# Patient Record
Sex: MALE | Race: WHITE | NOT HISPANIC OR LATINO | Employment: UNEMPLOYED | URBAN - METROPOLITAN AREA
[De-identification: names, ages, dates, MRNs, and addresses within clinical notes are randomized per-mention and may not be internally consistent; named-entity substitution may affect disease eponyms.]

---

## 2024-08-20 ENCOUNTER — APPOINTMENT (OUTPATIENT)
Dept: GENERAL RADIOLOGY | Facility: CLINIC | Age: 24
DRG: 853 | End: 2024-08-20
Attending: STUDENT IN AN ORGANIZED HEALTH CARE EDUCATION/TRAINING PROGRAM
Payer: MEDICARE

## 2024-08-20 ENCOUNTER — HOSPITAL ENCOUNTER (INPATIENT)
Facility: CLINIC | Age: 24
LOS: 30 days | Discharge: HOSPICE | DRG: 853 | End: 2024-09-19
Attending: INTERNAL MEDICINE | Admitting: INTERNAL MEDICINE
Payer: MEDICARE

## 2024-08-20 ENCOUNTER — APPOINTMENT (OUTPATIENT)
Dept: GENERAL RADIOLOGY | Facility: CLINIC | Age: 24
DRG: 853 | End: 2024-08-20
Attending: INTERNAL MEDICINE
Payer: MEDICARE

## 2024-08-20 DIAGNOSIS — R05.8 DRY COUGH: ICD-10-CM

## 2024-08-20 DIAGNOSIS — F84.0 AUTISM: ICD-10-CM

## 2024-08-20 DIAGNOSIS — B95.5 BACTEREMIA DUE TO STREPTOCOCCUS: ICD-10-CM

## 2024-08-20 DIAGNOSIS — R21 RASH: ICD-10-CM

## 2024-08-20 DIAGNOSIS — Z98.890 HISTORY OF BRONCHOSCOPY: ICD-10-CM

## 2024-08-20 DIAGNOSIS — I50.20 HEART FAILURE WITH REDUCED EJECTION FRACTION, NYHA CLASS II (H): Primary | ICD-10-CM

## 2024-08-20 DIAGNOSIS — Z78.9 TAKES DIETARY SUPPLEMENTS: ICD-10-CM

## 2024-08-20 DIAGNOSIS — R78.81 BACTEREMIA DUE TO STREPTOCOCCUS: ICD-10-CM

## 2024-08-20 DIAGNOSIS — F41.9 ANXIETY: ICD-10-CM

## 2024-08-20 DIAGNOSIS — R52 PAIN: ICD-10-CM

## 2024-08-20 DIAGNOSIS — R23.8 DRY SCALP: ICD-10-CM

## 2024-08-20 DIAGNOSIS — K59.00 CONSTIPATION, UNSPECIFIED CONSTIPATION TYPE: ICD-10-CM

## 2024-08-20 DIAGNOSIS — R57.0 CARDIOGENIC SHOCK (H): ICD-10-CM

## 2024-08-20 LAB
ALLEN'S TEST: ABNORMAL
BASE EXCESS BLDA CALC-SCNC: 1.8 MMOL/L (ref -3–3)
BASE EXCESS BLDV CALC-SCNC: 2.6 MMOL/L (ref -3–3)
BASOPHILS # BLD AUTO: 0.1 10E3/UL (ref 0–0.2)
BASOPHILS NFR BLD AUTO: 1 %
COHGB MFR BLD: 93.3 % (ref 96–97)
EOSINOPHIL # BLD AUTO: 0 10E3/UL (ref 0–0.7)
EOSINOPHIL NFR BLD AUTO: 0 %
ERYTHROCYTE [DISTWIDTH] IN BLOOD BY AUTOMATED COUNT: 13.2 % (ref 10–15)
GLUCOSE BLDC GLUCOMTR-MCNC: 133 MG/DL (ref 70–99)
HCO3 BLD-SCNC: 28 MMOL/L (ref 21–28)
HCO3 BLDV-SCNC: 29 MMOL/L (ref 21–28)
HCT VFR BLD AUTO: 44.2 % (ref 40–53)
HGB BLD-MCNC: 14.4 G/DL (ref 13.3–17.7)
IMM GRANULOCYTES # BLD: 0.1 10E3/UL
IMM GRANULOCYTES NFR BLD: 1 %
LACTATE SERPL-SCNC: 1.4 MMOL/L (ref 0.7–2)
LYMPHOCYTES # BLD AUTO: 4 10E3/UL (ref 0.8–5.3)
LYMPHOCYTES NFR BLD AUTO: 20 %
MCH RBC QN AUTO: 28.2 PG (ref 26.5–33)
MCHC RBC AUTO-ENTMCNC: 32.6 G/DL (ref 31.5–36.5)
MCV RBC AUTO: 87 FL (ref 78–100)
MONOCYTES # BLD AUTO: 2.8 10E3/UL (ref 0–1.3)
MONOCYTES NFR BLD AUTO: 14 %
NEUTROPHILS # BLD AUTO: 13.2 10E3/UL (ref 1.6–8.3)
NEUTROPHILS NFR BLD AUTO: 64 %
NRBC # BLD AUTO: 0 10E3/UL
NRBC BLD AUTO-RTO: 0 /100
O2/TOTAL GAS SETTING VFR VENT: 100 %
O2/TOTAL GAS SETTING VFR VENT: 100 %
OXYHGB MFR BLDV: 71 % (ref 70–75)
PCO2 BLD: 46 MM HG (ref 35–45)
PCO2 BLDV: 52 MM HG (ref 40–50)
PH BLD: 7.38 [PH] (ref 7.35–7.45)
PH BLDV: 7.36 [PH] (ref 7.32–7.43)
PLATELET # BLD AUTO: 315 10E3/UL (ref 150–450)
PO2 BLD: 66 MM HG (ref 80–105)
PO2 BLDV: 39 MM HG (ref 25–47)
RBC # BLD AUTO: 5.11 10E6/UL (ref 4.4–5.9)
SAO2 % BLDA: 92 % (ref 92–100)
SAO2 % BLDV: 72 % (ref 70–75)
WBC # BLD AUTO: 20.2 10E3/UL (ref 4–11)

## 2024-08-20 PROCEDURE — 83605 ASSAY OF LACTIC ACID: CPT | Performed by: STUDENT IN AN ORGANIZED HEALTH CARE EDUCATION/TRAINING PROGRAM

## 2024-08-20 PROCEDURE — 83880 ASSAY OF NATRIURETIC PEPTIDE: CPT | Performed by: STUDENT IN AN ORGANIZED HEALTH CARE EDUCATION/TRAINING PROGRAM

## 2024-08-20 PROCEDURE — 71045 X-RAY EXAM CHEST 1 VIEW: CPT

## 2024-08-20 PROCEDURE — 83735 ASSAY OF MAGNESIUM: CPT | Performed by: STUDENT IN AN ORGANIZED HEALTH CARE EDUCATION/TRAINING PROGRAM

## 2024-08-20 PROCEDURE — 71045 X-RAY EXAM CHEST 1 VIEW: CPT | Mod: 26 | Performed by: RADIOLOGY

## 2024-08-20 PROCEDURE — 85652 RBC SED RATE AUTOMATED: CPT | Performed by: STUDENT IN AN ORGANIZED HEALTH CARE EDUCATION/TRAINING PROGRAM

## 2024-08-20 PROCEDURE — 82805 BLOOD GASES W/O2 SATURATION: CPT | Performed by: STUDENT IN AN ORGANIZED HEALTH CARE EDUCATION/TRAINING PROGRAM

## 2024-08-20 PROCEDURE — 85025 COMPLETE CBC W/AUTO DIFF WBC: CPT | Performed by: STUDENT IN AN ORGANIZED HEALTH CARE EDUCATION/TRAINING PROGRAM

## 2024-08-20 PROCEDURE — 84100 ASSAY OF PHOSPHORUS: CPT | Performed by: STUDENT IN AN ORGANIZED HEALTH CARE EDUCATION/TRAINING PROGRAM

## 2024-08-20 PROCEDURE — 99291 CRITICAL CARE FIRST HOUR: CPT | Mod: GC | Performed by: INTERNAL MEDICINE

## 2024-08-20 PROCEDURE — 74018 RADEX ABDOMEN 1 VIEW: CPT | Mod: 26 | Performed by: RADIOLOGY

## 2024-08-20 PROCEDURE — 36415 COLL VENOUS BLD VENIPUNCTURE: CPT | Performed by: INTERNAL MEDICINE

## 2024-08-20 PROCEDURE — 94002 VENT MGMT INPAT INIT DAY: CPT

## 2024-08-20 PROCEDURE — 82248 BILIRUBIN DIRECT: CPT | Performed by: STUDENT IN AN ORGANIZED HEALTH CARE EDUCATION/TRAINING PROGRAM

## 2024-08-20 PROCEDURE — 200N000002 HC R&B ICU UMMC

## 2024-08-20 PROCEDURE — 99292 CRITICAL CARE ADDL 30 MIN: CPT | Mod: GC | Performed by: INTERNAL MEDICINE

## 2024-08-20 PROCEDURE — 86140 C-REACTIVE PROTEIN: CPT | Performed by: STUDENT IN AN ORGANIZED HEALTH CARE EDUCATION/TRAINING PROGRAM

## 2024-08-20 PROCEDURE — 999N000157 HC STATISTIC RCP TIME EA 10 MIN

## 2024-08-20 PROCEDURE — 5A1955Z RESPIRATORY VENTILATION, GREATER THAN 96 CONSECUTIVE HOURS: ICD-10-PCS | Performed by: INTERNAL MEDICINE

## 2024-08-20 PROCEDURE — 82550 ASSAY OF CK (CPK): CPT | Performed by: STUDENT IN AN ORGANIZED HEALTH CARE EDUCATION/TRAINING PROGRAM

## 2024-08-20 PROCEDURE — 84484 ASSAY OF TROPONIN QUANT: CPT | Performed by: STUDENT IN AN ORGANIZED HEALTH CARE EDUCATION/TRAINING PROGRAM

## 2024-08-20 PROCEDURE — 74018 RADEX ABDOMEN 1 VIEW: CPT

## 2024-08-20 PROCEDURE — 87040 BLOOD CULTURE FOR BACTERIA: CPT | Performed by: INTERNAL MEDICINE

## 2024-08-20 RX ORDER — NALOXONE HYDROCHLORIDE 0.4 MG/ML
0.4 INJECTION, SOLUTION INTRAMUSCULAR; INTRAVENOUS; SUBCUTANEOUS
Status: DISCONTINUED | OUTPATIENT
Start: 2024-08-20 | End: 2024-09-19 | Stop reason: HOSPADM

## 2024-08-20 RX ORDER — LIDOCAINE 40 MG/G
CREAM TOPICAL
Status: DISCONTINUED | OUTPATIENT
Start: 2024-08-20 | End: 2024-09-19 | Stop reason: HOSPADM

## 2024-08-20 RX ORDER — NALOXONE HYDROCHLORIDE 0.4 MG/ML
0.2 INJECTION, SOLUTION INTRAMUSCULAR; INTRAVENOUS; SUBCUTANEOUS
Status: DISCONTINUED | OUTPATIENT
Start: 2024-08-20 | End: 2024-09-19 | Stop reason: HOSPADM

## 2024-08-20 RX ORDER — HEPARIN SODIUM 5000 [USP'U]/.5ML
5000 INJECTION, SOLUTION INTRAVENOUS; SUBCUTANEOUS EVERY 8 HOURS
Status: DISCONTINUED | OUTPATIENT
Start: 2024-08-21 | End: 2024-08-22

## 2024-08-20 RX ORDER — MIDAZOLAM HCL IN 0.9 % NACL/PF 1 MG/ML
1-8 PLASTIC BAG, INJECTION (ML) INTRAVENOUS CONTINUOUS
Status: DISCONTINUED | OUTPATIENT
Start: 2024-08-20 | End: 2024-08-26

## 2024-08-20 RX ORDER — NOREPINEPHRINE BITARTRATE 0.06 MG/ML
.01-.6 INJECTION, SOLUTION INTRAVENOUS CONTINUOUS
Status: DISCONTINUED | OUTPATIENT
Start: 2024-08-20 | End: 2024-08-22

## 2024-08-20 RX ORDER — CEFEPIME HYDROCHLORIDE 2 G/1
2 INJECTION, POWDER, FOR SOLUTION INTRAVENOUS EVERY 8 HOURS
Status: DISCONTINUED | OUTPATIENT
Start: 2024-08-21 | End: 2024-08-21

## 2024-08-20 RX ORDER — MEROPENEM 1 G/1
1 INJECTION, POWDER, FOR SOLUTION INTRAVENOUS EVERY 8 HOURS
Status: DISCONTINUED | OUTPATIENT
Start: 2024-08-20 | End: 2024-08-20

## 2024-08-20 RX ORDER — ACETAMINOPHEN 325 MG/1
650 TABLET ORAL EVERY 4 HOURS PRN
Status: DISCONTINUED | OUTPATIENT
Start: 2024-08-20 | End: 2024-08-21

## 2024-08-20 RX ORDER — ACETAMINOPHEN 650 MG/1
650 SUPPOSITORY RECTAL EVERY 4 HOURS PRN
Status: DISCONTINUED | OUTPATIENT
Start: 2024-08-20 | End: 2024-08-21

## 2024-08-20 RX ORDER — IPRATROPIUM BROMIDE AND ALBUTEROL SULFATE 2.5; .5 MG/3ML; MG/3ML
3 SOLUTION RESPIRATORY (INHALATION) ONCE
Status: COMPLETED | OUTPATIENT
Start: 2024-08-21 | End: 2024-08-21

## 2024-08-20 RX ORDER — AMOXICILLIN 250 MG
1 CAPSULE ORAL 2 TIMES DAILY PRN
Status: DISCONTINUED | OUTPATIENT
Start: 2024-08-20 | End: 2024-08-22

## 2024-08-20 RX ORDER — AMOXICILLIN 250 MG
2 CAPSULE ORAL 2 TIMES DAILY PRN
Status: DISCONTINUED | OUTPATIENT
Start: 2024-08-20 | End: 2024-08-22

## 2024-08-20 RX ORDER — POLYETHYLENE GLYCOL 3350 17 G/17G
17 POWDER, FOR SOLUTION ORAL DAILY PRN
Status: DISCONTINUED | OUTPATIENT
Start: 2024-08-20 | End: 2024-09-19 | Stop reason: HOSPADM

## 2024-08-20 ASSESSMENT — ACTIVITIES OF DAILY LIVING (ADL): ADLS_ACUITY_SCORE: 35

## 2024-08-20 NOTE — Clinical Note
dry, intact, no bleeding and no hematoma. 9 fr sheath removed. 8 fr angiseal deployed. Light manual pressure held. Hemostasis achieved. Tegaderm dressing applied.

## 2024-08-20 NOTE — Clinical Note
IABP inserted in the right femoral artery. IABP inserted with 50 cc balloon volume Lot number is: [id number]

## 2024-08-20 NOTE — Clinical Note
Procedure is scheduled in [facility]. Body Of Note (Please Add Your Own Text Here): S/p TBSE today Price (Do Not Change): 0.00 Detail Level: Simple Instructions: This plan will send the code FBSE to the PM system.  DO NOT or CHANGE the price.

## 2024-08-20 NOTE — Clinical Note
Secured with Catheter remains in place at 50cm.    Secured in normal fashion with tegadeDibspace tech.

## 2024-08-21 ENCOUNTER — APPOINTMENT (OUTPATIENT)
Dept: CARDIOLOGY | Facility: CLINIC | Age: 24
DRG: 853 | End: 2024-08-21
Attending: STUDENT IN AN ORGANIZED HEALTH CARE EDUCATION/TRAINING PROGRAM
Payer: MEDICARE

## 2024-08-21 ENCOUNTER — DOCUMENTATION ONLY (OUTPATIENT)
Dept: OTHER | Facility: CLINIC | Age: 24
End: 2024-08-21

## 2024-08-21 ENCOUNTER — APPOINTMENT (OUTPATIENT)
Dept: CT IMAGING | Facility: CLINIC | Age: 24
DRG: 853 | End: 2024-08-21
Attending: STUDENT IN AN ORGANIZED HEALTH CARE EDUCATION/TRAINING PROGRAM
Payer: MEDICARE

## 2024-08-21 ENCOUNTER — APPOINTMENT (OUTPATIENT)
Dept: GENERAL RADIOLOGY | Facility: CLINIC | Age: 24
DRG: 853 | End: 2024-08-21
Attending: STUDENT IN AN ORGANIZED HEALTH CARE EDUCATION/TRAINING PROGRAM
Payer: MEDICARE

## 2024-08-21 LAB
ALBUMIN SERPL BCG-MCNC: 3.3 G/DL (ref 3.5–5.2)
ALBUMIN SERPL BCG-MCNC: 3.4 G/DL (ref 3.5–5.2)
ALBUMIN SERPL BCG-MCNC: 3.5 G/DL (ref 3.5–5.2)
ALBUMIN SERPL BCG-MCNC: 3.5 G/DL (ref 3.5–5.2)
ALBUMIN SERPL BCG-MCNC: 3.6 G/DL (ref 3.5–5.2)
ALBUMIN UR-MCNC: 30 MG/DL
ALLEN'S TEST: ABNORMAL
ALP SERPL-CCNC: 67 U/L (ref 40–150)
ALP SERPL-CCNC: 68 U/L (ref 40–150)
ALP SERPL-CCNC: 73 U/L (ref 40–150)
ALP SERPL-CCNC: 73 U/L (ref 40–150)
ALP SERPL-CCNC: 74 U/L (ref 40–150)
ALT SERPL W P-5'-P-CCNC: 362 U/L (ref 0–70)
ALT SERPL W P-5'-P-CCNC: 368 U/L (ref 0–70)
ALT SERPL W P-5'-P-CCNC: 376 U/L (ref 0–70)
ALT SERPL W P-5'-P-CCNC: 405 U/L (ref 0–70)
ALT SERPL W P-5'-P-CCNC: 415 U/L (ref 0–70)
ANION GAP SERPL CALCULATED.3IONS-SCNC: 12 MMOL/L (ref 7–15)
ANION GAP SERPL CALCULATED.3IONS-SCNC: 13 MMOL/L (ref 7–15)
ANION GAP SERPL CALCULATED.3IONS-SCNC: 14 MMOL/L (ref 7–15)
ANION GAP SERPL CALCULATED.3IONS-SCNC: 14 MMOL/L (ref 7–15)
ANION GAP SERPL CALCULATED.3IONS-SCNC: 17 MMOL/L (ref 7–15)
APPEARANCE UR: CLEAR
AST SERPL W P-5'-P-CCNC: 216 U/L (ref 0–45)
AST SERPL W P-5'-P-CCNC: 267 U/L (ref 0–45)
AST SERPL W P-5'-P-CCNC: 341 U/L (ref 0–45)
AST SERPL W P-5'-P-CCNC: 418 U/L (ref 0–45)
AST SERPL W P-5'-P-CCNC: 435 U/L (ref 0–45)
BASE EXCESS BLDA CALC-SCNC: 1.5 MMOL/L (ref -3–3)
BASE EXCESS BLDA CALC-SCNC: 2.9 MMOL/L (ref -3–3)
BASE EXCESS BLDA CALC-SCNC: 3.4 MMOL/L (ref -3–3)
BASE EXCESS BLDA CALC-SCNC: 5.7 MMOL/L (ref -3–3)
BASE EXCESS BLDA CALC-SCNC: 6.7 MMOL/L (ref -3–3)
BASE EXCESS BLDA CALC-SCNC: 7.1 MMOL/L (ref -3–3)
BASE EXCESS BLDV CALC-SCNC: 2.6 MMOL/L (ref -3–3)
BASE EXCESS BLDV CALC-SCNC: 5.1 MMOL/L (ref -3–3)
BASE EXCESS BLDV CALC-SCNC: 6.9 MMOL/L (ref -3–3)
BASE EXCESS BLDV CALC-SCNC: 7.1 MMOL/L (ref -3–3)
BASOPHILS # BLD AUTO: 0 10E3/UL (ref 0–0.2)
BASOPHILS # BLD AUTO: 0.1 10E3/UL (ref 0–0.2)
BASOPHILS NFR BLD AUTO: 0 %
BASOPHILS NFR BLD AUTO: 1 %
BI-PLANE LVEF ECHO: NORMAL
BILIRUB DIRECT SERPL-MCNC: 0.22 MG/DL (ref 0–0.3)
BILIRUB DIRECT SERPL-MCNC: 0.24 MG/DL (ref 0–0.3)
BILIRUB DIRECT SERPL-MCNC: 0.3 MG/DL (ref 0–0.3)
BILIRUB SERPL-MCNC: 0.6 MG/DL
BILIRUB SERPL-MCNC: 0.7 MG/DL
BILIRUB SERPL-MCNC: 0.8 MG/DL
BILIRUB UR QL STRIP: ABNORMAL
BUN SERPL-MCNC: 11.3 MG/DL (ref 6–20)
BUN SERPL-MCNC: 11.5 MG/DL (ref 6–20)
BUN SERPL-MCNC: 11.7 MG/DL (ref 6–20)
BUN SERPL-MCNC: 12.6 MG/DL (ref 6–20)
BUN SERPL-MCNC: 14.3 MG/DL (ref 6–20)
C PNEUM DNA SPEC QL NAA+PROBE: NOT DETECTED
CALCIUM SERPL-MCNC: 7.9 MG/DL (ref 8.8–10.4)
CALCIUM SERPL-MCNC: 8.2 MG/DL (ref 8.8–10.4)
CALCIUM SERPL-MCNC: 8.2 MG/DL (ref 8.8–10.4)
CALCIUM SERPL-MCNC: 8.3 MG/DL (ref 8.8–10.4)
CALCIUM SERPL-MCNC: 8.4 MG/DL (ref 8.8–10.4)
CHLORIDE SERPL-SCNC: 102 MMOL/L (ref 98–107)
CHLORIDE SERPL-SCNC: 103 MMOL/L (ref 98–107)
CK SERPL-CCNC: 367 U/L (ref 39–308)
COHGB MFR BLD: 93.7 % (ref 96–97)
COHGB MFR BLD: 94.9 % (ref 96–97)
COHGB MFR BLD: 95.8 % (ref 96–97)
COHGB MFR BLD: 98.5 % (ref 96–97)
COHGB MFR BLD: 99.2 % (ref 96–97)
COHGB MFR BLD: >100 % (ref 96–97)
COLOR UR AUTO: YELLOW
CREAT SERPL-MCNC: 0.63 MG/DL (ref 0.67–1.17)
CREAT SERPL-MCNC: 0.68 MG/DL (ref 0.67–1.17)
CREAT SERPL-MCNC: 0.75 MG/DL (ref 0.67–1.17)
CREAT SERPL-MCNC: 0.75 MG/DL (ref 0.67–1.17)
CREAT SERPL-MCNC: 0.86 MG/DL (ref 0.67–1.17)
CRP SERPL-MCNC: 103 MG/L
EGFRCR SERPLBLD CKD-EPI 2021: >90 ML/MIN/1.73M2
EOSINOPHIL # BLD AUTO: 0 10E3/UL (ref 0–0.7)
EOSINOPHIL NFR BLD AUTO: 0 %
ERYTHROCYTE [DISTWIDTH] IN BLOOD BY AUTOMATED COUNT: 13.1 % (ref 10–15)
ERYTHROCYTE [DISTWIDTH] IN BLOOD BY AUTOMATED COUNT: 13.3 % (ref 10–15)
ERYTHROCYTE [DISTWIDTH] IN BLOOD BY AUTOMATED COUNT: 13.3 % (ref 10–15)
ERYTHROCYTE [DISTWIDTH] IN BLOOD BY AUTOMATED COUNT: 13.5 % (ref 10–15)
ERYTHROCYTE [SEDIMENTATION RATE] IN BLOOD BY WESTERGREN METHOD: 11 MM/HR (ref 0–15)
FLUAV H1 2009 PAND RNA SPEC QL NAA+PROBE: NOT DETECTED
FLUAV H1 RNA SPEC QL NAA+PROBE: NOT DETECTED
FLUAV H3 RNA SPEC QL NAA+PROBE: NOT DETECTED
FLUAV RNA SPEC QL NAA+PROBE: NOT DETECTED
FLUBV RNA SPEC QL NAA+PROBE: NOT DETECTED
GLUCOSE BLDC GLUCOMTR-MCNC: 120 MG/DL (ref 70–99)
GLUCOSE BLDC GLUCOMTR-MCNC: 124 MG/DL (ref 70–99)
GLUCOSE BLDC GLUCOMTR-MCNC: 127 MG/DL (ref 70–99)
GLUCOSE BLDC GLUCOMTR-MCNC: 145 MG/DL (ref 70–99)
GLUCOSE SERPL-MCNC: 124 MG/DL (ref 70–99)
GLUCOSE SERPL-MCNC: 132 MG/DL (ref 70–99)
GLUCOSE SERPL-MCNC: 134 MG/DL (ref 70–99)
GLUCOSE SERPL-MCNC: 142 MG/DL (ref 70–99)
GLUCOSE SERPL-MCNC: 155 MG/DL (ref 70–99)
GLUCOSE UR STRIP-MCNC: NEGATIVE MG/DL
HADV DNA SPEC QL NAA+PROBE: NOT DETECTED
HCO3 BLD-SCNC: 27 MMOL/L (ref 21–28)
HCO3 BLD-SCNC: 29 MMOL/L (ref 21–28)
HCO3 BLD-SCNC: 29 MMOL/L (ref 21–28)
HCO3 BLD-SCNC: 31 MMOL/L (ref 21–28)
HCO3 BLD-SCNC: 32 MMOL/L (ref 21–28)
HCO3 BLD-SCNC: 33 MMOL/L (ref 21–28)
HCO3 BLDV-SCNC: 29 MMOL/L (ref 21–28)
HCO3 BLDV-SCNC: 31 MMOL/L (ref 21–28)
HCO3 BLDV-SCNC: 33 MMOL/L (ref 21–28)
HCO3 BLDV-SCNC: 34 MMOL/L (ref 21–28)
HCO3 SERPL-SCNC: 23 MMOL/L (ref 22–29)
HCO3 SERPL-SCNC: 25 MMOL/L (ref 22–29)
HCO3 SERPL-SCNC: 27 MMOL/L (ref 22–29)
HCO3 SERPL-SCNC: 28 MMOL/L (ref 22–29)
HCO3 SERPL-SCNC: 30 MMOL/L (ref 22–29)
HCOV PNL SPEC NAA+PROBE: NOT DETECTED
HCT VFR BLD AUTO: 40.8 % (ref 40–53)
HCT VFR BLD AUTO: 41 % (ref 40–53)
HCT VFR BLD AUTO: 43.5 % (ref 40–53)
HCT VFR BLD AUTO: 45 % (ref 40–53)
HGB BLD-MCNC: 13.4 G/DL (ref 13.3–17.7)
HGB BLD-MCNC: 13.5 G/DL (ref 13.3–17.7)
HGB BLD-MCNC: 14.4 G/DL (ref 13.3–17.7)
HGB BLD-MCNC: 14.6 G/DL (ref 13.3–17.7)
HGB BLD-MCNC: 14.7 G/DL (ref 13.3–17.7)
HGB UR QL STRIP: ABNORMAL
HIV 1+2 AB+HIV1 P24 AG SERPL QL IA: NONREACTIVE
HMPV RNA SPEC QL NAA+PROBE: NOT DETECTED
HOLD SPECIMEN: NORMAL
HPIV1 RNA SPEC QL NAA+PROBE: NOT DETECTED
HPIV2 RNA SPEC QL NAA+PROBE: NOT DETECTED
HPIV3 RNA SPEC QL NAA+PROBE: NOT DETECTED
HPIV4 RNA SPEC QL NAA+PROBE: NOT DETECTED
HYALINE CASTS: 5 /LPF
IMM GRANULOCYTES # BLD: 0 10E3/UL
IMM GRANULOCYTES # BLD: 0.1 10E3/UL
IMM GRANULOCYTES # BLD: 0.1 10E3/UL
IMM GRANULOCYTES # BLD: 0.2 10E3/UL
IMM GRANULOCYTES NFR BLD: 0 %
IMM GRANULOCYTES NFR BLD: 1 %
KETONES UR STRIP-MCNC: >150 MG/DL
L PNEUMO1 AG UR QL IA: NEGATIVE
LACTATE SERPL-SCNC: 1.4 MMOL/L (ref 0.7–2)
LACTATE SERPL-SCNC: 1.5 MMOL/L (ref 0.7–2)
LACTATE SERPL-SCNC: 1.6 MMOL/L (ref 0.7–2)
LACTATE SERPL-SCNC: 1.9 MMOL/L (ref 0.7–2)
LEUKOCYTE ESTERASE UR QL STRIP: NEGATIVE
LYMPHOCYTES # BLD AUTO: 0.6 10E3/UL (ref 0.8–5.3)
LYMPHOCYTES # BLD AUTO: 0.9 10E3/UL (ref 0.8–5.3)
LYMPHOCYTES # BLD AUTO: 1.5 10E3/UL (ref 0.8–5.3)
LYMPHOCYTES # BLD AUTO: 3.2 10E3/UL (ref 0.8–5.3)
LYMPHOCYTES NFR BLD AUTO: 10 %
LYMPHOCYTES NFR BLD AUTO: 17 %
LYMPHOCYTES NFR BLD AUTO: 5 %
LYMPHOCYTES NFR BLD AUTO: 7 %
M PNEUMO DNA SPEC QL NAA+PROBE: NOT DETECTED
MAGNESIUM SERPL-MCNC: 1.7 MG/DL (ref 1.7–2.3)
MAGNESIUM SERPL-MCNC: 1.8 MG/DL (ref 1.7–2.3)
MAGNESIUM SERPL-MCNC: 2 MG/DL (ref 1.7–2.3)
MAGNESIUM SERPL-MCNC: 2.4 MG/DL (ref 1.7–2.3)
MAGNESIUM SERPL-MCNC: 2.7 MG/DL (ref 1.7–2.3)
MCH RBC QN AUTO: 28.2 PG (ref 26.5–33)
MCH RBC QN AUTO: 28.3 PG (ref 26.5–33)
MCH RBC QN AUTO: 28.4 PG (ref 26.5–33)
MCH RBC QN AUTO: 28.5 PG (ref 26.5–33)
MCHC RBC AUTO-ENTMCNC: 32.7 G/DL (ref 31.5–36.5)
MCHC RBC AUTO-ENTMCNC: 32.8 G/DL (ref 31.5–36.5)
MCHC RBC AUTO-ENTMCNC: 32.9 G/DL (ref 31.5–36.5)
MCHC RBC AUTO-ENTMCNC: 33.1 G/DL (ref 31.5–36.5)
MCV RBC AUTO: 86 FL (ref 78–100)
MCV RBC AUTO: 87 FL (ref 78–100)
MONOCYTES # BLD AUTO: 0.2 10E3/UL (ref 0–1.3)
MONOCYTES # BLD AUTO: 1 10E3/UL (ref 0–1.3)
MONOCYTES # BLD AUTO: 1.8 10E3/UL (ref 0–1.3)
MONOCYTES # BLD AUTO: 2.1 10E3/UL (ref 0–1.3)
MONOCYTES NFR BLD AUTO: 11 %
MONOCYTES NFR BLD AUTO: 11 %
MONOCYTES NFR BLD AUTO: 2 %
MONOCYTES NFR BLD AUTO: 8 %
MUCOUS THREADS #/AREA URNS LPF: PRESENT /LPF
NEUTROPHILS # BLD AUTO: 10.6 10E3/UL (ref 1.6–8.3)
NEUTROPHILS # BLD AUTO: 12 10E3/UL (ref 1.6–8.3)
NEUTROPHILS # BLD AUTO: 13.4 10E3/UL (ref 1.6–8.3)
NEUTROPHILS # BLD AUTO: 9.5 10E3/UL (ref 1.6–8.3)
NEUTROPHILS NFR BLD AUTO: 70 %
NEUTROPHILS NFR BLD AUTO: 78 %
NEUTROPHILS NFR BLD AUTO: 84 %
NEUTROPHILS NFR BLD AUTO: 93 %
NITRATE UR QL: NEGATIVE
NRBC # BLD AUTO: 0 10E3/UL
NRBC BLD AUTO-RTO: 0 /100
NT-PROBNP SERPL-MCNC: 6568 PG/ML (ref 0–450)
O2/TOTAL GAS SETTING VFR VENT: 100 %
O2/TOTAL GAS SETTING VFR VENT: 60 %
O2/TOTAL GAS SETTING VFR VENT: 70 %
O2/TOTAL GAS SETTING VFR VENT: 70 %
O2/TOTAL GAS SETTING VFR VENT: 80 %
O2/TOTAL GAS SETTING VFR VENT: 80 %
OXYHGB MFR BLDV: 61 % (ref 70–75)
OXYHGB MFR BLDV: 70 % (ref 70–75)
OXYHGB MFR BLDV: 73 % (ref 70–75)
OXYHGB MFR BLDV: 78 % (ref 70–75)
OXYHGB MFR BLDV: 82 % (ref 70–75)
PCO2 BLD: 45 MM HG (ref 35–45)
PCO2 BLD: 46 MM HG (ref 35–45)
PCO2 BLD: 46 MM HG (ref 35–45)
PCO2 BLD: 47 MM HG (ref 35–45)
PCO2 BLD: 47 MM HG (ref 35–45)
PCO2 BLD: 48 MM HG (ref 35–45)
PCO2 BLDV: 50 MM HG (ref 40–50)
PCO2 BLDV: 52 MM HG (ref 40–50)
PCO2 BLDV: 53 MM HG (ref 40–50)
PCO2 BLDV: 54 MM HG (ref 40–50)
PEEP: 12 CM H2O
PH BLD: 7.38 [PH] (ref 7.35–7.45)
PH BLD: 7.4 [PH] (ref 7.35–7.45)
PH BLD: 7.42 [PH] (ref 7.35–7.45)
PH BLD: 7.43 [PH] (ref 7.35–7.45)
PH BLD: 7.44 [PH] (ref 7.35–7.45)
PH BLD: 7.44 [PH] (ref 7.35–7.45)
PH BLDV: 7.36 [PH] (ref 7.32–7.43)
PH BLDV: 7.4 [PH] (ref 7.32–7.43)
PH BLDV: 7.4 [PH] (ref 7.32–7.43)
PH BLDV: 7.41 [PH] (ref 7.32–7.43)
PH UR STRIP: 5.5 [PH] (ref 5–7)
PHOSPHATE SERPL-MCNC: 2.8 MG/DL (ref 2.5–4.5)
PHOSPHATE SERPL-MCNC: 2.9 MG/DL (ref 2.5–4.5)
PHOSPHATE SERPL-MCNC: 3.4 MG/DL (ref 2.5–4.5)
PHOSPHATE SERPL-MCNC: 3.6 MG/DL (ref 2.5–4.5)
PHOSPHATE SERPL-MCNC: 3.8 MG/DL (ref 2.5–4.5)
PLATELET # BLD AUTO: 230 10E3/UL (ref 150–450)
PLATELET # BLD AUTO: 235 10E3/UL (ref 150–450)
PLATELET # BLD AUTO: 239 10E3/UL (ref 150–450)
PLATELET # BLD AUTO: 275 10E3/UL (ref 150–450)
PO2 BLD: 108 MM HG (ref 80–105)
PO2 BLD: 179 MM HG (ref 80–105)
PO2 BLD: 67 MM HG (ref 80–105)
PO2 BLD: 68 MM HG (ref 80–105)
PO2 BLD: 76 MM HG (ref 80–105)
PO2 BLD: 90 MM HG (ref 80–105)
PO2 BLDV: 34 MM HG (ref 25–47)
PO2 BLDV: 42 MM HG (ref 25–47)
PO2 BLDV: 44 MM HG (ref 25–47)
PO2 BLDV: 46 MM HG (ref 25–47)
POTASSIUM SERPL-SCNC: 2.9 MMOL/L (ref 3.4–5.3)
POTASSIUM SERPL-SCNC: 3.3 MMOL/L (ref 3.4–5.3)
POTASSIUM SERPL-SCNC: 3.3 MMOL/L (ref 3.4–5.3)
POTASSIUM SERPL-SCNC: 4.1 MMOL/L (ref 3.4–5.3)
POTASSIUM SERPL-SCNC: 4.1 MMOL/L (ref 3.4–5.3)
POTASSIUM SERPL-SCNC: 4.9 MMOL/L (ref 3.4–5.3)
PROT SERPL-MCNC: 5.9 G/DL (ref 6.4–8.3)
PROT SERPL-MCNC: 6.1 G/DL (ref 6.4–8.3)
PROT SERPL-MCNC: 6.2 G/DL (ref 6.4–8.3)
PROT SERPL-MCNC: 6.2 G/DL (ref 6.4–8.3)
PROT SERPL-MCNC: 6.3 G/DL (ref 6.4–8.3)
RBC # BLD AUTO: 4.74 10E6/UL (ref 4.4–5.9)
RBC # BLD AUTO: 4.78 10E6/UL (ref 4.4–5.9)
RBC # BLD AUTO: 5.05 10E6/UL (ref 4.4–5.9)
RBC # BLD AUTO: 5.18 10E6/UL (ref 4.4–5.9)
RBC URINE: 12 /HPF
RSV RNA SPEC QL NAA+PROBE: NOT DETECTED
RSV RNA SPEC QL NAA+PROBE: NOT DETECTED
RV+EV RNA SPEC QL NAA+PROBE: NOT DETECTED
S PNEUM AG SPEC QL: NEGATIVE
SAO2 % BLDA: 92 % (ref 92–100)
SAO2 % BLDA: 94 % (ref 92–100)
SAO2 % BLDA: 94 % (ref 92–100)
SAO2 % BLDA: 97 % (ref 92–100)
SAO2 % BLDA: 98 % (ref 92–100)
SAO2 % BLDA: 99 % (ref 92–100)
SAO2 % BLDV: 62.7 % (ref 70–75)
SAO2 % BLDV: 74.3 % (ref 70–75)
SAO2 % BLDV: 79.8 % (ref 70–75)
SAO2 % BLDV: 83.6 % (ref 70–75)
SODIUM SERPL-SCNC: 142 MMOL/L (ref 135–145)
SODIUM SERPL-SCNC: 143 MMOL/L (ref 135–145)
SODIUM SERPL-SCNC: 144 MMOL/L (ref 135–145)
SP GR UR STRIP: 1.02 (ref 1–1.03)
SQUAMOUS EPITHELIAL: <1 /HPF
TROPONIN T SERPL HS-MCNC: 279 NG/L
TROPONIN T SERPL HS-MCNC: 297 NG/L
UROBILINOGEN UR STRIP-MCNC: NORMAL MG/DL
WBC # BLD AUTO: 10.3 10E3/UL (ref 4–11)
WBC # BLD AUTO: 12.6 10E3/UL (ref 4–11)
WBC # BLD AUTO: 15.5 10E3/UL (ref 4–11)
WBC # BLD AUTO: 18.9 10E3/UL (ref 4–11)
WBC URINE: 4 /HPF

## 2024-08-21 PROCEDURE — 83735 ASSAY OF MAGNESIUM: CPT | Performed by: STUDENT IN AN ORGANIZED HEALTH CARE EDUCATION/TRAINING PROGRAM

## 2024-08-21 PROCEDURE — 84100 ASSAY OF PHOSPHORUS: CPT | Performed by: STUDENT IN AN ORGANIZED HEALTH CARE EDUCATION/TRAINING PROGRAM

## 2024-08-21 PROCEDURE — 87899 AGENT NOS ASSAY W/OPTIC: CPT | Performed by: INTERNAL MEDICINE

## 2024-08-21 PROCEDURE — 31622 DX BRONCHOSCOPE/WASH: CPT

## 2024-08-21 PROCEDURE — 999N000065 XR CHEST PORT 1 VIEW

## 2024-08-21 PROCEDURE — C1769 GUIDE WIRE: HCPCS | Performed by: INTERNAL MEDICINE

## 2024-08-21 PROCEDURE — 86658 ENTEROVIRUS ANTIBODY: CPT | Performed by: STUDENT IN AN ORGANIZED HEALTH CARE EDUCATION/TRAINING PROGRAM

## 2024-08-21 PROCEDURE — 250N000009 HC RX 250: Performed by: INTERNAL MEDICINE

## 2024-08-21 PROCEDURE — 4A023N6 MEASUREMENT OF CARDIAC SAMPLING AND PRESSURE, RIGHT HEART, PERCUTANEOUS APPROACH: ICD-10-PCS | Performed by: INTERNAL MEDICINE

## 2024-08-21 PROCEDURE — 87449 NOS EACH ORGANISM AG IA: CPT | Performed by: INTERNAL MEDICINE

## 2024-08-21 PROCEDURE — 84132 ASSAY OF SERUM POTASSIUM: CPT | Performed by: STUDENT IN AN ORGANIZED HEALTH CARE EDUCATION/TRAINING PROGRAM

## 2024-08-21 PROCEDURE — 81001 URINALYSIS AUTO W/SCOPE: CPT | Performed by: STUDENT IN AN ORGANIZED HEALTH CARE EDUCATION/TRAINING PROGRAM

## 2024-08-21 PROCEDURE — 86747 PARVOVIRUS ANTIBODY: CPT | Performed by: STUDENT IN AN ORGANIZED HEALTH CARE EDUCATION/TRAINING PROGRAM

## 2024-08-21 PROCEDURE — 85018 HEMOGLOBIN: CPT | Performed by: STUDENT IN AN ORGANIZED HEALTH CARE EDUCATION/TRAINING PROGRAM

## 2024-08-21 PROCEDURE — 82310 ASSAY OF CALCIUM: CPT | Performed by: STUDENT IN AN ORGANIZED HEALTH CARE EDUCATION/TRAINING PROGRAM

## 2024-08-21 PROCEDURE — 85025 COMPLETE CBC W/AUTO DIFF WBC: CPT

## 2024-08-21 PROCEDURE — C1894 INTRO/SHEATH, NON-LASER: HCPCS | Performed by: INTERNAL MEDICINE

## 2024-08-21 PROCEDURE — 250N000013 HC RX MED GY IP 250 OP 250 PS 637: Performed by: INTERNAL MEDICINE

## 2024-08-21 PROCEDURE — 258N000003 HC RX IP 258 OP 636: Performed by: STUDENT IN AN ORGANIZED HEALTH CARE EDUCATION/TRAINING PROGRAM

## 2024-08-21 PROCEDURE — 82805 BLOOD GASES W/O2 SATURATION: CPT | Performed by: STUDENT IN AN ORGANIZED HEALTH CARE EDUCATION/TRAINING PROGRAM

## 2024-08-21 PROCEDURE — 82805 BLOOD GASES W/O2 SATURATION: CPT | Performed by: INTERNAL MEDICINE

## 2024-08-21 PROCEDURE — 87205 SMEAR GRAM STAIN: CPT | Performed by: STUDENT IN AN ORGANIZED HEALTH CARE EDUCATION/TRAINING PROGRAM

## 2024-08-21 PROCEDURE — 272N000001 HC OR GENERAL SUPPLY STERILE: Performed by: INTERNAL MEDICINE

## 2024-08-21 PROCEDURE — 250N000012 HC RX MED GY IP 250 OP 636 PS 637: Performed by: STUDENT IN AN ORGANIZED HEALTH CARE EDUCATION/TRAINING PROGRAM

## 2024-08-21 PROCEDURE — 83605 ASSAY OF LACTIC ACID: CPT | Performed by: STUDENT IN AN ORGANIZED HEALTH CARE EDUCATION/TRAINING PROGRAM

## 2024-08-21 PROCEDURE — 0BJ08ZZ INSPECTION OF TRACHEOBRONCHIAL TREE, VIA NATURAL OR ARTIFICIAL OPENING ENDOSCOPIC: ICD-10-PCS | Performed by: STUDENT IN AN ORGANIZED HEALTH CARE EDUCATION/TRAINING PROGRAM

## 2024-08-21 PROCEDURE — 88307 TISSUE EXAM BY PATHOLOGIST: CPT | Mod: TC | Performed by: INTERNAL MEDICINE

## 2024-08-21 PROCEDURE — 250N000011 HC RX IP 250 OP 636: Performed by: INTERNAL MEDICINE

## 2024-08-21 PROCEDURE — 255N000002 HC RX 255 OP 636: Performed by: INTERNAL MEDICINE

## 2024-08-21 PROCEDURE — 84484 ASSAY OF TROPONIN QUANT: CPT | Performed by: STUDENT IN AN ORGANIZED HEALTH CARE EDUCATION/TRAINING PROGRAM

## 2024-08-21 PROCEDURE — 71270 CT THORAX DX C-/C+: CPT | Mod: 26 | Performed by: RADIOLOGY

## 2024-08-21 PROCEDURE — 02HP32Z INSERTION OF MONITORING DEVICE INTO PULMONARY TRUNK, PERCUTANEOUS APPROACH: ICD-10-PCS | Performed by: INTERNAL MEDICINE

## 2024-08-21 PROCEDURE — 250N000013 HC RX MED GY IP 250 OP 250 PS 637: Performed by: STUDENT IN AN ORGANIZED HEALTH CARE EDUCATION/TRAINING PROGRAM

## 2024-08-21 PROCEDURE — 99153 MOD SED SAME PHYS/QHP EA: CPT

## 2024-08-21 PROCEDURE — C1751 CATH, INF, PER/CENT/MIDLINE: HCPCS | Performed by: INTERNAL MEDICINE

## 2024-08-21 PROCEDURE — 70450 CT HEAD/BRAIN W/O DYE: CPT | Mod: 26 | Performed by: RADIOLOGY

## 2024-08-21 PROCEDURE — 86038 ANTINUCLEAR ANTIBODIES: CPT | Performed by: STUDENT IN AN ORGANIZED HEALTH CARE EDUCATION/TRAINING PROGRAM

## 2024-08-21 PROCEDURE — 80048 BASIC METABOLIC PNL TOTAL CA: CPT | Performed by: STUDENT IN AN ORGANIZED HEALTH CARE EDUCATION/TRAINING PROGRAM

## 2024-08-21 PROCEDURE — 4A1239Z MONITORING OF CARDIAC OUTPUT, PERCUTANEOUS APPROACH: ICD-10-PCS | Performed by: INTERNAL MEDICINE

## 2024-08-21 PROCEDURE — 258N000003 HC RX IP 258 OP 636: Performed by: INTERNAL MEDICINE

## 2024-08-21 PROCEDURE — 999N000157 HC STATISTIC RCP TIME EA 10 MIN

## 2024-08-21 PROCEDURE — 87385 HISTOPLASMA CAPSUL AG IA: CPT | Performed by: INTERNAL MEDICINE

## 2024-08-21 PROCEDURE — 74178 CT ABD&PLV WO CNTR FLWD CNTR: CPT | Mod: 26 | Performed by: RADIOLOGY

## 2024-08-21 PROCEDURE — 02BK3ZX EXCISION OF RIGHT VENTRICLE, PERCUTANEOUS APPROACH, DIAGNOSTIC: ICD-10-PCS | Performed by: INTERNAL MEDICINE

## 2024-08-21 PROCEDURE — 99152 MOD SED SAME PHYS/QHP 5/>YRS: CPT | Mod: GC | Performed by: INTERNAL MEDICINE

## 2024-08-21 PROCEDURE — 250N000011 HC RX IP 250 OP 636: Performed by: STUDENT IN AN ORGANIZED HEALTH CARE EDUCATION/TRAINING PROGRAM

## 2024-08-21 PROCEDURE — 86618 LYME DISEASE ANTIBODY: CPT | Performed by: STUDENT IN AN ORGANIZED HEALTH CARE EDUCATION/TRAINING PROGRAM

## 2024-08-21 PROCEDURE — 82810 BLOOD GASES O2 SAT ONLY: CPT

## 2024-08-21 PROCEDURE — 94003 VENT MGMT INPAT SUBQ DAY: CPT

## 2024-08-21 PROCEDURE — C8929 TTE W OR WO FOL WCON,DOPPLER: HCPCS

## 2024-08-21 PROCEDURE — 94640 AIRWAY INHALATION TREATMENT: CPT

## 2024-08-21 PROCEDURE — 250N000009 HC RX 250: Performed by: STUDENT IN AN ORGANIZED HEALTH CARE EDUCATION/TRAINING PROGRAM

## 2024-08-21 PROCEDURE — 200N000002 HC R&B ICU UMMC

## 2024-08-21 PROCEDURE — 85025 COMPLETE CBC W/AUTO DIFF WBC: CPT | Performed by: STUDENT IN AN ORGANIZED HEALTH CARE EDUCATION/TRAINING PROGRAM

## 2024-08-21 PROCEDURE — 88307 TISSUE EXAM BY PATHOLOGIST: CPT | Mod: 26 | Performed by: PATHOLOGY

## 2024-08-21 PROCEDURE — 99223 1ST HOSP IP/OBS HIGH 75: CPT | Performed by: INTERNAL MEDICINE

## 2024-08-21 PROCEDURE — 93306 TTE W/DOPPLER COMPLETE: CPT | Mod: 26 | Performed by: INTERNAL MEDICINE

## 2024-08-21 PROCEDURE — 71045 X-RAY EXAM CHEST 1 VIEW: CPT | Mod: 26 | Performed by: RADIOLOGY

## 2024-08-21 PROCEDURE — 99222 1ST HOSP IP/OBS MODERATE 55: CPT | Mod: 25 | Performed by: STUDENT IN AN ORGANIZED HEALTH CARE EDUCATION/TRAINING PROGRAM

## 2024-08-21 PROCEDURE — 93505 ENDOMYOCARDIAL BIOPSY: CPT | Performed by: INTERNAL MEDICINE

## 2024-08-21 PROCEDURE — 93505 ENDOMYOCARDIAL BIOPSY: CPT | Mod: 26 | Performed by: INTERNAL MEDICINE

## 2024-08-21 PROCEDURE — 87389 HIV-1 AG W/HIV-1&-2 AB AG IA: CPT | Performed by: STUDENT IN AN ORGANIZED HEALTH CARE EDUCATION/TRAINING PROGRAM

## 2024-08-21 PROCEDURE — 82248 BILIRUBIN DIRECT: CPT | Performed by: STUDENT IN AN ORGANIZED HEALTH CARE EDUCATION/TRAINING PROGRAM

## 2024-08-21 PROCEDURE — 74178 CT ABD&PLV WO CNTR FLWD CNTR: CPT

## 2024-08-21 PROCEDURE — 71270 CT THORAX DX C-/C+: CPT

## 2024-08-21 PROCEDURE — 87799 DETECT AGENT NOS DNA QUANT: CPT | Performed by: STUDENT IN AN ORGANIZED HEALTH CARE EDUCATION/TRAINING PROGRAM

## 2024-08-21 PROCEDURE — 4A133B3 MONITORING OF ARTERIAL PRESSURE, PULMONARY, PERCUTANEOUS APPROACH: ICD-10-PCS | Performed by: INTERNAL MEDICINE

## 2024-08-21 PROCEDURE — 87476 LYME DIS DNA AMP PROBE: CPT | Performed by: STUDENT IN AN ORGANIZED HEALTH CARE EDUCATION/TRAINING PROGRAM

## 2024-08-21 PROCEDURE — 70450 CT HEAD/BRAIN W/O DYE: CPT

## 2024-08-21 PROCEDURE — 86036 ANCA SCREEN EACH ANTIBODY: CPT | Performed by: STUDENT IN AN ORGANIZED HEALTH CARE EDUCATION/TRAINING PROGRAM

## 2024-08-21 PROCEDURE — 99291 CRITICAL CARE FIRST HOUR: CPT | Mod: 25 | Performed by: INTERNAL MEDICINE

## 2024-08-21 PROCEDURE — 87486 CHLMYD PNEUM DNA AMP PROBE: CPT | Performed by: STUDENT IN AN ORGANIZED HEALTH CARE EDUCATION/TRAINING PROGRAM

## 2024-08-21 PROCEDURE — 99152 MOD SED SAME PHYS/QHP 5/>YRS: CPT

## 2024-08-21 PROCEDURE — 999N000208 ECHOCARDIOGRAM COMPLETE

## 2024-08-21 RX ORDER — AMPICILLIN AND SULBACTAM 2; 1 G/1; G/1
3 INJECTION, POWDER, FOR SOLUTION INTRAMUSCULAR; INTRAVENOUS EVERY 6 HOURS
Status: DISCONTINUED | OUTPATIENT
Start: 2024-08-21 | End: 2024-08-26

## 2024-08-21 RX ORDER — VANCOMYCIN HYDROCHLORIDE 1 G/200ML
1000 INJECTION, SOLUTION INTRAVENOUS EVERY 8 HOURS
Status: DISCONTINUED | OUTPATIENT
Start: 2024-08-21 | End: 2024-08-21

## 2024-08-21 RX ORDER — ALBUTEROL SULFATE 0.83 MG/ML
2.5 SOLUTION RESPIRATORY (INHALATION)
Status: ACTIVE | OUTPATIENT
Start: 2024-08-21 | End: 2024-08-21

## 2024-08-21 RX ORDER — MEPERIDINE HYDROCHLORIDE 25 MG/ML
25 INJECTION INTRAMUSCULAR; INTRAVENOUS; SUBCUTANEOUS EVERY 30 MIN PRN
Status: ACTIVE | OUTPATIENT
Start: 2024-08-21 | End: 2024-08-21

## 2024-08-21 RX ORDER — METHYLPREDNISOLONE SODIUM SUCCINATE 125 MG/2ML
125 INJECTION, POWDER, LYOPHILIZED, FOR SOLUTION INTRAMUSCULAR; INTRAVENOUS
Status: ACTIVE | OUTPATIENT
Start: 2024-08-21 | End: 2024-08-21

## 2024-08-21 RX ORDER — POTASSIUM CHLORIDE 29.8 MG/ML
20 INJECTION INTRAVENOUS
Status: COMPLETED | OUTPATIENT
Start: 2024-08-21 | End: 2024-08-21

## 2024-08-21 RX ORDER — ACETAMINOPHEN 325 MG/1
650 TABLET ORAL EVERY 8 HOURS PRN
Status: DISCONTINUED | OUTPATIENT
Start: 2024-08-21 | End: 2024-09-19 | Stop reason: HOSPADM

## 2024-08-21 RX ORDER — AZITHROMYCIN 250 MG/1
250 TABLET, FILM COATED ORAL DAILY
Status: DISCONTINUED | OUTPATIENT
Start: 2024-08-21 | End: 2024-08-22

## 2024-08-21 RX ORDER — MAGNESIUM SULFATE HEPTAHYDRATE 40 MG/ML
2 INJECTION, SOLUTION INTRAVENOUS ONCE
Status: COMPLETED | OUTPATIENT
Start: 2024-08-21 | End: 2024-08-21

## 2024-08-21 RX ORDER — POTASSIUM CHLORIDE 29.8 MG/ML
20 INJECTION INTRAVENOUS
Status: DISCONTINUED | OUTPATIENT
Start: 2024-08-21 | End: 2024-08-21

## 2024-08-21 RX ORDER — CHLORHEXIDINE GLUCONATE ORAL RINSE 1.2 MG/ML
15 SOLUTION DENTAL EVERY 12 HOURS
Status: DISCONTINUED | OUTPATIENT
Start: 2024-08-21 | End: 2024-08-29

## 2024-08-21 RX ORDER — POTASSIUM CHLORIDE 1.5 G/1.58G
40 POWDER, FOR SOLUTION ORAL ONCE
Status: COMPLETED | OUTPATIENT
Start: 2024-08-21 | End: 2024-08-21

## 2024-08-21 RX ORDER — IOPAMIDOL 755 MG/ML
135 INJECTION, SOLUTION INTRAVASCULAR ONCE
Status: COMPLETED | OUTPATIENT
Start: 2024-08-21 | End: 2024-08-21

## 2024-08-21 RX ORDER — DEXTROSE MONOHYDRATE 25 G/50ML
25-50 INJECTION, SOLUTION INTRAVENOUS
Status: DISCONTINUED | OUTPATIENT
Start: 2024-08-21 | End: 2024-08-23

## 2024-08-21 RX ORDER — ALBUTEROL SULFATE 90 UG/1
1-2 AEROSOL, METERED RESPIRATORY (INHALATION)
Status: ACTIVE | OUTPATIENT
Start: 2024-08-21 | End: 2024-08-21

## 2024-08-21 RX ORDER — POTASSIUM CHLORIDE 29.8 MG/ML
20 INJECTION INTRAVENOUS ONCE
Status: COMPLETED | OUTPATIENT
Start: 2024-08-21 | End: 2024-08-21

## 2024-08-21 RX ORDER — DIPHENHYDRAMINE HYDROCHLORIDE 50 MG/ML
50 INJECTION INTRAMUSCULAR; INTRAVENOUS
Status: ACTIVE | OUTPATIENT
Start: 2024-08-21 | End: 2024-08-21

## 2024-08-21 RX ORDER — NICOTINE POLACRILEX 4 MG
15-30 LOZENGE BUCCAL
Status: DISCONTINUED | OUTPATIENT
Start: 2024-08-21 | End: 2024-08-23

## 2024-08-21 RX ORDER — FUROSEMIDE 10 MG/ML
60 INJECTION INTRAMUSCULAR; INTRAVENOUS ONCE
Status: COMPLETED | OUTPATIENT
Start: 2024-08-21 | End: 2024-08-21

## 2024-08-21 RX ADMIN — HEPARIN SODIUM 5000 UNITS: 5000 INJECTION, SOLUTION INTRAVENOUS; SUBCUTANEOUS at 01:00

## 2024-08-21 RX ADMIN — INSULIN ASPART 1 UNITS: 100 INJECTION, SOLUTION INTRAVENOUS; SUBCUTANEOUS at 22:16

## 2024-08-21 RX ADMIN — AMPICILLIN SODIUM AND SULBACTAM SODIUM 3 G: 2; 1 INJECTION, POWDER, FOR SOLUTION INTRAMUSCULAR; INTRAVENOUS at 21:18

## 2024-08-21 RX ADMIN — Medication 2 UNITS/HR: at 00:27

## 2024-08-21 RX ADMIN — PANTOPRAZOLE SODIUM 40 MG: 40 INJECTION, POWDER, FOR SOLUTION INTRAVENOUS at 08:09

## 2024-08-21 RX ADMIN — AZITHROMYCIN DIHYDRATE 250 MG: 250 TABLET ORAL at 08:19

## 2024-08-21 RX ADMIN — HEPARIN SODIUM 5000 UNITS: 5000 INJECTION, SOLUTION INTRAVENOUS; SUBCUTANEOUS at 08:10

## 2024-08-21 RX ADMIN — AMPICILLIN SODIUM AND SULBACTAM SODIUM 3 G: 2; 1 INJECTION, POWDER, FOR SOLUTION INTRAMUSCULAR; INTRAVENOUS at 17:08

## 2024-08-21 RX ADMIN — CHLORHEXIDINE GLUCONATE 0.12% ORAL RINSE 15 ML: 1.2 LIQUID ORAL at 21:19

## 2024-08-21 RX ADMIN — CHLORHEXIDINE GLUCONATE 0.12% ORAL RINSE 15 ML: 1.2 LIQUID ORAL at 08:19

## 2024-08-21 RX ADMIN — IOPAMIDOL 135 ML: 755 INJECTION, SOLUTION INTRAVENOUS at 01:19

## 2024-08-21 RX ADMIN — IPRATROPIUM BROMIDE AND ALBUTEROL SULFATE 3 ML: .5; 3 SOLUTION RESPIRATORY (INHALATION) at 00:07

## 2024-08-21 RX ADMIN — MIDAZOLAM HYDROCHLORIDE 6 MG/HR: 1 INJECTION, SOLUTION INTRAVENOUS at 15:24

## 2024-08-21 RX ADMIN — MIDAZOLAM HYDROCHLORIDE 4 MG/HR: 1 INJECTION, SOLUTION INTRAVENOUS at 00:29

## 2024-08-21 RX ADMIN — CEFEPIME HYDROCHLORIDE 2 G: 2 INJECTION, POWDER, FOR SOLUTION INTRAVENOUS at 01:00

## 2024-08-21 RX ADMIN — NOREPINEPHRINE BITARTRATE 0.06 MCG/KG/MIN: 0.06 INJECTION, SOLUTION INTRAVENOUS at 00:27

## 2024-08-21 RX ADMIN — FUROSEMIDE 10 MG/HR: 10 INJECTION, SOLUTION INTRAVENOUS at 02:55

## 2024-08-21 RX ADMIN — VANCOMYCIN HYDROCHLORIDE 2500 MG: 500 INJECTION, POWDER, LYOPHILIZED, FOR SOLUTION INTRAVENOUS at 03:16

## 2024-08-21 RX ADMIN — VANCOMYCIN HYDROCHLORIDE 1000 MG: 1 INJECTION, SOLUTION INTRAVENOUS at 13:32

## 2024-08-21 RX ADMIN — MAGNESIUM SULFATE HEPTAHYDRATE 2 G: 2 INJECTION, SOLUTION INTRAVENOUS at 17:28

## 2024-08-21 RX ADMIN — POTASSIUM CHLORIDE 20 MEQ: 29.8 INJECTION, SOLUTION INTRAVENOUS at 17:28

## 2024-08-21 RX ADMIN — ACETAMINOPHEN 650 MG: 325 TABLET ORAL at 03:47

## 2024-08-21 RX ADMIN — HUMAN ALBUMIN MICROSPHERES AND PERFLUTREN 6 ML: 10; .22 INJECTION, SOLUTION INTRAVENOUS at 08:21

## 2024-08-21 RX ADMIN — HEPARIN SODIUM 5000 UNITS: 5000 INJECTION, SOLUTION INTRAVENOUS; SUBCUTANEOUS at 17:11

## 2024-08-21 RX ADMIN — THIAMINE HCL TAB 100 MG 100 MG: 100 TAB at 14:48

## 2024-08-21 RX ADMIN — Medication 100 MCG/HR: at 00:27

## 2024-08-21 RX ADMIN — MAGNESIUM SULFATE HEPTAHYDRATE 2 G: 2 INJECTION, SOLUTION INTRAVENOUS at 01:00

## 2024-08-21 RX ADMIN — POTASSIUM CHLORIDE 20 MEQ: 29.8 INJECTION, SOLUTION INTRAVENOUS at 18:13

## 2024-08-21 RX ADMIN — SODIUM CHLORIDE 1000 MG: 9 INJECTION, SOLUTION INTRAVENOUS at 13:36

## 2024-08-21 RX ADMIN — POTASSIUM CHLORIDE 40 MEQ: 1.5 POWDER, FOR SOLUTION ORAL at 01:00

## 2024-08-21 RX ADMIN — POTASSIUM CHLORIDE 20 MEQ: 29.8 INJECTION, SOLUTION INTRAVENOUS at 02:53

## 2024-08-21 RX ADMIN — FUROSEMIDE 60 MG: 10 INJECTION, SOLUTION INTRAMUSCULAR; INTRAVENOUS at 02:53

## 2024-08-21 RX ADMIN — POTASSIUM CHLORIDE 20 MEQ: 29.8 INJECTION, SOLUTION INTRAVENOUS at 03:38

## 2024-08-21 RX ADMIN — CEFEPIME HYDROCHLORIDE 2 G: 2 INJECTION, POWDER, FOR SOLUTION INTRAVENOUS at 08:03

## 2024-08-21 RX ADMIN — Medication 200 MCG/HR: at 14:41

## 2024-08-21 ASSESSMENT — ACTIVITIES OF DAILY LIVING (ADL)
ADLS_ACUITY_SCORE: 47
ADLS_ACUITY_SCORE: 45
ADLS_ACUITY_SCORE: 47
ADLS_ACUITY_SCORE: 35
ADLS_ACUITY_SCORE: 35
ADLS_ACUITY_SCORE: 47
ADLS_ACUITY_SCORE: 47
DEPENDENT_IADLS:: CLEANING;COOKING;LAUNDRY;SHOPPING;MEAL PREPARATION;MEDICATION MANAGEMENT;MONEY MANAGEMENT;TRANSPORTATION
ADLS_ACUITY_SCORE: 47
ADLS_ACUITY_SCORE: 35
ADLS_ACUITY_SCORE: 47
ADLS_ACUITY_SCORE: 35
ADLS_ACUITY_SCORE: 47

## 2024-08-21 NOTE — PROGRESS NOTES
Admitted/transferred from: [facility]  Reason for admission/transfer: Cardiogenic shock, Viral Myocarditis  2 RN skin assessment: completed by Rose Mary Okeefe  Result of skin assessment and interventions/actions: Sacral Mepilex applied,   Height, weight, drug calc weight: Done  Patient belongings (see Flowsheet)  MDRO education added to care planN/A  ?

## 2024-08-21 NOTE — PROGRESS NOTES
Cardiology ICU Progress Note  Date of Service: 08/21/2024  Patient: Terrance Hidalgo  MRN: [id number]  Admission Date: 8/20/2024  Hospital Day: 1             ASSESSMENT AND PLAN   25 yo M with PMH autism who initially presented to [place] with cough, URI symptoms, and hypoxemia. He was intubated due to respiratory failure and inability to tolerate BiPAP (vomiting). Then transferred to [facility], where his hospital course transpired to mixed cardiogenic/septic shock picture requiring multiple pressor agents and IV diuresis. Transferred again to [facility] CICU on 8/20. Arrived intubated, with maximal support on ventilator, yet otherwise hemodynamically stable on NE/vaso without additional inotropic support.    Clinical picture largely suggestive of fulminant lymphocytic myocarditis based on elevated cardiac and inflammatory biomarkers, severe biventricular dysfunction (presumed new based on reported history, LVIDd 5.8 cm), intermittent inotropic support, and concurrent infectious/respiratory symptomotology c/b respiratory failure. He was taken urgently to cardiac cath lab for EMB/RHC on 8/21. The study demonstrated normal right sided filling pressures, mildly elevated PCWP, borderline CO/CI (via osbaldo), and mild PH. He is being actively managed with IV steroids, broad antimicrobial coverage, and ventilatory support for oxygenation. Holding on immunosuppression, inotropes, or escalation to VA-ECMO. We will continue to investigate potential precipitants via infectious workup and bronchoscopy with BAL while we await pathology from EMB.     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 Biplane LVEF is 16%. Severe hypokinesis of mid to apical RV free wall.  RHC (8/21/24): RA mean  6, PA 30/23/26, PCWP 18, [place] CO/CI 5.0/2.1, PVR 1.6, SVR 1270  EMB (8/21/24): Pathology Pending  Coronary angiogram: None  CMR: None  - Preload:        - RAP 6 mmHg, PCWP 18 mmHg       - Diuretics: HOLD lasix gtt  - Afterload (LV):       - SVR (today): 1200 (Goal 3670-3251; MAP 65-75)       - Pressors: None       - Vasodilators: None  - Contractility:       - CO/CI (today): 5.0/2.1        - Inotropic agents: None       - Trend mixed venous blood gas and markers of end organ perfusion  - Steroids: 1g IV solumedrol (8/21 - )  - MCS: None - low threshold to VA-ECMO if deteriorates  - Anticoagulation: None - no indication  - Antiplatelet:None - no indication  - Statin: None - no indication  - Antiarrythmic: None - no indication  - SCD prophylaxis: None - no indication  - GDMT: Will start later in admission    # Acute hypoxic respiratory failure  # Aspiration Pneumonia  # Fevers   CT Chest on arrival - Patchy and nodular opacity of the right upper and right middle lobe suspicious for pneumonia. Significant atelectasis with complete collapse of the left lower lobe with near total collapse of the left upper, and right lower lobes. Moderate bilateral pleural effusions extending from the base to the apices.   - Consult pulm - consider bronch  - Full vent support   - Consult ID  - Antimicrobials       > Cefepime 8/20 -        > Azithromycin 8/20 -        > Vanco 8/20 -     # Transaminitis  - Suspected due to shock hepatic congestion/shock liver  - Trend LFT's  - CT CAP unremarkable    # AMS  - Sedation with ativan, fentanyl while intubated    Plan of care discussed with Dr. Martin, who agrees with above plan.    Thank you for consulting the cardiovascular services at the [facility]. Please do not hesitate to call us with any questions.     Colten Garay MD  Cardiology Fellow  Pager: [phone]             PAST MEDICAL HISTORY      No past medical history on file.  Active Problems:  Patient Active  Problem List    Diagnosis Date Noted    Cardiogenic shock (H) 08/20/2024     Priority: Medium     Social History:     Family History:  No family history on file.    Medications:  Current Facility-Administered Medications   Medication Dose Route Frequency Provider Last Rate Last Admin    azithromycin (ZITHROMAX) tablet 250 mg  250 mg Oral Daily Sudheer Rojas MD   250 mg at 08/21/24 0819    ceFEPIme (MAXIPIME) 2 g vial to attach to  mL bag for ADULTS or NS 50 mL bag for PEDS  2 g Intravenous Q8H Sudheer Rojas MD   2 g at 08/21/24 0803    chlorhexidine (PERIDEX) 0.12 % solution 15 mL  15 mL Mouth/Throat Q12H Sudheer Rojas MD   15 mL at 08/21/24 0819    heparin ANTICOAGULANT injection 5,000 Units  5,000 Units Subcutaneous Q8H Sudheer Rojas MD   5,000 Units at 08/21/24 0810    methylPREDNISolone sodium succinate (solu-MEDROL) 1,000 mg in sodium chloride 0.9 % 258 mL intermittent infusion  1,000 mg Intravenous Once Colten Garay MD        pantoprazole (PROTONIX) 2 mg/mL suspension 40 mg  40 mg Per Feeding Tube QAM AC Sudheer Rojas MD        Or    pantoprazole (PROTONIX) IV push injection 40 mg  40 mg Intravenous QA Sudheer Cavazos MD   40 mg at 08/21/24 0809    sodium chloride (PF) 0.9% PF flush 3 mL  3 mL Intracatheter Q8H Sudheer Rojas MD   3 mL at 08/21/24 0030    vancomycin (VANCOCIN) 1,000 mg in 200 mL dextrose intermittent infusion  1,000 mg Intravenous Q8H Jani Martin MD           Current Facility-Administered Medications   Medication Dose Route Frequency Provider Last Rate Last Admin    fentaNYL (SUBLIMAZE) infusion   mcg/hr Intravenous Continuous Sudheer Rojas MD 4 mL/hr at 08/21/24 1121 200 mcg/hr at 08/21/24 1121    [Held by provider] furosemide (LASIX) 500 mg/50mL infusion ADULT MAX CONC  10 mg/hr Intravenous Continuous Sudheer Rojas MD 1 mL/hr at 08/21/24 1121 10 mg/hr at 08/21/24 1121    medication instruction   Does not apply Continuous  PRN Sudheer Rojas MD        midazolam (VERSED) 100 mg/100 mL NS infusion - ADULT  1-8 mg/hr Intravenous Continuous Sudheer Rojas MD 6 mL/hr at 08/21/24 1204 6 mg/hr at 08/21/24 1204    norepinephrine (LEVOPHED) 16 mg in  mL infusion MAX CONC CENTRAL LINE  0.01-0.6 mcg/kg/min (Dosing Weight) Intravenous Continuous Sudheer Rojas MD   Stopped at 08/21/24 0730    vasopressin 1 unit/mL MAX Conc (PITRESSIN) infusion  0.5-4 Units/hr Intravenous Continuous Sudheer Rojas MD   Stopped at 08/21/24 0754             PHYSICAL EXAM     Temp:  [99.3  F (37.4  C)-101.8  F (38.8  C)] 99.7  F (37.6  C)  Pulse:  [119-151] 129  Resp:  [14-32] 16  MAP:  [49 mmHg-102 mmHg] 87 mmHg  Arterial Line BP: ()/(60-86) 112/75  FiO2 (%):  [100 %] 100 %  SpO2:  [88 %-100 %] 94 %    Intake/Output Summary (Last 24 hours) at 8/21/2024 1232  Last data filed at 8/21/2024 1121  Gross per 24 hour   Intake 1080.92 ml   Output 2850 ml   Net -1769.08 ml     GEN: Sedated  HEENT: No discharge  EYES: no icterus  CV: RRR, normal s1/s2, no murmurs/rubs/s3/s4, no heave.   CHEST:Mechanical breath sounds  ABD: soft, non-tender, normal active bowel sounds  : no flank/suprapubic tenderness  EXTR: pulses 2+. No clubbing, cyanosis or edema.   NEURO: alert oriented, speech fluent/appropriate, motor grossly nonfocal  PSYCH: cooperative, affect appropriate, pleasant            DIAGNOSTICS     All labs and imaging were reviewed, of note:    CMP  Recent Labs   Lab 08/21/24  1027 08/21/24  1010 08/21/24  0856 08/21/24  0358 08/20/24  2325   NA  --  144  --  142 143   POTASSIUM  --  3.3*  --  4.9 2.9*   CHLORIDE  --  103  --  103 103   CO2  --  27  --  25 23   ANIONGAP  --  14  --  14 17*   * 124* 124* 142* 134*   BUN  --  11.3  --  11.7 12.6   CR  --  0.75  --  0.75 0.86   GFRESTIMATED  --  >90  --  >90 >90   ALEXANDER  --  8.2*  --  8.2* 8.3*   MAG  --  2.0  --  2.4* 1.7   PHOS  --  3.4  --  3.8 3.6   PROTTOTAL  --  6.2*  --  6.3* 6.2*  "  ALBUMIN  --  3.5  --  3.5 3.6   BILITOTAL  --  0.6  --  0.6 0.7   ALKPHOS  --  73  --  74 73   AST  --  341*  --  418* 435*   ALT  --  415*  --  405* 368*     CBC  Recent Labs   Lab 08/21/24  1153 08/21/24  1010 08/21/24  0358 08/20/24  2325   WBC  --  15.5* 18.9* 20.2*   RBC  --  5.05 5.18 5.11   HGB 14.6 14.4 14.7 14.4   HCT  --  43.5 45.0 44.2   MCV  --  86 87 87   MCH  --  28.5 28.4 28.2   MCHC  --  33.1 32.7 32.6   RDW  --  13.5 13.3 13.2   PLT  --  230 275 315     INRNo lab results found in last 7 days.  Arterial Blood Gas  Recent Labs   Lab 08/21/24  1010 08/21/24  0843 08/21/24  0358 08/21/24  0052 08/20/24  2341 08/20/24  2326   PH  --  7.42 7.38 7.40  --  7.38   PCO2  --  45 47* 46*  --  46*   PO2  --  179* 67* 76*  --  66*   HCO3  --  29* 27 29*  --  28   O2PER 80 100 100  100 100   < > 100    < > = values in this interval not displayed.       No results found for: \"TROPI\", \"TROPONIN\", \"TROPR\", \"TROPN\"         "

## 2024-08-21 NOTE — H&P
CCU History and Physical    Date of Service: 8/20/2024  Attending: Jani Martin MD    Primary Care Provider:No primary care provider on file.     Phone:None  ID: Terrance Hidalgo is a 24 year old male patient.     Chief Complaint: SOB    HPI: 25 yo M with PMH autism who initially presented to [place] with cough and URI type symptoms, based on O2 saturation and presentation patient was placed on BiPAP. He subsequently vomitted and aspirated, leading to intubation. He was then transferred to [facility] for further care. During the admission he was found to have a newly reduced EF of ~20-25%. He then became febrile, and then required levo/vaso, failed a trial of carol ann. He was started on lasix drip. Labs notable for worsening troponin of 20's-114-1400. He was then trasferred to [facility] for further evaluation and management.     Social history. Previously was followed by cardiology/pulmonology when lived with aunt in [place]. He then moved to [place] a few years ago to live in a group home. Brother is current guardian, they are working toward transferring guardianship back the the aunt. Mother is allowed to receive updates, but is not to make any medical decision without the above 2 people involved.     No past medical history on file.  No past surgical history on file.    Allergies:   Allergies   Allergen Reactions    Penicillin V Hives     No medications prior to admission.       No current outpatient medications on file.       No family history on file.  Social History     Socioeconomic History    Marital status: Not on file     Spouse name: Not on file    Number of children: Not on file    Years of education: Not on file    Highest education level: Not on file   Occupational History    Not on file   Tobacco Use    Smoking status: Not on file    Smokeless tobacco: Not on file   Substance and Sexual Activity    Alcohol use: Not on file    Drug use: Not on file    Sexual activity: Not on file   Other Topics Concern     "Not on file   Social History Narrative    Not on file     Social Determinants of Health     Financial Resource Strain: Not on file   Food Insecurity: Not on file   Transportation Needs: Not on file   Physical Activity: Not on file   Stress: Not on file   Social Connections: Not on file   Interpersonal Safety: Not At Risk (8/19/2024)    Received from [facility]     Humiliation, Afraid, Rape, and Kick questionnaire     Fear of Current or Ex-Partner: No     Emotionally Abused: No     Physically Abused: No     Sexually Abused: No   Housing Stability: Not on file        Review Of Systems  Unable to assess, patient intubated and sedated    Exam and Labs by System    Gen: Ill appearing, intubated and sedated  CV: tachycardic, regular rhythm, no MRG, cap refill 3 seconds, 1+ pitting edema to mid shin, unable to assess JVD (CVP 12 on central line)  Pulm: crackles throughout, decreased breath sounds in L lung base  GI: soft, nondistended, protuberant, no fluid wave  Neuro/psych: intubated and sedated    Lab Results   Component Value Date    WBC 20.2 08/20/2024     Lab Results   Component Value Date    RBC 5.11 08/20/2024     Lab Results   Component Value Date    HGB 14.4 08/20/2024     Lab Results   Component Value Date    HCT 44.2 08/20/2024     No components found for: \"MCT\"  Lab Results   Component Value Date    MCV 87 08/20/2024     Lab Results   Component Value Date    MCH 28.2 08/20/2024     Lab Results   Component Value Date    MCHC 32.6 08/20/2024     Lab Results   Component Value Date    RDW 13.2 08/20/2024     Lab Results   Component Value Date     08/20/2024       Last Comprehensive Metabolic Panel:  Sodium   Date Value Ref Range Status   08/20/2024 143 135 - 145 mmol/L Final     Potassium   Date Value Ref Range Status   08/20/2024 2.9 (L) 3.4 - 5.3 mmol/L Final     Chloride   Date Value Ref Range Status   08/20/2024 103 98 - 107 mmol/L Final     Carbon Dioxide (CO2)   Date Value Ref Range Status "   08/20/2024 23 22 - 29 mmol/L Final     Anion Gap   Date Value Ref Range Status   08/20/2024 17 (H) 7 - 15 mmol/L Final     Glucose   Date Value Ref Range Status   08/20/2024 134 (H) 70 - 99 mg/dL Final     GLUCOSE BY METER POCT   Date Value Ref Range Status   08/20/2024 133 (H) 70 - 99 mg/dL Final     Urea Nitrogen   Date Value Ref Range Status   08/20/2024 12.6 6.0 - 20.0 mg/dL Final     Creatinine   Date Value Ref Range Status   08/20/2024 0.86 0.67 - 1.17 mg/dL Final     GFR Estimate   Date Value Ref Range Status   08/20/2024 >90 >60 mL/min/1.73m2 Final     Comment:     eGFR calculated using 2021 CKD-EPI equation.     Calcium   Date Value Ref Range Status   08/20/2024 8.3 (L) 8.8 - 10.4 mg/dL Final     Comment:     Reference intervals for this test were updated on 7/16/2024 to reflect our healthy population more accurately. There may be differences in the flagging of prior results with similar values performed with this method. Those prior results can be interpreted in the context of the updated reference intervals.     Bilirubin Total   Date Value Ref Range Status   08/20/2024 0.7 <=1.2 mg/dL Final     Alkaline Phosphatase   Date Value Ref Range Status   08/20/2024 73 40 - 150 U/L Final     ALT   Date Value Ref Range Status   08/20/2024 368 (H) 0 - 70 U/L Final     AST   Date Value Ref Range Status   08/20/2024 435 (H) 0 - 45 U/L Final       Liver Function Studies -   Recent Labs   Lab Test 08/20/24  2325   PROTTOTAL 6.2*   ALBUMIN 3.6   BILITOTAL 0.7   ALKPHOS 73   *   *     TTE 8/19/24  Interpretation Summary     * The left ventricular systolic function is severely reduced, quantified   ejection fraction is 23%.     * Left ventricular wall motion is abnormal with apical akinesis with global   hypokinesis.    * The right ventricle is not well visualized with normal right ventricular   systolic function.     * The inferior vena cava is normal in size (< 2.1 cm), > 50% respiratory   variance, RA  pressure normal at 3 mmHg.     * No pulmonary hypertension, estimated right ventricular systolic pressure   is 32 mmHg.     * No prior study.     Assessment/Plan by System  Neuro  #Acute toxic metabolic encephalopathy   #Autism spectrum disorder  -Continue versed and fentanyl for sedation, RAAS goal -2 to -3  -Hold on home risperidone for now    CV:  #Acute systolic heart failure, suspected NICM  #?Viral myocarditis  #Mixed shock, likely cardiogenic and septic  -Patient presenting with cough and URI symptoms for a few days. Initially on presentation patient was hypoxic requiring BIPAP, and emesis leading to aspiration and intubation. TTE obtained initially with EF of 23% with apical akinesis concerning for takotsubo. He did also have a fever up to 103 with hypotension which could lead to a potentially septic picture. He was treated with CTX and azithromycin. Initial workup with negative trops and CRP, however trop started to climb today [phone], repeat trop here 200 s. DDx still include stress CM vs. viral myocarditis. EF performed by myself on admission with global severe hypokinesis EF 5-10%. Ischemic less likely. BNP 6500 ( s)  ===PLAN===  -Continue support with levo/vaso, wean as tolerated  -Obtain pan-Ct to assess for other potential sources of infection  -Strict I/O, daily weights  -will limit fluids as much as possible  -formal TTE in the AM  -tenatively plan for RHC/endomyocardial biopsy +/- CORS tomorrow, NPO  -Continue lasix 10/hr with IV 60 push x1  -no BB in shock state  -No ACE/ARB/ARNI/MRA/SGLT2 given shock state  -vanc, cefepime, azithromycin  -UA/UC, resp cx, blood cx    Pulm:  #Acute hypoxic respiratory failure 2/2 aspiration pneumonia and acute heart failure  -Treatment of HF as above  -Vanc, cefepime, azithro  -duoneb  -CT including chest  -Continue vent settings (AC/VC FiO2 90%, PEEP 8, ), wean as tolerated    GI  #Shock liver  -LFTs in the 400 s on admission here, last in the  low 100 s prior to transfer, likely related to shock state as above.   -Pan-CT including A/P to assess for acute pathology  -Trend LFTs    Renal  N/A    ID  #Aspiration pneumonia   -antibiotics as above    Endo:  N/A    Code Status: Full code assumed until can clarify with family      Sudheer Rojas MD  Cardiology Fellow  August 20, 2024

## 2024-08-21 NOTE — PHARMACY-VANCOMYCIN DOSING SERVICE
Pharmacy Vancomycin Initial Note  Date of Service 2024  Patient's  2000  24 year old, male    Indication: Bacteremia    Current estimated CrCl = Estimated Creatinine Clearance: 169.4 mL/min (based on SCr of 0.86 mg/dL).    Creatinine for last 3 days  2024: 11:25 PM Creatinine 0.86 mg/dL    Recent Vancomycin Level(s) for last 3 days  No results found for requested labs within last 3 days.      Vancomycin IV Administrations (past 72 hours)                     vancomycin (VANCOCIN) 2,500 mg in sodium chloride 0.9 % 250 mL intermittent infusion (mg) 2,500 mg New Bag 24 0316                    Nephrotoxins and other renal medications (From now, onward)      Start     Dose/Rate Route Frequency Ordered Stop    24 0200  vancomycin (VANCOCIN) 2,500 mg in sodium chloride 0.9 % 250 mL intermittent infusion         2,500 mg (central catheter)  over 90 Minutes Intravenous ONCE 24 0058      24 0100  furosemide (LASIX) 500 mg/50mL infusion ADULT MAX CONC         10 mg/hr  1 mL/hr  Intravenous CONTINUOUS 24 0035      24 2230  norepinephrine (LEVOPHED) 16 mg in  mL infusion MAX CONC CENTRAL LINE         0.01-0.6 mcg/kg/min × 108 kg (Dosing Weight)  1-60.8 mL/hr  Intravenous CONTINUOUS 24 2223      24 2230  vasopressin 1 unit/mL MAX Conc (PITRESSIN) infusion         0.5-4 Units/hr  0.5-4 mL/hr  Intravenous CONTINUOUS 24 2223              Contrast Orders - past 72 hours (72h ago, onward)      Start     Dose/Rate Route Frequency Stop    24 0100  iopamidol (ISOVUE-370) solution 135 mL         135 mL Intravenous ONCE 24 0119            InsightRX Prediction of Planned Initial Vancomycin Regimen  Loading dose: 2500 mg IV once.  Regimen: 1000 mg IV every 8 hours.  Start time: 13:00 on 2024  Exposure target: AUC24 (range)400-600 mg/L.hr   AUC24,ss: 553 mg/L.hr  Probability of AUC24 > 400: 80 %  Ctrough,ss: 18.8 mg/L  Probability of  Ctrough,ss > 20: 45 %  Probability of nephrotoxicity (Lodise FCO 2009): 15 %          Plan:  Start vancomycin  2500 mg IV once, followed by 1000 mg IV q8h.   Vancomycin monitoring method: AUC  Vancomycin therapeutic monitoring goal: 400-600 mg*h/L  Pharmacy will check vancomycin levels as appropriate in 1-3 Days.    Serum creatinine levels will be ordered daily for the first week of therapy and at least twice weekly for subsequent weeks.      Misha Hernnadez RPH

## 2024-08-21 NOTE — PLAN OF CARE
Problem: Adult Inpatient Plan of Care  Goal: Plan of Care Review  Description: The Plan of Care Review/Shift note should be completed every shift.  The Outcome Evaluation is a brief statement about your assessment that the patient is improving, declining, or no change.  This information will be displayed automatically on your shift  note.  Flowsheets (Taken 8/21/2024 9879)  Outcome Evaluation: Psychosocial Assessment completed with AuntGuerda. Pt intubated and Sedated.  Plan of Care Reviewed With: family  Overall Patient Progress: no change     MILO Eddy, [facility] HospitalSW   4A/4E ICU   Phone: [phone]  Available via C-sam

## 2024-08-21 NOTE — PLAN OF CARE
Major Shift Events:  RHC in Cath lab w/[place] placed in L internal jugular @50cm. PA 30/23 PAOP 18 SvO2 70 CI 2.7. Bedside bronch attempted and aborted 2/2 friable tissue, bleeding. Multiple infectious panels sent and pending. 2g Mg+ 40 mEq K+ replaced    Neuro: Sedation increased for vent compliance Now 200 fent 6 versed. Responds vigorous stimulation.    CV:   Rate/rhythm: ST 110s. Off pressors MAP > 65    Pulm:  CMV 80%/400/RR 16/12 swan LIJ @ 50cm    GI: OG @ 57- meds only    : Swan ~150mL/hr, lasix gtt d/c'd    Skin: Intact    Drips:   Fent-200  Versed-6  Pt remains off pressors  Plan: Continue to titrate respiratory support as tolerated.  For vital signs and complete assessments, please see documentation flowsheets.

## 2024-08-21 NOTE — PLAN OF CARE
ICU End of Shift Summary. See flowsheets for vital signs and detailed assessment.    Neuro: Sedated, following commands, tolerating the vent.     CV:   Rate/rhythm: -150's  Mechanical: Bedside echo ~ 5% EF    Pulm:  %/400/16/14 changed from 50%/400/16/8 on admission.     GI: OG @ 57- meds only at this time    : Swan ~150mL/hr, given 60mg lasix push with 700 UOP response.     Skin: Intact    Labs:   Elevated LFT's    Plan: Possible bronch in AM if not proned    Goal Outcome Evaluation:      Plan of Care Reviewed With: patient, family    Overall Patient Progress: decliningOverall Patient Progress: declining    Outcome Evaluation: Remains intubated and sedated, had to increase respiratory support overnight.

## 2024-08-21 NOTE — PROGRESS NOTES
"Bronchoscopy Risk Assessment Guidelines      A. Patient symptoms to consider when assessing pulmonary TB risk are:    I. Cough greater than 3 weeks; and fever, hemoptysis, pleuritic chest    pain, weight loss greater than 10 lbs, night sweats, fatigue, infiltrates on    upper lobes or superior segments of lower lobes, cavitation on chest    x-ray.   B. Patient risk factors to consider when assessing pulmonary TB risk are:    I. Exposure to known TB case, foreign-born persons (within 5 years of    arrival to [place]), residence in a crowded setting (correctional facility,     long-term care center, etc.), persons with HIV or immunosuppression.    Patients with symptoms and risk factors should generally be considered \"suspect risk\" and bronchoscopies should be performed in airborne precautions.    This patient has NO KNOWN RISK of Tuberculosis (proceed with bronchoscopy)    Specimens sent: no  Complications: None  Scope used: #[id number]  Slim  Attending Physician: Dr.lambert Leonardo Saleem, RRT on 8/21/2024 at 3:55 PM  "

## 2024-08-21 NOTE — CONSULTS
[facility] CONSULTATION     Patient:  Terrance Hidalgo   Date of birth 2000, Medical record number [id number]  Date of Visit:  08/21/2024  Date of Admission: 8/20/2024  Consult Requester:Jani Martin MD          Assessment and Recommendations:     Terrance Hidalgo is a 24 year old male with autism. He presented with upper respiratory tract infection symptoms and new heart failure. I would expand his ID workup to check for infectious causes of myocarditis outlined below. Some of these should be molecular tests instead of serology (antibody testing). For his bacterial pneumonia possibly from aspiration, I would use amp-sulbactam instead as this covers oral zuleika. I doubt he has ESBL or Pseudomonas pneumonia and cephalosporins are associated with myocarditis. There is no indication for vancomycin since he does not have MRSA.         IMPRESSION:  Mixed shock   Elevated transaminases  Respiratory failure from Severe bacterial pneumonia  New heart failure with reduced EF    RECOMMENDATION:  Send serum parvovirus, CMV, EBV, adenovirus PCR, HIV ab, lyme ab .   Stop vancomycin and cefepime.   Start IV ampicillin-sulbactam 3g every 6 hours.     REFERENCE:  Sean P, Tommy LT, Calvin VARGASW. Diagnostic Approach for Suspected Acute Myocarditis: Considerations for Standardization and Broadening Clinical Spectrum. J Am Heart Assoc. 2023;12(17):[id number]. doi:10.1161/[id number]    Thank you for this consult. ID will continue to follow.     MDM: >3 notes and tests reviewed, discussed with primary team, life threatening illness.          History of Present Illness:     Terrance Hidalgo is a 24 year old male with autism and undefined heart condition history who presented to OSH with worsening cough and SOB.     Presented initially to [facility] then transferred to [facility]. On chart review, patient's family member noted a dry cough for 1-2 weeks. Over the few days PTA, cough worsened with developemnt of  "vomiting, SOB, and lethargy prompting ED presentation. On initial presentation patient sounded stable, afebrile but with some tachycardia and tachypnea. Initial labs with mild leukocytosis but normal PCT, lactate VBG and troponin. BNP elevated at 354 at OSH. Patient was described as \"tripoding\" with breathing and began dry heaving so was placed on BIPAP. Began vomiting on BIPAP which raised concern for aspiration event. He developed hypoxia requiring intubation at that point. CXR with bilateral infiltrates at OSH report. Was started on antibiotics. ECHO found to have an EF of 20%. Prior to transfer, he developed fever and hypotension requiring pressors. Now transferred to [facility] 8/20 for advanced cardiac care.     He is intubated and sedated. Is febrile with leukocytosis, elevated troponin, elevated CRP, and LFTs c/w shock. Current concern for viral myocarditis.     He underwent heart biopsy. Histopath pending.   I saw him in the ICU. The nurse did not report diarrhea, sputum production.     CT showed Patchy and nodular opacity of the right upper and right middle lobe suspicious for pneumonia. Additionally, significant atelectasis with complete collapse of the left lower lobe with near total collapse of  the left upper, and right lower lobes. Moderate bilateral pleural effusions extending from the base to the apices.  Multiple enlarged mediastinal and hilar lymph nodes, which are  likely reactive. Mild thickening of the colonic mucosa most notably at the level of the cecum, without significant surrounding inflammatory change. Nonspecific finding which can be seen with colitis.            Social History:     Social History     Tobacco Use    Smoking status: Not on file    Smokeless tobacco: Not on file   Substance Use Topics    Alcohol use: Not on file     History   Sexual Activity    Sexual activity: Not on file            Allergies:     Allergies   Allergen Reactions    Penicillin V Hives            Physical Exam: " "    Pulse 115   Temp 99.7  F (37.6  C)   Resp 16   Ht 1.86 m (6' 1.23\")   Wt 105.4 kg (232 lb 5.8 oz)   SpO2 99%   BMI 30.47 kg/m      Physical Examination:  Constitutional: intubated.   HEENT: anicteric sclerae, conjunctiva clear.   Face flushed  Respiratory: Lungs are clear to auscultation bilaterally, without wheezing, crackles.   Cardiovascular: Regular rate and rhythm with no murmur  GI: Abdomen is soft, non-distended, and non-tender to palpation. No rigidity or guarding.  Skin: Warm and dry.   VAD: c/d/i with no erythema, drainage.         Laboratory Data:     WBC 15  Estimated Creatinine Clearance: 194.2 mL/min (based on SCr of 0.75 mg/dL).      MICRO:  8/21 Parvo, Coxsackie ab -   RPP - neg   COVID - neg   "

## 2024-08-21 NOTE — CONSULTS
[facility]   Pulmonary Consult Note - Initial    Terrance Hidalgo MRN: [id number]  Date of Admission:8/20/2024  Date of Service: 08/21/2024    Reason for consult: Hypoxia, atelectasis and consolidation on CT chest, bronchoscopy?   Primary service: Cards 2/HF       ASSESSMENT/RECOMMENDATION:     # Bibasilar atelectasis and ALMA near complete collapse   # AHRF 2/2 above   # URI symptoms   # Acute decompensated HF, suspect viral myocarditis     Bronchoscopy was done on 8/21 with significant airway edema and erythema. Mucosa was extremely friable - oozed with a small amount of suctioned. There was no plugging, mucous mainly pooling in the LLL but thin. Inflammation of the airways certainly can be explained by a viral infection with superimposed bacterial (aspiration).     - Continue broad spectrum abx. Unasyn and Azithromycin okay. If worsens would broaden to Zosyn   - Added on histo and blasto urine Ag as patient will be receiving high dose steroids      Pulmonary will follow     Patient seen and discussed with Dr. Margarito Kraus MD   Pulmonary/Critical Care Fellow  Pager: [phone]             HPI:  25 yo M with PMH autism who initially presented from group home to OSH 8/19 with 1 week of cough and URI symptoms. Found to be hypoxic to the 80's. Had large volume emesis in the OSH ED with aspiration and subsequently intubated. During the admission he was found to have a newly reduced EF of ~20-25% and transferred to [facility] for further management of new acute HF.     Per chart review, patient never had any pulmonary issues. No prior recurrent PNA, dysphagia, aspiration.       Social history. Previously was followed by cardiology when lived with aunt in [place]. He then moved to [place] a few years ago to live in a group home. Brother is current guardian, they are working toward transferring guardianship back the the aunt. Mother is allowed to receive updates, but is not to make any medical decision  without the above 2 people involved.      ROS: Unable to obtain     PMHx/PSHx:  Autism  ?vague heart disorder    Social Hx:   Unable to obtain     Outpatient Medications:   Current Facility-Administered Medications   Medication Dose Route Frequency Provider Last Rate Last Admin    acetaminophen (TYLENOL) tablet 650 mg  650 mg Oral or Feeding Tube Q8H PRN Sudheer Rojas MD        azithromycin (ZITHROMAX) tablet 250 mg  250 mg Oral Daily Sudheer Rojas MD   250 mg at 08/21/24 0819    ceFEPIme (MAXIPIME) 2 g vial to attach to  mL bag for ADULTS or NS 50 mL bag for PEDS  2 g Intravenous Q8H Sudheer Rojas MD   2 g at 08/21/24 0803    chlorhexidine (PERIDEX) 0.12 % solution 15 mL  15 mL Mouth/Throat Q12H Sudheer Rojas MD   15 mL at 08/21/24 0819    fentaNYL (SUBLIMAZE) infusion   mcg/hr Intravenous Continuous Sudheer Rojas MD 4 mL/hr at 08/21/24 1300 200 mcg/hr at 08/21/24 1300    [Held by provider] furosemide (LASIX) 500 mg/50mL infusion ADULT MAX CONC  10 mg/hr Intravenous Continuous Sudheer Rojas MD   Stopped at 08/21/24 1251    heparin ANTICOAGULANT injection 5,000 Units  5,000 Units Subcutaneous Q8H Sudheer Rojas MD   5,000 Units at 08/21/24 0810    lidocaine (LMX4) cream   Topical Q1H PRN Sudheer Rojas MD        lidocaine 1 % 0.1-1 mL  0.1-1 mL Other Q1H PRN Sudheer Rojas MD        medication instruction   Does not apply Continuous PRN Sudheer Rojas MD        methylPREDNISolone sodium succinate (solu-MEDROL) 1,000 mg in sodium chloride 0.9 % 283 mL intermittent infusion  1,000 mg Intravenous Once Colten Garay  mL/hr at 08/21/24 1336 1,000 mg at 08/21/24 1336    midazolam (VERSED) 100 mg/100 mL NS infusion - ADULT  1-8 mg/hr Intravenous Continuous Sudheer Rojas MD 6 mL/hr at 08/21/24 1300 6 mg/hr at 08/21/24 1300    naloxone (NARCAN) injection 0.2 mg  0.2 mg Intravenous Q2 Min PRN Jani Martin MD        Or    naloxone (NARCAN)  "injection 0.4 mg  0.4 mg Intravenous Q2 Min PRN Jani Martin MD        Or    naloxone (NARCAN) injection 0.2 mg  0.2 mg Intramuscular Q2 Min PRN Jani Martin MD        Or    naloxone (NARCAN) injection 0.4 mg  0.4 mg Intramuscular Q2 Min PRN Jani Martin MD        norepinephrine (LEVOPHED) 16 mg in  mL infusion MAX CONC CENTRAL LINE  0.01-0.6 mcg/kg/min (Dosing Weight) Intravenous Continuous Sudheer Rojas MD   Stopped at 08/21/24 0730    pantoprazole (PROTONIX) 2 mg/mL suspension 40 mg  40 mg Per Feeding Tube M AC Sudheer Rojas MD        Or    pantoprazole (PROTONIX) IV push injection 40 mg  40 mg Intravenous QA Sudheer Cavazos MD   40 mg at 08/21/24 0809    polyethylene glycol (MIRALAX) Packet 17 g  17 g Oral Daily PRN Sudheer Rojas MD        senna-docusate (SENOKOT-S/PERICOLACE) 8.6-50 MG per tablet 1 tablet  1 tablet Oral BID PRN Sudheer Rojas MD        Or    senna-docusate (SENOKOT-S/PERICOLACE) 8.6-50 MG per tablet 2 tablet  2 tablet Oral BID PRN Sudheer Rojas MD        sodium chloride (PF) 0.9% PF flush 3 mL  3 mL Intracatheter Q8H Sudheer Rojas MD   3 mL at 08/21/24 1338    sodium chloride (PF) 0.9% PF flush 3 mL  3 mL Intracatheter q1 min prn Sudheer Rojas MD        thiamine (B-1) tablet 100 mg  100 mg Oral or Feeding Tube Daily Graciela Whittaker MD        vancomycin (VANCOCIN) 1,000 mg in 200 mL dextrose intermittent infusion  1,000 mg Intravenous Q8H Jani Martin  mL/hr at 08/21/24 1332 1,000 mg at 08/21/24 1332    vasopressin 1 unit/mL MAX Conc (PITRESSIN) infusion  0.5-4 Units/hr Intravenous Continuous Sudheer Rojas MD   Stopped at 08/21/24 0754       Allergies:   Allergies   Allergen Reactions    Penicillin V Hives       Family Hx:   No family history on file.      Physical Exam:   Pulse 115   Temp 99.7  F (37.6  C)   Resp 16   Ht 1.86 m (6' 1.23\")   Wt 105.4 kg (232 lb 5.8 oz)   SpO2 99%   BMI 30.47 kg/m    - Gen: " Intubated   - CV: RRR, no m/r/g  - Lung: Coarse breath sounds   - Abd: NTND, +BS  - Ext: No BLE swelling  - Skin: No major rashes or lesions    Images/Studies:   8/21/24 CT Chest   Personal read: Bibasilar atelectasis. Lobar collapse of ALMA.         Labs:   ABG  Recent Labs   Lab 08/21/24  1341 08/21/24  0843 08/21/24  0358 08/21/24  0052   PH 7.44 7.42 7.38 7.40   PCO2 47* 45 47* 46*   PO2 108* 179* 67* 76*   HCO3 32* 29* 27 29*     CBC  Recent Labs   Lab 08/21/24  1153 08/21/24  1010 08/21/24  0358 08/20/24  2325   WBC  --  15.5* 18.9* 20.2*   HGB 14.6 14.4 14.7 14.4   PLT  --  230 275 315     BMP  Recent Labs   Lab 08/21/24  1027 08/21/24  1010 08/21/24  0856 08/21/24  0358 08/20/24  2325   NA  --  144  --  142 143   POTASSIUM  --  3.3*  --  4.9 2.9*   CHLORIDE  --  103  --  103 103   CO2  --  27  --  25 23   BUN  --  11.3  --  11.7 12.6   CR  --  0.75  --  0.75 0.86   * 124* 124* 142* 134*     LFT  Recent Labs   Lab 08/21/24  1010 08/21/24  0358 08/20/24  2325   * 418* 435*   * 405* 368*   ALKPHOS 73 74 73   BILITOTAL 0.6 0.6 0.7   ALBUMIN 3.5 3.5 3.6
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
## 2024-08-21 NOTE — PROCEDURES
History     Chief Complaint   Patient presents with     Facial Swelling     HPI  Audelia Vargas is a 6 year old female who presents to the emergency department for concerns of facial swelling.  This has been ongoing for the last several days.  She was evaluated on  at the urgent care and mom states that they said it was nothing too serious and should just watch it.  It seemed to get quite a bit bigger in the following days.  It has since calmed down again but you can still feel a lump under her neck.  In the last 24 hours she is also noticed a little white spot underneath her tongue that feels hard.  Mom thought it may be salivary gland stone so she gave patient some sour foods to see if that would help but there has been no relief.  Patient has not had any fevers, cough or congestion symptoms.  She denies sore throat, but does have pain in the neck when she swallows.  No vomiting.  Otherwise at baseline health.      Allergies:  No Known Allergies    Problem List:    Patient Active Problem List    Diagnosis Date Noted     Underweight 10/07/2019     Priority: Medium     Anemia, unspecified type 2017     Priority: Medium     Congenital labial adhesions 2017     Priority: Medium     Imperforate hymen 2017     Priority: Medium     Heart murmur 2016     Priority: Medium     Subconjunctival hemorrhage of right eye 2016     Priority: Medium     Abnormal findings on  screening 2016     Priority: Medium     Elevated amino acids - but baby did get IV amino acids in NICU and this could have caused the findings.   Plan - repeat the screen       Premature infant, 35 weeks completed 2016     Priority: Medium        Past Medical History:    History reviewed. No pertinent past medical history.    Past Surgical History:    History reviewed. No pertinent surgical history.    Family History:    Family History   Problem Relation Age of Onset     Anxiety Disorder Mother       [facility]   Bronchoscopy Procedure Note    PROCEDURE: Bronchoscopy - therapeutic and washing    INDICATION: Abnormal CT chest with ALMA consolidation and bibasilar atelectasis     CONSENT: Obtained and in the paper chart  The patient's medical record has been reviewed. The indication for the procedure was reviewed. The necessary history and physical examination was performed and reviewed. The risks, benefits and alternatives of the procedure were discussed with the the patient in detail and he had the opportunity to ask questions. I discussed in particular the potential complications including risks of minor or life-threatening bleeding and/or infection, respiratory failure, vocal cord trauma / paralysis, pneumothorax, and discomfort. Sedation risks were also discussed including abnormal heart rhythms, low blood pressure, and respiratory failure. All questions were answered to the best of my ability.      PROCEDURE :   Graciela Kraus MD     MEDICATIONS: Midazolam 4 mg IV, Fentanyl 100 mcg IV   Sedation Time: 5 minutes of continuous 1:1 monitoring   Time Out:  Performed    After clinical evaluation and reviewing the indication, risks, alternatives and benefits of the procedure the patient was deemed to be in satisfactory condition to undergo the procedure.       Immediately before administration of medications the patient was re-assessed for adequacy to receive sedatives including the heart rate, respiratory rate, mental status, oxygen saturation, blood pressure and adequacy of pulmonary ventilation.These same parameters were continuously monitored throughout the procedure.    PROCEDURE:  3mL of lidocaine instilled via ET tube with minimal cough. Flexible bronchoscope was inserted through patient's ET tube. The ET tube was in good position. There was thick yellow mucous scattered within the ETT. The mauri was sharp. An airway inspection was done to the segmental level which showed  Depression Mother      Asthma Maternal Grandmother      Anxiety Disorder Maternal Grandmother      Depression Maternal Grandmother      Kidney Cancer Maternal Grandmother      Lung Cancer Maternal Grandmother      Other Cancer Maternal Grandmother      Substance Abuse Maternal Grandmother      Anesthesia Reaction Maternal Grandmother      Osteoporosis Maternal Grandmother      Obesity Maternal Grandmother      Anxiety Disorder Maternal Grandfather      Depression Maternal Grandfather      Substance Abuse Maternal Grandfather      Obesity Maternal Grandfather        Social History:  Marital Status:  Single [1]  Social History     Tobacco Use     Smoking status: Never Smoker     Smokeless tobacco: Never Used     Tobacco comment: both parents smoke outside   Vaping Use     Vaping Use: Never used   Substance Use Topics     Alcohol use: No     Alcohol/week: 0.0 standard drinks     Drug use: No        Medications:    Acetaminophen (TYLENOL PO)  ibuprofen (ADVIL/MOTRIN) 100 MG/5ML suspension  Nutritional Supplements (PEDIASURE PEDIATRIC) LIQD          Review of Systems   All other systems reviewed and are negative.      Physical Exam   BP: 98/65  Pulse: 92  Temp: 98  F (36.7  C)  Resp: 18  Weight: 18.1 kg (40 lb)  SpO2: 97 %      Physical Exam  Vitals and nursing note reviewed.   Constitutional:       General: She is active. She is not in acute distress.     Appearance: Normal appearance. She is well-developed. She is not toxic-appearing.   HENT:      Head: Normocephalic and atraumatic.      Right Ear: Tympanic membrane normal.      Left Ear: Tympanic membrane normal.      Nose: Nose normal.      Mouth/Throat:      Mouth: Mucous membranes are moist.      Pharynx: Oropharynx is clear. No oropharyngeal exudate or posterior oropharyngeal erythema.      Comments: Small 1 to 2 mm white papular lesion to the left of the base of the frenulum, question if at sublingual salivary gland.  Hard to touch and patient reports is tender.   significant edema and erythema, thin mucous in the L lung, particularly in the LLL. Suction was attempted in the ALMA with immediate bleeding/oozing. Due to the friability of the airways, no further suction was attempted. At withdrawal of scope, the oozing has stopped.     IMPRESSION: Significant airway edema and erythema. Due to friable nature of airway, procedure was aborted. No suction attempted or sample collected.     Supervised by Dr. Margarito Kraus MD  Pulmonary/Critical Care Fellow       No swelling on the floor of mouth noted.  Eyes:      Extraocular Movements: Extraocular movements intact.      Conjunctiva/sclera: Conjunctivae normal.      Pupils: Pupils are equal, round, and reactive to light.   Neck:      Comments: Left-sided submental swelling, question lymphadenopathy versus salivary gland.  No overlying skin changes noted.  Mildly tender to palpation.  Pulmonary:      Effort: Pulmonary effort is normal. No respiratory distress.   Musculoskeletal:      Cervical back: Neck supple.   Lymphadenopathy:      Cervical: No cervical adenopathy.   Skin:     General: Skin is warm and dry.   Neurological:      General: No focal deficit present.      Mental Status: She is alert and oriented for age.   Psychiatric:         Mood and Affect: Mood normal.         Behavior: Behavior normal.         ED Course            Procedures      Results for orders placed or performed during the hospital encounter of 05/23/22 (from the past 24 hour(s))   Streptococcus A Rapid Screen w/Reflex to PCR    Specimen: Throat; Swab   Result Value Ref Range    Group A Strep antigen Negative Negative   CBC with platelets differential    Narrative    The following orders were created for panel order CBC with platelets differential.  Procedure                               Abnormality         Status                     ---------                               -----------         ------                     CBC with platelets and d...[[id number]]                      Final result                 Please view results for these tests on the individual orders.   CRP inflammation   Result Value Ref Range    CRP Inflammation <2.9 0.0 - 8.0 mg/L   CBC with platelets and differential   Result Value Ref Range    WBC Count 8.2 5.0 - 14.5 10e3/uL    RBC Count 4.56 3.70 - 5.30 10e6/uL    Hemoglobin 12.4 10.5 - 14.0 g/dL    Hematocrit 37.8 31.5 - 43.0 %    MCV 83 70 - 100 fL    MCH 27.2 26.5 - 33.0 pg    MCHC 32.8 31.5 - 36.5 g/dL    RDW 12.2 10.0 -  15.0 %    Platelet Count 408 150 - 450 10e3/uL    % Neutrophils 37 %    % Lymphocytes 54 %    % Monocytes 6 %    % Eosinophils 2 %    % Basophils 1 %    % Immature Granulocytes 0 %    NRBCs per 100 WBC 0 <1 /100    Absolute Neutrophils 3.0 1.3 - 8.1 10e3/uL    Absolute Lymphocytes 4.4 1.1 - 8.6 10e3/uL    Absolute Monocytes 0.5 0.0 - 1.1 10e3/uL    Absolute Eosinophils 0.1 0.0 - 0.7 10e3/uL    Absolute Basophils 0.1 0.0 - 0.2 10e3/uL    Absolute Immature Granulocytes 0.0 <=0.4 10e3/uL    Absolute NRBCs 0.0 10e3/uL       Medications - No data to display       Assessments & Plan (with Medical Decision Making)  Audelia Vargas is a 6 year old female who presented to the ED for concerns of neck swelling for the last four days.  No associated fever, sore throat, other URI symptoms.  On arrival to the ED she was afebrile with normal vital signs.  Overall well-appearing in no apparent distress.  She was noted to have some submental gland swelling on the left on exam.  Also with white papular lesion on the base of the tongue near the frenulum on the left, concerning for possible salivary ductal stone.  Mom was agreeable to further evaluation however with a few lab studies to rule out bacterial infection contributing.  Fortunately there are no signs of overt bacterial infection on exam today.  Her white count today was normal at 8.2 and CRP was negative.  Strep screen was also negative.  Clinically I suspect an obstructed salivary duct, likely the sublingual gland based on exam.  No indication for emergent intervention at this time or antibiotics since there are no signs of infection.  Mom was comfortable with plan to continue with conservative therapies to include encouraging sour foods, gland massage, heat to the area, and ibuprofen for pain.  I did place a referral to ENT for them to follow-up with if symptoms are not improving.  They were provided instructions on when to return to the ED.  All questions answered and  patient discharged home in suitable condition.     I have reviewed the nursing notes.    I have reviewed the findings, diagnosis, plan and need for follow up with the patient.    New Prescriptions    No medications on file       Final diagnoses:   Stone of salivary gland or duct     Note: Chart documentation done in part with Dragon Voice Recognition software. Although reviewed after completion, some word and grammatical errors may remain.     5/23/2022   [facility] EMERGENCY DEPT     Anitha Moya PA-C  05/23/22 2002

## 2024-08-21 NOTE — PROGRESS NOTES
Patient admitted from OSH. Came intubated.     Adjusted vent settings per MD order with MD in the room. Will continue to monitor and adjust as necessary.

## 2024-08-21 NOTE — PROGRESS NOTES
CLINICAL NUTRITION SERVICES - ASSESSMENT NOTE     Nutrition Prescription    RECOMMENDATIONS FOR MDs/PROVIDERS TO ORDER:  - Total daily fluids/adjustments per MD   - Extubation and diet vs EN via OGT vs nasoFT. Please consult Nutrition Services Adult IP Consult with reason Registered Dietitian to Order TF per Medical Nutrition Therapy Guidelines if EN becomes POC.   - Electrolyte replacement per protocol as indicated      Malnutrition Status:    - Unable to determine due to unable to assess all parameters of NFPE    Recommendations already ordered by Registered Dietitian (RD):  - Ordered thiamine for EF   - None additional currently while nutrition POC pending     Future/Additional Recommendations:  - Extubation and diet vs TFs via OGT vs nasoFT    -  If EN indicated: recommend Pivot 1.5 Akbar (or equivalent) @ goal of  50ml/hr  (1200ml/day) provides: 1800 kcals, 112 g PRO, 900 ml free H20, 206 g CHO, and 9 g fiber daily + 2 [id number] = 1960 kcals (22 kcals/kg) and 152 gm PRO (1.7 gm/kg)     REASON FOR ASSESSMENT  Terrance Hidalgo is a/an 24 year old male assessed by the dietitian for Nutrition Risk Monitoring    Medical History: PMH autism who initially presented to [facility] with cough and URI type symptoms, based on O2 saturation and presentation patient was placed on BiPAP. He subsequently vomitted and aspirated, leading to intubation.     NUTRITION HISTORY  Lives in group home per chart.   Attempted visit - pt unavailable    Per chart review notes, pt saw OP RD 5/28/2024 for obesity/weight gain.   From OP visit with RD 5/2024  Nutrition/Diet Summary:   Do you follow any type of diet or meal plan now? no Description: regular diet. Enjoys spicy food. Dines out. Dislikes cheese. No issues with stool - some instances in constipation. Supervisor has concerns with weight gain of 40 lbs over past year, family history of heart disease. Discussed some benefits with cutting back on sugary beverages. Pt has up to 10  "beverages of soda and energy drinks daily. He reports before his father  in  he was limited to 1 soda a day, which he did not like. He does not like limits. So we agreed he can have unlimited amounts of zero calorie beverages, and reduce the number of drinks with sugar and caffeine daily. Pt agreed and will plan to work with group home staff member Miri.     Sample of meals for a typical day:  Time: - Breakfast: Random - oatmeal   Time: 10 Snack: chips + salsa   Time: - Lunch: PB and J sandwich   Time: 3 Snack: chips + salsa   Time: - Dinner: Random dinner - tacos / fajita and pot stickers and sushi and little ceasers pizza   Time: 7 Snack: chips + salsa   Beverages: Energy Drinks - Mountain Dew Rise and Monster, Mountain dew Code Red, juice 6 ounces ~ 1 cup , chocolate/ strawberry milk   Do you do your own food shopping? no  Do you cook your own meals? no  How often do you eat out? 3-4 times a week  What type of restaurants? fast food and take-out  Caffeine use: yes Number of cups per day: 2-4  Alcohol use: no Number of drinks per day: 0  Appetite: good (greater than 75%)    Exercise History: not active     CURRENT NUTRITION ORDERS  Diet: NPO  Enteral Access:  OGT (AXR)    LABS  Labs reviewed  K+ 3.3 (L)  Mg++ 2.4 (H)  ALT: 405 (H)  AST: 418 (H)  CRP: 103 (H)  Gluocse: 142, 124  Positive urinary ketones     Imaging/procedures:  CT CAP unremarkable   TTE (24): LVEF is 16%. Severe hypokinesis of mid to apical RV free wall.     MEDICATIONS  Medications reviewed  Lasix    ANTHROPOMETRICS  Height: 186 cm (6' 1.228\") 73\"   Most Recent Weight: 105.4 kg (232 lb 5.8 oz)    IBW: 84 kg  BMI: Obesity Grade I BMI 30-34.9  Weight History:   Wt Readings from Last 8 Encounters:   24 105.4 kg (232 lb 5.8 oz)     Weight per chart review  23 84.2 kg (185 lb 9.6 oz)   23 83.9 kg (185 lb)   10/28/22 93.9 kg (207 lb)     Dosing Weight: 89 kg (adjusted weight using 105 kg admit/current weight and IBW of " "84 kg)    ASSESSED NUTRITION NEEDS  Estimated Energy Needs: [id number] kcals/day (20 - 25 - 30 kcals/kg)  Justification: Maintenance vs Obese, and Vented  Estimated Protein Needs: 134-178 grams protein/day (1.5 - 2 grams of pro/kg)  Justification: Increased needs  Estimated Fluid Needs:  (1 mL/kcal)   Justification: Maintenance and Per provider pending fluid status    PHYSICAL FINDINGS  See malnutrition section below.  From Chart:  ABD: soft, non-tender, normal active bowel sounds  EXTR: pulses 2+. No clubbing, cyanosis or edema.     MALNUTRITION  % Intake: Unable to assess  % Weight Loss: None noted  Subcutaneous Fat Loss: Unable to assess  Muscle Loss: Unable to assess  Fluid Accumulation/Edema: medically related   Malnutrition Diagnosis: Unable to determine due to unable to assess all parameters of NFPE - not highly suspected considering recent OP visit notes/no recent weight loss of PO decline     NUTRITION DIAGNOSIS  Inadequate oral intake related to inability to take oral PO/vented as evidenced by NPO and meeting 0% nutrition needs at present      INTERVENTIONS  Implementation  Nutrition Education: Not appropriate at this time due to patient condition   Enteral Nutrition - recs made pending nutrition POC    Ordered thiamine     Goals  Diet adv v nutrition support within 24-48 hours     Monitoring/Evaluation  Progress toward goals will be monitored and evaluated per protocol  Diya Rios RD, LD, [facility]  Neuro ICU Dietitian; 6A Neuro  Vocera \"4E Clinical Dietitian\"  Weekend/holiday \"Weekend Clinical Dietitian\"      "

## 2024-08-22 ENCOUNTER — APPOINTMENT (OUTPATIENT)
Dept: GENERAL RADIOLOGY | Facility: CLINIC | Age: 24
DRG: 853 | End: 2024-08-22
Attending: INTERNAL MEDICINE
Payer: MEDICARE

## 2024-08-22 ENCOUNTER — ANESTHESIA EVENT (OUTPATIENT)
Dept: INTENSIVE CARE | Facility: CLINIC | Age: 24
DRG: 853 | End: 2024-08-22
Payer: MEDICARE

## 2024-08-22 ENCOUNTER — ANESTHESIA (OUTPATIENT)
Dept: INTENSIVE CARE | Facility: CLINIC | Age: 24
DRG: 853 | End: 2024-08-22
Payer: MEDICARE

## 2024-08-22 LAB
ALBUMIN SERPL BCG-MCNC: 3.3 G/DL (ref 3.5–5.2)
ALBUMIN SERPL BCG-MCNC: 3.3 G/DL (ref 3.5–5.2)
ALBUMIN SERPL BCG-MCNC: 3.4 G/DL (ref 3.5–5.2)
ALBUMIN SERPL BCG-MCNC: 3.5 G/DL (ref 3.5–5.2)
ALLEN'S TEST: ABNORMAL
ALP SERPL-CCNC: 58 U/L (ref 40–150)
ALP SERPL-CCNC: 64 U/L (ref 40–150)
ALP SERPL-CCNC: 65 U/L (ref 40–150)
ALP SERPL-CCNC: 68 U/L (ref 40–150)
ALT SERPL W P-5'-P-CCNC: 237 U/L (ref 0–70)
ALT SERPL W P-5'-P-CCNC: 280 U/L (ref 0–70)
ALT SERPL W P-5'-P-CCNC: 301 U/L (ref 0–70)
ALT SERPL W P-5'-P-CCNC: 331 U/L (ref 0–70)
ANION GAP SERPL CALCULATED.3IONS-SCNC: 13 MMOL/L (ref 7–15)
ANION GAP SERPL CALCULATED.3IONS-SCNC: 13 MMOL/L (ref 7–15)
ANION GAP SERPL CALCULATED.3IONS-SCNC: 14 MMOL/L (ref 7–15)
ANION GAP SERPL CALCULATED.3IONS-SCNC: 15 MMOL/L (ref 7–15)
AST SERPL W P-5'-P-CCNC: 109 U/L (ref 0–45)
AST SERPL W P-5'-P-CCNC: 134 U/L (ref 0–45)
AST SERPL W P-5'-P-CCNC: 164 U/L (ref 0–45)
AST SERPL W P-5'-P-CCNC: 74 U/L (ref 0–45)
B BURGDOR IGG+IGM SER QL: 0.13
BACTERIA SPT CULT: NO GROWTH
BASE EXCESS BLDA CALC-SCNC: 11.7 MMOL/L (ref -3–3)
BASE EXCESS BLDA CALC-SCNC: 6.8 MMOL/L (ref -3–3)
BASE EXCESS BLDA CALC-SCNC: 7.2 MMOL/L (ref -3–3)
BASE EXCESS BLDA CALC-SCNC: 7.4 MMOL/L (ref -3–3)
BASE EXCESS BLDA CALC-SCNC: 7.6 MMOL/L (ref -3–3)
BASE EXCESS BLDA CALC-SCNC: 8 MMOL/L (ref -3–3)
BASE EXCESS BLDV CALC-SCNC: 11.8 MMOL/L (ref -3–3)
BASE EXCESS BLDV CALC-SCNC: 6.9 MMOL/L (ref -3–3)
BASE EXCESS BLDV CALC-SCNC: 7.2 MMOL/L (ref -3–3)
BASE EXCESS BLDV CALC-SCNC: 7.7 MMOL/L (ref -3–3)
BASE EXCESS BLDV CALC-SCNC: 8.7 MMOL/L (ref -3–3)
BASOPHILS # BLD AUTO: 0 10E3/UL (ref 0–0.2)
BASOPHILS NFR BLD AUTO: 0 %
BILIRUB DIRECT SERPL-MCNC: 0.21 MG/DL (ref 0–0.3)
BILIRUB DIRECT SERPL-MCNC: <0.2 MG/DL (ref 0–0.3)
BILIRUB SERPL-MCNC: 0.3 MG/DL
BILIRUB SERPL-MCNC: 0.4 MG/DL
BILIRUB SERPL-MCNC: 0.4 MG/DL
BILIRUB SERPL-MCNC: 0.5 MG/DL
BUN SERPL-MCNC: 16.7 MG/DL (ref 6–20)
BUN SERPL-MCNC: 19.3 MG/DL (ref 6–20)
BUN SERPL-MCNC: 24.3 MG/DL (ref 6–20)
BUN SERPL-MCNC: 27.3 MG/DL (ref 6–20)
CALCIUM SERPL-MCNC: 8.4 MG/DL (ref 8.8–10.4)
CALCIUM SERPL-MCNC: 8.5 MG/DL (ref 8.8–10.4)
CALCIUM SERPL-MCNC: 8.5 MG/DL (ref 8.8–10.4)
CALCIUM SERPL-MCNC: 8.8 MG/DL (ref 8.8–10.4)
CHLORIDE SERPL-SCNC: 104 MMOL/L (ref 98–107)
CHLORIDE SERPL-SCNC: 105 MMOL/L (ref 98–107)
CHLORIDE SERPL-SCNC: 106 MMOL/L (ref 98–107)
CHLORIDE SERPL-SCNC: 106 MMOL/L (ref 98–107)
CK SERPL-CCNC: 336 U/L (ref 39–308)
COHGB MFR BLD: 96.9 % (ref 96–97)
COHGB MFR BLD: 98.5 % (ref 96–97)
COHGB MFR BLD: 98.6 % (ref 96–97)
COHGB MFR BLD: 99.6 % (ref 96–97)
COHGB MFR BLD: 99.7 % (ref 96–97)
COHGB MFR BLD: >100 % (ref 96–97)
CREAT SERPL-MCNC: 0.73 MG/DL (ref 0.67–1.17)
CREAT SERPL-MCNC: 0.76 MG/DL (ref 0.67–1.17)
CREAT SERPL-MCNC: 0.78 MG/DL (ref 0.67–1.17)
CREAT SERPL-MCNC: 0.79 MG/DL (ref 0.67–1.17)
CREAT SERPL-MCNC: 0.83 MG/DL (ref 0.67–1.17)
EBV DNA SERPL NAA+PROBE-ACNC: NOT DETECTED IU/ML
EGFRCR SERPLBLD CKD-EPI 2021: >90 ML/MIN/1.73M2
EOSINOPHIL # BLD AUTO: 0 10E3/UL (ref 0–0.7)
EOSINOPHIL NFR BLD AUTO: 0 %
ERYTHROCYTE [DISTWIDTH] IN BLOOD BY AUTOMATED COUNT: 12.8 % (ref 10–15)
ERYTHROCYTE [DISTWIDTH] IN BLOOD BY AUTOMATED COUNT: 12.9 % (ref 10–15)
ERYTHROCYTE [DISTWIDTH] IN BLOOD BY AUTOMATED COUNT: 13.2 % (ref 10–15)
ERYTHROCYTE [DISTWIDTH] IN BLOOD BY AUTOMATED COUNT: 13.2 % (ref 10–15)
GLUCOSE BLDC GLUCOMTR-MCNC: 143 MG/DL (ref 70–99)
GLUCOSE BLDC GLUCOMTR-MCNC: 158 MG/DL (ref 70–99)
GLUCOSE BLDC GLUCOMTR-MCNC: 161 MG/DL (ref 70–99)
GLUCOSE BLDC GLUCOMTR-MCNC: 170 MG/DL (ref 70–99)
GLUCOSE BLDC GLUCOMTR-MCNC: 189 MG/DL (ref 70–99)
GLUCOSE BLDC GLUCOMTR-MCNC: 198 MG/DL (ref 70–99)
GLUCOSE SERPL-MCNC: 167 MG/DL (ref 70–99)
GLUCOSE SERPL-MCNC: 170 MG/DL (ref 70–99)
GLUCOSE SERPL-MCNC: 200 MG/DL (ref 70–99)
GLUCOSE SERPL-MCNC: 225 MG/DL (ref 70–99)
GRAM STAIN RESULT: NORMAL
GRAM STAIN RESULT: NORMAL
HCO3 BLD-SCNC: 32 MMOL/L (ref 21–28)
HCO3 BLD-SCNC: 33 MMOL/L (ref 21–28)
HCO3 BLD-SCNC: 36 MMOL/L (ref 21–28)
HCO3 BLDV-SCNC: 33 MMOL/L (ref 21–28)
HCO3 BLDV-SCNC: 33 MMOL/L (ref 21–28)
HCO3 BLDV-SCNC: 34 MMOL/L (ref 21–28)
HCO3 BLDV-SCNC: 34 MMOL/L (ref 21–28)
HCO3 BLDV-SCNC: 37 MMOL/L (ref 21–28)
HCO3 SERPL-SCNC: 28 MMOL/L (ref 22–29)
HCO3 SERPL-SCNC: 29 MMOL/L (ref 22–29)
HCO3 SERPL-SCNC: 29 MMOL/L (ref 22–29)
HCO3 SERPL-SCNC: 31 MMOL/L (ref 22–29)
HCT VFR BLD AUTO: 36.4 % (ref 40–53)
HCT VFR BLD AUTO: 38.8 % (ref 40–53)
HCT VFR BLD AUTO: 38.8 % (ref 40–53)
HCT VFR BLD AUTO: 40.4 % (ref 40–53)
HGB BLD-MCNC: 11.9 G/DL (ref 13.3–17.7)
HGB BLD-MCNC: 12.7 G/DL (ref 13.3–17.7)
HGB BLD-MCNC: 12.8 G/DL (ref 13.3–17.7)
HGB BLD-MCNC: 13.3 G/DL (ref 13.3–17.7)
IMM GRANULOCYTES # BLD: 0 10E3/UL
IMM GRANULOCYTES # BLD: 0.1 10E3/UL
IMM GRANULOCYTES NFR BLD: 0 %
IMM GRANULOCYTES NFR BLD: 0 %
IMM GRANULOCYTES NFR BLD: 1 %
IMM GRANULOCYTES NFR BLD: 1 %
LACTATE SERPL-SCNC: 1.8 MMOL/L (ref 0.7–2)
LACTATE SERPL-SCNC: 2 MMOL/L (ref 0.7–2)
LACTATE SERPL-SCNC: 2 MMOL/L (ref 0.7–2)
LACTATE SERPL-SCNC: 2.3 MMOL/L (ref 0.7–2)
LYMPHOCYTES # BLD AUTO: 0.5 10E3/UL (ref 0.8–5.3)
LYMPHOCYTES # BLD AUTO: 0.6 10E3/UL (ref 0.8–5.3)
LYMPHOCYTES # BLD AUTO: 0.8 10E3/UL (ref 0.8–5.3)
LYMPHOCYTES # BLD AUTO: 0.8 10E3/UL (ref 0.8–5.3)
LYMPHOCYTES NFR BLD AUTO: 4 %
LYMPHOCYTES NFR BLD AUTO: 6 %
LYMPHOCYTES NFR BLD AUTO: 7 %
LYMPHOCYTES NFR BLD AUTO: 7 %
MAGNESIUM SERPL-MCNC: 2.2 MG/DL (ref 1.7–2.3)
MAGNESIUM SERPL-MCNC: 2.2 MG/DL (ref 1.7–2.3)
MAGNESIUM SERPL-MCNC: 2.3 MG/DL (ref 1.7–2.3)
MAGNESIUM SERPL-MCNC: 2.3 MG/DL (ref 1.7–2.3)
MCH RBC QN AUTO: 28.2 PG (ref 26.5–33)
MCH RBC QN AUTO: 28.3 PG (ref 26.5–33)
MCH RBC QN AUTO: 28.3 PG (ref 26.5–33)
MCH RBC QN AUTO: 28.7 PG (ref 26.5–33)
MCHC RBC AUTO-ENTMCNC: 32.7 G/DL (ref 31.5–36.5)
MCHC RBC AUTO-ENTMCNC: 32.7 G/DL (ref 31.5–36.5)
MCHC RBC AUTO-ENTMCNC: 32.9 G/DL (ref 31.5–36.5)
MCHC RBC AUTO-ENTMCNC: 33 G/DL (ref 31.5–36.5)
MCV RBC AUTO: 86 FL (ref 78–100)
MCV RBC AUTO: 86 FL (ref 78–100)
MCV RBC AUTO: 87 FL (ref 78–100)
MCV RBC AUTO: 88 FL (ref 78–100)
MONOCYTES # BLD AUTO: 0.3 10E3/UL (ref 0–1.3)
MONOCYTES # BLD AUTO: 0.6 10E3/UL (ref 0–1.3)
MONOCYTES # BLD AUTO: 0.6 10E3/UL (ref 0–1.3)
MONOCYTES # BLD AUTO: 0.8 10E3/UL (ref 0–1.3)
MONOCYTES NFR BLD AUTO: 3 %
MONOCYTES NFR BLD AUTO: 5 %
MONOCYTES NFR BLD AUTO: 6 %
MONOCYTES NFR BLD AUTO: 6 %
NEUTROPHILS # BLD AUTO: 10.6 10E3/UL (ref 1.6–8.3)
NEUTROPHILS # BLD AUTO: 11.6 10E3/UL (ref 1.6–8.3)
NEUTROPHILS # BLD AUTO: 9.1 10E3/UL (ref 1.6–8.3)
NEUTROPHILS # BLD AUTO: 9.2 10E3/UL (ref 1.6–8.3)
NEUTROPHILS NFR BLD AUTO: 87 %
NEUTROPHILS NFR BLD AUTO: 88 %
NEUTROPHILS NFR BLD AUTO: 89 %
NEUTROPHILS NFR BLD AUTO: 90 %
NRBC # BLD AUTO: 0 10E3/UL
NRBC BLD AUTO-RTO: 0 /100
O2/TOTAL GAS SETTING VFR VENT: 50 %
O2/TOTAL GAS SETTING VFR VENT: 60 %
O2/TOTAL GAS SETTING VFR VENT: 60 %
O2/TOTAL GAS SETTING VFR VENT: 70 %
OXYHGB MFR BLDV: 60 % (ref 70–75)
OXYHGB MFR BLDV: 63 % (ref 70–75)
OXYHGB MFR BLDV: 64 % (ref 70–75)
OXYHGB MFR BLDV: 72 % (ref 70–75)
OXYHGB MFR BLDV: 75 % (ref 70–75)
PATH REPORT.COMMENTS IMP SPEC: NORMAL
PATH REPORT.COMMENTS IMP SPEC: NORMAL
PATH REPORT.FINAL DX SPEC: NORMAL
PATH REPORT.GROSS SPEC: NORMAL
PATH REPORT.MICROSCOPIC SPEC OTHER STN: NORMAL
PATH REPORT.RELEVANT HX SPEC: NORMAL
PCO2 BLD: 43 MM HG (ref 35–45)
PCO2 BLD: 44 MM HG (ref 35–45)
PCO2 BLD: 46 MM HG (ref 35–45)
PCO2 BLD: 47 MM HG (ref 35–45)
PCO2 BLD: 48 MM HG (ref 35–45)
PCO2 BLD: 50 MM HG (ref 35–45)
PCO2 BLDV: 47 MM HG (ref 40–50)
PCO2 BLDV: 51 MM HG (ref 40–50)
PCO2 BLDV: 51 MM HG (ref 40–50)
PCO2 BLDV: 52 MM HG (ref 40–50)
PCO2 BLDV: 54 MM HG (ref 40–50)
PEEP: 12 CM H2O
PEEP: 8 CM H2O
PH BLD: 7.43 [PH] (ref 7.35–7.45)
PH BLD: 7.44 [PH] (ref 7.35–7.45)
PH BLD: 7.45 [PH] (ref 7.35–7.45)
PH BLD: 7.46 [PH] (ref 7.35–7.45)
PH BLD: 7.46 [PH] (ref 7.35–7.45)
PH BLD: 7.52 [PH] (ref 7.35–7.45)
PH BLDV: 7.41 [PH] (ref 7.32–7.43)
PH BLDV: 7.42 [PH] (ref 7.32–7.43)
PH BLDV: 7.42 [PH] (ref 7.32–7.43)
PH BLDV: 7.44 [PH] (ref 7.32–7.43)
PH BLDV: 7.5 [PH] (ref 7.32–7.43)
PHOSPHATE SERPL-MCNC: 2.2 MG/DL (ref 2.5–4.5)
PHOSPHATE SERPL-MCNC: 3.1 MG/DL (ref 2.5–4.5)
PHOSPHATE SERPL-MCNC: 3.4 MG/DL (ref 2.5–4.5)
PHOSPHATE SERPL-MCNC: 3.8 MG/DL (ref 2.5–4.5)
PHOTO IMAGE: NORMAL
PLATELET # BLD AUTO: 250 10E3/UL (ref 150–450)
PLATELET # BLD AUTO: 251 10E3/UL (ref 150–450)
PLATELET # BLD AUTO: 253 10E3/UL (ref 150–450)
PLATELET # BLD AUTO: 264 10E3/UL (ref 150–450)
PO2 BLD: 123 MM HG (ref 80–105)
PO2 BLD: 124 MM HG (ref 80–105)
PO2 BLD: 147 MM HG (ref 80–105)
PO2 BLD: 75 MM HG (ref 80–105)
PO2 BLD: 97 MM HG (ref 80–105)
PO2 BLD: 98 MM HG (ref 80–105)
PO2 BLDV: 33 MM HG (ref 25–47)
PO2 BLDV: 38 MM HG (ref 25–47)
PO2 BLDV: 42 MM HG (ref 25–47)
POTASSIUM SERPL-SCNC: 3.4 MMOL/L (ref 3.4–5.3)
POTASSIUM SERPL-SCNC: 3.7 MMOL/L (ref 3.4–5.3)
POTASSIUM SERPL-SCNC: 3.9 MMOL/L (ref 3.4–5.3)
POTASSIUM SERPL-SCNC: 4 MMOL/L (ref 3.4–5.3)
PROT SERPL-MCNC: 5.7 G/DL (ref 6.4–8.3)
PROT SERPL-MCNC: 5.8 G/DL (ref 6.4–8.3)
PROT SERPL-MCNC: 6 G/DL (ref 6.4–8.3)
PROT SERPL-MCNC: 6.1 G/DL (ref 6.4–8.3)
RBC # BLD AUTO: 4.2 10E6/UL (ref 4.4–5.9)
RBC # BLD AUTO: 4.43 10E6/UL (ref 4.4–5.9)
RBC # BLD AUTO: 4.52 10E6/UL (ref 4.4–5.9)
RBC # BLD AUTO: 4.71 10E6/UL (ref 4.4–5.9)
SAO2 % BLDA: 95 % (ref 92–100)
SAO2 % BLDA: 97 % (ref 92–100)
SAO2 % BLDA: 98 % (ref 92–100)
SAO2 % BLDA: 98 % (ref 92–100)
SAO2 % BLDA: 99 % (ref 92–100)
SAO2 % BLDA: 99 % (ref 92–100)
SAO2 % BLDV: 60.7 % (ref 70–75)
SAO2 % BLDV: 63.5 % (ref 70–75)
SAO2 % BLDV: 64.8 % (ref 70–75)
SAO2 % BLDV: 73.2 % (ref 70–75)
SAO2 % BLDV: 76.5 % (ref 70–75)
SODIUM SERPL-SCNC: 147 MMOL/L (ref 135–145)
SODIUM SERPL-SCNC: 147 MMOL/L (ref 135–145)
SODIUM SERPL-SCNC: 149 MMOL/L (ref 135–145)
SODIUM SERPL-SCNC: 150 MMOL/L (ref 135–145)
WBC # BLD AUTO: 10.3 10E3/UL (ref 4–11)
WBC # BLD AUTO: 10.4 10E3/UL (ref 4–11)
WBC # BLD AUTO: 12 10E3/UL (ref 4–11)
WBC # BLD AUTO: 13 10E3/UL (ref 4–11)

## 2024-08-22 PROCEDURE — 99291 CRITICAL CARE FIRST HOUR: CPT | Mod: GC | Performed by: INTERNAL MEDICINE

## 2024-08-22 PROCEDURE — 200N000002 HC R&B ICU UMMC

## 2024-08-22 PROCEDURE — 83605 ASSAY OF LACTIC ACID: CPT | Performed by: STUDENT IN AN ORGANIZED HEALTH CARE EDUCATION/TRAINING PROGRAM

## 2024-08-22 PROCEDURE — 250N000011 HC RX IP 250 OP 636: Performed by: STUDENT IN AN ORGANIZED HEALTH CARE EDUCATION/TRAINING PROGRAM

## 2024-08-22 PROCEDURE — 94003 VENT MGMT INPAT SUBQ DAY: CPT

## 2024-08-22 PROCEDURE — 71045 X-RAY EXAM CHEST 1 VIEW: CPT | Mod: 26 | Performed by: RADIOLOGY

## 2024-08-22 PROCEDURE — 82248 BILIRUBIN DIRECT: CPT | Performed by: STUDENT IN AN ORGANIZED HEALTH CARE EDUCATION/TRAINING PROGRAM

## 2024-08-22 PROCEDURE — 71045 X-RAY EXAM CHEST 1 VIEW: CPT

## 2024-08-22 PROCEDURE — 99232 SBSQ HOSP IP/OBS MODERATE 35: CPT | Performed by: INTERNAL MEDICINE

## 2024-08-22 PROCEDURE — 250N000013 HC RX MED GY IP 250 OP 250 PS 637: Performed by: STUDENT IN AN ORGANIZED HEALTH CARE EDUCATION/TRAINING PROGRAM

## 2024-08-22 PROCEDURE — 370N000003 HC ANESTHESIA WARD SERVICE: Performed by: ANESTHESIOLOGY

## 2024-08-22 PROCEDURE — 999N000065 XR ABDOMEN PORT 1 VIEW

## 2024-08-22 PROCEDURE — 82805 BLOOD GASES W/O2 SATURATION: CPT | Performed by: STUDENT IN AN ORGANIZED HEALTH CARE EDUCATION/TRAINING PROGRAM

## 2024-08-22 PROCEDURE — 99292 CRITICAL CARE ADDL 30 MIN: CPT | Mod: GC | Performed by: INTERNAL MEDICINE

## 2024-08-22 PROCEDURE — 85004 AUTOMATED DIFF WBC COUNT: CPT | Performed by: STUDENT IN AN ORGANIZED HEALTH CARE EDUCATION/TRAINING PROGRAM

## 2024-08-22 PROCEDURE — 999N000065 XR CHEST PORT 1 VIEW

## 2024-08-22 PROCEDURE — 80048 BASIC METABOLIC PNL TOTAL CA: CPT | Performed by: STUDENT IN AN ORGANIZED HEALTH CARE EDUCATION/TRAINING PROGRAM

## 2024-08-22 PROCEDURE — 999N000157 HC STATISTIC RCP TIME EA 10 MIN

## 2024-08-22 PROCEDURE — 250N000011 HC RX IP 250 OP 636

## 2024-08-22 PROCEDURE — 250N000009 HC RX 250: Performed by: STUDENT IN AN ORGANIZED HEALTH CARE EDUCATION/TRAINING PROGRAM

## 2024-08-22 PROCEDURE — 250N000011 HC RX IP 250 OP 636: Performed by: INTERNAL MEDICINE

## 2024-08-22 PROCEDURE — 250N000013 HC RX MED GY IP 250 OP 250 PS 637: Performed by: INTERNAL MEDICINE

## 2024-08-22 PROCEDURE — 84100 ASSAY OF PHOSPHORUS: CPT | Performed by: STUDENT IN AN ORGANIZED HEALTH CARE EDUCATION/TRAINING PROGRAM

## 2024-08-22 PROCEDURE — 82550 ASSAY OF CK (CPK): CPT

## 2024-08-22 PROCEDURE — 250N000013 HC RX MED GY IP 250 OP 250 PS 637

## 2024-08-22 PROCEDURE — 83735 ASSAY OF MAGNESIUM: CPT | Performed by: STUDENT IN AN ORGANIZED HEALTH CARE EDUCATION/TRAINING PROGRAM

## 2024-08-22 PROCEDURE — 74018 RADEX ABDOMEN 1 VIEW: CPT | Mod: 26 | Performed by: RADIOLOGY

## 2024-08-22 PROCEDURE — 250N000011 HC RX IP 250 OP 636: Mod: JZ | Performed by: STUDENT IN AN ORGANIZED HEALTH CARE EDUCATION/TRAINING PROGRAM

## 2024-08-22 PROCEDURE — 258N000003 HC RX IP 258 OP 636

## 2024-08-22 RX ORDER — AMOXICILLIN 250 MG
1 CAPSULE ORAL 2 TIMES DAILY PRN
Status: DISCONTINUED | OUTPATIENT
Start: 2024-08-22 | End: 2024-09-19 | Stop reason: HOSPADM

## 2024-08-22 RX ORDER — FUROSEMIDE 10 MG/ML
80 INJECTION INTRAMUSCULAR; INTRAVENOUS ONCE
Status: COMPLETED | OUTPATIENT
Start: 2024-08-22 | End: 2024-08-22

## 2024-08-22 RX ORDER — AMOXICILLIN 250 MG
2 CAPSULE ORAL 2 TIMES DAILY PRN
Status: DISCONTINUED | OUTPATIENT
Start: 2024-08-22 | End: 2024-09-19 | Stop reason: HOSPADM

## 2024-08-22 RX ORDER — AMINO ACIDS/PROTEIN HYDROLYS 11G-40/45
1 LIQUID IN PACKET (ML) ORAL 2 TIMES DAILY
Status: DISCONTINUED | OUTPATIENT
Start: 2024-08-22 | End: 2024-08-31

## 2024-08-22 RX ORDER — AZITHROMYCIN 250 MG/1
250 TABLET, FILM COATED ORAL DAILY
Status: COMPLETED | OUTPATIENT
Start: 2024-08-23 | End: 2024-08-24

## 2024-08-22 RX ORDER — FUROSEMIDE 10 MG/ML
60 INJECTION INTRAMUSCULAR; INTRAVENOUS ONCE
Status: COMPLETED | OUTPATIENT
Start: 2024-08-22 | End: 2024-08-22

## 2024-08-22 RX ORDER — GUAIFENESIN 600 MG/1
15 TABLET, EXTENDED RELEASE ORAL DAILY
Status: DISCONTINUED | OUTPATIENT
Start: 2024-08-22 | End: 2024-08-30

## 2024-08-22 RX ORDER — ENOXAPARIN SODIUM 100 MG/ML
40 INJECTION SUBCUTANEOUS EVERY 24 HOURS
Status: DISCONTINUED | OUTPATIENT
Start: 2024-08-22 | End: 2024-08-27

## 2024-08-22 RX ORDER — DEXTROSE MONOHYDRATE 100 MG/ML
INJECTION, SOLUTION INTRAVENOUS CONTINUOUS PRN
Status: DISCONTINUED | OUTPATIENT
Start: 2024-08-22 | End: 2024-08-23

## 2024-08-22 RX ORDER — POTASSIUM CHLORIDE 29.8 MG/ML
20 INJECTION INTRAVENOUS
Status: COMPLETED | OUTPATIENT
Start: 2024-08-23 | End: 2024-08-23

## 2024-08-22 RX ORDER — DEXMEDETOMIDINE HYDROCHLORIDE 4 UG/ML
.1-1.2 INJECTION, SOLUTION INTRAVENOUS CONTINUOUS
Status: DISCONTINUED | OUTPATIENT
Start: 2024-08-22 | End: 2024-08-22

## 2024-08-22 RX ORDER — POTASSIUM CHLORIDE 29.8 MG/ML
20 INJECTION INTRAVENOUS ONCE
Status: COMPLETED | OUTPATIENT
Start: 2024-08-22 | End: 2024-08-22

## 2024-08-22 RX ADMIN — SODIUM CHLORIDE 1000 MG: 9 INJECTION, SOLUTION INTRAVENOUS at 13:20

## 2024-08-22 RX ADMIN — Medication 200 MCG/HR: at 02:06

## 2024-08-22 RX ADMIN — POLYETHYLENE GLYCOL 3350 17 G: 17 POWDER, FOR SOLUTION ORAL at 14:30

## 2024-08-22 RX ADMIN — INSULIN ASPART 1 UNITS: 100 INJECTION, SOLUTION INTRAVENOUS; SUBCUTANEOUS at 13:32

## 2024-08-22 RX ADMIN — CHLORHEXIDINE GLUCONATE 0.12% ORAL RINSE 15 ML: 1.2 LIQUID ORAL at 09:33

## 2024-08-22 RX ADMIN — SENNOSIDES AND DOCUSATE SODIUM 2 TABLET: 8.6; 5 TABLET ORAL at 14:30

## 2024-08-22 RX ADMIN — ROCURONIUM BROMIDE 100 MG: 10 INJECTION, SOLUTION INTRAVENOUS at 07:02

## 2024-08-22 RX ADMIN — PANTOPRAZOLE SODIUM 40 MG: 40 INJECTION, POWDER, FOR SOLUTION INTRAVENOUS at 09:29

## 2024-08-22 RX ADMIN — POTASSIUM CHLORIDE 20 MEQ: 29.8 INJECTION, SOLUTION INTRAVENOUS at 06:41

## 2024-08-22 RX ADMIN — INSULIN ASPART 1 UNITS: 100 INJECTION, SOLUTION INTRAVENOUS; SUBCUTANEOUS at 10:00

## 2024-08-22 RX ADMIN — INSULIN ASPART 1 UNITS: 100 INJECTION, SOLUTION INTRAVENOUS; SUBCUTANEOUS at 04:37

## 2024-08-22 RX ADMIN — Medication 60 ML: at 20:08

## 2024-08-22 RX ADMIN — HEPARIN SODIUM 5000 UNITS: 5000 INJECTION, SOLUTION INTRAVENOUS; SUBCUTANEOUS at 09:29

## 2024-08-22 RX ADMIN — CHLORHEXIDINE GLUCONATE 0.12% ORAL RINSE 15 ML: 1.2 LIQUID ORAL at 20:07

## 2024-08-22 RX ADMIN — MIDAZOLAM HYDROCHLORIDE 6 MG/HR: 1 INJECTION, SOLUTION INTRAVENOUS at 06:09

## 2024-08-22 RX ADMIN — Medication 60 ML: at 13:21

## 2024-08-22 RX ADMIN — INSULIN ASPART 1 UNITS: 100 INJECTION, SOLUTION INTRAVENOUS; SUBCUTANEOUS at 00:39

## 2024-08-22 RX ADMIN — Medication 200 MCG/HR: at 14:16

## 2024-08-22 RX ADMIN — HEPARIN SODIUM 5000 UNITS: 5000 INJECTION, SOLUTION INTRAVENOUS; SUBCUTANEOUS at 00:33

## 2024-08-22 RX ADMIN — AMPICILLIN SODIUM AND SULBACTAM SODIUM 3 G: 2; 1 INJECTION, POWDER, FOR SOLUTION INTRAMUSCULAR; INTRAVENOUS at 09:29

## 2024-08-22 RX ADMIN — FUROSEMIDE 60 MG: 10 INJECTION, SOLUTION INTRAMUSCULAR; INTRAVENOUS at 02:04

## 2024-08-22 RX ADMIN — THIAMINE HCL TAB 100 MG 100 MG: 100 TAB at 09:47

## 2024-08-22 RX ADMIN — ENOXAPARIN SODIUM 40 MG: 40 INJECTION SUBCUTANEOUS at 17:16

## 2024-08-22 RX ADMIN — INSULIN ASPART 1 UNITS: 100 INJECTION, SOLUTION INTRAVENOUS; SUBCUTANEOUS at 15:44

## 2024-08-22 RX ADMIN — AMPICILLIN SODIUM AND SULBACTAM SODIUM 3 G: 2; 1 INJECTION, POWDER, FOR SOLUTION INTRAMUSCULAR; INTRAVENOUS at 04:33

## 2024-08-22 RX ADMIN — Medication 15 ML: at 13:21

## 2024-08-22 RX ADMIN — AMPICILLIN SODIUM AND SULBACTAM SODIUM 3 G: 2; 1 INJECTION, POWDER, FOR SOLUTION INTRAMUSCULAR; INTRAVENOUS at 15:32

## 2024-08-22 RX ADMIN — INSULIN ASPART 1 UNITS: 100 INJECTION, SOLUTION INTRAVENOUS; SUBCUTANEOUS at 20:07

## 2024-08-22 RX ADMIN — FUROSEMIDE 80 MG: 10 INJECTION, SOLUTION INTRAVENOUS at 17:16

## 2024-08-22 RX ADMIN — AZITHROMYCIN DIHYDRATE 250 MG: 250 TABLET ORAL at 09:47

## 2024-08-22 RX ADMIN — MIDAZOLAM HYDROCHLORIDE 6 MG/HR: 1 INJECTION, SOLUTION INTRAVENOUS at 20:22

## 2024-08-22 RX ADMIN — AMPICILLIN SODIUM AND SULBACTAM SODIUM 3 G: 2; 1 INJECTION, POWDER, FOR SOLUTION INTRAMUSCULAR; INTRAVENOUS at 22:14

## 2024-08-22 ASSESSMENT — ACTIVITIES OF DAILY LIVING (ADL)
ADLS_ACUITY_SCORE: 47
ADLS_ACUITY_SCORE: 49
ADLS_ACUITY_SCORE: 47
ADLS_ACUITY_SCORE: 49
ADLS_ACUITY_SCORE: 49
ADLS_ACUITY_SCORE: 47
ADLS_ACUITY_SCORE: 49
ADLS_ACUITY_SCORE: 49
ADLS_ACUITY_SCORE: 47
ADLS_ACUITY_SCORE: 49
ADLS_ACUITY_SCORE: 51
ADLS_ACUITY_SCORE: 49
ADLS_ACUITY_SCORE: 51
ADLS_ACUITY_SCORE: 49
ADLS_ACUITY_SCORE: 47
ADLS_ACUITY_SCORE: 49
ADLS_ACUITY_SCORE: 47
ADLS_ACUITY_SCORE: 47
ADLS_ACUITY_SCORE: 49

## 2024-08-22 NOTE — ANESTHESIA POSTPROCEDURE EVALUATION
Patient: Terrance Hidalgo    Procedure: * No procedures listed *       Anesthesia Type:  No value filed.    Note:  Disposition: ICU            ICU Sign Out: Anesthesiologist/ICU physician sign out WAS performed   Postop Pain Control:    PONV:    Neuro/Psych:    Airway/Respiratory:             Sign Out: AIRWAY IN SITU/Resp. Support   CV/Hemodynamics: Uneventful            Sign Out: Acceptable CV status; No obvious hypovolemia; No obvious fluid overload   Other NRE: NONE   DID A NON-ROUTINE EVENT OCCUR? No           Last vitals:  Vitals:    08/22/24 0615 08/22/24 0630 08/22/24 0645   Pulse: (!) 122 105 120   Resp: 16 16 17   Temp: 36.6  C (97.9  F) 36.4  C (97.5  F) 36.4  C (97.5  F)   SpO2: 97% 99% 97%       Electronically Signed By: Sudheer Peace MD  August 22, 2024  7:06 AM

## 2024-08-22 NOTE — PROGRESS NOTES
Care Management Follow Up    Length of Stay (days): 2    Expected Discharge Date: 08/26/2024     Concerns to be Addressed: care coordination/care conferences, adjustment to diagnosis/illness, coping/stress, decision making, discharge planning, developmental     Patient plan of care discussed at interdisciplinary rounds: Yes    Anticipated Discharge Disposition:  unknown      Anticipated Discharge Services:  unknown   Anticipated Discharge DME:  unknown     Patient/family educated on Medicare website which has current facility and service quality ratings:    Education Provided on the Discharge Plan:  n/a today  Patient/Family in Agreement with the Plan:  n/a today    Referrals Placed by CM/SW:  none   Private pay costs discussed: Not applicable    Additional Information:  Writer received message from our Care Management Office that pt's mother, Keyla ([phone]) requesting a call back. Writer contacted Keyla and she is requesting financial assistance to stay in a hotel. Keyla reports she took a one way flight from [place] and has no money. Writer informed Keyla that writer is unable to provide funds for a hotel when pt is expected to be here for some time. Mother does not appear to have a plan moving forward if we were to pay for 1 night. Furthermore, pt's brother is his guardian. Pt's mother expressed understanding that we are unable to provide financial support at this time.       MILO Eddy, [facility] CenterSW   4A/4E ICU   Phone: [phone]  Available via Socialance

## 2024-08-22 NOTE — ANESTHESIA PROCEDURE NOTES
Airway       Patient location during procedure: OR       Procedure Start/Stop Times: 8/22/2024 6:53 AM  Staff -        Anesthesiologist:  Ramon Piedra MD       Resident/Fellow: Sudheer Peace MD       Performed By: resident  Consent for Airway        Urgency: elective  Indications and Patient Condition       Indications for airway management: airway protection, altered level of consciousness and respiratory insufficiency       Induction type:RSI       Mask difficulty assessment: 0 - not attempted    Final Airway Details       Final airway type: endotracheal airway       Successful airway: ETT - single and Oral  Endotracheal Airway Details        ETT size (mm): 7.5       Cuffed: yes       Successful intubation technique: video laryngoscopy       VL Blade Size: Glidescope 3       Grade View of Cords: 1       Adjucts: stylet       Position: Right       Measured from: gums/teeth       Secured at (cm): 24       Bite block used: None    Post intubation assessment        Placement verified by: capnometry, equal breath sounds and chest rise        Number of attempts at approach: 1       Number of other approaches attempted: 0       Secured with: commercial tube stark       Ease of procedure: easy       Dentition: Intact    Medication(s) Administered   Medication Administration Time: 8/22/2024 6:53 AM

## 2024-08-22 NOTE — PLAN OF CARE
ICU End of Shift Summary. See flowsheets for vital signs and detailed assessment.    Changes this shift:   Neuro: Sedation increased for vent compliance Now 200 fent 6 versed. Responds to speech, follows commands.    CV:   Rate/rhythm: -120's  Mechanical: CVP 14, PA 46/28, SvO2 63, CI 2 SVR 1277.9    Pulm: CMV 70%/400 TV/16 RR/12 PEEP, scant secretions.    GI: OG @ 57- LIS.    : Swan  mL/hr, 60 mg Lasix ordered due to elevated CVP and decreased CI.    Other: K+ replaced x1    Plan: Monitor hemodynamic status, monitor electrolytes and replace per protocol. Continue POC.    Addendum: Pt self extubated @ 0655, pt was emergently reintubated, given 100mg of rocuronium, 4 mg bolus of midazolam. OG replaced, awaiting chest and abdominal X-Ray.

## 2024-08-22 NOTE — PROGRESS NOTES
"CLINICAL NUTRITION SERVICES - BRIEF NOTE    Nutrition Prescription    RECOMMENDATIONS FOR MDs/PROVIDERS TO ORDER:  - Total daily fluids/adjustments per MD   - Electrolyte replacement per protocol as indicated    - Bowel regimen as appro    Recommendations already ordered by Registered Dietitian (RD):  - Once enteral access verified for use: recommend Pivot 1.5 Akbar (or equivalent) @ goal of  50ml/hr  (1200ml/day) provides: 1800 kcals, 112 g PRO, 900 ml free H20, 206 g CHO, and 9 g fiber daily + 2 [id number] = 1960 kcals (22 kcals/kg) and 152 gm PRO (1.7 gm/kg)   - Initiate @ 10 ml/hr and advance by 10 ml q8hr as tolerated  - Do not start or advance until lytes (Mg++,K+) WNL and phos>2.0  - Recommend 30-60 ml q4hr fluid flushes for tube patency. Additional fluids and/or adjustments per MD.    - Order multivitamin/mineral (15 ml/day via FT) to help ensure micronutrient needs being met with suspected hypermetabolic demands and potential interruptions to TF infusions.  - Elevated HOB with gastric feeds   - Thiamine ordered 8/21     Future/Additional Recommendations:  - EN initiation/tolerance via OGT      Received consult for TFs. Reviewed labs/lytes.     Nutrition Progress Note - f/u for progress towards previous nutrition POC (see previous reassessment for details)    Diya Rios, RD, LD, [facility]  Neuro ICU Dietitian; 6A Neuro  Vocera \"4E Clinical Dietitian\"  Weekend/holiday \"Weekend Clinical Dietitian\"      "

## 2024-08-22 NOTE — PROGRESS NOTES
Cardiology ICU Progress Note  Date of Service: 08/22/2024  Patient: Terrance Hidalgo  MRN: [id number]  Admission Date: 8/20/2024  Hospital Day: 2             ASSESSMENT AND PLAN   23 yo M with PMH autism who initially presented to [place] with cough, URI symptoms, and hypoxemia. He was intubated due to respiratory failure and inability to tolerate BiPAP (vomiting). Then transferred to [facility], where his hospital course transpired to mixed cardiogenic/septic shock requiring multiple pressor agents and IV diuresis. Transferred again to [facility] CICU on 8/20 for continued management. Clinical picture largely suggestive of fulminant lymphocytic myocarditis based on elevated cardiac and inflammatory biomarkers, severe biventricular dysfunction (presumed new based on reported history, LVIDd 5.8 cm), intermittent inotropic support, and concurrent infectious/respiratory symptomotology c/b respiratory failure. He was taken urgently to cardiac cath lab for EMB/RHC on 8/21. The study demonstrated normal right sided filling pressures, mildly elevated PCWP, borderline CO/CI, and mild PH. He is being actively managed with IV steroids, broad antimicrobial coverage, and ventilatory support for oxygenation. Holding on immunosuppression, inotropes, or escalation to [facility]-ECMO. We will continue to investigate potential precipitants via infectious workup while we await pathology from EMB and BAL results.     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 Biplane LVEF is 16%. Severe hypokinesis of mid to apical RV free wall.  RHC (8/21/24): RA mean 6, PA 30/23/26, PCWP 18, [place] CO/CI 5.0/2.1, PVR 1.6, SVR 1270  EMB (8/21/24): Pathology Pending  Coronary angiogram: None  CMR: None  - Preload:        - RAP 6 mmHg, PCWP 18 mmHg       - Diuretics: HOLD lasix gtt  - Afterload (LV):       - SVR (today): 1200 (Goal 1873-1896; MAP 65-75)       - Pressors: None       - Vasodilators: None  - Contractility:       - CO/CI (today): 5.0/2.1        - Inotropic agents: None       - Trend mixed venous blood gas and markers of end organ perfusion  - Steroids: 1g IV solumedrol (8/21 -  )  - MCS: None - low threshold to [facility]-ECMO if deteriorates  - Anticoagulation: SubQ lovenox for DVT ppx  - Antiplatelet:None - no indication  - Statin: None - no indication  - Antiarrythmic: None - no indication  - SCD prophylaxis: None - no indication  - GDMT: Will start later in admission    # Acute hypoxic respiratory failure  # Aspiration Pneumonia  # Fevers   # Sepsis   CT Chest on arrival - Patchy and nodular opacity of the right upper and right middle lobe suspicious for pneumonia. Significant atelectasis with complete collapse of the left lower lobe with near total collapse of the left upper, and right lower lobes. Moderate bilateral pleural effusions extending from the base to the apices.   - Full vent support   - Pulm on board - appreciate recs  - ID on board - appreciate recs  - Bronchoscopy completed 8/21      > Awaiting studies   - Infectious workup      > Serum parvovirus, CMV, EBV, adenovirus PCR, HIV ab, lyme ab       > BC NGTD      > MRSA nares negative  - Antimicrobials       > Unasyn 8/21 -       > Cefepime 8/20 - 8/21       > Azithromycin 8/20 -        > Vanco 8/20 - 8/21    # Transaminitis  - Suspected due to shock hepatic congestion/shock liver  - Trend LFT's  - CT CAP unremarkable    #  AMS  - Sedation with ativan, fentanyl while intubated    # GI/Nutrition  - Consult nutrition to start tube feeds   - Insulin sliding scale    Plan of care discussed with Dr. Martin, who agrees with above plan.    Thank you for consulting the cardiovascular services at the [facility]. Please do not hesitate to call us with any questions.     Colten Garay MD  Cardiology Fellow  Pager: [phone]           INTERVAL EVENTS     Self extubated this AM - re intubated without issue  Weaning vent  Off pressors 24h  Pulm, ID following s/p bronch            PAST MEDICAL HISTORY      No past medical history on file.  Active Problems:  Patient Active Problem List    Diagnosis Date Noted    Cardiogenic shock (H) 08/20/2024     Priority: Medium     Social History:     Family History:  No family history on file.    Medications:  Current Facility-Administered Medications   Medication Dose Route Frequency Provider Last Rate Last Admin    ampicillin-sulbactam (UNASYN) 3 g vial to attach to  mL bag  3 g Intravenous Q6H Colten Garay MD   3 g at 08/22/24 0929    azithromycin (ZITHROMAX) tablet 250 mg  250 mg Oral Daily Sudheer Rojas MD   250 mg at 08/22/24 0947    chlorhexidine (PERIDEX) 0.12 % solution 15 mL  15 mL Mouth/Throat Q12H Sudheer Rojas MD   15 mL at 08/22/24 0933    heparin ANTICOAGULANT injection 5,000 Units  5,000 Units Subcutaneous Q8H Sudheer Rojas MD   5,000 Units at 08/22/24 0929    insulin aspart (NovoLOG) injection (RAPID ACTING)  1-4 Units Subcutaneous Q4H Colten Garay MD   1 Units at 08/22/24 1000    methylPREDNISolone sodium succinate (solu-MEDROL) 1,000 mg in sodium chloride 0.9 % 283 mL intermittent infusion  1,000 mg Intravenous Q24H Susan Yeh MD        midazolam (VERSED) 1 MG/ML injection             multivitamins w/minerals liquid 15 mL  15 mL Per Feeding Tube Daily Susan Yeh MD        pantoprazole (PROTONIX) 2 mg/mL  suspension 40 mg  40 mg Per Feeding Tube QAM Sudheer Cavazos MD        Or    pantoprazole (PROTONIX) IV push injection 40 mg  40 mg Intravenous QAM Sudheer Cavazos MD   40 mg at 08/22/24 0929    Prosource TF20 ENfit Compatibl EN LIQD (PROSOURCE TF20) packet 60 mL  1 packet Per Feeding Tube BID Susan Yeh MD        sodium chloride (PF) 0.9% PF flush 3 mL  3 mL Intracatheter Q8H Sudheer Rojas MD   3 mL at 08/21/24 1338    thiamine (B-1) tablet 100 mg  100 mg Oral or Feeding Tube Daily Graciela Whittaker MD   100 mg at 08/22/24 0947       Current Facility-Administered Medications   Medication Dose Route Frequency Provider Last Rate Last Admin    dextrose 10% infusion   Intravenous Continuous PRN Susan Yeh MD        fentaNYL (SUBLIMAZE) infusion   mcg/hr Intravenous Continuous Sudheer Rojas MD 4 mL/hr at 08/22/24 0700 200 mcg/hr at 08/22/24 0700    [Held by provider] furosemide (LASIX) 500 mg/50mL infusion ADULT MAX CONC  10 mg/hr Intravenous Continuous Sudheer Rojas MD   Stopped at 08/21/24 1251    medication instruction   Does not apply Continuous PRN Sudheer Rojas MD        midazolam (VERSED) 100 mg/100 mL NS infusion - ADULT  1-8 mg/hr Intravenous Continuous Sudheer Rojas MD 6 mL/hr at 08/22/24 0700 6 mg/hr at 08/22/24 0700             PHYSICAL EXAM     Temp:  [97.5  F (36.4  C)-99.1  F (37.3  C)] 98.2  F (36.8  C)  Pulse:  [105-137] 121  Resp:  [15-18] 16  MAP:  [71 mmHg-307 mmHg] 82 mmHg  Arterial Line BP: ()/(61-96) 103/71  FiO2 (%):  [60 %-100 %] 60 %  SpO2:  [92 %-100 %] 99 %    Intake/Output Summary (Last 24 hours) at 8/21/2024 1232  Last data filed at 8/21/2024 1121  Gross per 24 hour   Intake 1080.92 ml   Output 2850 ml   Net -1769.08 ml     GEN: Sedated, intubated   HEENT: No discharge  EYES: no icterus  CV: RRR, normal s1/s2, no murmurs/rubs/s3/s4, no heave.   CHEST:Mechanical breath sounds  ABD: soft, non-tender, normal  active bowel sounds  : no flank/suprapubic tenderness  EXTR: pulses 2+. No clubbing, cyanosis or edema.   NEURO: alert oriented, speech fluent/appropriate, motor grossly nonfocal  PSYCH: cooperative, affect appropriate, pleasant            DIAGNOSTICS     All labs and imaging were reviewed, of note:    CMP  Recent Labs   Lab 08/22/24  0958 08/22/24  0913 08/22/24 0436 08/22/24 0435 08/21/24  2109 08/21/24  2105 08/21/24  1615 08/21/24  1528   NA  --  147*  --  147*  --  144  --  144   POTASSIUM  --  4.0  --  3.7  --  4.1  4.1  --  3.3*   CHLORIDE  --  105  --  104  --  103  --  102   CO2  --  29  --  29  --  28  --  30*   ANIONGAP  --  13  --  14  --  13  --  12   * 167* 158* 170*   < > 155*   < > 132*   BUN  --  19.3  --  16.7  --  14.3  --  11.5   CR  --  0.73  --  0.78  --  0.68  --  0.63*   GFRESTIMATED  --  >90  --  >90  --  >90  --  >90   ALEXANDER  --  8.5*  --  8.5*  --  8.4*  --  7.9*   MAG  --  2.3  --  2.2  --  2.7*  --  1.8   PHOS  --  3.1  --  3.4  --  2.8  --  2.9   PROTTOTAL  --  5.8*  --  6.0*  --  6.1*  --  5.9*   ALBUMIN  --  3.3*  --  3.4*  --  3.3*  --  3.4*   BILITOTAL  --  0.4  --  0.5  --  0.6  --  0.8   ALKPHOS  --  65  --  68  --  68  --  67   AST  --  134*  --  164*  --  216*  --  267*   ALT  --  301*  --  331*  --  362*  --  376*    < > = values in this interval not displayed.     CBC  Recent Labs   Lab 08/22/24  0913 08/22/24 0435 08/21/24 2105 08/21/24  1528   WBC 12.0* 10.3 10.3 12.6*   RBC 4.52 4.71 4.74 4.78   HGB 12.8* 13.3 13.4 13.5   HCT 38.8* 40.4 40.8 41.0   MCV 86 86 86 86   MCH 28.3 28.2 28.3 28.2   MCHC 33.0 32.9 32.8 32.9   RDW 12.9 12.8 13.1 13.3    250 235 239     INRNo lab results found in last 7 days.  Arterial Blood Gas  Recent Labs   Lab 08/22/24  0913 08/22/24  0901 08/22/24  0435 08/22/24  0043 08/21/24 2106 08/21/24 2105 08/21/24  1554   PH  --  7.46* 7.44  --  7.43  --  7.44   PCO2  --  44 48*  --  46*  --  48*   PO2  --  124* 97  --  90  --  68*  "  HCO3  --  32* 33*  --  31*  --  33*   O2PER 70 70 70  70 70 70   < > 60    < > = values in this interval not displayed.       No results found for: \"TROPI\", \"TROPONIN\", \"TROPR\", \"TROPN\"         "

## 2024-08-22 NOTE — PROGRESS NOTES
[facility] INFECTIOUS DISEASES PROGRESS NOTE     Patient:  Terrance Hidalgo   YOB: 2000, MRN: [id number]  Date of Visit: 08/22/2024  Date of Admission: 8/20/2024          ASSESSMENT AND PLAN     Terrance Hidalgo is a 24 year old male with autism. He presented with new diagnosis of heart failure Aug 21. Biopsy and histopath showed essentially unremarkable myocardial tissue with no evidence of inflammatory infiltrate. I would follow viral workup.     Complete therapy for pneumonia with 5 day therapy.     IMPRESSION  Mixed shock  Elevated transaminases  Respiratory failure from Severe bacterial pneumonia  New heart failure with reduced EF     RECOMMENDATION:  Continue IV ampicillin-sulbactam 3g every 6 hours until Aug 26, 2024.    ID will continue to follow with you. Please check [facility] for staff covering the [facility] ID service.     Racheal Son MD  Infectious Diseases  Pager: [phone]  Vocera jena    35 MINUTES SPENT BY ME on the date of service doing chart review, history, exam, documentation & further activities per the note.             SUBJECTIVE      Interval History and Events:  Afebrile. More awake.     Antimicrobial Treatment:  Amp-sul 8/21-         OBJECTIVE       Physical Examination:    Temp:  [97.5  F (36.4  C)-101.8  F (38.8  C)] 97.5  F (36.4  C)  Pulse:  [105-146] 120  Resp:  [15-19] 17  MAP:  [71 mmHg-307 mmHg] 89 mmHg  Arterial Line BP: ()/(61-89) 110/78  FiO2 (%):  [70 %-100 %] 70 %  SpO2:  [92 %-100 %] 97 %    I/O last 3 completed shifts:  In: 2255.68 [I.V.:1955.68; NG/GT:300]  Out: 4490 [Urine:4340; Emesis/NG output:150]    Vitals:    08/20/24 2223 08/22/24 0000   Weight: 105.4 kg (232 lb 5.8 oz) 103.2 kg (227 lb 8.2 oz)     Constitutional: intubated.  Awake   HEENT: NC/AT, EOMI, sclera clear, conjunctiva normal, OP with MMM  Respiratory: CTAB, no crackles or wheezing.  Cardiovascular: RRR, no murmur noted.     I reviewed the following Laboratory Data:    Estimated Creatinine  Clearance: 173.7 mL/min (based on SCr of 0.83 mg/dL).    Microbiology:  8/21 Legio, Strep U - neg   8/21 Lyme ab - neg   Adeno, CMV, EBV pcr-  HIV neg  Parvo, Coxsackie ab -   Histo U  RPP - neg   COVID - neg

## 2024-08-22 NOTE — ANESTHESIA CARE TRANSFER NOTE
Patient: Terrance Hidalgo    Procedure: * No procedures listed *       Diagnosis: * No pre-op diagnosis entered *  Diagnosis Additional Information: No value filed.    Anesthesia Type:   No value filed.     Note:    Oropharynx: ventilatory support and endotracheal tube in place  Level of Consciousness: iatrogenic sedation  Patient oxygen source: Ventilated.    Independent Airway: airway patency satisfactory and stable  Dentition: dentition unchanged  Vital Signs Stable: post-procedure vital signs reviewed and stable  Report to RN Given: handoff report given  Patient transferred to: ICU    ICU Handoff: Call for PAUSE to initiate/utilize ICU HANDOFF, Identified Patient, Identified Responsible Provider, Reviewed the Pertinent Medical History, Discussed Surgical Course, Reviewed Intra-OP Anesthesia Management and Issues during Anesthesia, Set Expectations for Post Procedure Period and Allowed Opportunity for Questions and Acknowledgement of Understanding      Vitals:  Vitals Value Taken Time   BP     Temp 36.5  C (97.7  F) 08/22/24 0705   Pulse 131 08/22/24 0705   Resp 16 08/22/24 0705   SpO2 100 % 08/22/24 0705   Vitals shown include unfiled device data.    Electronically Signed By: Sudheer Peace MD  August 22, 2024  7:06 AM

## 2024-08-22 NOTE — PHARMACY-CONSULT NOTE
Pharmacy Tube Feeding Consult    Medication reviewed for administration by feeding tube and for potential food/drug interactions.    Recommendation: No changes are needed at this time.     Pharmacy will continue to follow as new medications are ordered.     Jace Weber, RavniderD

## 2024-08-23 ENCOUNTER — APPOINTMENT (OUTPATIENT)
Dept: GENERAL RADIOLOGY | Facility: CLINIC | Age: 24
DRG: 853 | End: 2024-08-23
Attending: STUDENT IN AN ORGANIZED HEALTH CARE EDUCATION/TRAINING PROGRAM
Payer: MEDICARE

## 2024-08-23 ENCOUNTER — APPOINTMENT (OUTPATIENT)
Dept: CARDIOLOGY | Facility: CLINIC | Age: 24
DRG: 853 | End: 2024-08-23
Attending: INTERNAL MEDICINE
Payer: MEDICARE

## 2024-08-23 LAB
ABO/RH(D): NORMAL
ALBUMIN SERPL BCG-MCNC: 3.2 G/DL (ref 3.5–5.2)
ALBUMIN SERPL BCG-MCNC: 3.3 G/DL (ref 3.5–5.2)
ALBUMIN SERPL BCG-MCNC: 3.4 G/DL (ref 3.5–5.2)
ALBUMIN SERPL BCG-MCNC: 3.8 G/DL (ref 3.5–5.2)
ALLEN'S TEST: ABNORMAL
ALP SERPL-CCNC: 63 U/L (ref 40–150)
ALP SERPL-CCNC: 63 U/L (ref 40–150)
ALP SERPL-CCNC: 69 U/L (ref 40–150)
ALP SERPL-CCNC: 77 U/L (ref 40–150)
ALT SERPL W P-5'-P-CCNC: 196 U/L (ref 0–70)
ALT SERPL W P-5'-P-CCNC: 205 U/L (ref 0–70)
ALT SERPL W P-5'-P-CCNC: 205 U/L (ref 0–70)
ALT SERPL W P-5'-P-CCNC: 223 U/L (ref 0–70)
ANION GAP SERPL CALCULATED.3IONS-SCNC: 10 MMOL/L (ref 7–15)
ANION GAP SERPL CALCULATED.3IONS-SCNC: 12 MMOL/L (ref 7–15)
ANION GAP SERPL CALCULATED.3IONS-SCNC: 13 MMOL/L (ref 7–15)
ANION GAP SERPL CALCULATED.3IONS-SCNC: 15 MMOL/L (ref 7–15)
ANION GAP SERPL CALCULATED.3IONS-SCNC: 15 MMOL/L (ref 7–15)
ANTIBODY SCREEN: NEGATIVE
AST SERPL W P-5'-P-CCNC: 49 U/L (ref 0–45)
AST SERPL W P-5'-P-CCNC: 55 U/L (ref 0–45)
AST SERPL W P-5'-P-CCNC: 56 U/L (ref 0–45)
AST SERPL W P-5'-P-CCNC: 64 U/L (ref 0–45)
B BURGDOR DNA SPEC QL NAA+PROBE: NOT DETECTED
B19V IGG SER IA-ACNC: 4.38 IV
B19V IGM SER IA-ACNC: 0.11 IV
BASE EXCESS BLDA CALC-SCNC: 10.3 MMOL/L (ref -3–3)
BASE EXCESS BLDA CALC-SCNC: 10.7 MMOL/L (ref -3–3)
BASE EXCESS BLDA CALC-SCNC: 7.6 MMOL/L (ref -3–3)
BASE EXCESS BLDA CALC-SCNC: 7.9 MMOL/L (ref -3–3)
BASE EXCESS BLDA CALC-SCNC: 9.1 MMOL/L (ref -3–3)
BASE EXCESS BLDA CALC-SCNC: 9.3 MMOL/L (ref -3–3)
BASE EXCESS BLDV CALC-SCNC: 1.9 MMOL/L (ref -3–3)
BASE EXCESS BLDV CALC-SCNC: 10.5 MMOL/L (ref -3–3)
BASE EXCESS BLDV CALC-SCNC: 10.8 MMOL/L (ref -3–3)
BASE EXCESS BLDV CALC-SCNC: 11 MMOL/L (ref -3–3)
BASE EXCESS BLDV CALC-SCNC: 4 MMOL/L (ref -3–3)
BASE EXCESS BLDV CALC-SCNC: 8.6 MMOL/L (ref -3–3)
BASE EXCESS BLDV CALC-SCNC: 8.9 MMOL/L (ref -3–3)
BASE EXCESS BLDV CALC-SCNC: 9 MMOL/L (ref -3–3)
BASE EXCESS BLDV CALC-SCNC: 9.2 MMOL/L (ref -3–3)
BASOPHILS # BLD AUTO: 0 10E3/UL (ref 0–0.2)
BASOPHILS NFR BLD AUTO: 0 %
BILIRUB DIRECT SERPL-MCNC: 0.2 MG/DL (ref 0–0.3)
BILIRUB DIRECT SERPL-MCNC: <0.2 MG/DL (ref 0–0.3)
BILIRUB SERPL-MCNC: 0.3 MG/DL
BILIRUB SERPL-MCNC: 0.4 MG/DL
BUN SERPL-MCNC: 29.2 MG/DL (ref 6–20)
BUN SERPL-MCNC: 34.1 MG/DL (ref 6–20)
BUN SERPL-MCNC: 37.8 MG/DL (ref 6–20)
BUN SERPL-MCNC: 39.1 MG/DL (ref 6–20)
BUN SERPL-MCNC: 39.5 MG/DL (ref 6–20)
CA-I BLD-MCNC: 3.8 MG/DL (ref 4.4–5.2)
CALCIUM SERPL-MCNC: 7.5 MG/DL (ref 8.8–10.4)
CALCIUM SERPL-MCNC: 7.9 MG/DL (ref 8.8–10.4)
CALCIUM SERPL-MCNC: 7.9 MG/DL (ref 8.8–10.4)
CALCIUM SERPL-MCNC: 8.3 MG/DL (ref 8.8–10.4)
CALCIUM SERPL-MCNC: 8.4 MG/DL (ref 8.8–10.4)
CHLORIDE SERPL-SCNC: 107 MMOL/L (ref 98–107)
CHLORIDE SERPL-SCNC: 108 MMOL/L (ref 98–107)
CHLORIDE SERPL-SCNC: 109 MMOL/L (ref 98–107)
CHLORIDE SERPL-SCNC: 109 MMOL/L (ref 98–107)
CHLORIDE SERPL-SCNC: 111 MMOL/L (ref 98–107)
CMV DNA SPEC NAA+PROBE-ACNC: NOT DETECTED IU/ML
COHGB MFR BLD: 93.4 % (ref 96–97)
COHGB MFR BLD: 98.1 % (ref 96–97)
COHGB MFR BLD: 98.3 % (ref 96–97)
COHGB MFR BLD: 98.4 % (ref 96–97)
COHGB MFR BLD: 99.2 % (ref 96–97)
COHGB MFR BLD: 99.7 % (ref 96–97)
CREAT SERPL-MCNC: 0.71 MG/DL (ref 0.67–1.17)
CREAT SERPL-MCNC: 0.8 MG/DL (ref 0.67–1.17)
CREAT SERPL-MCNC: 0.87 MG/DL (ref 0.67–1.17)
CREAT SERPL-MCNC: 0.97 MG/DL (ref 0.67–1.17)
CREAT SERPL-MCNC: 0.98 MG/DL (ref 0.67–1.17)
EGFRCR SERPLBLD CKD-EPI 2021: >90 ML/MIN/1.73M2
EOSINOPHIL # BLD AUTO: 0 10E3/UL (ref 0–0.7)
EOSINOPHIL NFR BLD AUTO: 0 %
ERYTHROCYTE [DISTWIDTH] IN BLOOD BY AUTOMATED COUNT: 13.2 % (ref 10–15)
ERYTHROCYTE [DISTWIDTH] IN BLOOD BY AUTOMATED COUNT: 13.4 % (ref 10–15)
ERYTHROCYTE [DISTWIDTH] IN BLOOD BY AUTOMATED COUNT: 13.5 % (ref 10–15)
GLUCOSE BLDC GLUCOMTR-MCNC: 175 MG/DL (ref 70–99)
GLUCOSE BLDC GLUCOMTR-MCNC: 188 MG/DL (ref 70–99)
GLUCOSE BLDC GLUCOMTR-MCNC: 198 MG/DL (ref 70–99)
GLUCOSE BLDC GLUCOMTR-MCNC: 208 MG/DL (ref 70–99)
GLUCOSE BLDC GLUCOMTR-MCNC: 240 MG/DL (ref 70–99)
GLUCOSE BLDC GLUCOMTR-MCNC: 250 MG/DL (ref 70–99)
GLUCOSE BLDC GLUCOMTR-MCNC: 257 MG/DL (ref 70–99)
GLUCOSE BLDC GLUCOMTR-MCNC: 266 MG/DL (ref 70–99)
GLUCOSE BLDC GLUCOMTR-MCNC: 281 MG/DL (ref 70–99)
GLUCOSE BLDC GLUCOMTR-MCNC: 287 MG/DL (ref 70–99)
GLUCOSE BLDC GLUCOMTR-MCNC: 299 MG/DL (ref 70–99)
GLUCOSE BLDC GLUCOMTR-MCNC: 316 MG/DL (ref 70–99)
GLUCOSE BLDC GLUCOMTR-MCNC: 318 MG/DL (ref 70–99)
GLUCOSE BLDC GLUCOMTR-MCNC: 333 MG/DL (ref 70–99)
GLUCOSE BLDC GLUCOMTR-MCNC: 344 MG/DL (ref 70–99)
GLUCOSE SERPL-MCNC: 218 MG/DL (ref 70–99)
GLUCOSE SERPL-MCNC: 251 MG/DL (ref 70–99)
GLUCOSE SERPL-MCNC: 252 MG/DL (ref 70–99)
GLUCOSE SERPL-MCNC: 281 MG/DL (ref 70–99)
GLUCOSE SERPL-MCNC: 335 MG/DL (ref 70–99)
H CAPSUL AG UR QL IA: NOT DETECTED
H CAPSUL AG UR-MCNC: NOT DETECTED NG/ML
HADV DNA # SPEC NAA+PROBE: NOT DETECTED COPIES/ML
HBA1C MFR BLD: 5.6 %
HCO3 BLD-SCNC: 33 MMOL/L (ref 21–28)
HCO3 BLD-SCNC: 34 MMOL/L (ref 21–28)
HCO3 BLD-SCNC: 35 MMOL/L (ref 21–28)
HCO3 BLD-SCNC: 35 MMOL/L (ref 21–28)
HCO3 BLDV-SCNC: 27 MMOL/L (ref 21–28)
HCO3 BLDV-SCNC: 30 MMOL/L (ref 21–28)
HCO3 BLDV-SCNC: 34 MMOL/L (ref 21–28)
HCO3 BLDV-SCNC: 35 MMOL/L (ref 21–28)
HCO3 BLDV-SCNC: 36 MMOL/L (ref 21–28)
HCO3 BLDV-SCNC: 38 MMOL/L (ref 21–28)
HCO3 SERPL-SCNC: 29 MMOL/L (ref 22–29)
HCO3 SERPL-SCNC: 30 MMOL/L (ref 22–29)
HCO3 SERPL-SCNC: 31 MMOL/L (ref 22–29)
HCT VFR BLD AUTO: 38.1 % (ref 40–53)
HCT VFR BLD AUTO: 38.6 % (ref 40–53)
HCT VFR BLD AUTO: 40.8 % (ref 40–53)
HCT VFR BLD AUTO: 41.1 % (ref 40–53)
HCT VFR BLD AUTO: 42.1 % (ref 40–53)
HGB BLD-MCNC: 12.3 G/DL (ref 13.3–17.7)
HGB BLD-MCNC: 12.5 G/DL (ref 13.3–17.7)
HGB BLD-MCNC: 12.9 G/DL (ref 13.3–17.7)
HGB BLD-MCNC: 13.1 G/DL (ref 13.3–17.7)
HGB BLD-MCNC: 13.5 G/DL (ref 13.3–17.7)
IMM GRANULOCYTES # BLD: 0.1 10E3/UL
IMM GRANULOCYTES NFR BLD: 1 %
LACTATE SERPL-SCNC: 1.9 MMOL/L (ref 0.7–2)
LACTATE SERPL-SCNC: 1.9 MMOL/L (ref 0.7–2)
LACTATE SERPL-SCNC: 2.6 MMOL/L (ref 0.7–2)
LACTATE SERPL-SCNC: 3.1 MMOL/L (ref 0.7–2)
LACTATE SERPL-SCNC: 3.5 MMOL/L (ref 0.7–2)
LVEF ECHO: NORMAL
LYMPHOCYTES # BLD AUTO: 0.3 10E3/UL (ref 0.8–5.3)
LYMPHOCYTES # BLD AUTO: 0.4 10E3/UL (ref 0.8–5.3)
LYMPHOCYTES # BLD AUTO: 0.4 10E3/UL (ref 0.8–5.3)
LYMPHOCYTES # BLD AUTO: 0.9 10E3/UL (ref 0.8–5.3)
LYMPHOCYTES NFR BLD AUTO: 2 %
LYMPHOCYTES NFR BLD AUTO: 3 %
LYMPHOCYTES NFR BLD AUTO: 4 %
LYMPHOCYTES NFR BLD AUTO: 8 %
MAGNESIUM SERPL-MCNC: 2.1 MG/DL (ref 1.7–2.3)
MAGNESIUM SERPL-MCNC: 2.4 MG/DL (ref 1.7–2.3)
MAGNESIUM SERPL-MCNC: 2.5 MG/DL (ref 1.7–2.3)
MAGNESIUM SERPL-MCNC: 2.6 MG/DL (ref 1.7–2.3)
MCH RBC QN AUTO: 28 PG (ref 26.5–33)
MCH RBC QN AUTO: 28.2 PG (ref 26.5–33)
MCH RBC QN AUTO: 28.4 PG (ref 26.5–33)
MCH RBC QN AUTO: 28.6 PG (ref 26.5–33)
MCH RBC QN AUTO: 28.9 PG (ref 26.5–33)
MCHC RBC AUTO-ENTMCNC: 31.6 G/DL (ref 31.5–36.5)
MCHC RBC AUTO-ENTMCNC: 31.9 G/DL (ref 31.5–36.5)
MCHC RBC AUTO-ENTMCNC: 32.1 G/DL (ref 31.5–36.5)
MCHC RBC AUTO-ENTMCNC: 32.3 G/DL (ref 31.5–36.5)
MCHC RBC AUTO-ENTMCNC: 32.4 G/DL (ref 31.5–36.5)
MCV RBC AUTO: 87 FL (ref 78–100)
MCV RBC AUTO: 89 FL (ref 78–100)
MCV RBC AUTO: 91 FL (ref 78–100)
MONOCYTES # BLD AUTO: 0.6 10E3/UL (ref 0–1.3)
MONOCYTES # BLD AUTO: 0.7 10E3/UL (ref 0–1.3)
MONOCYTES # BLD AUTO: 0.8 10E3/UL (ref 0–1.3)
MONOCYTES # BLD AUTO: 1 10E3/UL (ref 0–1.3)
MONOCYTES NFR BLD AUTO: 5 %
MONOCYTES NFR BLD AUTO: 6 %
MONOCYTES NFR BLD AUTO: 6 %
MONOCYTES NFR BLD AUTO: 7 %
NEUTROPHILS # BLD AUTO: 10.9 10E3/UL (ref 1.6–8.3)
NEUTROPHILS # BLD AUTO: 13.1 10E3/UL (ref 1.6–8.3)
NEUTROPHILS # BLD AUTO: 13.7 10E3/UL (ref 1.6–8.3)
NEUTROPHILS # BLD AUTO: 9.7 10E3/UL (ref 1.6–8.3)
NEUTROPHILS NFR BLD AUTO: 85 %
NEUTROPHILS NFR BLD AUTO: 90 %
NRBC # BLD AUTO: 0 10E3/UL
NRBC BLD AUTO-RTO: 0 /100
O2/TOTAL GAS SETTING VFR VENT: 50 %
O2/TOTAL GAS SETTING VFR VENT: 50 %
O2/TOTAL GAS SETTING VFR VENT: 60 %
O2/TOTAL GAS SETTING VFR VENT: 70 %
O2/TOTAL GAS SETTING VFR VENT: 80 %
O2/TOTAL GAS SETTING VFR VENT: 90 %
OXYHGB MFR BLDV: 45 % (ref 70–75)
OXYHGB MFR BLDV: 49 % (ref 70–75)
OXYHGB MFR BLDV: 49 % (ref 70–75)
OXYHGB MFR BLDV: 57 % (ref 70–75)
OXYHGB MFR BLDV: 61 % (ref 70–75)
OXYHGB MFR BLDV: 62 % (ref 70–75)
OXYHGB MFR BLDV: 66 % (ref 70–75)
OXYHGB MFR BLDV: 69 % (ref 70–75)
OXYHGB MFR BLDV: 71 % (ref 70–75)
PCO2 BLD: 42 MM HG (ref 35–45)
PCO2 BLD: 43 MM HG (ref 35–45)
PCO2 BLD: 44 MM HG (ref 35–45)
PCO2 BLD: 44 MM HG (ref 35–45)
PCO2 BLD: 48 MM HG (ref 35–45)
PCO2 BLD: 51 MM HG (ref 35–45)
PCO2 BLDV: 44 MM HG (ref 40–50)
PCO2 BLDV: 47 MM HG (ref 40–50)
PCO2 BLDV: 48 MM HG (ref 40–50)
PCO2 BLDV: 48 MM HG (ref 40–50)
PCO2 BLDV: 50 MM HG (ref 40–50)
PCO2 BLDV: 51 MM HG (ref 40–50)
PCO2 BLDV: 54 MM HG (ref 40–50)
PCO2 BLDV: 57 MM HG (ref 40–50)
PCO2 BLDV: 58 MM HG (ref 40–50)
PEEP: 10 CM H2O
PEEP: 8 CM H2O
PH BLD: 7.43 [PH] (ref 7.35–7.45)
PH BLD: 7.45 [PH] (ref 7.35–7.45)
PH BLD: 7.49 [PH] (ref 7.35–7.45)
PH BLD: 7.49 [PH] (ref 7.35–7.45)
PH BLD: 7.51 [PH] (ref 7.35–7.45)
PH BLD: 7.52 [PH] (ref 7.35–7.45)
PH BLDV: 7.39 [PH] (ref 7.32–7.43)
PH BLDV: 7.4 [PH] (ref 7.32–7.43)
PH BLDV: 7.4 [PH] (ref 7.32–7.43)
PH BLDV: 7.42 [PH] (ref 7.32–7.43)
PH BLDV: 7.42 [PH] (ref 7.32–7.43)
PH BLDV: 7.44 [PH] (ref 7.32–7.43)
PH BLDV: 7.46 [PH] (ref 7.32–7.43)
PH BLDV: 7.49 [PH] (ref 7.32–7.43)
PH BLDV: 7.49 [PH] (ref 7.32–7.43)
PHOSPHATE SERPL-MCNC: 2.2 MG/DL (ref 2.5–4.5)
PHOSPHATE SERPL-MCNC: 2.4 MG/DL (ref 2.5–4.5)
PHOSPHATE SERPL-MCNC: 4.4 MG/DL (ref 2.5–4.5)
PHOSPHATE SERPL-MCNC: 4.4 MG/DL (ref 2.5–4.5)
PLATELET # BLD AUTO: 252 10E3/UL (ref 150–450)
PLATELET # BLD AUTO: 266 10E3/UL (ref 150–450)
PLATELET # BLD AUTO: 282 10E3/UL (ref 150–450)
PLATELET # BLD AUTO: 302 10E3/UL (ref 150–450)
PLATELET # BLD AUTO: 336 10E3/UL (ref 150–450)
PO2 BLD: 116 MM HG (ref 80–105)
PO2 BLD: 146 MM HG (ref 80–105)
PO2 BLD: 62 MM HG (ref 80–105)
PO2 BLD: 90 MM HG (ref 80–105)
PO2 BLD: 92 MM HG (ref 80–105)
PO2 BLD: 93 MM HG (ref 80–105)
PO2 BLDV: 27 MM HG (ref 25–47)
PO2 BLDV: 28 MM HG (ref 25–47)
PO2 BLDV: 29 MM HG (ref 25–47)
PO2 BLDV: 30 MM HG (ref 25–47)
PO2 BLDV: 35 MM HG (ref 25–47)
PO2 BLDV: 36 MM HG (ref 25–47)
PO2 BLDV: 37 MM HG (ref 25–47)
PO2 BLDV: 38 MM HG (ref 25–47)
PO2 BLDV: 38 MM HG (ref 25–47)
POTASSIUM SERPL-SCNC: 2.9 MMOL/L (ref 3.4–5.3)
POTASSIUM SERPL-SCNC: 3.1 MMOL/L (ref 3.4–5.3)
POTASSIUM SERPL-SCNC: 3.3 MMOL/L (ref 3.4–5.3)
POTASSIUM SERPL-SCNC: 3.8 MMOL/L (ref 3.4–5.3)
POTASSIUM SERPL-SCNC: 3.8 MMOL/L (ref 3.4–5.3)
POTASSIUM SERPL-SCNC: 4.1 MMOL/L (ref 3.4–5.3)
POTASSIUM SERPL-SCNC: 4.1 MMOL/L (ref 3.4–5.3)
PROT SERPL-MCNC: 5.6 G/DL (ref 6.4–8.3)
PROT SERPL-MCNC: 5.6 G/DL (ref 6.4–8.3)
PROT SERPL-MCNC: 5.9 G/DL (ref 6.4–8.3)
PROT SERPL-MCNC: 6.6 G/DL (ref 6.4–8.3)
RBC # BLD AUTO: 4.3 10E6/UL (ref 4.4–5.9)
RBC # BLD AUTO: 4.43 10E6/UL (ref 4.4–5.9)
RBC # BLD AUTO: 4.54 10E6/UL (ref 4.4–5.9)
RBC # BLD AUTO: 4.6 10E6/UL (ref 4.4–5.9)
RBC # BLD AUTO: 4.75 10E6/UL (ref 4.4–5.9)
SAO2 % BLDA: 92 % (ref 92–100)
SAO2 % BLDA: 96 % (ref 92–100)
SAO2 % BLDA: 97 % (ref 92–100)
SAO2 % BLDA: 98 % (ref 92–100)
SAO2 % BLDV: 45.9 % (ref 70–75)
SAO2 % BLDV: 50.2 % (ref 70–75)
SAO2 % BLDV: 50.2 % (ref 70–75)
SAO2 % BLDV: 58 % (ref 70–75)
SAO2 % BLDV: 62 % (ref 70–75)
SAO2 % BLDV: 63.2 % (ref 70–75)
SAO2 % BLDV: 67.1 % (ref 70–75)
SAO2 % BLDV: 69.6 % (ref 70–75)
SAO2 % BLDV: 71.8 % (ref 70–75)
SODIUM SERPL-SCNC: 150 MMOL/L (ref 135–145)
SODIUM SERPL-SCNC: 150 MMOL/L (ref 135–145)
SODIUM SERPL-SCNC: 153 MMOL/L (ref 135–145)
SODIUM SERPL-SCNC: 153 MMOL/L (ref 135–145)
SODIUM SERPL-SCNC: 155 MMOL/L (ref 135–145)
SPECIMEN EXPIRATION DATE: NORMAL
WBC # BLD AUTO: 11.4 10E3/UL (ref 4–11)
WBC # BLD AUTO: 12 10E3/UL (ref 4–11)
WBC # BLD AUTO: 14.2 10E3/UL (ref 4–11)
WBC # BLD AUTO: 14.4 10E3/UL (ref 4–11)
WBC # BLD AUTO: 15.2 10E3/UL (ref 4–11)

## 2024-08-23 PROCEDURE — 83605 ASSAY OF LACTIC ACID: CPT | Performed by: STUDENT IN AN ORGANIZED HEALTH CARE EDUCATION/TRAINING PROGRAM

## 2024-08-23 PROCEDURE — 71045 X-RAY EXAM CHEST 1 VIEW: CPT | Mod: 26 | Performed by: STUDENT IN AN ORGANIZED HEALTH CARE EDUCATION/TRAINING PROGRAM

## 2024-08-23 PROCEDURE — 258N000003 HC RX IP 258 OP 636: Performed by: STUDENT IN AN ORGANIZED HEALTH CARE EDUCATION/TRAINING PROGRAM

## 2024-08-23 PROCEDURE — 250N000009 HC RX 250: Performed by: INTERNAL MEDICINE

## 2024-08-23 PROCEDURE — 99291 CRITICAL CARE FIRST HOUR: CPT | Mod: 25 | Performed by: INTERNAL MEDICINE

## 2024-08-23 PROCEDURE — 83735 ASSAY OF MAGNESIUM: CPT | Performed by: STUDENT IN AN ORGANIZED HEALTH CARE EDUCATION/TRAINING PROGRAM

## 2024-08-23 PROCEDURE — 93005 ELECTROCARDIOGRAM TRACING: CPT

## 2024-08-23 PROCEDURE — 250N000013 HC RX MED GY IP 250 OP 250 PS 637: Performed by: INTERNAL MEDICINE

## 2024-08-23 PROCEDURE — 250N000013 HC RX MED GY IP 250 OP 250 PS 637: Performed by: STUDENT IN AN ORGANIZED HEALTH CARE EDUCATION/TRAINING PROGRAM

## 2024-08-23 PROCEDURE — 250N000011 HC RX IP 250 OP 636: Performed by: INTERNAL MEDICINE

## 2024-08-23 PROCEDURE — 250N000011 HC RX IP 250 OP 636: Performed by: STUDENT IN AN ORGANIZED HEALTH CARE EDUCATION/TRAINING PROGRAM

## 2024-08-23 PROCEDURE — 71045 X-RAY EXAM CHEST 1 VIEW: CPT | Mod: 77

## 2024-08-23 PROCEDURE — 99233 SBSQ HOSP IP/OBS HIGH 50: CPT | Performed by: INTERNAL MEDICINE

## 2024-08-23 PROCEDURE — 82805 BLOOD GASES W/O2 SATURATION: CPT | Performed by: STUDENT IN AN ORGANIZED HEALTH CARE EDUCATION/TRAINING PROGRAM

## 2024-08-23 PROCEDURE — 80048 BASIC METABOLIC PNL TOTAL CA: CPT | Performed by: INTERNAL MEDICINE

## 2024-08-23 PROCEDURE — 93321 DOPPLER ECHO F-UP/LMTD STD: CPT | Mod: 26 | Performed by: INTERNAL MEDICINE

## 2024-08-23 PROCEDURE — 82248 BILIRUBIN DIRECT: CPT | Performed by: STUDENT IN AN ORGANIZED HEALTH CARE EDUCATION/TRAINING PROGRAM

## 2024-08-23 PROCEDURE — 99231 SBSQ HOSP IP/OBS SF/LOW 25: CPT | Mod: GC | Performed by: STUDENT IN AN ORGANIZED HEALTH CARE EDUCATION/TRAINING PROGRAM

## 2024-08-23 PROCEDURE — 250N000009 HC RX 250: Performed by: STUDENT IN AN ORGANIZED HEALTH CARE EDUCATION/TRAINING PROGRAM

## 2024-08-23 PROCEDURE — 93308 TTE F-UP OR LMTD: CPT | Mod: 26 | Performed by: INTERNAL MEDICINE

## 2024-08-23 PROCEDURE — 93325 DOPPLER ECHO COLOR FLOW MAPG: CPT | Mod: 26 | Performed by: INTERNAL MEDICINE

## 2024-08-23 PROCEDURE — 85025 COMPLETE CBC W/AUTO DIFF WBC: CPT | Performed by: STUDENT IN AN ORGANIZED HEALTH CARE EDUCATION/TRAINING PROGRAM

## 2024-08-23 PROCEDURE — 250N000013 HC RX MED GY IP 250 OP 250 PS 637

## 2024-08-23 PROCEDURE — 999N000208 ECHOCARDIOGRAM LIMITED

## 2024-08-23 PROCEDURE — 250N000013 HC RX MED GY IP 250 OP 250 PS 637: Performed by: NURSE PRACTITIONER

## 2024-08-23 PROCEDURE — 84132 ASSAY OF SERUM POTASSIUM: CPT | Performed by: INTERNAL MEDICINE

## 2024-08-23 PROCEDURE — 84132 ASSAY OF SERUM POTASSIUM: CPT | Performed by: STUDENT IN AN ORGANIZED HEALTH CARE EDUCATION/TRAINING PROGRAM

## 2024-08-23 PROCEDURE — 999N000157 HC STATISTIC RCP TIME EA 10 MIN

## 2024-08-23 PROCEDURE — 93010 ELECTROCARDIOGRAM REPORT: CPT | Performed by: INTERNAL MEDICINE

## 2024-08-23 PROCEDURE — 86900 BLOOD TYPING SEROLOGIC ABO: CPT | Performed by: STUDENT IN AN ORGANIZED HEALTH CARE EDUCATION/TRAINING PROGRAM

## 2024-08-23 PROCEDURE — 255N000002 HC RX 255 OP 636: Performed by: INTERNAL MEDICINE

## 2024-08-23 PROCEDURE — 82310 ASSAY OF CALCIUM: CPT | Performed by: STUDENT IN AN ORGANIZED HEALTH CARE EDUCATION/TRAINING PROGRAM

## 2024-08-23 PROCEDURE — 83036 HEMOGLOBIN GLYCOSYLATED A1C: CPT | Performed by: STUDENT IN AN ORGANIZED HEALTH CARE EDUCATION/TRAINING PROGRAM

## 2024-08-23 PROCEDURE — 250N000011 HC RX IP 250 OP 636: Mod: JZ | Performed by: STUDENT IN AN ORGANIZED HEALTH CARE EDUCATION/TRAINING PROGRAM

## 2024-08-23 PROCEDURE — 71045 X-RAY EXAM CHEST 1 VIEW: CPT

## 2024-08-23 PROCEDURE — 80048 BASIC METABOLIC PNL TOTAL CA: CPT | Performed by: STUDENT IN AN ORGANIZED HEALTH CARE EDUCATION/TRAINING PROGRAM

## 2024-08-23 PROCEDURE — 84100 ASSAY OF PHOSPHORUS: CPT | Performed by: STUDENT IN AN ORGANIZED HEALTH CARE EDUCATION/TRAINING PROGRAM

## 2024-08-23 PROCEDURE — 71045 X-RAY EXAM CHEST 1 VIEW: CPT | Mod: 26 | Performed by: RADIOLOGY

## 2024-08-23 PROCEDURE — 250N000011 HC RX IP 250 OP 636

## 2024-08-23 PROCEDURE — 94003 VENT MGMT INPAT SUBQ DAY: CPT

## 2024-08-23 PROCEDURE — 258N000003 HC RX IP 258 OP 636

## 2024-08-23 PROCEDURE — 85027 COMPLETE CBC AUTOMATED: CPT | Performed by: STUDENT IN AN ORGANIZED HEALTH CARE EDUCATION/TRAINING PROGRAM

## 2024-08-23 PROCEDURE — 200N000002 HC R&B ICU UMMC

## 2024-08-23 PROCEDURE — 82330 ASSAY OF CALCIUM: CPT | Performed by: STUDENT IN AN ORGANIZED HEALTH CARE EDUCATION/TRAINING PROGRAM

## 2024-08-23 PROCEDURE — 86901 BLOOD TYPING SEROLOGIC RH(D): CPT | Performed by: STUDENT IN AN ORGANIZED HEALTH CARE EDUCATION/TRAINING PROGRAM

## 2024-08-23 RX ORDER — QUETIAPINE FUMARATE 25 MG/1
25 TABLET, FILM COATED ORAL 2 TIMES DAILY
Status: DISCONTINUED | OUTPATIENT
Start: 2024-08-23 | End: 2024-08-31

## 2024-08-23 RX ORDER — HYDRALAZINE HYDROCHLORIDE 25 MG/1
25 TABLET, FILM COATED ORAL EVERY 8 HOURS SCHEDULED
Status: DISCONTINUED | OUTPATIENT
Start: 2024-08-23 | End: 2024-08-24

## 2024-08-23 RX ORDER — NICOTINE POLACRILEX 4 MG
15-30 LOZENGE BUCCAL
Status: DISCONTINUED | OUTPATIENT
Start: 2024-08-23 | End: 2024-09-19 | Stop reason: HOSPADM

## 2024-08-23 RX ORDER — POTASSIUM PHOS IN 0.9 % NACL 15MMOL/250
15 PLASTIC BAG, INJECTION (ML) INTRAVENOUS ONCE
Status: DISCONTINUED | OUTPATIENT
Start: 2024-08-24 | End: 2024-08-23

## 2024-08-23 RX ORDER — POTASSIUM CHLORIDE 29.8 MG/ML
20 INJECTION INTRAVENOUS
Status: COMPLETED | OUTPATIENT
Start: 2024-08-24 | End: 2024-08-24

## 2024-08-23 RX ORDER — DEXTROSE MONOHYDRATE 25 G/50ML
25-50 INJECTION, SOLUTION INTRAVENOUS
Status: DISCONTINUED | OUTPATIENT
Start: 2024-08-23 | End: 2024-09-19 | Stop reason: HOSPADM

## 2024-08-23 RX ORDER — POTASSIUM CHLORIDE 1.5 G/1.58G
20 POWDER, FOR SOLUTION ORAL ONCE
Status: COMPLETED | OUTPATIENT
Start: 2024-08-23 | End: 2024-08-23

## 2024-08-23 RX ORDER — RISPERIDONE 1 MG/1
1.5 TABLET ORAL EVERY EVENING
Status: ON HOLD | COMMUNITY
End: 2024-09-18

## 2024-08-23 RX ORDER — POTASSIUM PHOS IN 0.9 % NACL 15MMOL/250
15 PLASTIC BAG, INJECTION (ML) INTRAVENOUS ONCE
Status: COMPLETED | OUTPATIENT
Start: 2024-08-24 | End: 2024-08-24

## 2024-08-23 RX ORDER — BUMETANIDE 0.25 MG/ML
6 INJECTION INTRAMUSCULAR; INTRAVENOUS ONCE
Status: COMPLETED | OUTPATIENT
Start: 2024-08-23 | End: 2024-08-23

## 2024-08-23 RX ORDER — POTASSIUM CHLORIDE 29.8 MG/ML
20 INJECTION INTRAVENOUS
Status: COMPLETED | OUTPATIENT
Start: 2024-08-23 | End: 2024-08-23

## 2024-08-23 RX ORDER — POLYETHYLENE GLYCOL 3350 17 G/17G
17 POWDER, FOR SOLUTION ORAL DAILY
Status: DISCONTINUED | OUTPATIENT
Start: 2024-08-23 | End: 2024-09-19 | Stop reason: HOSPADM

## 2024-08-23 RX ORDER — ACETAZOLAMIDE 500 MG/5ML
250 INJECTION, POWDER, LYOPHILIZED, FOR SOLUTION INTRAVENOUS ONCE
Status: COMPLETED | OUTPATIENT
Start: 2024-08-23 | End: 2024-08-23

## 2024-08-23 RX ORDER — AMOXICILLIN 250 MG
1 CAPSULE ORAL AT BEDTIME
Status: DISCONTINUED | OUTPATIENT
Start: 2024-08-23 | End: 2024-09-19 | Stop reason: HOSPADM

## 2024-08-23 RX ORDER — DEXTROSE MONOHYDRATE 100 MG/ML
INJECTION, SOLUTION INTRAVENOUS CONTINUOUS PRN
Status: DISCONTINUED | OUTPATIENT
Start: 2024-08-23 | End: 2024-09-19 | Stop reason: HOSPADM

## 2024-08-23 RX ORDER — POTASSIUM CHLORIDE 29.8 MG/ML
20 INJECTION INTRAVENOUS ONCE
Status: COMPLETED | OUTPATIENT
Start: 2024-08-23 | End: 2024-08-23

## 2024-08-23 RX ORDER — RISPERIDONE 1 MG/1
1 TABLET ORAL EVERY MORNING
Status: ON HOLD | COMMUNITY
End: 2024-09-18

## 2024-08-23 RX ADMIN — POLYETHYLENE GLYCOL 3350 17 G: 17 POWDER, FOR SOLUTION ORAL at 10:49

## 2024-08-23 RX ADMIN — BUMETANIDE 2 MG/HR: 0.25 INJECTION INTRAMUSCULAR; INTRAVENOUS at 22:26

## 2024-08-23 RX ADMIN — CHLORHEXIDINE GLUCONATE 0.12% ORAL RINSE 15 ML: 1.2 LIQUID ORAL at 21:17

## 2024-08-23 RX ADMIN — Medication 200 MCG/HR: at 01:28

## 2024-08-23 RX ADMIN — AZITHROMYCIN DIHYDRATE 250 MG: 250 TABLET ORAL at 07:59

## 2024-08-23 RX ADMIN — AMPICILLIN SODIUM AND SULBACTAM SODIUM 3 G: 2; 1 INJECTION, POWDER, FOR SOLUTION INTRAMUSCULAR; INTRAVENOUS at 22:23

## 2024-08-23 RX ADMIN — SODIUM NITROPRUSSIDE 0.5 MCG/KG/MIN: 25 INJECTION, SOLUTION, CONCENTRATE INTRAVENOUS at 17:49

## 2024-08-23 RX ADMIN — Medication 15 MMOL: at 23:50

## 2024-08-23 RX ADMIN — Medication 15 ML: at 07:59

## 2024-08-23 RX ADMIN — AMPICILLIN SODIUM AND SULBACTAM SODIUM 3 G: 2; 1 INJECTION, POWDER, FOR SOLUTION INTRAMUSCULAR; INTRAVENOUS at 09:08

## 2024-08-23 RX ADMIN — HYDRALAZINE HYDROCHLORIDE 25 MG: 25 TABLET ORAL at 10:49

## 2024-08-23 RX ADMIN — MIDAZOLAM HYDROCHLORIDE 6 MG/HR: 1 INJECTION, SOLUTION INTRAVENOUS at 09:50

## 2024-08-23 RX ADMIN — POTASSIUM & SODIUM PHOSPHATES POWDER PACK 280-160-250 MG 1 PACKET: 280-160-250 PACK at 09:07

## 2024-08-23 RX ADMIN — POTASSIUM CHLORIDE 20 MEQ: 29.8 INJECTION, SOLUTION INTRAVENOUS at 01:28

## 2024-08-23 RX ADMIN — HUMAN ALBUMIN MICROSPHERES AND PERFLUTREN 6 ML: 10; .22 INJECTION, SOLUTION INTRAVENOUS at 09:32

## 2024-08-23 RX ADMIN — SODIUM NITROPRUSSIDE 1 MCG/KG/MIN: 25 INJECTION, SOLUTION, CONCENTRATE INTRAVENOUS at 15:48

## 2024-08-23 RX ADMIN — THIAMINE HCL TAB 100 MG 100 MG: 100 TAB at 07:59

## 2024-08-23 RX ADMIN — BUMETANIDE 2 MG/HR: 0.25 INJECTION INTRAMUSCULAR; INTRAVENOUS at 12:47

## 2024-08-23 RX ADMIN — INSULIN HUMAN 10 UNITS/HR: 1 INJECTION, SOLUTION INTRAVENOUS at 22:27

## 2024-08-23 RX ADMIN — POTASSIUM CHLORIDE 20 MEQ: 29.8 INJECTION, SOLUTION INTRAVENOUS at 00:15

## 2024-08-23 RX ADMIN — INSULIN HUMAN 3.5 UNITS/HR: 1 INJECTION, SOLUTION INTRAVENOUS at 16:07

## 2024-08-23 RX ADMIN — Medication 60 ML: at 08:00

## 2024-08-23 RX ADMIN — SODIUM NITROPRUSSIDE 3 MCG/KG/MIN: 25 INJECTION, SOLUTION, CONCENTRATE INTRAVENOUS at 22:57

## 2024-08-23 RX ADMIN — HYDRALAZINE HYDROCHLORIDE 25 MG: 25 TABLET ORAL at 13:01

## 2024-08-23 RX ADMIN — INSULIN ASPART 1 UNITS: 100 INJECTION, SOLUTION INTRAVENOUS; SUBCUTANEOUS at 00:15

## 2024-08-23 RX ADMIN — SODIUM CHLORIDE 1000 MG: 9 INJECTION, SOLUTION INTRAVENOUS at 11:06

## 2024-08-23 RX ADMIN — POTASSIUM CHLORIDE 20 MEQ: 29.8 INJECTION, SOLUTION INTRAVENOUS at 14:30

## 2024-08-23 RX ADMIN — AMPICILLIN SODIUM AND SULBACTAM SODIUM 3 G: 2; 1 INJECTION, POWDER, FOR SOLUTION INTRAMUSCULAR; INTRAVENOUS at 15:11

## 2024-08-23 RX ADMIN — POTASSIUM CHLORIDE 20 MEQ: 29.8 INJECTION, SOLUTION INTRAVENOUS at 18:02

## 2024-08-23 RX ADMIN — Medication 200 MCG/HR: at 13:06

## 2024-08-23 RX ADMIN — CHLORHEXIDINE GLUCONATE 0.12% ORAL RINSE 15 ML: 1.2 LIQUID ORAL at 08:00

## 2024-08-23 RX ADMIN — POTASSIUM CHLORIDE 20 MEQ: 1.5 POWDER, FOR SOLUTION ORAL at 23:51

## 2024-08-23 RX ADMIN — POTASSIUM & SODIUM PHOSPHATES POWDER PACK 280-160-250 MG 1 PACKET: 280-160-250 PACK at 01:28

## 2024-08-23 RX ADMIN — QUETIAPINE FUMARATE 25 MG: 25 TABLET ORAL at 22:13

## 2024-08-23 RX ADMIN — INSULIN ASPART 1 UNITS: 100 INJECTION, SOLUTION INTRAVENOUS; SUBCUTANEOUS at 04:31

## 2024-08-23 RX ADMIN — INSULIN ASPART 1 UNITS: 100 INJECTION, SOLUTION INTRAVENOUS; SUBCUTANEOUS at 08:25

## 2024-08-23 RX ADMIN — POTASSIUM CHLORIDE 20 MEQ: 29.8 INJECTION, SOLUTION INTRAVENOUS at 23:09

## 2024-08-23 RX ADMIN — ACETAZOLAMIDE 250 MG: 500 INJECTION, POWDER, LYOPHILIZED, FOR SOLUTION INTRAVENOUS at 12:47

## 2024-08-23 RX ADMIN — BUMETANIDE 6 MG: 0.25 INJECTION INTRAMUSCULAR; INTRAVENOUS at 10:54

## 2024-08-23 RX ADMIN — CALCIUM CHLORIDE 1 G: 100 INJECTION, SOLUTION INTRAVENOUS at 23:34

## 2024-08-23 RX ADMIN — AMPICILLIN SODIUM AND SULBACTAM SODIUM 3 G: 2; 1 INJECTION, POWDER, FOR SOLUTION INTRAMUSCULAR; INTRAVENOUS at 04:20

## 2024-08-23 RX ADMIN — Medication 60 ML: at 22:14

## 2024-08-23 RX ADMIN — INSULIN ASPART 1 UNITS: 100 INJECTION, SOLUTION INTRAVENOUS; SUBCUTANEOUS at 11:12

## 2024-08-23 RX ADMIN — PANTOPRAZOLE SODIUM 40 MG: 40 INJECTION, POWDER, FOR SOLUTION INTRAVENOUS at 08:46

## 2024-08-23 RX ADMIN — POTASSIUM & SODIUM PHOSPHATES POWDER PACK 280-160-250 MG 1 PACKET: 280-160-250 PACK at 06:22

## 2024-08-23 RX ADMIN — ENOXAPARIN SODIUM 40 MG: 40 INJECTION SUBCUTANEOUS at 15:11

## 2024-08-23 RX ADMIN — ACETAMINOPHEN 650 MG: 325 TABLET ORAL at 10:49

## 2024-08-23 RX ADMIN — HYDRALAZINE HYDROCHLORIDE 25 MG: 25 TABLET ORAL at 22:14

## 2024-08-23 RX ADMIN — POTASSIUM CHLORIDE 20 MEQ: 29.8 INJECTION, SOLUTION INTRAVENOUS at 15:26

## 2024-08-23 ASSESSMENT — ACTIVITIES OF DAILY LIVING (ADL)
ADLS_ACUITY_SCORE: 49
ADLS_ACUITY_SCORE: 53
ADLS_ACUITY_SCORE: 49
ADLS_ACUITY_SCORE: 53
ADLS_ACUITY_SCORE: 49
ADLS_ACUITY_SCORE: 53
ADLS_ACUITY_SCORE: 49

## 2024-08-23 NOTE — PLAN OF CARE
See flowsheets for vital signs, hemodynamics, and detailed assessment.    Major Shift Updates/Changes: Pt remains sedated with fent and versed. Pt still restless and attempts to pull on lines. Sitter at bedside for safety and wrist restraints. Able to follow commands. Increase in HR team aware. -140s. Started on Nipride, keep maps 65-75. Increase in Tidal volume. FI02 ranged from . Attempted to decrease peep to 8, with drop of o2 sat. Medium soft BM today x2. Insulin gtt initiated. Bumex gtt started and a dose of diamox given with good UOP. PRN tylenol given for low grade fever with ice packs placed. Potassium replaced.    Multiple family at bedside visited.    Hodan Golden Rn  Shift cared for: 1100 - 1930

## 2024-08-23 NOTE — PROGRESS NOTES
[facility]   Pulmonary Consult Note - Follow Up     Terrance Hidalgo MRN: [id number]  Date of Admission:8/20/2024  Date of Service: 08/23/2024    Reason for consult: Hypoxia, atelectasis and consolidation on CT chest, bronchoscopy?   Primary service: Cards 2/HF     ASSESSMENT/RECOMMENDATIONS:     Terrance Hidalgo is a 24M with no prior pulmonary history who presented to OSH on 8/19 with 1 month of URI symptoms. Found to be hypoxic requiring intubation with massive aspiration event maci-intubation. TTE with new BiV failure thought to be 2/2 lymphocytic myocarditis. Upon transfer to [facility], CT C/A/P obtained - lungs showed small b/l pleural effusion, bibasilar atelectasis and near complete collapse of the ALMA. No prior CT for comparison. Unable to see OSH CXR's but the read on the 8/19 CXR when patient first presented to their ED state low lung volumes and patchy b/l infiltrates. Post intubation CXR read progression of infiltrates. This suggests that patient may have had some atelectasis in the ambulatory setting from his prolonged URI course (mucous plug, persistent small airway inflammation, shallower breaths d/t pleurisy perhaps can all cause atelectasis). More acute, his maci-intubation aspiration event caused an acute pneumonitis and superimposed aspiration PNA which would explain progression of infiltrates and the high degree of airway friability we saw on 8/21 bronchoscopy. I do think now the airway inflammation should have subsided as an aspiration pneumonitis typically self resolves within 48-72 hours.     He continues to require a higher amount of PEEP because of his pulmonary edema. CVP is elevated as is mPAP. With the PEEP being dropped, the fluid shifts from the extra-alveolar space into the alveoli. While his volume status is being figured out, would maintain a higher PEEP until his CVP is normalized/going down. As CICU is now involved in ventilator management and we were initially consulted for the  "abnormal CT, we will sign off.     Pulmonary will sign off    Patient seen and discussed with Dr. Margarito Kraus MD   Pulmonary/Critical Care Fellow  Pager: [phone]           24H/SUBJECTIVE:    NAEO. PaO2 worse on PEEP of 8. CVP continue to be elevated      Physical Exam:   Pulse (!) 127   Temp 99.7  F (37.6  C)   Resp 18   Ht 1.86 m (6' 1.23\")   Wt 102.8 kg (226 lb 10.1 oz)   SpO2 94%   BMI 29.71 kg/m    - Gen: Intubated  - CV: RRR, no m/r/g  - Lung: Coarse breath sounds  - Abd: NTND, +BS  - Ext: Mild LE edema   - Skin: No major rashes or lesions    Images/Studies:   8/21/24 CT Chest   Personal read: Bibasilar atelectasis. Lobar collapse of ALMA.          Labs:   ABG  Recent Labs   Lab 08/23/24  0930 08/23/24  0620 08/23/24  0431 08/22/24 2223   PH 7.49* 7.52* 7.51* 7.52*   PCO2 44 42 43 43   PO2 92 90 62* 75*   HCO3 34* 35* 35* 36*     CBC  Recent Labs   Lab 08/23/24  0930 08/23/24  0430 08/22/24  2223 08/22/24  1536   WBC 12.0* 11.4* 10.4 13.0*   HGB 12.3* 12.5* 11.9* 12.7*    252 253 264     BMP  Recent Labs   Lab 08/23/24  1111 08/23/24  0930 08/23/24  0812 08/23/24  0430 08/23/24  0011 08/22/24  2223 08/22/24  1542 08/22/24  1536   NA  --  150*  --  150*  --  150*  --  149*   POTASSIUM  --  4.1  --  4.1  --  3.4  --  3.9   CHLORIDE  --  108*  --  109*  --  106  --  106   CO2  --  30*  --  31*  --  31*  --  28   BUN  --  34.1*  --  29.2*  --  27.3*  --  24.3*   CR  --  0.80  --  0.71  --  0.76  --  0.79   * 251* 208* 188*  218*   < > 225*   < > 200*    < > = values in this interval not displayed.     LFT  Recent Labs   Lab 08/23/24  0930 08/23/24  0430 08/22/24  2223 08/22/24  1536   AST 55* 64* 74* 109*   * 223* 237* 280*   ALKPHOS 63 63 58 64   BILITOTAL 0.4 0.3 0.3 0.4   ALBUMIN 3.3* 3.4* 3.3* 3.5     Medications:   Current Facility-Administered Medications   Medication Dose Route Frequency Provider Last Rate Last Admin    acetaminophen (TYLENOL) tablet 650 mg  650 " mg Oral or Feeding Tube Q8H PRN Sudheer Rojas MD   650 mg at 08/23/24 1049    acetaZOLAMIDE (DIAMOX) injection 250 mg  250 mg Intravenous Once Greg Moreno MD        ampicillin-sulbactam (UNASYN) 3 g vial to attach to  mL bag  3 g Intravenous Q6H Colten Garay MD   3 g at 08/23/24 0908    azithromycin (ZITHROMAX) tablet 250 mg  250 mg Oral or Feeding Tube Daily Jani Martin MD   250 mg at 08/23/24 0759    bumetanide (BUMEX) 0.25 mg/mL infusion  2 mg/hr Intravenous Continuous Greg Moreno MD        chlorhexidine (PERIDEX) 0.12 % solution 15 mL  15 mL Mouth/Throat Q12H Sudheer Rojas MD   15 mL at 08/23/24 0800    dextrose 10% infusion   Intravenous Continuous PRN Susan Yeh MD        glucose gel 15-30 g  15-30 g Oral Q15 Min PRN Colten Garay MD        Or    dextrose 50 % injection 25-50 mL  25-50 mL Intravenous Q15 Min PRN Colten Garay MD        Or    glucagon injection 1 mg  1 mg Subcutaneous Q15 Min PRN Colten Garay MD        enoxaparin ANTICOAGULANT (LOVENOX) injection 40 mg  40 mg Subcutaneous Q24H Colten Garay MD   40 mg at 08/22/24 1716    fentaNYL (SUBLIMAZE) 50 mcg/mL bolus from pump   mcg Intravenous Once PRN Graciela Whittaker MD        fentaNYL (SUBLIMAZE) infusion   mcg/hr Intravenous Continuous Sudheer Rojas MD 4 mL/hr at 08/23/24 1000 200 mcg/hr at 08/23/24 1000    hydrALAZINE (APRESOLINE) tablet 25 mg  25 mg Oral Q8H DARCY Greg Moreno MD   25 mg at 08/23/24 1049    insulin aspart (NovoLOG) injection (RAPID ACTING)  1-4 Units Subcutaneous Q4H Colten Garay MD   1 Units at 08/23/24 1112    lidocaine (LMX4) cream   Topical Q1H PRN Sudheer Rojas MD        lidocaine 1 % 0.1-1 mL  0.1-1 mL Other Q1H PRN Sudheer Rojas MD        medication instruction   Does not apply Continuous PRN Sudheer Rojas MD        methylPREDNISolone sodium succinate (solu-MEDROL) 1,000 mg in sodium chloride 0.9  % 283 mL intermittent infusion  1,000 mg Intravenous Q24H Susan Yeh  mL/hr at 08/23/24 1106 1,000 mg at 08/23/24 1106    midazolam (VERSED) 1 mg/mL bolus from syringe/bag pump ADULT  1-4 mg Intravenous Once PRN rGaciela Whittaker MD        midazolam (VERSED) 100 mg/100 mL NS infusion - ADULT  1-8 mg/hr Intravenous Continuous Sudheer Rojas MD 5 mL/hr at 08/23/24 1010 5 mg/hr at 08/23/24 1010    multivitamins w/minerals liquid 15 mL  15 mL Per Feeding Tube Daily Susan Yeh MD   15 mL at 08/23/24 0759    naloxone (NARCAN) injection 0.2 mg  0.2 mg Intravenous Q2 Min PRN Jani Martin MD        Or    naloxone (NARCAN) injection 0.4 mg  0.4 mg Intravenous Q2 Min PRN Jani Martin MD        Or    naloxone (NARCAN) injection 0.2 mg  0.2 mg Intramuscular Q2 Min PRN Jani Martin MD        Or    naloxone (NARCAN) injection 0.4 mg  0.4 mg Intramuscular Q2 Min PRN Jani Martin MD        pantoprazole (PROTONIX) 2 mg/mL suspension 40 mg  40 mg Per Feeding Tube QAM  Sudheer Rojas MD        Or    pantoprazole (PROTONIX) IV push injection 40 mg  40 mg Intravenous QAM  Sudheer Rojas MD   40 mg at 08/23/24 0846    polyethylene glycol (MIRALAX) Packet 17 g  17 g Oral or Feeding Tube Daily Andree Becerril, APRN CNP   17 g at 08/23/24 1049    polyethylene glycol (MIRALAX) Packet 17 g  17 g Oral Daily PRN Sudheer Rojas MD   17 g at 08/22/24 1430    Prosource TF20 ENfit Compatibl EN LIQD (PROSOURCE TF20) packet 60 mL  1 packet Per Feeding Tube BID Susan Yeh MD   60 mL at 08/23/24 0800    senna-docusate (SENOKOT-S/PERICOLACE) 8.6-50 MG per tablet 1 tablet  1 tablet Oral or Feeding Tube At Bedtime JerricaAndree pruett APRN CNP        senna-docusate (SENOKOT-S/PERICOLACE) 8.6-50 MG per tablet 1 tablet  1 tablet Oral or Feeding Tube BID HOLLANDN Jani Martin MD        Or    senna-docusate (SENOKOT-S/PERICOLACE) 8.6-50 MG per tablet 2 tablet  2 tablet Oral or Feeding Tube  BID PRN Jani Martin MD        sodium chloride (PF) 0.9% PF flush 3 mL  3 mL Intracatheter Q8H Sudheer Rojas MD   3 mL at 08/23/24 0622    sodium chloride (PF) 0.9% PF flush 3 mL  3 mL Intracatheter q1 min prn Sudheer Rojas MD        thiamine (B-1) tablet 100 mg  100 mg Oral or Feeding Tube Daily Graciela Whittaker MD   100 mg at 08/23/24 3649

## 2024-08-23 NOTE — PROGRESS NOTES
Cardiology ICU Progress Note  Date of Service: 08/23/2024  Patient: Terrance Hidalgo  MRN: [id number]  Admission Date: 8/20/2024  Hospital Day: 3             ASSESSMENT AND PLAN   25 yo M with PMH autism who initially presented to [facility] with cough, URI symptoms, and hypoxemia. He was intubated due to respiratory failure and transferred to [facility], where his hospital course transpired to mixed cardiogenic/septic shock requiring multiple pressor agents, inotropic support, and IV diuresis. Transferred again to [facility] CICU on 8/20 for continued management.     Presentation initially highly suspicious for fulminant lymphocytic myocarditis base on elevated cardiac and inflammatory biomarkers, new (suspected), severe biventricular dysfunction w/ reduced cardiac output requiring intermittent inotropic support, and concurrent infectious/respiratory symptomotology c/b respiratory failure. However, EMB 5/21 demonstrated unremarkable myocardial tissue with no evidence of inflammatory infiltrate. Unclear etiology of severe cardiomyopathy at this time. He is being actively managed with IV diuresis, IV steroids, broad antimicrobial coverage, and ventilatory support for oxygenation. Holding on inotropes or MCS for now while optimizing chamber filling pressures. We will continue to investigate potential precipitants via infectious workup which has been unremarkable thus far.    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 Biplane LVEF is 16%. Severe hypokinesis of mid to apical RV free wall.  RHC (8/21/24): RA mean 6, PA 30/23/26, PCWP 18, Wayne CO/CI 5.0/2.1, PVR 1.6, SVR 1270  EMB (8/21/24): Fragments of essentially unremarkable myocardial tissue with no evidence of inflammatory infiltrate   Coronary angiogram: None  CMR: None - will obtain prior to discharge   - Preload:        - RAP 18 mmHg       - Diuretics: Bolus bumex 6 mg IV and start gtt at rate 2 mg/hr; add diamox 250 x1  - Afterload (LV):       - SVR (today): 1300 (Goal 7156-1073; MAP 65-75)       - Pressors: None       - Vasodilators: Start hydralazine 25 mg Q8  - Contractility:       - CO/CI (today): 3.9/1.7       - Inotropic agents: None       - Trend mixed venous blood gas and markers of end organ perfusion  - Steroids: 1g IV solumedrol (8/21 -8/23)  - MCS: None   - Anticoagulation: SubQ lovenox for DVT ppx  - Antiplatelet:None - no indication  - Statin: None - no indication  - Antiarrythmic: None - no indication  - SCD prophylaxis: None - no indication  - GDMT: Will start later in admission    # Acute hypoxic respiratory failure from Bacterial Pneumonia  # Aspiration Pneumonia  # Fevers   # Sepsis   CT Chest on arrival - Patchy and nodular opacity of the right upper and right middle lobe suspicious for pneumonia. Significant atelectasis with complete collapse of the left lower lobe with near total collapse of the left upper, and right lower lobes. Moderate bilateral pleural effusions extending from the base to the apices.   - Full vent support - CICU on board for management   - Pulm on board - appreciate recs  - ID on board - appreciate recs  - Bronchoscopy completed 8/21      > Awaiting studies   - Infectious workup      > Serum parvovirus, CMV, EBV, adenovirus PCR, HIV ab, lyme ab       > BC NGTD      > MRSA nares negative  - Antimicrobials       > Unasyn 8/21 - 8/26       > Cefepime 8/20 - 8/21       >  Azithromycin 8/20 -        > Vanco 8/20 - 8/21    # Transaminitis  - Suspected due to shock hepatic congestion/shock liver  - Trend LFT's  - CT CAP unremarkable    # AMS  - Sedation with ativan, fentanyl while intubated  - Start seroquel 25 BID pending QTC  - Wean sedation, RASS goal -1 to -2     # Hypokalemia  # Hypernatremia  - Replete as needed to maintain K > 4.0  - BMP BID     # GI/Nutrition  # Hyperglycemia  - Consult nutrition to start tube feeds   - Insulin sliding scale    Plan of care discussed with Dr. Martin, who agrees with above plan.    Thank you for consulting the cardiovascular services at the [facility]. Please do not hesitate to call us with any questions.     Colten Garay MD  Cardiology Fellow  Pager: [phone]           PAST MEDICAL HISTORY      No past medical history on file.  Active Problems:  Patient Active Problem List    Diagnosis Date Noted    Cardiogenic shock (H) 08/20/2024     Priority: Medium     Social History:     Family History:  No family history on file.    Medications:  Current Facility-Administered Medications   Medication Dose Route Frequency Provider Last Rate Last Admin    ampicillin-sulbactam (UNASYN) 3 g vial to attach to  mL bag  3 g Intravenous Q6H Colten Garay MD   3 g at 08/23/24 0420    azithromycin (ZITHROMAX) tablet 250 mg  250 mg Oral or Feeding Tube Daily Jani Martin MD        chlorhexidine (PERIDEX) 0.12 % solution 15 mL  15 mL Mouth/Throat Q12H Sudheer Rojas MD   15 mL at 08/22/24 2007    enoxaparin ANTICOAGULANT (LOVENOX) injection 40 mg  40 mg Subcutaneous Q24H Colten Garay MD   40 mg at 08/22/24 1716    insulin aspart (NovoLOG) injection (RAPID ACTING)  1-4 Units Subcutaneous Q4H Colten Garay MD   1 Units at 08/23/24 0431    methylPREDNISolone sodium succinate (solu-MEDROL) 1,000 mg in sodium chloride 0.9 % 283 mL intermittent infusion  1,000 mg Intravenous Q24H Susan Yeh  mL/hr at  08/22/24 1320 1,000 mg at 08/22/24 1320    multivitamins w/minerals liquid 15 mL  15 mL Per Feeding Tube Daily Susan Yeh MD   15 mL at 08/22/24 1321    pantoprazole (PROTONIX) 2 mg/mL suspension 40 mg  40 mg Per Feeding Tube QAM AC Sudheer Rojas MD        Or    pantoprazole (PROTONIX) IV push injection 40 mg  40 mg Intravenous QAM Sudheer Cavazos MD   40 mg at 08/22/24 0929    potassium & sodium phosphates (NEUTRA-PHOS) Packet 1 packet  1 packet Oral or Feeding Tube Q4H Jani Martin MD   1 packet at 08/23/24 0622    Prosource TF20 ENfit Compatibl EN LIQD (PROSOURCE TF20) packet 60 mL  1 packet Per Feeding Tube BID Susan Yeh MD   60 mL at 08/22/24 2008    sodium chloride (PF) 0.9% PF flush 3 mL  3 mL Intracatheter Q8H Sudheer Rojas MD   3 mL at 08/23/24 0622    thiamine (B-1) tablet 100 mg  100 mg Oral or Feeding Tube Daily Graciela Whittaker MD   100 mg at 08/22/24 0947       Current Facility-Administered Medications   Medication Dose Route Frequency Provider Last Rate Last Admin    dextrose 10% infusion   Intravenous Continuous PRN Susan Yeh MD        fentaNYL (SUBLIMAZE) infusion   mcg/hr Intravenous Continuous Sudheer Rojas MD 4 mL/hr at 08/23/24 0700 200 mcg/hr at 08/23/24 0700    [Held by provider] furosemide (LASIX) 500 mg/50mL infusion ADULT MAX CONC  10 mg/hr Intravenous Continuous Sudheer Rojas MD   Stopped at 08/21/24 1251    medication instruction   Does not apply Continuous PRN Sudheer Rojas MD        midazolam (VERSED) 100 mg/100 mL NS infusion - ADULT  1-8 mg/hr Intravenous Continuous Sudheer Rojas MD 8 mL/hr at 08/23/24 0700 8 mg/hr at 08/23/24 0700             PHYSICAL EXAM     Temp:  [96.8  F (36  C)-99.1  F (37.3  C)] 98.8  F (37.1  C)  Pulse:  [] 118  Resp:  [16-23] 18  MAP:  [67 mmHg-103 mmHg] 73 mmHg  Arterial Line BP: ()/(57-89) 90/66  FiO2 (%):  [50 %-70 %] 70 %  SpO2:  [88 %-100 %] 97  %    Intake/Output Summary (Last 24 hours) at 8/21/2024 1232  Last data filed at 8/21/2024 1121  Gross per 24 hour   Intake 1080.92 ml   Output 2850 ml   Net -1769.08 ml     GEN: Sedated, intubated   HEENT: No discharge  EYES: no icterus  CV: RRR, normal s1/s2, no murmurs/rubs/s3/s4, no heave.   CHEST:Mechanical breath sounds  ABD: soft, non-tender, normal active bowel sounds  : no flank/suprapubic tenderness  EXTR: pulses 2+. No clubbing, cyanosis or edema.   NEURO: alert oriented, speech fluent/appropriate, motor grossly nonfocal  PSYCH: cooperative, affect appropriate, pleasant            DIAGNOSTICS     All labs and imaging were reviewed, of note:    CMP  Recent Labs   Lab 08/23/24  0430 08/23/24  0011 08/22/24  2223 08/22/24  1542 08/22/24  1536 08/22/24  1327 08/22/24  1319 08/22/24  0958 08/22/24  0913   *  --  150*  --  149*  --   --   --  147*   POTASSIUM 4.1  --  3.4  --  3.9  --   --   --  4.0   CHLORIDE 109*  --  106  --  106  --   --   --  105   CO2 31*  --  31*  --  28  --   --   --  29   ANIONGAP 10  --  13  --  15  --   --   --  13   *  218* 198* 225*   < > 200*   < >  --    < > 167*   BUN 29.2*  --  27.3*  --  24.3*  --   --   --  19.3   CR 0.71  --  0.76  --  0.79  --  0.83  --  0.73   GFRESTIMATED >90  --  >90  --  >90  --  >90  --  >90   ALEXANDER 8.3*  --  8.4*  --  8.8  --   --   --  8.5*   MAG 2.5*  --  2.2  --  2.3  --   --   --  2.3   PHOS 2.4*  --  2.2*  --  3.8  --   --   --  3.1   PROTTOTAL 5.9*  --  5.7*  --  6.1*  --   --   --  5.8*   ALBUMIN 3.4*  --  3.3*  --  3.5  --   --   --  3.3*   BILITOTAL 0.3  --  0.3  --  0.4  --   --   --  0.4   ALKPHOS 63  --  58  --  64  --   --   --  65   AST 64*  --  74*  --  109*  --   --   --  134*   *  --  237*  --  280*  --   --   --  301*    < > = values in this interval not displayed.     CBC  Recent Labs   Lab 08/23/24  0430 08/22/24  2223 08/22/24  1536 08/22/24  0913   WBC 11.4* 10.4 13.0* 12.0*   RBC 4.43 4.20* 4.43 4.52   HGB  "12.5* 11.9* 12.7* 12.8*   HCT 38.6* 36.4* 38.8* 38.8*   MCV 87 87 88 86   MCH 28.2 28.3 28.7 28.3   MCHC 32.4 32.7 32.7 33.0   RDW 13.2 13.2 13.2 12.9    253 264 251     INRNo lab results found in last 7 days.  Arterial Blood Gas  Recent Labs   Lab 08/23/24  0620 08/23/24  0617 08/23/24  0431 08/23/24  0430 08/22/24  2223 08/22/24  1537   PH 7.52*  --  7.51*  --  7.52* 7.43   PCO2 42  --  43  --  43 50*   PO2 90  --  62*  --  75* 98   HCO3 35*  --  35*  --  36* 33*   O2PER 70 70 50 50 50  50 50       No results found for: \"TROPI\", \"TROPONIN\", \"TROPR\", \"TROPN\"         "

## 2024-08-23 NOTE — PLAN OF CARE
ICU End of Shift Summary. See flowsheets for vital signs, I&Os, and detailed assessment.    Changes this shift:   Neuro: Sedation increased due to increased agitation, now 200 fent 8 versed. Responds to speech, follows commands. Redirectable, but attempts to pull out ETT when awake. Sitter at bedside from 0200.    CV:   Rate/rhythm: -120's  Mechanical: CVP 15, PA 50/32, SvO2 57, CI 1.9 SVR 1033.1    Pulm: CMV 70%/400 TV/18 RR/10 PEEP, scant secretions.    GI: OG @ 64cm -TF running at 30 mL/hr, increase by 10 mL/hr Q8H to goal 50 mL/hr. SFWF. Increase TF to 40 mL/hr due at 1400.    : Swan  mL/hr    Plan: Monitor hemodynamic status, monitor electrolytes and replace per protocol. Continue POC.

## 2024-08-23 NOTE — CONSULTS
CICU New Consult Note    Date of Service: 08/23/24    ASSESSMENT:   Terrance Hidalgo is a 24 year old male who presents with URI symptoms and hypoxia on 8/19. He has a reported pmhx of autism, HLD. He was intubated on 8/19 in the setting of acute hypoxic respiratory failure, likely 2/2 new severe CM. Work up is ongoing for potential myocarditis, which is negative to date.     Patient will need gradual weaning of sedation and vent support. Baseline neurocognitive deficits may make it difficult to wean from sedation and following commands. Will continue to adjust vent support based on ongoing gases and hemodynamics.     RECOMMENDATIONS:  - Increased TV to 480 ml for 6 ml/kg IBW  - Wean FiO2, goal PaO2 >70. Ok to keep PEEP 10 for now  - Check QTC. If ok start seroquel 25 mg BID  - Change sliding scale insulin to insulin gtt  - Wean sedation, RASS goal -1 to -2  - Advance ETT 3 cm  - Diuresis per primary. Consider addition of acetazolamide/metolazone give metabolic alkalosis and hyponatremia  - Continue PPI and peridex.   - Daily CXR  - Serial ABGs  - Continue ppx lovenox  - Abx per ID/primary  - Rest per primary    Discussed with attending, Dr. Garza    Thank you for consulting the cardiovascular services at the [facility]. Please do not hesitate to call us with any questions.     Ernesto Hernández, PGY-6  Cardiovascular Disease Fellow    ----------------------------------------------------------------------------  REASON FOR CONSULT: Vent Mgmt    History of Present Illness   Terrance Hidalgo is a 24 year old male who presents with URI symptoms and hypoxia on 8/19. He has a reported pmhx of autism, HLD. He was intubated due to respiratory failure and emesis while on BIPAP. Initially transferred to an OSH for further mgmt. Work up there revealed concern for mixed cardiogenic/septic shock requiring pressors and IV diuresis. Eventually transferred to CICU at [facility] on 8/20 and was admitted to [facility] 2.  He has since remained intubated for hypoxia. 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 Started on steroids. Admission TTE with LVEF 16% and severe RV dysfunction. Underwent RHC and EMBX on 8/21; RA 6, PA 30/23/26, PCWP 18, NICK/CI 5.0/2.1 off all pressors and inotropes. Path is negative for inflammation. Pulm attempted a bronchoscopy on 8/21, but was aborted due to severely friable tissue in the airway. Patient was sedated but self-extubated on AM of 8/22 and was reintubated. ID consulted for viral work up, thus far negative.     Today, patient remains intubated and heavily sedated on fentanyl 200 and versed 8. Currently on /18/10/70%. Settings increased due to low Pao2 overnight. Most recent gas 7.52/42/90/35. Patient opens eyes but not consistently following commands. Did not tolerate trial of PCV.     PAST MEDICAL HISTORY:  No past medical history on file.    CURRENT MEDICATIONS:  No current outpatient medications on file.     PAST SURGICAL HISTORY:  No past surgical history on file.    ALLERGIES  Allergies   Allergen Reactions    Penicillin V Hives       FAMILY HISTORY:  No family history on file.    SOCIAL HISTORY:  Social History     Socioeconomic History    Marital status: Single     Social Determinants of Health     Interpersonal Safety: Not At Risk (8/19/2024)    Received from [facility]     Humiliation, Afraid, Rape, and Kick questionnaire     Fear of Current or Ex-Partner: No     Emotionally Abused: No     Physically Abused: No     Sexually Abused: No       Review of Systems:   10-point ROS reviewed, & found negative w/ exceptions noted in the HPI.    Physical Exam   Temp: 99.7  F (37.6  C) Temp src: Esophageal   Pulse: (!) 128   Resp: 18 SpO2: 97 % O2 Device: Mechanical Ventilator    Vital Signs with Ranges  Temp:  [96.8  F (36  C)-99.7  F (37.6  C)] 99.7  F (37.6  C)  Pulse:  [] 128  Resp:  [16-23] 18  MAP:  [67 mmHg-103 mmHg]  84 mmHg  Arterial Line BP: ()/(57-89) 102/77  FiO2 (%):  [50 %-70 %] 70 %  SpO2:  [88 %-100 %] 97 %  226 lbs 10.13 oz    GEN: NAD, intubated  HEENT: no icterus  CV: RRR, no obvious murmurs  CHEST: Mechanical breath sounds  ABD: soft, NT/ND, NABS  EXT: Trace BLE edema, warm  NEURO: sedated  PSYCH: unable to assess    Data   Recent Labs   Lab 08/23/24  0930 08/23/24  0812 08/23/24  0430 08/23/24  0011 08/22/24  2223 08/22/24  1542 08/22/24  1536   WBC 12.0*  --  11.4*  --  10.4  --  13.0*   HGB 12.3*  --  12.5*  --  11.9*  --  12.7*   MCV 89  --  87  --  87  --  88     --  252  --  253  --  264   NA  --   --  150*  --  150*  --  149*   POTASSIUM  --   --  4.1  --  3.4  --  3.9   CHLORIDE  --   --  109*  --  106  --  106   CO2  --   --  31*  --  31*  --  28   BUN  --   --  29.2*  --  27.3*  --  24.3*   CR  --   --  0.71  --  0.76  --  0.79   ANIONGAP  --   --  10  --  13  --  15   ALEXANDER  --   --  8.3*  --  8.4*  --  8.8   GLC  --  208* 188*  218*   < > 225*   < > 200*   ALBUMIN  --   --  3.4*  --  3.3*  --  3.5   PROTTOTAL  --   --  5.9*  --  5.7*  --  6.1*   BILITOTAL  --   --  0.3  --  0.3  --  0.4   ALKPHOS  --   --  63  --  58  --  64   ALT  --   --  223*  --  237*  --  280*   AST  --   --  64*  --  74*  --  109*    < > = values in this interval not displayed.       Recent Results (from the past 24 hour(s))   XR Chest Port 1 View    Narrative    Exam: XR CHEST PORT 1 VIEW, 8/23/2024 7:02 AM    Indication: intubated, cardiogenic shock    Comparison: X-ray chest 8/20/2024, multiple priors.    Findings:   Frontal radiograph of the chest, 0121 hours. Endotracheal tube tipIs  in the midthoracic trachea approximately 6.7 cm above the mauri.  Stable placement left IJ [place]-Sha catheter, esophageal temperature  probe, gastric tube, and right IJ central venous catheter.    The trachea is midline. Stable enlarged cardiac silhouette. Unchanged  low lung volumes. No significant change in the streaky basilar  and  patchy airspace opacities throughout the lungs. Layering effusions in  the right lung base, there is silhouetting of the left lung base. No  discernible pneumothorax.      Impression    Impression:   1. Stable support devices. Endotracheal tube tip is approximately 6.7  cm above the mauri.  2. Continued basilar atelectasis, and diffuse hazy opacities likely  representing pulmonary edema.  3. Stable cardiomegaly.    I have personally reviewed the examination and initial interpretation  and I agree with the findings.    DIDI GERMAN MD         SYSTEM ID:  [id number]   Echo Limited   Result Value    LVEF  20-25% (severely reduced)    Narrative    [id number]  [id number]  [id number]  [id number]     [facility],[place]  Echocardiography Laboratory  [address] [place] [address]     Name: TRISH BURTON  MRN: [id number]  : 2000  Study Date: 2024 08:40 AM  Age: 24 yrs  Gender: Male  Patient Location: [facility]  Reason For Study: Shock  Ordering Physician: SARBJIT LUJAN  Referring Physician: DAVIE ALEGRIA  Performed By: Jeana Rodriguez     BSA: 2.3 m2  Height: 73 in  Weight: 227 lb  HR: 123  ______________________________________________________________________________  Procedure  Limited Portable Echo Adult. Contrast Optison. Optison (NDC #3773-2196-79)  given intravenously. Patient was given 6 ml mixture of 3 ml Optison and 6 ml  saline. 3 ml wasted.  ______________________________________________________________________________  Interpretation Summary  Left ventricular function is decreased. The ejection fraction is 20-25%  (severely reduced).  The right ventricle is normal size.  Global right ventricular function is moderately to severely reduced.  No pericardial effusion is present.  ______________________________________________________________________________  Left Ventricle  Left ventricular function is decreased. The ejection fraction is 20-25%  (severely  reduced).     Right Ventricle  The right ventricle is normal size. Global right ventricular function is  moderately to severely reduced.     Mitral Valve  Mild mitral insufficiency is present.     Tricuspid Valve  Mild tricuspid insufficiency is present. The right ventricular systolic  pressure is approximated at 43.8 mmHg plus the right atrial pressure.     Pericardium  No pericardial effusion is present.  ______________________________________________________________________________  Doppler Measurements & Calculations  TR max kimberly: 330.8 cm/sec  TR max P.8 mmHg     ______________________________________________________________________________  Report approved by: Dara Cardona 2024 10:08 AM           TTE 24:  Interpretation Summary  Tachycardia at 144BPM during study.  Mild left ventricular dilation is present.LVIDd 58mm.  Biplane LVEF is 16%.  Severe hypokinesis of mid to apical RV free wall.  Pulmonary artery systolic pressure is normal.  The inferior vena cava is normal.  No pericardial effusion is present.  There is no prior study for direct comparison.    RHC 24:  Conclusion         Right sided filling pressures are normal.    Left sided filling pressures are mildly elevated.    Mild elevated pulmonary hypertension.    Reduced cardiac output level.    Successful endomyocardial biopsy. Results pending.    Fluoroscopy was used to visualize the left internal jugular vein.     - Elevated left sided filling pressures with mildly reduced cardiac index   - Uncomplicated EMB with 4 samples collected         Hemodynamics    RIGHT HEART CATHETERIZATION:    === Off pressors vented Fio2 80% ====    /71/87 mmHg  RA mean of 6 mmHg with a V wave of 8 mmhg  PA 30/23/26 mmHg  PCWP 18 mmHg with a V wave of 20 mmhg  Wayne CO/CI 5.0/2.1 L/min/m2   PVR 1.6 GROVE   SVR 1270 dynes/sec/cm-5  PA sat 70%   Hgb 14.6 g/dL        Right sided filling pressures are normal. Left sided filling pressures are mildly  elevated. Mild elevated pulmonary hypertension. Reduced cardiac output level.

## 2024-08-23 NOTE — PROGRESS NOTES
[facility] INFECTIOUS DISEASES PROGRESS NOTE     Patient:  Terrance Hidalgo   YOB: 2000, MRN: [id number]  Date of Visit: 08/23/2024  Date of Admission: 8/20/2024          ASSESSMENT AND PLAN     Terrance Hidalgo is a 24 year old male with autism. He presented with new diagnosis of heart failure Aug 21. Biopsy and histopath showed essentially unremarkable myocardial tissue with no evidence of inflammatory infiltrate.     His pulmonary infiltrates is probably part due to aspiration and he will complete therapy for pneumonia with 5 day therapy.     Viral testing so far negative.      IMPRESSION  Mixed shock  Elevated transaminases  Respiratory failure from Severe bacterial pneumonia  New heart failure with reduced EF     RECOMMENDATION:  Continue IV ampicillin-sulbactam 3g every 6 hours until Aug 26, 2024.     ID will continue to follow with you. Please check [facility] for staff covering the [facility] ID service.     Racheal Son MD  Infectious Diseases  Pager: [phone]  Vocera jena         SUBJECTIVE      Interval History and Events:  Intubated. No pressors or fevers.     Antimicrobial Treatment:  Amp-sul 8/21-         OBJECTIVE       Physical Examination:    Temp:  [96.8  F (36  C)-99.1  F (37.3  C)] 98.4  F (36.9  C)  Pulse:  [] 108  Resp:  [16-23] 18  MAP:  [67 mmHg-103 mmHg] 73 mmHg  Arterial Line BP: ()/(57-89) 93/65  FiO2 (%):  [50 %-70 %] 70 %  SpO2:  [88 %-100 %] 97 %    I/O last 3 completed shifts:  In: 1877.5 [I.V.:1137.5; NG/GT:470]  Out: 2205 [Urine:2205]    Vitals:    08/20/24 2223 08/22/24 0000 08/23/24 0400   Weight: 105.4 kg (232 lb 5.8 oz) 103.2 kg (227 lb 8.2 oz) 102.8 kg (226 lb 10.1 oz)       Constitutional: intubated   Central line site clean both neck  Clear breath sounds   Tachycardia   Soft abdomen     I reviewed the following Laboratory Data:    WBC 12  Estimated Creatinine Clearance: 180 mL/min (based on SCr of 0.8 mg/dL).    Microbiology:  8/21 Legio, Strep U - neg   8/21  Lyme ab - neg   Adeno, CMV,   EBV pcr- neg  HIV neg  Parvo, Coxsackie ab -   Histo U  RPP - neg   COVID - neg   8/21 Scx - no growth

## 2024-08-23 NOTE — PROGRESS NOTES
Patient maintained on ventilator throughout the night. Had to add 1 ml of air into the cuff twice throughout the shift. Patient suctioned for thick tan/blood tinged secretions throughout the night.     Will continue to monitor.

## 2024-08-23 NOTE — PHARMACY-ADMISSION MEDICATION HISTORY
Pharmacist Admission Medication History    Admission medication history is complete. The information provided in this note is only as accurate as the sources available at the time of the update.    Information Source(s): Caregiver and CareEverywhere/SureScripts via phone  Group [facility] = [facility] ([phone])      Pertinent Information:   spoke with BENNIE Hutchison, at patient's group home who reviewed patient's medications with me. Per Foster, patient not very compliant with his medications (cheeking, hiding and disposing of medications)  Per Foster, patient previously had quetiapine 25 mg once daily but this was discontinued on 8/9/24 and his evening dose of rispiderone was increased from 1 mg to 1.5 mg.  Per chart review, patient follows with [facility] for psychiatry. Patient's outpatient pharmacy is [facility], which packages and delivers his medications to his group home.     Changes made to PTA medication list:  Added: All  Deleted: None  Changed: None    Allergies reviewed with patient and updates made in EHR: no    Medication History Completed By: Elvie Lamas RPH 8/23/2024 4:29 PM    PTA Med List   Medication Sig Last Dose    risperiDONE (RISPERDAL) 1 MG tablet Take 1 mg by mouth every morning. Past Week at unknown    risperiDONE (RISPERDAL) 1 MG tablet Take 1.5 mg by mouth every evening. Past Week at unknown

## 2024-08-23 NOTE — PLAN OF CARE
Goal Outcome Evaluation:      Plan of Care Reviewed With: family          Outcome Evaluation: FiO2 to 50 from 70; PEEP 8 from 12. Contines on Versed and Fentanyl with bilateral writst restraints in use.    Major Shift Events: Opens eyes to voice, follows commands. Continues on Versed 6 and Fentanyl 200 with bilateral wrist restraints for line pulling. See flowsheets for NAYA details. Lasix 80mg IV given this evening. Fio2 now 50% from 70% and PEEP 8 from 12; RR 16 and . Good, productive cough independent and with suction. TF started to OG at 10 ml/hour at 1400; next increase scheduled for 2200.   Plan: Continue with plan of care and update primary team with changes.   For vital signs and complete assessments, please see documentation flowsheets.

## 2024-08-24 ENCOUNTER — APPOINTMENT (OUTPATIENT)
Dept: GENERAL RADIOLOGY | Facility: CLINIC | Age: 24
DRG: 853 | End: 2024-08-24
Attending: STUDENT IN AN ORGANIZED HEALTH CARE EDUCATION/TRAINING PROGRAM
Payer: MEDICARE

## 2024-08-24 LAB
ALBUMIN SERPL BCG-MCNC: 3.3 G/DL (ref 3.5–5.2)
ALBUMIN SERPL BCG-MCNC: 3.5 G/DL (ref 3.5–5.2)
ALBUMIN SERPL BCG-MCNC: 3.6 G/DL (ref 3.5–5.2)
ALBUMIN SERPL BCG-MCNC: 3.7 G/DL (ref 3.5–5.2)
ALBUMIN SERPL BCG-MCNC: 3.7 G/DL (ref 3.5–5.2)
ALLEN'S TEST: ABNORMAL
ALP SERPL-CCNC: 64 U/L (ref 40–150)
ALP SERPL-CCNC: 68 U/L (ref 40–150)
ALP SERPL-CCNC: 68 U/L (ref 40–150)
ALP SERPL-CCNC: 73 U/L (ref 40–150)
ALP SERPL-CCNC: 73 U/L (ref 40–150)
ALT SERPL W P-5'-P-CCNC: 152 U/L (ref 0–70)
ALT SERPL W P-5'-P-CCNC: 155 U/L (ref 0–70)
ALT SERPL W P-5'-P-CCNC: 165 U/L (ref 0–70)
ALT SERPL W P-5'-P-CCNC: 165 U/L (ref 0–70)
ALT SERPL W P-5'-P-CCNC: 170 U/L (ref 0–70)
ANION GAP SERPL CALCULATED.3IONS-SCNC: 10 MMOL/L (ref 7–15)
ANION GAP SERPL CALCULATED.3IONS-SCNC: 11 MMOL/L (ref 7–15)
ANION GAP SERPL CALCULATED.3IONS-SCNC: 12 MMOL/L (ref 7–15)
ANION GAP SERPL CALCULATED.3IONS-SCNC: 12 MMOL/L (ref 7–15)
ANION GAP SERPL CALCULATED.3IONS-SCNC: 14 MMOL/L (ref 7–15)
AST SERPL W P-5'-P-CCNC: 38 U/L (ref 0–45)
AST SERPL W P-5'-P-CCNC: 40 U/L (ref 0–45)
AST SERPL W P-5'-P-CCNC: 46 U/L (ref 0–45)
AST SERPL W P-5'-P-CCNC: 46 U/L (ref 0–45)
AST SERPL W P-5'-P-CCNC: 47 U/L (ref 0–45)
ATRIAL RATE - MUSE: 127 BPM
BASE EXCESS BLDA CALC-SCNC: 6.5 MMOL/L (ref -3–3)
BASE EXCESS BLDA CALC-SCNC: 6.7 MMOL/L (ref -3–3)
BASE EXCESS BLDA CALC-SCNC: 7 MMOL/L (ref -3–3)
BASE EXCESS BLDA CALC-SCNC: 8.7 MMOL/L (ref -3–3)
BASE EXCESS BLDA CALC-SCNC: 9.2 MMOL/L (ref -3–3)
BASE EXCESS BLDV CALC-SCNC: 10.1 MMOL/L (ref -3–3)
BASE EXCESS BLDV CALC-SCNC: 6.4 MMOL/L (ref -3–3)
BASE EXCESS BLDV CALC-SCNC: 6.9 MMOL/L (ref -3–3)
BASE EXCESS BLDV CALC-SCNC: 7.9 MMOL/L (ref -3–3)
BASE EXCESS BLDV CALC-SCNC: 8.2 MMOL/L (ref -3–3)
BASOPHILS # BLD AUTO: 0 10E3/UL (ref 0–0.2)
BASOPHILS NFR BLD AUTO: 0 %
BILIRUB DIRECT SERPL-MCNC: <0.2 MG/DL (ref 0–0.3)
BILIRUB SERPL-MCNC: 0.3 MG/DL
BILIRUB SERPL-MCNC: 0.3 MG/DL
BILIRUB SERPL-MCNC: 0.4 MG/DL
BUN SERPL-MCNC: 42.2 MG/DL (ref 6–20)
BUN SERPL-MCNC: 43 MG/DL (ref 6–20)
BUN SERPL-MCNC: 43.6 MG/DL (ref 6–20)
BUN SERPL-MCNC: 43.7 MG/DL (ref 6–20)
BUN SERPL-MCNC: 46.3 MG/DL (ref 6–20)
CA-I BLD-MCNC: 4.4 MG/DL (ref 4.4–5.2)
CA-I BLD-MCNC: 4.6 MG/DL (ref 4.4–5.2)
CALCIUM SERPL-MCNC: 8.2 MG/DL (ref 8.8–10.4)
CALCIUM SERPL-MCNC: 8.3 MG/DL (ref 8.8–10.4)
CALCIUM SERPL-MCNC: 8.6 MG/DL (ref 8.8–10.4)
CHLORIDE SERPL-SCNC: 114 MMOL/L (ref 98–107)
CHLORIDE SERPL-SCNC: 115 MMOL/L (ref 98–107)
CHLORIDE SERPL-SCNC: 118 MMOL/L (ref 98–107)
CHLORIDE SERPL-SCNC: 119 MMOL/L (ref 98–107)
CHLORIDE SERPL-SCNC: 119 MMOL/L (ref 98–107)
COHGB MFR BLD: 75.5 % (ref 96–97)
COHGB MFR BLD: 97.3 % (ref 96–97)
COHGB MFR BLD: 98.9 % (ref 96–97)
COHGB MFR BLD: 99.8 % (ref 96–97)
COHGB MFR BLD: >100 % (ref 96–97)
CREAT SERPL-MCNC: 0.88 MG/DL (ref 0.67–1.17)
CREAT SERPL-MCNC: 0.93 MG/DL (ref 0.67–1.17)
CREAT SERPL-MCNC: 0.95 MG/DL (ref 0.67–1.17)
CREAT SERPL-MCNC: 0.98 MG/DL (ref 0.67–1.17)
CREAT SERPL-MCNC: 0.98 MG/DL (ref 0.67–1.17)
DIASTOLIC BLOOD PRESSURE - MUSE: NORMAL MMHG
EGFRCR SERPLBLD CKD-EPI 2021: >90 ML/MIN/1.73M2
EOSINOPHIL # BLD AUTO: 0 10E3/UL (ref 0–0.7)
EOSINOPHIL NFR BLD AUTO: 0 %
ERYTHROCYTE [DISTWIDTH] IN BLOOD BY AUTOMATED COUNT: 13.2 % (ref 10–15)
ERYTHROCYTE [DISTWIDTH] IN BLOOD BY AUTOMATED COUNT: 13.2 % (ref 10–15)
ERYTHROCYTE [DISTWIDTH] IN BLOOD BY AUTOMATED COUNT: 13.6 % (ref 10–15)
ERYTHROCYTE [DISTWIDTH] IN BLOOD BY AUTOMATED COUNT: 13.6 % (ref 10–15)
GLUCOSE BLDC GLUCOMTR-MCNC: 104 MG/DL (ref 70–99)
GLUCOSE BLDC GLUCOMTR-MCNC: 123 MG/DL (ref 70–99)
GLUCOSE BLDC GLUCOMTR-MCNC: 123 MG/DL (ref 70–99)
GLUCOSE BLDC GLUCOMTR-MCNC: 146 MG/DL (ref 70–99)
GLUCOSE BLDC GLUCOMTR-MCNC: 147 MG/DL (ref 70–99)
GLUCOSE BLDC GLUCOMTR-MCNC: 160 MG/DL (ref 70–99)
GLUCOSE BLDC GLUCOMTR-MCNC: 168 MG/DL (ref 70–99)
GLUCOSE BLDC GLUCOMTR-MCNC: 170 MG/DL (ref 70–99)
GLUCOSE BLDC GLUCOMTR-MCNC: 173 MG/DL (ref 70–99)
GLUCOSE BLDC GLUCOMTR-MCNC: 177 MG/DL (ref 70–99)
GLUCOSE BLDC GLUCOMTR-MCNC: 188 MG/DL (ref 70–99)
GLUCOSE BLDC GLUCOMTR-MCNC: 193 MG/DL (ref 70–99)
GLUCOSE BLDC GLUCOMTR-MCNC: 202 MG/DL (ref 70–99)
GLUCOSE BLDC GLUCOMTR-MCNC: 206 MG/DL (ref 70–99)
GLUCOSE BLDC GLUCOMTR-MCNC: 212 MG/DL (ref 70–99)
GLUCOSE BLDC GLUCOMTR-MCNC: 213 MG/DL (ref 70–99)
GLUCOSE BLDC GLUCOMTR-MCNC: 216 MG/DL (ref 70–99)
GLUCOSE BLDC GLUCOMTR-MCNC: 216 MG/DL (ref 70–99)
GLUCOSE BLDC GLUCOMTR-MCNC: 220 MG/DL (ref 70–99)
GLUCOSE BLDC GLUCOMTR-MCNC: 227 MG/DL (ref 70–99)
GLUCOSE BLDC GLUCOMTR-MCNC: 229 MG/DL (ref 70–99)
GLUCOSE BLDC GLUCOMTR-MCNC: 235 MG/DL (ref 70–99)
GLUCOSE BLDC GLUCOMTR-MCNC: 235 MG/DL (ref 70–99)
GLUCOSE BLDC GLUCOMTR-MCNC: 275 MG/DL (ref 70–99)
GLUCOSE SERPL-MCNC: 141 MG/DL (ref 70–99)
GLUCOSE SERPL-MCNC: 190 MG/DL (ref 70–99)
GLUCOSE SERPL-MCNC: 190 MG/DL (ref 70–99)
GLUCOSE SERPL-MCNC: 215 MG/DL (ref 70–99)
GLUCOSE SERPL-MCNC: 225 MG/DL (ref 70–99)
HCO3 BLD-SCNC: 30 MMOL/L (ref 21–28)
HCO3 BLD-SCNC: 30 MMOL/L (ref 21–28)
HCO3 BLD-SCNC: 31 MMOL/L (ref 21–28)
HCO3 BLD-SCNC: 31 MMOL/L (ref 21–28)
HCO3 BLD-SCNC: 32 MMOL/L (ref 21–28)
HCO3 BLD-SCNC: 33 MMOL/L (ref 21–28)
HCO3 BLD-SCNC: 34 MMOL/L (ref 21–28)
HCO3 BLDV-SCNC: 32 MMOL/L (ref 21–28)
HCO3 BLDV-SCNC: 33 MMOL/L (ref 21–28)
HCO3 BLDV-SCNC: 33 MMOL/L (ref 21–28)
HCO3 BLDV-SCNC: 34 MMOL/L (ref 21–28)
HCO3 BLDV-SCNC: 34 MMOL/L (ref 21–28)
HCO3 SERPL-SCNC: 28 MMOL/L (ref 22–29)
HCO3 SERPL-SCNC: 29 MMOL/L (ref 22–29)
HCO3 SERPL-SCNC: 30 MMOL/L (ref 22–29)
HCO3 SERPL-SCNC: 30 MMOL/L (ref 22–29)
HCO3 SERPL-SCNC: 31 MMOL/L (ref 22–29)
HCT VFR BLD AUTO: 35.8 % (ref 40–53)
HCT VFR BLD AUTO: 35.9 % (ref 40–53)
HCT VFR BLD AUTO: 38.4 % (ref 40–53)
HCT VFR BLD AUTO: 39.7 % (ref 40–53)
HGB BLD-MCNC: 11.4 G/DL (ref 13.3–17.7)
HGB BLD-MCNC: 11.6 G/DL (ref 13.3–17.7)
HGB BLD-MCNC: 12 G/DL (ref 13.3–17.7)
HGB BLD-MCNC: 12.4 G/DL (ref 13.3–17.7)
IMM GRANULOCYTES # BLD: 0.1 10E3/UL
IMM GRANULOCYTES NFR BLD: 1 %
INTERPRETATION ECG - MUSE: NORMAL
LACTATE SERPL-SCNC: 1.8 MMOL/L (ref 0.7–2)
LACTATE SERPL-SCNC: 2 MMOL/L (ref 0.7–2)
LACTATE SERPL-SCNC: 2 MMOL/L (ref 0.7–2)
LACTATE SERPL-SCNC: 2.1 MMOL/L (ref 0.7–2)
LACTATE SERPL-SCNC: 2.9 MMOL/L (ref 0.7–2)
LYMPHOCYTES # BLD AUTO: 0.4 10E3/UL (ref 0.8–5.3)
LYMPHOCYTES # BLD AUTO: 0.4 10E3/UL (ref 0.8–5.3)
LYMPHOCYTES # BLD AUTO: 0.5 10E3/UL (ref 0.8–5.3)
LYMPHOCYTES # BLD AUTO: 0.6 10E3/UL (ref 0.8–5.3)
LYMPHOCYTES NFR BLD AUTO: 3 %
LYMPHOCYTES NFR BLD AUTO: 4 %
LYMPHOCYTES NFR BLD AUTO: 4 %
LYMPHOCYTES NFR BLD AUTO: 6 %
MAGNESIUM SERPL-MCNC: 2.4 MG/DL (ref 1.7–2.3)
MAGNESIUM SERPL-MCNC: 2.5 MG/DL (ref 1.7–2.3)
MAGNESIUM SERPL-MCNC: 2.6 MG/DL (ref 1.7–2.3)
MAGNESIUM SERPL-MCNC: 2.7 MG/DL (ref 1.7–2.3)
MCH RBC QN AUTO: 28.3 PG (ref 26.5–33)
MCH RBC QN AUTO: 28.4 PG (ref 26.5–33)
MCH RBC QN AUTO: 28.5 PG (ref 26.5–33)
MCH RBC QN AUTO: 28.7 PG (ref 26.5–33)
MCHC RBC AUTO-ENTMCNC: 31.2 G/DL (ref 31.5–36.5)
MCHC RBC AUTO-ENTMCNC: 31.3 G/DL (ref 31.5–36.5)
MCHC RBC AUTO-ENTMCNC: 31.8 G/DL (ref 31.5–36.5)
MCHC RBC AUTO-ENTMCNC: 32.4 G/DL (ref 31.5–36.5)
MCV RBC AUTO: 88 FL (ref 78–100)
MCV RBC AUTO: 90 FL (ref 78–100)
MCV RBC AUTO: 91 FL (ref 78–100)
MCV RBC AUTO: 91 FL (ref 78–100)
MONOCYTES # BLD AUTO: 0.8 10E3/UL (ref 0–1.3)
MONOCYTES # BLD AUTO: 0.8 10E3/UL (ref 0–1.3)
MONOCYTES # BLD AUTO: 1 10E3/UL (ref 0–1.3)
MONOCYTES # BLD AUTO: 1 10E3/UL (ref 0–1.3)
MONOCYTES NFR BLD AUTO: 10 %
MONOCYTES NFR BLD AUTO: 7 %
MONOCYTES NFR BLD AUTO: 8 %
MONOCYTES NFR BLD AUTO: 8 %
NEUTROPHILS # BLD AUTO: 11.9 10E3/UL (ref 1.6–8.3)
NEUTROPHILS # BLD AUTO: 8.6 10E3/UL (ref 1.6–8.3)
NEUTROPHILS # BLD AUTO: 8.7 10E3/UL (ref 1.6–8.3)
NEUTROPHILS # BLD AUTO: 9.4 10E3/UL (ref 1.6–8.3)
NEUTROPHILS NFR BLD AUTO: 83 %
NEUTROPHILS NFR BLD AUTO: 87 %
NEUTROPHILS NFR BLD AUTO: 88 %
NEUTROPHILS NFR BLD AUTO: 88 %
NRBC # BLD AUTO: 0 10E3/UL
NRBC BLD AUTO-RTO: 0 /100
O2/TOTAL GAS SETTING VFR VENT: 40 %
O2/TOTAL GAS SETTING VFR VENT: 40 %
O2/TOTAL GAS SETTING VFR VENT: 50 %
O2/TOTAL GAS SETTING VFR VENT: 60 %
OXYHGB MFR BLDV: 53 % (ref 70–75)
OXYHGB MFR BLDV: 68 % (ref 70–75)
OXYHGB MFR BLDV: 73 % (ref 70–75)
OXYHGB MFR BLDV: 77 % (ref 70–75)
OXYHGB MFR BLDV: 84 % (ref 70–75)
P AXIS - MUSE: 39 DEGREES
PCO2 BLD: 39 MM HG (ref 35–45)
PCO2 BLD: 39 MM HG (ref 35–45)
PCO2 BLD: 40 MM HG (ref 35–45)
PCO2 BLD: 41 MM HG (ref 35–45)
PCO2 BLD: 43 MM HG (ref 35–45)
PCO2 BLD: 45 MM HG (ref 35–45)
PCO2 BLD: 47 MM HG (ref 35–45)
PCO2 BLDV: 43 MM HG (ref 40–50)
PCO2 BLDV: 47 MM HG (ref 40–50)
PCO2 BLDV: 49 MM HG (ref 40–50)
PCO2 BLDV: 50 MM HG (ref 40–50)
PCO2 BLDV: 50 MM HG (ref 40–50)
PEEP: 10 CM H2O
PH BLD: 7.46 [PH] (ref 7.35–7.45)
PH BLD: 7.47 [PH] (ref 7.35–7.45)
PH BLD: 7.47 [PH] (ref 7.35–7.45)
PH BLD: 7.48 [PH] (ref 7.35–7.45)
PH BLD: 7.49 [PH] (ref 7.35–7.45)
PH BLD: 7.5 [PH] (ref 7.35–7.45)
PH BLD: 7.53 [PH] (ref 7.35–7.45)
PH BLDV: 7.42 [PH] (ref 7.32–7.43)
PH BLDV: 7.43 [PH] (ref 7.32–7.43)
PH BLDV: 7.44 [PH] (ref 7.32–7.43)
PH BLDV: 7.46 [PH] (ref 7.32–7.43)
PH BLDV: 7.51 [PH] (ref 7.32–7.43)
PHOSPHATE SERPL-MCNC: 1.9 MG/DL (ref 2.5–4.5)
PHOSPHATE SERPL-MCNC: 4.7 MG/DL (ref 2.5–4.5)
PHOSPHATE SERPL-MCNC: 5.8 MG/DL (ref 2.5–4.5)
PHOSPHATE SERPL-MCNC: 6.4 MG/DL (ref 2.5–4.5)
PLATELET # BLD AUTO: 240 10E3/UL (ref 150–450)
PLATELET # BLD AUTO: 252 10E3/UL (ref 150–450)
PLATELET # BLD AUTO: 256 10E3/UL (ref 150–450)
PLATELET # BLD AUTO: 256 10E3/UL (ref 150–450)
PO2 BLD: 100 MM HG (ref 80–105)
PO2 BLD: 115 MM HG (ref 80–105)
PO2 BLD: 166 MM HG (ref 80–105)
PO2 BLD: 217 MM HG (ref 80–105)
PO2 BLD: 220 MM HG (ref 80–105)
PO2 BLD: 42 MM HG (ref 80–105)
PO2 BLD: 78 MM HG (ref 80–105)
PO2 BLDV: 38 MM HG (ref 25–47)
PO2 BLDV: 39 MM HG (ref 25–47)
PO2 BLDV: 46 MM HG (ref 25–47)
PO2 BLDV: 51 MM HG (ref 25–47)
PO2 BLDV: 60 MM HG (ref 25–47)
POTASSIUM SERPL-SCNC: 3.2 MMOL/L (ref 3.4–5.3)
POTASSIUM SERPL-SCNC: 3.3 MMOL/L (ref 3.4–5.3)
POTASSIUM SERPL-SCNC: 3.7 MMOL/L (ref 3.4–5.3)
POTASSIUM SERPL-SCNC: 3.8 MMOL/L (ref 3.4–5.3)
POTASSIUM SERPL-SCNC: 4.6 MMOL/L (ref 3.4–5.3)
POTASSIUM SERPL-SCNC: 5.2 MMOL/L (ref 3.4–5.3)
PR INTERVAL - MUSE: 132 MS
PROT SERPL-MCNC: 5.5 G/DL (ref 6.4–8.3)
PROT SERPL-MCNC: 5.8 G/DL (ref 6.4–8.3)
PROT SERPL-MCNC: 5.9 G/DL (ref 6.4–8.3)
PROT SERPL-MCNC: 6.1 G/DL (ref 6.4–8.3)
PROT SERPL-MCNC: 6.1 G/DL (ref 6.4–8.3)
QRS DURATION - MUSE: 86 MS
QT - MUSE: 278 MS
QTC - MUSE: 404 MS
R AXIS - MUSE: -34 DEGREES
RBC # BLD AUTO: 3.97 10E6/UL (ref 4.4–5.9)
RBC # BLD AUTO: 4.09 10E6/UL (ref 4.4–5.9)
RBC # BLD AUTO: 4.24 10E6/UL (ref 4.4–5.9)
RBC # BLD AUTO: 4.35 10E6/UL (ref 4.4–5.9)
SAO2 % BLDA: 100 % (ref 92–100)
SAO2 % BLDA: 74 % (ref 92–100)
SAO2 % BLDA: 96 % (ref 92–100)
SAO2 % BLDA: 97 % (ref 92–100)
SAO2 % BLDA: 98 % (ref 92–100)
SAO2 % BLDA: 99 % (ref 92–100)
SAO2 % BLDA: 99 % (ref 92–100)
SAO2 % BLDV: 54.3 % (ref 70–75)
SAO2 % BLDV: 68.7 % (ref 70–75)
SAO2 % BLDV: 74.7 % (ref 70–75)
SAO2 % BLDV: 78.6 % (ref 70–75)
SAO2 % BLDV: 85.3 % (ref 70–75)
SARS-COV-2 RNA RESP QL NAA+PROBE: NEGATIVE
SODIUM SERPL-SCNC: 157 MMOL/L (ref 135–145)
SODIUM SERPL-SCNC: 157 MMOL/L (ref 135–145)
SODIUM SERPL-SCNC: 159 MMOL/L (ref 135–145)
SODIUM SERPL-SCNC: 159 MMOL/L (ref 135–145)
SODIUM SERPL-SCNC: 160 MMOL/L (ref 135–145)
SODIUM SERPL-SCNC: 162 MMOL/L (ref 135–145)
SYSTOLIC BLOOD PRESSURE - MUSE: NORMAL MMHG
T AXIS - MUSE: 99 DEGREES
VENTRICULAR RATE- MUSE: 127 BPM
WBC # BLD AUTO: 10 10E3/UL (ref 4–11)
WBC # BLD AUTO: 10.4 10E3/UL (ref 4–11)
WBC # BLD AUTO: 10.7 10E3/UL (ref 4–11)
WBC # BLD AUTO: 13.4 10E3/UL (ref 4–11)

## 2024-08-24 PROCEDURE — 82248 BILIRUBIN DIRECT: CPT | Performed by: STUDENT IN AN ORGANIZED HEALTH CARE EDUCATION/TRAINING PROGRAM

## 2024-08-24 PROCEDURE — 82805 BLOOD GASES W/O2 SATURATION: CPT | Performed by: STUDENT IN AN ORGANIZED HEALTH CARE EDUCATION/TRAINING PROGRAM

## 2024-08-24 PROCEDURE — 82805 BLOOD GASES W/O2 SATURATION: CPT

## 2024-08-24 PROCEDURE — 250N000013 HC RX MED GY IP 250 OP 250 PS 637: Performed by: INTERNAL MEDICINE

## 2024-08-24 PROCEDURE — 99207 PR NO BILLABLE SERVICE THIS VISIT: CPT | Performed by: INTERNAL MEDICINE

## 2024-08-24 PROCEDURE — 85025 COMPLETE CBC W/AUTO DIFF WBC: CPT | Performed by: STUDENT IN AN ORGANIZED HEALTH CARE EDUCATION/TRAINING PROGRAM

## 2024-08-24 PROCEDURE — 87635 SARS-COV-2 COVID-19 AMP PRB: CPT | Performed by: STUDENT IN AN ORGANIZED HEALTH CARE EDUCATION/TRAINING PROGRAM

## 2024-08-24 PROCEDURE — 258N000003 HC RX IP 258 OP 636: Performed by: STUDENT IN AN ORGANIZED HEALTH CARE EDUCATION/TRAINING PROGRAM

## 2024-08-24 PROCEDURE — 82330 ASSAY OF CALCIUM: CPT | Performed by: STUDENT IN AN ORGANIZED HEALTH CARE EDUCATION/TRAINING PROGRAM

## 2024-08-24 PROCEDURE — 84132 ASSAY OF SERUM POTASSIUM: CPT | Performed by: STUDENT IN AN ORGANIZED HEALTH CARE EDUCATION/TRAINING PROGRAM

## 2024-08-24 PROCEDURE — 250N000011 HC RX IP 250 OP 636: Performed by: STUDENT IN AN ORGANIZED HEALTH CARE EDUCATION/TRAINING PROGRAM

## 2024-08-24 PROCEDURE — 71045 X-RAY EXAM CHEST 1 VIEW: CPT

## 2024-08-24 PROCEDURE — 94003 VENT MGMT INPAT SUBQ DAY: CPT

## 2024-08-24 PROCEDURE — 250N000009 HC RX 250

## 2024-08-24 PROCEDURE — 250N000011 HC RX IP 250 OP 636

## 2024-08-24 PROCEDURE — 200N000002 HC R&B ICU UMMC

## 2024-08-24 PROCEDURE — 80048 BASIC METABOLIC PNL TOTAL CA: CPT | Performed by: STUDENT IN AN ORGANIZED HEALTH CARE EDUCATION/TRAINING PROGRAM

## 2024-08-24 PROCEDURE — 250N000013 HC RX MED GY IP 250 OP 250 PS 637: Performed by: STUDENT IN AN ORGANIZED HEALTH CARE EDUCATION/TRAINING PROGRAM

## 2024-08-24 PROCEDURE — 84100 ASSAY OF PHOSPHORUS: CPT | Performed by: STUDENT IN AN ORGANIZED HEALTH CARE EDUCATION/TRAINING PROGRAM

## 2024-08-24 PROCEDURE — 83605 ASSAY OF LACTIC ACID: CPT | Performed by: STUDENT IN AN ORGANIZED HEALTH CARE EDUCATION/TRAINING PROGRAM

## 2024-08-24 PROCEDURE — 250N000013 HC RX MED GY IP 250 OP 250 PS 637

## 2024-08-24 PROCEDURE — 83735 ASSAY OF MAGNESIUM: CPT | Performed by: STUDENT IN AN ORGANIZED HEALTH CARE EDUCATION/TRAINING PROGRAM

## 2024-08-24 PROCEDURE — 99291 CRITICAL CARE FIRST HOUR: CPT | Mod: GC | Performed by: STUDENT IN AN ORGANIZED HEALTH CARE EDUCATION/TRAINING PROGRAM

## 2024-08-24 PROCEDURE — 71045 X-RAY EXAM CHEST 1 VIEW: CPT | Mod: 26 | Performed by: STUDENT IN AN ORGANIZED HEALTH CARE EDUCATION/TRAINING PROGRAM

## 2024-08-24 PROCEDURE — 250N000009 HC RX 250: Performed by: STUDENT IN AN ORGANIZED HEALTH CARE EDUCATION/TRAINING PROGRAM

## 2024-08-24 PROCEDURE — 250N000012 HC RX MED GY IP 250 OP 636 PS 637

## 2024-08-24 PROCEDURE — 94799 UNLISTED PULMONARY SVC/PX: CPT

## 2024-08-24 PROCEDURE — 999N000157 HC STATISTIC RCP TIME EA 10 MIN

## 2024-08-24 RX ORDER — DIGOXIN 0.25 MG/ML
250 INJECTION INTRAMUSCULAR; INTRAVENOUS ONCE
Status: DISCONTINUED | OUTPATIENT
Start: 2024-08-24 | End: 2024-08-24

## 2024-08-24 RX ORDER — PREDNISONE 5 MG/1
5 TABLET ORAL DAILY
Status: DISCONTINUED | OUTPATIENT
Start: 2024-09-21 | End: 2024-08-31

## 2024-08-24 RX ORDER — POTASSIUM CHLORIDE 1.5 G/1.58G
20 POWDER, FOR SOLUTION ORAL ONCE
Status: COMPLETED | OUTPATIENT
Start: 2024-08-24 | End: 2024-08-24

## 2024-08-24 RX ORDER — PREDNISONE 20 MG/1
20 TABLET ORAL DAILY
Status: DISCONTINUED | OUTPATIENT
Start: 2024-09-07 | End: 2024-08-31

## 2024-08-24 RX ORDER — HYDRALAZINE HYDROCHLORIDE 25 MG/1
25 TABLET, FILM COATED ORAL ONCE
Status: COMPLETED | OUTPATIENT
Start: 2024-08-24 | End: 2024-08-24

## 2024-08-24 RX ORDER — NOREPINEPHRINE BITARTRATE 0.06 MG/ML
INJECTION, SOLUTION INTRAVENOUS
Status: COMPLETED
Start: 2024-08-24 | End: 2024-08-24

## 2024-08-24 RX ORDER — ACETAZOLAMIDE 500 MG/5ML
500 INJECTION, POWDER, LYOPHILIZED, FOR SOLUTION INTRAVENOUS ONCE
Status: COMPLETED | OUTPATIENT
Start: 2024-08-24 | End: 2024-08-24

## 2024-08-24 RX ORDER — POTASSIUM CHLORIDE 1.5 G/1.58G
40 POWDER, FOR SOLUTION ORAL ONCE
Status: COMPLETED | OUTPATIENT
Start: 2024-08-24 | End: 2024-08-24

## 2024-08-24 RX ORDER — ISOSORBIDE DINITRATE 40 MG/1
40 TABLET ORAL EVERY 8 HOURS
Status: DISCONTINUED | OUTPATIENT
Start: 2024-08-24 | End: 2024-08-25

## 2024-08-24 RX ORDER — POTASSIUM CHLORIDE 29.8 MG/ML
20 INJECTION INTRAVENOUS
Status: COMPLETED | OUTPATIENT
Start: 2024-08-24 | End: 2024-08-24

## 2024-08-24 RX ORDER — DEXMEDETOMIDINE HYDROCHLORIDE 4 UG/ML
.1-1.2 INJECTION, SOLUTION INTRAVENOUS CONTINUOUS
Status: DISCONTINUED | OUTPATIENT
Start: 2024-08-24 | End: 2024-08-26

## 2024-08-24 RX ORDER — ISOSORBIDE DINITRATE 5 MG/1
20 TABLET ORAL
Status: DISCONTINUED | OUTPATIENT
Start: 2024-08-24 | End: 2024-08-24

## 2024-08-24 RX ORDER — HYDRALAZINE HYDROCHLORIDE 25 MG/1
25 TABLET, FILM COATED ORAL EVERY 8 HOURS SCHEDULED
Status: DISCONTINUED | OUTPATIENT
Start: 2024-08-24 | End: 2024-08-24

## 2024-08-24 RX ORDER — NYSTATIN 100000/ML
500000 SUSPENSION, ORAL (FINAL DOSE FORM) ORAL 4 TIMES DAILY
Status: DISCONTINUED | OUTPATIENT
Start: 2024-08-24 | End: 2024-09-11

## 2024-08-24 RX ORDER — POTASSIUM PHOS IN 0.9 % NACL 15MMOL/250
15 PLASTIC BAG, INJECTION (ML) INTRAVENOUS
Status: COMPLETED | OUTPATIENT
Start: 2024-08-24 | End: 2024-08-24

## 2024-08-24 RX ORDER — HYDRALAZINE HYDROCHLORIDE 25 MG/1
75 TABLET, FILM COATED ORAL EVERY 8 HOURS SCHEDULED
Status: DISCONTINUED | OUTPATIENT
Start: 2024-08-24 | End: 2024-08-25

## 2024-08-24 RX ORDER — ACETAZOLAMIDE 500 MG/5ML
250 INJECTION, POWDER, LYOPHILIZED, FOR SOLUTION INTRAVENOUS ONCE
Status: COMPLETED | OUTPATIENT
Start: 2024-08-24 | End: 2024-08-24

## 2024-08-24 RX ORDER — PREDNISONE 10 MG/1
10 TABLET ORAL DAILY
Status: DISCONTINUED | OUTPATIENT
Start: 2024-09-14 | End: 2024-08-31

## 2024-08-24 RX ORDER — HYDRALAZINE HYDROCHLORIDE 25 MG/1
50 TABLET, FILM COATED ORAL EVERY 8 HOURS SCHEDULED
Status: DISCONTINUED | OUTPATIENT
Start: 2024-08-24 | End: 2024-08-24

## 2024-08-24 RX ORDER — DIGOXIN 0.25 MG/ML
500 INJECTION INTRAMUSCULAR; INTRAVENOUS ONCE
Status: DISCONTINUED | OUTPATIENT
Start: 2024-08-24 | End: 2024-08-24

## 2024-08-24 RX ORDER — NOREPINEPHRINE BITARTRATE 0.06 MG/ML
.01-.6 INJECTION, SOLUTION INTRAVENOUS CONTINUOUS
Status: DISCONTINUED | OUTPATIENT
Start: 2024-08-24 | End: 2024-08-25

## 2024-08-24 RX ORDER — SULFAMETHOXAZOLE/TRIMETHOPRIM 800-160 MG
1 TABLET ORAL
Status: DISCONTINUED | OUTPATIENT
Start: 2024-08-26 | End: 2024-08-25

## 2024-08-24 RX ORDER — DEXTROSE MONOHYDRATE 50 MG/ML
INJECTION, SOLUTION INTRAVENOUS CONTINUOUS
Status: DISCONTINUED | OUTPATIENT
Start: 2024-08-24 | End: 2024-08-25

## 2024-08-24 RX ORDER — PREDNISONE 20 MG/1
40 TABLET ORAL DAILY
Status: COMPLETED | OUTPATIENT
Start: 2024-08-24 | End: 2024-08-30

## 2024-08-24 RX ORDER — BUMETANIDE 0.25 MG/ML
3 INJECTION INTRAMUSCULAR; INTRAVENOUS ONCE
Status: COMPLETED | OUTPATIENT
Start: 2024-08-24 | End: 2024-08-24

## 2024-08-24 RX ORDER — POTASSIUM CHLORIDE 750 MG/1
40 TABLET, EXTENDED RELEASE ORAL ONCE
Status: DISCONTINUED | OUTPATIENT
Start: 2024-08-24 | End: 2024-08-24

## 2024-08-24 RX ORDER — ISOSORBIDE DINITRATE 5 MG/1
20 TABLET ORAL EVERY 8 HOURS
Status: DISCONTINUED | OUTPATIENT
Start: 2024-08-24 | End: 2024-08-24

## 2024-08-24 RX ADMIN — NOREPINEPHRINE BITARTRATE 0.03 MCG/KG/MIN: 0.06 INJECTION, SOLUTION INTRAVENOUS at 23:16

## 2024-08-24 RX ADMIN — AZITHROMYCIN DIHYDRATE 250 MG: 250 TABLET ORAL at 08:34

## 2024-08-24 RX ADMIN — Medication 60 ML: at 08:35

## 2024-08-24 RX ADMIN — Medication 200 MCG/HR: at 16:47

## 2024-08-24 RX ADMIN — ACETAZOLAMIDE 500 MG: 500 INJECTION, POWDER, LYOPHILIZED, FOR SOLUTION INTRAVENOUS at 16:21

## 2024-08-24 RX ADMIN — HYDRALAZINE HYDROCHLORIDE 75 MG: 25 TABLET ORAL at 15:04

## 2024-08-24 RX ADMIN — SODIUM NITROPRUSSIDE 1 MCG/KG/MIN: 25 INJECTION, SOLUTION, CONCENTRATE INTRAVENOUS at 04:54

## 2024-08-24 RX ADMIN — POTASSIUM CHLORIDE 20 MEQ: 29.8 INJECTION, SOLUTION INTRAVENOUS at 22:53

## 2024-08-24 RX ADMIN — POTASSIUM PHOSPHATE, MONOBASIC POTASSIUM PHOSPHATE, DIBASIC 15 MMOL: 224; 236 INJECTION, SOLUTION, CONCENTRATE INTRAVENOUS at 06:04

## 2024-08-24 RX ADMIN — ISOSORBIDE DINITRATE 40 MG: 40 TABLET ORAL at 20:35

## 2024-08-24 RX ADMIN — NYSTATIN 500000 UNITS: 100000 SUSPENSION ORAL at 16:20

## 2024-08-24 RX ADMIN — POTASSIUM CHLORIDE 20 MEQ: 1.5 POWDER, FOR SOLUTION ORAL at 23:35

## 2024-08-24 RX ADMIN — CHLORHEXIDINE GLUCONATE 0.12% ORAL RINSE 15 ML: 1.2 LIQUID ORAL at 08:33

## 2024-08-24 RX ADMIN — DEXMEDETOMIDINE HYDROCHLORIDE IN SODIUM CHLORIDE 0.2 MCG/KG/HR: 4 INJECTION INTRAVENOUS at 16:29

## 2024-08-24 RX ADMIN — HYDRALAZINE HYDROCHLORIDE 25 MG: 25 TABLET ORAL at 06:27

## 2024-08-24 RX ADMIN — CHLORHEXIDINE GLUCONATE 0.12% ORAL RINSE 15 ML: 1.2 LIQUID ORAL at 20:35

## 2024-08-24 RX ADMIN — POTASSIUM CHLORIDE 20 MEQ: 29.8 INJECTION, SOLUTION INTRAVENOUS at 07:07

## 2024-08-24 RX ADMIN — DEXTROSE MONOHYDRATE: 50 INJECTION, SOLUTION INTRAVENOUS at 23:34

## 2024-08-24 RX ADMIN — BUMETANIDE 0.5 MG/HR: 0.25 INJECTION, SOLUTION INTRAMUSCULAR; INTRAVENOUS at 10:36

## 2024-08-24 RX ADMIN — INSULIN HUMAN 10 UNITS/HR: 1 INJECTION, SOLUTION INTRAVENOUS at 18:18

## 2024-08-24 RX ADMIN — POTASSIUM PHOSPHATE, MONOBASIC POTASSIUM PHOSPHATE, DIBASIC 15 MMOL: 224; 236 INJECTION, SOLUTION, CONCENTRATE INTRAVENOUS at 08:59

## 2024-08-24 RX ADMIN — NYSTATIN 500000 UNITS: 100000 SUSPENSION ORAL at 12:07

## 2024-08-24 RX ADMIN — QUETIAPINE FUMARATE 25 MG: 25 TABLET ORAL at 08:34

## 2024-08-24 RX ADMIN — HYDRALAZINE HYDROCHLORIDE 50 MG: 25 TABLET ORAL at 13:38

## 2024-08-24 RX ADMIN — DEXTROSE MONOHYDRATE 250 ML: 50 INJECTION, SOLUTION INTRAVENOUS at 23:17

## 2024-08-24 RX ADMIN — PREDNISONE 40 MG: 20 TABLET ORAL at 10:35

## 2024-08-24 RX ADMIN — POTASSIUM CHLORIDE 40 MEQ: 1.5 POWDER, FOR SOLUTION ORAL at 08:33

## 2024-08-24 RX ADMIN — AMPICILLIN SODIUM AND SULBACTAM SODIUM 3 G: 2; 1 INJECTION, POWDER, FOR SOLUTION INTRAMUSCULAR; INTRAVENOUS at 05:01

## 2024-08-24 RX ADMIN — THIAMINE HCL TAB 100 MG 100 MG: 100 TAB at 08:34

## 2024-08-24 RX ADMIN — AMPICILLIN SODIUM AND SULBACTAM SODIUM 3 G: 2; 1 INJECTION, POWDER, FOR SOLUTION INTRAMUSCULAR; INTRAVENOUS at 22:36

## 2024-08-24 RX ADMIN — POTASSIUM CHLORIDE 20 MEQ: 29.8 INJECTION, SOLUTION INTRAVENOUS at 05:54

## 2024-08-24 RX ADMIN — MIDAZOLAM HYDROCHLORIDE 5 MG/HR: 1 INJECTION, SOLUTION INTRAVENOUS at 04:27

## 2024-08-24 RX ADMIN — ACETAZOLAMIDE 250 MG: 500 INJECTION, POWDER, LYOPHILIZED, FOR SOLUTION INTRAVENOUS at 11:10

## 2024-08-24 RX ADMIN — POTASSIUM CHLORIDE 20 MEQ: 29.8 INJECTION, SOLUTION INTRAVENOUS at 01:17

## 2024-08-24 RX ADMIN — Medication 15 ML: at 08:33

## 2024-08-24 RX ADMIN — Medication 60 ML: at 20:35

## 2024-08-24 RX ADMIN — Medication 200 MCG/HR: at 01:51

## 2024-08-24 RX ADMIN — ISOSORBIDE DINITRATE 20 MG: 5 TABLET ORAL at 12:06

## 2024-08-24 RX ADMIN — AMPICILLIN SODIUM AND SULBACTAM SODIUM 3 G: 2; 1 INJECTION, POWDER, FOR SOLUTION INTRAMUSCULAR; INTRAVENOUS at 16:20

## 2024-08-24 RX ADMIN — INSULIN HUMAN 3 UNITS/HR: 1 INJECTION, SOLUTION INTRAVENOUS at 08:59

## 2024-08-24 RX ADMIN — AMPICILLIN SODIUM AND SULBACTAM SODIUM 3 G: 2; 1 INJECTION, POWDER, FOR SOLUTION INTRAMUSCULAR; INTRAVENOUS at 10:36

## 2024-08-24 RX ADMIN — QUETIAPINE FUMARATE 25 MG: 25 TABLET ORAL at 20:35

## 2024-08-24 RX ADMIN — ENOXAPARIN SODIUM 40 MG: 40 INJECTION SUBCUTANEOUS at 16:20

## 2024-08-24 RX ADMIN — POTASSIUM CHLORIDE 20 MEQ: 29.8 INJECTION, SOLUTION INTRAVENOUS at 21:19

## 2024-08-24 RX ADMIN — HYDRALAZINE HYDROCHLORIDE 25 MG: 25 TABLET ORAL at 12:35

## 2024-08-24 RX ADMIN — Medication 40 MG: at 08:35

## 2024-08-24 RX ADMIN — BUMETANIDE 3 MG: 0.25 INJECTION INTRAMUSCULAR; INTRAVENOUS at 10:35

## 2024-08-24 RX ADMIN — NYSTATIN 500000 UNITS: 100000 SUSPENSION ORAL at 20:44

## 2024-08-24 RX ADMIN — POTASSIUM CHLORIDE 20 MEQ: 29.8 INJECTION, SOLUTION INTRAVENOUS at 00:12

## 2024-08-24 ASSESSMENT — ACTIVITIES OF DAILY LIVING (ADL)
ADLS_ACUITY_SCORE: 49

## 2024-08-24 NOTE — PLAN OF CARE
Goal Outcome Evaluation: Progressing      Plan of Care Reviewed With: patient    Outcome Evaluation: Several electerolytes replaced. Pt had one 11 beat run of Vtach. Per CC bumex stopped for intracellur dehydration. Per CC RR decreased to 16 to assist with metabolic alkolosis. CC ok'd holding off on starting Nitric d/t Pao2 >100, P/F >150, and CI increasing =3.3 by end of shift.     Neuro: RASS -2 to +2. Pt seen once trying to move legs out of bed. Sedation decreased.  Pt following commands and Pt moving all extremities. Pupils E/R/R B/L. , fentanyl gtt @ 100 mcg/hr, and Versed gtt @ 2 mg/hr  CV: ST. HR from 100s to 140s. Map goal 65 to 75. Nipride Titrated to goal.  Resp: Ventilator: CMV mode 60%fio2, 480TV, 18->16RR, 10peep. LS are clear and dim in the basesPt has almost no secretions  : Swan in for strict I+Os. Pt's UO was between 70 to 525 mL/hr.  GI: OG @ 64. OG is for enteral feeds, TF @ 50 mL/hr., and FWF @ 60 mL/ q 2hr NPO. Rectal tube in place; total output was 0 mL.  Skin/Endo/Heme/ID:  Insulin @ Algorithm 4 and Insulin @ 2 units/hr Pt Afebrile  Plan: Id weighing in if pt has parvovirus.   For vital signs and complete assessments, please see documentation flowsheets.

## 2024-08-24 NOTE — PROGRESS NOTES
Cardiology Critical Care ICU Note      I have seen and examined the patient on 08/24/2024. I have discussed the patient with the resident and fellow team and I agree with the assessment and plan as outlined below. I have personally reviewed vital signs, hemodynamics, medications, laboratory values, and diagnostic testing.     24M with autism admitted with cardiogenic shock concerning for myocarditis and acute hypoxic respiratory failure now intubated and sedated. Parvovirus positive. Diuresing, adjusting afterload reduction, and weaning vent as able. Started steroid taper.     The patient is critically ill with the following -    [x] Cardiogenic shock needing  [x] PA cath monitoring [] Inotropes [] Vasopressors [x] Vasodilators [] IABP [] Impella [] LVAD [] RVAD [] ECMO  [] Hypotension/ Shock  [x] Septic shock  [] Acute blood loss anemia, hemorrhagic shock  [x] Acute respiratory failure with  [x] hypoxia [] hypercapnia [] Bipap [] High flow oxygen [x] Ventilator support  [x] ROMI  [x] Shock liver  [] Afib with RVR  [] Acute MI  [] Severe/ critical valvular disease  [] Severe delirium/ encephalopathy  [] Acute stroke    I have spent 39 minutes critical care time to review vital signs, laboratory, hemodynamic and imaging data, to adjust life saving medications and to serially assess the patient.     Farrah Delgado MD   of Medicine, [facility]   Advanced Heart Failure and Transplant Cardiology             Cardiology ICU Progress Note  Date of Service: 08/24/2024  Patient: Terrance Hidalgo  MRN: [id number]  Admission Date: 8/20/2024  Hospital Day: 4             ASSESSMENT AND PLAN   25 yo M with PMH autism who initially presented to [facility] with cough, URI symptoms, and hypoxemia. He was intubated due to respiratory failure and transferred to [facility], where his hospital course transpired to mixed cardiogenic/septic shock requiring multiple pressor agents, inotropic support, and IV  diuresis. Transferred again to [facility] CICU on 8/20 for continued management.     Presentation initially highly suspicious for fulminant myocarditis base on elevated cardiac and inflammatory biomarkers, new (suspected), severe biventricular dysfunction w/ reduced cardiac output requiring intermittent inotropic support, and concurrent infectious/respiratory symptomotology c/b respiratory failure. However, EMB 5/21 demonstrated unremarkable myocardial tissue with no evidence of inflammatory infiltrate. Unclear etiology of severe cardiomyopathy at this time. He is being actively managed with IV diuresis, steroids, broad antimicrobial coverage, and ventilatory support for oxygenation. Holding on inotropes or MCS for now. Noteworthy that his Parvovirus IgG is positive, with IgM negative. Otherwise, infectious workup which has been unremarkable thus far.    Today's Major Updates:  - Reduced bumex gtt to 0.5 mg/hr   - Start FWF for hypernatremia   - Discontinue nipride, transition to oral afterload agents   - Wean vent as tolerated   - Continue Oriana for now  - Consider broncoscopy  - Transition to PO steroids     # [place] Heart Failure with Reduced Ejection Fraction (LVEF 16%)  # Acute Biventricular Failure   # Mixed Cardiogenic/Septic Shock - Resolved   Etiology: Unknown   Baseline weight: Unknown  TTE (8/21/24): LVIDd 58mm. Biplane LVEF is 16%. Severe hypokinesis of mid to apical RV free wall.  RHC (8/21/24): RA mean 6, PA 30/23/26, PCWP 18, [place] CO/CI 5.0/2.1, PVR 1.6, SVR 1270  EMB (8/21/24): Fragments of essentially unremarkable myocardial tissue with no evidence of inflammatory infiltrate   Coronary angiogram: None  CMR: None - will obtain prior to discharge   - Preload:        - CVP 10 mmHg this AM (unable to obtain PCWP)        - Diuretics: Bumex gtt at 0.5 mg/hr for euvolemia        - Goal CVP: 10-12 mmHg  - Afterload (LV):       - SVR (today): 799 (Goal 9362-1995; MAP 65-75)       - Pressors: None       -  Vasodilators: Discontinue Nipride gtt, start hydral 25 Q8 and isordil 20 Q8  - Contractility:       - CO/CI (today): 7.9/3.3       - Inotropic agents: None       - Trend mixed venous blood gas and markers of end organ perfusion  - Steroids:        -  s/p 1g IV solumedrol (8/21 -8/23)        - start PO  prednisone 40 mg daily (taper by 10mg/week)        - Start bactrim, nystatin while on steroids   - MCS: None   - Anticoagulation: SubQ lovenox for DVT ppx  - Antiplatelet:None - no indication  - Statin: None - no indication  - Antiarrythmic: None - no indication  - SCD prophylaxis: None - no indication  - GDMT: Will start later in admission    # Acute hypoxic respiratory failure from Bacterial Pneumonia  # Parvovirus IgG positive   # Aspiration Pneumonia  # Fevers   # Sepsis   CT Chest on arrival - Patchy and nodular opacity of the right upper and right middle lobe suspicious for pneumonia. Significant atelectasis with complete collapse of the left lower lobe with near total collapse of the left upper, and right lower lobes. Moderate bilateral pleural effusions extending from the base to the apices.   - Full vent support - CICU on board for management   - Pulm on board - appreciate recs  - ID on board - appreciate recs  - Bronchoscopy completed 8/21      > Consider repeat study if un improving   - Infectious workup      > Serum parvovirus IgG positive       > BC NGTD      > MRSA nares negative  - Antimicrobials       > Unasyn 8/21 - 8/26       > Cefepime 8/20 - 8/21       > Azithromycin 8/20 -        > Vanco 8/20 - 8/21    # Metabolic Alkalosis   # Hypokalemia  # Hypernatremia  - Start  Q4   - Check sodium Q4  - Replete as needed to maintain K > 4.0  - BMP BID   - Volume optimization     # Transaminitis  - Suspected due to shock hepatic congestion/shock liver  - Trend LFT's  - CT CAP unremarkable    # AMS  - Sedation with ativan, fentanyl while intubated  - Start seroquel 25 BID pending QTC  - Wean sedation,  RASS goal -1 to -2     # GI/Nutrition  # Hyperglycemia  - Consult nutrition to start tube feeds   - Insulin gtt    Plan of care discussed with Dr. Delgado, who agrees with above plan.    Thank you for consulting the cardiovascular services at the [facility]. Please do not hesitate to call us with any questions.     Colten Garay MD  Cardiology Fellow  Pager: [phone]           PAST MEDICAL HISTORY      No past medical history on file.  Active Problems:  Patient Active Problem List    Diagnosis Date Noted    Cardiogenic shock (H) 08/20/2024     Priority: Medium     Social History:     Family History:  No family history on file.    Medications:  Current Facility-Administered Medications   Medication Dose Route Frequency Provider Last Rate Last Admin    ampicillin-sulbactam (UNASYN) 3 g vial to attach to  mL bag  3 g Intravenous Q6H Farrah Delgado MD   3 g at 08/24/24 0501    azithromycin (ZITHROMAX) tablet 250 mg  250 mg Oral or Feeding Tube Daily Jani Martin MD   250 mg at 08/23/24 0759    chlorhexidine (PERIDEX) 0.12 % solution 15 mL  15 mL Mouth/Throat Q12H Sudheer Rojas MD   15 mL at 08/23/24 2117    enoxaparin ANTICOAGULANT (LOVENOX) injection 40 mg  40 mg Subcutaneous Q24H Colten Garay MD   40 mg at 08/23/24 1511    hydrALAZINE (APRESOLINE) tablet 25 mg  25 mg Oral Q8H Greg Rodriguez MD   25 mg at 08/24/24 0627    multivitamins w/minerals liquid 15 mL  15 mL Per Feeding Tube Daily Susan Yeh MD   15 mL at 08/23/24 0759    pantoprazole (PROTONIX) 2 mg/mL suspension 40 mg  40 mg Oral or Feeding Tube Daily Farrah Delgado MD        polyethylene glycol (MIRALAX) Packet 17 g  17 g Oral or Feeding Tube Daily Andree Becerril APRN CNP   17 g at 08/23/24 1049    potassium chloride 20 mEq in 50 mL intermittent infusion  20 mEq Intravenous Q1H Farrah Delgado MD 50 mL/hr at 08/24/24 0707 20 mEq at 08/24/24 0707    potassium chloride  anaya ER (KLOR-CON M10) CR tablet 40 mEq  40 mEq Oral Once Colten Garay MD        Potassium Phosphate 15 mmol in NS intermittent infusion 15 mmol  15 mmol Intravenous Q2H Farrah Delgado MD   15 mmol at 08/24/24 0604    Prosource TF20 ENfit Compatibl EN LIQD (PROSOURCE TF20) packet 60 mL  1 packet Per Feeding Tube BID Susan Yeh MD   60 mL at 08/23/24 2214    QUEtiapine (SEROquel) tablet 25 mg  25 mg Oral or Feeding Tube BID Colten Garay MD   25 mg at 08/23/24 2213    senna-docusate (SENOKOT-S/PERICOLACE) 8.6-50 MG per tablet 1 tablet  1 tablet Oral or Feeding Tube At Bedtime Andree Becerril, APRN CNP        sodium chloride (PF) 0.9% PF flush 3 mL  3 mL Intracatheter Q8H Sudheer Rojas MD   3 mL at 08/23/24 2353    thiamine (B-1) tablet 100 mg  100 mg Oral or Feeding Tube Daily Graciela Whittaker MD   100 mg at 08/23/24 0759       Current Facility-Administered Medications   Medication Dose Route Frequency Provider Last Rate Last Admin    [Held by provider] bumetanide (BUMEX) 0.25 mg/mL infusion  2 mg/hr Intravenous Continuous Greg Moreno MD   Stopped at 08/24/24 0008    dextrose 10% infusion   Intravenous Continuous PRN Colten Garay MD        fentaNYL (SUBLIMAZE) infusion   mcg/hr Intravenous Continuous Sudheer Rojas MD 2 mL/hr at 08/24/24 0700 100 mcg/hr at 08/24/24 0700    insulin regular (MYXREDLIN) 1 unit/mL infusion  0-24 Units/hr Intravenous Continuous Colten Garay MD 2 mL/hr at 08/24/24 0700 2 Units/hr at 08/24/24 0700    medication instruction   Does not apply Continuous PRN Sudheer Rojas MD        midazolam (VERSED) 100 mg/100 mL NS infusion - ADULT  1-8 mg/hr Intravenous Continuous Sudheer Rojas MD 3 mL/hr at 08/24/24 0700 3 mg/hr at 08/24/24 0700    nitroPRUsside (NIPRIDE) 100 mg, sodium thiosulfate 1,000 mg in D5W 62.5 mL IV infusion (conc: 1.6 mg/mL)  0.25-5 mcg/kg/min (Dosing Weight) Intravenous Continuous Farrah Delgado MD  2 mL/hr at 08/24/24 0700 0.5 mcg/kg/min at 08/24/24 0700             PHYSICAL EXAM     Temp:  [97.9  F (36.6  C)-100  F (37.8  C)] 97.9  F (36.6  C)  Pulse:  [100-141] 107  Resp:  [12-24] 22  BP: ()/(49-54) 99/52  MAP:  [55 mmHg-110 mmHg] 71 mmHg  Arterial Line BP: ()/(48-97) 95/60  FiO2 (%):  [60 %-80 %] 60 %  SpO2:  [86 %-100 %] 97 %    Intake/Output Summary (Last 24 hours) at 8/21/2024 1232  Last data filed at 8/21/2024 1121  Gross per 24 hour   Intake 1080.92 ml   Output 2850 ml   Net -1769.08 ml     GEN: Sedated, intubated   HEENT: No discharge  EYES: no icterus  CV: RRR, normal s1/s2, no murmurs/rubs/s3/s4, no heave.   CHEST:Mechanical breath sounds  ABD: soft, non-tender, normal active bowel sounds  : no flank/suprapubic tenderness  EXTR: pulses 2+. No clubbing, cyanosis or edema.   NEURO: alert oriented, speech fluent/appropriate, motor grossly nonfocal  PSYCH: cooperative, affect appropriate, pleasant            DIAGNOSTICS     All labs and imaging were reviewed, of note:    CMP  Recent Labs   Lab 08/24/24  0658 08/24/24  0632 08/24/24  0504 08/24/24  0356 08/24/24  0351 08/23/24  2210 08/23/24  2145 08/23/24  1744 08/23/24  1707 08/23/24  1603 08/23/24  1516 08/23/24  1301 08/23/24  1252 08/23/24  1111 08/23/24  0930   NA  --   --   --   --  157*  --  155*  --  153*  --   --   --  153*  --  150*   POTASSIUM  --   --   --   --  3.3*  --  2.9*  --  3.8  3.8  --  3.3*  --  3.1*  --  4.1   CHLORIDE  --   --   --   --  114*  --  109*  --  107  --   --   --  111*  --  108*   CO2  --   --   --   --  31*  --  31*  --  31*  --   --   --  29  --  30*   ANIONGAP  --   --   --   --  12  --  15  --  15  --   --   --  13  --  12   * 104* 170* 206* 215*   < > 281*   < > 335*   < >  --    < > 252*   < > 251*   BUN  --   --   --   --  43.7*  --  39.1*  --  39.5*  --   --   --  37.8*  --  34.1*   CR  --   --   --   --  0.98  --  0.98  --  0.97  --   --   --  0.87  --  0.80   GFRESTIMATED  --   --   --   " --  >90  --  >90  --  >90  --   --   --  >90  --  >90   ALEXANDER  --   --   --   --  8.6*  --  7.9*  --  7.9*  --   --   --  7.5*  --  8.4*   MAG  --   --   --   --  2.4*  --  2.4*  --   --   --   --   --  2.1  --  2.6*   PHOS  --   --   --   --  1.9*  --  2.2*  --   --   --   --   --  4.4  --  4.4   PROTTOTAL  --   --   --   --  5.9*  --  6.6  --   --   --   --   --  5.6*  --  5.6*   ALBUMIN  --   --   --   --  3.5  --  3.8  --   --   --   --   --  3.2*  --  3.3*   BILITOTAL  --   --   --   --  0.4  --  0.4  --   --   --   --   --  0.4  --  0.4   ALKPHOS  --   --   --   --  68  --  77  --   --   --   --   --  69  --  63   AST  --   --   --   --  40  --  49*  --   --   --   --   --  56*  --  55*   ALT  --   --   --   --  170*  --  205*  --   --   --   --   --  196*  --  205*    < > = values in this interval not displayed.     CBC  Recent Labs   Lab 08/24/24  0351 08/23/24  2145 08/23/24  1707 08/23/24  1252   WBC 10.7 15.2* 14.2* 14.4*   RBC 4.09* 4.60 4.75 4.54   HGB 11.6* 12.9* 13.5 13.1*   HCT 35.8* 40.8 42.1 41.1   MCV 88 89 89 91   MCH 28.4 28.0 28.4 28.9   MCHC 32.4 31.6 32.1 31.9   RDW 13.2 13.2 13.5 13.4    302 336 282     INRNo lab results found in last 7 days.  Arterial Blood Gas  Recent Labs   Lab 08/24/24  0354 08/24/24  0351 08/23/24  2152 08/23/24  2146 08/23/24 2006 08/23/24  1707 08/23/24  1514   PH 7.53*  --   --  7.49*  --  7.45 7.43   PCO2 39  --   --  44  --  48* 51*   PO2 115*  --   --  93  --  116* 146*   HCO3 33*  --   --  34*  --  33* 34*   O2PER 60 60 60 60   < > 60  60 90    < > = values in this interval not displayed.       No results found for: \"TROPI\", \"TROPONIN\", \"TROPR\", \"TROPN\"         "

## 2024-08-24 NOTE — PLAN OF CARE
Problem: Adult Inpatient Plan of Care  Goal: Plan of Care Review  Description: The Plan of Care Review/Shift note should be completed every shift.  The Outcome Evaluation is a brief statement about your assessment that the patient is improving, declining, or no change.  This information will be displayed automatically on your shift  note.  Outcome: Progressing     Neuro: sedated with fent at 200 and versed at 5 majority of shift, precedex started d/t inability to achieve RASS goal, intermittently very agitated, intermittently following simple commands and nodding head yes/no, pupils PERRLA. Fent now at 150, versed at 2, and precedex at 0.2  CV: Sinus tach 110s-140s, MAP goal 65-75 with nipride at 1.0 majority of shift, difficult to achieve MAP goal with intermittent agitation increasing HR and BP, one time doses of hydralazine and DIAMOX ordered with minimal improvement in MAPs. MAPs currently within goal after starting precedex gtt and HR in 110s. Titrating down Nipride as tolerated. Bumex gtt restarted at straight rate 0.5 mg/hr. Q6h FICKS, last CVP 14 and PAP 50/32.   Resp: CMV settings, 50% FiO2, , PEEP 10, RR 16, LS clear/dim, minimal thin in-line secretions. Nitric oxide started at 20 PPM.   GI/: Swan in place, increased UOP with bumex gtt, rectal tube in place, OG with TF at goal 50 ml/hr,  ml q2h for hypernatremia, insulin gtt alg 4, elevated blood sugars in 200s despite insulin at 12 units/hr, MD aware.  Access:  R PIV x2, R triple lumen IJ CVC, L PAC with side port   For vital signs and complete assessments, please see documentation flowsheets.

## 2024-08-24 NOTE — PROGRESS NOTES
CICU New Consult Note    Date of Service: 08/23/24    ASSESSMENT:   Terrance Hidalgo is a 24 year old male who presents with URI symptoms and hypoxia on 8/19. He has a reported pmhx of autism, HLD. He was intubated on 8/19 in the setting of acute hypoxic respiratory failure, likely 2/2 new severe CM of unknown etiology. Myocarditis considered given elevated inflammatory markers, endomyocardial biopsy negative, viral panel negative. CICU following for vent management.     RECOMMENDATIONS:  - Wean FiO2 to 50%, if PaO2 remains >70- wean to 40% with PEEP 8.  - Continue seroquel 25 mg BID  - ETT in acceptable position today  - Diuresis per primary, can add metolazone given worsening hypernatremia  - Increase FWF to 100 q2 hrs  - Continue PPI and peridex.   - Daily CXR  - Serial ABGs  - Continue ppx lovenox  - Abx per ID/primary  - Consider rheumatology work-up to rule out vasculitis/ other autoimmune disorders given significant pulmonary involvement    Discussed with attending, Dr. Oneil    Thank you for consulting the cardiovascular services at the [facility]. Please do not hesitate to call us with any questions.   ----------------------------------------------------------------------------  REASON FOR CONSULT: Vent Mgmt    History of Present Illness   Terrance Hidalgo is a 24 year old male who presents with URI symptoms and hypoxia on 8/19. He has a reported pmhx of autism, HLD. He was intubated due to respiratory failure and emesis while on BIPAP. Initially transferred to an OSH for further mgmt. Work up there revealed concern for mixed cardiogenic/septic shock requiring pressors and IV diuresis. Eventually transferred to CICU at [facility] on 8/20 and was admitted to [facility]. He has since remained intubated for hypoxia. 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 Started on steroids. Admission TTE with LVEF 16% and severe RV dysfunction.  Underwent RHC and EMBX on 8/21; RA 6, PA 30/23/26, PCWP 18, NICK/CI 5.0/2.1 off all pressors and inotropes. Path is negative for inflammation. Pulm attempted a bronchoscopy on 8/21, but was aborted due to severely friable tissue in the airway. Patient was sedated but self-extubated on AM of 8/22 and was reintubated. ID consulted for viral work up, thus far negative.     He remains intubated and sedated on fentanyl 100 and versed 3. Currently on /16/10/60%. Most recent gas 7.47/47/42/34.    PAST MEDICAL HISTORY:  No past medical history on file.    CURRENT MEDICATIONS:  No current outpatient medications on file.     PAST SURGICAL HISTORY:  No past surgical history on file.    ALLERGIES  Allergies   Allergen Reactions    Penicillin V Hives     Tolerated ampicillin/sulbactam 08/21/2024       FAMILY HISTORY:  No family history on file.    SOCIAL HISTORY:  Social History     Socioeconomic History    Marital status: Single     Social Determinants of Health     Interpersonal Safety: Not At Risk (8/19/2024)    Received from [facility]     Humiliation, Afraid, Rape, and Kick questionnaire     Fear of Current or Ex-Partner: No     Emotionally Abused: No     Physically Abused: No     Sexually Abused: No       Review of Systems:   10-point ROS reviewed, & found negative w/ exceptions noted in the HPI.    Physical Exam   Temp: 97.9  F (36.6  C) Temp src: Esophageal BP: 99/52 Pulse: 107   Resp: 22 SpO2: 97 % O2 Device: Mechanical Ventilator    Vital Signs with Ranges  Temp:  [97.9  F (36.6  C)-100  F (37.8  C)] 97.9  F (36.6  C)  Pulse:  [100-141] 107  Resp:  [12-24] 22  BP: ()/(49-54) 99/52  MAP:  [55 mmHg-110 mmHg] 71 mmHg  Arterial Line BP: ()/(48-97) 95/60  FiO2 (%):  [60 %-80 %] 60 %  SpO2:  [86 %-100 %] 97 %  245 lbs 2.42 oz    GEN: NAD, intubated  HEENT: no icterus  CV: RRR, no obvious murmurs  CHEST: Mechanical breath sounds  ABD: soft, NT/ND, NABS  EXT: Trace BLE edema, warm  NEURO:  sedated  PSYCH: unable to assess    Data   Recent Labs   Lab 24  0826 24  0658 24  0632 24  0356 24  0351 24  2210 24  2145 24  1744 24  1707   WBC  --   --   --   --  10.7  --  15.2*  --  14.2*   HGB  --   --   --   --  11.6*  --  12.9*  --  13.5   MCV  --   --   --   --  88  --  89  --  89   PLT  --   --   --   --  252  --  302  --  336   NA  --   --   --   --  157*  --  155*  --  153*   POTASSIUM  --   --   --   --  3.3*  --  2.9*  --  3.8  3.8   CHLORIDE  --   --   --   --  114*  --  109*  --  107   CO2  --   --   --   --  31*  --  31*  --  31*   BUN  --   --   --   --  43.7*  --  39.1*  --  39.5*   CR  --   --   --   --  0.98  --  0.98  --  0.97   ANIONGAP  --   --   --   --  12  --  15  --  15   ALEXANDER  --   --   --   --  8.6*  --  7.9*  --  7.9*   * 123* 104*   < > 215*   < > 281*   < > 335*   ALBUMIN  --   --   --   --  3.5  --  3.8  --   --    PROTTOTAL  --   --   --   --  5.9*  --  6.6  --   --    BILITOTAL  --   --   --   --  0.4  --  0.4  --   --    ALKPHOS  --   --   --   --  68  --  77  --   --    ALT  --   --   --   --  170*  --  205*  --   --    AST  --   --   --   --  40  --  49*  --   --     < > = values in this interval not displayed.       Recent Results (from the past 24 hour(s))   Echo Limited   Result Value    LVEF  20-25% (severely reduced)    [facility]    [id number]  [id number]  [id number]  [id number]     [facility],[place]  Echocardiography Laboratory  [address] [place] [address]     Name: TRISH BURTON  MRN: [id number]  : 2000  Study Date: 2024 08:40 AM  Age: 24 yrs  Gender: Male  Patient Location: [facility]  Reason For Study: Shock  Ordering Physician: SARBJIT LUJAN  Referring Physician: DAVIE ALEGRIA  Performed By: Jeana Rodriguez     BSA: 2.3 m2  Height: 73 in  Weight: 227 lb  HR:  123  ______________________________________________________________________________  Procedure  Limited Portable Echo Adult. Contrast Optison. Optison (NDC #6987-9506-73)  given intravenously. Patient was given 6 ml mixture of 3 ml Optison and 6 ml  saline. 3 ml wasted.  ______________________________________________________________________________  Interpretation Summary  Left ventricular function is decreased. The ejection fraction is 20-25%  (severely reduced).  The right ventricle is normal size.  Global right ventricular function is moderately to severely reduced.  No pericardial effusion is present.  ______________________________________________________________________________  Left Ventricle  Left ventricular function is decreased. The ejection fraction is 20-25%  (severely reduced).     Right Ventricle  The right ventricle is normal size. Global right ventricular function is  moderately to severely reduced.     Mitral Valve  Mild mitral insufficiency is present.     Tricuspid Valve  Mild tricuspid insufficiency is present. The right ventricular systolic  pressure is approximated at 43.8 mmHg plus the right atrial pressure.     Pericardium  No pericardial effusion is present.  ______________________________________________________________________________  Doppler Measurements & Calculations  TR max kimberly: 330.8 cm/sec  TR max P.8 mmHg     ______________________________________________________________________________  Report approved by: Dara Cardona 2024 10:08 AM         XR Chest Port 1 View    Narrative    EXAM: XR CHEST PORT 1 VIEW  2024 3:39 PM      HISTORY: Hypoxia    COMPARISON: Same-day radiograph    FINDINGS: Single view of the chest. The endotracheal tube tip projects  of the mid thoracic trachea. Esophageal temperature probe tip projects  in the mid thoracic esophagus. Left IJ approach Jay-Sha catheter  with tip projecting over the right pulmonary artery. Right IJ CVC with  tip  projecting over the low SVC. Gastric tube courses outside the  field-of-view.    Trachea is midline. Stable enlarged cardiac silhouette. Continued  perihilar and bibasilar mixed pulmonary opacities. Slight increase in  right lower zone opacities. No discernible pneumothorax. Left  costophrenic angle is not completely included.      Impression    IMPRESSION:     1. Slight increased right basilar opacities, may represent increasing  edema, infiltrate or aspiration.  2. Stable support devices and similar streaky perihilar opacities    I have personally reviewed the examination and initial interpretation  and I agree with the findings.    DAKSHA MARQUEZ MD         SYSTEM ID:  [id number]   XR Chest Port 1 View    Narrative    Exam: XR CHEST PORT 1 VIEW, 8/24/2024 3:49 AM    Indication: intubated, cardiogenic shock    Comparison: X-ray chest 8/23/2024, multiple priors.    Findings:   Frontal radiograph of the chest, endotracheal tube tip terminates in  the midthoracic trachea approximately 4.8 cm above the mauri. Stable  placement of esophageal temperature probe, gastric tube, left IJ  Telford-Sha catheter, right IJ central venous catheter.    Trachea remains midline, heart size and shape is unchanged. Low lung  volumes, decreased from prior. Small left, trace right pleural  effusions. No definitive pneumothorax. Decreased right basilar hazy  and streaky opacities, no dense consolidations.      Impression    Impression:     1. Stable support devices, endotracheal tube tip is in the mid  thoracic trachea.  2. Decreased lung volumes compared to immediately prior chest x-ray  with decreased right basilar opacities. No new focal consolidations.  3. Small left pleural effusion    I have personally reviewed the examination and initial interpretation  and I agree with the findings.    DAKSHA MARQUEZ MD         SYSTEM ID:  [id number]     TTE 8/21/24:  Interpretation Summary  Tachycardia at 144BPM during study.  Mild left  ventricular dilation is present.LVIDd 58mm.  Biplane LVEF is 16%.  Severe hypokinesis of mid to apical RV free wall.  Pulmonary artery systolic pressure is normal.  The inferior vena cava is normal.  No pericardial effusion is present.  There is no prior study for direct comparison.    RHC 8/21/24:  Conclusion         Right sided filling pressures are normal.    Left sided filling pressures are mildly elevated.    Mild elevated pulmonary hypertension.    Reduced cardiac output level.    Successful endomyocardial biopsy. Results pending.    Fluoroscopy was used to visualize the left internal jugular vein.     - Elevated left sided filling pressures with mildly reduced cardiac index   - Uncomplicated EMB with 4 samples collected         Hemodynamics    RIGHT HEART CATHETERIZATION:    === Off pressors vented Fio2 80% ====    /71/87 mmHg  RA mean of 6 mmHg with a V wave of 8 mmhg  PA 30/23/26 mmHg  PCWP 18 mmHg with a V wave of 20 mmhg  Wayne CO/CI 5.0/2.1 L/min/m2   PVR 1.6 GROVE   SVR 1270 dynes/sec/cm-5  PA sat 70%   Hgb 14.6 g/dL        Right sided filling pressures are normal. Left sided filling pressures are mildly elevated. Mild elevated pulmonary hypertension. Reduced cardiac output level.

## 2024-08-25 ENCOUNTER — APPOINTMENT (OUTPATIENT)
Dept: GENERAL RADIOLOGY | Facility: CLINIC | Age: 24
DRG: 853 | End: 2024-08-25
Attending: STUDENT IN AN ORGANIZED HEALTH CARE EDUCATION/TRAINING PROGRAM
Payer: MEDICARE

## 2024-08-25 LAB
ALBUMIN SERPL BCG-MCNC: 3.5 G/DL (ref 3.5–5.2)
ALBUMIN SERPL BCG-MCNC: 3.7 G/DL (ref 3.5–5.2)
ALBUMIN SERPL BCG-MCNC: 4.1 G/DL (ref 3.5–5.2)
ALBUMIN SERPL BCG-MCNC: 4.3 G/DL (ref 3.5–5.2)
ALLEN'S TEST: ABNORMAL
ALP SERPL-CCNC: 101 U/L (ref 40–150)
ALP SERPL-CCNC: 67 U/L (ref 40–150)
ALP SERPL-CCNC: 73 U/L (ref 40–150)
ALP SERPL-CCNC: 95 U/L (ref 40–150)
ALT SERPL W P-5'-P-CCNC: 163 U/L (ref 0–70)
ALT SERPL W P-5'-P-CCNC: 205 U/L (ref 0–70)
ALT SERPL W P-5'-P-CCNC: 364 U/L (ref 0–70)
ALT SERPL W P-5'-P-CCNC: 410 U/L (ref 0–70)
ANION GAP SERPL CALCULATED.3IONS-SCNC: 10 MMOL/L (ref 7–15)
ANION GAP SERPL CALCULATED.3IONS-SCNC: 12 MMOL/L (ref 7–15)
ANION GAP SERPL CALCULATED.3IONS-SCNC: 12 MMOL/L (ref 7–15)
ANION GAP SERPL CALCULATED.3IONS-SCNC: 15 MMOL/L (ref 7–15)
ANION GAP SERPL CALCULATED.3IONS-SCNC: 17 MMOL/L (ref 7–15)
AST SERPL W P-5'-P-CCNC: 112 U/L (ref 0–45)
AST SERPL W P-5'-P-CCNC: 256 U/L (ref 0–45)
AST SERPL W P-5'-P-CCNC: 269 U/L (ref 0–45)
AST SERPL W P-5'-P-CCNC: 65 U/L (ref 0–45)
BACTERIA BLD CULT: NO GROWTH
BASE EXCESS BLDA CALC-SCNC: 13.5 MMOL/L (ref -3–3)
BASE EXCESS BLDA CALC-SCNC: 3.8 MMOL/L (ref -3–3)
BASE EXCESS BLDA CALC-SCNC: 5 MMOL/L (ref -3–3)
BASE EXCESS BLDA CALC-SCNC: 8.7 MMOL/L (ref -3–3)
BASE EXCESS BLDA CALC-SCNC: 9.5 MMOL/L (ref -3–3)
BASE EXCESS BLDV CALC-SCNC: 11.7 MMOL/L (ref -3–3)
BASE EXCESS BLDV CALC-SCNC: 15.9 MMOL/L (ref -3–3)
BASE EXCESS BLDV CALC-SCNC: 5.5 MMOL/L (ref -3–3)
BASE EXCESS BLDV CALC-SCNC: 5.6 MMOL/L (ref -3–3)
BASE EXCESS BLDV CALC-SCNC: 7.7 MMOL/L (ref -3–3)
BASOPHILS # BLD AUTO: 0 10E3/UL (ref 0–0.2)
BASOPHILS NFR BLD AUTO: 0 %
BILIRUB DIRECT SERPL-MCNC: 0.21 MG/DL (ref 0–0.3)
BILIRUB DIRECT SERPL-MCNC: 0.3 MG/DL (ref 0–0.3)
BILIRUB DIRECT SERPL-MCNC: 0.32 MG/DL (ref 0–0.3)
BILIRUB DIRECT SERPL-MCNC: <0.2 MG/DL (ref 0–0.3)
BILIRUB SERPL-MCNC: 0.3 MG/DL
BILIRUB SERPL-MCNC: 0.4 MG/DL
BILIRUB SERPL-MCNC: 0.6 MG/DL
BILIRUB SERPL-MCNC: 0.8 MG/DL
BUN SERPL-MCNC: 43.7 MG/DL (ref 6–20)
BUN SERPL-MCNC: 43.9 MG/DL (ref 6–20)
BUN SERPL-MCNC: 46.9 MG/DL (ref 6–20)
BUN SERPL-MCNC: 47.8 MG/DL (ref 6–20)
BUN SERPL-MCNC: 47.9 MG/DL (ref 6–20)
CA-I BLD-MCNC: 4.3 MG/DL (ref 4.4–5.2)
CA-I BLD-MCNC: 4.4 MG/DL (ref 4.4–5.2)
CALCIUM SERPL-MCNC: 8.2 MG/DL (ref 8.8–10.4)
CALCIUM SERPL-MCNC: 8.4 MG/DL (ref 8.8–10.4)
CALCIUM SERPL-MCNC: 8.5 MG/DL (ref 8.8–10.4)
CALCIUM SERPL-MCNC: 9.1 MG/DL (ref 8.8–10.4)
CALCIUM SERPL-MCNC: 9.6 MG/DL (ref 8.8–10.4)
CHLORIDE SERPL-SCNC: 105 MMOL/L (ref 98–107)
CHLORIDE SERPL-SCNC: 110 MMOL/L (ref 98–107)
CHLORIDE SERPL-SCNC: 114 MMOL/L (ref 98–107)
CHLORIDE SERPL-SCNC: 116 MMOL/L (ref 98–107)
CHLORIDE SERPL-SCNC: 119 MMOL/L (ref 98–107)
COHGB MFR BLD: 100 % (ref 96–97)
COHGB MFR BLD: 100 % (ref 96–97)
COHGB MFR BLD: 98.6 % (ref 96–97)
COHGB MFR BLD: 99.6 % (ref 96–97)
COHGB MFR BLD: >100 % (ref 96–97)
CREAT SERPL-MCNC: 0.85 MG/DL (ref 0.67–1.17)
CREAT SERPL-MCNC: 0.87 MG/DL (ref 0.67–1.17)
CREAT SERPL-MCNC: 0.89 MG/DL (ref 0.67–1.17)
CREAT SERPL-MCNC: 1.08 MG/DL (ref 0.67–1.17)
CREAT SERPL-MCNC: 1.09 MG/DL (ref 0.67–1.17)
EGFRCR SERPLBLD CKD-EPI 2021: >90 ML/MIN/1.73M2
EOSINOPHIL # BLD AUTO: 0 10E3/UL (ref 0–0.7)
EOSINOPHIL NFR BLD AUTO: 0 %
ERYTHROCYTE [DISTWIDTH] IN BLOOD BY AUTOMATED COUNT: 13.5 % (ref 10–15)
ERYTHROCYTE [DISTWIDTH] IN BLOOD BY AUTOMATED COUNT: 13.6 % (ref 10–15)
GLUCOSE BLDC GLUCOMTR-MCNC: 106 MG/DL (ref 70–99)
GLUCOSE BLDC GLUCOMTR-MCNC: 123 MG/DL (ref 70–99)
GLUCOSE BLDC GLUCOMTR-MCNC: 131 MG/DL (ref 70–99)
GLUCOSE BLDC GLUCOMTR-MCNC: 131 MG/DL (ref 70–99)
GLUCOSE BLDC GLUCOMTR-MCNC: 145 MG/DL (ref 70–99)
GLUCOSE BLDC GLUCOMTR-MCNC: 153 MG/DL (ref 70–99)
GLUCOSE BLDC GLUCOMTR-MCNC: 154 MG/DL (ref 70–99)
GLUCOSE BLDC GLUCOMTR-MCNC: 155 MG/DL (ref 70–99)
GLUCOSE BLDC GLUCOMTR-MCNC: 157 MG/DL (ref 70–99)
GLUCOSE BLDC GLUCOMTR-MCNC: 160 MG/DL (ref 70–99)
GLUCOSE BLDC GLUCOMTR-MCNC: 160 MG/DL (ref 70–99)
GLUCOSE BLDC GLUCOMTR-MCNC: 167 MG/DL (ref 70–99)
GLUCOSE BLDC GLUCOMTR-MCNC: 168 MG/DL (ref 70–99)
GLUCOSE BLDC GLUCOMTR-MCNC: 171 MG/DL (ref 70–99)
GLUCOSE BLDC GLUCOMTR-MCNC: 174 MG/DL (ref 70–99)
GLUCOSE BLDC GLUCOMTR-MCNC: 180 MG/DL (ref 70–99)
GLUCOSE BLDC GLUCOMTR-MCNC: 192 MG/DL (ref 70–99)
GLUCOSE BLDC GLUCOMTR-MCNC: 202 MG/DL (ref 70–99)
GLUCOSE BLDC GLUCOMTR-MCNC: 210 MG/DL (ref 70–99)
GLUCOSE BLDC GLUCOMTR-MCNC: 212 MG/DL (ref 70–99)
GLUCOSE SERPL-MCNC: 112 MG/DL (ref 70–99)
GLUCOSE SERPL-MCNC: 137 MG/DL (ref 70–99)
GLUCOSE SERPL-MCNC: 156 MG/DL (ref 70–99)
GLUCOSE SERPL-MCNC: 165 MG/DL (ref 70–99)
GLUCOSE SERPL-MCNC: 222 MG/DL (ref 70–99)
HCO3 BLD-SCNC: 28 MMOL/L (ref 21–28)
HCO3 BLD-SCNC: 29 MMOL/L (ref 21–28)
HCO3 BLD-SCNC: 34 MMOL/L (ref 21–28)
HCO3 BLD-SCNC: 34 MMOL/L (ref 21–28)
HCO3 BLD-SCNC: 37 MMOL/L (ref 21–28)
HCO3 BLDV-SCNC: 31 MMOL/L (ref 21–28)
HCO3 BLDV-SCNC: 31 MMOL/L (ref 21–28)
HCO3 BLDV-SCNC: 35 MMOL/L (ref 21–28)
HCO3 BLDV-SCNC: 38 MMOL/L (ref 21–28)
HCO3 BLDV-SCNC: 42 MMOL/L (ref 21–28)
HCO3 SERPL-SCNC: 25 MMOL/L (ref 22–29)
HCO3 SERPL-SCNC: 30 MMOL/L (ref 22–29)
HCO3 SERPL-SCNC: 32 MMOL/L (ref 22–29)
HCO3 SERPL-SCNC: 32 MMOL/L (ref 22–29)
HCO3 SERPL-SCNC: 34 MMOL/L (ref 22–29)
HCT VFR BLD AUTO: 38.2 % (ref 40–53)
HCT VFR BLD AUTO: 38.6 % (ref 40–53)
HCT VFR BLD AUTO: 43.4 % (ref 40–53)
HCT VFR BLD AUTO: 45.2 % (ref 40–53)
HGB BLD-MCNC: 11.9 G/DL (ref 13.3–17.7)
HGB BLD-MCNC: 11.9 G/DL (ref 13.3–17.7)
HGB BLD-MCNC: 13.7 G/DL (ref 13.3–17.7)
HGB BLD-MCNC: 14.6 G/DL (ref 13.3–17.7)
IMM GRANULOCYTES # BLD: 0.1 10E3/UL
IMM GRANULOCYTES # BLD: 0.2 10E3/UL
IMM GRANULOCYTES # BLD: 0.3 10E3/UL
IMM GRANULOCYTES # BLD: 0.3 10E3/UL
IMM GRANULOCYTES NFR BLD: 1 %
IMM GRANULOCYTES NFR BLD: 1 %
IMM GRANULOCYTES NFR BLD: 2 %
IMM GRANULOCYTES NFR BLD: 2 %
LACTATE SERPL-SCNC: 1.7 MMOL/L (ref 0.7–2)
LACTATE SERPL-SCNC: 1.7 MMOL/L (ref 0.7–2)
LACTATE SERPL-SCNC: 2 MMOL/L (ref 0.7–2)
LACTATE SERPL-SCNC: 2.2 MMOL/L (ref 0.7–2)
LACTATE SERPL-SCNC: 2.3 MMOL/L (ref 0.7–2)
LYMPHOCYTES # BLD AUTO: 0.6 10E3/UL (ref 0.8–5.3)
LYMPHOCYTES # BLD AUTO: 1.1 10E3/UL (ref 0.8–5.3)
LYMPHOCYTES # BLD AUTO: 1.2 10E3/UL (ref 0.8–5.3)
LYMPHOCYTES # BLD AUTO: 1.6 10E3/UL (ref 0.8–5.3)
LYMPHOCYTES NFR BLD AUTO: 10 %
LYMPHOCYTES NFR BLD AUTO: 3 %
LYMPHOCYTES NFR BLD AUTO: 7 %
LYMPHOCYTES NFR BLD AUTO: 9 %
MAGNESIUM SERPL-MCNC: 2.5 MG/DL (ref 1.7–2.3)
MAGNESIUM SERPL-MCNC: 2.7 MG/DL (ref 1.7–2.3)
MAGNESIUM SERPL-MCNC: 2.8 MG/DL (ref 1.7–2.3)
MAGNESIUM SERPL-MCNC: 2.9 MG/DL (ref 1.7–2.3)
MCH RBC QN AUTO: 28.4 PG (ref 26.5–33)
MCH RBC QN AUTO: 28.5 PG (ref 26.5–33)
MCH RBC QN AUTO: 28.5 PG (ref 26.5–33)
MCH RBC QN AUTO: 28.6 PG (ref 26.5–33)
MCHC RBC AUTO-ENTMCNC: 30.8 G/DL (ref 31.5–36.5)
MCHC RBC AUTO-ENTMCNC: 31.2 G/DL (ref 31.5–36.5)
MCHC RBC AUTO-ENTMCNC: 31.6 G/DL (ref 31.5–36.5)
MCHC RBC AUTO-ENTMCNC: 32.3 G/DL (ref 31.5–36.5)
MCV RBC AUTO: 89 FL (ref 78–100)
MCV RBC AUTO: 90 FL (ref 78–100)
MCV RBC AUTO: 91 FL (ref 78–100)
MCV RBC AUTO: 92 FL (ref 78–100)
MONOCYTES # BLD AUTO: 0.9 10E3/UL (ref 0–1.3)
MONOCYTES # BLD AUTO: 1.4 10E3/UL (ref 0–1.3)
MONOCYTES # BLD AUTO: 1.5 10E3/UL (ref 0–1.3)
MONOCYTES # BLD AUTO: 1.6 10E3/UL (ref 0–1.3)
MONOCYTES NFR BLD AUTO: 8 %
MONOCYTES NFR BLD AUTO: 8 %
MONOCYTES NFR BLD AUTO: 9 %
MONOCYTES NFR BLD AUTO: 9 %
NEUTROPHILS # BLD AUTO: 13.4 10E3/UL (ref 1.6–8.3)
NEUTROPHILS # BLD AUTO: 15 10E3/UL (ref 1.6–8.3)
NEUTROPHILS # BLD AUTO: 16 10E3/UL (ref 1.6–8.3)
NEUTROPHILS # BLD AUTO: 9.4 10E3/UL (ref 1.6–8.3)
NEUTROPHILS NFR BLD AUTO: 80 %
NEUTROPHILS NFR BLD AUTO: 81 %
NEUTROPHILS NFR BLD AUTO: 83 %
NEUTROPHILS NFR BLD AUTO: 87 %
NRBC # BLD AUTO: 0 10E3/UL
NRBC BLD AUTO-RTO: 0 /100
O2/TOTAL GAS SETTING VFR VENT: 40 %
O2/TOTAL GAS SETTING VFR VENT: 50 %
OXYHGB MFR BLDV: 50 % (ref 70–75)
OXYHGB MFR BLDV: 57 % (ref 70–75)
OXYHGB MFR BLDV: 59 % (ref 70–75)
OXYHGB MFR BLDV: 60 % (ref 70–75)
OXYHGB MFR BLDV: 63 % (ref 70–75)
PCO2 BLD: 39 MM HG (ref 35–45)
PCO2 BLD: 40 MM HG (ref 35–45)
PCO2 BLD: 41 MM HG (ref 35–45)
PCO2 BLD: 46 MM HG (ref 35–45)
PCO2 BLD: 47 MM HG (ref 35–45)
PCO2 BLDV: 46 MM HG (ref 40–50)
PCO2 BLDV: 50 MM HG (ref 40–50)
PCO2 BLDV: 51 MM HG (ref 40–50)
PCO2 BLDV: 57 MM HG (ref 40–50)
PCO2 BLDV: 58 MM HG (ref 40–50)
PEEP: 10 CM H2O
PEEP: 8 CM H2O
PEEP: 8 CM H2O
PH BLD: 7.46 [PH] (ref 7.35–7.45)
PH BLD: 7.46 [PH] (ref 7.35–7.45)
PH BLD: 7.48 [PH] (ref 7.35–7.45)
PH BLD: 7.48 [PH] (ref 7.35–7.45)
PH BLD: 7.56 [PH] (ref 7.35–7.45)
PH BLDV: 7.39 [PH] (ref 7.32–7.43)
PH BLDV: 7.41 [PH] (ref 7.32–7.43)
PH BLDV: 7.43 [PH] (ref 7.32–7.43)
PH BLDV: 7.44 [PH] (ref 7.32–7.43)
PH BLDV: 7.52 [PH] (ref 7.32–7.43)
PHOSPHATE SERPL-MCNC: 5.1 MG/DL (ref 2.5–4.5)
PHOSPHATE SERPL-MCNC: 5.7 MG/DL (ref 2.5–4.5)
PHOSPHATE SERPL-MCNC: 6.4 MG/DL (ref 2.5–4.5)
PHOSPHATE SERPL-MCNC: 6.5 MG/DL (ref 2.5–4.5)
PLATELET # BLD AUTO: 229 10E3/UL (ref 150–450)
PLATELET # BLD AUTO: 289 10E3/UL (ref 150–450)
PLATELET # BLD AUTO: 310 10E3/UL (ref 150–450)
PLATELET # BLD AUTO: 318 10E3/UL (ref 150–450)
PO2 BLD: 121 MM HG (ref 80–105)
PO2 BLD: 123 MM HG (ref 80–105)
PO2 BLD: 136 MM HG (ref 80–105)
PO2 BLD: 198 MM HG (ref 80–105)
PO2 BLD: 92 MM HG (ref 80–105)
PO2 BLDV: 30 MM HG (ref 25–47)
PO2 BLDV: 32 MM HG (ref 25–47)
PO2 BLDV: 34 MM HG (ref 25–47)
PO2 BLDV: 38 MM HG (ref 25–47)
PO2 BLDV: 61 MM HG (ref 25–47)
POTASSIUM SERPL-SCNC: 3.3 MMOL/L (ref 3.4–5.3)
POTASSIUM SERPL-SCNC: 3.4 MMOL/L (ref 3.4–5.3)
POTASSIUM SERPL-SCNC: 3.7 MMOL/L (ref 3.4–5.3)
POTASSIUM SERPL-SCNC: 3.7 MMOL/L (ref 3.4–5.3)
POTASSIUM SERPL-SCNC: 4.2 MMOL/L (ref 3.4–5.3)
POTASSIUM SERPL-SCNC: 4.7 MMOL/L (ref 3.4–5.3)
PROT SERPL-MCNC: 5.9 G/DL (ref 6.4–8.3)
PROT SERPL-MCNC: 6.2 G/DL (ref 6.4–8.3)
PROT SERPL-MCNC: 6.8 G/DL (ref 6.4–8.3)
PROT SERPL-MCNC: 7.3 G/DL (ref 6.4–8.3)
RBC # BLD AUTO: 4.18 10E6/UL (ref 4.4–5.9)
RBC # BLD AUTO: 4.19 10E6/UL (ref 4.4–5.9)
RBC # BLD AUTO: 4.8 10E6/UL (ref 4.4–5.9)
RBC # BLD AUTO: 5.11 10E6/UL (ref 4.4–5.9)
SAO2 % BLDA: 97 % (ref 92–100)
SAO2 % BLDA: 98 % (ref 92–100)
SAO2 % BLDA: 99 % (ref 92–100)
SAO2 % BLDV: 50.5 % (ref 70–75)
SAO2 % BLDV: 58.4 % (ref 70–75)
SAO2 % BLDV: 60.1 % (ref 70–75)
SAO2 % BLDV: 61.4 % (ref 70–75)
SAO2 % BLDV: 64.3 % (ref 70–75)
SODIUM SERPL-SCNC: 156 MMOL/L (ref 135–145)
SODIUM SERPL-SCNC: 157 MMOL/L (ref 135–145)
SODIUM SERPL-SCNC: 158 MMOL/L (ref 135–145)
SODIUM SERPL-SCNC: 159 MMOL/L (ref 135–145)
SODIUM SERPL-SCNC: 161 MMOL/L (ref 135–145)
SODIUM SERPL-SCNC: 162 MMOL/L (ref 135–145)
WBC # BLD AUTO: 11.6 10E3/UL (ref 4–11)
WBC # BLD AUTO: 16.3 10E3/UL (ref 4–11)
WBC # BLD AUTO: 18.4 10E3/UL (ref 4–11)
WBC # BLD AUTO: 18.5 10E3/UL (ref 4–11)

## 2024-08-25 PROCEDURE — 71045 X-RAY EXAM CHEST 1 VIEW: CPT

## 2024-08-25 PROCEDURE — 87040 BLOOD CULTURE FOR BACTERIA: CPT | Performed by: INTERNAL MEDICINE

## 2024-08-25 PROCEDURE — 250N000011 HC RX IP 250 OP 636: Performed by: INTERNAL MEDICINE

## 2024-08-25 PROCEDURE — 250N000011 HC RX IP 250 OP 636: Performed by: STUDENT IN AN ORGANIZED HEALTH CARE EDUCATION/TRAINING PROGRAM

## 2024-08-25 PROCEDURE — 82805 BLOOD GASES W/O2 SATURATION: CPT | Performed by: STUDENT IN AN ORGANIZED HEALTH CARE EDUCATION/TRAINING PROGRAM

## 2024-08-25 PROCEDURE — 82248 BILIRUBIN DIRECT: CPT | Performed by: STUDENT IN AN ORGANIZED HEALTH CARE EDUCATION/TRAINING PROGRAM

## 2024-08-25 PROCEDURE — 84100 ASSAY OF PHOSPHORUS: CPT | Performed by: STUDENT IN AN ORGANIZED HEALTH CARE EDUCATION/TRAINING PROGRAM

## 2024-08-25 PROCEDURE — 99207 PR NO BILLABLE SERVICE THIS VISIT: CPT | Performed by: INTERNAL MEDICINE

## 2024-08-25 PROCEDURE — 80048 BASIC METABOLIC PNL TOTAL CA: CPT | Performed by: STUDENT IN AN ORGANIZED HEALTH CARE EDUCATION/TRAINING PROGRAM

## 2024-08-25 PROCEDURE — 83605 ASSAY OF LACTIC ACID: CPT | Performed by: STUDENT IN AN ORGANIZED HEALTH CARE EDUCATION/TRAINING PROGRAM

## 2024-08-25 PROCEDURE — 99291 CRITICAL CARE FIRST HOUR: CPT | Mod: GC | Performed by: STUDENT IN AN ORGANIZED HEALTH CARE EDUCATION/TRAINING PROGRAM

## 2024-08-25 PROCEDURE — 200N000002 HC R&B ICU UMMC

## 2024-08-25 PROCEDURE — 82805 BLOOD GASES W/O2 SATURATION: CPT

## 2024-08-25 PROCEDURE — 250N000009 HC RX 250: Performed by: STUDENT IN AN ORGANIZED HEALTH CARE EDUCATION/TRAINING PROGRAM

## 2024-08-25 PROCEDURE — 36415 COLL VENOUS BLD VENIPUNCTURE: CPT | Performed by: INTERNAL MEDICINE

## 2024-08-25 PROCEDURE — 83735 ASSAY OF MAGNESIUM: CPT | Performed by: STUDENT IN AN ORGANIZED HEALTH CARE EDUCATION/TRAINING PROGRAM

## 2024-08-25 PROCEDURE — 250N000012 HC RX MED GY IP 250 OP 636 PS 637

## 2024-08-25 PROCEDURE — 71045 X-RAY EXAM CHEST 1 VIEW: CPT | Mod: 26 | Performed by: RADIOLOGY

## 2024-08-25 PROCEDURE — 84132 ASSAY OF SERUM POTASSIUM: CPT | Performed by: STUDENT IN AN ORGANIZED HEALTH CARE EDUCATION/TRAINING PROGRAM

## 2024-08-25 PROCEDURE — 87106 FUNGI IDENTIFICATION YEAST: CPT | Performed by: INTERNAL MEDICINE

## 2024-08-25 PROCEDURE — 250N000013 HC RX MED GY IP 250 OP 250 PS 637

## 2024-08-25 PROCEDURE — 94799 UNLISTED PULMONARY SVC/PX: CPT

## 2024-08-25 PROCEDURE — 82330 ASSAY OF CALCIUM: CPT | Performed by: STUDENT IN AN ORGANIZED HEALTH CARE EDUCATION/TRAINING PROGRAM

## 2024-08-25 PROCEDURE — 3E043XZ INTRODUCTION OF VASOPRESSOR INTO CENTRAL VEIN, PERCUTANEOUS APPROACH: ICD-10-PCS | Performed by: STUDENT IN AN ORGANIZED HEALTH CARE EDUCATION/TRAINING PROGRAM

## 2024-08-25 PROCEDURE — 94003 VENT MGMT INPAT SUBQ DAY: CPT

## 2024-08-25 PROCEDURE — 250N000013 HC RX MED GY IP 250 OP 250 PS 637: Performed by: STUDENT IN AN ORGANIZED HEALTH CARE EDUCATION/TRAINING PROGRAM

## 2024-08-25 PROCEDURE — 85025 COMPLETE CBC W/AUTO DIFF WBC: CPT | Performed by: STUDENT IN AN ORGANIZED HEALTH CARE EDUCATION/TRAINING PROGRAM

## 2024-08-25 PROCEDURE — 999N000157 HC STATISTIC RCP TIME EA 10 MIN

## 2024-08-25 PROCEDURE — 250N000013 HC RX MED GY IP 250 OP 250 PS 637: Performed by: INTERNAL MEDICINE

## 2024-08-25 PROCEDURE — 250N000009 HC RX 250: Performed by: INTERNAL MEDICINE

## 2024-08-25 PROCEDURE — 80048 BASIC METABOLIC PNL TOTAL CA: CPT

## 2024-08-25 PROCEDURE — 84132 ASSAY OF SERUM POTASSIUM: CPT

## 2024-08-25 PROCEDURE — 82805 BLOOD GASES W/O2 SATURATION: CPT | Performed by: INTERNAL MEDICINE

## 2024-08-25 RX ORDER — CHLOROTHIAZIDE SODIUM 500 MG/1
500 INJECTION INTRAVENOUS ONCE
Status: COMPLETED | OUTPATIENT
Start: 2024-08-25 | End: 2024-08-25

## 2024-08-25 RX ORDER — POTASSIUM CHLORIDE 29.8 MG/ML
20 INJECTION INTRAVENOUS ONCE
Status: COMPLETED | OUTPATIENT
Start: 2024-08-25 | End: 2024-08-25

## 2024-08-25 RX ORDER — SULFAMETHOXAZOLE/TRIMETHOPRIM 800-160 MG
1 TABLET ORAL
Status: DISCONTINUED | OUTPATIENT
Start: 2024-08-26 | End: 2024-08-31

## 2024-08-25 RX ORDER — POTASSIUM CHLORIDE 29.8 MG/ML
20 INJECTION INTRAVENOUS
Status: COMPLETED | OUTPATIENT
Start: 2024-08-25 | End: 2024-08-25

## 2024-08-25 RX ORDER — CALCIUM GLUCONATE 20 MG/ML
1 INJECTION, SOLUTION INTRAVENOUS ONCE
Status: COMPLETED | OUTPATIENT
Start: 2024-08-25 | End: 2024-08-26

## 2024-08-25 RX ORDER — BUMETANIDE 0.25 MG/ML
2 INJECTION INTRAMUSCULAR; INTRAVENOUS ONCE
Status: COMPLETED | OUTPATIENT
Start: 2024-08-25 | End: 2024-08-25

## 2024-08-25 RX ORDER — POTASSIUM CHLORIDE 1.5 G/1.58G
20 POWDER, FOR SOLUTION ORAL 2 TIMES DAILY
Status: DISCONTINUED | OUTPATIENT
Start: 2024-08-25 | End: 2024-08-26

## 2024-08-25 RX ORDER — POTASSIUM CHLORIDE 1.5 G/1.58G
40 POWDER, FOR SOLUTION ORAL ONCE
Status: COMPLETED | OUTPATIENT
Start: 2024-08-25 | End: 2024-08-25

## 2024-08-25 RX ORDER — IPRATROPIUM BROMIDE AND ALBUTEROL SULFATE 2.5; .5 MG/3ML; MG/3ML
3 SOLUTION RESPIRATORY (INHALATION) EVERY 4 HOURS PRN
Status: DISCONTINUED | OUTPATIENT
Start: 2024-08-25 | End: 2024-09-19 | Stop reason: HOSPADM

## 2024-08-25 RX ORDER — CHLOROTHIAZIDE SODIUM 500 MG/1
1000 INJECTION INTRAVENOUS ONCE
Status: COMPLETED | OUTPATIENT
Start: 2024-08-25 | End: 2024-08-25

## 2024-08-25 RX ORDER — NOREPINEPHRINE BITARTRATE 0.06 MG/ML
.01-.6 INJECTION, SOLUTION INTRAVENOUS CONTINUOUS
Status: DISCONTINUED | OUTPATIENT
Start: 2024-08-25 | End: 2024-08-26

## 2024-08-25 RX ADMIN — NYSTATIN 500000 UNITS: 100000 SUSPENSION ORAL at 11:35

## 2024-08-25 RX ADMIN — POTASSIUM CHLORIDE 20 MEQ: 29.8 INJECTION, SOLUTION INTRAVENOUS at 22:29

## 2024-08-25 RX ADMIN — IPRATROPIUM BROMIDE AND ALBUTEROL SULFATE 3 ML: .5; 3 SOLUTION RESPIRATORY (INHALATION) at 05:36

## 2024-08-25 RX ADMIN — CHLOROTHIAZIDE SODIUM 1000 MG: 500 INJECTION, POWDER, LYOPHILIZED, FOR SOLUTION INTRAVENOUS at 11:34

## 2024-08-25 RX ADMIN — NYSTATIN 500000 UNITS: 100000 SUSPENSION ORAL at 08:48

## 2024-08-25 RX ADMIN — PREDNISONE 40 MG: 20 TABLET ORAL at 08:48

## 2024-08-25 RX ADMIN — NOREPINEPHRINE BITARTRATE 0.02 MCG/KG/MIN: 0.06 INJECTION, SOLUTION INTRAVENOUS at 11:50

## 2024-08-25 RX ADMIN — CHLOROTHIAZIDE SODIUM 500 MG: 500 INJECTION, POWDER, LYOPHILIZED, FOR SOLUTION INTRAVENOUS at 16:38

## 2024-08-25 RX ADMIN — DEXMEDETOMIDINE HYDROCHLORIDE IN SODIUM CHLORIDE 0.2 MCG/KG/HR: 4 INJECTION INTRAVENOUS at 06:04

## 2024-08-25 RX ADMIN — QUETIAPINE FUMARATE 25 MG: 25 TABLET ORAL at 20:54

## 2024-08-25 RX ADMIN — Medication 60 ML: at 08:49

## 2024-08-25 RX ADMIN — ENOXAPARIN SODIUM 40 MG: 40 INJECTION SUBCUTANEOUS at 16:31

## 2024-08-25 RX ADMIN — POTASSIUM CHLORIDE 40 MEQ: 1.5 POWDER, FOR SOLUTION ORAL at 13:41

## 2024-08-25 RX ADMIN — POTASSIUM CHLORIDE 20 MEQ: 1.5 POWDER, FOR SOLUTION ORAL at 20:55

## 2024-08-25 RX ADMIN — Medication 15 ML: at 08:48

## 2024-08-25 RX ADMIN — POTASSIUM CHLORIDE 20 MEQ: 29.8 INJECTION, SOLUTION INTRAVENOUS at 11:36

## 2024-08-25 RX ADMIN — POTASSIUM CHLORIDE 20 MEQ: 29.8 INJECTION, SOLUTION INTRAVENOUS at 13:38

## 2024-08-25 RX ADMIN — CHLORHEXIDINE GLUCONATE 0.12% ORAL RINSE 15 ML: 1.2 LIQUID ORAL at 08:48

## 2024-08-25 RX ADMIN — Medication 60 ML: at 20:56

## 2024-08-25 RX ADMIN — AMPICILLIN SODIUM AND SULBACTAM SODIUM 3 G: 2; 1 INJECTION, POWDER, FOR SOLUTION INTRAMUSCULAR; INTRAVENOUS at 04:33

## 2024-08-25 RX ADMIN — AMPICILLIN SODIUM AND SULBACTAM SODIUM 3 G: 2; 1 INJECTION, POWDER, FOR SOLUTION INTRAMUSCULAR; INTRAVENOUS at 16:31

## 2024-08-25 RX ADMIN — NYSTATIN 500000 UNITS: 100000 SUSPENSION ORAL at 16:31

## 2024-08-25 RX ADMIN — POTASSIUM CHLORIDE 20 MEQ: 29.8 INJECTION, SOLUTION INTRAVENOUS at 21:17

## 2024-08-25 RX ADMIN — Medication 150 MCG/HR: at 10:03

## 2024-08-25 RX ADMIN — INSULIN HUMAN 6 UNITS/HR: 1 INJECTION, SOLUTION INTRAVENOUS at 16:29

## 2024-08-25 RX ADMIN — BUMETANIDE 2 MG: 0.25 INJECTION INTRAMUSCULAR; INTRAVENOUS at 05:56

## 2024-08-25 RX ADMIN — DEXMEDETOMIDINE HYDROCHLORIDE IN SODIUM CHLORIDE 0.4 MCG/KG/HR: 4 INJECTION INTRAVENOUS at 16:29

## 2024-08-25 RX ADMIN — THIAMINE HCL TAB 100 MG 100 MG: 100 TAB at 08:48

## 2024-08-25 RX ADMIN — AMPICILLIN SODIUM AND SULBACTAM SODIUM 3 G: 2; 1 INJECTION, POWDER, FOR SOLUTION INTRAMUSCULAR; INTRAVENOUS at 10:06

## 2024-08-25 RX ADMIN — Medication 40 MG: at 08:49

## 2024-08-25 RX ADMIN — CALCIUM GLUCONATE 1 G: 20 INJECTION, SOLUTION INTRAVENOUS at 23:21

## 2024-08-25 RX ADMIN — QUETIAPINE FUMARATE 25 MG: 25 TABLET ORAL at 08:48

## 2024-08-25 RX ADMIN — POTASSIUM CHLORIDE 20 MEQ: 29.8 INJECTION, SOLUTION INTRAVENOUS at 02:25

## 2024-08-25 RX ADMIN — Medication 50 MCG: at 10:05

## 2024-08-25 RX ADMIN — ACETAMINOPHEN 650 MG: 325 TABLET ORAL at 20:55

## 2024-08-25 RX ADMIN — NYSTATIN 500000 UNITS: 100000 SUSPENSION ORAL at 20:49

## 2024-08-25 RX ADMIN — CHLORHEXIDINE GLUCONATE 0.12% ORAL RINSE 15 ML: 1.2 LIQUID ORAL at 20:49

## 2024-08-25 RX ADMIN — ISOSORBIDE DINITRATE 40 MG: 40 TABLET ORAL at 11:35

## 2024-08-25 ASSESSMENT — ACTIVITIES OF DAILY LIVING (ADL)
ADLS_ACUITY_SCORE: 44
ADLS_ACUITY_SCORE: 49
ADLS_ACUITY_SCORE: 52
ADLS_ACUITY_SCORE: 52
ADLS_ACUITY_SCORE: 48
ADLS_ACUITY_SCORE: 48
ADLS_ACUITY_SCORE: 52
ADLS_ACUITY_SCORE: 49
ADLS_ACUITY_SCORE: 48
ADLS_ACUITY_SCORE: 44
ADLS_ACUITY_SCORE: 52
ADLS_ACUITY_SCORE: 49
ADLS_ACUITY_SCORE: 48
ADLS_ACUITY_SCORE: 49
ADLS_ACUITY_SCORE: 52
ADLS_ACUITY_SCORE: 49

## 2024-08-25 NOTE — PROGRESS NOTES
Cardiology ICU Progress Note  Date of Service: 08/25/2024  Patient: Terrance Hidalgo  MRN: [id number]  Admission Date: 8/20/2024  Hospital Day: 5             ASSESSMENT AND PLAN   23 yo M with PMH autism who initially presented to [place] with cough, URI symptoms, and hypoxemia. He was intubated due to respiratory failure and transferred to [facility], where his hospital course transpired to mixed cardiogenic/septic shock requiring multiple pressor agents, inotropic support, and IV diuresis. Transferred again to [facility]ICU on 8/20 for continued management.     Presentation initially highly suspicious for fulminant myocarditis base on elevated cardiac and inflammatory biomarkers, new (suspected), severe biventricular dysfunction w/ reduced cardiac output requiring intermittent inotropic support, and concurrent infectious/respiratory symptomotology c/b respiratory failure. However, EMB 5/21 demonstrated unremarkable myocardial tissue with no evidence of inflammatory infiltrate. Unclear etiology of severe cardiomyopathy at this time. He is being actively managed with IV diuresis, steroids, broad antimicrobial coverage, and ventilatory support for oxygenation. Holding on inotropes or MCS for now. Noteworthy that his Parvovirus IgG is positive, with IgM negative. Otherwise, infectious workup which has been unremarkable thus far.    08/25: Patient is needing on and off vasopressors, for today we are dealing with hypernatremia, weaning NO.     Today's Major Updates:  - Continue Bumetanide drip at 0.5mg/h   - Diuril 1g in AM - Might add metolazone in the future   - ICU team managing the vent and NO, continue weaning NO as needed.   - NO weaning plan - by 1:17pm is at 5.   - To discuss rheumatology consult in the future.     Neuro:  Intubated and sedated.  Verzed/Precedex/Fentanyl    Plan:  Planning to drop verzed  Continue precedex  Continue fentanyl.     Respiratory/ID  # Acute hypoxic respiratory failure from  Bacterial Pneumonia  # Parvovirus IgG positive   # Aspiration Pneumonia  # Fevers   # Sepsis   CT Chest on arrival - Patchy and nodular opacity of the right upper and right middle lobe suspicious for pneumonia. Significant atelectasis with complete collapse of the left lower lobe with near total collapse of the left upper, and right lower lobes. Moderate bilateral pleural effusions extending from the base to the apices.   - Full vent support - CICU on board for management   - Pulm on board - appreciate recs  - ID on board - appreciate recs  - Bronchoscopy completed 8/21      > Consider repeat study if un improving   - Infectious workup      > Serum parvovirus IgG positive       > BC NGTD      > MRSA nares negative  - Antimicrobials       > Unasyn 8/21 - 8/26       > Cefepime 8/20 - 8/21       > Azithromycin 8/20 - 08/24       > Vanco 8/20 - 8/21  FiO2 (%): 50 %, Resp: 16, Vent Mode: CMV/AC, Resp Rate (Set): 16 breaths/min, Tidal Volume (Set, mL): 480 mL, PEEP (cm H2O): 10 cmH2O, Resp Rate (Set): 16 breaths/min, Tidal Volume (Set, mL): 480 mL, PEEP (cm H2O): 10 cmH2O     CXR: WNL     Plan:  Continue empiric antibiotic coverage.   Planning to continue weaning NO  NO at 5 for now.   Planning to wean 1 every 2h following ABG      Cardiovascular  # [place] Heart Failure with Reduced Ejection Fraction (LVEF 16%)  # Acute Biventricular Failure   # Mixed Cardiogenic/Septic Shock - Resolved   Etiology: Unknown   Baseline weight: Unknown  TTE (8/21/24): LVIDd 58mm. Biplane LVEF is 16%. Severe hypokinesis of mid to apical RV free wall.  RHC (8/21/24): RA mean 6, PA 30/23/26, PCWP 18, Wayne CO/CI 5.0/2.1, PVR 1.6, SVR 1270  EMB (8/21/24): Fragments of essentially unremarkable myocardial tissue with no evidence of inflammatory infiltrate   Coronary angiogram: None  CMR: None - will obtain prior to discharge   - Preload:        - CVP 13 mmHg this AM (unable to obtain PCWP)        - Diuretics: Bumex gtt at 0.5 mg/hr + 1mg diuril in  AM       - Goal CVP: 10-12 mmHg  - Afterload (LV):       - SVR (today): 865 (Goal 6281-0046; MAP 65-75)       - Pressors: None       - Vasodilators: Holding on afterlaod management, due to hypotension. Restarting levophed.   - Contractility:       - CO/CI (today): 5.5/2.4       - Inotropic agents: None       - Trend mixed venous blood gas and markers of end organ perfusion  - Steroids:        -  s/p 1g IV solumedrol (8/21 -8/23)        - start PO  prednisone 40 mg daily (taper by 10mg/week)        - Start bactrim, nystatin while on steroids   - MCS: None   - Anticoagulation: SubQ lovenox for DVT ppx  - Antiplatelet:None - no indication  - Statin: None - no indication  - Antiarrythmic: None - no indication  - SCD prophylaxis: None - no indication  - GDMT: Will start later in admission    Renal:  # Metabolic Alkalosis   # Hypokalemia  # Hypernatremia  - Start  Q4   - Check sodium Q4  Plan:  - BMP q4h following hypernatremia correct   - Trying to correct hypernatremia with diuril and FWF  - Replete as needed to maintain K > 4.0      GI/Nutrition  # Hyperglycemia  - Consult nutrition to start tube feeds   - Insulin gtt    Heme onc:  Following cellular lines.     Rheumatology:  Planning to work up for different rheumatological/vasculitis etiologies.     Plan of care discussed with Dr. Delgado, who agrees with above plan.    Thank you for consulting the cardiovascular services at the [facility]. Please do not hesitate to call us with any questions.     Greg Moreno MD  Cardiology Fellow           PAST MEDICAL HISTORY      No past medical history on file.  Active Problems:  Patient Active Problem List    Diagnosis Date Noted    Cardiogenic shock (H) 08/20/2024     Priority: Medium     Social History:     Family History:  No family history on file.    Medications:  Current Facility-Administered Medications   Medication Dose Route Frequency Provider Last Rate Last Admin     ampicillin-sulbactam (UNASYN) 3 g vial to attach to  mL bag  3 g Intravenous Q6H Farrah Delgado MD   3 g at 08/25/24 0433    chlorhexidine (PERIDEX) 0.12 % solution 15 mL  15 mL Mouth/Throat Q12H Sudheer Rojas MD   15 mL at 08/25/24 0848    enoxaparin ANTICOAGULANT (LOVENOX) injection 40 mg  40 mg Subcutaneous Q24H Colten Garay MD   40 mg at 08/24/24 1620    hydrALAZINE (APRESOLINE) tablet 75 mg  75 mg Oral Q8H DARCY Colten Garay MD   75 mg at 08/24/24 1504    isosorbide Dinitrate (ISORDIL) tablet 40 mg  40 mg Oral or Feeding Tube Q8H Colten Garay MD   40 mg at 08/24/24 2035    multivitamins w/minerals liquid 15 mL  15 mL Per Feeding Tube Daily Susan Yeh MD   15 mL at 08/25/24 0848    nystatin (MYCOSTATIN) suspension 500,000 Units  500,000 Units Swish & Spit 4x Daily Susan Yeh MD   500,000 Units at 08/25/24 0848    pantoprazole (PROTONIX) 2 mg/mL suspension 40 mg  40 mg Oral or Feeding Tube Daily Farrah Delgado MD   40 mg at 08/25/24 0849    polyethylene glycol (MIRALAX) Packet 17 g  17 g Oral or Feeding Tube Daily Andree Becerril, APRN CNP   17 g at 08/23/24 1049    predniSONE (DELTASONE) tablet 40 mg  40 mg Oral or Feeding Tube Daily Susan Yeh MD   40 mg at 08/25/24 0848    Followed by    [START ON 8/31/2024] predniSONE (DELTASONE) tablet 30 mg  30 mg Oral or Feeding Tube Daily Susan Yeh MD        Followed by    [START ON 9/7/2024] predniSONE (DELTASONE) tablet 20 mg  20 mg Oral or Feeding Tube Daily Susan Yeh MD        Followed by    [START ON 9/14/2024] predniSONE (DELTASONE) tablet 10 mg  10 mg Oral or Feeding Tube Daily Susan Yeh MD        Followed by    [START ON 9/21/2024] predniSONE (DELTASONE) tablet 5 mg  5 mg Oral or Feeding Tube Daily Susan Yeh MD        Prosource TF20 ENfit Compatibl EN LIQD (PROSOURCE TF20) packet 60 mL  1 packet Per Feeding Tube BID Susan Yeh MD    60 mL at 08/25/24 0849    QUEtiapine (SEROquel) tablet 25 mg  25 mg Oral or Feeding Tube BID Colten Garay MD   25 mg at 08/25/24 0848    senna-docusate (SENOKOT-S/PERICOLACE) 8.6-50 MG per tablet 1 tablet  1 tablet Oral or Feeding Tube At Bedtime Andree Becerril, APRN CNP        sodium chloride (PF) 0.9% PF flush 3 mL  3 mL Intracatheter Q8H Sudheer Rojas MD   3 mL at 08/25/24 0850    [START ON 8/26/2024] sulfamethoxazole-trimethoprim (BACTRIM DS) 800-160 MG per tablet 1 tablet  1 tablet Oral or Feeding Tube Once per day on Monday Thursday Colten Garay MD        thiamine (B-1) tablet 100 mg  100 mg Oral or Feeding Tube Daily Graciela Whittaker MD   100 mg at 08/25/24 0848       Current Facility-Administered Medications   Medication Dose Route Frequency Provider Last Rate Last Admin    bumetanide (BUMEX) 0.25 mg/mL infusion  0.5 mg/hr Intravenous Continuous Sudheer Rojas MD 2 mL/hr at 08/25/24 0800 0.5 mg/hr at 08/25/24 0800    dexmedeTOMIDine (PRECEDEX) 4 mcg/mL in sodium chloride 0.9 % 100 mL infusion  0.1-0.7 mcg/kg/hr (Dosing Weight) Intravenous Continuous Sudheer Rojas MD 5.4 mL/hr at 08/25/24 0800 0.2 mcg/kg/hr at 08/25/24 0800    dextrose 10% infusion   Intravenous Continuous PRN Colten Garay MD        [Held by provider] dextrose 5% infusion   Intravenous Continuous Sudheer Rojas MD   Stopped at 08/25/24 0522    fentaNYL (SUBLIMAZE) infusion   mcg/hr Intravenous Continuous Sudheer Rojas MD 3 mL/hr at 08/25/24 0800 150 mcg/hr at 08/25/24 0800    insulin regular (MYXREDLIN) 1 unit/mL infusion  0-24 Units/hr Intravenous Continuous Colten Garay MD 6 mL/hr at 08/25/24 0843 6 Units/hr at 08/25/24 0843    medication instruction   Does not apply Continuous PRN Sudheer Rojas MD        midazolam (VERSED) 100 mg/100 mL NS infusion - ADULT  1-8 mg/hr Intravenous Continuous Sudheer Rojas MD 2 mL/hr at 08/25/24 0800 2 mg/hr at 08/25/24 0800    nitroPRUsside  (NIPRIDE) 100 mg, sodium thiosulfate 1,000 mg in D5W 62.5 mL IV infusion (conc: 1.6 mg/mL)  0.25-2 mcg/kg/min (Dosing Weight) Intravenous Continuous Susan Yeh MD   Stopped at 08/24/24 1800    norepinephrine (LEVOPHED) 16 mg in  mL infusion MAX CONC CENTRAL LINE  0.01-0.6 mcg/kg/min (Dosing Weight) Intravenous Continuous Sudheer Rojas MD 1 mL/hr at 08/25/24 0540 0.01 mcg/kg/min at 08/25/24 0540             PHYSICAL EXAM     Temp:  [95.5  F (35.3  C)-99.7  F (37.6  C)] 97.9  F (36.6  C)  Pulse:  [] 98  Resp:  [0-23] 16  BP: (81-82)/(52-70) 81/52  MAP:  [54 mmHg-93 mmHg] 77 mmHg  Arterial Line BP: ()/(48-81) 101/65  FiO2 (%):  [40 %-60 %] 50 %  SpO2:  [90 %-100 %] 94 %    Intake/Output Summary (Last 24 hours) at 8/21/2024 1232  Last data filed at 8/21/2024 1121  Gross per 24 hour   Intake 1080.92 ml   Output 2850 ml   Net -1769.08 ml     GEN: Sedated, intubated   HEENT: No discharge  EYES: no icterus  CV: RRR, normal s1/s2, no murmurs/rubs/s3/s4, no heave.   CHEST:Mechanical breath sounds  ABD: soft, non-tender, normal active bowel sounds  : no flank/suprapubic tenderness  EXTR: pulses 2+. No clubbing, cyanosis or edema.   NEURO: alert oriented, speech fluent/appropriate, motor grossly nonfocal  PSYCH: cooperative, affect appropriate, pleasant            DIAGNOSTICS     All labs and imaging were reviewed, of note:    CMP  Recent Labs   Lab 08/25/24  0840 08/25/24  0601 08/25/24  0504 08/25/24  0421 08/25/24  0157 08/25/24  0103 08/25/24  0102 08/25/24  0036 08/24/24  2241 08/24/24 2236 08/24/24 2235 08/24/24 2138 08/24/24 2137 08/24/24 2012 08/24/24  1608 08/24/24  1533 08/24/24  1413 08/24/24  1411 08/24/24  1124 08/24/24  1011   NA  --   --   --  156*  --   --   --  159*  --  162* 159*  --   --  160*  160*  --  157*  --   --    < > 159*   POTASSIUM  --   --   --  4.2  --  3.7  --   --   --   --  4.6  --   --  3.2*  --  3.7  --  3.8  --  5.2   CHLORIDE  --   --   --  119*   --   --   --   --   --   --  119*  --   --  118*  --  115*  --   --   --  119*   CO2  --   --   --  25  --   --   --   --   --   --  29  --   --  28  --  30*  --   --   --  30*   ANIONGAP  --   --   --  12  --   --   --   --   --   --  11  --   --  14  --  12  --   --   --  10   * 154* 202* 192*  222*   < >  --    < > 171*   < >  --  141*  --    < > 190*   < > 225*   < >  --    < > 177*  190*   BUN  --   --   --  47.8*  --   --   --   --   --   --  46.3*  --   --  43.6*  --  43.0*  --   --   --  42.2*   CR  --   --   --  0.89  --   --   --   --   --   --  0.98  --   --  0.95  --  0.93  --   --   --  0.88   GFRESTIMATED  --   --   --  >90  --   --   --   --   --   --  >90  --   --  >90  --  >90  --   --   --  >90   ALEXANDER  --   --   --  8.2*  --   --   --   --   --   --  8.2*  --   --  8.3*  --  8.3*  --   --   --  8.3*   MAG  --   --   --  2.7*  --   --   --   --   --   --   --  2.7*  --   --   --  2.6*  --  2.5*  --   --    PHOS  --   --   --  5.1*  --   --   --   --   --   --   --  4.7*  --   --   --  5.8*  --  6.4*  --   --    PROTTOTAL  --   --   --  5.9*  --   --   --   --   --   --  5.8*  --   --   --   --  6.1*  6.1*  --   --   --  5.5*   ALBUMIN  --   --   --  3.5  --   --   --   --   --   --  3.6  --   --   --   --  3.7  3.7  --   --   --  3.3*   BILITOTAL  --   --   --  0.3  --   --   --   --   --   --  0.3  --   --   --   --  0.4  0.4  --   --   --  0.3   ALKPHOS  --   --   --  67  --   --   --   --   --   --  68  --   --   --   --  73  73  --   --   --  64   AST  --   --   --  65*  --   --   --   --   --   --  47*  --   --   --   --  46*  46*  --   --   --  38   ALT  --   --   --  163*  --   --   --   --   --   --  155*  --   --   --   --  165*  165*  --   --   --  152*    < > = values in this interval not displayed.     CBC  Recent Labs   Lab 08/25/24  0421 08/24/24  2138 08/24/24  1533 08/24/24  1011   WBC 16.3* 10.4 13.4* 10.0   RBC 4.19* 4.24* 4.35* 3.97*   HGB 11.9* 12.0* 12.4* 11.4*  "  HCT 38.2* 38.4* 39.7* 35.9*   MCV 91 91 91 90   MCH 28.4 28.3 28.5 28.7   MCHC 31.2* 31.3* 31.2* 31.8   RDW 13.6 13.6 13.6 13.2    256 256 240     INRNo lab results found in last 7 days.  Arterial Blood Gas  Recent Labs   Lab 08/25/24  0422 08/25/24  0421 08/25/24  0036 08/24/24  2236 08/24/24  2138 08/24/24 2135   PH 7.46*  --  7.48* 7.50*  --  7.47*   PCO2 40  --  39 39  --  43   PO2 198*  --  136* 78*  --  100   HCO3 28  --  29* 30*  --  31*   O2PER 40 50 40  40 40   < > 50    < > = values in this interval not displayed.       No results found for: \"TROPI\", \"TROPONIN\", \"TROPR\", \"TROPN\"         "

## 2024-08-25 NOTE — PLAN OF CARE
Goal Outcome Evaluation: Progressing      Plan of Care Reviewed With: patient    Overall Patient Progress: no changeOverall Patient Progress: no change    Outcome Evaluation: Pt became hypotensive after Peridex titration change, or scheduled antibiotic started around 2300. Levo started. D5 250mL bolus given to help with NA: 162.    Neuro: Sedated (RASS: 4 to -4)  Pt following commands and Pt moving all extremities. Pupils E/R/R B/L.Dex @ 0.2 mcg/kg/hr, fentanyl gtt @ 150 mcg/hr, and Versed gtt @ 2 mg/hr. Pt senitive to Dex, this is why writer left Versed on after episode were pt was scratching, kicking staff and almost removed his dockery.  CV: SR and ST and fr. PVCs. HR from 90s to 120s. Map goal 65 to 75. (Nipride never restarted).   PO HF meds held per CC. Map goal 65-75.  Levo @ 0-0.03 mcg/kg/min   Resp: Ventilator: CMV mode 50%fio2, 480TV, 16RR, 10peep. LS are clear and dim in the basesPt has thin secretions  : Dockery in for strict I+Os. Pt's UO was between 40 to 300 mL/hr.  GI: OG @ 64. OG is for enteral feeds, TF @ 50 mL/hr., and Sodiums elevated @ 162, so FWF increased to 75 mL q 1hr.  NPO.   Skin/Endo/Heme/ID:  Skin WDL.  Insulin @ Algorithm 4 and Insulin @ 10 units/hr Pt Afebrile  Plan: Team to consider NJ placement, Radial kimberly placement, [place] rethreading as waveform is poor quality.   For vital signs and complete assessments, please see documentation flowsheets.       Hemodynamics Table  Time 2130 2230 0030 0400   CVP 9 8 12 16   PA 36/21 38/22 55/35 60/40   SvO2 68 53 57 50   PaO2 100 78 136 198   CI 2.6 1.7 1.9 1.6    1201 1212 1129    Bumex stopped D5 started, Levo started   Bumex Bolus and gtt restarted

## 2024-08-25 NOTE — PLAN OF CARE
Goal Outcome Evaluation:      Plan of Care Reviewed With: patient    Overall Patient Progress: no changeOverall Patient Progress: no change    Outcome Evaluation: NO weaned to 4, Versed off, levo restarted    ICU End of Shift Summary. See flowsheets for vital signs and detailed assessment.    Changes this shift: Potassium recheck 4.2 after 40 mEq IV and 40 mEq PO. Wayne at 1600 CO 3.9, CI 1.7, sv02 60, SVR 1296. NO weaned to 4 ppm, Versed off, Precedex increased to 0.4, and Levo started due to soft pressures (70/40s) After bumex drip and diuril. Tmax 101.8 with elevated HR in 120s. Max levo 0.02 weaned to 0.01. Peep reduced to 8 from 10, RR increased to 18 and Fi02 reduced to 40%. FWF decreased to 100q2. 1500 mg Diuril given on top of Bumex drop at 0.5 with 4.6 liters out over shift.     Plan: Maintain NO at 4 for RV function.

## 2024-08-26 ENCOUNTER — APPOINTMENT (OUTPATIENT)
Dept: GENERAL RADIOLOGY | Facility: CLINIC | Age: 24
DRG: 853 | End: 2024-08-26
Attending: STUDENT IN AN ORGANIZED HEALTH CARE EDUCATION/TRAINING PROGRAM
Payer: MEDICARE

## 2024-08-26 ENCOUNTER — APPOINTMENT (OUTPATIENT)
Dept: ULTRASOUND IMAGING | Facility: CLINIC | Age: 24
DRG: 853 | End: 2024-08-26
Attending: STUDENT IN AN ORGANIZED HEALTH CARE EDUCATION/TRAINING PROGRAM
Payer: MEDICARE

## 2024-08-26 LAB
ABO/RH(D): NORMAL
ALBUMIN SERPL BCG-MCNC: 4 G/DL (ref 3.5–5.2)
ALBUMIN SERPL BCG-MCNC: 4.2 G/DL (ref 3.5–5.2)
ALBUMIN SERPL BCG-MCNC: 4.8 G/DL (ref 3.5–5.2)
ALBUMIN SERPL BCG-MCNC: 4.9 G/DL (ref 3.5–5.2)
ALBUMIN UR-MCNC: NEGATIVE MG/DL
ALLEN'S TEST: ABNORMAL
ALP SERPL-CCNC: 110 U/L (ref 40–150)
ALP SERPL-CCNC: 115 U/L (ref 40–150)
ALP SERPL-CCNC: 94 U/L (ref 40–150)
ALP SERPL-CCNC: 99 U/L (ref 40–150)
ALT SERPL W P-5'-P-CCNC: 476 U/L (ref 0–70)
ALT SERPL W P-5'-P-CCNC: 489 U/L (ref 0–70)
ALT SERPL W P-5'-P-CCNC: 599 U/L (ref 0–70)
ALT SERPL W P-5'-P-CCNC: 605 U/L (ref 0–70)
AMMONIA PLAS-SCNC: 54 UMOL/L (ref 16–60)
ANION GAP SERPL CALCULATED.3IONS-SCNC: 15 MMOL/L (ref 7–15)
ANION GAP SERPL CALCULATED.3IONS-SCNC: 16 MMOL/L (ref 7–15)
ANION GAP SERPL CALCULATED.3IONS-SCNC: 19 MMOL/L (ref 7–15)
ANION GAP SERPL CALCULATED.3IONS-SCNC: 20 MMOL/L (ref 7–15)
ANION GAP SERPL CALCULATED.3IONS-SCNC: 21 MMOL/L (ref 7–15)
ANION GAP SERPL CALCULATED.3IONS-SCNC: 22 MMOL/L (ref 7–15)
ANTIBODY SCREEN: NEGATIVE
APPEARANCE UR: CLEAR
AST SERPL W P-5'-P-CCNC: 259 U/L (ref 0–45)
AST SERPL W P-5'-P-CCNC: 263 U/L (ref 0–45)
AST SERPL W P-5'-P-CCNC: 271 U/L (ref 0–45)
AST SERPL W P-5'-P-CCNC: 317 U/L (ref 0–45)
BACTERIA #/AREA URNS HPF: ABNORMAL /HPF
BACTERIA UR CULT: NO GROWTH
BASE EXCESS BLDA CALC-SCNC: 10 MMOL/L (ref -3–3)
BASE EXCESS BLDA CALC-SCNC: 10.1 MMOL/L (ref -3–3)
BASE EXCESS BLDA CALC-SCNC: 12.1 MMOL/L (ref -3–3)
BASE EXCESS BLDA CALC-SCNC: 12.1 MMOL/L (ref -3–3)
BASE EXCESS BLDA CALC-SCNC: 12.2 MMOL/L (ref -3–3)
BASE EXCESS BLDA CALC-SCNC: 8.6 MMOL/L (ref -3–3)
BASE EXCESS BLDA CALC-SCNC: 9.6 MMOL/L (ref -3–3)
BASE EXCESS BLDV CALC-SCNC: 10.7 MMOL/L (ref -3–3)
BASE EXCESS BLDV CALC-SCNC: 11.2 MMOL/L (ref -3–3)
BASE EXCESS BLDV CALC-SCNC: 11.7 MMOL/L (ref -3–3)
BASE EXCESS BLDV CALC-SCNC: 11.9 MMOL/L (ref -3–3)
BASE EXCESS BLDV CALC-SCNC: 13.1 MMOL/L (ref -3–3)
BASE EXCESS BLDV CALC-SCNC: 15.2 MMOL/L (ref -3–3)
BASE EXCESS BLDV CALC-SCNC: 9.5 MMOL/L (ref -3–3)
BASOPHILS # BLD AUTO: 0 10E3/UL (ref 0–0.2)
BASOPHILS # BLD AUTO: 0 10E3/UL (ref 0–0.2)
BASOPHILS # BLD AUTO: 0.1 10E3/UL (ref 0–0.2)
BASOPHILS NFR BLD AUTO: 0 %
BILIRUB DIRECT SERPL-MCNC: 0.45 MG/DL (ref 0–0.3)
BILIRUB DIRECT SERPL-MCNC: 0.47 MG/DL (ref 0–0.3)
BILIRUB DIRECT SERPL-MCNC: 0.57 MG/DL (ref 0–0.3)
BILIRUB DIRECT SERPL-MCNC: 0.69 MG/DL (ref 0–0.3)
BILIRUB SERPL-MCNC: 0.9 MG/DL
BILIRUB SERPL-MCNC: 1 MG/DL
BILIRUB SERPL-MCNC: 1.2 MG/DL
BILIRUB SERPL-MCNC: 1.5 MG/DL
BILIRUB UR QL STRIP: NEGATIVE
BUN SERPL-MCNC: 57.2 MG/DL (ref 6–20)
BUN SERPL-MCNC: 59.4 MG/DL (ref 6–20)
BUN SERPL-MCNC: 60.5 MG/DL (ref 6–20)
BUN SERPL-MCNC: 61.3 MG/DL (ref 6–20)
BUN SERPL-MCNC: 64.7 MG/DL (ref 6–20)
BUN SERPL-MCNC: 73.5 MG/DL (ref 6–20)
C DIFF TOX B STL QL: NEGATIVE
CA-I BLD-MCNC: 4.4 MG/DL (ref 4.4–5.2)
CA-I BLD-MCNC: 4.5 MG/DL (ref 4.4–5.2)
CALCIUM SERPL-MCNC: 10.1 MG/DL (ref 8.8–10.4)
CALCIUM SERPL-MCNC: 9.2 MG/DL (ref 8.8–10.4)
CALCIUM SERPL-MCNC: 9.3 MG/DL (ref 8.8–10.4)
CALCIUM SERPL-MCNC: 9.7 MG/DL (ref 8.8–10.4)
CHLORIDE SERPL-SCNC: 106 MMOL/L (ref 98–107)
CHLORIDE SERPL-SCNC: 107 MMOL/L (ref 98–107)
CHLORIDE SERPL-SCNC: 108 MMOL/L (ref 98–107)
CHLORIDE SERPL-SCNC: 109 MMOL/L (ref 98–107)
CHLORIDE SERPL-SCNC: 109 MMOL/L (ref 98–107)
CHLORIDE SERPL-SCNC: 110 MMOL/L (ref 98–107)
COHGB MFR BLD: 100 % (ref 96–97)
COHGB MFR BLD: 96.2 % (ref 96–97)
COHGB MFR BLD: 98.1 % (ref 96–97)
COHGB MFR BLD: 99.1 % (ref 96–97)
COHGB MFR BLD: 99.3 % (ref 96–97)
COHGB MFR BLD: 99.5 % (ref 96–97)
COHGB MFR BLD: 99.9 % (ref 96–97)
COLOR UR AUTO: ABNORMAL
CREAT SERPL-MCNC: 1.03 MG/DL (ref 0.67–1.17)
CREAT SERPL-MCNC: 1.07 MG/DL (ref 0.67–1.17)
CREAT SERPL-MCNC: 1.13 MG/DL (ref 0.67–1.17)
CREAT SERPL-MCNC: 1.18 MG/DL (ref 0.67–1.17)
CREAT SERPL-MCNC: 1.35 MG/DL (ref 0.67–1.17)
CREAT SERPL-MCNC: 1.41 MG/DL (ref 0.67–1.17)
EGFRCR SERPLBLD CKD-EPI 2021: 71 ML/MIN/1.73M2
EGFRCR SERPLBLD CKD-EPI 2021: 75 ML/MIN/1.73M2
EGFRCR SERPLBLD CKD-EPI 2021: 88 ML/MIN/1.73M2
EGFRCR SERPLBLD CKD-EPI 2021: >90 ML/MIN/1.73M2
EOSINOPHIL # BLD AUTO: 0 10E3/UL (ref 0–0.7)
EOSINOPHIL # BLD AUTO: 0 10E3/UL (ref 0–0.7)
EOSINOPHIL # BLD AUTO: 0.1 10E3/UL (ref 0–0.7)
EOSINOPHIL NFR BLD AUTO: 0 %
ERYTHROCYTE [DISTWIDTH] IN BLOOD BY AUTOMATED COUNT: 13.5 % (ref 10–15)
ERYTHROCYTE [DISTWIDTH] IN BLOOD BY AUTOMATED COUNT: 13.6 % (ref 10–15)
ERYTHROCYTE [DISTWIDTH] IN BLOOD BY AUTOMATED COUNT: 13.7 % (ref 10–15)
GLUCOSE BLDC GLUCOMTR-MCNC: 127 MG/DL (ref 70–99)
GLUCOSE BLDC GLUCOMTR-MCNC: 136 MG/DL (ref 70–99)
GLUCOSE BLDC GLUCOMTR-MCNC: 147 MG/DL (ref 70–99)
GLUCOSE BLDC GLUCOMTR-MCNC: 148 MG/DL (ref 70–99)
GLUCOSE BLDC GLUCOMTR-MCNC: 149 MG/DL (ref 70–99)
GLUCOSE BLDC GLUCOMTR-MCNC: 155 MG/DL (ref 70–99)
GLUCOSE BLDC GLUCOMTR-MCNC: 156 MG/DL (ref 70–99)
GLUCOSE BLDC GLUCOMTR-MCNC: 160 MG/DL (ref 70–99)
GLUCOSE BLDC GLUCOMTR-MCNC: 172 MG/DL (ref 70–99)
GLUCOSE BLDC GLUCOMTR-MCNC: 182 MG/DL (ref 70–99)
GLUCOSE BLDC GLUCOMTR-MCNC: 184 MG/DL (ref 70–99)
GLUCOSE BLDC GLUCOMTR-MCNC: 190 MG/DL (ref 70–99)
GLUCOSE BLDC GLUCOMTR-MCNC: 190 MG/DL (ref 70–99)
GLUCOSE BLDC GLUCOMTR-MCNC: 194 MG/DL (ref 70–99)
GLUCOSE BLDC GLUCOMTR-MCNC: 196 MG/DL (ref 70–99)
GLUCOSE BLDC GLUCOMTR-MCNC: 199 MG/DL (ref 70–99)
GLUCOSE BLDC GLUCOMTR-MCNC: 206 MG/DL (ref 70–99)
GLUCOSE BLDC GLUCOMTR-MCNC: 216 MG/DL (ref 70–99)
GLUCOSE BLDC GLUCOMTR-MCNC: 83 MG/DL (ref 70–99)
GLUCOSE SERPL-MCNC: 168 MG/DL (ref 70–99)
GLUCOSE SERPL-MCNC: 176 MG/DL (ref 70–99)
GLUCOSE SERPL-MCNC: 184 MG/DL (ref 70–99)
GLUCOSE SERPL-MCNC: 190 MG/DL (ref 70–99)
GLUCOSE SERPL-MCNC: 226 MG/DL (ref 70–99)
GLUCOSE SERPL-MCNC: 231 MG/DL (ref 70–99)
GLUCOSE UR STRIP-MCNC: NEGATIVE MG/DL
HAV IGM SERPL QL IA: NONREACTIVE
HBV CORE IGM SERPL QL IA: NONREACTIVE
HBV SURFACE AG SERPL QL IA: NONREACTIVE
HCO3 BLD-SCNC: 32 MMOL/L (ref 21–28)
HCO3 BLD-SCNC: 33 MMOL/L (ref 21–28)
HCO3 BLD-SCNC: 33 MMOL/L (ref 21–28)
HCO3 BLD-SCNC: 34 MMOL/L (ref 21–28)
HCO3 BLD-SCNC: 35 MMOL/L (ref 21–28)
HCO3 BLD-SCNC: 35 MMOL/L (ref 21–28)
HCO3 BLD-SCNC: 36 MMOL/L (ref 21–28)
HCO3 BLDV-SCNC: 35 MMOL/L (ref 21–28)
HCO3 BLDV-SCNC: 36 MMOL/L (ref 21–28)
HCO3 BLDV-SCNC: 37 MMOL/L (ref 21–28)
HCO3 BLDV-SCNC: 38 MMOL/L (ref 21–28)
HCO3 BLDV-SCNC: 40 MMOL/L (ref 21–28)
HCO3 SERPL-SCNC: 29 MMOL/L (ref 22–29)
HCO3 SERPL-SCNC: 29 MMOL/L (ref 22–29)
HCO3 SERPL-SCNC: 30 MMOL/L (ref 22–29)
HCO3 SERPL-SCNC: 30 MMOL/L (ref 22–29)
HCO3 SERPL-SCNC: 32 MMOL/L (ref 22–29)
HCO3 SERPL-SCNC: 33 MMOL/L (ref 22–29)
HCT VFR BLD AUTO: 45.8 % (ref 40–53)
HCT VFR BLD AUTO: 45.9 % (ref 40–53)
HCT VFR BLD AUTO: 51.5 % (ref 40–53)
HCV AB SERPL QL IA: NONREACTIVE
HGB BLD-MCNC: 14.2 G/DL (ref 13.3–17.7)
HGB BLD-MCNC: 14.9 G/DL (ref 13.3–17.7)
HGB BLD-MCNC: 16.1 G/DL (ref 13.3–17.7)
HGB BLD-MCNC: 16.5 G/DL (ref 13.3–17.7)
HGB UR QL STRIP: ABNORMAL
HYALINE CASTS: 4 /LPF
IMM GRANULOCYTES # BLD: 0.2 10E3/UL
IMM GRANULOCYTES # BLD: 0.4 10E3/UL
IMM GRANULOCYTES # BLD: 0.5 10E3/UL
IMM GRANULOCYTES NFR BLD: 2 %
KETONES UR STRIP-MCNC: NEGATIVE MG/DL
LACTATE SERPL-SCNC: 1.7 MMOL/L (ref 0.7–2)
LACTATE SERPL-SCNC: 2.1 MMOL/L (ref 0.7–2)
LACTATE SERPL-SCNC: 2.3 MMOL/L (ref 0.7–2)
LACTATE SERPL-SCNC: 2.4 MMOL/L (ref 0.7–2)
LACTATE SERPL-SCNC: 2.4 MMOL/L (ref 0.7–2)
LACTATE SERPL-SCNC: 2.8 MMOL/L (ref 0.7–2)
LEUKOCYTE ESTERASE UR QL STRIP: ABNORMAL
LYMPHOCYTES # BLD AUTO: 0.9 10E3/UL (ref 0.8–5.3)
LYMPHOCYTES # BLD AUTO: 3.6 10E3/UL (ref 0.8–5.3)
LYMPHOCYTES # BLD AUTO: 3.7 10E3/UL (ref 0.8–5.3)
LYMPHOCYTES NFR BLD AUTO: 16 %
LYMPHOCYTES NFR BLD AUTO: 20 %
LYMPHOCYTES NFR BLD AUTO: 7 %
MAGNESIUM SERPL-MCNC: 2.8 MG/DL (ref 1.7–2.3)
MAGNESIUM SERPL-MCNC: 2.9 MG/DL (ref 1.7–2.3)
MAGNESIUM SERPL-MCNC: 3 MG/DL (ref 1.7–2.3)
MCH RBC QN AUTO: 27.9 PG (ref 26.5–33)
MCH RBC QN AUTO: 28.4 PG (ref 26.5–33)
MCH RBC QN AUTO: 28.7 PG (ref 26.5–33)
MCHC RBC AUTO-ENTMCNC: 31 G/DL (ref 31.5–36.5)
MCHC RBC AUTO-ENTMCNC: 31.3 G/DL (ref 31.5–36.5)
MCHC RBC AUTO-ENTMCNC: 32.5 G/DL (ref 31.5–36.5)
MCV RBC AUTO: 88 FL (ref 78–100)
MCV RBC AUTO: 89 FL (ref 78–100)
MCV RBC AUTO: 92 FL (ref 78–100)
MONOCYTES # BLD AUTO: 1 10E3/UL (ref 0–1.3)
MONOCYTES # BLD AUTO: 1.3 10E3/UL (ref 0–1.3)
MONOCYTES # BLD AUTO: 2.2 10E3/UL (ref 0–1.3)
MONOCYTES NFR BLD AUTO: 8 %
MONOCYTES NFR BLD AUTO: 8 %
MONOCYTES NFR BLD AUTO: 9 %
MRSA DNA SPEC QL NAA+PROBE: NEGATIVE
MUCOUS THREADS #/AREA URNS LPF: PRESENT /LPF
NEUTROPHILS # BLD AUTO: 10.9 10E3/UL (ref 1.6–8.3)
NEUTROPHILS # BLD AUTO: 12.6 10E3/UL (ref 1.6–8.3)
NEUTROPHILS # BLD AUTO: 16.3 10E3/UL (ref 1.6–8.3)
NEUTROPHILS NFR BLD AUTO: 70 %
NEUTROPHILS NFR BLD AUTO: 73 %
NEUTROPHILS NFR BLD AUTO: 83 %
NITRATE UR QL: NEGATIVE
NRBC # BLD AUTO: 0 10E3/UL
NRBC # BLD AUTO: 0 10E3/UL
NRBC # BLD AUTO: 0.1 10E3/UL
NRBC BLD AUTO-RTO: 0 /100
O2/TOTAL GAS SETTING VFR VENT: 40 %
OXYHGB MFR BLDV: 43 % (ref 70–75)
OXYHGB MFR BLDV: 45 % (ref 70–75)
OXYHGB MFR BLDV: 55 % (ref 70–75)
OXYHGB MFR BLDV: 59 % (ref 70–75)
OXYHGB MFR BLDV: 60 % (ref 70–75)
OXYHGB MFR BLDV: 61 % (ref 70–75)
OXYHGB MFR BLDV: 68 % (ref 70–75)
PCO2 BLD: 34 MM HG (ref 35–45)
PCO2 BLD: 36 MM HG (ref 35–45)
PCO2 BLD: 36 MM HG (ref 35–45)
PCO2 BLD: 38 MM HG (ref 35–45)
PCO2 BLD: 38 MM HG (ref 35–45)
PCO2 BLD: 42 MM HG (ref 35–45)
PCO2 BLD: 50 MM HG (ref 35–45)
PCO2 BLDV: 45 MM HG (ref 40–50)
PCO2 BLDV: 47 MM HG (ref 40–50)
PCO2 BLDV: 49 MM HG (ref 40–50)
PCO2 BLDV: 52 MM HG (ref 40–50)
PCO2 BLDV: 62 MM HG (ref 40–50)
PEEP: 5 CM H2O
PEEP: 8 CM H2O
PH BLD: 7.45 [PH] (ref 7.35–7.45)
PH BLD: 7.54 [PH] (ref 7.35–7.45)
PH BLD: 7.55 [PH] (ref 7.35–7.45)
PH BLD: 7.56 [PH] (ref 7.35–7.45)
PH BLD: 7.57 [PH] (ref 7.35–7.45)
PH BLD: 7.58 [PH] (ref 7.35–7.45)
PH BLD: 7.59 [PH] (ref 7.35–7.45)
PH BLDV: 7.38 [PH] (ref 7.32–7.43)
PH BLDV: 7.46 [PH] (ref 7.32–7.43)
PH BLDV: 7.5 [PH] (ref 7.32–7.43)
PH BLDV: 7.51 [PH] (ref 7.32–7.43)
PH BLDV: 7.52 [PH] (ref 7.32–7.43)
PH BLDV: 7.52 [PH] (ref 7.32–7.43)
PH BLDV: 7.54 [PH] (ref 7.32–7.43)
PH UR STRIP: 5 [PH] (ref 5–7)
PHOSPHATE SERPL-MCNC: 5 MG/DL (ref 2.5–4.5)
PHOSPHATE SERPL-MCNC: 5.2 MG/DL (ref 2.5–4.5)
PHOSPHATE SERPL-MCNC: 6.1 MG/DL (ref 2.5–4.5)
PLATELET # BLD AUTO: 251 10E3/UL (ref 150–450)
PLATELET # BLD AUTO: 269 10E3/UL (ref 150–450)
PLATELET # BLD AUTO: 344 10E3/UL (ref 150–450)
PO2 BLD: 106 MM HG (ref 80–105)
PO2 BLD: 119 MM HG (ref 80–105)
PO2 BLD: 120 MM HG (ref 80–105)
PO2 BLD: 121 MM HG (ref 80–105)
PO2 BLD: 177 MM HG (ref 80–105)
PO2 BLD: 75 MM HG (ref 80–105)
PO2 BLD: 97 MM HG (ref 80–105)
PO2 BLDV: 27 MM HG (ref 25–47)
PO2 BLDV: 29 MM HG (ref 25–47)
PO2 BLDV: 32 MM HG (ref 25–47)
PO2 BLDV: 33 MM HG (ref 25–47)
PO2 BLDV: 34 MM HG (ref 25–47)
PO2 BLDV: 34 MM HG (ref 25–47)
PO2 BLDV: 38 MM HG (ref 25–47)
POTASSIUM SERPL-SCNC: 3.1 MMOL/L (ref 3.4–5.3)
POTASSIUM SERPL-SCNC: 3.3 MMOL/L (ref 3.4–5.3)
POTASSIUM SERPL-SCNC: 3.6 MMOL/L (ref 3.4–5.3)
POTASSIUM SERPL-SCNC: 4.6 MMOL/L (ref 3.4–5.3)
POTASSIUM SERPL-SCNC: 4.9 MMOL/L (ref 3.4–5.3)
PROCALCITONIN SERPL IA-MCNC: 0.35 NG/ML
PROT SERPL-MCNC: 6.9 G/DL (ref 6.4–8.3)
PROT SERPL-MCNC: 7.3 G/DL (ref 6.4–8.3)
PROT SERPL-MCNC: 8.5 G/DL (ref 6.4–8.3)
PROT SERPL-MCNC: 8.7 G/DL (ref 6.4–8.3)
RBC # BLD AUTO: 5 10E6/UL (ref 4.4–5.9)
RBC # BLD AUTO: 5.2 10E6/UL (ref 4.4–5.9)
RBC # BLD AUTO: 5.78 10E6/UL (ref 4.4–5.9)
RBC URINE: 21 /HPF
SA TARGET DNA: NEGATIVE
SAO2 % BLDA: 95 % (ref 92–100)
SAO2 % BLDA: 97 % (ref 92–100)
SAO2 % BLDA: 98 % (ref 92–100)
SAO2 % BLDA: 98 % (ref 92–100)
SAO2 % BLDA: 99 % (ref 92–100)
SAO2 % BLDV: 44 % (ref 70–75)
SAO2 % BLDV: 45.7 % (ref 70–75)
SAO2 % BLDV: 55.7 % (ref 70–75)
SAO2 % BLDV: 60.4 % (ref 70–75)
SAO2 % BLDV: 60.9 % (ref 70–75)
SAO2 % BLDV: 62 % (ref 70–75)
SAO2 % BLDV: 69.7 % (ref 70–75)
SCANNED LAB RESULT: NORMAL
SODIUM SERPL-SCNC: 157 MMOL/L (ref 135–145)
SODIUM SERPL-SCNC: 158 MMOL/L (ref 135–145)
SP GR UR STRIP: 1.01 (ref 1–1.03)
SPECIMEN EXPIRATION DATE: NORMAL
SQUAMOUS EPITHELIAL: 3 /HPF
UROBILINOGEN UR STRIP-MCNC: NORMAL MG/DL
WBC # BLD AUTO: 13.1 10E3/UL (ref 4–11)
WBC # BLD AUTO: 17.9 10E3/UL (ref 4–11)
WBC # BLD AUTO: 22.8 10E3/UL (ref 4–11)
WBC URINE: 3 /HPF

## 2024-08-26 PROCEDURE — 82805 BLOOD GASES W/O2 SATURATION: CPT | Performed by: INTERNAL MEDICINE

## 2024-08-26 PROCEDURE — 250N000011 HC RX IP 250 OP 636: Performed by: STUDENT IN AN ORGANIZED HEALTH CARE EDUCATION/TRAINING PROGRAM

## 2024-08-26 PROCEDURE — 85025 COMPLETE CBC W/AUTO DIFF WBC: CPT | Performed by: STUDENT IN AN ORGANIZED HEALTH CARE EDUCATION/TRAINING PROGRAM

## 2024-08-26 PROCEDURE — 82805 BLOOD GASES W/O2 SATURATION: CPT | Performed by: STUDENT IN AN ORGANIZED HEALTH CARE EDUCATION/TRAINING PROGRAM

## 2024-08-26 PROCEDURE — 87798 DETECT AGENT NOS DNA AMP: CPT | Performed by: STUDENT IN AN ORGANIZED HEALTH CARE EDUCATION/TRAINING PROGRAM

## 2024-08-26 PROCEDURE — 83605 ASSAY OF LACTIC ACID: CPT | Performed by: STUDENT IN AN ORGANIZED HEALTH CARE EDUCATION/TRAINING PROGRAM

## 2024-08-26 PROCEDURE — 200N000002 HC R&B ICU UMMC

## 2024-08-26 PROCEDURE — 86901 BLOOD TYPING SEROLOGIC RH(D): CPT | Performed by: STUDENT IN AN ORGANIZED HEALTH CARE EDUCATION/TRAINING PROGRAM

## 2024-08-26 PROCEDURE — 258N000003 HC RX IP 258 OP 636: Performed by: STUDENT IN AN ORGANIZED HEALTH CARE EDUCATION/TRAINING PROGRAM

## 2024-08-26 PROCEDURE — 80048 BASIC METABOLIC PNL TOTAL CA: CPT

## 2024-08-26 PROCEDURE — 87493 C DIFF AMPLIFIED PROBE: CPT | Performed by: STUDENT IN AN ORGANIZED HEALTH CARE EDUCATION/TRAINING PROGRAM

## 2024-08-26 PROCEDURE — 83735 ASSAY OF MAGNESIUM: CPT | Performed by: STUDENT IN AN ORGANIZED HEALTH CARE EDUCATION/TRAINING PROGRAM

## 2024-08-26 PROCEDURE — 82248 BILIRUBIN DIRECT: CPT | Performed by: STUDENT IN AN ORGANIZED HEALTH CARE EDUCATION/TRAINING PROGRAM

## 2024-08-26 PROCEDURE — 84100 ASSAY OF PHOSPHORUS: CPT | Performed by: STUDENT IN AN ORGANIZED HEALTH CARE EDUCATION/TRAINING PROGRAM

## 2024-08-26 PROCEDURE — 81001 URINALYSIS AUTO W/SCOPE: CPT | Performed by: STUDENT IN AN ORGANIZED HEALTH CARE EDUCATION/TRAINING PROGRAM

## 2024-08-26 PROCEDURE — 99233 SBSQ HOSP IP/OBS HIGH 50: CPT | Performed by: INTERNAL MEDICINE

## 2024-08-26 PROCEDURE — 80048 BASIC METABOLIC PNL TOTAL CA: CPT | Performed by: STUDENT IN AN ORGANIZED HEALTH CARE EDUCATION/TRAINING PROGRAM

## 2024-08-26 PROCEDURE — 94003 VENT MGMT INPAT SUBQ DAY: CPT

## 2024-08-26 PROCEDURE — 250N000009 HC RX 250: Performed by: STUDENT IN AN ORGANIZED HEALTH CARE EDUCATION/TRAINING PROGRAM

## 2024-08-26 PROCEDURE — 44500 INTRO GASTROINTESTINAL TUBE: CPT

## 2024-08-26 PROCEDURE — 84132 ASSAY OF SERUM POTASSIUM: CPT

## 2024-08-26 PROCEDURE — 250N000013 HC RX MED GY IP 250 OP 250 PS 637: Performed by: NURSE PRACTITIONER

## 2024-08-26 PROCEDURE — 999N000157 HC STATISTIC RCP TIME EA 10 MIN

## 2024-08-26 PROCEDURE — 93975 VASCULAR STUDY: CPT

## 2024-08-26 PROCEDURE — 250N000013 HC RX MED GY IP 250 OP 250 PS 637: Performed by: STUDENT IN AN ORGANIZED HEALTH CARE EDUCATION/TRAINING PROGRAM

## 2024-08-26 PROCEDURE — 85018 HEMOGLOBIN: CPT | Performed by: STUDENT IN AN ORGANIZED HEALTH CARE EDUCATION/TRAINING PROGRAM

## 2024-08-26 PROCEDURE — 99207 PR NO BILLABLE SERVICE THIS VISIT: CPT | Performed by: INTERNAL MEDICINE

## 2024-08-26 PROCEDURE — 87449 NOS EACH ORGANISM AG IA: CPT | Performed by: STUDENT IN AN ORGANIZED HEALTH CARE EDUCATION/TRAINING PROGRAM

## 2024-08-26 PROCEDURE — 76705 ECHO EXAM OF ABDOMEN: CPT | Mod: 26 | Performed by: RADIOLOGY

## 2024-08-26 PROCEDURE — 999N000065 XR ABDOMEN PORT 1 VIEW

## 2024-08-26 PROCEDURE — 94640 AIRWAY INHALATION TREATMENT: CPT

## 2024-08-26 PROCEDURE — 71045 X-RAY EXAM CHEST 1 VIEW: CPT | Mod: 26 | Performed by: RADIOLOGY

## 2024-08-26 PROCEDURE — 94799 UNLISTED PULMONARY SVC/PX: CPT

## 2024-08-26 PROCEDURE — 71045 X-RAY EXAM CHEST 1 VIEW: CPT

## 2024-08-26 PROCEDURE — 99291 CRITICAL CARE FIRST HOUR: CPT | Mod: GC | Performed by: STUDENT IN AN ORGANIZED HEALTH CARE EDUCATION/TRAINING PROGRAM

## 2024-08-26 PROCEDURE — 93975 VASCULAR STUDY: CPT | Mod: 26 | Performed by: RADIOLOGY

## 2024-08-26 PROCEDURE — 74018 RADEX ABDOMEN 1 VIEW: CPT | Mod: 26 | Performed by: RADIOLOGY

## 2024-08-26 PROCEDURE — 250N000013 HC RX MED GY IP 250 OP 250 PS 637

## 2024-08-26 PROCEDURE — 87086 URINE CULTURE/COLONY COUNT: CPT | Performed by: STUDENT IN AN ORGANIZED HEALTH CARE EDUCATION/TRAINING PROGRAM

## 2024-08-26 PROCEDURE — 86900 BLOOD TYPING SEROLOGIC ABO: CPT | Performed by: STUDENT IN AN ORGANIZED HEALTH CARE EDUCATION/TRAINING PROGRAM

## 2024-08-26 PROCEDURE — 84145 PROCALCITONIN (PCT): CPT | Performed by: INTERNAL MEDICINE

## 2024-08-26 PROCEDURE — 86038 ANTINUCLEAR ANTIBODIES: CPT | Performed by: STUDENT IN AN ORGANIZED HEALTH CARE EDUCATION/TRAINING PROGRAM

## 2024-08-26 PROCEDURE — 87641 MR-STAPH DNA AMP PROBE: CPT | Performed by: STUDENT IN AN ORGANIZED HEALTH CARE EDUCATION/TRAINING PROGRAM

## 2024-08-26 PROCEDURE — 999N000065 XR CHEST PORT 1 VIEW

## 2024-08-26 PROCEDURE — 86705 HEP B CORE ANTIBODY IGM: CPT | Performed by: STUDENT IN AN ORGANIZED HEALTH CARE EDUCATION/TRAINING PROGRAM

## 2024-08-26 PROCEDURE — 250N000009 HC RX 250: Performed by: INTERNAL MEDICINE

## 2024-08-26 PROCEDURE — 82140 ASSAY OF AMMONIA: CPT | Performed by: STUDENT IN AN ORGANIZED HEALTH CARE EDUCATION/TRAINING PROGRAM

## 2024-08-26 PROCEDURE — 250N000013 HC RX MED GY IP 250 OP 250 PS 637: Performed by: INTERNAL MEDICINE

## 2024-08-26 PROCEDURE — 82248 BILIRUBIN DIRECT: CPT

## 2024-08-26 PROCEDURE — 250N000012 HC RX MED GY IP 250 OP 636 PS 637

## 2024-08-26 PROCEDURE — 82330 ASSAY OF CALCIUM: CPT | Performed by: STUDENT IN AN ORGANIZED HEALTH CARE EDUCATION/TRAINING PROGRAM

## 2024-08-26 RX ORDER — BUMETANIDE 0.25 MG/ML
2 INJECTION INTRAMUSCULAR; INTRAVENOUS ONCE
Status: COMPLETED | OUTPATIENT
Start: 2024-08-26 | End: 2024-08-26

## 2024-08-26 RX ORDER — POTASSIUM CHLORIDE 29.8 MG/ML
20 INJECTION INTRAVENOUS
Status: COMPLETED | OUTPATIENT
Start: 2024-08-26 | End: 2024-08-27

## 2024-08-26 RX ORDER — METOLAZONE 5 MG/1
10 TABLET ORAL DAILY
Status: DISCONTINUED | OUTPATIENT
Start: 2024-08-27 | End: 2024-08-26

## 2024-08-26 RX ORDER — LIDOCAINE HYDROCHLORIDE 20 MG/ML
5 SOLUTION OROPHARYNGEAL ONCE
Status: COMPLETED | OUTPATIENT
Start: 2024-08-26 | End: 2024-08-26

## 2024-08-26 RX ORDER — POTASSIUM CHLORIDE 29.8 MG/ML
20 INJECTION INTRAVENOUS ONCE
Status: COMPLETED | OUTPATIENT
Start: 2024-08-26 | End: 2024-08-26

## 2024-08-26 RX ORDER — BUMETANIDE 0.25 MG/ML
4 INJECTION INTRAMUSCULAR; INTRAVENOUS ONCE
Status: COMPLETED | OUTPATIENT
Start: 2024-08-26 | End: 2024-08-26

## 2024-08-26 RX ORDER — POTASSIUM CHLORIDE 1.5 G/1.58G
40 POWDER, FOR SOLUTION ORAL ONCE
Status: COMPLETED | OUTPATIENT
Start: 2024-08-26 | End: 2024-08-26

## 2024-08-26 RX ORDER — POTASSIUM CHLORIDE 29.8 MG/ML
20 INJECTION INTRAVENOUS
Status: COMPLETED | OUTPATIENT
Start: 2024-08-26 | End: 2024-08-26

## 2024-08-26 RX ORDER — POTASSIUM CHLORIDE 1.5 G/1.58G
40 POWDER, FOR SOLUTION ORAL 2 TIMES DAILY
Status: DISCONTINUED | OUTPATIENT
Start: 2024-08-26 | End: 2024-09-04

## 2024-08-26 RX ORDER — POTASSIUM CHLORIDE 1.5 G/1.58G
40 POWDER, FOR SOLUTION ORAL 2 TIMES DAILY
Status: DISCONTINUED | OUTPATIENT
Start: 2024-08-26 | End: 2024-08-26

## 2024-08-26 RX ORDER — PIPERACILLIN SODIUM, TAZOBACTAM SODIUM 4; .5 G/20ML; G/20ML
4.5 INJECTION, POWDER, LYOPHILIZED, FOR SOLUTION INTRAVENOUS EVERY 6 HOURS
Status: DISCONTINUED | OUTPATIENT
Start: 2024-08-26 | End: 2024-08-28

## 2024-08-26 RX ORDER — METOLAZONE 5 MG/1
10 TABLET ORAL ONCE
Status: COMPLETED | OUTPATIENT
Start: 2024-08-26 | End: 2024-08-26

## 2024-08-26 RX ORDER — DOBUTAMINE HCL IN DEXTROSE 5 % 500MG/250
2.5 INTRAVENOUS SOLUTION INTRAVENOUS CONTINUOUS
Status: DISCONTINUED | OUTPATIENT
Start: 2024-08-26 | End: 2024-09-07

## 2024-08-26 RX ORDER — ALBUTEROL SULFATE 0.83 MG/ML
2.5 SOLUTION RESPIRATORY (INHALATION)
Status: DISCONTINUED | OUTPATIENT
Start: 2024-08-26 | End: 2024-08-28

## 2024-08-26 RX ORDER — METOLAZONE 5 MG/1
5 TABLET ORAL DAILY
Status: DISCONTINUED | OUTPATIENT
Start: 2024-08-26 | End: 2024-08-26

## 2024-08-26 RX ORDER — MEROPENEM 1 G/1
1 INJECTION, POWDER, FOR SOLUTION INTRAVENOUS EVERY 8 HOURS
Status: DISCONTINUED | OUTPATIENT
Start: 2024-08-26 | End: 2024-08-26

## 2024-08-26 RX ADMIN — NYSTATIN 500000 UNITS: 100000 SUSPENSION ORAL at 21:27

## 2024-08-26 RX ADMIN — POTASSIUM CHLORIDE 20 MEQ: 29.8 INJECTION, SOLUTION INTRAVENOUS at 13:09

## 2024-08-26 RX ADMIN — PREDNISONE 40 MG: 20 TABLET ORAL at 07:55

## 2024-08-26 RX ADMIN — AMPICILLIN SODIUM AND SULBACTAM SODIUM 3 G: 2; 1 INJECTION, POWDER, FOR SOLUTION INTRAMUSCULAR; INTRAVENOUS at 00:07

## 2024-08-26 RX ADMIN — BUMETANIDE 2 MG/HR: 0.25 INJECTION, SOLUTION INTRAMUSCULAR; INTRAVENOUS at 18:35

## 2024-08-26 RX ADMIN — POTASSIUM CHLORIDE 20 MEQ: 29.8 INJECTION, SOLUTION INTRAVENOUS at 11:56

## 2024-08-26 RX ADMIN — DEXMEDETOMIDINE HYDROCHLORIDE IN SODIUM CHLORIDE 1.2 MCG/KG/HR: 4 INJECTION INTRAVENOUS at 14:47

## 2024-08-26 RX ADMIN — BUMETANIDE 0.5 MG/HR: 0.25 INJECTION, SOLUTION INTRAMUSCULAR; INTRAVENOUS at 07:55

## 2024-08-26 RX ADMIN — POTASSIUM CHLORIDE 20 MEQ: 29.8 INJECTION, SOLUTION INTRAVENOUS at 07:54

## 2024-08-26 RX ADMIN — POTASSIUM CHLORIDE 20 MEQ: 29.8 INJECTION, SOLUTION INTRAVENOUS at 22:12

## 2024-08-26 RX ADMIN — POTASSIUM CHLORIDE 20 MEQ: 1.5 POWDER, FOR SOLUTION ORAL at 07:55

## 2024-08-26 RX ADMIN — POTASSIUM CHLORIDE 20 MEQ: 29.8 INJECTION, SOLUTION INTRAVENOUS at 09:43

## 2024-08-26 RX ADMIN — DEXMEDETOMIDINE HYDROCHLORIDE IN SODIUM CHLORIDE 0.5 MCG/KG/HR: 4 INJECTION INTRAVENOUS at 00:09

## 2024-08-26 RX ADMIN — NYSTATIN 500000 UNITS: 100000 SUSPENSION ORAL at 15:50

## 2024-08-26 RX ADMIN — POTASSIUM CHLORIDE 40 MEQ: 1.5 POWDER, FOR SOLUTION ORAL at 17:49

## 2024-08-26 RX ADMIN — INSULIN HUMAN 4 UNITS/HR: 1 INJECTION, SOLUTION INTRAVENOUS at 07:55

## 2024-08-26 RX ADMIN — Medication 40 MG: at 07:55

## 2024-08-26 RX ADMIN — POTASSIUM CHLORIDE 40 MEQ: 1.5 POWDER, FOR SOLUTION ORAL at 07:55

## 2024-08-26 RX ADMIN — SENNOSIDES AND DOCUSATE SODIUM 1 TABLET: 8.6; 5 TABLET ORAL at 20:42

## 2024-08-26 RX ADMIN — ALBUTEROL SULFATE 2.5 MG: 2.5 SOLUTION RESPIRATORY (INHALATION) at 21:15

## 2024-08-26 RX ADMIN — INSULIN HUMAN 8 UNITS/HR: 1 INJECTION, SOLUTION INTRAVENOUS at 22:58

## 2024-08-26 RX ADMIN — POTASSIUM CHLORIDE 20 MEQ: 29.8 INJECTION, SOLUTION INTRAVENOUS at 02:25

## 2024-08-26 RX ADMIN — Medication 175 MCG/HR: at 17:49

## 2024-08-26 RX ADMIN — MEROPENEM 1 G: 1 INJECTION, POWDER, FOR SOLUTION INTRAVENOUS at 13:09

## 2024-08-26 RX ADMIN — SENNOSIDES AND DOCUSATE SODIUM 1 TABLET: 8.6; 5 TABLET ORAL at 00:32

## 2024-08-26 RX ADMIN — ACETAMINOPHEN 650 MG: 325 TABLET ORAL at 05:01

## 2024-08-26 RX ADMIN — SODIUM NITROPRUSSIDE 0.25 MCG/KG/MIN: 25 INJECTION, SOLUTION, CONCENTRATE INTRAVENOUS at 12:07

## 2024-08-26 RX ADMIN — Medication 15 ML: at 07:55

## 2024-08-26 RX ADMIN — NYSTATIN 500000 UNITS: 100000 SUSPENSION ORAL at 07:55

## 2024-08-26 RX ADMIN — NYSTATIN 500000 UNITS: 100000 SUSPENSION ORAL at 13:12

## 2024-08-26 RX ADMIN — POTASSIUM CHLORIDE 20 MEQ: 29.8 INJECTION, SOLUTION INTRAVENOUS at 23:08

## 2024-08-26 RX ADMIN — DOBUTAMINE 2.5 MCG/KG/MIN: 12.5 INJECTION, SOLUTION, CONCENTRATE INTRAVENOUS at 10:26

## 2024-08-26 RX ADMIN — QUETIAPINE FUMARATE 25 MG: 25 TABLET ORAL at 20:42

## 2024-08-26 RX ADMIN — LIDOCAINE HYDROCHLORIDE 5 ML: 20 SOLUTION ORAL; TOPICAL at 11:40

## 2024-08-26 RX ADMIN — QUETIAPINE FUMARATE 25 MG: 25 TABLET ORAL at 07:55

## 2024-08-26 RX ADMIN — POTASSIUM CHLORIDE 20 MEQ: 29.8 INJECTION, SOLUTION INTRAVENOUS at 09:02

## 2024-08-26 RX ADMIN — CHLORHEXIDINE GLUCONATE 0.12% ORAL RINSE 15 ML: 1.2 LIQUID ORAL at 21:27

## 2024-08-26 RX ADMIN — METOLAZONE 10 MG: 5 TABLET ORAL at 13:09

## 2024-08-26 RX ADMIN — SULFAMETHOXAZOLE AND TRIMETHOPRIM 1 TABLET: 800; 160 TABLET ORAL at 09:43

## 2024-08-26 RX ADMIN — POTASSIUM CHLORIDE 40 MEQ: 1.5 POWDER, FOR SOLUTION ORAL at 18:20

## 2024-08-26 RX ADMIN — BUMETANIDE 2 MG: 0.25 INJECTION INTRAMUSCULAR; INTRAVENOUS at 05:30

## 2024-08-26 RX ADMIN — PIPERACILLIN AND TAZOBACTAM 4.5 G: 4; .5 INJECTION, POWDER, LYOPHILIZED, FOR SOLUTION INTRAVENOUS at 20:15

## 2024-08-26 RX ADMIN — DEXMEDETOMIDINE HYDROCHLORIDE IN SODIUM CHLORIDE 0.8 MCG/KG/HR: 4 INJECTION INTRAVENOUS at 06:48

## 2024-08-26 RX ADMIN — CHLORHEXIDINE GLUCONATE 0.12% ORAL RINSE 15 ML: 1.2 LIQUID ORAL at 07:56

## 2024-08-26 RX ADMIN — Medication 150 MCG/HR: at 02:04

## 2024-08-26 RX ADMIN — POTASSIUM CHLORIDE 20 MEQ: 29.8 INJECTION, SOLUTION INTRAVENOUS at 03:24

## 2024-08-26 RX ADMIN — ALBUTEROL SULFATE 2.5 MG: 2.5 SOLUTION RESPIRATORY (INHALATION) at 13:41

## 2024-08-26 RX ADMIN — BUMETANIDE 4 MG: 0.25 INJECTION INTRAMUSCULAR; INTRAVENOUS at 11:24

## 2024-08-26 RX ADMIN — DEXMEDETOMIDINE HYDROCHLORIDE IN SODIUM CHLORIDE 1 MCG/KG/HR: 4 INJECTION INTRAVENOUS at 11:24

## 2024-08-26 RX ADMIN — AMPICILLIN SODIUM AND SULBACTAM SODIUM 3 G: 2; 1 INJECTION, POWDER, FOR SOLUTION INTRAMUSCULAR; INTRAVENOUS at 09:43

## 2024-08-26 RX ADMIN — DEXMEDETOMIDINE HYDROCHLORIDE IN SODIUM CHLORIDE 1.2 MCG/KG/HR: 4 INJECTION INTRAVENOUS at 03:36

## 2024-08-26 RX ADMIN — THIAMINE HCL TAB 100 MG 100 MG: 100 TAB at 07:55

## 2024-08-26 RX ADMIN — ENOXAPARIN SODIUM 40 MG: 40 INJECTION SUBCUTANEOUS at 15:49

## 2024-08-26 RX ADMIN — Medication 60 ML: at 20:42

## 2024-08-26 RX ADMIN — MIDAZOLAM HYDROCHLORIDE 2 MG/HR: 1 INJECTION, SOLUTION INTRAVENOUS at 02:30

## 2024-08-26 RX ADMIN — AMPICILLIN SODIUM AND SULBACTAM SODIUM 3 G: 2; 1 INJECTION, POWDER, FOR SOLUTION INTRAMUSCULAR; INTRAVENOUS at 04:24

## 2024-08-26 RX ADMIN — Medication 60 ML: at 07:56

## 2024-08-26 ASSESSMENT — ACTIVITIES OF DAILY LIVING (ADL)
ADLS_ACUITY_SCORE: 44

## 2024-08-26 NOTE — PROGRESS NOTES
SUBJECTIVE:  Remains intermittently agitated. Intermittently hypoxic on gas and Spo2 monitor. Became hypotensive and needed low dose NE; was around time of increasing precedex dose. Started on Oriana at 20 ppm last night. Worsening hypernatremia, FWF increased. Opens eyes, not consistently following commands.     ASSESSMENT:   Terrance Hidalgo is a 24 year old male who presents with URI symptoms and hypoxia on 8/19. He has a reported pmhx of autism, HLD. He was intubated on 8/19 in the setting of acute hypoxic respiratory failure, likely 2/2 new severe CM of unknown etiology. Myocarditis considered given elevated inflammatory markers, endomyocardial biopsy negative, viral panel negative. CICU following for vent management.     Overnight:      RECOMMENDATIONS:  - Will decrease PEEP to 5, assist with RV dysfunction now that patient is oxygenating better. Once sedation is weaned and mentation improves and off Oriana we will plan for extubation.   - As patient is febrile, we will order RUQ as work-up for elevated LFTs although that may relate to RV dysfunction, Bcx pending. Agree with broadening antibiotic regimen.   - Stop precedex and wean ativan for now. Precedex may contribute to elevated temperature, wouldn't cause elevated WBC which could either be from steroids vs infection.   - Hemodynamics (CI and SVR) may be impacted by elevated temperature due to impact on metabolic rate.   - Agree with dobutamine for biventricular support.  - Hold off for now on weaning Oriana given elevated CVP. May consider a slow wean tomorrow if hemodynamics are more favorable  - Increase FWF for hypernatremia.  - Diuresis per primary.  - Continue PPI and peridex.   - Daily CXR  - Serial ABGs  - Continue ppx lovenox  - Abx per ID/primary  - Consider rheumatology work-up to rule out vasculitis/other autoimmune disorders given significant pulmonary involvement    Discussed with attending, Dr. Oneil.    Huy Delgado MD PGY-5  Cardiovascular  Disease Fellow    Physical Exam   Temp: 99.9  F (37.7  C) Temp src: Bladder BP: 104/62 Pulse: 103   Resp: 18 SpO2: 97 % O2 Device: Mechanical Ventilator    Vital Signs with Ranges  Temp:  [97.5  F (36.4  C)-102.4  F (39.1  C)] 99.9  F (37.7  C)  Pulse:  [] 103  Resp:  [14-24] 18  BP: (104)/(62) 104/62  MAP:  [55 mmHg-95 mmHg] 78 mmHg  Arterial Line BP: ()/(48-83) 102/69  FiO2 (%):  [40 %-50 %] 40 %  SpO2:  [93 %-100 %] 97 %  212 lbs 15.43 oz    GEN: NAD, intubated  HEENT: no icterus  CV: RRR, no obvious murmurs  CHEST: Mechanical breath sounds  ABD: soft, NT/ND, NABS  EXT: Trace BLE edema, warm  NEURO: sedated, opens eyes and moves all extremities.   PSYCH: unable to assess    Data   Recent Labs   Lab 08/26/24  1256 08/26/24  1224 08/26/24  1218 08/26/24  0901 08/26/24  0848 08/26/24  0805 08/26/24  0756 08/26/24  0624 08/26/24  0617 08/26/24  0455 08/26/24  0421 08/25/24  2108 08/25/24  2039   WBC  --   --   --   --  17.9*  --   --   --   --   --  22.8*  --  18.5*   HGB  --   --   --   --  14.2  --   --   --   --   --  14.9  --  14.6   MCV  --   --   --   --  92  --   --   --   --   --  88  --  89   PLT  --   --   --   --  269  --   --   --   --   --  344  --  310   NA  --   --  158*  --   --   --  157*  --   --   --  157*   < >  --    POTASSIUM  --   --  4.6  --   --   --  3.3*  --  3.3*  --  4.9   < >  --    CHLORIDE  --   --  110*  --   --   --  109*  --   --   --  109*   < >  --    CO2  --   --  29  --   --   --  33*  --   --   --  32*   < >  --    BUN  --   --  61.3*  --   --   --  57.2*  --   --   --  59.4*   < >  --    CR  --   --  1.07  --   --   --  1.03  --   --   --  1.18*   < >  --    ANIONGAP  --   --  19*  --   --   --  15  --   --   --  16*   < >  --    ALEXANDER  --   --  10.1  --   --   --  9.2  --   --   --  9.3   < >  --    * 172* 176*   < >  --    < > 190*   < >  --    < > 168*   < >  --    ALBUMIN  --   --   --   --  4.0  --   --   --   --   --  4.2  --   --    PROTTOTAL  --   --    --   --  6.9  --   --   --   --   --  7.3  --   --    BILITOTAL  --   --   --   --  0.9  --   --   --   --   --  1.0  --   --    ALKPHOS  --   --   --   --  94  --   --   --   --   --  99  --   --    ALT  --   --   --   --  476*  --   --   --   --   --  489*  --   --    AST  --   --   --   --  271*  --   --   --   --   --  317*  --   --     < > = values in this interval not displayed.       Recent Results (from the past 24 hour(s))   XR Chest Port 1 View    Narrative    Portable chest    INDICATION: Intubated, cardiogenic shock    COMPARISON: Yesterday    FINDINGS: Multiple support tubes and devices again noted including  left IJ [place]-Sha catheter with tip in the distal right main branch  pulmonary artery and endotracheal tube with tip approximately 6.3 cm  above the mauri. Esophageal temperature probe in the distal  esophagus. Right IJ catheter tip in the low SVC. Hazy opacities in the  lungs appear similar.      Impression    IMPRESSION: No significant interval changes radiographically    KATIE BOBBY MD         SYSTEM ID:  [id number]   XR Chest Port 1 View    Narrative    Portable chest 8/26/2024 0905 hours    INDICATION: Endotracheal tube advanced    COMPARISON: 0607 hours earlier today    FINDINGS: No significant change in appearance of the heart or lungs or  many of the support devices. Endotracheal tube advanced with tip now  projects a 4.7 cm above the mauri.      Impression    IMPRESSION: Endotracheal tube slightly advanced just under 5 cm above  the mauri.    KATIE BOBBY MD         SYSTEM ID:  [id number]   XR Abdomen Port 1 View    Narrative    Exam: XR ABDOMEN PORT 1 VIEW, 8/26/2024 12:36 PM    Indication: Verify small bowel feeding tube bedside placement    Comparison: Abdomen 8/22/2024.    Findings:   Portable AP supine. Feeding tube has been inserted with tip towards  the DJ flexure. Gastric tube tip and sidehole overlying stomach.  Nonobstructive bowel gas pattern. Please see separate  chest  radiograph.      Impression    Impression: Feeding tube tip towards the DJ flexure. Gastric tube tip  and sidehole overlying stomach.    LUMA MATHEWS MD         SYSTEM ID:  [id number]     TTE 8/21/24:  Interpretation Summary  Tachycardia at 144BPM during study.  Mild left ventricular dilation is present.LVIDd 58mm.  Biplane LVEF is 16%.  Severe hypokinesis of mid to apical RV free wall.  Pulmonary artery systolic pressure is normal.  The inferior vena cava is normal.  No pericardial effusion is present.  There is no prior study for direct comparison.    RHC 8/21/24:  Conclusion         Right sided filling pressures are normal.    Left sided filling pressures are mildly elevated.    Mild elevated pulmonary hypertension.    Reduced cardiac output level.    Successful endomyocardial biopsy. Results pending.    Fluoroscopy was used to visualize the left internal jugular vein.     - Elevated left sided filling pressures with mildly reduced cardiac index   - Uncomplicated EMB with 4 samples collected         Hemodynamics    RIGHT HEART CATHETERIZATION:    === Off pressors vented Fio2 80% ====    /71/87 mmHg  RA mean of 6 mmHg with a V wave of 8 mmhg  PA 30/23/26 mmHg  PCWP 18 mmHg with a V wave of 20 mmhg  Wayne CO/CI 5.0/2.1 L/min/m2   PVR 1.6 GROVE   SVR 1270 dynes/sec/cm-5  PA sat 70%   Hgb 14.6 g/dL        Right sided filling pressures are normal. Left sided filling pressures are mildly elevated. Mild elevated pulmonary hypertension. Reduced cardiac output level.

## 2024-08-26 NOTE — PLAN OF CARE
"Neuro: 175 fent, precedex 0, Rass: +2 to -2. MCALLISTER. SUKHJINDERRLA, follows commands. BL wrist restraints.    CV:   Rate/rhythm: SR/ST 's,   Mechanical:   SWAN @50cm, CVP: 10, PA: 22/13, SvO2: 68, CI:2 SVR: 1570  Keep MAPs 65-85, BP labile   CVP Goal 10-12  Dobutamine & Nipride started     Pulm: CMV 40%/480/18/5 PEEP, thin secretions. CTX on arrival: .\"Significant atelectasis with complete collapse of the left lower lobe with near total collapse of the left upper, and right lower lobes.\" Pulm signed off. CICU managing.   Goal PaO2>70 per CICU  Oriana weaned @2; goal to extubate    GI: OG @ 64-   NJ placed @102 TF running at goal 50 mL/hr. FWF 90 ml q1h for hypernatremia    : Swan in place, rectal tube in place.    Skin: Intact    Drips:   Fent 175  Versed 0. PRN still available  Dobutamine 2.5  Nipride 0-2  Dex: 0  Bumex : 2  Insulin 6 Al.4    Goal Outcome Evaluation:      Plan of Care Reviewed With: patient, caregiver    Overall Patient Progress: no changeOverall Patient Progress: no change    Outcome Evaluation: NO weaned; plan to extubate in AM      "

## 2024-08-26 NOTE — PROGRESS NOTES
[facility] INFECTIOUS DISEASES PROGRESS NOTE     Patient:  Terrance Hidalgo   YOB: 2000, MRN: [id number]  Date of Visit: 08/26/2024  Date of Admission: 8/20/2024          ASSESSMENT AND PLAN     Terrance Hidalgo is a 24 year old male with autism. He presented with new diagnosis of heart failure Aug 21. Biopsy and histopath showed essentially unremarkable myocardial tissue with no evidence of inflammatory infiltrate.      Parvovirus IgG positive which indicated past infection. IgM is elevated for 2-3 months so it's probably not acute infection. I would recommend checking for serum PCR.     For his new fevers, he has worsening aminases again so would investigate his gallbladder and biliary tract to check. Stool negative for C diff. Does not seem to be new VAP since CXR and vent setting stable. Unclear what the basis for VAP and indication for meropenem. No new microbiologic data indicating ESBL or other resistant Gram-negatives. Reasonable to expand coverage for Pseudomonas and anaerobes.      IMPRESSION  New fevers, etiology under investigation   Elevated transaminases   Mixed shock (mostly cardiogenic), resolved  Respiratory failure from Severe bacterial pneumonia  New heart failure with reduced EF     RECOMMENDATION:  Check serum parvovirus PCR.   US liver and biliary tract.   Consider checking for DVT.   Stop meropenem.   Start IV piptazo 3.375g every 6 hours.    ID will continue to follow with you. Please check [facility] for staff covering the [place] ID service.     Racheal Son MD  Infectious Diseases  Pager: [phone]  [place] jena    50 MINUTES SPENT BY ME on the date of service doing chart review, history, exam, documentation & further activities per the note.           SUBJECTIVE      Interval History and Events:  New fevers. Has loose stools. No new sputum production.     Antimicrobial Treatment:  Amp-sul 8/21-         OBJECTIVE       Physical Examination:    Temp:  [97.5  F (36.4  C)-102.4  F (39.1   C)] 101.7  F (38.7  C)  Pulse:  [] 90  Resp:  [14-26] 18  BP: (104)/(62) 104/62  MAP:  [55 mmHg-88 mmHg] 65 mmHg  Arterial Line BP: ()/(48-78) 88/57  FiO2 (%):  [40 %-50 %] 40 %  SpO2:  [90 %-100 %] 98 %    I/O last 3 completed shifts:  In: 4758.18 [I.V.:1768.18; NG/GT:1790]  Out: 7125 [Urine:7000; Stool:125]    Vitals:    08/23/24 0400 08/24/24 0200 08/24/24 0900 08/25/24 0000   Weight: 102.8 kg (226 lb 10.1 oz) 111.2 kg (245 lb 2.4 oz) 100.5 kg (221 lb 9 oz) 98.5 kg (217 lb 2.5 oz)    08/26/24 0000   Weight: 96.6 kg (212 lb 15.4 oz)       Constitutional: intubated and sedated  Respiratory: intubated, clear anteriorly   Cardiovascular: RRR, no murmur noted.   GI: Normal bowel sounds, soft, non-distended and non-tender.  Skin: Warm, dry, well-perfused.   Vascular access:  clean bandage. no surrounding erythema.    I reviewed the following Laboratory Data:    ALT  8/26 476  8/23 205    Elevated bili    Microbiology:  8/26 C diff - neg   8/26 MRSA - neg   8/26 BDG -  8/26 Ucx -   8/25 Scx - yeast   8/25 Bcx -   8/21 Legio, Strep U - neg   8/21 Lyme ab - neg   Adeno, CMV - neg   EBV pcr- neg  HIV neg  8/21 Parvo Ig positive, IgM neg   Coxsackie ab -   Histo U - neg  RPP - neg   COVID - neg   8/21 Scx - no growth

## 2024-08-26 NOTE — PROCEDURES
Small Bowel Feeding Tube Placement Assessment  Reason for Feeding Tube Placement: post pyloric enteral access for nutrition and medication administration  Cortrak Start Time: 1130   Cortrak End Time: 1200  Medicine Delivered During Procedure: 2% viscous lidocaine gel   Placement Successful: yes per Cortrak tracing pending AXR confirmation      Procedure Complications: none  Final Placement Ravi at exit of nare:  102 cm  Face to Face time with patient: 30 minutes    Bridle Placement:   Reason for bridle placement: securement of nasoenteric feeding tube    Medicine delivered during procedure: lidocaine gel   Procedure: Successful   Location of top of clip on FT: @ 104 cm marker   Condition of nose/skin at time of bridle placement: Unremarkable   Face to Face time with patient: < 5 minutes.      Keara Russo, MPH, RDN, LD  4A (CVICU) ALFIE Stearns [4A Clinical Dietitian]   Weekend/Holiday: Vocera - Weekend Clinical Dietitian

## 2024-08-26 NOTE — PROGRESS NOTES
[facility]  Cardiology Heart Failure Service (Cards 2) Progress Note    Patient Name: Terrance Hidalgo    Medical Record Number: [id number]    YOB: 2000  PCP: Colt Flores    Admit Date/Time: 8/20/2024 10:13 PM   6    Assessment/Plan:    25 yo M with PMH autism who initially presented to [facility] with cough, URI symptoms, and hypoxemia. He was intubated due to respiratory failure and transferred to [facility], where his hospital course transpired to mixed cardiogenic/septic shock requiring multiple pressor agents, inotropic support, and IV diuresis. Transferred again to [facility] CICU on 8/20 for continued management. Presentation initially highly suspicious for fulminant myocarditis base on elevated cardiac and inflammatory biomarkers, new (suspected), severe biventricular dysfunction w/ reduced cardiac output requiring intermittent inotropic support, and concurrent infectious/respiratory symptomotology c/b acute respiratory failure 2/2 ARDS vs cardiogenic etiology 2/2 miocarditis with superimposed aspiration PNM treated with sulbactam. Empirically pulsed with steroids. However, EMB 5/21 demonstrated unremarkable myocardial tissue with no evidence of inflammatory infiltrate.     - ECMO candidate     Changes:  - start dobutamine for biventricular inotropic support   - start nipride and wean off Oriana  - bolus bumex and increase the rate with CVP goal 10-12    - Advance ETT, start chest PT & albuterol   - POCUS lungs   - wean off sedation and pressure support  - PEEP from 8 to 5 given concern for RV afterload effect     - liver US given transaminates  - post pyloric Tube    - check MRSA, C diff  - given elevated WBC and fevers of unclear etiology while on sulbactam day 6 we will empirically escalate to meropenem and panculture, send B D glucan    Neuro:     #AMS  #Sedation   - moving x 4 intermittent following commands     Plan:      - sedation holiday daily  -  precedex/fentanyl for now      CV:    #Biventricular failure LVEF 25%   #Cardiogenic shock SCAI C  #Lactic acidosis   TTE (8/21/24): LVIDd 58mm. Biplane LVEF is 16%. Severe hypokinesis of mid to apical RV free wall.  RHC (8/21/24): RA mean 6, PA 30/23/26, PCWP 18, [place] CO/CI 5.0/2.1, PVR 1.6, SVR 1270  EMB (8/21/24): Fragments of essentially unremarkable myocardial tissue with no evidence of inflammatory infiltrate   Coronary angiogram: NA   CMR: None - will obtain prior to discharge     Plan:  - start dobutamine  - start nipride   - wean off Oriana  - c/w steroid taper- c/w nystatin/bactrim   - increase bumex dose CVP goal 10-12   - start metolazone      Pulm:     #Acute hypoxic respiratory failure  #Mild simple pleular effusion   #concern for Aspiration PNM     Plan:    - wean off PEEP  - abx per below       GI/Nutrition:    #Transaminitis    Plan:    - liver US with dopplers to r/o cholecystitis and differentiate liver perfusion/congestion with doppler indices   - place post piloric feeding tube and start trikle feeds      Renal:    #HyperNa   #Hypervolemia    Plan:    - start metolazone   - adjust FWF   - frequent BMP      ID:     #Concern for aspiration PNM   #Transaminitis     - Antimicrobials       > Unasyn 8/21 - 8/26       > Cefepime 8/20 - 8/21       > Azithromycin 8/20 - 08/24       > Vanco 8/20 - 8/21    Plan:  - switch unasyn to meropenem      Heme:    #Anemia of critical illness     Plan:  - H/H  - transfuse if hb < 7 or drop > 2 g     Endo:    Plan:    - FSG goal 140-160  - insulin     Family Meeting/Goals of Care:  - last time family updated: TBD   - next of kin/HCP:      Johann Hidalgo(TEMPORARY GUARDIAN TO 10-)  Brother  [phone]  (+1)  [address] [facility] [address]  Legal guardian    Disposition:   - Unit   Code Status:   Full Code     Thank you for allowing me to care for this patient, please don't hesitate to contact me with any questions regarding this plan.   Patient was  discussed and evaluated with Dr. Delgado, Farrah CHAPARRO MD, attending physician, who agrees with the assessment and plan above.    Matt Cabral MD, PhD, MSc  Cardiology Fellow    Interval Changes in Past 24 Hours:     - Overnight events: spiking fever, need for pressors, worsening liver function   - Patient's complains: no new complaints.    - Telemetry monitoring: unremarkable.      - Review Of Systems:  A 4-point ROS was negative aside from those listed above.    Physical Exam:    General appearance - Lying in bed; intubated sedated   Head: Normocephalic and atraumatic.   Eyes: Pupils are equal, round, and reactive to light. No scleral icterus.  Neck: JVD, bruit.  Cardiovascular: Tachycardiac regular rhythm. S1 and S2 auscultated. No murmurs, rub, or gallops.  Pulmonary/Chest: Intubated. Mechanical ventilation   Abdominal: Bowel sounds present. Soft. No distension. No rigidity, rebound or guarding. Extremities: Warm, no cyanosis, 2+ distal pulses bilaterally. Pitting edema b/l +1.   Skin: Skin is warm and not diaphoretic. No rash, bruising, or erythema.  Neuro: Sedated     Objective data:    Temp:  [97.5  F (36.4  C)-102.4  F (39.1  C)] 101.3  F (38.5  C)  Pulse:  [] 101  Resp:  [14-24] 18  BP: (104)/(62) 104/62  MAP:  [55 mmHg-88 mmHg] 75 mmHg  Arterial Line BP: ()/(48-78) 95/66  FiO2 (%):  [40 %-50 %] 40 %  SpO2:  [93 %-100 %] 98 %    Vitals:    08/23/24 0400 08/24/24 0200 08/24/24 0900 08/25/24 0000   Weight: 102.8 kg (226 lb 10.1 oz) 111.2 kg (245 lb 2.4 oz) 100.5 kg (221 lb 9 oz) 98.5 kg (217 lb 2.5 oz)    08/26/24 0000   Weight: 96.6 kg (212 lb 15.4 oz)        Vent Settings:  Resp: 18 SpO2: 98 % O2 Device: Mechanical Ventilator    FiO2 (%): 40 %, Resp: 18, Vent Mode: CMV/AC, Resp Rate (Set): 18 breaths/min, Tidal Volume (Set, mL): 480 mL, PEEP (cm H2O): 8 cmH2O, Resp Rate (Set): 18 breaths/min, Tidal Volume (Set, mL): 480 mL, PEEP (cm H2O): 8 cmH2O    LABS Reviewed  IMAGES Reviewed    Current  medications   Current Facility-Administered Medications   Medication Dose Route Frequency Provider Last Rate Last Admin    acetaminophen (TYLENOL) tablet 650 mg  650 mg Oral or Feeding Tube Q8H PRN Sudheer Rojas MD   650 mg at 08/26/24 0501    bumetanide (BUMEX) 0.25 mg/mL infusion  2 mg/hr Intravenous Continuous Matt Cabral MD 8 mL/hr at 08/26/24 1105 2 mg/hr at 08/26/24 1105    chlorhexidine (PERIDEX) 0.12 % solution 15 mL  15 mL Mouth/Throat Q12H Sudheer Rojas MD   15 mL at 08/26/24 0756    dexmedeTOMIDine (PRECEDEX) 4 mcg/mL in sodium chloride 0.9 % 100 mL infusion  0.1-1.2 mcg/kg/hr (Dosing Weight) Intravenous Continuous Greg Moreno MD 27 mL/hr at 08/26/24 1124 1 mcg/kg/hr at 08/26/24 1124    dextrose 10% infusion   Intravenous Continuous PRN Colten Garay MD        glucose gel 15-30 g  15-30 g Oral Q15 Min PRN Colten Garay MD        Or    dextrose 50 % injection 25-50 mL  25-50 mL Intravenous Q15 Min PRN Colten Garay MD        Or    glucagon injection 1 mg  1 mg Subcutaneous Q15 Min PRN Colten Garay MD        DOBUTamine (DOBUTREX) 1,000 mg in D5W 250 mL infusion (adult MAX conc)  2.5 mcg/kg/min (Dosing Weight) Intravenous Continuous Matt Cabral MD 4.1 mL/hr at 08/26/24 1026 2.5 mcg/kg/min at 08/26/24 1026    enoxaparin ANTICOAGULANT (LOVENOX) injection 40 mg  40 mg Subcutaneous Q24H Colten Garay MD   40 mg at 08/25/24 1631    fentaNYL (SUBLIMAZE) infusion   mcg/hr Intravenous Continuous Sudheer Rojas MD 3 mL/hr at 08/26/24 1000 150 mcg/hr at 08/26/24 1000    insulin regular (MYXREDLIN) 1 unit/mL infusion  0-24 Units/hr Intravenous Continuous Colten Garay MD 5 mL/hr at 08/26/24 1109 5 Units/hr at 08/26/24 1109    ipratropium - albuterol 0.5 mg/2.5 mg/3 mL (DUONEB) neb solution 3 mL  3 mL Nebulization Q4H PRN Sudheer Rojas MD   3 mL at 08/25/24 0536    lidocaine (LMX4) cream   Topical Q1H PRN Sudheer Rojas MD        lidocaine 1 %  0.1-1 mL  0.1-1 mL Other Q1H PRN Sudheer Rojas MD        medication instruction   Does not apply Continuous PRN Sudheer Rojas MD        meropenem (MERREM) 1 g vial to attach to  mL bag  1 g Intravenous Q8H Matt Cabral MD        [START ON 8/27/2024] metolazone (ZAROXOLYN) tablet 10 mg  10 mg Oral Daily Matt Cabral MD        multivitamins w/minerals liquid 15 mL  15 mL Per Feeding Tube Daily Susan Yeh MD   15 mL at 08/26/24 0755    naloxone (NARCAN) injection 0.2 mg  0.2 mg Intravenous Q2 Min PRN Jani Martin MD        Or    naloxone (NARCAN) injection 0.4 mg  0.4 mg Intravenous Q2 Min PRN Jani Martin MD        Or    naloxone (NARCAN) injection 0.2 mg  0.2 mg Intramuscular Q2 Min PRN Jani Martin MD        Or    naloxone (NARCAN) injection 0.4 mg  0.4 mg Intramuscular Q2 Min PRN Jani Martin MD        nitroPRUsside (NIPRIDE) 100 mg, sodium thiosulfate 1,000 mg in D5W 62.5 mL IV infusion (conc: 1.6 mg/mL)  0.25-5 mcg/kg/min (Dosing Weight) Intravenous Continuous Matt Cabral MD 1 mL/hr at 08/26/24 1207 0.25 mcg/kg/min at 08/26/24 1207    nystatin (MYCOSTATIN) suspension 500,000 Units  500,000 Units Swish & Spit 4x Daily Susan Yeh MD   500,000 Units at 08/26/24 0755    pantoprazole (PROTONIX) 2 mg/mL suspension 40 mg  40 mg Oral or Feeding Tube Daily Farrah Delgado MD   40 mg at 08/26/24 0755    [Held by provider] polyethylene glycol (MIRALAX) Packet 17 g  17 g Oral or Feeding Tube Daily Andree Becerril, APRN CNP   17 g at 08/23/24 1049    polyethylene glycol (MIRALAX) Packet 17 g  17 g Oral Daily PRN Sudheer Rojas MD   17 g at 08/22/24 1430    potassium chloride (KLOR-CON) Packet 20 mEq  20 mEq Oral or Feeding Tube BID Greg Moreno MD   20 mEq at 08/26/24 0755    potassium chloride 20 mEq in 50 mL intermittent infusion  20 mEq Intravenous Q1H Matt Cabral MD   20 mEq at 08/26/24 1156    predniSONE (DELTASONE) tablet 40 mg   40 mg Oral or Feeding Tube Daily Susan Yeh MD   40 mg at 08/26/24 0755    Followed by    [START ON 8/31/2024] predniSONE (DELTASONE) tablet 30 mg  30 mg Oral or Feeding Tube Daily Susan Yeh MD        Followed by    [START ON 9/7/2024] predniSONE (DELTASONE) tablet 20 mg  20 mg Oral or Feeding Tube Daily Susan Yeh MD        Followed by    [START ON 9/14/2024] predniSONE (DELTASONE) tablet 10 mg  10 mg Oral or Feeding Tube Daily Susan Yeh MD        Followed by    [START ON 9/21/2024] predniSONE (DELTASONE) tablet 5 mg  5 mg Oral or Feeding Tube Daily Susan Yeh MD        Prosource TF20 ENfit Compatibl EN LIQD (PROSOURCE TF20) packet 60 mL  1 packet Per Feeding Tube BID Susan Yeh MD   60 mL at 08/26/24 0756    QUEtiapine (SEROquel) tablet 25 mg  25 mg Oral or Feeding Tube BID Colten Garay MD   25 mg at 08/26/24 0755    senna-docusate (SENOKOT-S/PERICOLACE) 8.6-50 MG per tablet 1 tablet  1 tablet Oral or Feeding Tube At Bedtime Andree Becerril, APRN CNP   1 tablet at 08/26/24 0032    senna-docusate (SENOKOT-S/PERICOLACE) 8.6-50 MG per tablet 1 tablet  1 tablet Oral or Feeding Tube BID PRN Jani Martin MD        Or    senna-docusate (SENOKOT-S/PERICOLACE) 8.6-50 MG per tablet 2 tablet  2 tablet Oral or Feeding Tube BID PRN Jani Martin MD        sodium chloride (PF) 0.9% PF flush 3 mL  3 mL Intracatheter Q8H Sudheer Rojas MD   3 mL at 08/26/24 0010    sodium chloride (PF) 0.9% PF flush 3 mL  3 mL Intracatheter q1 min prn Sudheer Rojas MD        sulfamethoxazole-trimethoprim (BACTRIM DS) 800-160 MG per tablet 1 tablet  1 tablet Oral or Feeding Tube Once per day on Monday Wednesday Friday Farrah Delgado MD   1 tablet at 08/26/24 0943    thiamine (B-1) tablet 100 mg  100 mg Oral or Feeding Tube Daily Graciela Whittaker MD   100 mg at 08/26/24 0755       Medications Prior to Admission   Medication Sig Dispense Refill  Last Dose    risperiDONE (RISPERDAL) 1 MG tablet Take 1 mg by mouth every morning.   Past Week at unknown    risperiDONE (RISPERDAL) 1 MG tablet Take 1.5 mg by mouth every evening.   Past Week at unknown

## 2024-08-26 NOTE — PLAN OF CARE
Goal Outcome Evaluation:      Plan of Care Reviewed With: patient    Overall Patient Progress: no changeOverall Patient Progress: no change    Outcome Evaluation: NO parked @ 4 overnight. Bumex off for increase in Cr, and turned back on fpr increase in CVP. Swan changed.     Neuro: Sedated (RASS: 4 to -4)  Pt following commands and Pt moving all extremities. Pupils E/R/R B/L.Dex @ 0.8 mcg/kg/hr, fentanyl gtt @ 150 mcg/hr, and Versed gtt @ 1 mg/hr. Writer turned versed back on after pt was kicking, scratching, fighting staff during cares.  CV: SR and ST and fr. PVCs. HR from 90s to 120s. Map goal 65 to 75. Map goal 65-75.  Levo @ 0-0.03 mcg/kg/min.   Resp: Ventilator: CMV mode 40%fio2, 480TV, 18RR, 8peep. LS are clear and dim in the bases. Pt has thin secretions.  : Swan in for strict I+Os. Exchanged. Pt's UO was between 40 to 290 mL/hr.  GI: OG @ 64. OG is for enteral feeds, TF @ 50 mL/hr., and  FWF increased to 50 mL q 1hr.  NPO.   Skin/Endo/Heme/ID:  Skin WDL.  Insulin @ Algorithm 4 and Insulin @ 4 units/hr Pt Afebrile  Plan: Team to consider NJ placement, Radial kimberly placement, weaning nitric is able, widening ID coverage if appropriate.   For vital signs and complete assessments, please see documentation flowsheets.        Hemodynamics Table  Time 2000 0030 0400 0630   CVP 11 12 19 10   PA 32/21 35/22 55/36 34/19   SvO2 59 55 45 60   PaO2 121 97 97 106   CI 1.7 1.7 1.1 1.6   SVR 1371 1645 1439 1451     Bumex stopped  Bumex started

## 2024-08-27 ENCOUNTER — APPOINTMENT (OUTPATIENT)
Dept: GENERAL RADIOLOGY | Facility: CLINIC | Age: 24
DRG: 853 | End: 2024-08-27
Attending: STUDENT IN AN ORGANIZED HEALTH CARE EDUCATION/TRAINING PROGRAM
Payer: MEDICARE

## 2024-08-27 ENCOUNTER — APPOINTMENT (OUTPATIENT)
Dept: CT IMAGING | Facility: CLINIC | Age: 24
DRG: 853 | End: 2024-08-27
Attending: INTERNAL MEDICINE
Payer: MEDICARE

## 2024-08-27 ENCOUNTER — APPOINTMENT (OUTPATIENT)
Dept: CARDIOLOGY | Facility: CLINIC | Age: 24
DRG: 853 | End: 2024-08-27
Attending: STUDENT IN AN ORGANIZED HEALTH CARE EDUCATION/TRAINING PROGRAM
Payer: MEDICARE

## 2024-08-27 ENCOUNTER — REFERRAL (OUTPATIENT)
Dept: TRANSPLANT | Facility: CLINIC | Age: 24
End: 2024-08-27

## 2024-08-27 ENCOUNTER — APPOINTMENT (OUTPATIENT)
Dept: ULTRASOUND IMAGING | Facility: CLINIC | Age: 24
DRG: 853 | End: 2024-08-27
Attending: STUDENT IN AN ORGANIZED HEALTH CARE EDUCATION/TRAINING PROGRAM
Payer: MEDICARE

## 2024-08-27 PROBLEM — N17.9 ACUTE KIDNEY FAILURE, UNSPECIFIED (H): Status: ACTIVE | Noted: 2024-08-27

## 2024-08-27 LAB
1,3 BETA GLUCAN SER-MCNC: <31 PG/ML
ALBUMIN SERPL BCG-MCNC: 4.1 G/DL (ref 3.5–5.2)
ALBUMIN SERPL BCG-MCNC: 4.2 G/DL (ref 3.5–5.2)
ALBUMIN SERPL BCG-MCNC: 4.3 G/DL (ref 3.5–5.2)
ALBUMIN SERPL BCG-MCNC: 4.4 G/DL (ref 3.5–5.2)
ALBUMIN SERPL BCG-MCNC: 4.5 G/DL (ref 3.5–5.2)
ALBUMIN SERPL BCG-MCNC: 4.6 G/DL (ref 3.5–5.2)
ALLEN'S TEST: ABNORMAL
ALP SERPL-CCNC: 102 U/L (ref 40–150)
ALP SERPL-CCNC: 103 U/L (ref 40–150)
ALP SERPL-CCNC: 103 U/L (ref 40–150)
ALP SERPL-CCNC: 108 U/L (ref 40–150)
ALP SERPL-CCNC: 110 U/L (ref 40–150)
ALP SERPL-CCNC: 114 U/L (ref 40–150)
ALT SERPL W P-5'-P-CCNC: 571 U/L (ref 0–70)
ALT SERPL W P-5'-P-CCNC: 589 U/L (ref 0–70)
ALT SERPL W P-5'-P-CCNC: 655 U/L (ref 0–70)
ALT SERPL W P-5'-P-CCNC: 669 U/L (ref 0–70)
ALT SERPL W P-5'-P-CCNC: 686 U/L (ref 0–70)
ALT SERPL W P-5'-P-CCNC: 696 U/L (ref 0–70)
ANA SER QL IF: NEGATIVE
ANA SER QL IF: NEGATIVE
ANCA AB PATTERN SER IF-IMP: NORMAL
ANION GAP SERPL CALCULATED.3IONS-SCNC: 16 MMOL/L (ref 7–15)
ANION GAP SERPL CALCULATED.3IONS-SCNC: 18 MMOL/L (ref 7–15)
ANION GAP SERPL CALCULATED.3IONS-SCNC: 20 MMOL/L (ref 7–15)
ANION GAP SERPL CALCULATED.3IONS-SCNC: 21 MMOL/L (ref 7–15)
ANION GAP SERPL CALCULATED.3IONS-SCNC: 21 MMOL/L (ref 7–15)
ANION GAP SERPL CALCULATED.3IONS-SCNC: 23 MMOL/L (ref 7–15)
ANION GAP SERPL CALCULATED.3IONS-SCNC: 24 MMOL/L (ref 7–15)
AST SERPL W P-5'-P-CCNC: 178 U/L (ref 0–45)
AST SERPL W P-5'-P-CCNC: 179 U/L (ref 0–45)
AST SERPL W P-5'-P-CCNC: 259 U/L (ref 0–45)
AST SERPL W P-5'-P-CCNC: 368 U/L (ref 0–45)
AST SERPL W P-5'-P-CCNC: 372 U/L (ref 0–45)
AST SERPL W P-5'-P-CCNC: 380 U/L (ref 0–45)
BACTERIA SPT CULT: ABNORMAL
BASE EXCESS BLDA CALC-SCNC: 10.8 MMOL/L (ref -3–3)
BASE EXCESS BLDA CALC-SCNC: 7.9 MMOL/L (ref -3–3)
BASE EXCESS BLDA CALC-SCNC: 8.5 MMOL/L (ref -3–3)
BASE EXCESS BLDA CALC-SCNC: 8.8 MMOL/L (ref -3–3)
BASE EXCESS BLDA CALC-SCNC: 9.6 MMOL/L (ref -3–3)
BASE EXCESS BLDA CALC-SCNC: 9.8 MMOL/L (ref -3–3)
BASE EXCESS BLDV CALC-SCNC: 10.4 MMOL/L (ref -3–3)
BASE EXCESS BLDV CALC-SCNC: 10.7 MMOL/L (ref -3–3)
BASE EXCESS BLDV CALC-SCNC: 11.5 MMOL/L (ref -3–3)
BASE EXCESS BLDV CALC-SCNC: 11.8 MMOL/L (ref -3–3)
BASE EXCESS BLDV CALC-SCNC: 11.9 MMOL/L (ref -3–3)
BASE EXCESS BLDV CALC-SCNC: 12.4 MMOL/L (ref -3–3)
BASE EXCESS BLDV CALC-SCNC: 8.7 MMOL/L (ref -3–3)
BASOPHILS # BLD AUTO: 0 10E3/UL (ref 0–0.2)
BASOPHILS # BLD AUTO: 0 10E3/UL (ref 0–0.2)
BASOPHILS NFR BLD AUTO: 0 %
BASOPHILS NFR BLD AUTO: 0 %
BILIRUB DIRECT SERPL-MCNC: 0.72 MG/DL (ref 0–0.3)
BILIRUB DIRECT SERPL-MCNC: 0.83 MG/DL (ref 0–0.3)
BILIRUB DIRECT SERPL-MCNC: 0.87 MG/DL (ref 0–0.3)
BILIRUB DIRECT SERPL-MCNC: 0.9 MG/DL (ref 0–0.3)
BILIRUB DIRECT SERPL-MCNC: 1.03 MG/DL (ref 0–0.3)
BILIRUB DIRECT SERPL-MCNC: 1.11 MG/DL (ref 0–0.3)
BILIRUB SERPL-MCNC: 1.4 MG/DL
BILIRUB SERPL-MCNC: 1.5 MG/DL
BILIRUB SERPL-MCNC: 1.6 MG/DL
BILIRUB SERPL-MCNC: 1.7 MG/DL
BILIRUB SERPL-MCNC: 1.9 MG/DL
BILIRUB SERPL-MCNC: 2.2 MG/DL
BUN SERPL-MCNC: 77.6 MG/DL (ref 6–20)
BUN SERPL-MCNC: 78 MG/DL (ref 6–20)
BUN SERPL-MCNC: 81.2 MG/DL (ref 6–20)
BUN SERPL-MCNC: 82.7 MG/DL (ref 6–20)
BUN SERPL-MCNC: 85.3 MG/DL (ref 6–20)
BUN SERPL-MCNC: 86.1 MG/DL (ref 6–20)
BUN SERPL-MCNC: 88.4 MG/DL (ref 6–20)
C-ANCA TITR SER IF: NORMAL {TITER}
CA-I BLD-MCNC: 4.4 MG/DL (ref 4.4–5.2)
CA-I BLD-MCNC: 4.4 MG/DL (ref 4.4–5.2)
CALCIUM SERPL-MCNC: 9.1 MG/DL (ref 8.8–10.4)
CALCIUM SERPL-MCNC: 9.3 MG/DL (ref 8.8–10.4)
CALCIUM SERPL-MCNC: 9.3 MG/DL (ref 8.8–10.4)
CALCIUM SERPL-MCNC: 9.5 MG/DL (ref 8.8–10.4)
CALCIUM SERPL-MCNC: 9.7 MG/DL (ref 8.8–10.4)
CALCIUM SERPL-MCNC: 9.7 MG/DL (ref 8.8–10.4)
CALCIUM SERPL-MCNC: 9.9 MG/DL (ref 8.8–10.4)
CHLORIDE SERPL-SCNC: 101 MMOL/L (ref 98–107)
CHLORIDE SERPL-SCNC: 101 MMOL/L (ref 98–107)
CHLORIDE SERPL-SCNC: 103 MMOL/L (ref 98–107)
CHLORIDE SERPL-SCNC: 104 MMOL/L (ref 98–107)
CHLORIDE SERPL-SCNC: 104 MMOL/L (ref 98–107)
COHGB MFR BLD: 99.1 % (ref 96–97)
COHGB MFR BLD: 99.2 % (ref 96–97)
COHGB MFR BLD: 99.4 % (ref 96–97)
COHGB MFR BLD: 99.6 % (ref 96–97)
COHGB MFR BLD: 99.9 % (ref 96–97)
COHGB MFR BLD: >100 % (ref 96–97)
CREAT SERPL-MCNC: 1.48 MG/DL (ref 0.67–1.17)
CREAT SERPL-MCNC: 1.53 MG/DL (ref 0.67–1.17)
CREAT SERPL-MCNC: 1.54 MG/DL (ref 0.67–1.17)
CREAT SERPL-MCNC: 1.55 MG/DL (ref 0.67–1.17)
CREAT SERPL-MCNC: 1.56 MG/DL (ref 0.67–1.17)
CREAT SERPL-MCNC: 1.66 MG/DL (ref 0.67–1.17)
CREAT SERPL-MCNC: 1.68 MG/DL (ref 0.67–1.17)
CRP SERPL-MCNC: 4.81 MG/L
EGFRCR SERPLBLD CKD-EPI 2021: 58 ML/MIN/1.73M2
EGFRCR SERPLBLD CKD-EPI 2021: 59 ML/MIN/1.73M2
EGFRCR SERPLBLD CKD-EPI 2021: 63 ML/MIN/1.73M2
EGFRCR SERPLBLD CKD-EPI 2021: 64 ML/MIN/1.73M2
EGFRCR SERPLBLD CKD-EPI 2021: 64 ML/MIN/1.73M2
EGFRCR SERPLBLD CKD-EPI 2021: 65 ML/MIN/1.73M2
EGFRCR SERPLBLD CKD-EPI 2021: 67 ML/MIN/1.73M2
EOSINOPHIL # BLD AUTO: 0 10E3/UL (ref 0–0.7)
EOSINOPHIL # BLD AUTO: 0.1 10E3/UL (ref 0–0.7)
EOSINOPHIL NFR BLD AUTO: 0 %
EOSINOPHIL NFR BLD AUTO: 0 %
ERYTHROCYTE [DISTWIDTH] IN BLOOD BY AUTOMATED COUNT: 14 % (ref 10–15)
ERYTHROCYTE [DISTWIDTH] IN BLOOD BY AUTOMATED COUNT: 14.1 % (ref 10–15)
GLUCOSE BLDC GLUCOMTR-MCNC: 138 MG/DL (ref 70–99)
GLUCOSE BLDC GLUCOMTR-MCNC: 139 MG/DL (ref 70–99)
GLUCOSE BLDC GLUCOMTR-MCNC: 143 MG/DL (ref 70–99)
GLUCOSE BLDC GLUCOMTR-MCNC: 154 MG/DL (ref 70–99)
GLUCOSE BLDC GLUCOMTR-MCNC: 158 MG/DL (ref 70–99)
GLUCOSE BLDC GLUCOMTR-MCNC: 160 MG/DL (ref 70–99)
GLUCOSE BLDC GLUCOMTR-MCNC: 165 MG/DL (ref 70–99)
GLUCOSE BLDC GLUCOMTR-MCNC: 168 MG/DL (ref 70–99)
GLUCOSE BLDC GLUCOMTR-MCNC: 172 MG/DL (ref 70–99)
GLUCOSE BLDC GLUCOMTR-MCNC: 175 MG/DL (ref 70–99)
GLUCOSE BLDC GLUCOMTR-MCNC: 178 MG/DL (ref 70–99)
GLUCOSE BLDC GLUCOMTR-MCNC: 183 MG/DL (ref 70–99)
GLUCOSE BLDC GLUCOMTR-MCNC: 186 MG/DL (ref 70–99)
GLUCOSE BLDC GLUCOMTR-MCNC: 187 MG/DL (ref 70–99)
GLUCOSE BLDC GLUCOMTR-MCNC: 205 MG/DL (ref 70–99)
GLUCOSE BLDC GLUCOMTR-MCNC: 216 MG/DL (ref 70–99)
GLUCOSE BLDC GLUCOMTR-MCNC: 217 MG/DL (ref 70–99)
GLUCOSE SERPL-MCNC: 136 MG/DL (ref 70–99)
GLUCOSE SERPL-MCNC: 173 MG/DL (ref 70–99)
GLUCOSE SERPL-MCNC: 180 MG/DL (ref 70–99)
GLUCOSE SERPL-MCNC: 186 MG/DL (ref 70–99)
GLUCOSE SERPL-MCNC: 190 MG/DL (ref 70–99)
GLUCOSE SERPL-MCNC: 216 MG/DL (ref 70–99)
GLUCOSE SERPL-MCNC: 228 MG/DL (ref 70–99)
GRAM STAIN RESULT: ABNORMAL
GRAM STAIN RESULT: ABNORMAL
HCO3 BLD-SCNC: 32 MMOL/L (ref 21–28)
HCO3 BLD-SCNC: 32 MMOL/L (ref 21–28)
HCO3 BLD-SCNC: 33 MMOL/L (ref 21–28)
HCO3 BLDV-SCNC: 33 MMOL/L (ref 21–28)
HCO3 BLDV-SCNC: 35 MMOL/L (ref 21–28)
HCO3 BLDV-SCNC: 36 MMOL/L (ref 21–28)
HCO3 BLDV-SCNC: 36 MMOL/L (ref 21–28)
HCO3 BLDV-SCNC: 37 MMOL/L (ref 21–28)
HCO3 SERPL-SCNC: 28 MMOL/L (ref 22–29)
HCO3 SERPL-SCNC: 29 MMOL/L (ref 22–29)
HCO3 SERPL-SCNC: 30 MMOL/L (ref 22–29)
HCO3 SERPL-SCNC: 30 MMOL/L (ref 22–29)
HCT VFR BLD AUTO: 43.7 % (ref 40–53)
HCT VFR BLD AUTO: 47.3 % (ref 40–53)
HGB BLD-MCNC: 13.6 G/DL (ref 13.3–17.7)
HGB BLD-MCNC: 14 G/DL (ref 13.3–17.7)
HGB BLD-MCNC: 15.3 G/DL (ref 13.3–17.7)
IMM GRANULOCYTES # BLD: 0.3 10E3/UL
IMM GRANULOCYTES # BLD: 0.4 10E3/UL
IMM GRANULOCYTES NFR BLD: 1 %
IMM GRANULOCYTES NFR BLD: 2 %
LACTATE SERPL-SCNC: 2.4 MMOL/L (ref 0.7–2)
LACTATE SERPL-SCNC: 2.4 MMOL/L (ref 0.7–2)
LACTATE SERPL-SCNC: 2.5 MMOL/L (ref 0.7–2)
LACTATE SERPL-SCNC: 2.6 MMOL/L (ref 0.7–2)
LACTATE SERPL-SCNC: 2.7 MMOL/L (ref 0.7–2)
LACTATE SERPL-SCNC: 2.8 MMOL/L (ref 0.7–2)
LACTATE SERPL-SCNC: 3.4 MMOL/L (ref 0.7–2)
LACTATE SERPL-SCNC: 3.5 MMOL/L (ref 0.7–2)
LYMPHOCYTES # BLD AUTO: 1.1 10E3/UL (ref 0.8–5.3)
LYMPHOCYTES # BLD AUTO: 1.6 10E3/UL (ref 0.8–5.3)
LYMPHOCYTES NFR BLD AUTO: 5 %
LYMPHOCYTES NFR BLD AUTO: 7 %
MAGNESIUM SERPL-MCNC: 3.1 MG/DL (ref 1.7–2.3)
MAGNESIUM SERPL-MCNC: 3.2 MG/DL (ref 1.7–2.3)
MCH RBC QN AUTO: 28.6 PG (ref 26.5–33)
MCH RBC QN AUTO: 28.6 PG (ref 26.5–33)
MCHC RBC AUTO-ENTMCNC: 32 G/DL (ref 31.5–36.5)
MCHC RBC AUTO-ENTMCNC: 32.3 G/DL (ref 31.5–36.5)
MCV RBC AUTO: 88 FL (ref 78–100)
MCV RBC AUTO: 89 FL (ref 78–100)
MONOCYTES # BLD AUTO: 1.5 10E3/UL (ref 0–1.3)
MONOCYTES # BLD AUTO: 1.5 10E3/UL (ref 0–1.3)
MONOCYTES NFR BLD AUTO: 7 %
MONOCYTES NFR BLD AUTO: 7 %
NEUTROPHILS # BLD AUTO: 19.4 10E3/UL (ref 1.6–8.3)
NEUTROPHILS # BLD AUTO: 19.9 10E3/UL (ref 1.6–8.3)
NEUTROPHILS NFR BLD AUTO: 84 %
NEUTROPHILS NFR BLD AUTO: 87 %
NRBC # BLD AUTO: 0 10E3/UL
NRBC # BLD AUTO: 0 10E3/UL
NRBC BLD AUTO-RTO: 0 /100
NRBC BLD AUTO-RTO: 0 /100
O2/TOTAL GAS SETTING VFR VENT: 40 %
OBSERVATION IMP: NEGATIVE
OXYHGB MFR BLDV: 58 % (ref 70–75)
OXYHGB MFR BLDV: 60 % (ref 70–75)
OXYHGB MFR BLDV: 62 % (ref 70–75)
OXYHGB MFR BLDV: 65 % (ref 70–75)
OXYHGB MFR BLDV: 67 % (ref 70–75)
OXYHGB MFR BLDV: 68 % (ref 70–75)
OXYHGB MFR BLDV: 68 % (ref 70–75)
PCO2 BLD: 36 MM HG (ref 35–45)
PCO2 BLD: 38 MM HG (ref 35–45)
PCO2 BLD: 40 MM HG (ref 35–45)
PCO2 BLD: 41 MM HG (ref 35–45)
PCO2 BLDV: 43 MM HG (ref 40–50)
PCO2 BLDV: 43 MM HG (ref 40–50)
PCO2 BLDV: 44 MM HG (ref 40–50)
PCO2 BLDV: 45 MM HG (ref 40–50)
PCO2 BLDV: 46 MM HG (ref 40–50)
PCO2 BLDV: 47 MM HG (ref 40–50)
PCO2 BLDV: 50 MM HG (ref 40–50)
PEEP: 5 CM H2O
PH BLD: 7.5 [PH] (ref 7.35–7.45)
PH BLD: 7.52 [PH] (ref 7.35–7.45)
PH BLD: 7.53 [PH] (ref 7.35–7.45)
PH BLD: 7.54 [PH] (ref 7.35–7.45)
PH BLD: 7.54 [PH] (ref 7.35–7.45)
PH BLD: 7.57 [PH] (ref 7.35–7.45)
PH BLDV: 7.47 [PH] (ref 7.32–7.43)
PH BLDV: 7.49 [PH] (ref 7.32–7.43)
PH BLDV: 7.5 [PH] (ref 7.32–7.43)
PH BLDV: 7.51 [PH] (ref 7.32–7.43)
PH BLDV: 7.52 [PH] (ref 7.32–7.43)
PH BLDV: 7.53 [PH] (ref 7.32–7.43)
PH BLDV: 7.53 [PH] (ref 7.32–7.43)
PHOSPHATE SERPL-MCNC: 8 MG/DL (ref 2.5–4.5)
PLATELET # BLD AUTO: 261 10E3/UL (ref 150–450)
PLATELET # BLD AUTO: 277 10E3/UL (ref 150–450)
PO2 BLD: 116 MM HG (ref 80–105)
PO2 BLD: 117 MM HG (ref 80–105)
PO2 BLD: 118 MM HG (ref 80–105)
PO2 BLD: 146 MM HG (ref 80–105)
PO2 BLD: 155 MM HG (ref 80–105)
PO2 BLD: 156 MM HG (ref 80–105)
PO2 BLDV: 34 MM HG (ref 25–47)
PO2 BLDV: 34 MM HG (ref 25–47)
PO2 BLDV: 35 MM HG (ref 25–47)
PO2 BLDV: 35 MM HG (ref 25–47)
PO2 BLDV: 37 MM HG (ref 25–47)
PO2 BLDV: 38 MM HG (ref 25–47)
PO2 BLDV: 39 MM HG (ref 25–47)
POTASSIUM SERPL-SCNC: 2.8 MMOL/L (ref 3.4–5.3)
POTASSIUM SERPL-SCNC: 2.9 MMOL/L (ref 3.4–5.3)
POTASSIUM SERPL-SCNC: 3.3 MMOL/L (ref 3.4–5.3)
POTASSIUM SERPL-SCNC: 3.3 MMOL/L (ref 3.4–5.3)
POTASSIUM SERPL-SCNC: 3.4 MMOL/L (ref 3.4–5.3)
POTASSIUM SERPL-SCNC: 3.7 MMOL/L (ref 3.4–5.3)
POTASSIUM SERPL-SCNC: 3.9 MMOL/L (ref 3.4–5.3)
POTASSIUM SERPL-SCNC: 4.3 MMOL/L (ref 3.4–5.3)
POTASSIUM SERPL-SCNC: 4.7 MMOL/L (ref 3.4–5.3)
PROT SERPL-MCNC: 7.1 G/DL (ref 6.4–8.3)
PROT SERPL-MCNC: 7.2 G/DL (ref 6.4–8.3)
PROT SERPL-MCNC: 7.3 G/DL (ref 6.4–8.3)
PROT SERPL-MCNC: 7.8 G/DL (ref 6.4–8.3)
PROT SERPL-MCNC: 7.8 G/DL (ref 6.4–8.3)
PROT SERPL-MCNC: 8.1 G/DL (ref 6.4–8.3)
RBC # BLD AUTO: 4.89 10E6/UL (ref 4.4–5.9)
RBC # BLD AUTO: 5.35 10E6/UL (ref 4.4–5.9)
SAO2 % BLDA: 97 % (ref 92–100)
SAO2 % BLDA: 98 % (ref 92–100)
SAO2 % BLDA: 99 % (ref 92–100)
SAO2 % BLDV: 58.9 % (ref 70–75)
SAO2 % BLDV: 61.4 % (ref 70–75)
SAO2 % BLDV: 63.1 % (ref 70–75)
SAO2 % BLDV: 66.3 % (ref 70–75)
SAO2 % BLDV: 68.5 % (ref 70–75)
SAO2 % BLDV: 69.1 % (ref 70–75)
SAO2 % BLDV: 69.6 % (ref 70–75)
SODIUM SERPL-SCNC: 147 MMOL/L (ref 135–145)
SODIUM SERPL-SCNC: 148 MMOL/L (ref 135–145)
SODIUM SERPL-SCNC: 150 MMOL/L (ref 135–145)
SODIUM SERPL-SCNC: 154 MMOL/L (ref 135–145)
SODIUM SERPL-SCNC: 155 MMOL/L (ref 135–145)
SODIUM SERPL-SCNC: 156 MMOL/L (ref 135–145)
SODIUM SERPL-SCNC: 156 MMOL/L (ref 135–145)
THIOCYANATE SERPL-MCNC: 1 MG/DL
UFH PPP CHRO-ACNC: 0.29 IU/ML
WBC # BLD AUTO: 22.4 10E3/UL (ref 4–11)
WBC # BLD AUTO: 23.4 10E3/UL (ref 4–11)

## 2024-08-27 PROCEDURE — 999N000077 HC STATISTIC INSERT IABP

## 2024-08-27 PROCEDURE — 94003 VENT MGMT INPAT SUBQ DAY: CPT

## 2024-08-27 PROCEDURE — 250N000011 HC RX IP 250 OP 636: Performed by: STUDENT IN AN ORGANIZED HEALTH CARE EDUCATION/TRAINING PROGRAM

## 2024-08-27 PROCEDURE — 272N000057 HC CATH BALLOON IABP

## 2024-08-27 PROCEDURE — 83735 ASSAY OF MAGNESIUM: CPT | Performed by: STUDENT IN AN ORGANIZED HEALTH CARE EDUCATION/TRAINING PROGRAM

## 2024-08-27 PROCEDURE — 999N000075 HC STATISTIC IABP MONITORING

## 2024-08-27 PROCEDURE — C1894 INTRO/SHEATH, NON-LASER: HCPCS | Performed by: INTERNAL MEDICINE

## 2024-08-27 PROCEDURE — 999N000185 HC STATISTIC TRANSPORT TIME EA 15 MIN

## 2024-08-27 PROCEDURE — 200N000002 HC R&B ICU UMMC

## 2024-08-27 PROCEDURE — 99153 MOD SED SAME PHYS/QHP EA: CPT | Performed by: INTERNAL MEDICINE

## 2024-08-27 PROCEDURE — 82805 BLOOD GASES W/O2 SATURATION: CPT | Performed by: STUDENT IN AN ORGANIZED HEALTH CARE EDUCATION/TRAINING PROGRAM

## 2024-08-27 PROCEDURE — 71045 X-RAY EXAM CHEST 1 VIEW: CPT | Mod: 26 | Performed by: RADIOLOGY

## 2024-08-27 PROCEDURE — 71045 X-RAY EXAM CHEST 1 VIEW: CPT

## 2024-08-27 PROCEDURE — 74176 CT ABD & PELVIS W/O CONTRAST: CPT | Mod: 26 | Performed by: RADIOLOGY

## 2024-08-27 PROCEDURE — 93930 UPPER EXTREMITY STUDY: CPT | Mod: 26 | Performed by: RADIOLOGY

## 2024-08-27 PROCEDURE — G1010 CDSM STANSON: HCPCS | Performed by: RADIOLOGY

## 2024-08-27 PROCEDURE — 93005 ELECTROCARDIOGRAM TRACING: CPT

## 2024-08-27 PROCEDURE — 258N000003 HC RX IP 258 OP 636: Performed by: STUDENT IN AN ORGANIZED HEALTH CARE EDUCATION/TRAINING PROGRAM

## 2024-08-27 PROCEDURE — 94799 UNLISTED PULMONARY SVC/PX: CPT

## 2024-08-27 PROCEDURE — 999N000157 HC STATISTIC RCP TIME EA 10 MIN

## 2024-08-27 PROCEDURE — 80048 BASIC METABOLIC PNL TOTAL CA: CPT

## 2024-08-27 PROCEDURE — 250N000013 HC RX MED GY IP 250 OP 250 PS 637: Performed by: STUDENT IN AN ORGANIZED HEALTH CARE EDUCATION/TRAINING PROGRAM

## 2024-08-27 PROCEDURE — 82330 ASSAY OF CALCIUM: CPT | Performed by: STUDENT IN AN ORGANIZED HEALTH CARE EDUCATION/TRAINING PROGRAM

## 2024-08-27 PROCEDURE — 93308 TTE F-UP OR LMTD: CPT | Mod: 26 | Performed by: STUDENT IN AN ORGANIZED HEALTH CARE EDUCATION/TRAINING PROGRAM

## 2024-08-27 PROCEDURE — 82248 BILIRUBIN DIRECT: CPT | Performed by: STUDENT IN AN ORGANIZED HEALTH CARE EDUCATION/TRAINING PROGRAM

## 2024-08-27 PROCEDURE — 250N000012 HC RX MED GY IP 250 OP 636 PS 637

## 2024-08-27 PROCEDURE — 250N000009 HC RX 250: Performed by: STUDENT IN AN ORGANIZED HEALTH CARE EDUCATION/TRAINING PROGRAM

## 2024-08-27 PROCEDURE — 250N000013 HC RX MED GY IP 250 OP 250 PS 637

## 2024-08-27 PROCEDURE — 250N000009 HC RX 250: Performed by: INTERNAL MEDICINE

## 2024-08-27 PROCEDURE — 93321 DOPPLER ECHO F-UP/LMTD STD: CPT | Mod: 26 | Performed by: STUDENT IN AN ORGANIZED HEALTH CARE EDUCATION/TRAINING PROGRAM

## 2024-08-27 PROCEDURE — 93930 UPPER EXTREMITY STUDY: CPT

## 2024-08-27 PROCEDURE — 83605 ASSAY OF LACTIC ACID: CPT | Performed by: STUDENT IN AN ORGANIZED HEALTH CARE EDUCATION/TRAINING PROGRAM

## 2024-08-27 PROCEDURE — 80048 BASIC METABOLIC PNL TOTAL CA: CPT | Performed by: STUDENT IN AN ORGANIZED HEALTH CARE EDUCATION/TRAINING PROGRAM

## 2024-08-27 PROCEDURE — 99291 CRITICAL CARE FIRST HOUR: CPT | Mod: 25 | Performed by: STUDENT IN AN ORGANIZED HEALTH CARE EDUCATION/TRAINING PROGRAM

## 2024-08-27 PROCEDURE — 272N000766 HC IAB CATH AND INSERTION KIT, SENSATION

## 2024-08-27 PROCEDURE — 33967 INSERT I-AORT PERCUT DEVICE: CPT | Performed by: INTERNAL MEDICINE

## 2024-08-27 PROCEDURE — 999N000254 HC STATISTIC VENTILATOR TRANSFER

## 2024-08-27 PROCEDURE — 250N000011 HC RX IP 250 OP 636: Performed by: INTERNAL MEDICINE

## 2024-08-27 PROCEDURE — 255N000002 HC RX 255 OP 636: Performed by: STUDENT IN AN ORGANIZED HEALTH CARE EDUCATION/TRAINING PROGRAM

## 2024-08-27 PROCEDURE — 93325 DOPPLER ECHO COLOR FLOW MAPG: CPT | Mod: 26 | Performed by: STUDENT IN AN ORGANIZED HEALTH CARE EDUCATION/TRAINING PROGRAM

## 2024-08-27 PROCEDURE — 93010 ELECTROCARDIOGRAM REPORT: CPT | Mod: XU | Performed by: INTERNAL MEDICINE

## 2024-08-27 PROCEDURE — 5A02210 ASSISTANCE WITH CARDIAC OUTPUT USING BALLOON PUMP, CONTINUOUS: ICD-10-PCS | Performed by: INTERNAL MEDICINE

## 2024-08-27 PROCEDURE — 71250 CT THORAX DX C-: CPT | Mod: MG

## 2024-08-27 PROCEDURE — 85520 HEPARIN ASSAY: CPT | Performed by: STUDENT IN AN ORGANIZED HEALTH CARE EDUCATION/TRAINING PROGRAM

## 2024-08-27 PROCEDURE — 999N000065 XR CHEST PORT 1 VIEW

## 2024-08-27 PROCEDURE — 999N000208 ECHOCARDIOGRAM LIMITED

## 2024-08-27 PROCEDURE — 71250 CT THORAX DX C-: CPT | Mod: 26 | Performed by: RADIOLOGY

## 2024-08-27 PROCEDURE — 250N000013 HC RX MED GY IP 250 OP 250 PS 637: Performed by: INTERNAL MEDICINE

## 2024-08-27 PROCEDURE — 250N000013 HC RX MED GY IP 250 OP 250 PS 637: Performed by: NURSE PRACTITIONER

## 2024-08-27 PROCEDURE — 272N000001 HC OR GENERAL SUPPLY STERILE: Performed by: INTERNAL MEDICINE

## 2024-08-27 PROCEDURE — 84132 ASSAY OF SERUM POTASSIUM: CPT | Performed by: STUDENT IN AN ORGANIZED HEALTH CARE EDUCATION/TRAINING PROGRAM

## 2024-08-27 PROCEDURE — 99233 SBSQ HOSP IP/OBS HIGH 50: CPT | Performed by: INTERNAL MEDICINE

## 2024-08-27 PROCEDURE — 84100 ASSAY OF PHOSPHORUS: CPT | Performed by: STUDENT IN AN ORGANIZED HEALTH CARE EDUCATION/TRAINING PROGRAM

## 2024-08-27 PROCEDURE — 84430 ASSAY OF THIOCYANATE: CPT | Performed by: STUDENT IN AN ORGANIZED HEALTH CARE EDUCATION/TRAINING PROGRAM

## 2024-08-27 PROCEDURE — 85025 COMPLETE CBC W/AUTO DIFF WBC: CPT | Performed by: STUDENT IN AN ORGANIZED HEALTH CARE EDUCATION/TRAINING PROGRAM

## 2024-08-27 PROCEDURE — 258N000003 HC RX IP 258 OP 636

## 2024-08-27 PROCEDURE — 250N000011 HC RX IP 250 OP 636

## 2024-08-27 PROCEDURE — 86140 C-REACTIVE PROTEIN: CPT | Performed by: STUDENT IN AN ORGANIZED HEALTH CARE EDUCATION/TRAINING PROGRAM

## 2024-08-27 PROCEDURE — 85018 HEMOGLOBIN: CPT | Performed by: STUDENT IN AN ORGANIZED HEALTH CARE EDUCATION/TRAINING PROGRAM

## 2024-08-27 PROCEDURE — 99418 PROLNG IP/OBS E/M EA 15 MIN: CPT | Performed by: INTERNAL MEDICINE

## 2024-08-27 PROCEDURE — 99207 PR NO BILLABLE SERVICE THIS VISIT: CPT | Performed by: INTERNAL MEDICINE

## 2024-08-27 PROCEDURE — 94640 AIRWAY INHALATION TREATMENT: CPT

## 2024-08-27 PROCEDURE — 99152 MOD SED SAME PHYS/QHP 5/>YRS: CPT | Performed by: INTERNAL MEDICINE

## 2024-08-27 RX ORDER — POTASSIUM CHLORIDE 750 MG/1
40 TABLET, EXTENDED RELEASE ORAL ONCE
Status: DISCONTINUED | OUTPATIENT
Start: 2024-08-27 | End: 2024-08-27

## 2024-08-27 RX ORDER — VANCOMYCIN HYDROCHLORIDE 1 G/200ML
1000 INJECTION, SOLUTION INTRAVENOUS EVERY 12 HOURS
Status: DISCONTINUED | OUTPATIENT
Start: 2024-08-27 | End: 2024-08-27

## 2024-08-27 RX ORDER — HEPARIN SODIUM 10000 [USP'U]/100ML
0-5000 INJECTION, SOLUTION INTRAVENOUS CONTINUOUS
Status: DISCONTINUED | OUTPATIENT
Start: 2024-08-27 | End: 2024-09-02

## 2024-08-27 RX ORDER — POTASSIUM CHLORIDE 20MEQ/15ML
40 LIQUID (ML) ORAL ONCE
Status: COMPLETED | OUTPATIENT
Start: 2024-08-27 | End: 2024-08-27

## 2024-08-27 RX ORDER — BUMETANIDE 0.25 MG/ML
2 INJECTION INTRAMUSCULAR; INTRAVENOUS ONCE
Status: COMPLETED | OUTPATIENT
Start: 2024-08-27 | End: 2024-08-27

## 2024-08-27 RX ORDER — SODIUM CHLORIDE 9 MG/ML
INJECTION, SOLUTION INTRAVENOUS CONTINUOUS
Status: DISCONTINUED | OUTPATIENT
Start: 2024-08-27 | End: 2024-09-02

## 2024-08-27 RX ORDER — ACETAMINOPHEN 325 MG/1
650 TABLET ORAL EVERY 4 HOURS PRN
Status: DISCONTINUED | OUTPATIENT
Start: 2024-08-27 | End: 2024-08-27

## 2024-08-27 RX ORDER — POTASSIUM CHLORIDE 29.8 MG/ML
20 INJECTION INTRAVENOUS
Status: COMPLETED | OUTPATIENT
Start: 2024-08-27 | End: 2024-08-27

## 2024-08-27 RX ORDER — POTASSIUM CHLORIDE 29.8 MG/ML
20 INJECTION INTRAVENOUS
Status: DISCONTINUED | OUTPATIENT
Start: 2024-08-27 | End: 2024-08-27

## 2024-08-27 RX ORDER — POTASSIUM CHLORIDE 7.45 MG/ML
10 INJECTION INTRAVENOUS
Status: COMPLETED | OUTPATIENT
Start: 2024-08-27 | End: 2024-08-27

## 2024-08-27 RX ADMIN — PIPERACILLIN AND TAZOBACTAM 4.5 G: 4; .5 INJECTION, POWDER, LYOPHILIZED, FOR SOLUTION INTRAVENOUS at 21:54

## 2024-08-27 RX ADMIN — ALBUTEROL SULFATE 2.5 MG: 2.5 SOLUTION RESPIRATORY (INHALATION) at 01:41

## 2024-08-27 RX ADMIN — PIPERACILLIN AND TAZOBACTAM 4.5 G: 4; .5 INJECTION, POWDER, LYOPHILIZED, FOR SOLUTION INTRAVENOUS at 02:24

## 2024-08-27 RX ADMIN — ALBUTEROL SULFATE 2.5 MG: 2.5 SOLUTION RESPIRATORY (INHALATION) at 07:57

## 2024-08-27 RX ADMIN — HUMAN ALBUMIN MICROSPHERES AND PERFLUTREN 6 ML: 10; .22 INJECTION, SOLUTION INTRAVENOUS at 09:24

## 2024-08-27 RX ADMIN — QUETIAPINE FUMARATE 25 MG: 25 TABLET ORAL at 08:45

## 2024-08-27 RX ADMIN — POTASSIUM CHLORIDE 10 MEQ: 7.46 INJECTION, SOLUTION INTRAVENOUS at 11:53

## 2024-08-27 RX ADMIN — INSULIN HUMAN 5 UNITS/HR: 1 INJECTION, SOLUTION INTRAVENOUS at 14:13

## 2024-08-27 RX ADMIN — POTASSIUM CHLORIDE 40 MEQ: 1.5 POWDER, FOR SOLUTION ORAL at 20:20

## 2024-08-27 RX ADMIN — POTASSIUM CHLORIDE 20 MEQ: 29.8 INJECTION, SOLUTION INTRAVENOUS at 04:13

## 2024-08-27 RX ADMIN — PIPERACILLIN AND TAZOBACTAM 4.5 G: 4; .5 INJECTION, POWDER, LYOPHILIZED, FOR SOLUTION INTRAVENOUS at 08:24

## 2024-08-27 RX ADMIN — NYSTATIN 500000 UNITS: 100000 SUSPENSION ORAL at 21:40

## 2024-08-27 RX ADMIN — PREDNISONE 40 MG: 20 TABLET ORAL at 08:44

## 2024-08-27 RX ADMIN — Medication 150 MCG/HR: at 06:28

## 2024-08-27 RX ADMIN — POTASSIUM CHLORIDE 10 MEQ: 7.46 INJECTION, SOLUTION INTRAVENOUS at 10:56

## 2024-08-27 RX ADMIN — Medication 15 ML: at 08:24

## 2024-08-27 RX ADMIN — DOBUTAMINE 5 MCG/KG/MIN: 12.5 INJECTION, SOLUTION, CONCENTRATE INTRAVENOUS at 21:36

## 2024-08-27 RX ADMIN — ALBUTEROL SULFATE 2.5 MG: 2.5 SOLUTION RESPIRATORY (INHALATION) at 19:45

## 2024-08-27 RX ADMIN — PIPERACILLIN AND TAZOBACTAM 4.5 G: 4; .5 INJECTION, POWDER, LYOPHILIZED, FOR SOLUTION INTRAVENOUS at 13:50

## 2024-08-27 RX ADMIN — NYSTATIN 500000 UNITS: 100000 SUSPENSION ORAL at 17:10

## 2024-08-27 RX ADMIN — POTASSIUM CHLORIDE 20 MEQ: 29.8 INJECTION, SOLUTION INTRAVENOUS at 19:49

## 2024-08-27 RX ADMIN — NYSTATIN 500000 UNITS: 100000 SUSPENSION ORAL at 08:44

## 2024-08-27 RX ADMIN — THIAMINE HCL TAB 100 MG 100 MG: 100 TAB at 08:44

## 2024-08-27 RX ADMIN — BUMETANIDE 2 MG: 0.25 INJECTION INTRAMUSCULAR; INTRAVENOUS at 19:27

## 2024-08-27 RX ADMIN — Medication 40 MG: at 08:45

## 2024-08-27 RX ADMIN — POTASSIUM CHLORIDE 40 MEQ: 20 SOLUTION ORAL at 11:47

## 2024-08-27 RX ADMIN — SENNOSIDES AND DOCUSATE SODIUM 1 TABLET: 8.6; 5 TABLET ORAL at 20:21

## 2024-08-27 RX ADMIN — QUETIAPINE FUMARATE 25 MG: 25 TABLET ORAL at 20:20

## 2024-08-27 RX ADMIN — NYSTATIN 500000 UNITS: 100000 SUSPENSION ORAL at 11:47

## 2024-08-27 RX ADMIN — POTASSIUM CHLORIDE 10 MEQ: 7.46 INJECTION, SOLUTION INTRAVENOUS at 13:52

## 2024-08-27 RX ADMIN — BUMETANIDE 1 MG/HR: 0.25 INJECTION, SOLUTION INTRAMUSCULAR; INTRAVENOUS at 20:03

## 2024-08-27 RX ADMIN — SODIUM NITROPRUSSIDE 1 MCG/KG/MIN: 25 INJECTION, SOLUTION, CONCENTRATE INTRAVENOUS at 05:41

## 2024-08-27 RX ADMIN — VANCOMYCIN HYDROCHLORIDE 2000 MG: 1 INJECTION, POWDER, LYOPHILIZED, FOR SOLUTION INTRAVENOUS at 06:43

## 2024-08-27 RX ADMIN — Medication 200 MCG/HR: at 18:48

## 2024-08-27 RX ADMIN — POTASSIUM CHLORIDE 10 MEQ: 7.46 INJECTION, SOLUTION INTRAVENOUS at 14:54

## 2024-08-27 RX ADMIN — CHLORHEXIDINE GLUCONATE 0.12% ORAL RINSE 15 ML: 1.2 LIQUID ORAL at 08:24

## 2024-08-27 RX ADMIN — SODIUM NITROPRUSSIDE 1.5 MCG/KG/MIN: 25 INJECTION, SOLUTION, CONCENTRATE INTRAVENOUS at 23:07

## 2024-08-27 RX ADMIN — POTASSIUM CHLORIDE 10 MEQ: 7.46 INJECTION, SOLUTION INTRAVENOUS at 10:06

## 2024-08-27 RX ADMIN — POTASSIUM CHLORIDE 20 MEQ: 29.8 INJECTION, SOLUTION INTRAVENOUS at 20:52

## 2024-08-27 RX ADMIN — POTASSIUM CHLORIDE 10 MEQ: 7.46 INJECTION, SOLUTION INTRAVENOUS at 12:53

## 2024-08-27 RX ADMIN — HEPARIN SODIUM 1150 UNITS/HR: 10000 INJECTION, SOLUTION INTRAVENOUS at 17:06

## 2024-08-27 RX ADMIN — CHLORHEXIDINE GLUCONATE 0.12% ORAL RINSE 15 ML: 1.2 LIQUID ORAL at 21:40

## 2024-08-27 RX ADMIN — POTASSIUM CHLORIDE 40 MEQ: 1.5 POWDER, FOR SOLUTION ORAL at 05:30

## 2024-08-27 ASSESSMENT — ACTIVITIES OF DAILY LIVING (ADL)
ADLS_ACUITY_SCORE: 44
ADLS_ACUITY_SCORE: 44
ADLS_ACUITY_SCORE: 46
ADLS_ACUITY_SCORE: 44
ADLS_ACUITY_SCORE: 46
ADLS_ACUITY_SCORE: 44
ADLS_ACUITY_SCORE: 46
ADLS_ACUITY_SCORE: 46
ADLS_ACUITY_SCORE: 44
ADLS_ACUITY_SCORE: 44
ADLS_ACUITY_SCORE: 46
ADLS_ACUITY_SCORE: 44
ADLS_ACUITY_SCORE: 46

## 2024-08-27 NOTE — PROGRESS NOTES
SUBJECTIVE:  Remains intermittently agitated. Intermittently hypoxic on gas and Spo2 monitor. Became hypotensive and needed low dose NE; was around time of increasing precedex dose. Started on Oriana at 20 ppm last night. Worsening hypernatremia, FWF increased. Opens eyes, not consistently following commands.     ASSESSMENT:   Terrance Hidalgo is a 24 year old male who presents with URI symptoms and hypoxia on 8/19. He has a reported pmhx of autism, HLD. He was intubated on 8/19 in the setting of acute hypoxic respiratory failure, likely 2/2 new severe CM of unknown etiology. Myocarditis considered given elevated inflammatory markers, endomyocardial biopsy negative, viral panel negative. CICU following for vent management.     RECOMMENDATIONS:      - This morning has right and left sided filling pressures, holding diuretics at this time to keep net even to slightly net positive.  - No longer febrile since broadening antibiotics and stopping precedex. His WBC continues to uptrend.  Cultures thus far are negative.   -Abx per ID/primary  - Off sedation, on minimal vent setting, and following commands.   -Once off Oriana, can begin slow wean of 5 to 3, 2,1  then off with plans to be off by tomorrow   -We will plan for extubation.   - Diuresis per primary.  - Continue PPI and peridex.   - Daily CXR  - Serial ABGs  - Continue ppx lovenox  - CHE and ANCA pending.   - Agree with dobutamine for biventricular support.    Discussed with attending, Dr. Oneil.    Huy Delgado MD PGY-5  Cardiovascular Disease Fellow    Physical Exam   Temp: 98.1  F (36.7  C) Temp src: Bladder BP: 111/74 Pulse: (!) 131   Resp: 18 SpO2: 100 % O2 Device: Mechanical Ventilator    Vital Signs with Ranges  Temp:  [98.1  F (36.7  C)-101.3  F (38.5  C)] 98.1  F (36.7  C)  Pulse:  [] 131  Resp:  [15-24] 18  BP: (111)/(74) 111/74  MAP:  [38 mmHg-113 mmHg] 88 mmHg  Arterial Line BP: ()/() 119/77  FiO2 (%):  [40 %] 40 %  SpO2:  [94 %-100  %] 100 %  210 lbs 1.57 oz    GEN: NAD, intubated  HEENT: no icterus  CV: RRR, no obvious murmurs  CHEST: Mechanical breath sounds  ABD: soft, NT/ND, NABS  EXT: Trace BLE edema, warm  NEURO: sedated, opens eyes and moves all extremities.   PSYCH: unable to assess    Data   Recent Labs   Lab 08/27/24  0646 08/27/24  0413 08/27/24  0412 08/27/24  0304 08/27/24  0205 08/27/24  0107 08/26/24  2357 08/26/24  2102 08/26/24  2100 08/26/24  1559 08/26/24  1551 08/26/24  0901 08/26/24  0848   WBC  --  23.4*  --   --   --   --   --   --   --   --  13.1*  --  17.9*   HGB  --  15.3  --   --   --   --   --   --  16.5  --  16.1  --  14.2   MCV  --  88  --   --   --   --   --   --   --   --  89  --  92   PLT  --  277  --   --   --   --   --   --   --   --  251  --  269   NA  --  156*  --   --  155*  --  156*  --   --    < > 158*   < >  --    POTASSIUM  --  2.8*  --   --  3.3*  3.3*  --  3.9  --   --    < > 3.3*   < >  --    CHLORIDE  --  103  --   --  104  --  104  --   --    < > 108*   < >  --    CO2  --  29  --   --  30*  --  29  --   --    < > 30*   < >  --    BUN  --  88.4*  --   --  86.1*  --  82.7*  --   --    < > 64.7*   < >  --    CR  --  1.66*  --   --  1.53*  --  1.48*  --   --    < > 1.13   < >  --    ANIONGAP  --  24*  --   --  21*  --  23*  --   --    < > 20*   < >  --    ALEXANDER  --  9.7  --   --  9.7  --  9.9  --   --    < > 10.1   < >  --    * 228* 216*   < > 216*   < > 186*   < >  --    < > 226*   < >  --    ALBUMIN  --  4.4  --   --  4.5  --  4.6  --   --    < > 4.9  --  4.0   PROTTOTAL  --  7.8  --   --  7.8  --  8.1  --   --    < > 8.7*  --  6.9   BILITOTAL  --  1.5*  --   --  1.4*  --  1.6*  --   --    < > 1.2  --  0.9   ALKPHOS  --  108  --   --  114  --  110  --   --    < > 115  --  94   ALT  --  696*  --   --  686*  --  669*  --   --    < > 599*  --  476*   AST  --  368*  --   --  380*  --  372*  --   --    < > 263*  --  271*    < > = values in this interval not displayed.       Recent Results (from the  past 24 hour(s))   XR Chest Port 1 View    Narrative    Portable chest 8/26/2024 0905 hours    INDICATION: Endotracheal tube advanced    COMPARISON: 0607 hours earlier today    FINDINGS: No significant change in appearance of the heart or lungs or  many of the support devices. Endotracheal tube advanced with tip now  projects a 4.7 cm above the mauri.      Impression    IMPRESSION: Endotracheal tube slightly advanced just under 5 cm above  the mauri.    KATIE BOBBY MD         SYSTEM ID:  [id number]   XR Abdomen Port 1 View    Narrative    Exam: XR ABDOMEN PORT 1 VIEW, 8/26/2024 12:36 PM    Indication: Verify small bowel feeding tube bedside placement    Comparison: Abdomen 8/22/2024.    Findings:   Portable AP supine. Feeding tube has been inserted with tip towards  the DJ flexure. Gastric tube tip and sidehole overlying stomach.  Nonobstructive bowel gas pattern. Please see separate chest  radiograph.      Impression    Impression: Feeding tube tip towards the DJ flexure. Gastric tube tip  and sidehole overlying stomach.    LUMA MATHEWS MD         SYSTEM ID:  [id number]     TTE 8/21/24:  Interpretation Summary  Tachycardia at 144BPM during study.  Mild left ventricular dilation is present.LVIDd 58mm.  Biplane LVEF is 16%.  Severe hypokinesis of mid to apical RV free wall.  Pulmonary artery systolic pressure is normal.  The inferior vena cava is normal.  No pericardial effusion is present.  There is no prior study for direct comparison.    RHC 8/21/24:  Conclusion         Right sided filling pressures are normal.    Left sided filling pressures are mildly elevated.    Mild elevated pulmonary hypertension.    Reduced cardiac output level.    Successful endomyocardial biopsy. Results pending.    Fluoroscopy was used to visualize the left internal jugular vein.     - Elevated left sided filling pressures with mildly reduced cardiac index   - Uncomplicated EMB with 4 samples collected          Hemodynamics    RIGHT HEART CATHETERIZATION:    === Off pressors vented Fio2 80% ====    /71/87 mmHg  RA mean of 6 mmHg with a V wave of 8 mmhg  PA 30/23/26 mmHg  PCWP 18 mmHg with a V wave of 20 mmhg  Wayne CO/CI 5.0/2.1 L/min/m2   PVR 1.6 GROVE   SVR 1270 dynes/sec/cm-5  PA sat 70%   Hgb 14.6 g/dL        Right sided filling pressures are normal. Left sided filling pressures are mildly elevated. Mild elevated pulmonary hypertension. Reduced cardiac output level.

## 2024-08-27 NOTE — PROGRESS NOTES
[facility] PROGRESS NOTE     Patient:  Terrance Hidalgo   YOB: 2000, MRN: [id number]  Date of Visit: 08/27/2024  Date of Admission: 8/20/2024  Consult Requester: Farrah Delgado MD          ASSESSMENT AND PLAN     Terrance Hidalgo is a 24 year old male with autism. He presented with new diagnosis of heart failure Aug 21. Biopsy and histopath showed essentially unremarkable myocardial tissue with no evidence of inflammatory infiltrate.      US liver did not show cholecystitis. Liver enzymes is worse. The leukocytosis might be due to steroids. We do not have a good explanation for the persistently elevated liver enzymes. I would request our hepatology colleagues to weigh in and give their opinion on differentials for the hepatitis if we are missing something.     His fevers might be non-infectious as his CRP is now normal and PCT is now below 0.35.     I talked to his aunt who was his caretaker. He did not have extensive exposure outdoors and abroad. No farm animal exposures as well.      IMPRESSION  New fevers, possibly drug fever    Hepatitis, Elevated transaminases   Mixed shock (mostly cardiogenic)  Respiratory failure from Severe bacterial pneumonia  New heart failure with reduced EF     RECOMMENDATION:  Consider GI consult regarding elevated liver enzymes   Consider pan CT.   Continue IV piptazo at 3.375g every 6 hours.    ID will continue to follow with you. Please check [facility] for staff covering the service.     Racheal Son MD  Infectious Diseases  Pager: [phone]  Ipropertyz jena    65 MINUTES SPENT BY ME on the date of service doing chart review, history, exam, documentation & further activities per the note.           SUBJECTIVE      Interval History and Events:  More awake but still intubated. On pressors.     Antimicrobial Treatment:  Vanco 8/27-  Piptazo 8/26-  Amp-sul 8/21-8/26         OBJECTIVE       Physical Examination:    Temp:  [98.1  F (36.7  C)-101.3  F (38.5  C)]  98.1  F (36.7  C)  Pulse:  [] 145  Resp:  [15-24] 18  BP: (111)/(74) 111/74  MAP:  [38 mmHg-113 mmHg] 88 mmHg  Arterial Line BP: ()/() 119/77  FiO2 (%):  [40 %] 40 %  SpO2:  [94 %-100 %] 100 %    I/O last 3 completed shifts:  In: 5082.38 [I.V.:2092.38; NG/GT:2240]  Out: 6630 [Urine:5730; Emesis/NG output:550; Stool:350]    Vitals:    08/24/24 0200 08/24/24 0900 08/25/24 0000 08/26/24 0000   Weight: 111.2 kg (245 lb 2.4 oz) 100.5 kg (221 lb 9 oz) 98.5 kg (217 lb 2.5 oz) 96.6 kg (212 lb 15.4 oz)    08/27/24 0000   Weight: 95.3 kg (210 lb 1.6 oz)       Constitutional: Awake, alert, intubated.  HEENT: sclera clear, conjunctiva normal  Respiratory: intubated clear breath sounds   Cardiovascular: tachycardia   GI: Normal bowel sounds, soft, non-distended and non-tender.  Vascular access:   non-tender, no surrounding erythema.    Laboratory Data:    ALT, AST - high  Estimated Creatinine Clearance: 98.4 mL/min (A) (based on SCr of 1.56 mg/dL (H)).    CRP normal   PCT 0.35    Microbiology:  8/26 Parvo PCR -   8/26 C diff - neg   8/26 MRSA - neg   8/26 BDG -  8/26 Ucx -   8/25 Scx - yeast   8/25 Bcx -   8/21 Legio, Strep U - neg   8/21 Lyme ab - neg   Adeno, CMV - neg   EBV pcr- neg  HIV neg  8/21 Parvo Ig positive, IgM neg   Coxsackie ab -   Histo U - neg  RPP - neg   COVID - neg   8/21 Scx - no growth

## 2024-08-27 NOTE — PHARMACY-VANCOMYCIN DOSING SERVICE
"Pharmacy Vancomycin Initial Note  Date of Service 2024  Patient's  2000  24 year old, male    Indication: Ventilator-Associated Pneumonia    Current estimated CrCl = Estimated Creatinine Clearance: 92.5 mL/min (A) (based on SCr of 1.66 mg/dL (H)).    Creatinine for last 3 days  2024: 10:11 AM Creatinine 0.88 mg/dL;  3:33 PM Creatinine 0.93 mg/dL;  8:12 PM Creatinine 0.95 mg/dL; 10:35 PM Creatinine 0.98 mg/dL  2024:  4:21 AM Creatinine 0.89 mg/dL;  9:43 AM Creatinine 0.85 mg/dL; 12:04 PM Creatinine 0.87 mg/dL;  4:51 PM Creatinine 1.08 mg/dL;  8:08 PM Creatinine 1.09 mg/dL  2024: 12:46 AM Creatinine 1.41 mg/dL;  4:21 AM Creatinine 1.18 mg/dL;  7:56 AM Creatinine 1.03 mg/dL; 12:18 PM Creatinine 1.07 mg/dL;  3:51 PM Creatinine 1.13 mg/dL;  8:57 PM Creatinine 1.35 mg/dL; 11:57 PM Creatinine 1.48 mg/dL  2024:  2:05 AM Creatinine 1.53 mg/dL;  4:13 AM Creatinine 1.66 mg/dL    Recent Vancomycin Level(s) for last 3 days  No results found for requested labs within last 3 days.      Vancomycin IV Administrations (past 72 hours)        No vancomycin orders with administrations in past 72 hours.                    Nephrotoxins and other renal medications (From now, onward)      Start     Dose/Rate Route Frequency Ordered Stop    24 1900  vancomycin (VANCOCIN) 1,000 mg in 200 mL dextrose intermittent infusion         1,000 mg  200 mL/hr over 1 Hours Intravenous EVERY 12 HOURS 24 0615      24 0630  vancomycin (VANCOCIN) 2,000 mg in sodium chloride 0.9 % 500 mL intermittent infusion         2,000 mg  over 120 Minutes Intravenous ONCE 24 0615      24  piperacillin-tazobactam (ZOSYN) 4.5 g vial to attach to  mL bag        Note to Pharmacy: For [facility], [facility] and [facility]: For Zosyn-naive patients, use the \"Zosyn initial dose + extended infusion\" order panel.    4.5 g  over 30 Minutes Intravenous EVERY 6 HOURS 24 1752      24 1000  DOBUTamine (DOBUTREX) " 1,000 mg in D5W 250 mL infusion (adult MAX conc)         5 mcg/kg/min × 108 kg (Dosing Weight)  8.1 mL/hr  Intravenous CONTINUOUS 08/26/24 0936      08/24/24 1100  [Held by provider]  bumetanide (BUMEX) 0.25 mg/mL infusion        (On hold since today at 0254 until manually unheld; held by Shantanu Mclean MDHold Reason: Change in Vitals)    1 mg/hr  4 mL/hr  Intravenous CONTINUOUS 08/24/24 1008              Contrast Orders - past 72 hours (72h ago, onward)      None            [employer] Prediction of Planned Initial Vancomycin Regimen  Loading dose: 2000 mg IV X1  Regimen: 1000 mg IV every 12 hours.  Start time: 06:12 on 08/27/2024  Exposure target: AUC24 (range)400-600 mg/L.hr   AUC24,ss: 516 mg/L.hr  Probability of AUC24 > 400: 78 %  Ctrough,ss: 17.1 mg/L  Probability of Ctrough,ss > 20: 35 %  Probability of nephrotoxicity (Lodise FCO 2009): 13 %          Plan:  Start vancomycin  2000 mg IV X1 followed by 1000 mg IV q12h.   Vancomycin monitoring method: AUC  Vancomycin therapeutic monitoring goal: 400-600 mg*h/L  Pharmacy will check vancomycin levels as appropriate in 1-3 Days.    Serum creatinine levels will be ordered daily for the first week of therapy and at least twice weekly for subsequent weeks.      Patrick Morgan, [facility]

## 2024-08-27 NOTE — PROGRESS NOTES
[facility]  Cardiology Heart Failure Service (Cards 2) Progress Note    Patient Name: Terrance Hidalgo    Medical Record Number: [id number]    YOB: 2000  PCP: Colt Flores    Admit Date/Time: 8/20/2024 10:13 PM   7    Assessment/Plan:    23 yo M with PMH autism who initially presented to [place] with cough, URI symptoms, and hypoxemia. He was intubated due to respiratory failure and transferred to [facility], where his hospital course transpired to mixed cardiogenic/septic shock requiring multiple pressor agents, inotropic support, and IV diuresis. Transferred again to [facility] CICU on 8/20 for continued management. Presentation initially highly suspicious for fulminant myocarditis base on elevated cardiac and inflammatory biomarkers, new (suspected), severe biventricular dysfunction w/ reduced cardiac output requiring intermittent inotropic support, and concurrent infectious/respiratory symptomotology c/b acute respiratory failure 2/2 ARDS vs cardiogenic etiology 2/2 miocarditis with superimposed aspiration PNM treated with sulbactam. Empirically pulsed with steroids. However, EMB 5/21 demonstrated unremarkable myocardial tissue with no evidence of inflammatory infiltrate.     - ECMO candidate     Changes:  - IABP today   - c/w dobutamine for biventricular inotropic support   - nipride and wean off Oriana  - diuresis with CVP goal 10-12  - stop vanco  - CT C/A/P  - UE arterial US to evaluated size for impella 5.5    Neuro:     #AMS  #Sedation   - moving x 4 intermittent following commands     Plan:      - sedation holiday daily  - Sbp daily   - fentanyl for now   - prn ativan      CV:    #Biventricular failure LVEF 25%   #Cardiogenic shock SCAI C  #Lactic acidosis   TTE (8/21/24): LVIDd 58mm. Biplane LVEF is 16%. Severe hypokinesis of mid to apical RV free wall.  RHC (8/21/24): RA mean 6, PA 30/23/26, PCWP 18, [place] CO/CI 5.0/2.1, PVR 1.6, SVR 1270  EMB (8/21/24):  Fragments of essentially unremarkable myocardial tissue with no evidence of inflammatory infiltrate   Coronary angiogram: NA   CMR: None - will obtain prior to discharge     Plan:  - IABP today   - c/w dobutamine for biventricular inotropic support   - nipride and wean off Oriana  - diuresis with CVP goal 10-12  - UE arterial US to evaluated size for impella 5.5  - c/w steroid taper- c/w nystatin/bactrim   - increase bumex dose CVP goal 10-12      Pulm:     #Acute hypoxic respiratory failure  #Mild simple pleular effusion   #concern for Aspiration PNM     Plan:    - peep 5, titrate fio2  - abx per below       GI/Nutrition:    #Transaminitis    Plan:    - liver US with dopplers to r/o cholecystitis and differentiate liver perfusion/congestion with doppler indices showed no culprit   - post piloric feeding tube and start trikle feeds      Renal:    #HyperNa   #Hypervolemia    Plan:    - adjust FWF   - frequent BMP      ID:     #Concern for aspiration PNM   #Transaminitis     - Antimicrobials       > Unasyn 8/21 - 8/26       > Cefepime 8/20 - 8/21       > Azithromycin 8/20 - 08/24       > Vanco 8/20 - 8/21 s/p 8/26       > meropenem 8/26       > zosyn 8/26 -     Plan:  - c/w zosyn         Heme:    #Anemia of critical illness     Plan:  - H/H  - transfuse if hb < 7 or drop > 2 g     Endo:    Plan:    - FSG goal 140-160  - insulin     Family Meeting/Goals of Care:  - last time family updated: TBD   - next of kin/HCP:  Johann Hidalgo(TEMPORARY GUARDIAN TO 10-)  Brother  [phone]  (+[phone])  [facility] [address]  Legal guardian    Disposition:   - Unit   Code Status:   Full Code     Thank you for allowing me to care for this patient, please don't hesitate to contact me with any questions regarding this plan.   Patient was discussed and evaluated with Farrah Claudio MD, attending physician, who agrees with the assessment and plan above.    Matt Cabral MD, PhD, MSc  Cardiology  Fellow    Interval Changes in Past 24 Hours:     - Overnight events: spiking fever, need for pressors, worsening liver function renal function, intermittent hypotension  - Patient's complains: no new complaints.    - Telemetry monitoring: unremarkable.      - Review Of Systems:  A 4-point ROS was negative aside from those listed above.    Physical Exam:    General appearance - Lying in bed; intubated sedated   Head: Normocephalic and atraumatic.   Eyes: Pupils are equal, round, and reactive to light. No scleral icterus.  Neck: JVD, bruit.  Cardiovascular: Tachycardiac regular rhythm. S1 and S2 auscultated. No murmurs, rub, or gallops.  Pulmonary/Chest: Intubated. Mechanical ventilation   Abdominal: Bowel sounds present. Soft. No distension. No rigidity, rebound or guarding.   Extremities: Warm, no cyanosis, 2+ distal pulses bilaterally. Pitting edema b/l +1.   Skin: Skin is warm and not diaphoretic. No rash, bruising, or erythema.  Neuro: Sedated     Objective data:    Temp:  [97.9  F (36.6  C)-100.8  F (38.2  C)] 98.1  F (36.7  C)  Pulse:  [] 124  Resp:  [15-24] 18  BP: (111)/(74) 111/74  MAP:  [38 mmHg-113 mmHg] 72 mmHg  Arterial Line BP: ()/() 89/62  FiO2 (%):  [40 %] 40 %  SpO2:  [94 %-100 %] 100 %    Vitals:    08/24/24 0200 08/24/24 0900 08/25/24 0000 08/26/24 0000   Weight: 111.2 kg (245 lb 2.4 oz) 100.5 kg (221 lb 9 oz) 98.5 kg (217 lb 2.5 oz) 96.6 kg (212 lb 15.4 oz)    08/27/24 0000   Weight: 95.3 kg (210 lb 1.6 oz)        Vent Settings:  Resp: 18 SpO2: 100 % O2 Device: Mechanical Ventilator    FiO2 (%): 40 %, Resp: 18, Vent Mode: CMV/AC, Resp Rate (Set): 18 breaths/min, Tidal Volume (Set, mL): 480 mL, PEEP (cm H2O): 5 cmH2O, Resp Rate (Set): 18 breaths/min, Tidal Volume (Set, mL): 480 mL, PEEP (cm H2O): 5 cmH2O    LABS Reviewed  IMAGES Reviewed    Current medications   Current Facility-Administered Medications   Medication Dose Route Frequency Provider Last Rate Last Admin     acetaminophen (TYLENOL) tablet 650 mg  650 mg Oral or Feeding Tube Q8H PRN Sudheer Rojas MD   650 mg at 08/26/24 0501    albuterol (PROVENTIL) neb solution 2.5 mg  2.5 mg Nebulization Q6H Matt Cabral MD   2.5 mg at 08/27/24 0757    bumetanide (BUMEX) 0.25 mg/mL infusion  1 mg/hr Intravenous Continuous Matt Cabral MD   Stopped at 08/27/24 0915    chlorhexidine (PERIDEX) 0.12 % solution 15 mL  15 mL Mouth/Throat Q12H Sudheer Rojas MD   15 mL at 08/27/24 0824    dextrose 10% infusion   Intravenous Continuous PRN Colten Garay MD        glucose gel 15-30 g  15-30 g Oral Q15 Min PRN Colten Garay MD        Or    dextrose 50 % injection 25-50 mL  25-50 mL Intravenous Q15 Min PRN Colten Garay MD        Or    glucagon injection 1 mg  1 mg Subcutaneous Q15 Min PRN Colten Garay MD        DOBUTamine (DOBUTREX) 1,000 mg in D5W 250 mL infusion (adult MAX conc)  7.5 mcg/kg/min (Dosing Weight) Intravenous Continuous Matt Cabral MD 12.2 mL/hr at 08/27/24 1000 7.5 mcg/kg/min at 08/27/24 1000    enoxaparin ANTICOAGULANT (LOVENOX) injection 40 mg  40 mg Subcutaneous Q24H Colten Garay MD   40 mg at 08/26/24 1549    fentaNYL (SUBLIMAZE) infusion   mcg/hr Intravenous Continuous Sudheer Rojas MD 4 mL/hr at 08/27/24 1000 200 mcg/hr at 08/27/24 1000    insulin regular (MYXREDLIN) 1 unit/mL infusion  0-24 Units/hr Intravenous Continuous Colten Garay MD 4 mL/hr at 08/27/24 1000 4 Units/hr at 08/27/24 1000    ipratropium - albuterol 0.5 mg/2.5 mg/3 mL (DUONEB) neb solution 3 mL  3 mL Nebulization Q4H PRN Sudheer Rojas MD   3 mL at 08/25/24 0536    lidocaine (LMX4) cream   Topical Q1H PRN Sudheer Rojas MD        lidocaine 1 % 0.1-1 mL  0.1-1 mL Other Q1H PRN Sudheer Rojas MD        medication instruction   Does not apply Continuous PRN Sudheer Rojas MD        multivitamins w/minerals liquid 15 mL  15 mL Per Feeding Tube Daily Susan Yeh MD   15 mL at  08/27/24 0824    naloxone (NARCAN) injection 0.2 mg  0.2 mg Intravenous Q2 Min PRN Jani Martin MD        Or    naloxone (NARCAN) injection 0.4 mg  0.4 mg Intravenous Q2 Min PRN Jani Martin MD        Or    naloxone (NARCAN) injection 0.2 mg  0.2 mg Intramuscular Q2 Min PRN Jani Martin MD        Or    naloxone (NARCAN) injection 0.4 mg  0.4 mg Intramuscular Q2 Min PRN Jani Martin MD        nitroPRUsside (NIPRIDE) 100 mg, sodium thiosulfate 1,000 mg in D5W 62.5 mL IV infusion (conc: 1.6 mg/mL)  0.25-2.5 mcg/kg/min (Dosing Weight) Intravenous Continuous Matt Cabral MD 4.1 mL/hr at 08/27/24 1000 1 mcg/kg/min at 08/27/24 1000    nystatin (MYCOSTATIN) suspension 500,000 Units  500,000 Units Swish & Spit 4x Daily Susan Yeh MD   500,000 Units at 08/27/24 0844    pantoprazole (PROTONIX) 2 mg/mL suspension 40 mg  40 mg Oral or Feeding Tube Daily Farrah Delgado MD   40 mg at 08/27/24 0845    piperacillin-tazobactam (ZOSYN) 4.5 g vial to attach to  mL bag  4.5 g Intravenous Q6H Matt Cabral MD   4.5 g at 08/27/24 0824    [Held by provider] polyethylene glycol (MIRALAX) Packet 17 g  17 g Oral or Feeding Tube Daily Andree Becerril, APRN CNP   17 g at 08/23/24 1049    polyethylene glycol (MIRALAX) Packet 17 g  17 g Oral Daily PRN Sudheer Rojas MD   17 g at 08/22/24 1430    potassium chloride (KAYCIEL) solution 40 mEq  40 mEq Oral Once Jaxson Davis MD        potassium chloride (KLOR-CON) Packet 40 mEq  40 mEq Oral or Feeding Tube BID Matt Cabral MD   40 mEq at 08/27/24 0530    potassium chloride 10 mEq in 100 mL sterile water infusion  10 mEq Intravenous Q1H Farrah Delgado  mL/hr at 08/27/24 1056 10 mEq at 08/27/24 1056    predniSONE (DELTASONE) tablet 40 mg  40 mg Oral or Feeding Tube Daily Susan Yeh MD   40 mg at 08/27/24 0844    Followed by    [START ON 8/31/2024] predniSONE (DELTASONE) tablet 30 mg  30 mg Oral or Feeding Tube Daily Rao  MD Susan        Followed by    [START ON 9/7/2024] predniSONE (DELTASONE) tablet 20 mg  20 mg Oral or Feeding Tube Daily Susan Yeh MD        Followed by    [START ON 9/14/2024] predniSONE (DELTASONE) tablet 10 mg  10 mg Oral or Feeding Tube Daily Susan Yeh MD        Followed by    [START ON 9/21/2024] predniSONE (DELTASONE) tablet 5 mg  5 mg Oral or Feeding Tube Daily Susan Yeh MD        [Held by provider] Prosource TF20 ENfit Compatibl EN LIQD (PROSOURCE TF20) packet 60 mL  1 packet Per Feeding Tube BID Susan Yeh MD   60 mL at 08/26/24 2042    QUEtiapine (SEROquel) tablet 25 mg  25 mg Oral or Feeding Tube BID Colten Garay MD   25 mg at 08/27/24 0845    senna-docusate (SENOKOT-S/PERICOLACE) 8.6-50 MG per tablet 1 tablet  1 tablet Oral or Feeding Tube At Bedtime Andree Becerril, APRN CNP   1 tablet at 08/26/24 2042    senna-docusate (SENOKOT-S/PERICOLACE) 8.6-50 MG per tablet 1 tablet  1 tablet Oral or Feeding Tube BID PRN Jani Martin MD        Or    senna-docusate (SENOKOT-S/PERICOLACE) 8.6-50 MG per tablet 2 tablet  2 tablet Oral or Feeding Tube BID PRN Jani Martin MD        sodium chloride (PF) 0.9% PF flush 3 mL  3 mL Intracatheter Q8H Sudheer Rojas MD   3 mL at 08/27/24 0003    sodium chloride (PF) 0.9% PF flush 3 mL  3 mL Intracatheter q1 min prn Sudheer Rojas MD        sulfamethoxazole-trimethoprim (BACTRIM DS) 800-160 MG per tablet 1 tablet  1 tablet Oral or Feeding Tube Once per day on Monday Wednesday Friday Farrah Delgado MD   1 tablet at 08/26/24 0943    thiamine (B-1) tablet 100 mg  100 mg Oral or Feeding Tube Daily Graciela Whittaker MD   100 mg at 08/27/24 0844       Medications Prior to Admission   Medication Sig Dispense Refill Last Dose    risperiDONE (RISPERDAL) 1 MG tablet Take 1 mg by mouth every morning.   Past Week at unknown    risperiDONE (RISPERDAL) 1 MG tablet Take 1.5 mg by mouth every evening.   Past  Week at unknown

## 2024-08-27 NOTE — PLAN OF CARE
Goal Outcome Evaluation: Declining       Plan of Care Reviewed With: patient    Overall Patient Progress: decliningOverall Patient Progress: declining    Outcome Evaluation: Attemped wean of nitric unsuccessfully.  3->2->1->2->4. Half'd, then held bumex per CC. Dobutamine increased to 5.     Neuro: Sedated (RASS: -1 to +1) Pt following commands and Pt moving all extremities. Pupils E/R/R B/L.fentanyl gtt @ 150 mcg/hr  CV: ST and PVCs. HR from 100s to 140s. Map goal 65 to 75. CVP goal 10-12. Nipride @ 0.9 mcg/kg/min.  Resp: Ventilator: CMV mode 40%fio2, 480TV, 18RR, 480peep. LS are clear and dim in the basesPt has increasing respiratory  secretions through shift.   : Swan in for strict I+Os. Pt's UO was between 60 to 220 mL/hr.  GI: OG @ 64. OG to LIS., NJ @ 102., and FWF @ 125 mL/ q 1hr. TF held.NPO. Rectal tube in place; total output was 275 mL.  Skin/Endo/Heme/ID:  Bruise on left wrist.Photo in chart near restraint.  Insulin @ Algorithm 4 and Insulin @ 4 units/hr  WBC this AM 23.4. Vanco started. *  Plan:  tentative MCS this AM. Team to update patient and family.   For vital signs and complete assessments, please see documentation flowsheets.       Time 2100 0000 0200 0400   CVP 8 10 5 12   PA 17/7 15/10 17/12 17/10   Svo2 61 60 62 58   CI 1.5 1.4 1.5 1.5   SVR 1819.6 1665.2 1484 1451.1   Nipride 0.75 0.75 2 1.6   Nitric 1 2 4 4   Lactate 2.8 2.8 2.7 3.4   CC notified of findings in note.

## 2024-08-27 NOTE — PROGRESS NOTES
[facility], Procedure Note          Intra-Aortic Balloon Pump Insertion:       Terrance Hidalgo  MRN# [id number]   August 27, 2024, 3:51 PM Indication: Cardiogenic shock           Procedure performed: August 27, 2024, 3:51PM   Location: [facility]   Catheter size: 8fr   Inserted: 9 inch introducer sheath   Catheter placed: Right femoral artery   Complications:: None   Assist initiated: 1:1 ratio   Percent augmentation: 100   Timing adjusted: Adjusted to achieve optimal assist   Verification of position: Fluoroscopy   Comments: None      Recorded by Raheem Price, RT

## 2024-08-28 ENCOUNTER — APPOINTMENT (OUTPATIENT)
Dept: GENERAL RADIOLOGY | Facility: CLINIC | Age: 24
DRG: 853 | End: 2024-08-28
Attending: STUDENT IN AN ORGANIZED HEALTH CARE EDUCATION/TRAINING PROGRAM
Payer: MEDICARE

## 2024-08-28 LAB
ALBUMIN SERPL BCG-MCNC: 4 G/DL (ref 3.5–5.2)
ALBUMIN SERPL BCG-MCNC: 4 G/DL (ref 3.5–5.2)
ALBUMIN SERPL BCG-MCNC: 4.1 G/DL (ref 3.5–5.2)
ALBUMIN SERPL BCG-MCNC: 4.1 G/DL (ref 3.5–5.2)
ALBUMIN SERPL BCG-MCNC: 4.2 G/DL (ref 3.5–5.2)
ALBUMIN UR-MCNC: ABNORMAL MG/DL
ALLEN'S TEST: ABNORMAL
ALP SERPL-CCNC: 102 U/L (ref 40–150)
ALP SERPL-CCNC: 103 U/L (ref 40–150)
ALP SERPL-CCNC: 104 U/L (ref 40–150)
ALP SERPL-CCNC: 106 U/L (ref 40–150)
ALP SERPL-CCNC: 99 U/L (ref 40–150)
ALT SERPL W P-5'-P-CCNC: 583 U/L (ref 0–70)
ALT SERPL W P-5'-P-CCNC: 595 U/L (ref 0–70)
ALT SERPL W P-5'-P-CCNC: 602 U/L (ref 0–70)
ALT SERPL W P-5'-P-CCNC: 602 U/L (ref 0–70)
ALT SERPL W P-5'-P-CCNC: 617 U/L (ref 0–70)
ANION GAP SERPL CALCULATED.3IONS-SCNC: 14 MMOL/L (ref 7–15)
ANION GAP SERPL CALCULATED.3IONS-SCNC: 14 MMOL/L (ref 7–15)
ANION GAP SERPL CALCULATED.3IONS-SCNC: 15 MMOL/L (ref 7–15)
ANION GAP SERPL CALCULATED.3IONS-SCNC: 17 MMOL/L (ref 7–15)
ANION GAP SERPL CALCULATED.3IONS-SCNC: 17 MMOL/L (ref 7–15)
ANION GAP SERPL CALCULATED.3IONS-SCNC: 20 MMOL/L (ref 7–15)
APPEARANCE UR: CLEAR
APTT PPP: 24 SECONDS (ref 22–38)
AST SERPL W P-5'-P-CCNC: 200 U/L (ref 0–45)
AST SERPL W P-5'-P-CCNC: 219 U/L (ref 0–45)
AST SERPL W P-5'-P-CCNC: 223 U/L (ref 0–45)
AST SERPL W P-5'-P-CCNC: 223 U/L (ref 0–45)
AST SERPL W P-5'-P-CCNC: 247 U/L (ref 0–45)
ATRIAL RATE - MUSE: 136 BPM
BASE EXCESS BLDA CALC-SCNC: 11.8 MMOL/L (ref -3–3)
BASE EXCESS BLDA CALC-SCNC: 6.9 MMOL/L (ref -3–3)
BASE EXCESS BLDA CALC-SCNC: 7.6 MMOL/L (ref -3–3)
BASE EXCESS BLDA CALC-SCNC: 8.1 MMOL/L (ref -3–3)
BASE EXCESS BLDA CALC-SCNC: 8.2 MMOL/L (ref -3–3)
BASE EXCESS BLDA CALC-SCNC: 9.7 MMOL/L (ref -3–3)
BASE EXCESS BLDA CALC-SCNC: 9.9 MMOL/L (ref -3–3)
BASE EXCESS BLDV CALC-SCNC: 11.3 MMOL/L (ref -3–3)
BASE EXCESS BLDV CALC-SCNC: 8.3 MMOL/L (ref -3–3)
BASE EXCESS BLDV CALC-SCNC: 9.1 MMOL/L (ref -3–3)
BASE EXCESS BLDV CALC-SCNC: 9.2 MMOL/L (ref -3–3)
BASE EXCESS BLDV CALC-SCNC: 9.3 MMOL/L (ref -3–3)
BASE EXCESS BLDV CALC-SCNC: 9.4 MMOL/L (ref -3–3)
BASE EXCESS BLDV CALC-SCNC: 9.6 MMOL/L (ref -3–3)
BASOPHILS # BLD AUTO: 0 10E3/UL (ref 0–0.2)
BASOPHILS # BLD AUTO: 0 10E3/UL (ref 0–0.2)
BASOPHILS NFR BLD AUTO: 0 %
BASOPHILS NFR BLD AUTO: 0 %
BILIRUB DIRECT SERPL-MCNC: 0.46 MG/DL (ref 0–0.3)
BILIRUB DIRECT SERPL-MCNC: 0.57 MG/DL (ref 0–0.3)
BILIRUB DIRECT SERPL-MCNC: 0.57 MG/DL (ref 0–0.3)
BILIRUB DIRECT SERPL-MCNC: 0.88 MG/DL (ref 0–0.3)
BILIRUB DIRECT SERPL-MCNC: 1.01 MG/DL (ref 0–0.3)
BILIRUB SERPL-MCNC: 0.9 MG/DL
BILIRUB SERPL-MCNC: 1 MG/DL
BILIRUB SERPL-MCNC: 1.1 MG/DL
BILIRUB SERPL-MCNC: 1.6 MG/DL
BILIRUB SERPL-MCNC: 1.8 MG/DL
BILIRUB UR QL STRIP: NEGATIVE
BUN SERPL-MCNC: 51.1 MG/DL (ref 6–20)
BUN SERPL-MCNC: 57.2 MG/DL (ref 6–20)
BUN SERPL-MCNC: 64.7 MG/DL (ref 6–20)
BUN SERPL-MCNC: 69.9 MG/DL (ref 6–20)
BUN SERPL-MCNC: 73.8 MG/DL (ref 6–20)
BUN SERPL-MCNC: 76.6 MG/DL (ref 6–20)
C PNEUM DNA SPEC QL NAA+PROBE: NOT DETECTED
CA-I BLD-MCNC: 4.5 MG/DL (ref 4.4–5.2)
CALCIUM SERPL-MCNC: 9 MG/DL (ref 8.8–10.4)
CALCIUM SERPL-MCNC: 9.1 MG/DL (ref 8.8–10.4)
CALCIUM SERPL-MCNC: 9.2 MG/DL (ref 8.8–10.4)
CALCIUM SERPL-MCNC: 9.3 MG/DL (ref 8.8–10.4)
CHLORIDE SERPL-SCNC: 101 MMOL/L (ref 98–107)
CHLORIDE SERPL-SCNC: 101 MMOL/L (ref 98–107)
CHLORIDE SERPL-SCNC: 102 MMOL/L (ref 98–107)
CHLORIDE SERPL-SCNC: 103 MMOL/L (ref 98–107)
CHLORIDE SERPL-SCNC: 105 MMOL/L (ref 98–107)
CHLORIDE SERPL-SCNC: 106 MMOL/L (ref 98–107)
CK SERPL-CCNC: 300 U/L (ref 39–308)
COHGB MFR BLD: 100 % (ref 96–97)
COHGB MFR BLD: 98.8 % (ref 96–97)
COHGB MFR BLD: 99.3 % (ref 96–97)
COHGB MFR BLD: 99.6 % (ref 96–97)
COHGB MFR BLD: >100 % (ref 96–97)
COLOR UR AUTO: YELLOW
CREAT SERPL-MCNC: 1.02 MG/DL (ref 0.67–1.17)
CREAT SERPL-MCNC: 1.06 MG/DL (ref 0.67–1.17)
CREAT SERPL-MCNC: 1.14 MG/DL (ref 0.67–1.17)
CREAT SERPL-MCNC: 1.2 MG/DL (ref 0.67–1.17)
CREAT SERPL-MCNC: 1.44 MG/DL (ref 0.67–1.17)
CREAT SERPL-MCNC: 1.46 MG/DL (ref 0.67–1.17)
DIASTOLIC BLOOD PRESSURE - MUSE: NORMAL MMHG
EGFRCR SERPLBLD CKD-EPI 2021: 68 ML/MIN/1.73M2
EGFRCR SERPLBLD CKD-EPI 2021: 70 ML/MIN/1.73M2
EGFRCR SERPLBLD CKD-EPI 2021: 87 ML/MIN/1.73M2
EGFRCR SERPLBLD CKD-EPI 2021: >90 ML/MIN/1.73M2
EOSINOPHIL # BLD AUTO: 0 10E3/UL (ref 0–0.7)
EOSINOPHIL # BLD AUTO: 0.1 10E3/UL (ref 0–0.7)
EOSINOPHIL NFR BLD AUTO: 0 %
EOSINOPHIL NFR BLD AUTO: 1 %
ERYTHROCYTE [DISTWIDTH] IN BLOOD BY AUTOMATED COUNT: 13.7 % (ref 10–15)
ERYTHROCYTE [DISTWIDTH] IN BLOOD BY AUTOMATED COUNT: 13.7 % (ref 10–15)
ERYTHROCYTE [DISTWIDTH] IN BLOOD BY AUTOMATED COUNT: 13.9 % (ref 10–15)
FIBRINOGEN PPP-MCNC: 402 MG/DL (ref 170–510)
FLUAV H1 2009 PAND RNA SPEC QL NAA+PROBE: NOT DETECTED
FLUAV H1 RNA SPEC QL NAA+PROBE: NOT DETECTED
FLUAV H3 RNA SPEC QL NAA+PROBE: NOT DETECTED
FLUAV RNA SPEC QL NAA+PROBE: NOT DETECTED
FLUBV RNA SPEC QL NAA+PROBE: NOT DETECTED
GLUCOSE BLDC GLUCOMTR-MCNC: 120 MG/DL (ref 70–99)
GLUCOSE BLDC GLUCOMTR-MCNC: 129 MG/DL (ref 70–99)
GLUCOSE BLDC GLUCOMTR-MCNC: 131 MG/DL (ref 70–99)
GLUCOSE BLDC GLUCOMTR-MCNC: 135 MG/DL (ref 70–99)
GLUCOSE BLDC GLUCOMTR-MCNC: 137 MG/DL (ref 70–99)
GLUCOSE BLDC GLUCOMTR-MCNC: 145 MG/DL (ref 70–99)
GLUCOSE BLDC GLUCOMTR-MCNC: 160 MG/DL (ref 70–99)
GLUCOSE BLDC GLUCOMTR-MCNC: 160 MG/DL (ref 70–99)
GLUCOSE BLDC GLUCOMTR-MCNC: 162 MG/DL (ref 70–99)
GLUCOSE BLDC GLUCOMTR-MCNC: 166 MG/DL (ref 70–99)
GLUCOSE BLDC GLUCOMTR-MCNC: 171 MG/DL (ref 70–99)
GLUCOSE BLDC GLUCOMTR-MCNC: 171 MG/DL (ref 70–99)
GLUCOSE BLDC GLUCOMTR-MCNC: 175 MG/DL (ref 70–99)
GLUCOSE BLDC GLUCOMTR-MCNC: 175 MG/DL (ref 70–99)
GLUCOSE BLDC GLUCOMTR-MCNC: 178 MG/DL (ref 70–99)
GLUCOSE BLDC GLUCOMTR-MCNC: 180 MG/DL (ref 70–99)
GLUCOSE BLDC GLUCOMTR-MCNC: 182 MG/DL (ref 70–99)
GLUCOSE BLDC GLUCOMTR-MCNC: 184 MG/DL (ref 70–99)
GLUCOSE BLDC GLUCOMTR-MCNC: 188 MG/DL (ref 70–99)
GLUCOSE BLDC GLUCOMTR-MCNC: 193 MG/DL (ref 70–99)
GLUCOSE BLDC GLUCOMTR-MCNC: 194 MG/DL (ref 70–99)
GLUCOSE BLDC GLUCOMTR-MCNC: 202 MG/DL (ref 70–99)
GLUCOSE BLDC GLUCOMTR-MCNC: 93 MG/DL (ref 70–99)
GLUCOSE SERPL-MCNC: 160 MG/DL (ref 70–99)
GLUCOSE SERPL-MCNC: 173 MG/DL (ref 70–99)
GLUCOSE SERPL-MCNC: 180 MG/DL (ref 70–99)
GLUCOSE SERPL-MCNC: 181 MG/DL (ref 70–99)
GLUCOSE SERPL-MCNC: 186 MG/DL (ref 70–99)
GLUCOSE SERPL-MCNC: 189 MG/DL (ref 70–99)
GLUCOSE UR STRIP-MCNC: NEGATIVE MG/DL
HADV DNA SPEC QL NAA+PROBE: NOT DETECTED
HCO3 BLD-SCNC: 31 MMOL/L (ref 21–28)
HCO3 BLD-SCNC: 31 MMOL/L (ref 21–28)
HCO3 BLD-SCNC: 32 MMOL/L (ref 21–28)
HCO3 BLD-SCNC: 32 MMOL/L (ref 21–28)
HCO3 BLD-SCNC: 34 MMOL/L (ref 21–28)
HCO3 BLD-SCNC: 35 MMOL/L (ref 21–28)
HCO3 BLD-SCNC: 36 MMOL/L (ref 21–28)
HCO3 BLDV-SCNC: 34 MMOL/L (ref 21–28)
HCO3 BLDV-SCNC: 35 MMOL/L (ref 21–28)
HCO3 BLDV-SCNC: 35 MMOL/L (ref 21–28)
HCO3 BLDV-SCNC: 36 MMOL/L (ref 21–28)
HCO3 BLDV-SCNC: 38 MMOL/L (ref 21–28)
HCO3 SERPL-SCNC: 28 MMOL/L (ref 22–29)
HCO3 SERPL-SCNC: 28 MMOL/L (ref 22–29)
HCO3 SERPL-SCNC: 29 MMOL/L (ref 22–29)
HCO3 SERPL-SCNC: 29 MMOL/L (ref 22–29)
HCO3 SERPL-SCNC: 30 MMOL/L (ref 22–29)
HCO3 SERPL-SCNC: 32 MMOL/L (ref 22–29)
HCOV PNL SPEC NAA+PROBE: NOT DETECTED
HCT VFR BLD AUTO: 39.7 % (ref 40–53)
HCT VFR BLD AUTO: 40.2 % (ref 40–53)
HCT VFR BLD AUTO: 42 % (ref 40–53)
HGB BLD-MCNC: 12.5 G/DL (ref 13.3–17.7)
HGB BLD-MCNC: 12.9 G/DL (ref 13.3–17.7)
HGB BLD-MCNC: 13.5 G/DL (ref 13.3–17.7)
HGB UR QL STRIP: ABNORMAL
HMPV RNA SPEC QL NAA+PROBE: NOT DETECTED
HPIV1 RNA SPEC QL NAA+PROBE: NOT DETECTED
HPIV2 RNA SPEC QL NAA+PROBE: NOT DETECTED
HPIV3 RNA SPEC QL NAA+PROBE: NOT DETECTED
HPIV4 RNA SPEC QL NAA+PROBE: NOT DETECTED
IMM GRANULOCYTES # BLD: 0.4 10E3/UL
IMM GRANULOCYTES # BLD: 0.5 10E3/UL
IMM GRANULOCYTES NFR BLD: 2 %
IMM GRANULOCYTES NFR BLD: 3 %
INR PPP: 1 (ref 0.85–1.15)
INTERPRETATION ECG - MUSE: NORMAL
KETONES UR STRIP-MCNC: NEGATIVE MG/DL
LACTATE SERPL-SCNC: 2.2 MMOL/L (ref 0.7–2)
LACTATE SERPL-SCNC: 3 MMOL/L (ref 0.7–2)
LACTATE SERPL-SCNC: 3.2 MMOL/L (ref 0.7–2)
LACTATE SERPL-SCNC: 3.7 MMOL/L (ref 0.7–2)
LACTATE SERPL-SCNC: 3.7 MMOL/L (ref 0.7–2)
LACTATE SERPL-SCNC: 4 MMOL/L (ref 0.7–2)
LEUKOCYTE ESTERASE UR QL STRIP: ABNORMAL
LYMPHOCYTES # BLD AUTO: 0.6 10E3/UL (ref 0.8–5.3)
LYMPHOCYTES # BLD AUTO: 1.2 10E3/UL (ref 0.8–5.3)
LYMPHOCYTES NFR BLD AUTO: 4 %
LYMPHOCYTES NFR BLD AUTO: 6 %
M PNEUMO DNA SPEC QL NAA+PROBE: NOT DETECTED
MAGNESIUM SERPL-MCNC: 2.9 MG/DL (ref 1.7–2.3)
MAGNESIUM SERPL-MCNC: 3 MG/DL (ref 1.7–2.3)
MAGNESIUM SERPL-MCNC: 3.1 MG/DL (ref 1.7–2.3)
MCH RBC QN AUTO: 28.2 PG (ref 26.5–33)
MCH RBC QN AUTO: 28.7 PG (ref 26.5–33)
MCH RBC QN AUTO: 28.9 PG (ref 26.5–33)
MCHC RBC AUTO-ENTMCNC: 31.5 G/DL (ref 31.5–36.5)
MCHC RBC AUTO-ENTMCNC: 32.1 G/DL (ref 31.5–36.5)
MCHC RBC AUTO-ENTMCNC: 32.1 G/DL (ref 31.5–36.5)
MCV RBC AUTO: 89 FL (ref 78–100)
MCV RBC AUTO: 90 FL (ref 78–100)
MCV RBC AUTO: 90 FL (ref 78–100)
MONOCYTES # BLD AUTO: 1.3 10E3/UL (ref 0–1.3)
MONOCYTES # BLD AUTO: 2 10E3/UL (ref 0–1.3)
MONOCYTES NFR BLD AUTO: 7 %
MONOCYTES NFR BLD AUTO: 9 %
MRSA DNA SPEC QL NAA+PROBE: NEGATIVE
MUCOUS THREADS #/AREA URNS LPF: PRESENT /LPF
NEUTROPHILS # BLD AUTO: 15.3 10E3/UL (ref 1.6–8.3)
NEUTROPHILS # BLD AUTO: 17.3 10E3/UL (ref 1.6–8.3)
NEUTROPHILS NFR BLD AUTO: 82 %
NEUTROPHILS NFR BLD AUTO: 86 %
NITRATE UR QL: NEGATIVE
NRBC # BLD AUTO: 0 10E3/UL
NRBC # BLD AUTO: 0 10E3/UL
NRBC BLD AUTO-RTO: 0 /100
NRBC BLD AUTO-RTO: 0 /100
O2/TOTAL GAS SETTING VFR VENT: 30 %
O2/TOTAL GAS SETTING VFR VENT: 40 %
OXYHGB MFR BLDV: 63 % (ref 70–75)
OXYHGB MFR BLDV: 65 % (ref 70–75)
OXYHGB MFR BLDV: 67 % (ref 70–75)
OXYHGB MFR BLDV: 68 % (ref 70–75)
OXYHGB MFR BLDV: 72 % (ref 70–75)
OXYHGB MFR BLDV: 74 % (ref 70–75)
OXYHGB MFR BLDV: 75 % (ref 70–75)
P AXIS - MUSE: 77 DEGREES
PCO2 BLD: 36 MM HG (ref 35–45)
PCO2 BLD: 38 MM HG (ref 35–45)
PCO2 BLD: 41 MM HG (ref 35–45)
PCO2 BLD: 42 MM HG (ref 35–45)
PCO2 BLD: 42 MM HG (ref 35–45)
PCO2 BLD: 46 MM HG (ref 35–45)
PCO2 BLD: 47 MM HG (ref 35–45)
PCO2 BLDV: 44 MM HG (ref 40–50)
PCO2 BLDV: 46 MM HG (ref 40–50)
PCO2 BLDV: 46 MM HG (ref 40–50)
PCO2 BLDV: 48 MM HG (ref 40–50)
PCO2 BLDV: 53 MM HG (ref 40–50)
PCO2 BLDV: 54 MM HG (ref 40–50)
PCO2 BLDV: 54 MM HG (ref 40–50)
PEEP: 40 CM H2O
PEEP: 5 CM H2O
PH BLD: 7.48 [PH] (ref 7.35–7.45)
PH BLD: 7.5 [PH] (ref 7.35–7.45)
PH BLD: 7.52 [PH] (ref 7.35–7.45)
PH BLD: 7.54 [PH] (ref 7.35–7.45)
PH BLD: 7.55 [PH] (ref 7.35–7.45)
PH BLDV: 7.41 [PH] (ref 7.32–7.43)
PH BLDV: 7.43 [PH] (ref 7.32–7.43)
PH BLDV: 7.45 [PH] (ref 7.32–7.43)
PH BLDV: 7.47 [PH] (ref 7.32–7.43)
PH BLDV: 7.48 [PH] (ref 7.32–7.43)
PH BLDV: 7.48 [PH] (ref 7.32–7.43)
PH BLDV: 7.5 [PH] (ref 7.32–7.43)
PH UR STRIP: 6.5 [PH] (ref 5–7)
PHOSPHATE SERPL-MCNC: 3.1 MG/DL (ref 2.5–4.5)
PHOSPHATE SERPL-MCNC: 4.5 MG/DL (ref 2.5–4.5)
PLATELET # BLD AUTO: 209 10E3/UL (ref 150–450)
PLATELET # BLD AUTO: 221 10E3/UL (ref 150–450)
PLATELET # BLD AUTO: 223 10E3/UL (ref 150–450)
PO2 BLD: 103 MM HG (ref 80–105)
PO2 BLD: 118 MM HG (ref 80–105)
PO2 BLD: 139 MM HG (ref 80–105)
PO2 BLD: 143 MM HG (ref 80–105)
PO2 BLD: 147 MM HG (ref 80–105)
PO2 BLD: 171 MM HG (ref 80–105)
PO2 BLD: 177 MM HG (ref 80–105)
PO2 BLDV: 36 MM HG (ref 25–47)
PO2 BLDV: 37 MM HG (ref 25–47)
PO2 BLDV: 38 MM HG (ref 25–47)
PO2 BLDV: 38 MM HG (ref 25–47)
PO2 BLDV: 43 MM HG (ref 25–47)
PO2 BLDV: 43 MM HG (ref 25–47)
PO2 BLDV: 44 MM HG (ref 25–47)
POTASSIUM SERPL-SCNC: 2.8 MMOL/L (ref 3.4–5.3)
POTASSIUM SERPL-SCNC: 3.3 MMOL/L (ref 3.4–5.3)
POTASSIUM SERPL-SCNC: 3.5 MMOL/L (ref 3.4–5.3)
POTASSIUM SERPL-SCNC: 3.6 MMOL/L (ref 3.4–5.3)
POTASSIUM SERPL-SCNC: 3.6 MMOL/L (ref 3.4–5.3)
POTASSIUM SERPL-SCNC: 3.7 MMOL/L (ref 3.4–5.3)
PR INTERVAL - MUSE: 126 MS
PROT SERPL-MCNC: 6.8 G/DL (ref 6.4–8.3)
PROT SERPL-MCNC: 6.9 G/DL (ref 6.4–8.3)
PROT SERPL-MCNC: 6.9 G/DL (ref 6.4–8.3)
PROT SERPL-MCNC: 7.1 G/DL (ref 6.4–8.3)
PROT SERPL-MCNC: 7.2 G/DL (ref 6.4–8.3)
QRS DURATION - MUSE: 72 MS
QT - MUSE: 306 MS
QTC - MUSE: 460 MS
R AXIS - MUSE: -13 DEGREES
RBC # BLD AUTO: 4.43 10E6/UL (ref 4.4–5.9)
RBC # BLD AUTO: 4.5 10E6/UL (ref 4.4–5.9)
RBC # BLD AUTO: 4.67 10E6/UL (ref 4.4–5.9)
RBC URINE: 2 /HPF
RSV RNA SPEC QL NAA+PROBE: NOT DETECTED
RSV RNA SPEC QL NAA+PROBE: NOT DETECTED
RV+EV RNA SPEC QL NAA+PROBE: NOT DETECTED
SA TARGET DNA: NEGATIVE
SAO2 % BLDA: 97 % (ref 92–100)
SAO2 % BLDA: 98 % (ref 92–100)
SAO2 % BLDA: 98 % (ref 92–100)
SAO2 % BLDA: 99 % (ref 92–100)
SAO2 % BLDA: 99 % (ref 92–100)
SAO2 % BLDV: 64.3 % (ref 70–75)
SAO2 % BLDV: 66.7 % (ref 70–75)
SAO2 % BLDV: 67.8 % (ref 70–75)
SAO2 % BLDV: 69.3 % (ref 70–75)
SAO2 % BLDV: 73.4 % (ref 70–75)
SAO2 % BLDV: 75.1 % (ref 70–75)
SAO2 % BLDV: 76.5 % (ref 70–75)
SODIUM SERPL-SCNC: 147 MMOL/L (ref 135–145)
SODIUM SERPL-SCNC: 147 MMOL/L (ref 135–145)
SODIUM SERPL-SCNC: 149 MMOL/L (ref 135–145)
SODIUM SERPL-SCNC: 150 MMOL/L (ref 135–145)
SP GR UR STRIP: 1.01 (ref 1–1.03)
SYSTOLIC BLOOD PRESSURE - MUSE: NORMAL MMHG
T AXIS - MUSE: 71 DEGREES
UFH PPP CHRO-ACNC: 0.45 IU/ML
UROBILINOGEN UR STRIP-MCNC: NORMAL MG/DL
VENTRICULAR RATE- MUSE: 136 BPM
WBC # BLD AUTO: 17.8 10E3/UL (ref 4–11)
WBC # BLD AUTO: 18.7 10E3/UL (ref 4–11)
WBC # BLD AUTO: 21 10E3/UL (ref 4–11)
WBC URINE: 1 /HPF

## 2024-08-28 PROCEDURE — 83735 ASSAY OF MAGNESIUM: CPT | Performed by: STUDENT IN AN ORGANIZED HEALTH CARE EDUCATION/TRAINING PROGRAM

## 2024-08-28 PROCEDURE — 83605 ASSAY OF LACTIC ACID: CPT | Performed by: STUDENT IN AN ORGANIZED HEALTH CARE EDUCATION/TRAINING PROGRAM

## 2024-08-28 PROCEDURE — 999N000065 XR ABDOMEN PORT 1 VIEW

## 2024-08-28 PROCEDURE — 82805 BLOOD GASES W/O2 SATURATION: CPT | Performed by: STUDENT IN AN ORGANIZED HEALTH CARE EDUCATION/TRAINING PROGRAM

## 2024-08-28 PROCEDURE — 71045 X-RAY EXAM CHEST 1 VIEW: CPT | Mod: 26 | Performed by: RADIOLOGY

## 2024-08-28 PROCEDURE — 83735 ASSAY OF MAGNESIUM: CPT

## 2024-08-28 PROCEDURE — 258N000003 HC RX IP 258 OP 636: Performed by: STUDENT IN AN ORGANIZED HEALTH CARE EDUCATION/TRAINING PROGRAM

## 2024-08-28 PROCEDURE — 85520 HEPARIN ASSAY: CPT | Performed by: STUDENT IN AN ORGANIZED HEALTH CARE EDUCATION/TRAINING PROGRAM

## 2024-08-28 PROCEDURE — 250N000012 HC RX MED GY IP 250 OP 636 PS 637

## 2024-08-28 PROCEDURE — 85384 FIBRINOGEN ACTIVITY: CPT | Performed by: STUDENT IN AN ORGANIZED HEALTH CARE EDUCATION/TRAINING PROGRAM

## 2024-08-28 PROCEDURE — 250N000013 HC RX MED GY IP 250 OP 250 PS 637: Performed by: INTERNAL MEDICINE

## 2024-08-28 PROCEDURE — 250N000013 HC RX MED GY IP 250 OP 250 PS 637: Performed by: STUDENT IN AN ORGANIZED HEALTH CARE EDUCATION/TRAINING PROGRAM

## 2024-08-28 PROCEDURE — 85730 THROMBOPLASTIN TIME PARTIAL: CPT | Performed by: STUDENT IN AN ORGANIZED HEALTH CARE EDUCATION/TRAINING PROGRAM

## 2024-08-28 PROCEDURE — 80048 BASIC METABOLIC PNL TOTAL CA: CPT

## 2024-08-28 PROCEDURE — 99232 SBSQ HOSP IP/OBS MODERATE 35: CPT | Performed by: INTERNAL MEDICINE

## 2024-08-28 PROCEDURE — 999N000157 HC STATISTIC RCP TIME EA 10 MIN

## 2024-08-28 PROCEDURE — 74018 RADEX ABDOMEN 1 VIEW: CPT | Mod: 26 | Performed by: RADIOLOGY

## 2024-08-28 PROCEDURE — 250N000009 HC RX 250: Performed by: STUDENT IN AN ORGANIZED HEALTH CARE EDUCATION/TRAINING PROGRAM

## 2024-08-28 PROCEDURE — 99207 PR NO BILLABLE SERVICE THIS VISIT: CPT | Performed by: INTERNAL MEDICINE

## 2024-08-28 PROCEDURE — 85027 COMPLETE CBC AUTOMATED: CPT | Performed by: STUDENT IN AN ORGANIZED HEALTH CARE EDUCATION/TRAINING PROGRAM

## 2024-08-28 PROCEDURE — 250N000011 HC RX IP 250 OP 636: Performed by: STUDENT IN AN ORGANIZED HEALTH CARE EDUCATION/TRAINING PROGRAM

## 2024-08-28 PROCEDURE — 999N000075 HC STATISTIC IABP MONITORING

## 2024-08-28 PROCEDURE — 36415 COLL VENOUS BLD VENIPUNCTURE: CPT | Performed by: STUDENT IN AN ORGANIZED HEALTH CARE EDUCATION/TRAINING PROGRAM

## 2024-08-28 PROCEDURE — 200N000002 HC R&B ICU UMMC

## 2024-08-28 PROCEDURE — 87106 FUNGI IDENTIFICATION YEAST: CPT | Performed by: STUDENT IN AN ORGANIZED HEALTH CARE EDUCATION/TRAINING PROGRAM

## 2024-08-28 PROCEDURE — 999N000253 HC STATISTIC WEANING TRIALS

## 2024-08-28 PROCEDURE — 87486 CHLMYD PNEUM DNA AMP PROBE: CPT | Performed by: STUDENT IN AN ORGANIZED HEALTH CARE EDUCATION/TRAINING PROGRAM

## 2024-08-28 PROCEDURE — 94003 VENT MGMT INPAT SUBQ DAY: CPT

## 2024-08-28 PROCEDURE — 82330 ASSAY OF CALCIUM: CPT | Performed by: STUDENT IN AN ORGANIZED HEALTH CARE EDUCATION/TRAINING PROGRAM

## 2024-08-28 PROCEDURE — 999N000185 HC STATISTIC TRANSPORT TIME EA 15 MIN

## 2024-08-28 PROCEDURE — 84100 ASSAY OF PHOSPHORUS: CPT | Performed by: STUDENT IN AN ORGANIZED HEALTH CARE EDUCATION/TRAINING PROGRAM

## 2024-08-28 PROCEDURE — 94799 UNLISTED PULMONARY SVC/PX: CPT

## 2024-08-28 PROCEDURE — 250N000013 HC RX MED GY IP 250 OP 250 PS 637

## 2024-08-28 PROCEDURE — 71045 X-RAY EXAM CHEST 1 VIEW: CPT

## 2024-08-28 PROCEDURE — 999N000259 HC STATISTIC EXTUBATION

## 2024-08-28 PROCEDURE — 87077 CULTURE AEROBIC IDENTIFY: CPT | Performed by: STUDENT IN AN ORGANIZED HEALTH CARE EDUCATION/TRAINING PROGRAM

## 2024-08-28 PROCEDURE — 999N000248 HC STATISTIC IV INSERT WITH US BY RN

## 2024-08-28 PROCEDURE — 99291 CRITICAL CARE FIRST HOUR: CPT | Mod: GC | Performed by: STUDENT IN AN ORGANIZED HEALTH CARE EDUCATION/TRAINING PROGRAM

## 2024-08-28 PROCEDURE — 87040 BLOOD CULTURE FOR BACTERIA: CPT | Performed by: STUDENT IN AN ORGANIZED HEALTH CARE EDUCATION/TRAINING PROGRAM

## 2024-08-28 PROCEDURE — 82248 BILIRUBIN DIRECT: CPT | Performed by: STUDENT IN AN ORGANIZED HEALTH CARE EDUCATION/TRAINING PROGRAM

## 2024-08-28 PROCEDURE — 94640 AIRWAY INHALATION TREATMENT: CPT | Mod: 76

## 2024-08-28 PROCEDURE — 81001 URINALYSIS AUTO W/SCOPE: CPT | Performed by: STUDENT IN AN ORGANIZED HEALTH CARE EDUCATION/TRAINING PROGRAM

## 2024-08-28 PROCEDURE — 87641 MR-STAPH DNA AMP PROBE: CPT | Performed by: STUDENT IN AN ORGANIZED HEALTH CARE EDUCATION/TRAINING PROGRAM

## 2024-08-28 PROCEDURE — 85025 COMPLETE CBC W/AUTO DIFF WBC: CPT | Performed by: STUDENT IN AN ORGANIZED HEALTH CARE EDUCATION/TRAINING PROGRAM

## 2024-08-28 PROCEDURE — 82550 ASSAY OF CK (CPK): CPT | Performed by: STUDENT IN AN ORGANIZED HEALTH CARE EDUCATION/TRAINING PROGRAM

## 2024-08-28 PROCEDURE — 250N000012 HC RX MED GY IP 250 OP 636 PS 637: Performed by: STUDENT IN AN ORGANIZED HEALTH CARE EDUCATION/TRAINING PROGRAM

## 2024-08-28 PROCEDURE — 94640 AIRWAY INHALATION TREATMENT: CPT

## 2024-08-28 PROCEDURE — 82248 BILIRUBIN DIRECT: CPT

## 2024-08-28 PROCEDURE — 85610 PROTHROMBIN TIME: CPT | Performed by: STUDENT IN AN ORGANIZED HEALTH CARE EDUCATION/TRAINING PROGRAM

## 2024-08-28 PROCEDURE — 85025 COMPLETE CBC W/AUTO DIFF WBC: CPT

## 2024-08-28 RX ORDER — POTASSIUM CHLORIDE 1.5 G/1.58G
20 POWDER, FOR SOLUTION ORAL ONCE
Status: COMPLETED | OUTPATIENT
Start: 2024-08-28 | End: 2024-08-28

## 2024-08-28 RX ORDER — HYDRALAZINE HYDROCHLORIDE 10 MG/1
10 TABLET, FILM COATED ORAL 3 TIMES DAILY
Status: DISCONTINUED | OUTPATIENT
Start: 2024-08-28 | End: 2024-08-29

## 2024-08-28 RX ORDER — VANCOMYCIN HYDROCHLORIDE
1500 EVERY 12 HOURS
Status: DISCONTINUED | OUTPATIENT
Start: 2024-08-28 | End: 2024-08-31

## 2024-08-28 RX ORDER — BUMETANIDE 0.25 MG/ML
2 INJECTION INTRAMUSCULAR; INTRAVENOUS ONCE
Status: COMPLETED | OUTPATIENT
Start: 2024-08-28 | End: 2024-08-28

## 2024-08-28 RX ORDER — DEXMEDETOMIDINE HYDROCHLORIDE 4 UG/ML
.1-.6 INJECTION, SOLUTION INTRAVENOUS CONTINUOUS
Status: DISCONTINUED | OUTPATIENT
Start: 2024-08-28 | End: 2024-08-28

## 2024-08-28 RX ORDER — ALBUTEROL SULFATE 0.83 MG/ML
2.5 SOLUTION RESPIRATORY (INHALATION) EVERY 6 HOURS PRN
Status: DISCONTINUED | OUTPATIENT
Start: 2024-08-28 | End: 2024-09-19 | Stop reason: HOSPADM

## 2024-08-28 RX ORDER — POTASSIUM CHLORIDE 29.8 MG/ML
20 INJECTION INTRAVENOUS
Status: COMPLETED | OUTPATIENT
Start: 2024-08-28 | End: 2024-08-28

## 2024-08-28 RX ORDER — ONDANSETRON 2 MG/ML
INJECTION INTRAMUSCULAR; INTRAVENOUS
Status: COMPLETED
Start: 2024-08-28 | End: 2024-08-28

## 2024-08-28 RX ORDER — ONDANSETRON 2 MG/ML
4 INJECTION INTRAMUSCULAR; INTRAVENOUS EVERY 6 HOURS PRN
Status: DISCONTINUED | OUTPATIENT
Start: 2024-08-28 | End: 2024-09-19 | Stop reason: HOSPADM

## 2024-08-28 RX ORDER — MEROPENEM 1 G/1
1 INJECTION, POWDER, FOR SOLUTION INTRAVENOUS EVERY 8 HOURS
Status: COMPLETED | OUTPATIENT
Start: 2024-08-28 | End: 2024-09-01

## 2024-08-28 RX ORDER — POTASSIUM CHLORIDE 29.8 MG/ML
20 INJECTION INTRAVENOUS ONCE
Status: COMPLETED | OUTPATIENT
Start: 2024-08-28 | End: 2024-08-28

## 2024-08-28 RX ORDER — BUMETANIDE 0.25 MG/ML
2 INJECTION INTRAMUSCULAR; INTRAVENOUS EVERY 8 HOURS
Status: DISCONTINUED | OUTPATIENT
Start: 2024-08-28 | End: 2024-09-03

## 2024-08-28 RX ADMIN — THIAMINE HCL TAB 100 MG 100 MG: 100 TAB at 08:29

## 2024-08-28 RX ADMIN — POTASSIUM CHLORIDE 40 MEQ: 1.5 POWDER, FOR SOLUTION ORAL at 08:29

## 2024-08-28 RX ADMIN — ALBUTEROL SULFATE 2.5 MG: 2.5 SOLUTION RESPIRATORY (INHALATION) at 03:59

## 2024-08-28 RX ADMIN — CHLORHEXIDINE GLUCONATE 0.12% ORAL RINSE 15 ML: 1.2 LIQUID ORAL at 20:57

## 2024-08-28 RX ADMIN — NYSTATIN 500000 UNITS: 100000 SUSPENSION ORAL at 11:51

## 2024-08-28 RX ADMIN — HEPARIN SODIUM 1150 UNITS/HR: 10000 INJECTION, SOLUTION INTRAVENOUS at 22:01

## 2024-08-28 RX ADMIN — POTASSIUM CHLORIDE 20 MEQ: 29.8 INJECTION, SOLUTION INTRAVENOUS at 18:30

## 2024-08-28 RX ADMIN — SELENIUM SULFIDE: 10 SHAMPOO TOPICAL at 23:16

## 2024-08-28 RX ADMIN — INSULIN HUMAN 17 UNITS/HR: 1 INJECTION, SOLUTION INTRAVENOUS at 18:06

## 2024-08-28 RX ADMIN — POTASSIUM CHLORIDE 20 MEQ: 29.8 INJECTION, SOLUTION INTRAVENOUS at 08:49

## 2024-08-28 RX ADMIN — POTASSIUM CHLORIDE 40 MEQ: 1.5 POWDER, FOR SOLUTION ORAL at 20:53

## 2024-08-28 RX ADMIN — HYDRALAZINE HYDROCHLORIDE 10 MG: 10 TABLET ORAL at 23:08

## 2024-08-28 RX ADMIN — ONDANSETRON 4 MG: 2 INJECTION INTRAMUSCULAR; INTRAVENOUS at 21:00

## 2024-08-28 RX ADMIN — MIDAZOLAM 1 MG: 1 INJECTION INTRAMUSCULAR; INTRAVENOUS at 05:18

## 2024-08-28 RX ADMIN — POTASSIUM CHLORIDE 20 MEQ: 29.8 INJECTION, SOLUTION INTRAVENOUS at 22:03

## 2024-08-28 RX ADMIN — POTASSIUM CHLORIDE 20 MEQ: 29.8 INJECTION, SOLUTION INTRAVENOUS at 08:43

## 2024-08-28 RX ADMIN — BUMETANIDE 2 MG: 0.25 INJECTION INTRAMUSCULAR; INTRAVENOUS at 14:20

## 2024-08-28 RX ADMIN — POTASSIUM CHLORIDE 20 MEQ: 29.8 INJECTION, SOLUTION INTRAVENOUS at 06:45

## 2024-08-28 RX ADMIN — Medication 40 MG: at 08:30

## 2024-08-28 RX ADMIN — QUETIAPINE FUMARATE 25 MG: 25 TABLET ORAL at 08:29

## 2024-08-28 RX ADMIN — POTASSIUM CHLORIDE 20 MEQ: 29.8 INJECTION, SOLUTION INTRAVENOUS at 17:29

## 2024-08-28 RX ADMIN — VANCOMYCIN HYDROCHLORIDE 1500 MG: 10 INJECTION, POWDER, LYOPHILIZED, FOR SOLUTION INTRAVENOUS at 19:48

## 2024-08-28 RX ADMIN — QUETIAPINE FUMARATE 25 MG: 25 TABLET ORAL at 20:53

## 2024-08-28 RX ADMIN — MEROPENEM 1 G: 1 INJECTION, POWDER, FOR SOLUTION INTRAVENOUS at 16:06

## 2024-08-28 RX ADMIN — Medication 200 MCG/HR: at 05:27

## 2024-08-28 RX ADMIN — ALBUTEROL SULFATE 2.5 MG: 2.5 SOLUTION RESPIRATORY (INHALATION) at 09:16

## 2024-08-28 RX ADMIN — CHLORHEXIDINE GLUCONATE 0.12% ORAL RINSE 15 ML: 1.2 LIQUID ORAL at 08:29

## 2024-08-28 RX ADMIN — POTASSIUM CHLORIDE 20 MEQ: 29.8 INJECTION, SOLUTION INTRAVENOUS at 02:12

## 2024-08-28 RX ADMIN — INSULIN HUMAN 9 UNITS/HR: 1 INJECTION, SOLUTION INTRAVENOUS at 03:54

## 2024-08-28 RX ADMIN — POTASSIUM CHLORIDE 20 MEQ: 29.8 INJECTION, SOLUTION INTRAVENOUS at 14:20

## 2024-08-28 RX ADMIN — PIPERACILLIN AND TAZOBACTAM 4.5 G: 4; .5 INJECTION, POWDER, LYOPHILIZED, FOR SOLUTION INTRAVENOUS at 13:34

## 2024-08-28 RX ADMIN — POTASSIUM CHLORIDE 20 MEQ: 1.5 POWDER, FOR SOLUTION ORAL at 01:16

## 2024-08-28 RX ADMIN — SODIUM NITROPRUSSIDE 1.25 MCG/KG/MIN: 25 INJECTION, SOLUTION, CONCENTRATE INTRAVENOUS at 09:04

## 2024-08-28 RX ADMIN — NYSTATIN 500000 UNITS: 100000 SUSPENSION ORAL at 20:57

## 2024-08-28 RX ADMIN — NYSTATIN 500000 UNITS: 100000 SUSPENSION ORAL at 16:15

## 2024-08-28 RX ADMIN — POTASSIUM CHLORIDE 20 MEQ: 29.8 INJECTION, SOLUTION INTRAVENOUS at 01:15

## 2024-08-28 RX ADMIN — HEPARIN SODIUM 1150 UNITS/HR: 10000 INJECTION, SOLUTION INTRAVENOUS at 12:26

## 2024-08-28 RX ADMIN — SULFAMETHOXAZOLE AND TRIMETHOPRIM 1 TABLET: 800; 160 TABLET ORAL at 10:59

## 2024-08-28 RX ADMIN — BUMETANIDE 2 MG: 0.25 INJECTION INTRAMUSCULAR; INTRAVENOUS at 16:15

## 2024-08-28 RX ADMIN — INSULIN HUMAN 16 UNITS/HR: 1 INJECTION, SOLUTION INTRAVENOUS at 11:32

## 2024-08-28 RX ADMIN — PREDNISONE 40 MG: 20 TABLET ORAL at 08:29

## 2024-08-28 RX ADMIN — DEXMEDETOMIDINE HYDROCHLORIDE 0.2 MCG/KG/HR: 400 INJECTION INTRAVENOUS at 17:16

## 2024-08-28 RX ADMIN — Medication 25 MCG: at 01:26

## 2024-08-28 RX ADMIN — POTASSIUM CHLORIDE 20 MEQ: 29.8 INJECTION, SOLUTION INTRAVENOUS at 03:10

## 2024-08-28 RX ADMIN — INSULIN GLARGINE 10 UNITS: 100 INJECTION, SOLUTION SUBCUTANEOUS at 22:24

## 2024-08-28 RX ADMIN — PIPERACILLIN AND TAZOBACTAM 4.5 G: 4; .5 INJECTION, POWDER, LYOPHILIZED, FOR SOLUTION INTRAVENOUS at 01:33

## 2024-08-28 RX ADMIN — Medication 15 ML: at 08:29

## 2024-08-28 RX ADMIN — PIPERACILLIN AND TAZOBACTAM 4.5 G: 4; .5 INJECTION, POWDER, LYOPHILIZED, FOR SOLUTION INTRAVENOUS at 08:06

## 2024-08-28 RX ADMIN — NYSTATIN 500000 UNITS: 100000 SUSPENSION ORAL at 08:29

## 2024-08-28 ASSESSMENT — ACTIVITIES OF DAILY LIVING (ADL)
ADLS_ACUITY_SCORE: 48

## 2024-08-28 NOTE — PROGRESS NOTES
[facility], Procedure Note          Extubation:       Terrance Hidalgo  MRN# [id number]   August 28, 2024, 5:39 PM         Patient extubated at: August 28, 2024, 5:25 PM   Supplemental Oxygen: Via high flow nasal cannula at 40 liters per minute, 40% fi02   Cough: The cough is good, moist, and productive   Secretion Mode: Able to clear   Secretion Amount: Moderate amount, moderately thick and white / yellow in color   Respiratory Exam:: Breath sounds: equal and clear and diminished     Location: bilaterally   Skin Exam:: Patient color: natural   Patient Status: Currently appears comfortable   Arterial Blood Gasses: pH Arterial (no units)   Date Value   08/28/2024 7.48 (H)     pO2 Arterial (mm Hg)   Date Value   08/28/2024 143 (H)     pCO2 Arterial (mm Hg)   Date Value   08/28/2024 46 (H)     Bicarbonate Arterial (mmol/L)   Date Value   08/28/2024 34 (H)            Recorded by Wilman David, RT

## 2024-08-28 NOTE — PHARMACY-VANCOMYCIN DOSING SERVICE
Pharmacy Vancomycin Initial Note  Date of Service 2024  Patient's  2000  24 year old, male    Indication: Healthcare-Associated Pneumonia    Current estimated CrCl = Estimated Creatinine Clearance: 143.2 mL/min (based on SCr of 1.06 mg/dL).    Creatinine for last 3 days  2024:  8:08 PM Creatinine 1.09 mg/dL  2024: 12:46 AM Creatinine 1.41 mg/dL;  4:21 AM Creatinine 1.18 mg/dL;  7:56 AM Creatinine 1.03 mg/dL; 12:18 PM Creatinine 1.07 mg/dL;  3:51 PM Creatinine 1.13 mg/dL;  8:57 PM Creatinine 1.35 mg/dL; 11:57 PM Creatinine 1.48 mg/dL  2024:  2:05 AM Creatinine 1.53 mg/dL;  4:13 AM Creatinine 1.66 mg/dL;  8:12 AM Creatinine 1.56 mg/dL; 12:10 PM Creatinine 1.54 mg/dL;  5:42 PM Creatinine 1.68 mg/dL;  8:59 PM Creatinine 1.55 mg/dL  2024: 12:09 AM Creatinine 1.46 mg/dL;  3:50 AM Creatinine 1.44 mg/dL;  8:13 AM Creatinine 1.20 mg/dL; 12:08 PM Creatinine 1.14 mg/dL;  4:06 PM Creatinine 1.06 mg/dL    Recent Vancomycin Level(s) for last 3 days  No results found for requested labs within last 3 days.      Vancomycin IV Administrations (past 72 hours)                     vancomycin (VANCOCIN) 2,000 mg in sodium chloride 0.9 % 500 mL intermittent infusion (mg) 2,000 mg New Bag 24 0643                    Nephrotoxins and other renal medications (From now, onward)      Start     Dose/Rate Route Frequency Ordered Stop    24 1830  vancomycin (VANCOCIN) 1,500 mg in 0.9% NaCl 250 mL intermittent infusion         1,500 mg  166.7 mL/hr over 90 Minutes Intravenous EVERY 12 HOURS 24 1818      24 1600  bumetanide (BUMEX) injection 2 mg         2 mg Intravenous EVERY 8 HOURS 24 1521      24 1000  DOBUTamine (DOBUTREX) 1,000 mg in D5W 250 mL infusion (adult MAX conc)         5 mcg/kg/min × 108 kg (Dosing Weight)  8.1 mL/hr  Intravenous CONTINUOUS 24 0936              Contrast Orders - past 72 hours (72h ago, onward)      Start     Dose/Rate Route Frequency Stop     08/27/24 0930  perflutren diluted 1mL to 2mL with saline (OPTISON) diluted injection 6 mL         6 mL Intravenous ONCE 08/27/24 0924            InsightRX Prediction of Planned Initial Vancomycin Regimen  Loading dose: N/A  Regimen: 1500 mg IV every 12 hours.  Start time: 06:43 on 08/29/2024  Exposure target: AUC24 (range)400-600 mg/L.hr   AUC24,ss: 570 mg/L.hr  Probability of AUC24 > 400: 83 %  Ctrough,ss: 17.4 mg/L  Probability of Ctrough,ss > 20: 39 %  Probability of nephrotoxicity (Lodise FCO 2009): 13 %          Plan:  Start vancomycin  1500 mg IV q12h (SCr improving, already recieved 2g dose x1 yesterday, 8/27, so did not re-load).   Vancomycin monitoring method: AUC  Vancomycin therapeutic monitoring goal: 400-600 mg*h/L  Pharmacy will check vancomycin levels as appropriate in 1-3 Days.    Serum creatinine levels will be ordered daily for the first week of therapy and at least twice weekly for subsequent weeks.      Angie Crespo [facility]

## 2024-08-28 NOTE — PLAN OF CARE
Major Shift Events: Oriana weaned off, extubated at 1725. Nipride weaned from 1.4 to 1.     Neuro: follows commands, MCALLISTER, PERRL  CV: ST with frequent PVCs. IABP 1:1. Afebrile  Pulmonary: Extubated to high flow, 40L 30%.  GI/: 4mg bumex given, 100-550 UOP/hr. Rectal tube in place with output.  Intake/Output Summary (Last 24 hours) at 8/28/2024 1832  Last data filed at 8/28/2024 1800  Gross per 24 hour   Intake 6634.49 ml   Output 6040 ml   Net 594.49 ml     Plan: Monitor hemodynamics after weaning Oriana off. Monitor oxygenation after extubation.    I assumed care of this patient from 8/28/2024 at 0730. See flowsheet for full assessment and medication titrations.     Eloisa Rodgers RN, BAN, CCRN on 8/28/2024 at 2:35 PM    Problem: Heart Failure  Goal: Optimal Cardiac Output  Outcome: Progressing  Intervention: Optimize Cardiac Output  Recent Flowsheet Documentation  Taken 8/28/2024 1200 by Eloisa Steinberg RN  Environmental Support:   calm environment promoted   distractions minimized   environmental consistency promoted  Taken 8/28/2024 0800 by Eloisa Steinberg RN  Environmental Support:   calm environment promoted   distractions minimized   environmental consistency promoted  Goal: Effective Oxygenation and Ventilation  Outcome: Progressing  Intervention: Promote Airway Secretion Clearance  Recent Flowsheet Documentation  Taken 8/28/2024 1200 by Eloisa Steinberg RN  Breathing Techniques/Airway Clearance: deep/controlled cough encouraged  Cough And Deep Breathing: done with encouragement  Activity Management: activity adjusted per tolerance  Taken 8/28/2024 0800 by Eloisa Steinberg RN  Breathing Techniques/Airway Clearance: deep/controlled cough encouraged  Cough And Deep Breathing: done with encouragement  Activity Management: activity adjusted per tolerance  Intervention: Optimize Oxygenation and Ventilation  Recent Flowsheet Documentation  Taken 8/28/2024 1200 by Peyman Rodgers  Eloisa DRAKE RN  Airway/Ventilation Management: airway patency maintained  Head of Bed (HOB) Positioning: HOB at 30 degrees  Taken 8/28/2024 1000 by Eloisa Steinberg RN  Head of Bed (HOB) Positioning: HOB at 30 degrees  Taken 8/28/2024 0800 by Eloisa Steinberg RN  Airway/Ventilation Management: airway patency maintained  Head of Bed (HOB) Positioning: HOB at 30 degrees     Problem: Gas Exchange Impaired  Goal: Optimal Gas Exchange  Outcome: Progressing  Intervention: Optimize Oxygenation and Ventilation  Recent Flowsheet Documentation  Taken 8/28/2024 1200 by Eloisa Steinberg RN  Airway/Ventilation Management: airway patency maintained  Head of Bed (HOB) Positioning: HOB at 30 degrees  Taken 8/28/2024 1000 by Eloisa Steinberg RN  Head of Bed (HOB) Positioning: HOB at 30 degrees  Taken 8/28/2024 0800 by Eloisa Steinberg RN  Airway/Ventilation Management: airway patency maintained  Head of Bed (HOB) Positioning: HOB at 30 degrees   Goal Outcome Evaluation:    Plan of Care Reviewed with: patient and family    Overall patient progress: progressing    Outcome evaluation: progressing

## 2024-08-28 NOTE — PLAN OF CARE
Goal Outcome Evaluation: No change.      Plan of Care Reviewed With: patient    Overall Patient Progress: no changeOverall Patient Progress: no change    Outcome Evaluation: Subobtimal diffrence between diastolic on unassisted and assisted (0-3 mmHg) CC aware. Held bumex.    Neuro:  (RASS: -1 to +2) Pt following commands and Pt moving all extremities. Pupils E/R/R B/L.fentanyl gtt @ 200 mcg/hr   CV: ST and occ. PVCs. HR from 110s to 130. .Map goal 65 to 75. CVP goal 10-12. Nipride @ 1.4 mcg/kg/min.  Dobutamine @ 5 mcg/kg/min   Resp: Ventilator: CMV mode 40%fio2, 480TV, 18RR, 5peep. LS are clear and dim in the basesPt has thin secretions. Pt requests frequent suctioning.   : Swan in for strict I+Os. Pt's UO was between 140 to 335 mL/hr.  GI: OG @ 64. OG to LIS., NJ @ 104., TF @ 50 mL/hr., and FWF @ 125 mL/ q 1hr NPO. Rectal tube in place.   Skin/Endo/Heme/ID:  Left wrist bruise.  Insulin @ Algorithm 12 and Insulin @ Al 4+4  units/hr (BG goal 120-160) Pt persistently in 180s.  Pt Afebrile  Plan:  Pending course with MCS and RV function, advance therapies candidate workup?   For vital signs and complete assessments, please see documentation flowsheets.       Time 2100 0000 0400   CVP 12 5 8   PA 21/11 19/7 20/7   Svo2 68 65 67   CI 2.1 1.9 2.1   .7 1256.1 1105.4   Lactate 2.6 3.2 3.7

## 2024-08-28 NOTE — PROGRESS NOTES
CLINICAL NUTRITION SERVICES - REASSESSMENT NOTE     Nutrition Prescription    RECOMMENDATIONS FOR MDs/PROVIDERS TO ORDER:  - Total daily fluids/adjustments per MD   - Minimize disruptions to EN infusions as able   - Team okay with TF at goal per message 8/27. Trophic feeds as medically indicated per MD should hemodynamics warrant.   - Bowel regimen/adjustments as appro     Malnutrition Status:    - Moderate malnutrition in the context of acute illness     Recommendations already ordered by Registered Dietitian (RD):  - Continue as ordered: Pivot 1.5 Akbar (or equivalent) @ goal of  50ml/hr  (1200ml/day) provides: 1800 kcals, 112 g PRO, 900 ml free H20, 206 g CHO, and 9 g fiber daily + 2 [id number] = 1960 kcals (22 kcals/kg) and 152 gm PRO (1.7 gm/kg)   - Ordered met cart study     Future/Additional Recommendations:  - EN tolerance via current access/NDT (Cortrak)  - Weight trends/adjustments   - GI status   - Met cart (ordered 8/28)     EVALUATION OF THE PROGRESS TOWARD GOALS   Diet: NPO  Nutrition Support:  Pivot 1.5 Akbar (or equivalent) @ goal of  50ml/hr  (1200ml/day) provides: 1800 kcals, 112 g PRO, 900 ml free H20, 206 g CHO, and 9 g fiber daily + 2 [id number] = 1960 kcals (22 kcals/kg) and 152 gm PRO (1.7 gm/kg)   Intake: 623 ml, 935 kcals, 59 gm PRO + 2 [id number]  = 1095 kcals (12 kcals/kg) and 99 gm PRO (1.1 gm/kg)      NEW FINDINGS   Nutrition/GI: Pt tolerating EN via small bore ppFT placed 8/26 via Cortrak. Pt with OGT that was removed at bedside due to duration of use and team okay with removal per RN.  GI: Transaminitis - liver US with dopplers to r/o cholecystitis and differentiate liver perfusion/congestion with doppler indices showed no culprit. LBM per flowsheets 8/26.     CV: Biventricular failure LVEF 25%; Cardiogenic shock SCAI C; Lactic acidosis. TTE (8/21/24):  LVEF is 16%. Thiamine was initiated. Severe hypokinesis of mid to apical RV free wall. On diuresis.      Pulm: Acute hypoxic  respiratory failure, vented.     Renal:Hypernatremia: 147; Hypervolemia. Team adjusts FWF - currently at 90 ml q1hr.     Skin: No issues noted. Skin beneath nasal bridle was inspected; appears appropriate, with no obvious skin breakdown or tension on the bridle string - moved FT midline.     Labs/meds: Meds: Bumix; Certavite; lytes; prednisone; thiamine; senokot. Labs: Na+ 149 (H); BUN: 64.7 (H); ALT: 583 (H); AST: 200 (H); lactic acid: 4.0 (H)    Weights:  Overall down from admit: 105.4 kg --> 94.2 kg = 12% weight loss over ~ 1 week. Cannot r/o at least some role of fluid status changes in short-term weight decline.     MALNUTRITION  % Intake: < 75% for > 7 days (moderate)  % Weight Loss: 1-2% in 1 week (moderate) - cannot r/o at least some role of fluid status changes in short-term weight changes   Subcutaneous Fat Loss: None observed  Muscle Loss: None observed - pt with baseline lower LBM status due to medical condition per family. Noted lower LBM in lower extremities in particular. Otherwise difficult to fully assess with pt appearing fluid-up in uppers.   Fluid Accumulation/Edema: medically related vs none   Malnutrition Diagnosis: Moderate malnutrition in the context of acute illness     Previous Goals   Diet adv v nutrition support within 24-48 hours   Evaluation: Met    Previous Nutrition Diagnosis  Inadequate oral intake related to inability to take oral PO/vented as evidenced by NPO and meeting 0% nutrition needs at present    Evaluation: Improving    CURRENT NUTRITION DIAGNOSIS  Inadequate protein-energy intake related to advancement of and disruptions to EN infusions as evidenced by pt not meeting goa provisions with 7-day averages meeting 12 kcals/kg and 1.1 gm PRO/kg dosing weight.       INTERVENTIONS  Implementation  Enteral Nutrition - continue as ordered and monitor accordingly   Met cart  Weight trends - adjustments to DW    Goals  Total avg nutritional intake to meet a minimum of 20 kcal/kg and  "1.5 g PRO/kg daily (per dosing wt 89 kg).    Monitoring/Evaluation  Progress toward goals will be monitored and evaluated per protocol.    Diya Rios RD, LD, [facility]  Neuro ICU Dietitian; 6A Neuro  Vocera \"4E Clinical Dietitian\"  Weekend/holiday \"Weekend Clinical Dietitian\"      "

## 2024-08-28 NOTE — PROGRESS NOTES
Pt placed on PS 5/+5       08/28/24 1522   Weaning Assessment   Weaning Assessment Complete Y   Safety Screen Weaning Assessment Weaning Assessment meets all criteria   RASS (Eid Agitation-Sedation Scale) 0-->alert and calm   Pulse 113   /57   Spontaneous Respiratory Rate 13 breaths/min   Spontaneous Tidal Volume (mL) 520 mL   Spontaneous Minute Ventilation 5.43 mL   RSBI 25   Wean Start Time  1522       Wilman David, RT

## 2024-08-28 NOTE — PROGRESS NOTES
[facility] PROGRESS NOTE     Patient:  Terrance Hidalgo   YOB: 2000, MRN: [id number]  Date of Visit: 08/28/2024  Date of Admission: 8/20/2024  Consult Requester: Farrah Delgado MD          ASSESSMENT AND PLAN     Terrance Hidalgo is a 24 year old male with autism. He presented with new diagnosis of heart failure Aug 21. Biopsy and histopath showed essentially unremarkable myocardial tissue with no evidence of inflammatory infiltrate.     Fevers have now resolved, he has completed a course of antibiotics and low likelihood of septic shock with <0.5 PCT. Piptazo has high sodium load and might be harmful in his case of severe heart failure. Overall, risk of continuing therapy is higher than benefit at the moment.      IMPRESSION  Congestive hepatopathy    Cardiogenic shock with respiratory failure   Severe bacterial pneumonia, treated   New heart failure with reduced EF     RECOMMENDATION:  Observe off antibiotics.      ID will continue to follow with you. Please check [facility] for staff covering the service.     Racheal Son MD  Infectious Diseases  Pager: [phone]  [email or web address]    50 MINUTES SPENT BY ME on the date of service doing chart review, history, exam, documentation & further activities per the note.             SUBJECTIVE      Interval History and Events:  Still in ICU, intubated with IABP    Antimicrobial Treatment:  Vanco 8/27-  Piptazo 8/26-  Amp-sul 8/21-8/26         OBJECTIVE       Physical Examination:    Temp:  [97.3  F (36.3  C)-98.2  F (36.8  C)] 97.7  F (36.5  C)  Pulse:  [104-148] 104  Resp:  [17-24] 18  BP: (107)/(72) 107/72  MAP:  [62 mmHg-97 mmHg] 65 mmHg  Arterial Line BP: ()/(50-76) 81/53  FiO2 (%):  [40 %] 40 %  SpO2:  [97 %-100 %] 100 %    I/O last 3 completed shifts:  In: 6439.48 [I.V.:2699.48; NG/GT:2990]  Out: 5150 [Urine:4045; Emesis/NG output:900; Stool:205]    Vitals:    08/24/24 0900 08/25/24 0000 08/26/24 0000 08/27/24 0000   Weight: 100.5 kg  (221 lb 9 oz) 98.5 kg (217 lb 2.5 oz) 96.6 kg (212 lb 15.4 oz) 95.3 kg (210 lb 1.6 oz)    08/28/24 0000   Weight: 94.2 kg (207 lb 10.8 oz)     Constitutional: Looks ill.  Respiratory: Some bilateral crackles   Cardiovascular: Humming sound   GI: soft, non-distended and non-tender.    Laboratory Data:    CRP normal   PCT 0.35    AST, ALT     Microbiology:  8/26 Parvo PCR -   8/26 C diff - neg   8/26 MRSA - neg   8/26 BDG -  8/26 Ucx -   8/25 Scx - yeast   8/25 Bcx -   8/21 Legio, Strep U - neg   8/21 Lyme ab - neg   Adeno, CMV - neg   EBV pcr- neg  HIV neg  8/21 Parvo Ig positive, IgM neg   Coxsackie ab -   Histo U - neg  RPP - neg   COVID - neg   8/21 Scx - no growth

## 2024-08-28 NOTE — PROGRESS NOTES
[facility]  Cardiology Heart Failure Service (Cards 2) Progress Note    Patient Name: Terrance Hidalgo    Medical Record Number: [id number]    YOB: 2000  PCP: Colt Flores    Admit Date/Time: 8/20/2024 10:13 PM   8    Assessment/Plan:    23 yo M with PMH autism who initially presented to [facility] with cough, URI symptoms, and hypoxemia. He was intubated due to respiratory failure and transferred to [facility], where his hospital course transpired to mixed cardiogenic/septic shock requiring multiple pressor agents, inotropic support, and IV diuresis. Transferred again to [facility] CICU on 8/20 for continued management. Presentation initially highly suspicious for fulminant myocarditis base on elevated cardiac and inflammatory biomarkers, new (suspected), severe biventricular dysfunction w/ reduced cardiac output requiring intermittent inotropic support, and concurrent infectious/respiratory symptomotology c/b acute respiratory failure 2/2 ARDS vs cardiogenic etiology 2/2 miocarditis with superimposed aspiration PNM treated with sulbactam. Empirically pulsed with steroids. However, EMB 5/21 demonstrated unremarkable myocardial tissue with no evidence of inflammatory infiltrate.     - ECMO candidate     Changes:  - c/w IABP   - c/w dobutamine for biventricular inotropic support   - wean off nipride and Oriana  - diuresis with CVP goal 10-12  - hold bactirm if it is related to LA     Neuro:     #AMS  #Sedation   - moving x 4 intermittent following commands     Plan:      - sedation holiday daily  - Sbp daily   - fentanyl for now   - prn ativan      CV:    #Biventricular failure LVEF 25%   #Cardiogenic shock SCAI C  #Lactic acidosis   TTE (8/21/24): LVIDd 58mm. Biplane LVEF is 16%. Severe hypokinesis of mid to apical RV free wall.  RHC (8/21/24): RA mean 6, PA 30/23/26, PCWP 18, [place] CO/CI 5.0/2.1, PVR 1.6, SVR 1270  EMB (8/21/24): Fragments of essentially unremarkable myocardial  tissue with no evidence of inflammatory infiltrate   Coronary angiogram: NA   CMR: None - will obtain prior to discharge     Plan:  - IABP   - c/w dobutamine for biventricular inotropic support   - wean off nipride and Oriana  - diuresis with CVP goal 10-12  - c/w steroid taper- c/w nystatin/bactrim   - increase bumex dose CVP goal 10-12      Pulm:     #Acute hypoxic respiratory failure  #Mild simple pleular effusion   #concern for Aspiration PNM     Plan:    - peep 5, titrate fio2  - abx per below       GI/Nutrition:    #Transaminitis    1. Hepatomegaly with increased echogenicity of the liver, which may be  seen with intrinsic parenchymal disease such as steatosis.  2. Biliary sludge without sonographic evidence for acute  cholecystitis.  3. Normal antegrade flow in the extrahepatic and right portal vein.   4. The remainder of the findings are significantly limited secondary  to obscuring bowel gas and technical challenges related to patient  mobility.      Plan:    - monitor MELD labs  - treatment per above      Renal:    #HyperNa   #Hypervolemia  #LA    Plan:    - adjust FWF   - frequent BMP   - hold bactim for possible contributing factor to LA     ID:     #Concern for aspiration PNM   #Transaminitis     - Antimicrobials       > Unasyn 8/21 - 8/26       > Cefepime 8/20 - 8/21       > Azithromycin 8/20 - 08/24       > Vanco 8/20 - 8/21 s/p 8/26       > meropenem 8/26       > zosyn 8/26 -     Plan:  - c/w zosyn      Heme:    #Anemia of critical illness     Plan:  - H/H  - transfuse if hb < 7 or drop > 2 g     Endo:    Plan:  - FSG goal 140-160  - insulin   - start lantus     Family Meeting/Goals of Care:  - last time family updated: TBD   - next of kin/HCP:  Johann Hidalgo(TEMPORARY GUARDIAN TO 10-)  Brother  [phone]  [facility] [facility] [address]  Legal guardian    Disposition:   - Unit   Code Status:   Full Code     Thank you for allowing me to care for this patient, please don't  hesitate to contact me with any questions regarding this plan.     Patient was discussed and evaluated with Farrah Claudio MD, attending physician, who agrees with the assessment and plan above.    Matt Cabral MD, PhD, MSc  Cardiology Fellow    Interval Changes in Past 24 Hours:     - Overnight events: IABP placed. LA worsening. Minimal vent settings.   - Patient's complains: no new complaints.    - Telemetry monitoring: unremarkable.      - Review Of Systems:  A 4-point ROS was negative aside from those listed above.    Physical Exam:    General appearance - Lying in bed; intubated sedated   Head: Normocephalic and atraumatic.   Eyes: Pupils are equal, round, and reactive to light. No scleral icterus.  Neck: JVD, bruit.  Cardiovascular: Tachycardiac regular rhythm. S1 and S2 auscultated. No murmurs, rub, or gallops.  Pulmonary/Chest: Intubated. Mechanical ventilation   Abdominal: Bowel sounds present. Soft. No distension. No rigidity, rebound or guarding.   Extremities: Warm, no cyanosis, 2+ distal pulses bilaterally. Pitting edema b/l +1.   Skin: Skin is warm and not diaphoretic. No rash, bruising, or erythema.  Neuro: Sedated     Objective data:    Temp:  [97.2  F (36.2  C)-98.2  F (36.8  C)] 97.7  F (36.5  C)  Pulse:  [] 108  Resp:  [14-24] 16  BP: (107)/(72) 107/72  MAP:  [62 mmHg-97 mmHg] 83 mmHg  Arterial Line BP: ()/(47-76) 100/66  FiO2 (%):  [40 %] 40 %  SpO2:  [97 %-100 %] 100 %    Vitals:    08/24/24 0900 08/25/24 0000 08/26/24 0000 08/27/24 0000   Weight: 100.5 kg (221 lb 9 oz) 98.5 kg (217 lb 2.5 oz) 96.6 kg (212 lb 15.4 oz) 95.3 kg (210 lb 1.6 oz)    08/28/24 0000   Weight: 94.2 kg (207 lb 10.8 oz)        Vent Settings:  Resp: 16 SpO2: 100 % O2 Device: Mechanical Ventilator    FiO2 (%): 40 %, Resp: 16, Inspiratory Pressure (cm H2O) (Drager Garima): 13, Vent Mode: PCV Plus assist, Resp Rate (Set): 16 breaths/min, Tidal Volume (Set, mL): 480 mL, PEEP (cm H2O): 5 cmH2O, Resp Rate  (Set): 16 breaths/min, Tidal Volume (Set, mL): 480 mL, PEEP (cm H2O): 5 cmH2O    LABS Reviewed  IMAGES Reviewed    Current medications   Current Facility-Administered Medications   Medication Dose Route Frequency Provider Last Rate Last Admin    acetaminophen (TYLENOL) tablet 650 mg  650 mg Oral or Feeding Tube Q8H PRN Sudheer Rojas MD   650 mg at 08/26/24 0501    albuterol (PROVENTIL) neb solution 2.5 mg  2.5 mg Nebulization Q6H PRN Matt Cabral MD        [Held by provider] bumetanide (BUMEX) 0.25 mg/mL infusion  1 mg/hr Intravenous Continuous Matt Cabral MD   Stopped at 08/28/24 0116    chlorhexidine (PERIDEX) 0.12 % solution 15 mL  15 mL Mouth/Throat Q12H Sudheer Rojas MD   15 mL at 08/28/24 0829    dextrose 10% infusion   Intravenous Continuous PRN Colten Garay MD        glucose gel 15-30 g  15-30 g Oral Q15 Min PRN Colten Garay MD        Or    dextrose 50 % injection 25-50 mL  25-50 mL Intravenous Q15 Min PRN Colten aGray MD        Or    glucagon injection 1 mg  1 mg Subcutaneous Q15 Min PRN Colten Garay MD        DOBUTamine (DOBUTREX) 1,000 mg in D5W 250 mL infusion (adult MAX conc)  5 mcg/kg/min (Dosing Weight) Intravenous Continuous Matt Cabral MD 8.1 mL/hr at 08/28/24 1100 5 mcg/kg/min at 08/28/24 1100    fentaNYL (SUBLIMAZE) 50 mcg/mL bolus from pump  12.5 mcg Intravenous Q1H PRN Shantanu Mclean MD   25 mcg at 08/28/24 0126    fentaNYL (SUBLIMAZE) infusion   mcg/hr Intravenous Continuous Sudheer Rojas MD 4 mL/hr at 08/28/24 1100 200 mcg/hr at 08/28/24 1100    heparin 25,000 units in 0.45% NaCl 250 mL ANTICOAGULANT infusion  0-5,000 Units/hr Intravenous Continuous Matt Cabral MD 11.5 mL/hr at 08/28/24 1226 1,150 Units/hr at 08/28/24 1226    insulin regular (MYXREDLIN) 1 unit/mL infusion  0-24 Units/hr Intravenous Continuous Colten Garay MD 16 mL/hr at 08/28/24 1132 16 Units/hr at 08/28/24 1132    ipratropium - albuterol 0.5 mg/2.5 mg/3 mL  (DUONEB) neb solution 3 mL  3 mL Nebulization Q4H PRN Sudheer Rojas MD   3 mL at 08/25/24 0536    lidocaine (LMX4) cream   Topical Q1H PRN Sudheer Rojas MD        lidocaine 1 % 0.1-1 mL  0.1-1 mL Other Q1H PRN Sudheer Rojas MD        medication instruction   Does not apply Continuous PRN Sudheer Rojas MD        midazolam (VERSED) injection 1 mg  1 mg Intravenous Q2H PRN Shantanu Mclean MD   1 mg at 08/28/24 0518    multivitamins w/minerals liquid 15 mL  15 mL Per Feeding Tube Daily Susan Yeh MD   15 mL at 08/28/24 0829    naloxone (NARCAN) injection 0.2 mg  0.2 mg Intravenous Q2 Min PRN Jani Martin MD        Or    naloxone (NARCAN) injection 0.4 mg  0.4 mg Intravenous Q2 Min PRN Jani Martin MD        Or    naloxone (NARCAN) injection 0.2 mg  0.2 mg Intramuscular Q2 Min PRN Jani Martin MD        Or    naloxone (NARCAN) injection 0.4 mg  0.4 mg Intramuscular Q2 Min PRN Jani Martin MD        nitroPRUsside (NIPRIDE) 100 mg, sodium thiosulfate 1,000 mg in D5W 62.5 mL IV infusion (conc: 1.6 mg/mL)  0.25 mcg/kg/min (Dosing Weight) Intravenous Continuous Matt Cabral MD 4.1 mL/hr at 08/28/24 1100 1 mcg/kg/min at 08/28/24 1100    nystatin (MYCOSTATIN) suspension 500,000 Units  500,000 Units Swish & Spit 4x Daily Susan Yeh MD   500,000 Units at 08/28/24 1151    pantoprazole (PROTONIX) 2 mg/mL suspension 40 mg  40 mg Oral or Feeding Tube Daily Farrah Delgado MD   40 mg at 08/28/24 0830    piperacillin-tazobactam (ZOSYN) 4.5 g vial to attach to  mL bag  4.5 g Intravenous Q6H Matt Cabral MD   4.5 g at 08/28/24 0806    [Held by provider] polyethylene glycol (MIRALAX) Packet 17 g  17 g Oral or Feeding Tube Daily Andree Becerril, APRN CNP   17 g at 08/23/24 1049    polyethylene glycol (MIRALAX) Packet 17 g  17 g Oral Daily PRN Sudheer Rojas MD   17 g at 08/22/24 1430    potassium chloride (KLOR-CON) Packet 40 mEq  40 mEq Oral or Feeding Tube  BID Matt Cabral MD   40 mEq at 08/28/24 0829    predniSONE (DELTASONE) tablet 40 mg  40 mg Oral or Feeding Tube Daily Susan Yeh MD   40 mg at 08/28/24 0829    Followed by    [START ON 8/31/2024] predniSONE (DELTASONE) tablet 30 mg  30 mg Oral or Feeding Tube Daily Susan Yeh MD        Followed by    [START ON 9/7/2024] predniSONE (DELTASONE) tablet 20 mg  20 mg Oral or Feeding Tube Daily Susan Yeh MD        Followed by    [START ON 9/14/2024] predniSONE (DELTASONE) tablet 10 mg  10 mg Oral or Feeding Tube Daily Susan Yeh MD        Followed by    [START ON 9/21/2024] predniSONE (DELTASONE) tablet 5 mg  5 mg Oral or Feeding Tube Daily Susan Yeh MD        [Held by provider] Prosource TF20 ENfit Compatibl EN LIQD (PROSOURCE TF20) packet 60 mL  1 packet Per Feeding Tube BID Susan Yeh MD   60 mL at 08/26/24 2042    QUEtiapine (SEROquel) tablet 25 mg  25 mg Oral or Feeding Tube BID Colten Garay MD   25 mg at 08/28/24 0829    senna-docusate (SENOKOT-S/PERICOLACE) 8.6-50 MG per tablet 1 tablet  1 tablet Oral or Feeding Tube At Bedtime Andree Becerril, APRN CNP   1 tablet at 08/27/24 2021    senna-docusate (SENOKOT-S/PERICOLACE) 8.6-50 MG per tablet 1 tablet  1 tablet Oral or Feeding Tube BID PRN Jani Martin MD        Or    senna-docusate (SENOKOT-S/PERICOLACE) 8.6-50 MG per tablet 2 tablet  2 tablet Oral or Feeding Tube BID PRN Jani Martin MD        sodium chloride (PF) 0.9% PF flush 3 mL  3 mL Intracatheter Q8H Sudheer Rojas MD   3 mL at 08/27/24 2307    sodium chloride (PF) 0.9% PF flush 3 mL  3 mL Intracatheter q1 min prn Sudheer Rojas MD        sodium chloride 0.9 % infusion   Intravenous Continuous Matt Cabral MD        [Held by provider] sulfamethoxazole-trimethoprim (BACTRIM DS) 800-160 MG per tablet 1 tablet  1 tablet Oral or Feeding Tube Once per day on Monday Wednesday Friday Farrah Delgado MD   1  tablet at 08/28/24 1059    thiamine (B-1) tablet 100 mg  100 mg Oral or Feeding Tube Daily Graciela Whittaker MD   100 mg at 08/28/24 0829       Medications Prior to Admission   Medication Sig Dispense Refill Last Dose    risperiDONE (RISPERDAL) 1 MG tablet Take 1 mg by mouth every morning.   Past Week at unknown    risperiDONE (RISPERDAL) 1 MG tablet Take 1.5 mg by mouth every evening.   Past Week at unknown

## 2024-08-28 NOTE — PROGRESS NOTES
Major Shift Events:  IABP placed, changed from wrist restraints to mitts    Neuro: on 200 fent, follows commands, MCALLISTER, pupils = reactive  CV: ST, map goal 65-75, IABP placed and 1:1. Afebrile, on dobutamine & nipride   Respi: LS clear/dim, minimal inline secretions, copious oral secretions, on Oriana @ 4  Gi: TF at goal of 50, 125 q1 FWF, rectal tube in place with output   Gu: dockery in place, adequate UOP      Plan: Wean Oriana, wean gtts as able? Wean vent support as able    For vital signs and complete assessments, please see documentation flowsheets.

## 2024-08-29 ENCOUNTER — APPOINTMENT (OUTPATIENT)
Dept: GENERAL RADIOLOGY | Facility: CLINIC | Age: 24
DRG: 853 | End: 2024-08-29
Attending: STUDENT IN AN ORGANIZED HEALTH CARE EDUCATION/TRAINING PROGRAM
Payer: MEDICARE

## 2024-08-29 LAB
ABO/RH(D): NORMAL
ALBUMIN SERPL BCG-MCNC: 3.6 G/DL (ref 3.5–5.2)
ALBUMIN SERPL BCG-MCNC: 3.8 G/DL (ref 3.5–5.2)
ALBUMIN SERPL BCG-MCNC: 3.8 G/DL (ref 3.5–5.2)
ALBUMIN SERPL BCG-MCNC: 3.9 G/DL (ref 3.5–5.2)
ALLEN'S TEST: ABNORMAL
ALP SERPL-CCNC: 101 U/L (ref 40–150)
ALP SERPL-CCNC: 106 U/L (ref 40–150)
ALP SERPL-CCNC: 111 U/L (ref 40–150)
ALP SERPL-CCNC: 120 U/L (ref 40–150)
ALT SERPL W P-5'-P-CCNC: 439 U/L (ref 0–70)
ALT SERPL W P-5'-P-CCNC: 491 U/L (ref 0–70)
ALT SERPL W P-5'-P-CCNC: 493 U/L (ref 0–70)
ALT SERPL W P-5'-P-CCNC: 560 U/L (ref 0–70)
ALT SERPL W P-5'-P-CCNC: 608 U/L (ref 0–70)
ANION GAP SERPL CALCULATED.3IONS-SCNC: 10 MMOL/L (ref 7–15)
ANION GAP SERPL CALCULATED.3IONS-SCNC: 11 MMOL/L (ref 7–15)
ANION GAP SERPL CALCULATED.3IONS-SCNC: 11 MMOL/L (ref 7–15)
ANION GAP SERPL CALCULATED.3IONS-SCNC: 13 MMOL/L (ref 7–15)
ANION GAP SERPL CALCULATED.3IONS-SCNC: 15 MMOL/L (ref 7–15)
ANION GAP SERPL CALCULATED.3IONS-SCNC: 7 MMOL/L (ref 7–15)
ANTIBODY SCREEN: NEGATIVE
AST SERPL W P-5'-P-CCNC: 112 U/L (ref 0–45)
AST SERPL W P-5'-P-CCNC: 146 U/L (ref 0–45)
AST SERPL W P-5'-P-CCNC: 158 U/L (ref 0–45)
AST SERPL W P-5'-P-CCNC: 180 U/L (ref 0–45)
AST SERPL W P-5'-P-CCNC: 225 U/L (ref 0–45)
BASE EXCESS BLDA CALC-SCNC: 10.1 MMOL/L (ref -3–3)
BASE EXCESS BLDA CALC-SCNC: 10.3 MMOL/L (ref -3–3)
BASE EXCESS BLDA CALC-SCNC: 11 MMOL/L (ref -3–3)
BASE EXCESS BLDA CALC-SCNC: 6.9 MMOL/L (ref -3–3)
BASE EXCESS BLDA CALC-SCNC: 7.7 MMOL/L (ref -3–3)
BASE EXCESS BLDA CALC-SCNC: 8.4 MMOL/L (ref -3–3)
BASE EXCESS BLDV CALC-SCNC: 11.1 MMOL/L (ref -3–3)
BASE EXCESS BLDV CALC-SCNC: 11.4 MMOL/L (ref -3–3)
BASE EXCESS BLDV CALC-SCNC: 11.9 MMOL/L (ref -3–3)
BASE EXCESS BLDV CALC-SCNC: 8 MMOL/L (ref -3–3)
BASE EXCESS BLDV CALC-SCNC: 9.4 MMOL/L (ref -3–3)
BASE EXCESS BLDV CALC-SCNC: 9.7 MMOL/L (ref -3–3)
BASOPHILS # BLD AUTO: 0.1 10E3/UL (ref 0–0.2)
BASOPHILS # BLD AUTO: ABNORMAL 10*3/UL
BASOPHILS # BLD MANUAL: 0 10E3/UL (ref 0–0.2)
BASOPHILS NFR BLD AUTO: 0 %
BASOPHILS NFR BLD AUTO: ABNORMAL %
BASOPHILS NFR BLD MANUAL: 0 %
BILIRUB DIRECT SERPL-MCNC: 0.22 MG/DL (ref 0–0.3)
BILIRUB DIRECT SERPL-MCNC: 0.28 MG/DL (ref 0–0.3)
BILIRUB DIRECT SERPL-MCNC: 0.33 MG/DL (ref 0–0.3)
BILIRUB DIRECT SERPL-MCNC: 0.34 MG/DL (ref 0–0.3)
BILIRUB SERPL-MCNC: 0.4 MG/DL
BILIRUB SERPL-MCNC: 0.6 MG/DL
BILIRUB SERPL-MCNC: 0.6 MG/DL
BILIRUB SERPL-MCNC: 0.7 MG/DL
BUN SERPL-MCNC: 34.3 MG/DL (ref 6–20)
BUN SERPL-MCNC: 38.9 MG/DL (ref 6–20)
BUN SERPL-MCNC: 42.3 MG/DL (ref 6–20)
BUN SERPL-MCNC: 42.4 MG/DL (ref 6–20)
BUN SERPL-MCNC: 44.9 MG/DL (ref 6–20)
BUN SERPL-MCNC: 45.4 MG/DL (ref 6–20)
CA-I BLD-MCNC: 4.7 MG/DL (ref 4.4–5.2)
CALCIUM SERPL-MCNC: 8.6 MG/DL (ref 8.8–10.4)
CALCIUM SERPL-MCNC: 8.7 MG/DL (ref 8.8–10.4)
CALCIUM SERPL-MCNC: 8.9 MG/DL (ref 8.8–10.4)
CALCIUM SERPL-MCNC: 9 MG/DL (ref 8.8–10.4)
CALCIUM SERPL-MCNC: 9 MG/DL (ref 8.8–10.4)
CALCIUM SERPL-MCNC: 9.2 MG/DL (ref 8.8–10.4)
CHLORIDE SERPL-SCNC: 105 MMOL/L (ref 98–107)
CHLORIDE SERPL-SCNC: 107 MMOL/L (ref 98–107)
CHLORIDE SERPL-SCNC: 108 MMOL/L (ref 98–107)
CHLORIDE SERPL-SCNC: 109 MMOL/L (ref 98–107)
CHLORIDE SERPL-SCNC: 109 MMOL/L (ref 98–107)
CHLORIDE SERPL-SCNC: 111 MMOL/L (ref 98–107)
COHGB MFR BLD: 90.9 % (ref 96–97)
COHGB MFR BLD: 92.8 % (ref 96–97)
COHGB MFR BLD: 96.6 % (ref 96–97)
COHGB MFR BLD: 98 % (ref 96–97)
COHGB MFR BLD: 98.1 % (ref 96–97)
COHGB MFR BLD: 99.2 % (ref 96–97)
CREAT SERPL-MCNC: 0.83 MG/DL (ref 0.67–1.17)
CREAT SERPL-MCNC: 0.84 MG/DL (ref 0.67–1.17)
CREAT SERPL-MCNC: 0.87 MG/DL (ref 0.67–1.17)
CREAT SERPL-MCNC: 0.89 MG/DL (ref 0.67–1.17)
CREAT SERPL-MCNC: 0.94 MG/DL (ref 0.67–1.17)
CREAT SERPL-MCNC: 0.96 MG/DL (ref 0.67–1.17)
CV A10 AB TITR SER CF: NORMAL {TITER}
CV A16 AB TITR SER CF: NORMAL {TITER}
CV A2 AB TITR SER CF: NORMAL {TITER}
CV A4 AB TITR SER CF: NORMAL {TITER}
CV A7 AB TITR SER CF: NORMAL {TITER}
CV A9 AB TITR SER CF: NORMAL {TITER}
EGFRCR SERPLBLD CKD-EPI 2021: >90 ML/MIN/1.73M2
EOSINOPHIL # BLD AUTO: 0.2 10E3/UL (ref 0–0.7)
EOSINOPHIL # BLD AUTO: ABNORMAL 10*3/UL
EOSINOPHIL # BLD MANUAL: 0 10E3/UL (ref 0–0.7)
EOSINOPHIL NFR BLD AUTO: 1 %
EOSINOPHIL NFR BLD AUTO: ABNORMAL %
EOSINOPHIL NFR BLD MANUAL: 0 %
ERYTHROCYTE [DISTWIDTH] IN BLOOD BY AUTOMATED COUNT: 13.8 % (ref 10–15)
ERYTHROCYTE [DISTWIDTH] IN BLOOD BY AUTOMATED COUNT: 13.8 % (ref 10–15)
GLUCOSE BLDC GLUCOMTR-MCNC: 108 MG/DL (ref 70–99)
GLUCOSE BLDC GLUCOMTR-MCNC: 109 MG/DL (ref 70–99)
GLUCOSE BLDC GLUCOMTR-MCNC: 122 MG/DL (ref 70–99)
GLUCOSE BLDC GLUCOMTR-MCNC: 124 MG/DL (ref 70–99)
GLUCOSE BLDC GLUCOMTR-MCNC: 131 MG/DL (ref 70–99)
GLUCOSE BLDC GLUCOMTR-MCNC: 131 MG/DL (ref 70–99)
GLUCOSE BLDC GLUCOMTR-MCNC: 142 MG/DL (ref 70–99)
GLUCOSE BLDC GLUCOMTR-MCNC: 147 MG/DL (ref 70–99)
GLUCOSE BLDC GLUCOMTR-MCNC: 148 MG/DL (ref 70–99)
GLUCOSE BLDC GLUCOMTR-MCNC: 149 MG/DL (ref 70–99)
GLUCOSE BLDC GLUCOMTR-MCNC: 154 MG/DL (ref 70–99)
GLUCOSE BLDC GLUCOMTR-MCNC: 158 MG/DL (ref 70–99)
GLUCOSE BLDC GLUCOMTR-MCNC: 159 MG/DL (ref 70–99)
GLUCOSE BLDC GLUCOMTR-MCNC: 179 MG/DL (ref 70–99)
GLUCOSE BLDC GLUCOMTR-MCNC: 229 MG/DL (ref 70–99)
GLUCOSE SERPL-MCNC: 110 MG/DL (ref 70–99)
GLUCOSE SERPL-MCNC: 129 MG/DL (ref 70–99)
GLUCOSE SERPL-MCNC: 141 MG/DL (ref 70–99)
GLUCOSE SERPL-MCNC: 142 MG/DL (ref 70–99)
GLUCOSE SERPL-MCNC: 160 MG/DL (ref 70–99)
GLUCOSE SERPL-MCNC: 229 MG/DL (ref 70–99)
HCO3 BLD-SCNC: 32 MMOL/L (ref 21–28)
HCO3 BLD-SCNC: 32 MMOL/L (ref 21–28)
HCO3 BLD-SCNC: 34 MMOL/L (ref 21–28)
HCO3 BLD-SCNC: 35 MMOL/L (ref 21–28)
HCO3 BLD-SCNC: 36 MMOL/L (ref 21–28)
HCO3 BLD-SCNC: 36 MMOL/L (ref 21–28)
HCO3 BLDV-SCNC: 33 MMOL/L (ref 21–28)
HCO3 BLDV-SCNC: 35 MMOL/L (ref 21–28)
HCO3 BLDV-SCNC: 37 MMOL/L (ref 21–28)
HCO3 BLDV-SCNC: 37 MMOL/L (ref 21–28)
HCO3 BLDV-SCNC: 38 MMOL/L (ref 21–28)
HCO3 BLDV-SCNC: 38 MMOL/L (ref 21–28)
HCO3 SERPL-SCNC: 29 MMOL/L (ref 22–29)
HCO3 SERPL-SCNC: 32 MMOL/L (ref 22–29)
HCO3 SERPL-SCNC: 33 MMOL/L (ref 22–29)
HCO3 SERPL-SCNC: 33 MMOL/L (ref 22–29)
HCT VFR BLD AUTO: 38.9 % (ref 40–53)
HCT VFR BLD AUTO: 41.5 % (ref 40–53)
HGB BLD-MCNC: 12.2 G/DL (ref 13.3–17.7)
HGB BLD-MCNC: 12.8 G/DL (ref 13.3–17.7)
IMM GRANULOCYTES # BLD: 1 10E3/UL
IMM GRANULOCYTES # BLD: ABNORMAL 10*3/UL
IMM GRANULOCYTES NFR BLD: 4 %
IMM GRANULOCYTES NFR BLD: ABNORMAL %
INR PPP: 1.02 (ref 0.85–1.15)
LACTATE SERPL-SCNC: 1.4 MMOL/L (ref 0.7–2)
LACTATE SERPL-SCNC: 1.7 MMOL/L (ref 0.7–2)
LACTATE SERPL-SCNC: 1.9 MMOL/L (ref 0.7–2)
LACTATE SERPL-SCNC: 2 MMOL/L (ref 0.7–2)
LACTATE SERPL-SCNC: 2.6 MMOL/L (ref 0.7–2)
LACTATE SERPL-SCNC: 3.4 MMOL/L (ref 0.7–2)
LYMPHOCYTES # BLD AUTO: 2 10E3/UL (ref 0.8–5.3)
LYMPHOCYTES # BLD AUTO: ABNORMAL 10*3/UL
LYMPHOCYTES # BLD MANUAL: 0.8 10E3/UL (ref 0.8–5.3)
LYMPHOCYTES NFR BLD AUTO: 8 %
LYMPHOCYTES NFR BLD AUTO: ABNORMAL %
LYMPHOCYTES NFR BLD MANUAL: 5 %
MAGNESIUM SERPL-MCNC: 2.5 MG/DL (ref 1.7–2.3)
MAGNESIUM SERPL-MCNC: 2.8 MG/DL (ref 1.7–2.3)
MCH RBC QN AUTO: 28.3 PG (ref 26.5–33)
MCH RBC QN AUTO: 28.9 PG (ref 26.5–33)
MCHC RBC AUTO-ENTMCNC: 30.8 G/DL (ref 31.5–36.5)
MCHC RBC AUTO-ENTMCNC: 31.4 G/DL (ref 31.5–36.5)
MCV RBC AUTO: 92 FL (ref 78–100)
MCV RBC AUTO: 92 FL (ref 78–100)
MONOCYTES # BLD AUTO: 2.8 10E3/UL (ref 0–1.3)
MONOCYTES # BLD AUTO: ABNORMAL 10*3/UL
MONOCYTES # BLD MANUAL: 1.5 10E3/UL (ref 0–1.3)
MONOCYTES NFR BLD AUTO: 12 %
MONOCYTES NFR BLD AUTO: ABNORMAL %
MONOCYTES NFR BLD MANUAL: 9 %
MYELOCYTES # BLD MANUAL: 0.5 10E3/UL
MYELOCYTES NFR BLD MANUAL: 3 %
NEUTROPHILS # BLD AUTO: 17.7 10E3/UL (ref 1.6–8.3)
NEUTROPHILS # BLD AUTO: ABNORMAL 10*3/UL
NEUTROPHILS # BLD MANUAL: 14 10E3/UL (ref 1.6–8.3)
NEUTROPHILS NFR BLD AUTO: 75 %
NEUTROPHILS NFR BLD AUTO: ABNORMAL %
NEUTROPHILS NFR BLD MANUAL: 83 %
NRBC # BLD AUTO: 0 10E3/UL
NRBC # BLD AUTO: 0 10E3/UL
NRBC BLD AUTO-RTO: 0 /100
NRBC BLD AUTO-RTO: 0 /100
O2/TOTAL GAS SETTING VFR VENT: 2 %
O2/TOTAL GAS SETTING VFR VENT: 2 %
O2/TOTAL GAS SETTING VFR VENT: 21 %
O2/TOTAL GAS SETTING VFR VENT: 21 %
O2/TOTAL GAS SETTING VFR VENT: 35 %
O2/TOTAL GAS SETTING VFR VENT: 4 %
O2/TOTAL GAS SETTING VFR VENT: 4 %
O2/TOTAL GAS SETTING VFR VENT: 50 %
O2/TOTAL GAS SETTING VFR VENT: 50 %
O2/TOTAL GAS SETTING VFR VENT: 60 %
OXYHGB MFR BLDV: 54 % (ref 70–75)
OXYHGB MFR BLDV: 58 % (ref 70–75)
OXYHGB MFR BLDV: 66 % (ref 70–75)
OXYHGB MFR BLDV: 68 % (ref 70–75)
OXYHGB MFR BLDV: 70 % (ref 70–75)
OXYHGB MFR BLDV: 70 % (ref 70–75)
PCO2 BLD: 42 MM HG (ref 35–45)
PCO2 BLD: 43 MM HG (ref 35–45)
PCO2 BLD: 47 MM HG (ref 35–45)
PCO2 BLD: 48 MM HG (ref 35–45)
PCO2 BLD: 53 MM HG (ref 35–45)
PCO2 BLD: 53 MM HG (ref 35–45)
PCO2 BLDV: 47 MM HG (ref 40–50)
PCO2 BLDV: 50 MM HG (ref 40–50)
PCO2 BLDV: 52 MM HG (ref 40–50)
PCO2 BLDV: 56 MM HG (ref 40–50)
PCO2 BLDV: 60 MM HG (ref 40–50)
PCO2 BLDV: 60 MM HG (ref 40–50)
PH BLD: 7.42 [PH] (ref 7.35–7.45)
PH BLD: 7.44 [PH] (ref 7.35–7.45)
PH BLD: 7.45 [PH] (ref 7.35–7.45)
PH BLD: 7.48 [PH] (ref 7.35–7.45)
PH BLD: 7.49 [PH] (ref 7.35–7.45)
PH BLD: 7.52 [PH] (ref 7.35–7.45)
PH BLDV: 7.4 [PH] (ref 7.32–7.43)
PH BLDV: 7.41 [PH] (ref 7.32–7.43)
PH BLDV: 7.43 [PH] (ref 7.32–7.43)
PH BLDV: 7.45 [PH] (ref 7.32–7.43)
PH BLDV: 7.46 [PH] (ref 7.32–7.43)
PH BLDV: 7.48 [PH] (ref 7.32–7.43)
PHOSPHATE SERPL-MCNC: 1.5 MG/DL (ref 2.5–4.5)
PHOSPHATE SERPL-MCNC: 1.6 MG/DL (ref 2.5–4.5)
PHOSPHATE SERPL-MCNC: 2.7 MG/DL (ref 2.5–4.5)
PLAT MORPH BLD: ABNORMAL
PLATELET # BLD AUTO: 185 10E3/UL (ref 150–450)
PLATELET # BLD AUTO: 224 10E3/UL (ref 150–450)
PO2 BLD: 112 MM HG (ref 80–105)
PO2 BLD: 56 MM HG (ref 80–105)
PO2 BLD: 68 MM HG (ref 80–105)
PO2 BLD: 72 MM HG (ref 80–105)
PO2 BLD: 87 MM HG (ref 80–105)
PO2 BLD: 92 MM HG (ref 80–105)
PO2 BLDV: 30 MM HG (ref 25–47)
PO2 BLDV: 35 MM HG (ref 25–47)
PO2 BLDV: 35 MM HG (ref 25–47)
PO2 BLDV: 37 MM HG (ref 25–47)
PO2 BLDV: 38 MM HG (ref 25–47)
PO2 BLDV: 39 MM HG (ref 25–47)
POTASSIUM SERPL-SCNC: 3.8 MMOL/L (ref 3.4–5.3)
POTASSIUM SERPL-SCNC: 3.8 MMOL/L (ref 3.4–5.3)
POTASSIUM SERPL-SCNC: 3.9 MMOL/L (ref 3.4–5.3)
POTASSIUM SERPL-SCNC: 4 MMOL/L (ref 3.4–5.3)
POTASSIUM SERPL-SCNC: 5.1 MMOL/L (ref 3.4–5.3)
POTASSIUM SERPL-SCNC: 5.3 MMOL/L (ref 3.4–5.3)
PROT SERPL-MCNC: 6 G/DL (ref 6.4–8.3)
PROT SERPL-MCNC: 6.4 G/DL (ref 6.4–8.3)
PROT SERPL-MCNC: 6.6 G/DL (ref 6.4–8.3)
PROT SERPL-MCNC: 6.6 G/DL (ref 6.4–8.3)
RBC # BLD AUTO: 4.22 10E6/UL (ref 4.4–5.9)
RBC # BLD AUTO: 4.52 10E6/UL (ref 4.4–5.9)
RBC MORPH BLD: ABNORMAL
SAO2 % BLDA: 89 % (ref 92–100)
SAO2 % BLDA: 91 % (ref 92–100)
SAO2 % BLDA: 95 % (ref 92–100)
SAO2 % BLDA: 96 % (ref 92–100)
SAO2 % BLDA: 97 % (ref 92–100)
SAO2 % BLDA: 97 % (ref 92–100)
SAO2 % BLDV: 54.4 % (ref 70–75)
SAO2 % BLDV: 58.9 % (ref 70–75)
SAO2 % BLDV: 67.5 % (ref 70–75)
SAO2 % BLDV: 69.1 % (ref 70–75)
SAO2 % BLDV: 71.6 % (ref 70–75)
SAO2 % BLDV: 72 % (ref 70–75)
SODIUM SERPL-SCNC: 147 MMOL/L (ref 135–145)
SODIUM SERPL-SCNC: 149 MMOL/L (ref 135–145)
SODIUM SERPL-SCNC: 150 MMOL/L (ref 135–145)
SODIUM SERPL-SCNC: 151 MMOL/L (ref 135–145)
SODIUM SERPL-SCNC: 152 MMOL/L (ref 135–145)
SODIUM SERPL-SCNC: 152 MMOL/L (ref 135–145)
SPECIMEN EXPIRATION DATE: NORMAL
UFH PPP CHRO-ACNC: 0.23 IU/ML
UFH PPP CHRO-ACNC: 0.67 IU/ML
UFH PPP CHRO-ACNC: 0.79 IU/ML
VANCOMYCIN SERPL-MCNC: 10.7 UG/ML
WBC # BLD AUTO: 16.9 10E3/UL (ref 4–11)
WBC # BLD AUTO: 23.6 10E3/UL (ref 4–11)

## 2024-08-29 PROCEDURE — 250N000011 HC RX IP 250 OP 636: Performed by: STUDENT IN AN ORGANIZED HEALTH CARE EDUCATION/TRAINING PROGRAM

## 2024-08-29 PROCEDURE — 999N000075 HC STATISTIC IABP MONITORING

## 2024-08-29 PROCEDURE — 258N000003 HC RX IP 258 OP 636: Performed by: STUDENT IN AN ORGANIZED HEALTH CARE EDUCATION/TRAINING PROGRAM

## 2024-08-29 PROCEDURE — 82248 BILIRUBIN DIRECT: CPT

## 2024-08-29 PROCEDURE — 250N000009 HC RX 250: Performed by: STUDENT IN AN ORGANIZED HEALTH CARE EDUCATION/TRAINING PROGRAM

## 2024-08-29 PROCEDURE — 83735 ASSAY OF MAGNESIUM: CPT | Performed by: STUDENT IN AN ORGANIZED HEALTH CARE EDUCATION/TRAINING PROGRAM

## 2024-08-29 PROCEDURE — 250N000013 HC RX MED GY IP 250 OP 250 PS 637: Performed by: STUDENT IN AN ORGANIZED HEALTH CARE EDUCATION/TRAINING PROGRAM

## 2024-08-29 PROCEDURE — 84100 ASSAY OF PHOSPHORUS: CPT | Performed by: STUDENT IN AN ORGANIZED HEALTH CARE EDUCATION/TRAINING PROGRAM

## 2024-08-29 PROCEDURE — 85520 HEPARIN ASSAY: CPT | Performed by: STUDENT IN AN ORGANIZED HEALTH CARE EDUCATION/TRAINING PROGRAM

## 2024-08-29 PROCEDURE — 82805 BLOOD GASES W/O2 SATURATION: CPT | Performed by: STUDENT IN AN ORGANIZED HEALTH CARE EDUCATION/TRAINING PROGRAM

## 2024-08-29 PROCEDURE — 250N000013 HC RX MED GY IP 250 OP 250 PS 637: Performed by: INTERNAL MEDICINE

## 2024-08-29 PROCEDURE — 200N000002 HC R&B ICU UMMC

## 2024-08-29 PROCEDURE — 71045 X-RAY EXAM CHEST 1 VIEW: CPT | Mod: 26 | Performed by: RADIOLOGY

## 2024-08-29 PROCEDURE — 999N000157 HC STATISTIC RCP TIME EA 10 MIN

## 2024-08-29 PROCEDURE — 82248 BILIRUBIN DIRECT: CPT | Performed by: STUDENT IN AN ORGANIZED HEALTH CARE EDUCATION/TRAINING PROGRAM

## 2024-08-29 PROCEDURE — 80048 BASIC METABOLIC PNL TOTAL CA: CPT

## 2024-08-29 PROCEDURE — 99222 1ST HOSP IP/OBS MODERATE 55: CPT | Mod: GC | Performed by: INTERNAL MEDICINE

## 2024-08-29 PROCEDURE — 83605 ASSAY OF LACTIC ACID: CPT | Performed by: STUDENT IN AN ORGANIZED HEALTH CARE EDUCATION/TRAINING PROGRAM

## 2024-08-29 PROCEDURE — 250N000013 HC RX MED GY IP 250 OP 250 PS 637

## 2024-08-29 PROCEDURE — 99291 CRITICAL CARE FIRST HOUR: CPT | Mod: GC | Performed by: STUDENT IN AN ORGANIZED HEALTH CARE EDUCATION/TRAINING PROGRAM

## 2024-08-29 PROCEDURE — 85610 PROTHROMBIN TIME: CPT | Performed by: STUDENT IN AN ORGANIZED HEALTH CARE EDUCATION/TRAINING PROGRAM

## 2024-08-29 PROCEDURE — 85007 BL SMEAR W/DIFF WBC COUNT: CPT | Performed by: STUDENT IN AN ORGANIZED HEALTH CARE EDUCATION/TRAINING PROGRAM

## 2024-08-29 PROCEDURE — 999N000185 HC STATISTIC TRANSPORT TIME EA 15 MIN

## 2024-08-29 PROCEDURE — 71045 X-RAY EXAM CHEST 1 VIEW: CPT

## 2024-08-29 PROCEDURE — 85025 COMPLETE CBC W/AUTO DIFF WBC: CPT | Performed by: STUDENT IN AN ORGANIZED HEALTH CARE EDUCATION/TRAINING PROGRAM

## 2024-08-29 PROCEDURE — 82330 ASSAY OF CALCIUM: CPT | Performed by: STUDENT IN AN ORGANIZED HEALTH CARE EDUCATION/TRAINING PROGRAM

## 2024-08-29 PROCEDURE — 99232 SBSQ HOSP IP/OBS MODERATE 35: CPT | Performed by: INTERNAL MEDICINE

## 2024-08-29 PROCEDURE — 86900 BLOOD TYPING SEROLOGIC ABO: CPT | Performed by: STUDENT IN AN ORGANIZED HEALTH CARE EDUCATION/TRAINING PROGRAM

## 2024-08-29 PROCEDURE — 80202 ASSAY OF VANCOMYCIN: CPT | Performed by: STUDENT IN AN ORGANIZED HEALTH CARE EDUCATION/TRAINING PROGRAM

## 2024-08-29 PROCEDURE — 86901 BLOOD TYPING SEROLOGIC RH(D): CPT | Performed by: STUDENT IN AN ORGANIZED HEALTH CARE EDUCATION/TRAINING PROGRAM

## 2024-08-29 PROCEDURE — 250N000012 HC RX MED GY IP 250 OP 636 PS 637

## 2024-08-29 RX ORDER — HYDRALAZINE HYDROCHLORIDE 25 MG/1
25 TABLET, FILM COATED ORAL EVERY 8 HOURS SCHEDULED
Status: DISCONTINUED | OUTPATIENT
Start: 2024-08-29 | End: 2024-08-30

## 2024-08-29 RX ORDER — DEXMEDETOMIDINE HYDROCHLORIDE 4 UG/ML
.1-.6 INJECTION, SOLUTION INTRAVENOUS CONTINUOUS
Status: DISCONTINUED | OUTPATIENT
Start: 2024-08-29 | End: 2024-08-29

## 2024-08-29 RX ORDER — POTASSIUM PHOS IN 0.9 % NACL 15MMOL/250
15 PLASTIC BAG, INJECTION (ML) INTRAVENOUS
Status: COMPLETED | OUTPATIENT
Start: 2024-08-29 | End: 2024-08-29

## 2024-08-29 RX ORDER — HYDRALAZINE HYDROCHLORIDE 10 MG/1
10 TABLET, FILM COATED ORAL EVERY 8 HOURS SCHEDULED
Status: DISCONTINUED | OUTPATIENT
Start: 2024-08-29 | End: 2024-08-29

## 2024-08-29 RX ORDER — POTASSIUM CHLORIDE 1.5 G/1.58G
20 POWDER, FOR SOLUTION ORAL ONCE
Status: COMPLETED | OUTPATIENT
Start: 2024-08-29 | End: 2024-08-29

## 2024-08-29 RX ORDER — HYDROXYZINE HYDROCHLORIDE 10 MG/1
10 TABLET, FILM COATED ORAL 3 TIMES DAILY PRN
Status: DISCONTINUED | OUTPATIENT
Start: 2024-08-29 | End: 2024-09-19 | Stop reason: HOSPADM

## 2024-08-29 RX ORDER — ISOSORBIDE DINITRATE 10 MG/1
10 TABLET ORAL
Status: DISCONTINUED | OUTPATIENT
Start: 2024-08-29 | End: 2024-08-29

## 2024-08-29 RX ORDER — POTASSIUM CHLORIDE 29.8 MG/ML
20 INJECTION INTRAVENOUS ONCE
Status: COMPLETED | OUTPATIENT
Start: 2024-08-29 | End: 2024-08-29

## 2024-08-29 RX ORDER — ISOSORBIDE DINITRATE 10 MG/1
20 TABLET ORAL
Status: DISCONTINUED | OUTPATIENT
Start: 2024-08-29 | End: 2024-09-01

## 2024-08-29 RX ADMIN — VANCOMYCIN HYDROCHLORIDE 1500 MG: 10 INJECTION, POWDER, LYOPHILIZED, FOR SOLUTION INTRAVENOUS at 18:02

## 2024-08-29 RX ADMIN — NYSTATIN 500000 UNITS: 100000 SUSPENSION ORAL at 15:06

## 2024-08-29 RX ADMIN — ISOSORBIDE DINITRATE 10 MG: 10 TABLET ORAL at 08:09

## 2024-08-29 RX ADMIN — NYSTATIN 500000 UNITS: 100000 SUSPENSION ORAL at 20:33

## 2024-08-29 RX ADMIN — POTASSIUM CHLORIDE 20 MEQ: 29.8 INJECTION, SOLUTION INTRAVENOUS at 02:56

## 2024-08-29 RX ADMIN — THIAMINE HCL TAB 100 MG 100 MG: 100 TAB at 08:09

## 2024-08-29 RX ADMIN — MEROPENEM 1 G: 1 INJECTION, POWDER, FOR SOLUTION INTRAVENOUS at 00:08

## 2024-08-29 RX ADMIN — VANCOMYCIN HYDROCHLORIDE 1500 MG: 10 INJECTION, POWDER, LYOPHILIZED, FOR SOLUTION INTRAVENOUS at 06:35

## 2024-08-29 RX ADMIN — SODIUM NITROPRUSSIDE 0.75 MCG/KG/MIN: 25 INJECTION, SOLUTION, CONCENTRATE INTRAVENOUS at 01:42

## 2024-08-29 RX ADMIN — MEROPENEM 1 G: 1 INJECTION, POWDER, FOR SOLUTION INTRAVENOUS at 08:09

## 2024-08-29 RX ADMIN — QUETIAPINE FUMARATE 25 MG: 25 TABLET ORAL at 20:17

## 2024-08-29 RX ADMIN — SODIUM NITROPRUSSIDE 0.5 MCG/KG/MIN: 25 INJECTION, SOLUTION, CONCENTRATE INTRAVENOUS at 19:38

## 2024-08-29 RX ADMIN — NYSTATIN 500000 UNITS: 100000 SUSPENSION ORAL at 08:09

## 2024-08-29 RX ADMIN — QUETIAPINE FUMARATE 25 MG: 25 TABLET ORAL at 08:09

## 2024-08-29 RX ADMIN — POTASSIUM PHOSPHATE, MONOBASIC POTASSIUM PHOSPHATE, DIBASIC 9 MMOL: 224; 236 INJECTION, SOLUTION, CONCENTRATE INTRAVENOUS at 04:57

## 2024-08-29 RX ADMIN — INSULIN GLARGINE 10 UNITS: 100 INJECTION, SOLUTION SUBCUTANEOUS at 22:02

## 2024-08-29 RX ADMIN — POTASSIUM PHOSPHATE, MONOBASIC POTASSIUM PHOSPHATE, DIBASIC 15 MMOL: 224; 236 INJECTION, SOLUTION, CONCENTRATE INTRAVENOUS at 20:16

## 2024-08-29 RX ADMIN — INSULIN HUMAN 10 UNITS/HR: 1 INJECTION, SOLUTION INTRAVENOUS at 16:51

## 2024-08-29 RX ADMIN — PREDNISONE 40 MG: 20 TABLET ORAL at 08:09

## 2024-08-29 RX ADMIN — CHLORHEXIDINE GLUCONATE 0.12% ORAL RINSE 15 ML: 1.2 LIQUID ORAL at 08:08

## 2024-08-29 RX ADMIN — POTASSIUM CHLORIDE 40 MEQ: 1.5 POWDER, FOR SOLUTION ORAL at 20:17

## 2024-08-29 RX ADMIN — DEXMEDETOMIDINE HYDROCHLORIDE 0.4 MCG/KG/HR: 400 INJECTION INTRAVENOUS at 05:08

## 2024-08-29 RX ADMIN — HYDRALAZINE HYDROCHLORIDE 25 MG: 25 TABLET ORAL at 22:04

## 2024-08-29 RX ADMIN — POTASSIUM CHLORIDE 20 MEQ: 1.5 POWDER, FOR SOLUTION ORAL at 17:10

## 2024-08-29 RX ADMIN — HYDRALAZINE HYDROCHLORIDE 10 MG: 10 TABLET ORAL at 06:21

## 2024-08-29 RX ADMIN — BUMETANIDE 2 MG: 0.25 INJECTION INTRAMUSCULAR; INTRAVENOUS at 08:36

## 2024-08-29 RX ADMIN — BUMETANIDE 2 MG: 0.25 INJECTION INTRAMUSCULAR; INTRAVENOUS at 15:32

## 2024-08-29 RX ADMIN — HYDRALAZINE HYDROCHLORIDE 25 MG: 25 TABLET ORAL at 14:06

## 2024-08-29 RX ADMIN — Medication 40 MG: at 08:09

## 2024-08-29 RX ADMIN — INSULIN HUMAN 7 UNITS/HR: 1 INJECTION, SOLUTION INTRAVENOUS at 05:16

## 2024-08-29 RX ADMIN — POTASSIUM CHLORIDE 40 MEQ: 1.5 POWDER, FOR SOLUTION ORAL at 08:09

## 2024-08-29 RX ADMIN — Medication 15 ML: at 08:09

## 2024-08-29 RX ADMIN — ISOSORBIDE DINITRATE 20 MG: 40 TABLET ORAL at 17:10

## 2024-08-29 RX ADMIN — BUMETANIDE 2 MG: 0.25 INJECTION INTRAMUSCULAR; INTRAVENOUS at 00:07

## 2024-08-29 RX ADMIN — BUMETANIDE 2 MG: 0.25 INJECTION INTRAMUSCULAR; INTRAVENOUS at 23:58

## 2024-08-29 RX ADMIN — DOBUTAMINE 5 MCG/KG/MIN: 12.5 INJECTION, SOLUTION, CONCENTRATE INTRAVENOUS at 01:47

## 2024-08-29 RX ADMIN — ISOSORBIDE DINITRATE 20 MG: 40 TABLET ORAL at 12:00

## 2024-08-29 RX ADMIN — POTASSIUM PHOSPHATE, MONOBASIC POTASSIUM PHOSPHATE, DIBASIC 15 MMOL: 224; 236 INJECTION, SOLUTION, CONCENTRATE INTRAVENOUS at 18:14

## 2024-08-29 RX ADMIN — MEROPENEM 1 G: 1 INJECTION, POWDER, FOR SOLUTION INTRAVENOUS at 15:27

## 2024-08-29 RX ADMIN — HEPARIN SODIUM 700 UNITS/HR: 10000 INJECTION, SOLUTION INTRAVENOUS at 23:56

## 2024-08-29 ASSESSMENT — ACTIVITIES OF DAILY LIVING (ADL)
ADLS_ACUITY_SCORE: 49
ADLS_ACUITY_SCORE: 53
ADLS_ACUITY_SCORE: 53
ADLS_ACUITY_SCORE: 49
ADLS_ACUITY_SCORE: 53
ADLS_ACUITY_SCORE: 53
ADLS_ACUITY_SCORE: 49
ADLS_ACUITY_SCORE: 48
ADLS_ACUITY_SCORE: 49
ADLS_ACUITY_SCORE: 48
ADLS_ACUITY_SCORE: 49

## 2024-08-29 NOTE — PLAN OF CARE
Major Shift Events:  Precedex off, pt remaining calm and cooperative. Mitts removed. Following commands, MCALLISTER.   Weaned off HFNC to RA but placed back on 4L NC due to low PaO2.   IABP 1:1 100%. Dobutamine continued at 5. Updated MAP goal to 75-95; Nipride titrated 0-0.75 this shift. Hydralazine and Isurdil doses increased. NAYA completed Q4H; CI 1.6-3.4, see flowsheet for full values.   Lactic increased to 3.4 at 1600; team aware, down to 2.6 this evening.   CVP 8-12, Bumex given x2.   Passed bedside swallow; advanced to regular cardiac diet. TF continued, FWF 60 Q1H. Insulin gtt. Electrolytes replaced per protocol.   Heparin gtt supratherapeutic; decreased to 550u/hr, recheck 2000.   Plan: Wean Nipride as able. IABP 1:1. NAYA Q4H. Trend labs.   For vital signs and complete assessments, please see documentation flowsheets.

## 2024-08-29 NOTE — CONSULTS
GASTROENTEROLOGY CONSULTATION      Date of Admission:  8/20/2024           Reason for Consultation:   We were asked  to evaluate this patient with elevated transaminases           ASSESSMENT AND RECOMMENDATIONS:   Assessment:  23 yo M previously healthy, currently admitted to ICU for cardiogenic shock concerning for myocarditis and acute hypoxic respiratory, biventricular failure on balloon pump.    # Elevated transaminases  # Biventricular failure on IABP      Lfts 8/19: AST 23, ALT 57, Alk Phos 95, TB 0.54  8/20: , , TB 0.7  8/29: , , TB 0.6    Given unremarkable LFTs prior to presentation, suspect no underlying parenchymal disease. CT without imaging evidence of liver or spleen abnormality. Bile ducts are normal. CMV, EBV, Adeno not detected. No Hep B/C. Parvo IgM negative    The patient does not have acute liver failure, which is an acute primary parenchymal injury withcoagulopathy and encephalopathy.     Careful review of patient's charts tells me the timeline of transaminase elevation parallels cardiac dysfunction. Liver duplex showed normal flow in HV and portals.     His transaminase elevation is secondary to hepatic congestion from cardiac dysfunction.       Recommendations:  -Management of underlying cardiac injury per primary team. Continue excellent care as you are doing      We will sign off    Thank you for involving us in this patient's care. Please do not hesitate to contact the GI service with any questions or concerns.     Pt care plan discussed with Dr. Bateman, GI staff physician.    Jordan Oneal MD  ATTENDING NOTE HEPATOLOGY    I saw and discussed this patient with the fellow and participated in the decision making. I agree with the fellow's note. Susie Bateman MD             History of Present Illness:   23 yo M with PMH autism for whom Hepatology is consulted for JAN.    He initially presented to [facility] with cough, URI symptoms, and hypoxemia. He was  intubated due to respiratory failure and transferred to [facility], where his hospital course transpired to mixed cardiogenic/septic shock requiring multiple pressor agents, inotropic support, and IV diuresis. Transferred again to [facility] on 8/20 for continued management.     He had de hugh HF with reduced EF 16%. He was treated for 1 gm solumedrol for suspected myocarditis, however, EMB unremarkable. He was intubated and sedated but extuabted on 8/28. Balloon pump placed 8/27. Hepatology consulted for half-way.       Data:   Key relevant labs:     Key relevant imaging:           Previous Endoscopy:   None         Medications:     Current Facility-Administered Medications   Medication Dose Route Frequency Provider Last Rate Last Admin    acetaminophen (TYLENOL) tablet 650 mg  650 mg Oral or Feeding Tube Q8H PRN Sudheer Rojas MD   650 mg at 08/26/24 0501    albuterol (PROVENTIL) neb solution 2.5 mg  2.5 mg Nebulization Q6H PRN Matt Cabral MD        bumetanide (BUMEX) injection 2 mg  2 mg Intravenous Q8H Matt Cabral MD   2 mg at 08/29/24 0836    chlorhexidine (PERIDEX) 0.12 % solution 15 mL  15 mL Mouth/Throat Q12H Sudheer Rojas MD   15 mL at 08/29/24 0808    [Held by provider] dexmedeTOMIDine (PRECEDEX) 4 mcg/mL in sodium chloride 0.9 % 100 mL infusion  0.1-0.6 mcg/kg/hr (Dosing Weight) Intravenous Continuous Shantanu Mclean MD   Stopped at 08/29/24 1000    dextrose 10% infusion   Intravenous Continuous PRN Colten Garay MD        glucose gel 15-30 g  15-30 g Oral Q15 Min PRN Colten Garay MD        Or    dextrose 50 % injection 25-50 mL  25-50 mL Intravenous Q15 Min PRN Colten Garay MD        Or    glucagon injection 1 mg  1 mg Subcutaneous Q15 Min PRN Colten Garay MD        DOBUTamine (DOBUTREX) 1,000 mg in D5W 250 mL infusion (adult MAX conc)  5 mcg/kg/min (Dosing Weight) Intravenous Continuous Matt Cabral MD 8.1 mL/hr at 08/29/24 1000 5 mcg/kg/min at 08/29/24 1000     fentaNYL (SUBLIMAZE) 50 mcg/mL bolus from pump  12.5 mcg Intravenous Q1H PRN Shantanu Mclean MD   25 mcg at 08/28/24 0126    heparin 25,000 units in 0.45% NaCl 250 mL ANTICOAGULANT infusion  0-5,000 Units/hr Intravenous Continuous Matt Cabral MD 8.5 mL/hr at 08/29/24 1000 850 Units/hr at 08/29/24 1000    hydrALAZINE (APRESOLINE) tablet 10 mg  10 mg Oral Q8H DARCY Shantanu Mclean MD   10 mg at 08/29/24 0621    hydrOXYzine HCl (ATARAX) tablet 10 mg  10 mg Oral TID PRN Matt Cabral MD        insulin glargine (LANTUS PEN) injection 10 Units  10 Units Subcutaneous At Bedtime Matt Cabral MD   10 Units at 08/28/24 2224    insulin regular (MYXREDLIN) 1 unit/mL infusion  0-24 Units/hr Intravenous Continuous Colten Garay MD 6 mL/hr at 08/29/24 1025 6 Units/hr at 08/29/24 1025    ipratropium - albuterol 0.5 mg/2.5 mg/3 mL (DUONEB) neb solution 3 mL  3 mL Nebulization Q4H PRN Sudheer Rojas MD   3 mL at 08/25/24 0536    isosorbide dinitrate (ISORDIL) tablet 10 mg  10 mg Oral TID Shantanu Mclean MD   10 mg at 08/29/24 0809    lidocaine (LMX4) cream   Topical Q1H PRN Sudheer Rojas MD        lidocaine 1 % 0.1-1 mL  0.1-1 mL Other Q1H PRN Sudheer Rojas MD        medication instruction   Does not apply Continuous PRN Sudheer Rojas MD        meropenem (MERREM) 1 g vial to attach to  mL bag  1 g Intravenous Q8H Matt Cabral MD   1 g at 08/29/24 0809    midazolam (VERSED) injection 1 mg  1 mg Intravenous Q2H PRN Shantanu Mclean MD   1 mg at 08/28/24 0518    multivitamins w/minerals liquid 15 mL  15 mL Per Feeding Tube Daily Susan Yeh MD   15 mL at 08/29/24 0809    naloxone (NARCAN) injection 0.2 mg  0.2 mg Intravenous Q2 Min PRN Jani Martin MD        Or    naloxone (NARCAN) injection 0.4 mg  0.4 mg Intravenous Q2 Min PRN Jani Martin MD        Or    naloxone (NARCAN) injection 0.2 mg  0.2 mg Intramuscular Q2 Min PRN Jani Martin MD        Or    naloxone (NARCAN)  injection 0.4 mg  0.4 mg Intramuscular Q2 Min PRN Jani Martin MD        nitroPRUsside (NIPRIDE) 100 mg, sodium thiosulfate 1,000 mg in D5W 62.5 mL IV infusion (conc: 1.6 mg/mL)  0.25-1 mcg/kg/min (Dosing Weight) Intravenous Continuous Matt Cabral MD 1 mL/hr at 08/29/24 1030 0.25 mcg/kg/min at 08/29/24 1030    nystatin (MYCOSTATIN) suspension 500,000 Units  500,000 Units Swish & Spit 4x Daily Susan Yeh MD   500,000 Units at 08/29/24 0809    ondansetron (ZOFRAN) injection 4 mg  4 mg Intravenous Q6H PRN Shantanu Mclean MD   4 mg at 08/28/24 2100    pantoprazole (PROTONIX) 2 mg/mL suspension 40 mg  40 mg Oral or Feeding Tube Daily Farrah Delgado MD   40 mg at 08/29/24 0809    [Held by provider] polyethylene glycol (MIRALAX) Packet 17 g  17 g Oral or Feeding Tube Daily Andree Becerril, APRN CNP   17 g at 08/23/24 1049    polyethylene glycol (MIRALAX) Packet 17 g  17 g Oral Daily PRN Sudheer Rojas MD   17 g at 08/22/24 1430    potassium chloride (KLOR-CON) Packet 40 mEq  40 mEq Oral or Feeding Tube BID Matt Cabral MD   40 mEq at 08/29/24 0809    predniSONE (DELTASONE) tablet 40 mg  40 mg Oral or Feeding Tube Daily Susan Yeh MD   40 mg at 08/29/24 0809    Followed by    [START ON 8/31/2024] predniSONE (DELTASONE) tablet 30 mg  30 mg Oral or Feeding Tube Daily Susan Yeh MD        Followed by    [START ON 9/7/2024] predniSONE (DELTASONE) tablet 20 mg  20 mg Oral or Feeding Tube Daily Susan Yeh MD        Followed by    [START ON 9/14/2024] predniSONE (DELTASONE) tablet 10 mg  10 mg Oral or Feeding Tube Daily Susan Yeh MD        Followed by    [START ON 9/21/2024] predniSONE (DELTASONE) tablet 5 mg  5 mg Oral or Feeding Tube Daily Susan Yeh MD        [Held by provider] Prosource TF20 ENfit Compatibl EN LIQD (PROSOURCE TF20) packet 60 mL  1 packet Per Feeding Tube BID Susan Yeh MD   60 mL at 08/26/24 2042  "   QUEtiapine (SEROquel) tablet 25 mg  25 mg Oral or Feeding Tube BID Colten Garay MD   25 mg at 08/29/24 0809    selenium sulfide (SELSUN) 1 % shampoo   Topical Daily PRN Matt Cabral MD   Given at 08/28/24 2316    senna-docusate (SENOKOT-S/PERICOLACE) 8.6-50 MG per tablet 1 tablet  1 tablet Oral or Feeding Tube At Bedtime Andree Becerril, APRN CNP   1 tablet at 08/27/24 2021    senna-docusate (SENOKOT-S/PERICOLACE) 8.6-50 MG per tablet 1 tablet  1 tablet Oral or Feeding Tube BID PRN Jani Martin MD        Or    senna-docusate (SENOKOT-S/PERICOLACE) 8.6-50 MG per tablet 2 tablet  2 tablet Oral or Feeding Tube BID PRN Jani Martin MD        sodium chloride (PF) 0.9% PF flush 3 mL  3 mL Intracatheter Q8H Sudheer Rojas MD   3 mL at 08/27/24 2307    sodium chloride (PF) 0.9% PF flush 3 mL  3 mL Intracatheter q1 min prn Sudheer Rojas MD        sodium chloride 0.9 % infusion   Intravenous Continuous Matt Cabral MD        [Held by provider] sulfamethoxazole-trimethoprim (BACTRIM DS) 800-160 MG per tablet 1 tablet  1 tablet Oral or Feeding Tube Once per day on Monday Wednesday Friday Farrah Delgado MD   1 tablet at 08/28/24 1059    thiamine (B-1) tablet 100 mg  100 mg Oral or Feeding Tube Daily Graciela Whittaker MD   100 mg at 08/29/24 0809    vancomycin (VANCOCIN) 1,500 mg in 0.9% NaCl 250 mL intermittent infusion  1,500 mg Intravenous Q12H Farrah Delgado .7 mL/hr at 08/29/24 0635 1,500 mg at 08/29/24 0635             Physical Exam:   /55 (BP Location: Right arm)   Pulse 115   Temp 98.8  F (37.1  C) (Bladder)   Resp 22   Ht 1.86 m (6' 1.23\")   Wt 95.1 kg (209 lb 10.5 oz)   SpO2 97%   BMI 27.49 kg/m    Wt:   Wt Readings from Last 2 Encounters:   08/29/24 95.1 kg (209 lb 10.5 oz)      GEN: NAD, intubated  HEENT: no icterus  CV: RRR, no obvious murmurs  CHEST: Mechanical breath sounds  ABD: soft, NT/ND, NABS  EXT: Trace BLE edema, warm  NEURO: sedated, opens eyes " and moves all extremities.   PSYCH: unable to assess          Past Medical History:   Reviewed and edited as appropriate  No past medical history on file.         Past Surgical History:   Reviewed and edited as appropriate   No past surgical history on file.         Social History:   Alcohol use:   Smoking history:   Living situation:          Family History:   Reviewed and edited as appropriate  No family history on file.   No known history of colorectal cancer, liver disease, or inflammatory bowel disease.         Allergies:   Reviewed and edited as appropriate     Allergies   Allergen Reactions    Penicillin V Hives     Tolerated ampicillin/sulbactam 08/21/2024              Review of Systems:     A complete 10 point review of systems was performed and is negative except as noted in the HPI

## 2024-08-29 NOTE — PROGRESS NOTES
Care Management Follow Up    Length of Stay (days): 9    Expected Discharge Date:       Concerns to be Addressed: care coordination/care conferences, adjustment to diagnosis/illness, coping/stress, decision making, discharge planning, developmental     Patient plan of care discussed at interdisciplinary rounds: Yes    Anticipated Discharge Disposition: Group Home     Anticipated Discharge Services:  (TBD)  Anticipated Discharge DME:  (TBD)    Patient/family educated on Medicare website which has current facility and service quality ratings: no  Education Provided on the Discharge Plan: No  Patient/Family in Agreement with the Plan: unable to assess    Referrals Placed by CM/SW:    Private pay costs discussed: Not applicable    Discussed  Partnership in Safe Discharge Planning  document with patient/family: No     Handoff Completed: No, handoff not indicated or clinically appropriate    Additional Information:    VM from nurse manager of pt group home (Lydia with [facility] : [phone]) requesting update. SW called and LVM informing there are no updates on discharge planning and care coordination will follow up to coordinate discharge when clinically appropriate.    Next Steps:     Follow for return to     [facility]  [place]   [facility]  Covering 4A/E: [phone]

## 2024-08-29 NOTE — PROGRESS NOTES
[facility] INF[facility]IOUS DISEASES PROGRESS NOTE     Patient:  Terrance Hidalgo   YOB: 2000, MRN: [id number]  Date of Visit: 08/29/2024  Date of Admission: 8/20/2024  Consult Requester: Farrah Delgado MD          ASSESSMENT AND PLAN     Terrance Hidalgo is a 24 year old male with autism. He presented with new diagnosis of heart failure Aug 21. Biopsy and histopath showed essentially unremarkable myocardial tissue with no evidence of inflammatory infiltrate. ID workup for myocarditis unremarkable. Parvo IgG positive, IgM negative. Likely old infection.          IMPRESSION  Congestive hepatopathy    Cardiogenic shock with respiratory failure   Severe bacterial pneumonia, treated   New heart failure with reduced EF  Positive parvovirus IgG     RECOMMENDATION:  Observe off antibiotics.      REFERENCE:  [facility] ED [id number].Human Parvovirus B19. Clin Microbiol Rev 15:.[email or web address]    ID will sign off. Please check [facility] for staff covering the service.     Racheal Son MD  Infectious Diseases  Pager: [phone]  Vocera jena    MDM: >3 notes and tests reviewed, discussed with primary team, life threatening illness.          SUBJECTIVE      Interval History and Events:  Extubated.     Antimicrobial Treatment:  Vanco 8/27-  Vicky 8/28-  Piptazo 8/26-8/28  Amp-sul 8/21-8/26         OBJECTIVE       Physical Examination:    Temp:  [97.3  F (36.3  C)-100.2  F (37.9  C)] 98.8  F (37.1  C)  Pulse:  [] 115  Resp:  [13-27] 22  BP: (101-111)/(55-70) 106/55  MAP:  [65 mmHg-103 mmHg] 89 mmHg  Arterial Line BP: ()/(47-80) 111/62  FiO2 (%):  [30 %-100 %] 30 %  SpO2:  [85 %-100 %] 97 %    I/O last 3 completed shifts:  In: 6641.13 [I.V.:2876.13; NG/GT:2565]  Out: 4845 [Urine:4345; Emesis/NG output:200; Stool:300]    Vitals:    08/25/24 0000 08/26/24 0000 08/27/24 0000 08/28/24 0000   Weight: 98.5 kg (217 lb 2.5 oz) 96.6 kg (212 lb 15.4 oz) 95.3 kg (210 lb 1.6 oz) 94.2 kg (207 lb  10.8 oz)    08/29/24 0000   Weight: 95.1 kg (209 lb 10.5 oz)     Constitutional: Looks ill.  Respiratory: Some bilateral crackles   Cardiovascular: Humming sound     Laboratory Data:    Microbiology:  8/28 MRSA - neg   8/26 Parvo PCR -   8/26 C diff - neg   8/26 MRSA - neg   8/26 BDG -  8/26 Ucx -   8/25 Scx - yeast   8/25 Bcx -   8/21 Legio, Strep U - neg   8/21 Lyme ab - neg   Adeno, CMV - neg   EBV pcr- neg  HIV neg  8/21 Parvo Ig positive, IgM neg   Coxsackie ab -   Histo U - neg  RPP - neg   COVID - neg   8/21 Scx - no growth

## 2024-08-29 NOTE — PROGRESS NOTES
[facility]  Cardiology Heart Failure Service (Cards 2) Progress Note    Patient Name: Terrance Hidalgo    Medical Record Number: [id number]    YOB: 2000  PCP: Colt Flores    Admit Date/Time: 8/20/2024 10:13 PM   9    Assessment/Plan:    25 yo M with PMH autism who initially presented to [place] with cough, URI symptoms, and hypoxemia. He was intubated due to respiratory failure and transferred to [facility], where his hospital course transpired to mixed cardiogenic/septic shock requiring multiple pressor agents, inotropic support, and IV diuresis. Transferred again to [facility] CICU on 8/20 for continued management. Presentation initially highly suspicious for fulminant myocarditis base on elevated cardiac and inflammatory biomarkers, new (suspected), severe biventricular dysfunction w/ reduced cardiac output requiring intermittent inotropic support, and concurrent infectious/respiratory symptomotology c/b acute respiratory failure 2/2 ARDS vs cardiogenic etiology 2/2 miocarditis with superimposed aspiration PNM treated with sulbactam. Empirically pulsed with steroids. However, EMB 5/21 demonstrated unremarkable myocardial tissue with no evidence of inflammatory infiltrate.  Hospital course further complicated by worsening cardiogenic/septic shock with worsening renal and liver failure requiring IABP placement on 8/27. Jarocho weaned off on 8/28 and extubated.      - ECMO candidate     Changes:  - c/w IABP   - c/w dobutamine for biventricular inotropic support   - wean off nipride if possible, adjust hydralazine/isordil  - diuresis with CVP goal 10-12  - consult to hepatology given possible valuation for heart transplant / mechanical support   - wean off HFNC  - c/w meropenem and vanc given worsening clinical findings with elevated WBC and fever and IABP in place  - if blood cultures negative for 48h then deescalate the antibiotics    Neuro:     #AMS - resolved  #Sedation    - moving x 4 intermittent following commands     Plan:      - prn atarax  - c/w Seroquel       CV:    #Biventricular failure LVEF 25%   #Cardiogenic shock SCAI C  #Lactic acidosis   TTE (8/21/24): LVIDd 58mm. Biplane LVEF is 16%. Severe hypokinesis of mid to apical RV free wall.  RHC (8/21/24): RA mean 6, PA 30/23/26, PCWP 18, Wayne CO/CI 5.0/2.1, PVR 1.6, SVR 1270  EMB (8/21/24): Fragments of essentially unremarkable myocardial tissue with no evidence of inflammatory infiltrate   Coronary angiogram: NA   CMR: None - will obtain prior to discharge     DATE MAP CVP PAP PCWP Wayne CO Wayne CI SVR PVR Mvo2 LA Therapies   8/29 82 10 41/23 16 3.5 1.6 1800 3.7 54 1.4 IABP 1:1, dobutamine 5                                                                           Plan:  - IABP   - c/w dobutamine for biventricular inotropic support   - wean off nipride and Oriana  - diuresis with CVP goal 10-12  - c/w steroid taper- c/w nystatin/bactrim     Pulm:     #Acute hypoxic respiratory failure - resolving    #Pleular effusion - resolved   #concern for Aspiration PNM  - resolving      Plan:    - incentive spirometer, peridex   - abx per below    - wean off high flow NC to NC and to RA when possible      GI/Nutrition:    #Transaminitis    1. Hepatomegaly with increased echogenicity of the liver, which may be  seen with intrinsic parenchymal disease such as steatosis.  2. Biliary sludge without sonographic evidence for acute  cholecystitis.  3. Normal antegrade flow in the extrahepatic and right portal vein.   4. The remainder of the findings are significantly limited secondary  to obscuring bowel gas and technical challenges related to patient  mobility.    Plan:    - monitor MELD labs  - treatment per above   - consult to hepatology given evaluation for heart transplant / mechanical support      Renal:    #HyperNa   #Hypervolemia  #LA    Plan:    - adjust FWF   - frequent BMP   - hold bactim for possible contributing factor to LA      ID:     #Concern for aspiration PNM   #Transaminitis   #Recurrent fevers of unclear etiology     - Antimicrobials       > Unasyn 8/21 - 8/26       > Cefepime 8/20 - 8/21       > Azithromycin 8/20 - 08/24       > Vanco 8/20 - 8/21 s/p 8/26       > meropenem 8/26       > zosyn 8/26 - 8/28    Plan:  - c/w zosyn    - c/w meropenem and vanc given worsening clinical findings with elevated WBC and fever and IABP in place  - if blood cultures negative for 48h then deescalate the antibiotics       Heme:    #Anemia of critical illness     Plan:  - H/H  - transfuse if hb < 7 or drop > 2 g     Endo:    Plan:  - FSG goal 140-160  - insulin   - start lantus     Family Meeting/Goals of Care:  - last time family updated: TBD   - next of kin/HCP:  Johann Hidalgo(TEMPORARY GUARDIAN TO 10-)  Brother  [phone]  (+1)  [address] [facility] [address]  Legal guardian    Disposition:   - Unit   Code Status:   Full Code     Thank you for allowing me to care for this patient, please don't hesitate to contact me with any questions regarding this plan.     Patient was discussed and evaluated with Farrah Claudio MD, attending physician, who agrees with the assessment and plan above.    Matt Cabral MD, PhD, MSc  Cardiology Fellow    Physical Exam:    General appearance - Lying in bed; intubated sedated   Head: Normocephalic and atraumatic.   Eyes: Pupils are equal, round, and reactive to light. No scleral icterus.  Neck: JVD, bruit.  Cardiovascular: Tachycardiac regular rhythm. S1 and S2 auscultated. No murmurs, rub, or gallops.  Pulmonary/Chest: Intubated. Mechanical ventilation   Abdominal: Bowel sounds present. Soft. No distension. No rigidity, rebound or guarding.   Extremities: Warm, no cyanosis, 2+ distal pulses bilaterally. Pitting edema b/l +1.   Skin: Skin is warm and not diaphoretic. No rash, bruising, or erythema.  Neuro: Sedated     Objective data:    Temp:  [97.7  F (36.5  C)-100.2  F (37.9  C)]  98.8  F (37.1  C)  Pulse:  [] 121  Resp:  [13-27] 21  BP: (101-111)/(55-70) 106/55  MAP:  [65 mmHg-103 mmHg] 71 mmHg  Arterial Line BP: ()/(50-80) 89/54  FiO2 (%):  [30 %-100 %] 30 %  SpO2:  [85 %-100 %] 96 %    Vitals:    08/25/24 0000 08/26/24 0000 08/27/24 0000 08/28/24 0000   Weight: 98.5 kg (217 lb 2.5 oz) 96.6 kg (212 lb 15.4 oz) 95.3 kg (210 lb 1.6 oz) 94.2 kg (207 lb 10.8 oz)    08/29/24 0000   Weight: 95.1 kg (209 lb 10.5 oz)        Vent Settings:  Resp: 21 SpO2: 96 % O2 Device: High Flow Nasal Cannula (HFNC) Oxygen Delivery: 30 LPM  FiO2 (%): 30 %, Resp: 21, Inspiratory Pressure (cm H2O) ([employer] Garima): 13, Vent Mode: (S) CPAP/PS, Resp Rate (Set): 16 breaths/min, Tidal Volume (Set, mL): 480 mL, PEEP (cm H2O): 5 cmH2O, Pressure Support (cm H2O): 5 cmH2O, Resp Rate (Set): 16 breaths/min, Tidal Volume (Set, mL): 480 mL, PEEP (cm H2O): 5 cmH2O    LABS Reviewed  IMAGES Reviewed    Current medications   Current Facility-Administered Medications   Medication Dose Route Frequency Provider Last Rate Last Admin    acetaminophen (TYLENOL) tablet 650 mg  650 mg Oral or Feeding Tube Q8H PRN Sudheer Rojas MD   650 mg at 08/26/24 0501    albuterol (PROVENTIL) neb solution 2.5 mg  2.5 mg Nebulization Q6H PRN Matt Cabral MD        bumetanide (BUMEX) injection 2 mg  2 mg Intravenous Q8H Matt Cabral MD   2 mg at 08/29/24 0836    chlorhexidine (PERIDEX) 0.12 % solution 15 mL  15 mL Mouth/Throat Q12H Sudheer Rojas MD   15 mL at 08/29/24 0808    [Held by provider] dexmedeTOMIDine (PRECEDEX) 4 mcg/mL in sodium chloride 0.9 % 100 mL infusion  0.1-0.6 mcg/kg/hr (Dosing Weight) Intravenous Continuous Shantanu Mclean MD   Stopped at 08/29/24 1000    dextrose 10% infusion   Intravenous Continuous PRN Colten Garay MD        glucose gel 15-30 g  15-30 g Oral Q15 Min PRN Colten Garay MD        Or    dextrose 50 % injection 25-50 mL  25-50 mL Intravenous Q15 Min PRN Colten Garay MD        Or     glucagon injection 1 mg  1 mg Subcutaneous Q15 Min PRN Colten Garay MD        DOBUTamine (DOBUTREX) 1,000 mg in D5W 250 mL infusion (adult MAX conc)  5 mcg/kg/min (Dosing Weight) Intravenous Continuous Matt Cabral MD 8.1 mL/hr at 08/29/24 1100 5 mcg/kg/min at 08/29/24 1100    fentaNYL (SUBLIMAZE) 50 mcg/mL bolus from pump  12.5 mcg Intravenous Q1H PRN Shantanu Mclean MD   25 mcg at 08/28/24 0126    heparin 25,000 units in 0.45% NaCl 250 mL ANTICOAGULANT infusion  0-5,000 Units/hr Intravenous Continuous Matt Cabral MD 8.5 mL/hr at 08/29/24 1100 850 Units/hr at 08/29/24 1100    hydrALAZINE (APRESOLINE) tablet 10 mg  10 mg Oral Q8H DARCY Shantanu Mclean MD   10 mg at 08/29/24 0621    hydrOXYzine HCl (ATARAX) tablet 10 mg  10 mg Oral TID PRN Matt Cabral MD        insulin glargine (LANTUS PEN) injection 10 Units  10 Units Subcutaneous At Bedtime Matt Cabral MD   10 Units at 08/28/24 2224    insulin regular (MYXREDLIN) 1 unit/mL infusion  0-24 Units/hr Intravenous Continuous Colten Garay MD 6 mL/hr at 08/29/24 1100 6 Units/hr at 08/29/24 1100    ipratropium - albuterol 0.5 mg/2.5 mg/3 mL (DUONEB) neb solution 3 mL  3 mL Nebulization Q4H PRN Sudheer Rojas MD   3 mL at 08/25/24 0536    isosorbide dinitrate (ISORDIL) tablet 10 mg  10 mg Oral TID Shantanu Mclean MD   10 mg at 08/29/24 0809    lidocaine (LMX4) cream   Topical Q1H PRN Sudheer Rojas MD        lidocaine 1 % 0.1-1 mL  0.1-1 mL Other Q1H PRN Sudheer Rojas MD        medication instruction   Does not apply Continuous PRN Sudheer Rojas MD        meropenem (MERREM) 1 g vial to attach to  mL bag  1 g Intravenous Q8H Matt Cabral MD   1 g at 08/29/24 0809    midazolam (VERSED) injection 1 mg  1 mg Intravenous Q2H PRN Shantanu Mclean MD   1 mg at 08/28/24 0518    multivitamins w/minerals liquid 15 mL  15 mL Per Feeding Tube Daily Susan Yeh MD   15 mL at 08/29/24 0809    naloxone (NARCAN)  injection 0.2 mg  0.2 mg Intravenous Q2 Min PRN Jani Martin MD        Or    naloxone (NARCAN) injection 0.4 mg  0.4 mg Intravenous Q2 Min PRN Jani Martin MD        Or    naloxone (NARCAN) injection 0.2 mg  0.2 mg Intramuscular Q2 Min PRN Jani Martin MD        Or    naloxone (NARCAN) injection 0.4 mg  0.4 mg Intramuscular Q2 Min PRN Jani Martin MD        nitroPRUsside (NIPRIDE) 100 mg, sodium thiosulfate 1,000 mg in D5W 62.5 mL IV infusion (conc: 1.6 mg/mL)  0.25-1 mcg/kg/min (Dosing Weight) Intravenous Continuous Matt Cabral MD 1 mL/hr at 08/29/24 1100 0.25 mcg/kg/min at 08/29/24 1100    nystatin (MYCOSTATIN) suspension 500,000 Units  500,000 Units Swish & Spit 4x Daily Susan Yeh MD   500,000 Units at 08/29/24 0809    ondansetron (ZOFRAN) injection 4 mg  4 mg Intravenous Q6H PRN Shantanu Mclean MD   4 mg at 08/28/24 2100    pantoprazole (PROTONIX) 2 mg/mL suspension 40 mg  40 mg Oral or Feeding Tube Daily Farrah Delgado MD   40 mg at 08/29/24 0809    [Held by provider] polyethylene glycol (MIRALAX) Packet 17 g  17 g Oral or Feeding Tube Daily Andree Becerril, APRN CNP   17 g at 08/23/24 1049    polyethylene glycol (MIRALAX) Packet 17 g  17 g Oral Daily PRN Sudheer Rojas MD   17 g at 08/22/24 1430    potassium chloride (KLOR-CON) Packet 40 mEq  40 mEq Oral or Feeding Tube BID Matt Cabral MD   40 mEq at 08/29/24 0809    predniSONE (DELTASONE) tablet 40 mg  40 mg Oral or Feeding Tube Daily Susan Yeh MD   40 mg at 08/29/24 0809    Followed by    [START ON 8/31/2024] predniSONE (DELTASONE) tablet 30 mg  30 mg Oral or Feeding Tube Daily Susan Yeh MD        Followed by    [START ON 9/7/2024] predniSONE (DELTASONE) tablet 20 mg  20 mg Oral or Feeding Tube Daily Susan Yeh MD        Followed by    [START ON 9/14/2024] predniSONE (DELTASONE) tablet 10 mg  10 mg Oral or Feeding Tube Daily Susan Yeh MD        Followed by     [START ON 9/21/2024] predniSONE (DELTASONE) tablet 5 mg  5 mg Oral or Feeding Tube Daily Susan Yeh MD        [Held by provider] Prosource TF20 ENfit Compatibl EN LIQD (PROSOURCE TF20) packet 60 mL  1 packet Per Feeding Tube BID Susan Yeh MD   60 mL at 08/26/24 2042    QUEtiapine (SEROquel) tablet 25 mg  25 mg Oral or Feeding Tube BID Colten Garay MD   25 mg at 08/29/24 0809    selenium sulfide (SELSUN) 1 % shampoo   Topical Daily PRN Matt Cabral MD   Given at 08/28/24 2316    senna-docusate (SENOKOT-S/PERICOLACE) 8.6-50 MG per tablet 1 tablet  1 tablet Oral or Feeding Tube At Bedtime Andree Becerril, APRN CNP   1 tablet at 08/27/24 2021    senna-docusate (SENOKOT-S/PERICOLACE) 8.6-50 MG per tablet 1 tablet  1 tablet Oral or Feeding Tube BID PRN Jani Martin MD        Or    senna-docusate (SENOKOT-S/PERICOLACE) 8.6-50 MG per tablet 2 tablet  2 tablet Oral or Feeding Tube BID PRN Jani Martin MD        sodium chloride (PF) 0.9% PF flush 3 mL  3 mL Intracatheter Q8H Sudheer Rojas MD   3 mL at 08/27/24 2307    sodium chloride (PF) 0.9% PF flush 3 mL  3 mL Intracatheter q1 min prn Sudheer Rojas MD        sodium chloride 0.9 % infusion   Intravenous Continuous Matt Cabral MD        [Held by provider] sulfamethoxazole-trimethoprim (BACTRIM DS) 800-160 MG per tablet 1 tablet  1 tablet Oral or Feeding Tube Once per day on Monday Wednesday Friday Farrah Delgado MD   1 tablet at 08/28/24 1059    thiamine (B-1) tablet 100 mg  100 mg Oral or Feeding Tube Daily Graciela Whittaker MD   100 mg at 08/29/24 0809    vancomycin (VANCOCIN) 1,500 mg in 0.9% NaCl 250 mL intermittent infusion  1,500 mg Intravenous Q12H Farrah Delgado .7 mL/hr at 08/29/24 0635 1,500 mg at 08/29/24 0635       Medications Prior to Admission   Medication Sig Dispense Refill Last Dose    risperiDONE (RISPERDAL) 1 MG tablet Take 1 mg by mouth every morning.   Past Week at unknown     risperiDONE (RISPERDAL) 1 MG tablet Take 1.5 mg by mouth every evening.   Past Week at unknown

## 2024-08-29 NOTE — PLAN OF CARE
Goal Outcome Evaluation: No Change      Plan of Care Reviewed With: patient    Overall Patient Progress: no changeOverall Patient Progress: no change    Outcome Evaluation: Pt had destaturation episode during daily bath spo2 down to 77%. Pt recovered with hyperoxygenation. Pt became frustrated with lengh of time for cares and wearing HFNC. Pt started moaning and picking at HFNC. Mittens applied and low dose precedex started. Later pt tried to sit up IABP alarmed and pt was redirectable.     Neuro: A&Ox3 (Disoriented to place).  Pt mostly quiet during shift only answering writer's orientation questions with more than nonverbal head nods. Pt following commands and Pt moving all extremities. Pupils E/R/R B/L.Dex @ 0.6 mcg/kg/hr. Pt noticed to be tremoring in all extremities at times.   CV: SR and ST w/ PVCs. HR from 100s to 140s. Map goal 65-95. Goal map 65 to 95. Nipride @ 1 mcg/kg/min. . and Dobutamine @ 5 mcg/kg/min IABP 1:1 100% augmentation. Minimal difference between unassisted and assisted diastolic pressures on IABP.   Resp: HFNC 45 %fio2 and 50 LPM. LS are clear and dim in the basesPt has white foamy  secretions.   : Swan in for strict I+Os. Pt's UO was between 60 to 300 mL/hr.  GI: NJ @ 102., TF @ 50 mL/hr., and FWF @ 90 mL/ q 1hr NPO. Rectal tube in place; total output was 200 mL Pt had large loose stool. Pt reports Intermittent abdominal tenderness, pt also pats abdomen at times family reports and patient confirms this patting is the patient telling staff he is hungry. Pt also had one dry heaving episode given IV zofran.   Skin/Endo/Heme/ID:  left wrist bruise  Insulin @ Algorithm Al.3 and Insulin @ 9 units/hr. Added 10 units HS lantus.  Pt Afebrile  Plan: Team to consider restarting PTA risperdone, and increasing HS sleep medications .   For vital signs and complete assessments, please see documentation flowsheets.       Time 2000 0100 0400   CVP 8 11 6   PA 34/12 47/29 28/13   Svo2 63 58 68   CI 2.2  1.8 2.5   SVR 1353.5 1637.2 1125.7   Nipride (mcg/kg/min) 1 0.5 1

## 2024-08-30 ENCOUNTER — APPOINTMENT (OUTPATIENT)
Dept: GENERAL RADIOLOGY | Facility: CLINIC | Age: 24
DRG: 853 | End: 2024-08-30
Attending: STUDENT IN AN ORGANIZED HEALTH CARE EDUCATION/TRAINING PROGRAM
Payer: MEDICARE

## 2024-08-30 LAB
ALBUMIN SERPL BCG-MCNC: 3.8 G/DL (ref 3.5–5.2)
ALBUMIN SERPL BCG-MCNC: 3.8 G/DL (ref 3.5–5.2)
ALBUMIN SERPL BCG-MCNC: 4 G/DL (ref 3.5–5.2)
ALBUMIN SERPL BCG-MCNC: 4 G/DL (ref 3.5–5.2)
ALLEN'S TEST: ABNORMAL
ALP SERPL-CCNC: 115 U/L (ref 40–150)
ALP SERPL-CCNC: 116 U/L (ref 40–150)
ALP SERPL-CCNC: 118 U/L (ref 40–150)
ALP SERPL-CCNC: 119 U/L (ref 40–150)
ALT SERPL W P-5'-P-CCNC: 355 U/L (ref 0–70)
ALT SERPL W P-5'-P-CCNC: 385 U/L (ref 0–70)
ALT SERPL W P-5'-P-CCNC: 400 U/L (ref 0–70)
ALT SERPL W P-5'-P-CCNC: 411 U/L (ref 0–70)
ANION GAP SERPL CALCULATED.3IONS-SCNC: 10 MMOL/L (ref 7–15)
ANION GAP SERPL CALCULATED.3IONS-SCNC: 12 MMOL/L (ref 7–15)
ANION GAP SERPL CALCULATED.3IONS-SCNC: 12 MMOL/L (ref 7–15)
ANION GAP SERPL CALCULATED.3IONS-SCNC: 15 MMOL/L (ref 7–15)
ANION GAP SERPL CALCULATED.3IONS-SCNC: 16 MMOL/L (ref 7–15)
ANION GAP SERPL CALCULATED.3IONS-SCNC: 9 MMOL/L (ref 7–15)
AST SERPL W P-5'-P-CCNC: 101 U/L (ref 0–45)
AST SERPL W P-5'-P-CCNC: 107 U/L (ref 0–45)
AST SERPL W P-5'-P-CCNC: 79 U/L (ref 0–45)
AST SERPL W P-5'-P-CCNC: 87 U/L (ref 0–45)
B19V DNA SER QL NAA+PROBE: NOT DETECTED
BACTERIA BLD CULT: NO GROWTH
BACTERIA SPT CULT: ABNORMAL
BACTERIA SPT CULT: ABNORMAL
BASE EXCESS BLDA CALC-SCNC: -0.2 MMOL/L (ref -3–3)
BASE EXCESS BLDA CALC-SCNC: 0.7 MMOL/L (ref -3–3)
BASE EXCESS BLDA CALC-SCNC: 8.2 MMOL/L (ref -3–3)
BASE EXCESS BLDV CALC-SCNC: 1.7 MMOL/L (ref -3–3)
BASE EXCESS BLDV CALC-SCNC: 2.7 MMOL/L (ref -3–3)
BASE EXCESS BLDV CALC-SCNC: 7.2 MMOL/L (ref -3–3)
BASE EXCESS BLDV CALC-SCNC: 8.2 MMOL/L (ref -3–3)
BASE EXCESS BLDV CALC-SCNC: 8.8 MMOL/L (ref -3–3)
BASOPHILS # BLD AUTO: ABNORMAL 10*3/UL
BASOPHILS # BLD AUTO: ABNORMAL 10*3/UL
BASOPHILS # BLD MANUAL: 0 10E3/UL (ref 0–0.2)
BASOPHILS # BLD MANUAL: 0 10E3/UL (ref 0–0.2)
BASOPHILS NFR BLD AUTO: ABNORMAL %
BASOPHILS NFR BLD AUTO: ABNORMAL %
BASOPHILS NFR BLD MANUAL: 0 %
BASOPHILS NFR BLD MANUAL: 0 %
BILIRUB DIRECT SERPL-MCNC: 0.22 MG/DL (ref 0–0.3)
BILIRUB DIRECT SERPL-MCNC: 0.23 MG/DL (ref 0–0.3)
BILIRUB SERPL-MCNC: 0.5 MG/DL
BUN SERPL-MCNC: 24.3 MG/DL (ref 6–20)
BUN SERPL-MCNC: 25 MG/DL (ref 6–20)
BUN SERPL-MCNC: 27.7 MG/DL (ref 6–20)
BUN SERPL-MCNC: 27.9 MG/DL (ref 6–20)
BUN SERPL-MCNC: 30 MG/DL (ref 6–20)
BUN SERPL-MCNC: 31.6 MG/DL (ref 6–20)
CA-I BLD-MCNC: 4.6 MG/DL (ref 4.4–5.2)
CA-I BLD-MCNC: 4.6 MG/DL (ref 4.4–5.2)
CALCIUM SERPL-MCNC: 8.6 MG/DL (ref 8.8–10.4)
CALCIUM SERPL-MCNC: 8.7 MG/DL (ref 8.8–10.4)
CALCIUM SERPL-MCNC: 8.7 MG/DL (ref 8.8–10.4)
CALCIUM SERPL-MCNC: 9 MG/DL (ref 8.8–10.4)
CALCIUM SERPL-MCNC: 9.1 MG/DL (ref 8.8–10.4)
CALCIUM SERPL-MCNC: 9.2 MG/DL (ref 8.8–10.4)
CHLORIDE SERPL-SCNC: 107 MMOL/L (ref 98–107)
CHLORIDE SERPL-SCNC: 108 MMOL/L (ref 98–107)
CHLORIDE SERPL-SCNC: 109 MMOL/L (ref 98–107)
CHLORIDE SERPL-SCNC: 109 MMOL/L (ref 98–107)
CHLORIDE SERPL-SCNC: 113 MMOL/L (ref 98–107)
CHLORIDE SERPL-SCNC: 116 MMOL/L (ref 98–107)
COHGB MFR BLD: 96.9 % (ref 96–97)
COHGB MFR BLD: 97.7 % (ref 96–97)
COHGB MFR BLD: 98.9 % (ref 96–97)
CREAT SERPL-MCNC: 0.64 MG/DL (ref 0.67–1.17)
CREAT SERPL-MCNC: 0.65 MG/DL (ref 0.67–1.17)
CREAT SERPL-MCNC: 0.67 MG/DL (ref 0.67–1.17)
CREAT SERPL-MCNC: 0.72 MG/DL (ref 0.67–1.17)
CREAT SERPL-MCNC: 0.76 MG/DL (ref 0.67–1.17)
CREAT SERPL-MCNC: 0.76 MG/DL (ref 0.67–1.17)
EGFRCR SERPLBLD CKD-EPI 2021: >90 ML/MIN/1.73M2
EOSINOPHIL # BLD AUTO: ABNORMAL 10*3/UL
EOSINOPHIL # BLD AUTO: ABNORMAL 10*3/UL
EOSINOPHIL # BLD MANUAL: 0 10E3/UL (ref 0–0.7)
EOSINOPHIL # BLD MANUAL: 0.2 10E3/UL (ref 0–0.7)
EOSINOPHIL NFR BLD AUTO: ABNORMAL %
EOSINOPHIL NFR BLD AUTO: ABNORMAL %
EOSINOPHIL NFR BLD MANUAL: 0 %
EOSINOPHIL NFR BLD MANUAL: 1 %
ERYTHROCYTE [DISTWIDTH] IN BLOOD BY AUTOMATED COUNT: 13.9 % (ref 10–15)
ERYTHROCYTE [DISTWIDTH] IN BLOOD BY AUTOMATED COUNT: 13.9 % (ref 10–15)
GLUCOSE BLDC GLUCOMTR-MCNC: 103 MG/DL (ref 70–99)
GLUCOSE BLDC GLUCOMTR-MCNC: 107 MG/DL (ref 70–99)
GLUCOSE BLDC GLUCOMTR-MCNC: 111 MG/DL (ref 70–99)
GLUCOSE BLDC GLUCOMTR-MCNC: 112 MG/DL (ref 70–99)
GLUCOSE BLDC GLUCOMTR-MCNC: 118 MG/DL (ref 70–99)
GLUCOSE BLDC GLUCOMTR-MCNC: 124 MG/DL (ref 70–99)
GLUCOSE BLDC GLUCOMTR-MCNC: 126 MG/DL (ref 70–99)
GLUCOSE BLDC GLUCOMTR-MCNC: 126 MG/DL (ref 70–99)
GLUCOSE BLDC GLUCOMTR-MCNC: 136 MG/DL (ref 70–99)
GLUCOSE BLDC GLUCOMTR-MCNC: 137 MG/DL (ref 70–99)
GLUCOSE BLDC GLUCOMTR-MCNC: 156 MG/DL (ref 70–99)
GLUCOSE BLDC GLUCOMTR-MCNC: 198 MG/DL (ref 70–99)
GLUCOSE BLDC GLUCOMTR-MCNC: 218 MG/DL (ref 70–99)
GLUCOSE SERPL-MCNC: 110 MG/DL (ref 70–99)
GLUCOSE SERPL-MCNC: 121 MG/DL (ref 70–99)
GLUCOSE SERPL-MCNC: 130 MG/DL (ref 70–99)
GLUCOSE SERPL-MCNC: 138 MG/DL (ref 70–99)
GLUCOSE SERPL-MCNC: 139 MG/DL (ref 70–99)
GLUCOSE SERPL-MCNC: 222 MG/DL (ref 70–99)
GRAM STAIN RESULT: ABNORMAL
GRAM STAIN RESULT: ABNORMAL
HCO3 BLD-SCNC: 25 MMOL/L (ref 21–28)
HCO3 BLD-SCNC: 26 MMOL/L (ref 21–28)
HCO3 BLD-SCNC: 33 MMOL/L (ref 21–28)
HCO3 BLDV-SCNC: 27 MMOL/L (ref 21–28)
HCO3 BLDV-SCNC: 28 MMOL/L (ref 21–28)
HCO3 BLDV-SCNC: 33 MMOL/L (ref 21–28)
HCO3 BLDV-SCNC: 34 MMOL/L (ref 21–28)
HCO3 BLDV-SCNC: 34 MMOL/L (ref 21–28)
HCO3 SERPL-SCNC: 22 MMOL/L (ref 22–29)
HCO3 SERPL-SCNC: 25 MMOL/L (ref 22–29)
HCO3 SERPL-SCNC: 25 MMOL/L (ref 22–29)
HCO3 SERPL-SCNC: 29 MMOL/L (ref 22–29)
HCO3 SERPL-SCNC: 30 MMOL/L (ref 22–29)
HCO3 SERPL-SCNC: 30 MMOL/L (ref 22–29)
HCT VFR BLD AUTO: 38.1 % (ref 40–53)
HCT VFR BLD AUTO: 39.1 % (ref 40–53)
HGB BLD-MCNC: 11.8 G/DL (ref 13.3–17.7)
HGB BLD-MCNC: 12 G/DL (ref 13.3–17.7)
IMM GRANULOCYTES # BLD: ABNORMAL 10*3/UL
IMM GRANULOCYTES # BLD: ABNORMAL 10*3/UL
IMM GRANULOCYTES NFR BLD: ABNORMAL %
IMM GRANULOCYTES NFR BLD: ABNORMAL %
INR PPP: 0.96 (ref 0.85–1.15)
LACTATE SERPL-SCNC: 0.8 MMOL/L (ref 0.7–2)
LACTATE SERPL-SCNC: 1.2 MMOL/L (ref 0.7–2)
LACTATE SERPL-SCNC: 2 MMOL/L (ref 0.7–2)
LACTATE SERPL-SCNC: 2 MMOL/L (ref 0.7–2)
LYMPHOCYTES # BLD AUTO: ABNORMAL 10*3/UL
LYMPHOCYTES # BLD AUTO: ABNORMAL 10*3/UL
LYMPHOCYTES # BLD MANUAL: 1.5 10E3/UL (ref 0.8–5.3)
LYMPHOCYTES # BLD MANUAL: 3 10E3/UL (ref 0.8–5.3)
LYMPHOCYTES NFR BLD AUTO: ABNORMAL %
LYMPHOCYTES NFR BLD AUTO: ABNORMAL %
LYMPHOCYTES NFR BLD MANUAL: 15 %
LYMPHOCYTES NFR BLD MANUAL: 6 %
MAGNESIUM SERPL-MCNC: 2.2 MG/DL (ref 1.7–2.3)
MAGNESIUM SERPL-MCNC: 2.3 MG/DL (ref 1.7–2.3)
MAGNESIUM SERPL-MCNC: 2.4 MG/DL (ref 1.7–2.3)
MCH RBC QN AUTO: 28.4 PG (ref 26.5–33)
MCH RBC QN AUTO: 28.6 PG (ref 26.5–33)
MCHC RBC AUTO-ENTMCNC: 30.7 G/DL (ref 31.5–36.5)
MCHC RBC AUTO-ENTMCNC: 31 G/DL (ref 31.5–36.5)
MCV RBC AUTO: 92 FL (ref 78–100)
MCV RBC AUTO: 92 FL (ref 78–100)
METAMYELOCYTES # BLD MANUAL: 0.2 10E3/UL
METAMYELOCYTES # BLD MANUAL: 0.5 10E3/UL
METAMYELOCYTES NFR BLD MANUAL: 1 %
METAMYELOCYTES NFR BLD MANUAL: 2 %
MONOCYTES # BLD AUTO: ABNORMAL 10*3/UL
MONOCYTES # BLD AUTO: ABNORMAL 10*3/UL
MONOCYTES # BLD MANUAL: 1.2 10E3/UL (ref 0–1.3)
MONOCYTES # BLD MANUAL: 1.8 10E3/UL (ref 0–1.3)
MONOCYTES NFR BLD AUTO: ABNORMAL %
MONOCYTES NFR BLD AUTO: ABNORMAL %
MONOCYTES NFR BLD MANUAL: 5 %
MONOCYTES NFR BLD MANUAL: 9 %
MYELOCYTES # BLD MANUAL: 0.5 10E3/UL
MYELOCYTES NFR BLD MANUAL: 2 %
NEUTROPHILS # BLD AUTO: ABNORMAL 10*3/UL
NEUTROPHILS # BLD AUTO: ABNORMAL 10*3/UL
NEUTROPHILS # BLD MANUAL: 15 10E3/UL (ref 1.6–8.3)
NEUTROPHILS # BLD MANUAL: 21 10E3/UL (ref 1.6–8.3)
NEUTROPHILS NFR BLD AUTO: ABNORMAL %
NEUTROPHILS NFR BLD AUTO: ABNORMAL %
NEUTROPHILS NFR BLD MANUAL: 74 %
NEUTROPHILS NFR BLD MANUAL: 85 %
NRBC # BLD AUTO: 0 10E3/UL
NRBC # BLD AUTO: 0 10E3/UL
NRBC BLD AUTO-RTO: 0 /100
NRBC BLD AUTO-RTO: 0 /100
O2/TOTAL GAS SETTING VFR VENT: 2 %
O2/TOTAL GAS SETTING VFR VENT: 3 %
O2/TOTAL GAS SETTING VFR VENT: 30 %
O2/TOTAL GAS SETTING VFR VENT: 4 %
OXYHGB MFR BLDV: 67 % (ref 70–75)
OXYHGB MFR BLDV: 69 % (ref 70–75)
OXYHGB MFR BLDV: 70 % (ref 70–75)
OXYHGB MFR BLDV: 75 % (ref 70–75)
OXYHGB MFR BLDV: 76 % (ref 70–75)
PCO2 BLD: 39 MM HG (ref 35–45)
PCO2 BLD: 44 MM HG (ref 35–45)
PCO2 BLD: 45 MM HG (ref 35–45)
PCO2 BLDV: 43 MM HG (ref 40–50)
PCO2 BLDV: 44 MM HG (ref 40–50)
PCO2 BLDV: 48 MM HG (ref 40–50)
PCO2 BLDV: 50 MM HG (ref 40–50)
PCO2 BLDV: 50 MM HG (ref 40–50)
PH BLD: 7.38 [PH] (ref 7.35–7.45)
PH BLD: 7.4 [PH] (ref 7.35–7.45)
PH BLD: 7.47 [PH] (ref 7.35–7.45)
PH BLDV: 7.39 [PH] (ref 7.32–7.43)
PH BLDV: 7.42 [PH] (ref 7.32–7.43)
PH BLDV: 7.44 [PH] (ref 7.32–7.43)
PH BLDV: 7.44 [PH] (ref 7.32–7.43)
PH BLDV: 7.45 [PH] (ref 7.32–7.43)
PHOSPHATE SERPL-MCNC: 1.7 MG/DL (ref 2.5–4.5)
PHOSPHATE SERPL-MCNC: 2.1 MG/DL (ref 2.5–4.5)
PHOSPHATE SERPL-MCNC: 2.2 MG/DL (ref 2.5–4.5)
PLAT MORPH BLD: ABNORMAL
PLAT MORPH BLD: ABNORMAL
PLATELET # BLD AUTO: 204 10E3/UL (ref 150–450)
PLATELET # BLD AUTO: 213 10E3/UL (ref 150–450)
PO2 BLD: 79 MM HG (ref 80–105)
PO2 BLD: 89 MM HG (ref 80–105)
PO2 BLD: 98 MM HG (ref 80–105)
PO2 BLDV: 36 MM HG (ref 25–47)
PO2 BLDV: 37 MM HG (ref 25–47)
PO2 BLDV: 37 MM HG (ref 25–47)
PO2 BLDV: 41 MM HG (ref 25–47)
PO2 BLDV: 43 MM HG (ref 25–47)
POTASSIUM SERPL-SCNC: 3.4 MMOL/L (ref 3.4–5.3)
POTASSIUM SERPL-SCNC: 3.4 MMOL/L (ref 3.4–5.3)
POTASSIUM SERPL-SCNC: 3.5 MMOL/L (ref 3.4–5.3)
POTASSIUM SERPL-SCNC: 3.5 MMOL/L (ref 3.4–5.3)
POTASSIUM SERPL-SCNC: 3.8 MMOL/L (ref 3.4–5.3)
POTASSIUM SERPL-SCNC: 3.9 MMOL/L (ref 3.4–5.3)
POTASSIUM SERPL-SCNC: 4.5 MMOL/L (ref 3.4–5.3)
PROT SERPL-MCNC: 6.4 G/DL (ref 6.4–8.3)
PROT SERPL-MCNC: 6.5 G/DL (ref 6.4–8.3)
PROT SERPL-MCNC: 6.8 G/DL (ref 6.4–8.3)
PROT SERPL-MCNC: 6.9 G/DL (ref 6.4–8.3)
RBC # BLD AUTO: 4.13 10E6/UL (ref 4.4–5.9)
RBC # BLD AUTO: 4.23 10E6/UL (ref 4.4–5.9)
RBC MORPH BLD: ABNORMAL
RBC MORPH BLD: ABNORMAL
SAO2 % BLDA: 95 % (ref 92–100)
SAO2 % BLDA: 96 % (ref 92–100)
SAO2 % BLDA: 97 % (ref 92–100)
SAO2 % BLDV: 68.3 % (ref 70–75)
SAO2 % BLDV: 71 % (ref 70–75)
SAO2 % BLDV: 72 % (ref 70–75)
SAO2 % BLDV: 76 % (ref 70–75)
SAO2 % BLDV: 77.7 % (ref 70–75)
SODIUM SERPL-SCNC: 144 MMOL/L (ref 135–145)
SODIUM SERPL-SCNC: 146 MMOL/L (ref 135–145)
SODIUM SERPL-SCNC: 149 MMOL/L (ref 135–145)
SODIUM SERPL-SCNC: 150 MMOL/L (ref 135–145)
SODIUM SERPL-SCNC: 152 MMOL/L (ref 135–145)
SODIUM SERPL-SCNC: 156 MMOL/L (ref 135–145)
UFH PPP CHRO-ACNC: 0.24 IU/ML
UFH PPP CHRO-ACNC: 0.37 IU/ML
UFH PPP CHRO-ACNC: 0.56 IU/ML
WBC # BLD AUTO: 20.3 10E3/UL (ref 4–11)
WBC # BLD AUTO: 24.7 10E3/UL (ref 4–11)

## 2024-08-30 PROCEDURE — 84100 ASSAY OF PHOSPHORUS: CPT | Performed by: STUDENT IN AN ORGANIZED HEALTH CARE EDUCATION/TRAINING PROGRAM

## 2024-08-30 PROCEDURE — 83735 ASSAY OF MAGNESIUM: CPT | Performed by: STUDENT IN AN ORGANIZED HEALTH CARE EDUCATION/TRAINING PROGRAM

## 2024-08-30 PROCEDURE — 83605 ASSAY OF LACTIC ACID: CPT | Performed by: STUDENT IN AN ORGANIZED HEALTH CARE EDUCATION/TRAINING PROGRAM

## 2024-08-30 PROCEDURE — 85520 HEPARIN ASSAY: CPT | Performed by: INTERNAL MEDICINE

## 2024-08-30 PROCEDURE — 250N000011 HC RX IP 250 OP 636: Performed by: STUDENT IN AN ORGANIZED HEALTH CARE EDUCATION/TRAINING PROGRAM

## 2024-08-30 PROCEDURE — 85610 PROTHROMBIN TIME: CPT | Performed by: STUDENT IN AN ORGANIZED HEALTH CARE EDUCATION/TRAINING PROGRAM

## 2024-08-30 PROCEDURE — 250N000009 HC RX 250: Performed by: INTERNAL MEDICINE

## 2024-08-30 PROCEDURE — 999N000075 HC STATISTIC IABP MONITORING

## 2024-08-30 PROCEDURE — 258N000003 HC RX IP 258 OP 636: Performed by: STUDENT IN AN ORGANIZED HEALTH CARE EDUCATION/TRAINING PROGRAM

## 2024-08-30 PROCEDURE — 250N000009 HC RX 250: Performed by: STUDENT IN AN ORGANIZED HEALTH CARE EDUCATION/TRAINING PROGRAM

## 2024-08-30 PROCEDURE — 250N000013 HC RX MED GY IP 250 OP 250 PS 637: Performed by: INTERNAL MEDICINE

## 2024-08-30 PROCEDURE — 82248 BILIRUBIN DIRECT: CPT

## 2024-08-30 PROCEDURE — 82805 BLOOD GASES W/O2 SATURATION: CPT | Performed by: STUDENT IN AN ORGANIZED HEALTH CARE EDUCATION/TRAINING PROGRAM

## 2024-08-30 PROCEDURE — 84100 ASSAY OF PHOSPHORUS: CPT | Performed by: INTERNAL MEDICINE

## 2024-08-30 PROCEDURE — 82248 BILIRUBIN DIRECT: CPT | Performed by: STUDENT IN AN ORGANIZED HEALTH CARE EDUCATION/TRAINING PROGRAM

## 2024-08-30 PROCEDURE — 200N000002 HC R&B ICU UMMC

## 2024-08-30 PROCEDURE — 250N000011 HC RX IP 250 OP 636: Performed by: INTERNAL MEDICINE

## 2024-08-30 PROCEDURE — 80048 BASIC METABOLIC PNL TOTAL CA: CPT

## 2024-08-30 PROCEDURE — 258N000003 HC RX IP 258 OP 636: Performed by: INTERNAL MEDICINE

## 2024-08-30 PROCEDURE — 250N000013 HC RX MED GY IP 250 OP 250 PS 637: Performed by: STUDENT IN AN ORGANIZED HEALTH CARE EDUCATION/TRAINING PROGRAM

## 2024-08-30 PROCEDURE — 999N000185 HC STATISTIC TRANSPORT TIME EA 15 MIN

## 2024-08-30 PROCEDURE — 71045 X-RAY EXAM CHEST 1 VIEW: CPT

## 2024-08-30 PROCEDURE — 71045 X-RAY EXAM CHEST 1 VIEW: CPT | Mod: 26 | Performed by: RADIOLOGY

## 2024-08-30 PROCEDURE — 999N000065 XR CHEST PORT 1 VIEW

## 2024-08-30 PROCEDURE — 36415 COLL VENOUS BLD VENIPUNCTURE: CPT | Performed by: STUDENT IN AN ORGANIZED HEALTH CARE EDUCATION/TRAINING PROGRAM

## 2024-08-30 PROCEDURE — 82330 ASSAY OF CALCIUM: CPT | Performed by: STUDENT IN AN ORGANIZED HEALTH CARE EDUCATION/TRAINING PROGRAM

## 2024-08-30 PROCEDURE — 99291 CRITICAL CARE FIRST HOUR: CPT | Mod: GC | Performed by: INTERNAL MEDICINE

## 2024-08-30 PROCEDURE — 85027 COMPLETE CBC AUTOMATED: CPT | Performed by: STUDENT IN AN ORGANIZED HEALTH CARE EDUCATION/TRAINING PROGRAM

## 2024-08-30 PROCEDURE — 250N000013 HC RX MED GY IP 250 OP 250 PS 637

## 2024-08-30 PROCEDURE — 80048 BASIC METABOLIC PNL TOTAL CA: CPT | Performed by: STUDENT IN AN ORGANIZED HEALTH CARE EDUCATION/TRAINING PROGRAM

## 2024-08-30 PROCEDURE — 85520 HEPARIN ASSAY: CPT | Performed by: STUDENT IN AN ORGANIZED HEALTH CARE EDUCATION/TRAINING PROGRAM

## 2024-08-30 PROCEDURE — 87040 BLOOD CULTURE FOR BACTERIA: CPT | Performed by: STUDENT IN AN ORGANIZED HEALTH CARE EDUCATION/TRAINING PROGRAM

## 2024-08-30 PROCEDURE — 250N000012 HC RX MED GY IP 250 OP 636 PS 637

## 2024-08-30 PROCEDURE — 85007 BL SMEAR W/DIFF WBC COUNT: CPT | Performed by: STUDENT IN AN ORGANIZED HEALTH CARE EDUCATION/TRAINING PROGRAM

## 2024-08-30 RX ORDER — MULTIPLE VITAMINS W/ MINERALS TAB 9MG-400MCG
1 TAB ORAL DAILY
Status: DISCONTINUED | OUTPATIENT
Start: 2024-08-31 | End: 2024-09-19 | Stop reason: HOSPADM

## 2024-08-30 RX ORDER — POTASSIUM PHOS IN 0.9 % NACL 15MMOL/250
15 PLASTIC BAG, INJECTION (ML) INTRAVENOUS ONCE
Status: COMPLETED | OUTPATIENT
Start: 2024-08-30 | End: 2024-08-30

## 2024-08-30 RX ORDER — POTASSIUM CHLORIDE 1.5 G/1.58G
20 POWDER, FOR SOLUTION ORAL ONCE
Status: COMPLETED | OUTPATIENT
Start: 2024-08-30 | End: 2024-08-30

## 2024-08-30 RX ORDER — POTASSIUM PHOS IN 0.9 % NACL 15MMOL/250
15 PLASTIC BAG, INJECTION (ML) INTRAVENOUS
Status: COMPLETED | OUTPATIENT
Start: 2024-08-30 | End: 2024-08-31

## 2024-08-30 RX ORDER — POTASSIUM CHLORIDE 1.5 G/1.58G
40 POWDER, FOR SOLUTION ORAL ONCE
Status: COMPLETED | OUTPATIENT
Start: 2024-08-30 | End: 2024-08-30

## 2024-08-30 RX ORDER — POTASSIUM CHLORIDE 29.8 MG/ML
20 INJECTION INTRAVENOUS
Status: COMPLETED | OUTPATIENT
Start: 2024-08-31 | End: 2024-08-31

## 2024-08-30 RX ORDER — POTASSIUM CHLORIDE 29.8 MG/ML
20 INJECTION INTRAVENOUS ONCE
Status: COMPLETED | OUTPATIENT
Start: 2024-08-30 | End: 2024-08-30

## 2024-08-30 RX ORDER — ACETAZOLAMIDE 500 MG/5ML
500 INJECTION, POWDER, LYOPHILIZED, FOR SOLUTION INTRAVENOUS ONCE
Status: COMPLETED | OUTPATIENT
Start: 2024-08-30 | End: 2024-08-30

## 2024-08-30 RX ORDER — METOCLOPRAMIDE HYDROCHLORIDE 5 MG/ML
5 INJECTION INTRAMUSCULAR; INTRAVENOUS EVERY 6 HOURS PRN
Status: DISCONTINUED | OUTPATIENT
Start: 2024-08-30 | End: 2024-09-19 | Stop reason: HOSPADM

## 2024-08-30 RX ADMIN — SODIUM NITROPRUSSIDE 0.75 MCG/KG/MIN: 25 INJECTION, SOLUTION, CONCENTRATE INTRAVENOUS at 11:55

## 2024-08-30 RX ADMIN — HYDRALAZINE HYDROCHLORIDE 25 MG: 25 TABLET ORAL at 14:27

## 2024-08-30 RX ADMIN — POTASSIUM & SODIUM PHOSPHATES POWDER PACK 280-160-250 MG 2 PACKET: 280-160-250 PACK at 14:31

## 2024-08-30 RX ADMIN — NYSTATIN 500000 UNITS: 100000 SUSPENSION ORAL at 07:46

## 2024-08-30 RX ADMIN — ONDANSETRON 4 MG: 2 INJECTION INTRAMUSCULAR; INTRAVENOUS at 18:32

## 2024-08-30 RX ADMIN — POTASSIUM CHLORIDE 20 MEQ: 29.8 INJECTION, SOLUTION INTRAVENOUS at 19:55

## 2024-08-30 RX ADMIN — BUMETANIDE 2 MG: 0.25 INJECTION INTRAMUSCULAR; INTRAVENOUS at 16:00

## 2024-08-30 RX ADMIN — POTASSIUM CHLORIDE 20 MEQ: 29.8 INJECTION, SOLUTION INTRAVENOUS at 08:52

## 2024-08-30 RX ADMIN — VANCOMYCIN HYDROCHLORIDE 1500 MG: 10 INJECTION, POWDER, LYOPHILIZED, FOR SOLUTION INTRAVENOUS at 06:00

## 2024-08-30 RX ADMIN — ISOSORBIDE DINITRATE 20 MG: 40 TABLET ORAL at 17:48

## 2024-08-30 RX ADMIN — THIAMINE HCL TAB 100 MG 100 MG: 100 TAB at 07:47

## 2024-08-30 RX ADMIN — ACETAMINOPHEN 650 MG: 325 TABLET ORAL at 00:57

## 2024-08-30 RX ADMIN — HYDRALAZINE HYDROCHLORIDE 25 MG: 25 TABLET ORAL at 05:09

## 2024-08-30 RX ADMIN — INSULIN GLARGINE 10 UNITS: 100 INJECTION, SOLUTION SUBCUTANEOUS at 21:06

## 2024-08-30 RX ADMIN — Medication 15 ML: at 07:47

## 2024-08-30 RX ADMIN — POTASSIUM PHOSPHATE, MONOBASIC POTASSIUM PHOSPHATE, DIBASIC 15 MMOL: 224; 236 INJECTION, SOLUTION, CONCENTRATE INTRAVENOUS at 02:31

## 2024-08-30 RX ADMIN — POTASSIUM PHOSPHATE, MONOBASIC POTASSIUM PHOSPHATE, DIBASIC 15 MMOL: 224; 236 INJECTION, SOLUTION, CONCENTRATE INTRAVENOUS at 21:06

## 2024-08-30 RX ADMIN — ISOSORBIDE DINITRATE 20 MG: 40 TABLET ORAL at 11:55

## 2024-08-30 RX ADMIN — POTASSIUM & SODIUM PHOSPHATES POWDER PACK 280-160-250 MG 2 PACKET: 280-160-250 PACK at 11:55

## 2024-08-30 RX ADMIN — ACETAZOLAMIDE 500 MG: 500 INJECTION, POWDER, LYOPHILIZED, FOR SOLUTION INTRAVENOUS at 10:09

## 2024-08-30 RX ADMIN — POTASSIUM PHOSPHATE, MONOBASIC POTASSIUM PHOSPHATE, DIBASIC 15 MMOL: 224; 236 INJECTION, SOLUTION, CONCENTRATE INTRAVENOUS at 23:01

## 2024-08-30 RX ADMIN — NYSTATIN 500000 UNITS: 100000 SUSPENSION ORAL at 16:00

## 2024-08-30 RX ADMIN — METOCLOPRAMIDE 5 MG: 5 INJECTION, SOLUTION INTRAMUSCULAR; INTRAVENOUS at 19:55

## 2024-08-30 RX ADMIN — VANCOMYCIN HYDROCHLORIDE 1500 MG: 10 INJECTION, POWDER, LYOPHILIZED, FOR SOLUTION INTRAVENOUS at 17:48

## 2024-08-30 RX ADMIN — MEROPENEM 1 G: 1 INJECTION, POWDER, FOR SOLUTION INTRAVENOUS at 07:47

## 2024-08-30 RX ADMIN — PREDNISONE 40 MG: 20 TABLET ORAL at 07:47

## 2024-08-30 RX ADMIN — POTASSIUM CHLORIDE 20 MEQ: 29.8 INJECTION, SOLUTION INTRAVENOUS at 17:48

## 2024-08-30 RX ADMIN — Medication 37.5 MG: at 17:48

## 2024-08-30 RX ADMIN — DOBUTAMINE 5 MCG/KG/MIN: 12.5 INJECTION, SOLUTION, CONCENTRATE INTRAVENOUS at 03:20

## 2024-08-30 RX ADMIN — QUETIAPINE FUMARATE 25 MG: 25 TABLET ORAL at 07:47

## 2024-08-30 RX ADMIN — NYSTATIN 500000 UNITS: 100000 SUSPENSION ORAL at 11:55

## 2024-08-30 RX ADMIN — MEROPENEM 1 G: 1 INJECTION, POWDER, FOR SOLUTION INTRAVENOUS at 16:00

## 2024-08-30 RX ADMIN — Medication 40 MG: at 08:04

## 2024-08-30 RX ADMIN — POTASSIUM CHLORIDE 40 MEQ: 1.5 POWDER, FOR SOLUTION ORAL at 07:47

## 2024-08-30 RX ADMIN — MEROPENEM 1 G: 1 INJECTION, POWDER, FOR SOLUTION INTRAVENOUS at 00:14

## 2024-08-30 RX ADMIN — POTASSIUM CHLORIDE 20 MEQ: 1.5 POWDER, FOR SOLUTION ORAL at 01:55

## 2024-08-30 RX ADMIN — INSULIN HUMAN 5 UNITS/HR: 1 INJECTION, SOLUTION INTRAVENOUS at 08:05

## 2024-08-30 RX ADMIN — ISOSORBIDE DINITRATE 20 MG: 40 TABLET ORAL at 07:47

## 2024-08-30 RX ADMIN — POTASSIUM CHLORIDE 40 MEQ: 1.5 POWDER, FOR SOLUTION ORAL at 05:09

## 2024-08-30 RX ADMIN — BUMETANIDE 2 MG: 0.25 INJECTION INTRAMUSCULAR; INTRAVENOUS at 07:46

## 2024-08-30 ASSESSMENT — ACTIVITIES OF DAILY LIVING (ADL)
ADLS_ACUITY_SCORE: 49

## 2024-08-30 NOTE — PLAN OF CARE
Major Shift Events:  A&Ox3-4, intermittently disoriented to situation. Pt following commands, MCALLISTER. Denies pain. Tmax 100.6, MD aware and tylenol given. ST, rates 120s-140s with frequent PVCs, MD aware. Continues on nipride to maintain MAP goal 75-95. IABP 1:1, 100% augmentation. Continues on dobutamine.  Last NAYA: CVP 14, PA 24/12, CI 3.6, , SvO2 75, MD aware no new orders placed. On 2L NC. TF @ goal, FWF 60 ml/hr. Continues on insulin gtt alg 4. Rectal tube in place with moderate amount of output. Swan in place, UOP  ml/hr, scheduled bumex continues with good response. Heparin gtt therapeutic @ 700 unit(s)/hr, recheck @ 1030.    Plan: Continue with plan of care.    For vital signs and complete assessments, please see documentation flowsheets.         Problem: Adult Inpatient Plan of Care  Goal: Plan of Care Review  Description: The Plan of Care Review/Shift note should be completed every shift.  The Outcome Evaluation is a brief statement about your assessment that the patient is improving, declining, or no change.  This information will be displayed automatically on your shift  note.  Outcome: Progressing  Flowsheets (Taken 8/30/2024 1553)  Plan of Care Reviewed With: patient  Overall Patient Progress: no change   Goal Outcome Evaluation:      Plan of Care Reviewed With: patient    Overall Patient Progress: no changeOverall Patient Progress: no change

## 2024-08-30 NOTE — PROGRESS NOTES
"CLINICAL NUTRITION SERVICES - BRIEF NOTE     Nutrition Prescription    Recommendations already ordered by Registered Dietitian (RD):  Ordered strawberry Ensure Enlive at 2 pm + additional PRN, do not count towards Na/sat fat restriction    Discontinued TF and FWF orders  Modified MVI from liquid to tablet Thera-vit-m    Future/Additional Recommendations:  Monitor oral intake, supplement tolerance.     EVALUATION OF THE PROGRESS TOWARD GOALS   Diet: Low Saturated Fat Na <2400 mg    Nutrition Support: Previous TF via NDT - NDT came out today  8/22 - 8/30: Pivot 1.5 Akbar (or equivalent) @ goal of  50ml/hr  (1200ml/day) provides: 1800 kcals, 112 g PRO, 900 ml free H20, 206 g CHO, and 9 g fiber daily + 2 [id number] = 1960 kcals (22 kcals/kg) and 152 gm PRO (1.7 gm/kg)       NEW FINDINGS   Per RN, pt's FT came out this AM when pt had emesis and provider approved it being removed. Met with pt and his aunt. Aunt says he's taking it slow with eating now since he got sick this morning. PTA he ate well. He likes strawberry milk and is agreeable to trying strawberry Ensure. Aunt reports that pt has gained 50 lbs in the last year and could benefit from wt loss. Writer encouraged wt maintenance while pt is inpatient but discussed that pt can pursue wt loss after discharge if he wishes.     INTERVENTIONS  Medical food supplement therapy  Enteral nutrition - discontinued    Monitoring/Evaluation  Progress toward goals will be monitored and evaluated per protocol.     Ruddy Benedict RD, LD  Available on [employer]  Weekend/Holiday ALFIE Stearns - \"Weekend Clinical Dietitian\"  No longer available by paging   "

## 2024-08-30 NOTE — PROGRESS NOTES
[facility]  Cardiology Heart Failure Service (Cards 2) Progress Note    Patient Name: Terrance Hidalgo    Medical Record Number: [id number]    YOB: 2000  PCP: Colt Flores    Admit Date/Time: 8/20/2024 10:13 PM   10    Assessment/Plan:    25 yo M with PMH autism who initially presented to [facility] with cough, URI symptoms, and hypoxemia. He was intubated due to respiratory failure and transferred to [facility], where his hospital course transpired to mixed cardiogenic/septic shock requiring multiple pressor agents, inotropic support, and IV diuresis. Transferred again to [facility] CICU on 8/20 for continued management. Presentation initially highly suspicious for fulminant myocarditis base on elevated cardiac and inflammatory biomarkers, new (suspected), severe biventricular dysfunction w/ reduced cardiac output requiring intermittent inotropic support, and concurrent infectious/respiratory symptomotology c/b acute respiratory failure 2/2 ARDS vs cardiogenic etiology 2/2 miocarditis with superimposed aspiration PNM treated with sulbactam. Empirically pulsed with steroids. However, EMB 5/21 demonstrated unremarkable myocardial tissue with no evidence of inflammatory infiltrate.  Hospital course further complicated by worsening cardiogenic/septic shock with worsening renal and liver failure requiring IABP placement on 8/27. Jarocho weaned off on 8/28 and extubated.      - ECMO candidate     Changes:  - c/w IABP; iabp repositioned   - c/w dobutamine for biventricular inotropic support   - wean off nipride if possible, adjust hydralazine/isordil  - diuresis with CVP goal 10-12  - wean off HFNC  - c/w meropenem and vanc given worsening clinical findings with elevated WBC and fever and IABP in place  - if blood cultures negative for 48h then deescalate the antibiotics    Neuro:     #AMS - resolved  #Sedation   - moving x 4 intermittent following commands     Plan:      - prn  atarax  - c/w Seroquel       CV:    #Biventricular failure LVEF 25%   #Cardiogenic shock SCAI C  #Lactic acidosis   TTE (8/21/24): LVIDd 58mm. Biplane LVEF is 16%. Severe hypokinesis of mid to apical RV free wall.  RHC (8/21/24): RA mean 6, PA 30/23/26, PCWP 18, Wayne CO/CI 5.0/2.1, PVR 1.6, SVR 1270  EMB (8/21/24): Fragments of essentially unremarkable myocardial tissue with no evidence of inflammatory infiltrate   Coronary angiogram: NA   CMR: None - will obtain prior to discharge     DATE MAP CVP PAP PCWP Wayne CO Wayne CI SVR PVR Mvo2 LA Therapies   8/29 82 10 41/23 16 3.5 1.6 1800 3.7 54 1.4 IABP 1:1, dobutamine 5   8/30 80 12 24/12 12 6.1 2.7 984 <2 70 2 IABP1:1 dobutamine 5 nipride 0.75                                                             Plan:  - IABP   - c/w dobutamine for biventricular inotropic support   - wean off nipride   - diuresis with CVP goal 10-12  - c/w steroid taper- c/w nystatin/bactrim     Pulm:     #Acute hypoxic respiratory failure - resolving    #Pleular effusion - resolved   #concern for Aspiration PNM  - resolving      Plan:    - incentive spirometer, peridex   - abx per below    - wean off NC     GI/Nutrition:    #Transaminitis    1. Hepatomegaly with increased echogenicity of the liver, which may be  seen with intrinsic parenchymal disease such as steatosis.  2. Biliary sludge without sonographic evidence for acute  cholecystitis.  3. Normal antegrade flow in the extrahepatic and right portal vein.   4. The remainder of the findings are significantly limited secondary  to obscuring bowel gas and technical challenges related to patient  mobility.    Plan:    - monitor MELD labs  - treatment per above        Renal:    #HyperNa   #Hypervolemia  #LA    Plan:    - adjust FWF   - frequent BMP   - hold bactim for possible contributing factor to LA     ID:     #Concern for aspiration PNM   #Transaminitis   #Recurrent fevers of unclear etiology     - Antimicrobials       > Unasyn 8/21 -  8/26       > Cefepime 8/20 - 8/21       > Azithromycin 8/20 - 08/24       > Vanco 8/20 - 8/21 s/p 8/26       > meropenem 8/26       > zosyn 8/26 - 8/28    Plan:  - c/w zosyn    - c/w meropenem and vanc given worsening clinical findings with elevated WBC and fever and IABP in place  - if blood cultures negative for 48h then deescalate the antibiotics       Heme:    #Anemia of critical illness     Plan:  - H/H  - transfuse if hb < 7 or drop > 2 g     Endo:    Plan:  - FSG goal 140-160  - insulin   - start lantus     Family Meeting/Goals of Care:  - last time family updated: TBD   - next of kin/HCP:  Johann Hidalgo(TEMPORARY GUARDIAN TO 10-)  Brother  [phone]  (+[phone])  [address] [facility] [address]  Legal guardian    Disposition:   - Unit   Code Status:   Full Code     Thank you for allowing me to care for this patient, please don't hesitate to contact me with any questions regarding this plan.     Patient was discussed and evaluated with Dr. Luis HOUSE, attending physician, who agrees with the assessment and plan above.    Matt Cabral MD, PhD, MSc  Cardiology Fellow    Physical Exam:    General appearance - Lying in bed; on NC  Head: Normocephalic and atraumatic.   Eyes: Pupils are equal, round, and reactive to light. No scleral icterus.  Neck: no JVD, bruit.  Cardiovascular: Tachycardiac regular rhythm. S1 and S2 auscultated. No murmurs, rub, or gallops.  Pulmonary/Chest: clear breath bilaterally   Abdominal: Bowel sounds present. Soft. No distension. No rigidity, rebound or guarding.   Extremities: Warm, no cyanosis, 2+ distal pulses bilaterally. Pitting edema b/l +1.   Skin: Skin is warm and not diaphoretic. No rash, bruising, or erythema.  Neuro: Sedated     Objective data:    Temp:  [99.3  F (37.4  C)-100.6  F (38.1  C)] 99.3  F (37.4  C)  Pulse:  [104-153] 122  Resp:  [14-40] 22  MAP:  [63 mmHg-325 mmHg] 88 mmHg  Arterial Line BP: ()/(45-74) 106/71  SpO2:  [92 %-100 %] 92  %    Vitals:    08/26/24 0000 08/27/24 0000 08/28/24 0000 08/29/24 0000   Weight: 96.6 kg (212 lb 15.4 oz) 95.3 kg (210 lb 1.6 oz) 94.2 kg (207 lb 10.8 oz) 95.1 kg (209 lb 10.5 oz)    08/30/24 0400   Weight: 96.1 kg (211 lb 13.8 oz)        Vent Settings:  Resp: 22 SpO2: 92 % O2 Device: Nasal cannula Oxygen Delivery: 2 LPM  FiO2 (%): 30 %, Resp: 22    LABS Reviewed  IMAGES Reviewed    Current medications   Current Facility-Administered Medications   Medication Dose Route Frequency Provider Last Rate Last Admin    acetaminophen (TYLENOL) tablet 650 mg  650 mg Oral or Feeding Tube Q8H PRN Sudheer Rojas MD   650 mg at 08/30/24 0057    albuterol (PROVENTIL) neb solution 2.5 mg  2.5 mg Nebulization Q6H PRN Matt Cabral MD        bumetanide (BUMEX) injection 2 mg  2 mg Intravenous Q8H Matt Cabral MD   2 mg at 08/30/24 0746    dextrose 10% infusion   Intravenous Continuous PRN Colten Garay MD        glucose gel 15-30 g  15-30 g Oral Q15 Min PRN Colten Garay MD        Or    dextrose 50 % injection 25-50 mL  25-50 mL Intravenous Q15 Min PRN Colten Garay MD        Or    glucagon injection 1 mg  1 mg Subcutaneous Q15 Min PRN Colten Garay MD        DOBUTamine (DOBUTREX) 1,000 mg in D5W 250 mL infusion (adult MAX conc)  5 mcg/kg/min (Dosing Weight) Intravenous Continuous Matt Cabral MD 8.1 mL/hr at 08/30/24 1200 5 mcg/kg/min at 08/30/24 1200    heparin 25,000 units in 0.45% NaCl 250 mL ANTICOAGULANT infusion  0-5,000 Units/hr Intravenous Continuous Matt Cabral MD 8.5 mL/hr at 08/30/24 1200 850 Units/hr at 08/30/24 1200    hydrALAZINE (APRESOLINE) tablet 25 mg  25 mg Oral Q8H DARCY Matt Cabral MD   25 mg at 08/30/24 0509    hydrOXYzine HCl (ATARAX) tablet 10 mg  10 mg Oral TID PRN Matt Cabral MD        insulin glargine (LANTUS PEN) injection 10 Units  10 Units Subcutaneous At Bedtime Matt Cabral MD   10 Units at 08/29/24 2202    insulin regular (MYXREDLIN) 1 unit/mL infusion   0-24 Units/hr Intravenous Continuous Colten Garay MD 8 mL/hr at 08/30/24 1200 8 Units/hr at 08/30/24 1200    ipratropium - albuterol 0.5 mg/2.5 mg/3 mL (DUONEB) neb solution 3 mL  3 mL Nebulization Q4H PRN Sudheer Rojas MD   3 mL at 08/25/24 0536    isosorbide dinitrate (ISORDIL) tablet 20 mg  20 mg Oral TID Matt Cabral MD   20 mg at 08/30/24 1155    lidocaine (LMX4) cream   Topical Q1H PRN Sudheer Rojas MD        lidocaine 1 % 0.1-1 mL  0.1-1 mL Other Q1H PRN Sudheer Rojas MD        medication instruction   Does not apply Continuous PRN Sudheer Rojas MD        meropenem (MERREM) 1 g vial to attach to  mL bag  1 g Intravenous Q8H Matt Cabral MD   1 g at 08/30/24 0747    multivitamins w/minerals liquid 15 mL  15 mL Per Feeding Tube Daily Susan Yeh MD   15 mL at 08/30/24 0747    naloxone (NARCAN) injection 0.2 mg  0.2 mg Intravenous Q2 Min PRN Jani Martin MD        Or    naloxone (NARCAN) injection 0.4 mg  0.4 mg Intravenous Q2 Min PRN Jani Martin MD        Or    naloxone (NARCAN) injection 0.2 mg  0.2 mg Intramuscular Q2 Min PRN Jani Martin MD        Or    naloxone (NARCAN) injection 0.4 mg  0.4 mg Intramuscular Q2 Min PRN Jani Martin MD        nitroPRUsside (NIPRIDE) 100 mg, sodium thiosulfate 1,000 mg in D5W 62.5 mL IV infusion (conc: 1.6 mg/mL)  0.25-1 mcg/kg/min (Dosing Weight) Intravenous Continuous Matt Cabral MD 3 mL/hr at 08/30/24 1200 0.75 mcg/kg/min at 08/30/24 1200    nystatin (MYCOSTATIN) suspension 500,000 Units  500,000 Units Swish & Spit 4x Daily uSsan Yeh MD   500,000 Units at 08/30/24 1155    ondansetron (ZOFRAN) injection 4 mg  4 mg Intravenous Q6H PRN Shantanu Mclean MD   4 mg at 08/28/24 2100    pantoprazole (PROTONIX) 2 mg/mL suspension 40 mg  40 mg Oral or Feeding Tube Daily Farrah Delgado MD   40 mg at 08/30/24 0804    [Held by provider] polyethylene glycol (MIRALAX) Packet 17 g  17 g Oral or Feeding  Tube Daily Andree Becerril, APRN CNP   17 g at 08/23/24 1049    polyethylene glycol (MIRALAX) Packet 17 g  17 g Oral Daily PRN Sudheer Rojas MD   17 g at 08/22/24 1430    potassium & sodium phosphates (NEUTRA-PHOS) Packet 2 packet  2 packet Oral or Feeding Tube Q4H Graciela Whittaker MD   2 packet at 08/30/24 1155    potassium chloride (KLOR-CON) Packet 40 mEq  40 mEq Oral or Feeding Tube BID Matt Cabral MD   40 mEq at 08/30/24 0747    [START ON 8/31/2024] predniSONE (DELTASONE) tablet 30 mg  30 mg Oral or Feeding Tube Daily Susan Yeh MD        Followed by    [START ON 9/7/2024] predniSONE (DELTASONE) tablet 20 mg  20 mg Oral or Feeding Tube Daily Susan Yeh MD        Followed by    [START ON 9/14/2024] predniSONE (DELTASONE) tablet 10 mg  10 mg Oral or Feeding Tube Daily Susan Yeh MD        Followed by    [START ON 9/21/2024] predniSONE (DELTASONE) tablet 5 mg  5 mg Oral or Feeding Tube Daily Susan Yeh MD        [Held by provider] Prosource TF20 ENfit Compatibl EN LIQD (PROSOURCE TF20) packet 60 mL  1 packet Per Feeding Tube BID Susan Yeh MD   60 mL at 08/26/24 2042    QUEtiapine (SEROquel) tablet 25 mg  25 mg Oral or Feeding Tube BID Colten Garay MD   25 mg at 08/30/24 0747    selenium sulfide (SELSUN) 1 % shampoo   Topical Daily PRN Matt Cabral MD   Given at 08/28/24 2316    senna-docusate (SENOKOT-S/PERICOLACE) 8.6-50 MG per tablet 1 tablet  1 tablet Oral or Feeding Tube At Bedtime Andree Becerril, APRN CNP   1 tablet at 08/27/24 2021    senna-docusate (SENOKOT-S/PERICOLACE) 8.6-50 MG per tablet 1 tablet  1 tablet Oral or Feeding Tube BID PRN Jani Martin MD        Or    senna-docusate (SENOKOT-S/PERICOLACE) 8.6-50 MG per tablet 2 tablet  2 tablet Oral or Feeding Tube BID PRN Jani Martin MD        sodium chloride (PF) 0.9% PF flush 3 mL  3 mL Intracatheter Q8H Sudheer Rojas MD   3 mL at 08/30/24 0014    sodium  chloride (PF) 0.9% PF flush 3 mL  3 mL Intracatheter q1 min prn Sudheer Rojas MD        sodium chloride 0.9 % infusion   Intravenous Continuous Matt Cabral MD        [Held by provider] sulfamethoxazole-trimethoprim (BACTRIM DS) 800-160 MG per tablet 1 tablet  1 tablet Oral or Feeding Tube Once per day on Monday Wednesday Friday Farrah Delgado MD   1 tablet at 08/28/24 1059    thiamine (B-1) tablet 100 mg  100 mg Oral or Feeding Tube Daily Graciela Whittaker MD   100 mg at 08/30/24 0747    vancomycin (VANCOCIN) 1,500 mg in 0.9% NaCl 250 mL intermittent infusion  1,500 mg Intravenous Q12H Farrah Delgado .7 mL/hr at 08/30/24 0600 1,500 mg at 08/30/24 0600       Medications Prior to Admission   Medication Sig Dispense Refill Last Dose    risperiDONE (RISPERDAL) 1 MG tablet Take 1 mg by mouth every morning.   Past Week at unknown    risperiDONE (RISPERDAL) 1 MG tablet Take 1.5 mg by mouth every evening.   Past Week at unknown

## 2024-08-30 NOTE — PLAN OF CARE
Neuro: A&Ox4, MCALLISTER. PERRLA, follows commands. PM Tmax 100.6- PRN tylenol avail    CV:   Rate/rhythm: -140  Mechanical:  IABP 1:1-wean tomorrow  SWAN @47cm,   Keep MAPs <95 w/ Nipride. Last NAYA: CVP 12, PA 20s/10s, CI 3+, SVR 900s, SvO2 70  Pulses dopplerable    Pulm: 2-4L NC, lungs equal BL    GI: Cardiac diet. X2 emesis; PRN zofran given.  Rectal tube in place; loose stools    : Swan adequate UOP. Scheduled IV Bumex q8hr    Skin: scattered bruises. Lower back rash. Mepilex on saccrum    IVs:  L PIV (Heparin)  R X3 internal jugular CVC  L cath lab swan with side port (TKO)  L fem Loretta    Drips:   Dobutamine 5  Nipride 0.5  Insulin 2 unit(s)/hr  Heparin  850 unit(s)/hr- therapeutic recheck @0000    Labs:   K replaced x2. Phos replaced x1    Goal Outcome Evaluation:      Plan of Care Reviewed With: patient    Overall Patient Progress: no changeOverall Patient Progress: no change

## 2024-08-30 NOTE — PHARMACY-VANCOMYCIN DOSING SERVICE
Pharmacy Vancomycin Note  Date of Service 2024  Patient's  2000   24 year old, male    Indication: Healthcare-Associated Pneumonia  Day of Therapy: 2  Current vancomycin regimen:  1500 mg IV q12h  Current vancomycin monitoring method: AUC  Current vancomycin therapeutic monitoring goal: 400-600 mg*h/L    InsightRX Prediction of Current Vancomycin Regimen  Loading dose: N/A  Regimen: 1500 mg IV every 12 hours.  Start time: 06:02 on 2024  Exposure target: AUC24 (range)400-600 mg/L.hr   AUC24,ss: 467 mg/L.hr  Probability of AUC24 > 400: 84 %  Ctrough,ss: 12.7 mg/L  Probability of Ctrough,ss > 20: 2 %  Probability of nephrotoxicity (Lodise FCO ): 8 %    Current estimated CrCl = Estimated Creatinine Clearance: 184.6 mL/min (based on SCr of 0.83 mg/dL).    Creatinine for last 3 days  2024:  8:57 PM Creatinine 1.35 mg/dL; 11:57 PM Creatinine 1.48 mg/dL  2024:  2:05 AM Creatinine 1.53 mg/dL;  4:13 AM Creatinine 1.66 mg/dL;  8:12 AM Creatinine 1.56 mg/dL; 12:10 PM Creatinine 1.54 mg/dL;  5:42 PM Creatinine 1.68 mg/dL;  8:59 PM Creatinine 1.55 mg/dL  2024: 12:09 AM Creatinine 1.46 mg/dL;  3:50 AM Creatinine 1.44 mg/dL;  8:13 AM Creatinine 1.20 mg/dL; 12:08 PM Creatinine 1.14 mg/dL;  4:06 PM Creatinine 1.06 mg/dL;  8:24 PM Creatinine 1.02 mg/dL  2024: 12:55 AM Creatinine 0.96 mg/dL;  4:17 AM Creatinine 0.94 mg/dL;  8:50 AM Creatinine 0.89 mg/dL; 12:22 PM Creatinine 0.87 mg/dL;  3:40 PM Creatinine 0.83 mg/dL    Recent Vancomycin Levels (past 3 days)  2024:  5:30 PM Vancomycin 10.7 ug/mL    Vancomycin IV Administrations (past 72 hours)                     vancomycin (VANCOCIN) 1,500 mg in 0.9% NaCl 250 mL intermittent infusion (mg) 1,500 mg New Bag 24 1802     1,500 mg New Bag  0635     1,500 mg New Bag 24 1948    vancomycin (VANCOCIN) 2,000 mg in sodium chloride 0.9 % 500 mL intermittent infusion (mg) 2,000 mg New Bag 24 0643                    Nephrotoxins  and other renal medications (From now, onward)      Start     Dose/Rate Route Frequency Ordered Stop    08/28/24 1830  vancomycin (VANCOCIN) 1,500 mg in 0.9% NaCl 250 mL intermittent infusion         1,500 mg  166.7 mL/hr over 90 Minutes Intravenous EVERY 12 HOURS 08/28/24 1818      08/28/24 1600  bumetanide (BUMEX) injection 2 mg         2 mg Intravenous EVERY 8 HOURS 08/28/24 1521      08/26/24 1000  DOBUTamine (DOBUTREX) 1,000 mg in D5W 250 mL infusion (adult MAX conc)         5 mcg/kg/min × 108 kg (Dosing Weight)  8.1 mL/hr  Intravenous CONTINUOUS 08/26/24 0936                 Contrast Orders - past 72 hours (72h ago, onward)      Start     Dose/Rate Route Frequency Stop    08/27/24 0930  perflutren diluted 1mL to 2mL with saline (OPTISON) diluted injection 6 mL         6 mL Intravenous ONCE 08/27/24 0924            Interpretation of levels and current regimen:  Vancomycin level is reflective of -600    Has serum creatinine changed greater than 50% in last 72 hours: Yes    Urine output:  good urine output    Renal Function: Improving    Plan:  Continue Current Dose  Vancomycin monitoring method: AUC  Vancomycin therapeutic monitoring goal: 400-600 mg*h/L  Pharmacy will check vancomycin levels as appropriate in 1-3 Days.  Serum creatinine levels will be ordered daily for the first week of therapy and at least twice weekly for subsequent weeks.    Celestine Alaniz, PharmD

## 2024-08-30 NOTE — CONSULTS
Care Management Follow Up    Length of Stay (days): 10    Expected Discharge Date:       Concerns to be Addressed: care coordination  Patient plan of care discussed at interdisciplinary rounds: Yes    Anticipated Discharge Disposition: Group Home  Anticipated Discharge Services:  (TBD)  Anticipated Discharge DME:  (TBD)    Patient/family educated on Medicare website which has current facility and service quality ratings: no  Education Provided on the Discharge Plan: No  Patient/Family in Agreement with the Plan: pt is not medically stable for discharge planning    Referrals Placed by CM/SW:  Not applicable    Discussed  Partnership in Safe Discharge Planning  document with patient/family: No     Handoff Completed: No, handoff not indicated or clinically appropriate    Additional Information:  RNCC/SW was informed that family would like to talk to care management regarding giving permission to the group home to get update.    RNCC visited pt, aunt Guerda, mom Keyla and brother Johann (on the speaker phone).  Pt family stated pt group home would like to talk to the team regarding pt care and how long pt will need to stay in the hospital.  The group home need to know if pt is going to stay in the hospital for more than 30 days.  RNCC informed family that group home staff can get discharge plan update as long as the family gives permission and their contact information. We will place their name and contact information on the face sheet.  Pt family agreed to give group home nurse name and contact number.  RNCC will place the info on the face sheet once received.  RNCC informed family that RNCC will cont to communicate with the group home staff regarding discharge needs and plan once pt is medically stable and needs are known.  Family agreed.    Next Steps:   Family will provide the group home  name and number.  Plan to discharge pt back to the group home once medically stable.  Discharge dates are not  known at this time.    Addendum:  Family provided group home nurse info.  RNCC placed the info on the face sheet.  [facility]  [facility]:  [phone]    Fabiola Montejo RN, PHN, BSN  4A and 4E/ ICU  Care Coordinator  Phone: [phone]  Pager: [phone]      SE[facility]ABLE in [facility] - search CARE COORDINATOR       [place] [facility] [place] ([id number]) Saturday & Sunday; ([phone]) [facility] Recognized Holidays     Units: 5A Onc Vocera & 5C Vocera Pager: [phone]    Units: 6B Vocera & 6C Vocera  Pager: [phone]    Units: 7A SOT RNCC Vocera, 7B Med Surg Vocera, & 7C Med Surg Vocera  Pager: [phone]    Units: 6A Vocera & 4A CVICU Vocera, 4C MICU Vocera, and 4E SICU Vocera   Pager: [phone]    Units: 5 Ortho Vocera & 5 Med Surg Vocera  Pager: [phone]    Units: 6 Med Surg Vocera & 8 Med Surg Vocera  Pager [phone]

## 2024-08-31 ENCOUNTER — APPOINTMENT (OUTPATIENT)
Dept: GENERAL RADIOLOGY | Facility: CLINIC | Age: 24
DRG: 853 | End: 2024-08-31
Attending: STUDENT IN AN ORGANIZED HEALTH CARE EDUCATION/TRAINING PROGRAM
Payer: MEDICARE

## 2024-08-31 LAB
ALBUMIN SERPL BCG-MCNC: 3.8 G/DL (ref 3.5–5.2)
ALBUMIN SERPL BCG-MCNC: 3.8 G/DL (ref 3.5–5.2)
ALBUMIN SERPL BCG-MCNC: 3.9 G/DL (ref 3.5–5.2)
ALBUMIN SERPL BCG-MCNC: 4 G/DL (ref 3.5–5.2)
ALLEN'S TEST: ABNORMAL
ALP SERPL-CCNC: 110 U/L (ref 40–150)
ALP SERPL-CCNC: 111 U/L (ref 40–150)
ALP SERPL-CCNC: 114 U/L (ref 40–150)
ALP SERPL-CCNC: 114 U/L (ref 40–150)
ALT SERPL W P-5'-P-CCNC: 286 U/L (ref 0–70)
ALT SERPL W P-5'-P-CCNC: 295 U/L (ref 0–70)
ALT SERPL W P-5'-P-CCNC: 324 U/L (ref 0–70)
ALT SERPL W P-5'-P-CCNC: 334 U/L (ref 0–70)
ANION GAP SERPL CALCULATED.3IONS-SCNC: 10 MMOL/L (ref 7–15)
ANION GAP SERPL CALCULATED.3IONS-SCNC: 11 MMOL/L (ref 7–15)
ANION GAP SERPL CALCULATED.3IONS-SCNC: 11 MMOL/L (ref 7–15)
ANION GAP SERPL CALCULATED.3IONS-SCNC: 12 MMOL/L (ref 7–15)
AST SERPL W P-5'-P-CCNC: 72 U/L (ref 0–45)
AST SERPL W P-5'-P-CCNC: 73 U/L (ref 0–45)
AST SERPL W P-5'-P-CCNC: 80 U/L (ref 0–45)
AST SERPL W P-5'-P-CCNC: 84 U/L (ref 0–45)
BASE EXCESS BLDA CALC-SCNC: 1.3 MMOL/L (ref -3–3)
BASE EXCESS BLDA CALC-SCNC: 2.7 MMOL/L (ref -3–3)
BASE EXCESS BLDA CALC-SCNC: 2.9 MMOL/L (ref -3–3)
BASE EXCESS BLDA CALC-SCNC: 3 MMOL/L (ref -3–3)
BASE EXCESS BLDV CALC-SCNC: 0.5 MMOL/L (ref -3–3)
BASE EXCESS BLDV CALC-SCNC: 3.2 MMOL/L (ref -3–3)
BASE EXCESS BLDV CALC-SCNC: 3.6 MMOL/L (ref -3–3)
BASOPHILS # BLD AUTO: ABNORMAL 10*3/UL
BASOPHILS # BLD AUTO: ABNORMAL 10*3/UL
BASOPHILS # BLD MANUAL: 0 10E3/UL (ref 0–0.2)
BASOPHILS # BLD MANUAL: 0 10E3/UL (ref 0–0.2)
BASOPHILS NFR BLD AUTO: ABNORMAL %
BASOPHILS NFR BLD AUTO: ABNORMAL %
BASOPHILS NFR BLD MANUAL: 0 %
BASOPHILS NFR BLD MANUAL: 0 %
BILIRUB DIRECT SERPL-MCNC: 0.22 MG/DL (ref 0–0.3)
BILIRUB DIRECT SERPL-MCNC: <0.2 MG/DL (ref 0–0.3)
BILIRUB SERPL-MCNC: 0.4 MG/DL
BILIRUB SERPL-MCNC: 0.4 MG/DL
BILIRUB SERPL-MCNC: 0.5 MG/DL
BILIRUB SERPL-MCNC: 0.5 MG/DL
BUN SERPL-MCNC: 22.1 MG/DL (ref 6–20)
BUN SERPL-MCNC: 22.8 MG/DL (ref 6–20)
BUN SERPL-MCNC: 23 MG/DL (ref 6–20)
BUN SERPL-MCNC: 23.3 MG/DL (ref 6–20)
BUN SERPL-MCNC: 23.9 MG/DL (ref 6–20)
BUN SERPL-MCNC: 24.9 MG/DL (ref 6–20)
CA-I BLD-MCNC: 4.6 MG/DL (ref 4.4–5.2)
CALCIUM SERPL-MCNC: 9 MG/DL (ref 8.8–10.4)
CALCIUM SERPL-MCNC: 9.1 MG/DL (ref 8.8–10.4)
CALCIUM SERPL-MCNC: 9.4 MG/DL (ref 8.8–10.4)
CALCIUM SERPL-MCNC: 9.4 MG/DL (ref 8.8–10.4)
CALCIUM SERPL-MCNC: 9.5 MG/DL (ref 8.8–10.4)
CALCIUM SERPL-MCNC: 9.5 MG/DL (ref 8.8–10.4)
CHLORIDE SERPL-SCNC: 103 MMOL/L (ref 98–107)
CHLORIDE SERPL-SCNC: 107 MMOL/L (ref 98–107)
CHLORIDE SERPL-SCNC: 108 MMOL/L (ref 98–107)
CHLORIDE SERPL-SCNC: 108 MMOL/L (ref 98–107)
CHLORIDE SERPL-SCNC: 109 MMOL/L (ref 98–107)
CHLORIDE SERPL-SCNC: 111 MMOL/L (ref 98–107)
COHGB MFR BLD: 93.5 % (ref 96–97)
COHGB MFR BLD: 98.6 % (ref 96–97)
COHGB MFR BLD: 99.3 % (ref 96–97)
COHGB MFR BLD: 99.7 % (ref 96–97)
CREAT SERPL-MCNC: 0.6 MG/DL (ref 0.67–1.17)
CREAT SERPL-MCNC: 0.61 MG/DL (ref 0.67–1.17)
CREAT SERPL-MCNC: 0.65 MG/DL (ref 0.67–1.17)
CREAT SERPL-MCNC: 0.67 MG/DL (ref 0.67–1.17)
CV B1 NAB TITR SER NT: ABNORMAL {TITER}
CV B2 NAB TITR SER NT: ABNORMAL {TITER}
CV B3 NAB TITR SER NT: ABNORMAL {TITER}
CV B4 NAB TITR SER NT: ABNORMAL {TITER}
CV B5 NAB TITR SER NT: ABNORMAL {TITER}
CV B6 NAB TITR SER NT: ABNORMAL {TITER}
EGFRCR SERPLBLD CKD-EPI 2021: >90 ML/MIN/1.73M2
EOSINOPHIL # BLD AUTO: ABNORMAL 10*3/UL
EOSINOPHIL # BLD AUTO: ABNORMAL 10*3/UL
EOSINOPHIL # BLD MANUAL: 0.2 10E3/UL (ref 0–0.7)
EOSINOPHIL # BLD MANUAL: 0.5 10E3/UL (ref 0–0.7)
EOSINOPHIL NFR BLD AUTO: ABNORMAL %
EOSINOPHIL NFR BLD AUTO: ABNORMAL %
EOSINOPHIL NFR BLD MANUAL: 1 %
EOSINOPHIL NFR BLD MANUAL: 2 %
ERYTHROCYTE [DISTWIDTH] IN BLOOD BY AUTOMATED COUNT: 14 % (ref 10–15)
ERYTHROCYTE [DISTWIDTH] IN BLOOD BY AUTOMATED COUNT: 14.3 % (ref 10–15)
GLUCOSE BLDC GLUCOMTR-MCNC: 103 MG/DL (ref 70–99)
GLUCOSE BLDC GLUCOMTR-MCNC: 109 MG/DL (ref 70–99)
GLUCOSE BLDC GLUCOMTR-MCNC: 113 MG/DL (ref 70–99)
GLUCOSE BLDC GLUCOMTR-MCNC: 113 MG/DL (ref 70–99)
GLUCOSE BLDC GLUCOMTR-MCNC: 125 MG/DL (ref 70–99)
GLUCOSE BLDC GLUCOMTR-MCNC: 126 MG/DL (ref 70–99)
GLUCOSE BLDC GLUCOMTR-MCNC: 128 MG/DL (ref 70–99)
GLUCOSE BLDC GLUCOMTR-MCNC: 133 MG/DL (ref 70–99)
GLUCOSE BLDC GLUCOMTR-MCNC: 148 MG/DL (ref 70–99)
GLUCOSE BLDC GLUCOMTR-MCNC: 151 MG/DL (ref 70–99)
GLUCOSE BLDC GLUCOMTR-MCNC: 156 MG/DL (ref 70–99)
GLUCOSE BLDC GLUCOMTR-MCNC: 206 MG/DL (ref 70–99)
GLUCOSE SERPL-MCNC: 103 MG/DL (ref 70–99)
GLUCOSE SERPL-MCNC: 107 MG/DL (ref 70–99)
GLUCOSE SERPL-MCNC: 125 MG/DL (ref 70–99)
GLUCOSE SERPL-MCNC: 135 MG/DL (ref 70–99)
GLUCOSE SERPL-MCNC: 144 MG/DL (ref 70–99)
GLUCOSE SERPL-MCNC: 227 MG/DL (ref 70–99)
HCO3 BLD-SCNC: 26 MMOL/L (ref 21–28)
HCO3 BLD-SCNC: 27 MMOL/L (ref 21–28)
HCO3 BLDV-SCNC: 26 MMOL/L (ref 21–28)
HCO3 BLDV-SCNC: 29 MMOL/L (ref 21–28)
HCO3 BLDV-SCNC: 29 MMOL/L (ref 21–28)
HCO3 SERPL-SCNC: 23 MMOL/L (ref 22–29)
HCO3 SERPL-SCNC: 24 MMOL/L (ref 22–29)
HCO3 SERPL-SCNC: 25 MMOL/L (ref 22–29)
HCO3 SERPL-SCNC: 26 MMOL/L (ref 22–29)
HCT VFR BLD AUTO: 36.1 % (ref 40–53)
HCT VFR BLD AUTO: 38.7 % (ref 40–53)
HGB BLD-MCNC: 11.2 G/DL (ref 13.3–17.7)
HGB BLD-MCNC: 12 G/DL (ref 13.3–17.7)
IMM GRANULOCYTES # BLD: ABNORMAL 10*3/UL
IMM GRANULOCYTES # BLD: ABNORMAL 10*3/UL
IMM GRANULOCYTES NFR BLD: ABNORMAL %
IMM GRANULOCYTES NFR BLD: ABNORMAL %
INR PPP: 0.98 (ref 0.85–1.15)
LACTATE SERPL-SCNC: 0.8 MMOL/L (ref 0.7–2)
LACTATE SERPL-SCNC: 1.4 MMOL/L (ref 0.7–2)
LACTATE SERPL-SCNC: 2.3 MMOL/L (ref 0.7–2)
LYMPHOCYTES # BLD AUTO: ABNORMAL 10*3/UL
LYMPHOCYTES # BLD AUTO: ABNORMAL 10*3/UL
LYMPHOCYTES # BLD MANUAL: 1.6 10E3/UL (ref 0.8–5.3)
LYMPHOCYTES # BLD MANUAL: 4.1 10E3/UL (ref 0.8–5.3)
LYMPHOCYTES NFR BLD AUTO: ABNORMAL %
LYMPHOCYTES NFR BLD AUTO: ABNORMAL %
LYMPHOCYTES NFR BLD MANUAL: 17 %
LYMPHOCYTES NFR BLD MANUAL: 7 %
MAGNESIUM SERPL-MCNC: 2.1 MG/DL (ref 1.7–2.3)
MAGNESIUM SERPL-MCNC: 2.3 MG/DL (ref 1.7–2.3)
MCH RBC QN AUTO: 28.2 PG (ref 26.5–33)
MCH RBC QN AUTO: 28.5 PG (ref 26.5–33)
MCHC RBC AUTO-ENTMCNC: 31 G/DL (ref 31.5–36.5)
MCHC RBC AUTO-ENTMCNC: 31 G/DL (ref 31.5–36.5)
MCV RBC AUTO: 91 FL (ref 78–100)
MCV RBC AUTO: 92 FL (ref 78–100)
METAMYELOCYTES # BLD MANUAL: 0.2 10E3/UL
METAMYELOCYTES NFR BLD MANUAL: 1 %
MONOCYTES # BLD AUTO: ABNORMAL 10*3/UL
MONOCYTES # BLD AUTO: ABNORMAL 10*3/UL
MONOCYTES # BLD MANUAL: 0.7 10E3/UL (ref 0–1.3)
MONOCYTES # BLD MANUAL: 1.2 10E3/UL (ref 0–1.3)
MONOCYTES NFR BLD AUTO: ABNORMAL %
MONOCYTES NFR BLD AUTO: ABNORMAL %
MONOCYTES NFR BLD MANUAL: 3 %
MONOCYTES NFR BLD MANUAL: 5 %
MYELOCYTES # BLD MANUAL: 0.5 10E3/UL
MYELOCYTES NFR BLD MANUAL: 2 %
NEUTROPHILS # BLD AUTO: ABNORMAL 10*3/UL
NEUTROPHILS # BLD AUTO: ABNORMAL 10*3/UL
NEUTROPHILS # BLD MANUAL: 18.6 10E3/UL (ref 1.6–8.3)
NEUTROPHILS # BLD MANUAL: 19.6 10E3/UL (ref 1.6–8.3)
NEUTROPHILS NFR BLD AUTO: ABNORMAL %
NEUTROPHILS NFR BLD AUTO: ABNORMAL %
NEUTROPHILS NFR BLD MANUAL: 77 %
NEUTROPHILS NFR BLD MANUAL: 85 %
NRBC # BLD AUTO: 0 10E3/UL
NRBC # BLD AUTO: 0 10E3/UL
NRBC BLD AUTO-RTO: 0 /100
NRBC BLD AUTO-RTO: 0 /100
O2/TOTAL GAS SETTING VFR VENT: 0 %
O2/TOTAL GAS SETTING VFR VENT: 0 %
O2/TOTAL GAS SETTING VFR VENT: 2 %
O2/TOTAL GAS SETTING VFR VENT: 2 %
O2/TOTAL GAS SETTING VFR VENT: 21 %
O2/TOTAL GAS SETTING VFR VENT: 3 %
O2/TOTAL GAS SETTING VFR VENT: 3 %
OXYHGB MFR BLDV: 66 % (ref 70–75)
OXYHGB MFR BLDV: 69 % (ref 70–75)
OXYHGB MFR BLDV: 69 % (ref 70–75)
PCO2 BLD: 33 MM HG (ref 35–45)
PCO2 BLD: 38 MM HG (ref 35–45)
PCO2 BLD: 39 MM HG (ref 35–45)
PCO2 BLD: 43 MM HG (ref 35–45)
PCO2 BLDV: 43 MM HG (ref 40–50)
PCO2 BLDV: 46 MM HG (ref 40–50)
PCO2 BLDV: 49 MM HG (ref 40–50)
PH BLD: 7.4 [PH] (ref 7.35–7.45)
PH BLD: 7.45 [PH] (ref 7.35–7.45)
PH BLD: 7.46 [PH] (ref 7.35–7.45)
PH BLD: 7.5 [PH] (ref 7.35–7.45)
PH BLDV: 7.38 [PH] (ref 7.32–7.43)
PH BLDV: 7.38 [PH] (ref 7.32–7.43)
PH BLDV: 7.41 [PH] (ref 7.32–7.43)
PHOSPHATE SERPL-MCNC: 3.7 MG/DL (ref 2.5–4.5)
PHOSPHATE SERPL-MCNC: 5.7 MG/DL (ref 2.5–4.5)
PLAT MORPH BLD: ABNORMAL
PLAT MORPH BLD: ABNORMAL
PLATELET # BLD AUTO: 204 10E3/UL (ref 150–450)
PLATELET # BLD AUTO: 204 10E3/UL (ref 150–450)
PO2 BLD: 113 MM HG (ref 80–105)
PO2 BLD: 117 MM HG (ref 80–105)
PO2 BLD: 59 MM HG (ref 80–105)
PO2 BLD: 84 MM HG (ref 80–105)
PO2 BLDV: 35 MM HG (ref 25–47)
PO2 BLDV: 37 MM HG (ref 25–47)
PO2 BLDV: 38 MM HG (ref 25–47)
POTASSIUM SERPL-SCNC: 3.8 MMOL/L (ref 3.4–5.3)
POTASSIUM SERPL-SCNC: 3.9 MMOL/L (ref 3.4–5.3)
POTASSIUM SERPL-SCNC: 4 MMOL/L (ref 3.4–5.3)
POTASSIUM SERPL-SCNC: 4 MMOL/L (ref 3.4–5.3)
POTASSIUM SERPL-SCNC: 4.1 MMOL/L (ref 3.4–5.3)
POTASSIUM SERPL-SCNC: 4.2 MMOL/L (ref 3.4–5.3)
PROT SERPL-MCNC: 6.6 G/DL (ref 6.4–8.3)
PROT SERPL-MCNC: 6.7 G/DL (ref 6.4–8.3)
PROT SERPL-MCNC: 6.8 G/DL (ref 6.4–8.3)
PROT SERPL-MCNC: 7 G/DL (ref 6.4–8.3)
RBC # BLD AUTO: 3.97 10E6/UL (ref 4.4–5.9)
RBC # BLD AUTO: 4.21 10E6/UL (ref 4.4–5.9)
RBC MORPH BLD: ABNORMAL
RBC MORPH BLD: ABNORMAL
SAO2 % BLDA: 92 % (ref 92–100)
SAO2 % BLDA: 97 % (ref 92–100)
SAO2 % BLDA: 98 % (ref 92–100)
SAO2 % BLDA: 98 % (ref 92–100)
SAO2 % BLDV: 67.3 % (ref 70–75)
SAO2 % BLDV: 69.8 % (ref 70–75)
SAO2 % BLDV: 70.9 % (ref 70–75)
SODIUM SERPL-SCNC: 140 MMOL/L (ref 135–145)
SODIUM SERPL-SCNC: 142 MMOL/L (ref 135–145)
SODIUM SERPL-SCNC: 143 MMOL/L (ref 135–145)
SODIUM SERPL-SCNC: 144 MMOL/L (ref 135–145)
SODIUM SERPL-SCNC: 144 MMOL/L (ref 135–145)
SODIUM SERPL-SCNC: 147 MMOL/L (ref 135–145)
UFH PPP CHRO-ACNC: 0.34 IU/ML
WBC # BLD AUTO: 23 10E3/UL (ref 4–11)
WBC # BLD AUTO: 24.1 10E3/UL (ref 4–11)

## 2024-08-31 PROCEDURE — 80048 BASIC METABOLIC PNL TOTAL CA: CPT

## 2024-08-31 PROCEDURE — 999N000185 HC STATISTIC TRANSPORT TIME EA 15 MIN

## 2024-08-31 PROCEDURE — 250N000011 HC RX IP 250 OP 636: Performed by: STUDENT IN AN ORGANIZED HEALTH CARE EDUCATION/TRAINING PROGRAM

## 2024-08-31 PROCEDURE — 82248 BILIRUBIN DIRECT: CPT

## 2024-08-31 PROCEDURE — 83735 ASSAY OF MAGNESIUM: CPT | Performed by: STUDENT IN AN ORGANIZED HEALTH CARE EDUCATION/TRAINING PROGRAM

## 2024-08-31 PROCEDURE — 82330 ASSAY OF CALCIUM: CPT | Performed by: STUDENT IN AN ORGANIZED HEALTH CARE EDUCATION/TRAINING PROGRAM

## 2024-08-31 PROCEDURE — 85610 PROTHROMBIN TIME: CPT | Performed by: STUDENT IN AN ORGANIZED HEALTH CARE EDUCATION/TRAINING PROGRAM

## 2024-08-31 PROCEDURE — 250N000013 HC RX MED GY IP 250 OP 250 PS 637: Performed by: INTERNAL MEDICINE

## 2024-08-31 PROCEDURE — 85007 BL SMEAR W/DIFF WBC COUNT: CPT | Performed by: STUDENT IN AN ORGANIZED HEALTH CARE EDUCATION/TRAINING PROGRAM

## 2024-08-31 PROCEDURE — 258N000003 HC RX IP 258 OP 636: Performed by: STUDENT IN AN ORGANIZED HEALTH CARE EDUCATION/TRAINING PROGRAM

## 2024-08-31 PROCEDURE — 84100 ASSAY OF PHOSPHORUS: CPT | Performed by: STUDENT IN AN ORGANIZED HEALTH CARE EDUCATION/TRAINING PROGRAM

## 2024-08-31 PROCEDURE — 250N000013 HC RX MED GY IP 250 OP 250 PS 637: Performed by: STUDENT IN AN ORGANIZED HEALTH CARE EDUCATION/TRAINING PROGRAM

## 2024-08-31 PROCEDURE — 82805 BLOOD GASES W/O2 SATURATION: CPT | Performed by: STUDENT IN AN ORGANIZED HEALTH CARE EDUCATION/TRAINING PROGRAM

## 2024-08-31 PROCEDURE — 250N000011 HC RX IP 250 OP 636: Performed by: INTERNAL MEDICINE

## 2024-08-31 PROCEDURE — 99291 CRITICAL CARE FIRST HOUR: CPT | Mod: GC | Performed by: INTERNAL MEDICINE

## 2024-08-31 PROCEDURE — 200N000002 HC R&B ICU UMMC

## 2024-08-31 PROCEDURE — 83605 ASSAY OF LACTIC ACID: CPT | Performed by: STUDENT IN AN ORGANIZED HEALTH CARE EDUCATION/TRAINING PROGRAM

## 2024-08-31 PROCEDURE — 999N000075 HC STATISTIC IABP MONITORING

## 2024-08-31 PROCEDURE — 82248 BILIRUBIN DIRECT: CPT | Performed by: STUDENT IN AN ORGANIZED HEALTH CARE EDUCATION/TRAINING PROGRAM

## 2024-08-31 PROCEDURE — 250N000013 HC RX MED GY IP 250 OP 250 PS 637

## 2024-08-31 PROCEDURE — 85027 COMPLETE CBC AUTOMATED: CPT | Performed by: STUDENT IN AN ORGANIZED HEALTH CARE EDUCATION/TRAINING PROGRAM

## 2024-08-31 PROCEDURE — 250N000012 HC RX MED GY IP 250 OP 636 PS 637

## 2024-08-31 PROCEDURE — 71045 X-RAY EXAM CHEST 1 VIEW: CPT | Mod: 26 | Performed by: RADIOLOGY

## 2024-08-31 PROCEDURE — 71045 X-RAY EXAM CHEST 1 VIEW: CPT

## 2024-08-31 PROCEDURE — 999N000157 HC STATISTIC RCP TIME EA 10 MIN

## 2024-08-31 PROCEDURE — 250N000009 HC RX 250: Performed by: STUDENT IN AN ORGANIZED HEALTH CARE EDUCATION/TRAINING PROGRAM

## 2024-08-31 RX ORDER — PREDNISONE 10 MG/1
10 TABLET ORAL DAILY
Status: COMPLETED | OUTPATIENT
Start: 2024-09-06 | End: 2024-09-08

## 2024-08-31 RX ORDER — POTASSIUM CHLORIDE 29.8 MG/ML
20 INJECTION INTRAVENOUS ONCE
Status: COMPLETED | OUTPATIENT
Start: 2024-08-31 | End: 2024-08-31

## 2024-08-31 RX ORDER — RISPERIDONE 1 MG/1
1 TABLET ORAL DAILY
Status: DISCONTINUED | OUTPATIENT
Start: 2024-09-01 | End: 2024-09-19 | Stop reason: HOSPADM

## 2024-08-31 RX ORDER — PREDNISONE 5 MG/1
5 TABLET ORAL DAILY
Status: COMPLETED | OUTPATIENT
Start: 2024-09-09 | End: 2024-09-11

## 2024-08-31 RX ORDER — PREDNISONE 20 MG/1
20 TABLET ORAL DAILY
Status: COMPLETED | OUTPATIENT
Start: 2024-09-03 | End: 2024-09-05

## 2024-08-31 RX ADMIN — BUMETANIDE 2 MG: 0.25 INJECTION INTRAMUSCULAR; INTRAVENOUS at 07:52

## 2024-08-31 RX ADMIN — MEROPENEM 1 G: 1 INJECTION, POWDER, FOR SOLUTION INTRAVENOUS at 07:47

## 2024-08-31 RX ADMIN — POTASSIUM CHLORIDE 20 MEQ: 29.8 INJECTION, SOLUTION INTRAVENOUS at 11:19

## 2024-08-31 RX ADMIN — MEROPENEM 1 G: 1 INJECTION, POWDER, FOR SOLUTION INTRAVENOUS at 00:06

## 2024-08-31 RX ADMIN — Medication 37.5 MG: at 18:24

## 2024-08-31 RX ADMIN — POTASSIUM CHLORIDE 40 MEQ: 1.5 POWDER, FOR SOLUTION ORAL at 20:12

## 2024-08-31 RX ADMIN — Medication 37.5 MG: at 00:03

## 2024-08-31 RX ADMIN — NYSTATIN 500000 UNITS: 100000 SUSPENSION ORAL at 08:00

## 2024-08-31 RX ADMIN — Medication 37.5 MG: at 06:14

## 2024-08-31 RX ADMIN — INSULIN GLARGINE 10 UNITS: 100 INJECTION, SOLUTION SUBCUTANEOUS at 21:07

## 2024-08-31 RX ADMIN — Medication 37.5 MG: at 23:17

## 2024-08-31 RX ADMIN — ISOSORBIDE DINITRATE 20 MG: 40 TABLET ORAL at 13:11

## 2024-08-31 RX ADMIN — THIAMINE HCL TAB 100 MG 100 MG: 100 TAB at 07:50

## 2024-08-31 RX ADMIN — VANCOMYCIN HYDROCHLORIDE 1500 MG: 10 INJECTION, POWDER, LYOPHILIZED, FOR SOLUTION INTRAVENOUS at 06:04

## 2024-08-31 RX ADMIN — MEROPENEM 1 G: 1 INJECTION, POWDER, FOR SOLUTION INTRAVENOUS at 23:26

## 2024-08-31 RX ADMIN — Medication 37.5 MG: at 13:11

## 2024-08-31 RX ADMIN — DOBUTAMINE 5 MCG/KG/MIN: 12.5 INJECTION, SOLUTION, CONCENTRATE INTRAVENOUS at 06:04

## 2024-08-31 RX ADMIN — ISOSORBIDE DINITRATE 20 MG: 40 TABLET ORAL at 18:22

## 2024-08-31 RX ADMIN — BUMETANIDE 2 MG: 0.25 INJECTION INTRAMUSCULAR; INTRAVENOUS at 16:31

## 2024-08-31 RX ADMIN — MEROPENEM 1 G: 1 INJECTION, POWDER, FOR SOLUTION INTRAVENOUS at 16:28

## 2024-08-31 RX ADMIN — POTASSIUM CHLORIDE 20 MEQ: 29.8 INJECTION, SOLUTION INTRAVENOUS at 01:06

## 2024-08-31 RX ADMIN — ISOSORBIDE DINITRATE 20 MG: 40 TABLET ORAL at 07:50

## 2024-08-31 RX ADMIN — ONDANSETRON 4 MG: 2 INJECTION INTRAMUSCULAR; INTRAVENOUS at 10:28

## 2024-08-31 RX ADMIN — POTASSIUM CHLORIDE 40 MEQ: 1.5 POWDER, FOR SOLUTION ORAL at 07:48

## 2024-08-31 RX ADMIN — HYDROXYZINE HYDROCHLORIDE 10 MG: 10 TABLET ORAL at 20:13

## 2024-08-31 RX ADMIN — QUETIAPINE FUMARATE 25 MG: 25 TABLET ORAL at 07:50

## 2024-08-31 RX ADMIN — INSULIN HUMAN 2 UNITS/HR: 1 INJECTION, SOLUTION INTRAVENOUS at 18:22

## 2024-08-31 RX ADMIN — POTASSIUM CHLORIDE 20 MEQ: 29.8 INJECTION, SOLUTION INTRAVENOUS at 00:38

## 2024-08-31 RX ADMIN — Medication 1 TABLET: at 07:51

## 2024-08-31 RX ADMIN — PREDNISONE 30 MG: 20 TABLET ORAL at 07:50

## 2024-08-31 RX ADMIN — HEPARIN SODIUM 850 UNITS/HR: 10000 INJECTION, SOLUTION INTRAVENOUS at 01:07

## 2024-08-31 RX ADMIN — BUMETANIDE 2 MG: 0.25 INJECTION INTRAMUSCULAR; INTRAVENOUS at 00:06

## 2024-08-31 RX ADMIN — RISPERIDONE 1.5 MG: 1 TABLET, FILM COATED ORAL at 20:13

## 2024-08-31 RX ADMIN — BUMETANIDE 2 MG: 0.25 INJECTION INTRAMUSCULAR; INTRAVENOUS at 23:24

## 2024-08-31 RX ADMIN — NYSTATIN 500000 UNITS: 100000 SUSPENSION ORAL at 16:32

## 2024-08-31 ASSESSMENT — ACTIVITIES OF DAILY LIVING (ADL)
ADLS_ACUITY_SCORE: 49
ADLS_ACUITY_SCORE: 51
ADLS_ACUITY_SCORE: 49
ADLS_ACUITY_SCORE: 51
ADLS_ACUITY_SCORE: 49
ADLS_ACUITY_SCORE: 51
ADLS_ACUITY_SCORE: 49

## 2024-08-31 NOTE — PLAN OF CARE
ICU End of Shift Summary 1900 - 0700  Labs drawn per orders and reviewed with results. MD informed of all critical labs and patient conditions as indicated throughout shift. See flowsheets for vital signs and detailed assessment.      Shift Events:   Neuro: A&Ox4, moves all extremities, follows commands, answers questions, but has some difficulty making needs known. Sleep and rest promoted by clustering cares, reducing stimulation, and provider comfort measures. Denies any numbness/tingling throughout the night.   CV:  ST 120s-150s throughout shift with frequent multifocal PVCs. IABP 1:1 100% argumentation without any alarms throughout shift. Nipride between off and 1 - see MAR. CVP 15, PA 36/16, CI 2.4,  - bumex given NAYA checked. Dobutamine continues to infuse at 5.   Pulmonary:  2-3L via NC without any desaturations noted. Lung sounds CTAB, diminished throughout.   GI/Nutrition:  2g Na Diet. At start of shift was having persistent episodes of vomiting - zofran given by previous RN - continued to have emesis. Given 1 dose of Reglan with improvement. Rectal tube removed due vomiting episode.   Endocrinology:  Insulin gtt @ 2unit/hr - unadjusted.   /Electrolytes:  Swan with adequate output. Given Bumex x1 this shift. Phos and K replaced.   Infectious Disease:  Afebrile. WBCs 24.1; Lactic 0.8  Hematology:  Hgb stable 12, no overt signs of bleeding noted.   Musc/Skin:  No new significant skin issue noted this shift.     Plan:   Switched to sliding scale insulin?   IABP wean   Nipride wean/adjusted Anti-hypertensive   Monitor vomiting    Goal Outcome Evaluation:      Plan of Care Reviewed With: patient    Overall Patient Progress: improvingOverall Patient Progress: improving    Outcome Evaluation: Orientated x4 throughout shift, able to make needs known. Denies pain/discomfort. Sleep and rest promoted with minimal interruptions. Continues on 2-3L via NC without any desaturations. MAP maintained with Nipride  between 0.5-1, Dobutamine 5 and anti-hypertensives.

## 2024-08-31 NOTE — PROGRESS NOTES
[facility]  Cardiology Heart Failure Service (Cards 2) Progress Note    Patient Name: Terrance Hidalgo    Medical Record Number: [id number]    YOB: 2000  PCP: Colt Flores    Admit Date/Time: 8/20/2024 10:13 PM   11    Assessment/Plan:    23 yo M with PMH autism who initially presented to [facility] with cough, URI symptoms, and hypoxemia. He was intubated due to respiratory failure and transferred to [facility], where his hospital course transpired to mixed cardiogenic/septic shock requiring multiple pressor agents, inotropic support, and IV diuresis. Transferred again to [facility] CICU on 8/20 for continued management. Presentation initially highly suspicious for fulminant myocarditis base on elevated cardiac and inflammatory biomarkers, new (suspected), severe biventricular dysfunction w/ reduced cardiac output requiring intermittent inotropic support, and concurrent infectious/respiratory symptomotology c/b acute respiratory failure 2/2 ARDS vs cardiogenic etiology 2/2 myocarditis with superimposed aspiration PNM treated with sulbactam. Empirically pulsed with steroids. However, EMB 5/21 demonstrated unremarkable myocardial tissue with no evidence of inflammatory infiltrate.  Hospital course further complicated by worsening cardiogenic/septic shock with worsening renal and liver failure requiring IABP placement on 8/27. Jarocho weaned off on 8/28 and extubated.      - ECMO candidate     Changes:  - IABP weaned to 1:2, follow-up repeat NAYA numbers at 2 pm  - c/w dobutamine for biventricular inotropic support   - wean off nipride, increase hydralazine to 50 mg q6 hrs, continue isordil 20 mg TID  - continue bumex 2 mg IV TID, NN goal 1L today, will redose additional 2 mg if not meeting goal, CVP 15 this am, goal 10-12  - remove femoral arterial line  - steroid taper changed to decrease 10 mg every 3 days, no longer needs bactrim for ppx  - Stopped vanc, MRSA negative,  continue meropenem    Neuro:     #AMS - resolved  #Sedation   - moving x 4 intermittent following commands     Plan:      - prn atarax  - c/w Seroquel       CV:    #Biventricular failure LVEF 25%   #Cardiogenic shock SCAI C  #Suspected acute myocarditis     TTE (8/21/24): LVIDd 58mm. Biplane LVEF is 16%. Severe hypokinesis of mid to apical RV free wall.  RHC (8/21/24): RA mean 6, PA 30/23/26, PCWP 18, Naya CO/CI 5.0/2.1, PVR 1.6, SVR 1270  EMB (8/21/24): Fragments of essentially unremarkable myocardial tissue with no evidence of inflammatory infiltrate   Coronary angiogram: NA   CMR: None - will obtain prior to discharge     DATE MAP CVP PAP PCWP Naya CO Naya CI SVR PVR Mvo2 LA Therapies   8/29 82 10 41/23 16 3.5 1.6 1800 3.7 54 1.4 IABP 1:1, dobutamine 5   8/30 80 12 24/12 12 6.1 2.7 984 <2 70 2 IABP1:1 dobutamine 5 nipride 0.75   8/31 75 15 40/14 12 5.9 2.6 1180  69 0.8 IABP 1:1, dobutamine 5, nipride 0.25                                               Plan:  - IABP weaned to 1:2 today, f/unit(s) repeat NAYA #s  - c/w dobutamine at 5 for biventricular inotropic support   - wean off nipride, increase hydralazine to 50 mg q6 hrs, continue isordil 20 mg TID  -continue bumex 2 mg IV TID, NN goal 1L today, will redose additional 2 mg if not meeting goal, CVP 15 this am, goal 10-12  - c/w steroid taper- c/w nystatin/bactrim     Pulm:     #Acute hypoxic respiratory failure - resolving    #Pleural effusion - resolved   #concern for Aspiration PNM  - resolving      Plan:    - incentive spirometer, peridex   - abx per below    - wean off NC     GI/Nutrition:    #Transaminitis    1. Hepatomegaly with increased echogenicity of the liver, which may be  seen with intrinsic parenchymal disease such as steatosis.  2. Biliary sludge without sonographic evidence for acute  cholecystitis.  3. Normal antegrade flow in the extrahepatic and right portal vein.   4. The remainder of the findings are significantly limited secondary  to  obscuring bowel gas and technical challenges related to patient  mobility.    Plan:    - monitor MELD labs  - treatment per above        Renal:    #HyperNa   #Hypervolemia  #LA    Plan:    - aim for NN 1L today, continue bumex at current dose  - monitor BMP   - hold bactim for possible contributing factor to LA     ID:     #Concern for aspiration PNM   #Transaminitis   #Recurrent fevers of unclear etiology     - Antimicrobials       > Unasyn 8/21 - 8/26       > Cefepime 8/20 - 8/21       > Azithromycin 8/20 - 08/24       > Vanco 8/20 - 8/21 s/p 8/26       > meropenem 8/26       > zosyn 8/26 - 8/28    Plan:  - c/w meropenem and vanc given worsening clinical findings with elevated WBC and fever and IABP in place  - if blood cultures negative for 48h then deescalate the antibiotics, NGTD       Heme:    #Anemia of critical illness     Plan:  - H/H  - transfuse if hb < 7 or drop > 2 g     Endo:    Plan:  - FSG goal 140-160  - insulin   - start lantus     Family Meeting/Goals of Care:  - last time family updated: TBD   - next of kin/HCP:  Johann Hidalgo(TEMPORARY GUARDIAN TO 10-)  Brother  [phone]  (+[phone]  [facility] [id number]  Legal guardian    Disposition:   - Unit   Code Status:   Full Code     Thank you for allowing me to care for this patient, please don't hesitate to contact us with any questions regarding this plan.     Patient was discussed and evaluated with Dr. Luis HOUSE, attending physician, who agrees with the assessment and plan above.    Physical Exam:    General appearance - Lying in bed; on NC  Head: Normocephalic and atraumatic.   Eyes: Pupils are equal, round, and reactive to light. No scleral icterus.  Neck: no JVD, bruit.  Cardiovascular: Tachycardiac regular rhythm. S1 and S2 auscultated. No murmurs, rub, or gallops.  Pulmonary/Chest: clear breath bilaterally   Abdominal: Bowel sounds present. Soft. No distension. No rigidity, rebound or guarding.   Extremities: Warm,  no cyanosis, 2+ distal pulses bilaterally. Pitting edema b/l +1.   Skin: Skin is warm and not diaphoretic. No rash, bruising, or erythema.  Neuro: Sedated     Objective data:    Temp:  [98.6  F (37  C)-99.9  F (37.7  C)] 99.5  F (37.5  C)  Pulse:  [102-150] 120  Resp:  [10-40] 24  MAP:  [69 mmHg-99 mmHg] 82 mmHg  Arterial Line BP: ()/(53-74) 101/59  SpO2:  [92 %-100 %] 100 %    Vitals:    08/27/24 0000 08/28/24 0000 08/29/24 0000 08/30/24 0400   Weight: 95.3 kg (210 lb 1.6 oz) 94.2 kg (207 lb 10.8 oz) 95.1 kg (209 lb 10.5 oz) 96.1 kg (211 lb 13.8 oz)    08/31/24 0000   Weight: 96.2 kg (212 lb 1.3 oz)        Vent Settings:  Resp: 24 SpO2: 100 % O2 Device: Nasal cannula Oxygen Delivery: 2 LPM  Resp: 24    LABS Reviewed  IMAGES Reviewed    Current medications   Current Facility-Administered Medications   Medication Dose Route Frequency Provider Last Rate Last Admin    acetaminophen (TYLENOL) tablet 650 mg  650 mg Oral or Feeding Tube Q8H PRN Sudheer Rojas MD   650 mg at 08/30/24 0057    albuterol (PROVENTIL) neb solution 2.5 mg  2.5 mg Nebulization Q6H PRN Matt Cabral MD        bumetanide (BUMEX) injection 2 mg  2 mg Intravenous Q8H Matt Cabral MD   2 mg at 08/31/24 0752    dextrose 10% infusion   Intravenous Continuous PRN Colten Garay MD        glucose gel 15-30 g  15-30 g Oral Q15 Min PRN Colten Garay MD        Or    dextrose 50 % injection 25-50 mL  25-50 mL Intravenous Q15 Min PRN Colten Garay MD        Or    glucagon injection 1 mg  1 mg Subcutaneous Q15 Min PRN Colten Garay MD        DOBUTamine (DOBUTREX) 1,000 mg in D5W 250 mL infusion (adult MAX conc)  5 mcg/kg/min (Dosing Weight) Intravenous Continuous Matt Cabral MD 8.1 mL/hr at 08/31/24 0700 5 mcg/kg/min at 08/31/24 0700    heparin 25,000 units in 0.45% NaCl 250 mL ANTICOAGULANT infusion  0-5,000 Units/hr Intravenous Continuous Matt Cabral MD 8.5 mL/hr at 08/31/24 0700 850 Units/hr at 08/31/24 0700     hydrALAZINE (APRESOLINE) half-tab 37.5 mg  37.5 mg Oral or Feeding Tube Q6H Matt Cabral MD   37.5 mg at 08/31/24 0614    hydrOXYzine HCl (ATARAX) tablet 10 mg  10 mg Oral TID PRN Matt Cabral MD        insulin glargine (LANTUS PEN) injection 10 Units  10 Units Subcutaneous At Bedtime Matt Cabral MD   10 Units at 08/30/24 2106    insulin regular (MYXREDLIN) 1 unit/mL infusion  0-24 Units/hr Intravenous Continuous Colten Garay MD 2 mL/hr at 08/31/24 0700 2 Units/hr at 08/31/24 0700    ipratropium - albuterol 0.5 mg/2.5 mg/3 mL (DUONEB) neb solution 3 mL  3 mL Nebulization Q4H PRN Sudheer Rojas MD   3 mL at 08/25/24 0536    isosorbide dinitrate (ISORDIL) tablet 20 mg  20 mg Oral TID Matt Cabral MD   20 mg at 08/31/24 0750    lidocaine (LMX4) cream   Topical Q1H PRN Sudheer Rojas MD        lidocaine 1 % 0.1-1 mL  0.1-1 mL Other Q1H PRN Sudheer Rojas MD        medication instruction   Does not apply Continuous PRN Sudheer Rojas MD        meropenem (MERREM) 1 g vial to attach to  mL bag  1 g Intravenous Q8H Matt Cabral MD   1 g at 08/31/24 0747    metoclopramide (REGLAN) injection 5 mg  5 mg Intravenous Q6H PRN Shantanu Mclean MD   5 mg at 08/30/24 1955    multivitamin w/minerals (THERA-VIT-M) tablet 1 tablet  1 tablet Oral Daily Luis Smith MD   1 tablet at 08/31/24 0751    naloxone (NARCAN) injection 0.2 mg  0.2 mg Intravenous Q2 Min PRN Jani Martin MD        Or    naloxone (NARCAN) injection 0.4 mg  0.4 mg Intravenous Q2 Min PRN Jani Martin MD        Or    naloxone (NARCAN) injection 0.2 mg  0.2 mg Intramuscular Q2 Min PRN Jani Martin MD        Or    naloxone (NARCAN) injection 0.4 mg  0.4 mg Intramuscular Q2 Min PRN Jani Matrin MD        nitroPRUsside (NIPRIDE) 100 mg, sodium thiosulfate 1,000 mg in D5W 62.5 mL IV infusion (conc: 1.6 mg/mL)  0.25-1 mcg/kg/min (Dosing Weight) Intravenous Continuous Matt Cabral MD   Stopped at  08/31/24 0700    nystatin (MYCOSTATIN) suspension 500,000 Units  500,000 Units Swish & Spit 4x Daily Susan Yeh MD   500,000 Units at 08/31/24 0800    ondansetron (ZOFRAN) injection 4 mg  4 mg Intravenous Q6H PRN Shantanu Mclean MD   4 mg at 08/30/24 1832    pantoprazole (PROTONIX) 2 mg/mL suspension 40 mg  40 mg Oral or Feeding Tube Daily Farrah Delgado MD   40 mg at 08/30/24 0804    [Held by provider] polyethylene glycol (MIRALAX) Packet 17 g  17 g Oral or Feeding Tube Daily Andree Becerril, APRN CNP   17 g at 08/23/24 1049    polyethylene glycol (MIRALAX) Packet 17 g  17 g Oral Daily PRN Sudheer Rojas MD   17 g at 08/22/24 1430    potassium chloride (KLOR-CON) Packet 40 mEq  40 mEq Oral or Feeding Tube BID Matt Cabral MD   40 mEq at 08/31/24 0748    predniSONE (DELTASONE) tablet 30 mg  30 mg Oral or Feeding Tube Daily Susan Yeh MD   30 mg at 08/31/24 0750    Followed by    [START ON 9/7/2024] predniSONE (DELTASONE) tablet 20 mg  20 mg Oral or Feeding Tube Daily Susan Yeh MD        Followed by    [START ON 9/14/2024] predniSONE (DELTASONE) tablet 10 mg  10 mg Oral or Feeding Tube Daily Susan Yeh MD        Followed by    [START ON 9/21/2024] predniSONE (DELTASONE) tablet 5 mg  5 mg Oral or Feeding Tube Daily Susan Yeh MD        [Held by provider] Prosource TF20 ENfit Compatibl EN LIQD (PROSOURCE TF20) packet 60 mL  1 packet Per Feeding Tube BID Susan Yeh MD   60 mL at 08/26/24 2042    QUEtiapine (SEROquel) tablet 25 mg  25 mg Oral or Feeding Tube BID Colten Garay MD   25 mg at 08/31/24 0750    selenium sulfide (SELSUN) 1 % shampoo   Topical Daily PRN Tao Cabralos, MD   Given at 08/28/24 2316    senna-docusate (SENOKOT-S/PERICOLACE) 8.6-50 MG per tablet 1 tablet  1 tablet Oral or Feeding Tube At Bedtime Andree Becerril APRN CNP   1 tablet at 08/27/24 2021    senna-docusate (SENOKOT-S/PERICOLACE) 8.6-50 MG per  tablet 1 tablet  1 tablet Oral or Feeding Tube BID PRN Jani Martin MD        Or    senna-docusate (SENOKOT-S/PERICOLACE) 8.6-50 MG per tablet 2 tablet  2 tablet Oral or Feeding Tube BID PRN Jani Martin MD        sodium chloride (PF) 0.9% PF flush 3 mL  3 mL Intracatheter Q8H Sudheer Rojas MD   3 mL at 08/31/24 0759    sodium chloride (PF) 0.9% PF flush 3 mL  3 mL Intracatheter q1 min prn Sudheer Rojas MD        sodium chloride 0.9 % infusion   Intravenous Continuous Matt Cabral MD        [Held by provider] sulfamethoxazole-trimethoprim (BACTRIM DS) 800-160 MG per tablet 1 tablet  1 tablet Oral or Feeding Tube Once per day on Monday Wednesday Friday Farrah Delgado MD   1 tablet at 08/28/24 1059    thiamine (B-1) tablet 100 mg  100 mg Oral or Feeding Tube Daily Graciela Whittaker MD   100 mg at 08/31/24 0750    vancomycin (VANCOCIN) 1,500 mg in 0.9% NaCl 250 mL intermittent infusion  1,500 mg Intravenous Q12H Farrah Delgado .7 mL/hr at 08/31/24 0604 1,500 mg at 08/31/24 0604       Medications Prior to Admission   Medication Sig Dispense Refill Last Dose    risperiDONE (RISPERDAL) 1 MG tablet Take 1 mg by mouth every morning.   Past Week at unknown    risperiDONE (RISPERDAL) 1 MG tablet Take 1.5 mg by mouth every evening.   Past Week at unknown

## 2024-08-31 NOTE — PLAN OF CARE
Goal Outcome Evaluation: progressing    Patient has been cooperative with staff. Very simple commands and education provided. Playing [email or web address] switch to time. Large emesis x 1. Zofran x 1. IABP transitioned to 1:2 Aug 100%. Dopplerable pulses. Increased oral hydralazine and isordil. See flowsheets for hemodynamics. Lasix given as ordered. Good UOP. Bowel incontinence with emesis. Good appetite. Cont with IABP wean and current POC.

## 2024-09-01 ENCOUNTER — APPOINTMENT (OUTPATIENT)
Dept: GENERAL RADIOLOGY | Facility: CLINIC | Age: 24
DRG: 853 | End: 2024-09-01
Attending: STUDENT IN AN ORGANIZED HEALTH CARE EDUCATION/TRAINING PROGRAM
Payer: MEDICARE

## 2024-09-01 LAB
ALBUMIN SERPL BCG-MCNC: 3.9 G/DL (ref 3.5–5.2)
ALBUMIN SERPL BCG-MCNC: 3.9 G/DL (ref 3.5–5.2)
ALLEN'S TEST: ABNORMAL
ALP SERPL-CCNC: 110 U/L (ref 40–150)
ALP SERPL-CCNC: 112 U/L (ref 40–150)
ALT SERPL W P-5'-P-CCNC: 253 U/L (ref 0–70)
ALT SERPL W P-5'-P-CCNC: 260 U/L (ref 0–70)
ANION GAP SERPL CALCULATED.3IONS-SCNC: 11 MMOL/L (ref 7–15)
ANION GAP SERPL CALCULATED.3IONS-SCNC: 12 MMOL/L (ref 7–15)
ANION GAP SERPL CALCULATED.3IONS-SCNC: 13 MMOL/L (ref 7–15)
ANION GAP SERPL CALCULATED.3IONS-SCNC: 13 MMOL/L (ref 7–15)
ANION GAP SERPL CALCULATED.3IONS-SCNC: 15 MMOL/L (ref 7–15)
AST SERPL W P-5'-P-CCNC: 57 U/L (ref 0–45)
AST SERPL W P-5'-P-CCNC: 64 U/L (ref 0–45)
BASE EXCESS BLDA CALC-SCNC: 2.4 MMOL/L (ref -3–3)
BASE EXCESS BLDA CALC-SCNC: 4.1 MMOL/L (ref -3–3)
BASE EXCESS BLDA CALC-SCNC: 4.7 MMOL/L (ref -3–3)
BASE EXCESS BLDV CALC-SCNC: 4.9 MMOL/L (ref -3–3)
BASE EXCESS BLDV CALC-SCNC: 5.8 MMOL/L (ref -3–3)
BASE EXCESS BLDV CALC-SCNC: 6 MMOL/L (ref -3–3)
BASOPHILS # BLD AUTO: ABNORMAL 10*3/UL
BASOPHILS # BLD AUTO: ABNORMAL 10*3/UL
BASOPHILS # BLD MANUAL: 0 10E3/UL (ref 0–0.2)
BASOPHILS # BLD MANUAL: 0.2 10E3/UL (ref 0–0.2)
BASOPHILS NFR BLD AUTO: ABNORMAL %
BASOPHILS NFR BLD AUTO: ABNORMAL %
BASOPHILS NFR BLD MANUAL: 0 %
BASOPHILS NFR BLD MANUAL: 1 %
BILIRUB DIRECT SERPL-MCNC: <0.2 MG/DL (ref 0–0.3)
BILIRUB DIRECT SERPL-MCNC: <0.2 MG/DL (ref 0–0.3)
BILIRUB SERPL-MCNC: 0.4 MG/DL
BILIRUB SERPL-MCNC: 0.4 MG/DL
BUN SERPL-MCNC: 19.3 MG/DL (ref 6–20)
BUN SERPL-MCNC: 19.6 MG/DL (ref 6–20)
BUN SERPL-MCNC: 19.7 MG/DL (ref 6–20)
BUN SERPL-MCNC: 23.1 MG/DL (ref 6–20)
BUN SERPL-MCNC: 24.1 MG/DL (ref 6–20)
CA-I BLD-MCNC: 4.7 MG/DL (ref 4.4–5.2)
CALCIUM SERPL-MCNC: 9.4 MG/DL (ref 8.8–10.4)
CALCIUM SERPL-MCNC: 9.5 MG/DL (ref 8.8–10.4)
CALCIUM SERPL-MCNC: 9.7 MG/DL (ref 8.8–10.4)
CHLORIDE SERPL-SCNC: 102 MMOL/L (ref 98–107)
CHLORIDE SERPL-SCNC: 105 MMOL/L (ref 98–107)
CHLORIDE SERPL-SCNC: 106 MMOL/L (ref 98–107)
CHLORIDE SERPL-SCNC: 106 MMOL/L (ref 98–107)
CHLORIDE SERPL-SCNC: 107 MMOL/L (ref 98–107)
COHGB MFR BLD: 95 % (ref 96–97)
COHGB MFR BLD: 99.6 % (ref 96–97)
COHGB MFR BLD: >100 % (ref 96–97)
CREAT SERPL-MCNC: 0.58 MG/DL (ref 0.67–1.17)
CREAT SERPL-MCNC: 0.6 MG/DL (ref 0.67–1.17)
CREAT SERPL-MCNC: 0.61 MG/DL (ref 0.67–1.17)
CREAT SERPL-MCNC: 0.62 MG/DL (ref 0.67–1.17)
CREAT SERPL-MCNC: 0.63 MG/DL (ref 0.67–1.17)
EGFRCR SERPLBLD CKD-EPI 2021: >90 ML/MIN/1.73M2
EOSINOPHIL # BLD AUTO: ABNORMAL 10*3/UL
EOSINOPHIL # BLD AUTO: ABNORMAL 10*3/UL
EOSINOPHIL # BLD MANUAL: 0 10E3/UL (ref 0–0.7)
EOSINOPHIL # BLD MANUAL: 0 10E3/UL (ref 0–0.7)
EOSINOPHIL NFR BLD AUTO: ABNORMAL %
EOSINOPHIL NFR BLD AUTO: ABNORMAL %
EOSINOPHIL NFR BLD MANUAL: 0 %
EOSINOPHIL NFR BLD MANUAL: 0 %
ERYTHROCYTE [DISTWIDTH] IN BLOOD BY AUTOMATED COUNT: 13.9 % (ref 10–15)
ERYTHROCYTE [DISTWIDTH] IN BLOOD BY AUTOMATED COUNT: 14.1 % (ref 10–15)
GLUCOSE BLDC GLUCOMTR-MCNC: 102 MG/DL (ref 70–99)
GLUCOSE BLDC GLUCOMTR-MCNC: 133 MG/DL (ref 70–99)
GLUCOSE BLDC GLUCOMTR-MCNC: 147 MG/DL (ref 70–99)
GLUCOSE BLDC GLUCOMTR-MCNC: 162 MG/DL (ref 70–99)
GLUCOSE BLDC GLUCOMTR-MCNC: 166 MG/DL (ref 70–99)
GLUCOSE BLDC GLUCOMTR-MCNC: 204 MG/DL (ref 70–99)
GLUCOSE BLDC GLUCOMTR-MCNC: 211 MG/DL (ref 70–99)
GLUCOSE BLDC GLUCOMTR-MCNC: 95 MG/DL (ref 70–99)
GLUCOSE SERPL-MCNC: 100 MG/DL (ref 70–99)
GLUCOSE SERPL-MCNC: 112 MG/DL (ref 70–99)
GLUCOSE SERPL-MCNC: 159 MG/DL (ref 70–99)
GLUCOSE SERPL-MCNC: 167 MG/DL (ref 70–99)
GLUCOSE SERPL-MCNC: 205 MG/DL (ref 70–99)
HCO3 BLD-SCNC: 26 MMOL/L (ref 21–28)
HCO3 BLD-SCNC: 28 MMOL/L (ref 21–28)
HCO3 BLD-SCNC: 30 MMOL/L (ref 21–28)
HCO3 BLDV-SCNC: 29 MMOL/L (ref 21–28)
HCO3 BLDV-SCNC: 30 MMOL/L (ref 21–28)
HCO3 BLDV-SCNC: 32 MMOL/L (ref 21–28)
HCO3 SERPL-SCNC: 24 MMOL/L (ref 22–29)
HCO3 SERPL-SCNC: 25 MMOL/L (ref 22–29)
HCO3 SERPL-SCNC: 26 MMOL/L (ref 22–29)
HCT VFR BLD AUTO: 35.7 % (ref 40–53)
HCT VFR BLD AUTO: 36.1 % (ref 40–53)
HGB BLD-MCNC: 11.5 G/DL (ref 13.3–17.7)
HGB BLD-MCNC: 11.6 G/DL (ref 13.3–17.7)
IMM GRANULOCYTES # BLD: ABNORMAL 10*3/UL
IMM GRANULOCYTES # BLD: ABNORMAL 10*3/UL
IMM GRANULOCYTES NFR BLD: ABNORMAL %
IMM GRANULOCYTES NFR BLD: ABNORMAL %
INR PPP: 1.06 (ref 0.85–1.15)
LACTATE SERPL-SCNC: 0.9 MMOL/L (ref 0.7–2)
LACTATE SERPL-SCNC: 2.4 MMOL/L (ref 0.7–2)
LYMPHOCYTES # BLD AUTO: ABNORMAL 10*3/UL
LYMPHOCYTES # BLD AUTO: ABNORMAL 10*3/UL
LYMPHOCYTES # BLD MANUAL: 1.2 10E3/UL (ref 0.8–5.3)
LYMPHOCYTES # BLD MANUAL: 3.3 10E3/UL (ref 0.8–5.3)
LYMPHOCYTES NFR BLD AUTO: ABNORMAL %
LYMPHOCYTES NFR BLD AUTO: ABNORMAL %
LYMPHOCYTES NFR BLD MANUAL: 14 %
LYMPHOCYTES NFR BLD MANUAL: 5 %
MAGNESIUM SERPL-MCNC: 2.2 MG/DL (ref 1.7–2.3)
MAGNESIUM SERPL-MCNC: 2.3 MG/DL (ref 1.7–2.3)
MCH RBC QN AUTO: 28.8 PG (ref 26.5–33)
MCH RBC QN AUTO: 28.8 PG (ref 26.5–33)
MCHC RBC AUTO-ENTMCNC: 32.1 G/DL (ref 31.5–36.5)
MCHC RBC AUTO-ENTMCNC: 32.2 G/DL (ref 31.5–36.5)
MCV RBC AUTO: 90 FL (ref 78–100)
MCV RBC AUTO: 90 FL (ref 78–100)
METAMYELOCYTES # BLD MANUAL: 0.2 10E3/UL
METAMYELOCYTES # BLD MANUAL: 0.5 10E3/UL
METAMYELOCYTES NFR BLD MANUAL: 1 %
METAMYELOCYTES NFR BLD MANUAL: 2 %
MONOCYTES # BLD AUTO: ABNORMAL 10*3/UL
MONOCYTES # BLD AUTO: ABNORMAL 10*3/UL
MONOCYTES # BLD MANUAL: 0.2 10E3/UL (ref 0–1.3)
MONOCYTES # BLD MANUAL: 0.7 10E3/UL (ref 0–1.3)
MONOCYTES NFR BLD AUTO: ABNORMAL %
MONOCYTES NFR BLD AUTO: ABNORMAL %
MONOCYTES NFR BLD MANUAL: 1 %
MONOCYTES NFR BLD MANUAL: 3 %
MYELOCYTES # BLD MANUAL: 0.5 10E3/UL
MYELOCYTES # BLD MANUAL: 0.7 10E3/UL
MYELOCYTES NFR BLD MANUAL: 2 %
MYELOCYTES NFR BLD MANUAL: 3 %
NEUTROPHILS # BLD AUTO: ABNORMAL 10*3/UL
NEUTROPHILS # BLD AUTO: ABNORMAL 10*3/UL
NEUTROPHILS # BLD MANUAL: 18.9 10E3/UL (ref 1.6–8.3)
NEUTROPHILS # BLD MANUAL: 21.4 10E3/UL (ref 1.6–8.3)
NEUTROPHILS NFR BLD AUTO: ABNORMAL %
NEUTROPHILS NFR BLD AUTO: ABNORMAL %
NEUTROPHILS NFR BLD MANUAL: 80 %
NEUTROPHILS NFR BLD MANUAL: 88 %
NRBC # BLD AUTO: 0 10E3/UL
NRBC # BLD AUTO: 0 10E3/UL
NRBC BLD AUTO-RTO: 0 /100
NRBC BLD AUTO-RTO: 0 /100
O2/TOTAL GAS SETTING VFR VENT: 0 %
O2/TOTAL GAS SETTING VFR VENT: 0 %
O2/TOTAL GAS SETTING VFR VENT: 2 %
O2/TOTAL GAS SETTING VFR VENT: 21 %
OXYHGB MFR BLDV: 68 % (ref 70–75)
OXYHGB MFR BLDV: 73 % (ref 70–75)
OXYHGB MFR BLDV: 74 % (ref 70–75)
PCO2 BLD: 34 MM HG (ref 35–45)
PCO2 BLD: 38 MM HG (ref 35–45)
PCO2 BLD: 44 MM HG (ref 35–45)
PCO2 BLDV: 38 MM HG (ref 40–50)
PCO2 BLDV: 41 MM HG (ref 40–50)
PCO2 BLDV: 50 MM HG (ref 40–50)
PH BLD: 7.44 [PH] (ref 7.35–7.45)
PH BLD: 7.47 [PH] (ref 7.35–7.45)
PH BLD: 7.48 [PH] (ref 7.35–7.45)
PH BLDV: 7.41 [PH] (ref 7.32–7.43)
PH BLDV: 7.47 [PH] (ref 7.32–7.43)
PH BLDV: 7.48 [PH] (ref 7.32–7.43)
PHOSPHATE SERPL-MCNC: 3.1 MG/DL (ref 2.5–4.5)
PLAT MORPH BLD: ABNORMAL
PLAT MORPH BLD: ABNORMAL
PLATELET # BLD AUTO: 214 10E3/UL (ref 150–450)
PLATELET # BLD AUTO: 222 10E3/UL (ref 150–450)
PO2 BLD: 101 MM HG (ref 80–105)
PO2 BLD: 154 MM HG (ref 80–105)
PO2 BLD: 66 MM HG (ref 80–105)
PO2 BLDV: 35 MM HG (ref 25–47)
PO2 BLDV: 38 MM HG (ref 25–47)
PO2 BLDV: 41 MM HG (ref 25–47)
POLYCHROMASIA BLD QL SMEAR: SLIGHT
POTASSIUM SERPL-SCNC: 3.5 MMOL/L (ref 3.4–5.3)
POTASSIUM SERPL-SCNC: 3.9 MMOL/L (ref 3.4–5.3)
POTASSIUM SERPL-SCNC: 4 MMOL/L (ref 3.4–5.3)
POTASSIUM SERPL-SCNC: 4.2 MMOL/L (ref 3.4–5.3)
POTASSIUM SERPL-SCNC: 4.3 MMOL/L (ref 3.4–5.3)
POTASSIUM SERPL-SCNC: 4.4 MMOL/L (ref 3.4–5.3)
PROT SERPL-MCNC: 6.8 G/DL (ref 6.4–8.3)
PROT SERPL-MCNC: 6.9 G/DL (ref 6.4–8.3)
RBC # BLD AUTO: 3.99 10E6/UL (ref 4.4–5.9)
RBC # BLD AUTO: 4.03 10E6/UL (ref 4.4–5.9)
RBC MORPH BLD: ABNORMAL
RBC MORPH BLD: ABNORMAL
SAO2 % BLDA: 93 % (ref 92–100)
SAO2 % BLDA: 98 % (ref 92–100)
SAO2 % BLDA: 99 % (ref 92–100)
SAO2 % BLDV: 69.7 % (ref 70–75)
SAO2 % BLDV: 74.8 % (ref 70–75)
SAO2 % BLDV: 75.3 % (ref 70–75)
SODIUM SERPL-SCNC: 142 MMOL/L (ref 135–145)
SODIUM SERPL-SCNC: 142 MMOL/L (ref 135–145)
SODIUM SERPL-SCNC: 143 MMOL/L (ref 135–145)
SODIUM SERPL-SCNC: 143 MMOL/L (ref 135–145)
SODIUM SERPL-SCNC: 145 MMOL/L (ref 135–145)
UFH PPP CHRO-ACNC: 0.27 IU/ML
WBC # BLD AUTO: 23.6 10E3/UL (ref 4–11)
WBC # BLD AUTO: 24.3 10E3/UL (ref 4–11)

## 2024-09-01 PROCEDURE — 83605 ASSAY OF LACTIC ACID: CPT | Performed by: STUDENT IN AN ORGANIZED HEALTH CARE EDUCATION/TRAINING PROGRAM

## 2024-09-01 PROCEDURE — C1760 CLOSURE DEV, VASC: HCPCS | Performed by: INTERNAL MEDICINE

## 2024-09-01 PROCEDURE — 80048 BASIC METABOLIC PNL TOTAL CA: CPT

## 2024-09-01 PROCEDURE — 250N000013 HC RX MED GY IP 250 OP 250 PS 637: Performed by: INTERNAL MEDICINE

## 2024-09-01 PROCEDURE — 258N000003 HC RX IP 258 OP 636: Performed by: STUDENT IN AN ORGANIZED HEALTH CARE EDUCATION/TRAINING PROGRAM

## 2024-09-01 PROCEDURE — 71045 X-RAY EXAM CHEST 1 VIEW: CPT

## 2024-09-01 PROCEDURE — 272N000001 HC OR GENERAL SUPPLY STERILE: Performed by: INTERNAL MEDICINE

## 2024-09-01 PROCEDURE — 85520 HEPARIN ASSAY: CPT | Performed by: INTERNAL MEDICINE

## 2024-09-01 PROCEDURE — 84132 ASSAY OF SERUM POTASSIUM: CPT | Performed by: STUDENT IN AN ORGANIZED HEALTH CARE EDUCATION/TRAINING PROGRAM

## 2024-09-01 PROCEDURE — 200N000002 HC R&B ICU UMMC

## 2024-09-01 PROCEDURE — 250N000013 HC RX MED GY IP 250 OP 250 PS 637

## 2024-09-01 PROCEDURE — 250N000013 HC RX MED GY IP 250 OP 250 PS 637: Performed by: STUDENT IN AN ORGANIZED HEALTH CARE EDUCATION/TRAINING PROGRAM

## 2024-09-01 PROCEDURE — 250N000012 HC RX MED GY IP 250 OP 636 PS 637: Performed by: STUDENT IN AN ORGANIZED HEALTH CARE EDUCATION/TRAINING PROGRAM

## 2024-09-01 PROCEDURE — 84100 ASSAY OF PHOSPHORUS: CPT | Performed by: STUDENT IN AN ORGANIZED HEALTH CARE EDUCATION/TRAINING PROGRAM

## 2024-09-01 PROCEDURE — 999N000185 HC STATISTIC TRANSPORT TIME EA 15 MIN

## 2024-09-01 PROCEDURE — 82805 BLOOD GASES W/O2 SATURATION: CPT | Performed by: STUDENT IN AN ORGANIZED HEALTH CARE EDUCATION/TRAINING PROGRAM

## 2024-09-01 PROCEDURE — 71045 X-RAY EXAM CHEST 1 VIEW: CPT | Mod: 26 | Performed by: RADIOLOGY

## 2024-09-01 PROCEDURE — 85610 PROTHROMBIN TIME: CPT | Performed by: STUDENT IN AN ORGANIZED HEALTH CARE EDUCATION/TRAINING PROGRAM

## 2024-09-01 PROCEDURE — 33968 REMOVE AORTIC ASSIST DEVICE: CPT | Performed by: INTERNAL MEDICINE

## 2024-09-01 PROCEDURE — 83735 ASSAY OF MAGNESIUM: CPT | Performed by: STUDENT IN AN ORGANIZED HEALTH CARE EDUCATION/TRAINING PROGRAM

## 2024-09-01 PROCEDURE — 250N000011 HC RX IP 250 OP 636: Performed by: INTERNAL MEDICINE

## 2024-09-01 PROCEDURE — 33968 REMOVE AORTIC ASSIST DEVICE: CPT

## 2024-09-01 PROCEDURE — 85007 BL SMEAR W/DIFF WBC COUNT: CPT | Performed by: STUDENT IN AN ORGANIZED HEALTH CARE EDUCATION/TRAINING PROGRAM

## 2024-09-01 PROCEDURE — 85027 COMPLETE CBC AUTOMATED: CPT | Performed by: STUDENT IN AN ORGANIZED HEALTH CARE EDUCATION/TRAINING PROGRAM

## 2024-09-01 PROCEDURE — 82330 ASSAY OF CALCIUM: CPT | Performed by: STUDENT IN AN ORGANIZED HEALTH CARE EDUCATION/TRAINING PROGRAM

## 2024-09-01 PROCEDURE — 82248 BILIRUBIN DIRECT: CPT | Performed by: STUDENT IN AN ORGANIZED HEALTH CARE EDUCATION/TRAINING PROGRAM

## 2024-09-01 PROCEDURE — 250N000011 HC RX IP 250 OP 636: Performed by: STUDENT IN AN ORGANIZED HEALTH CARE EDUCATION/TRAINING PROGRAM

## 2024-09-01 PROCEDURE — 33968 REMOVE AORTIC ASSIST DEVICE: CPT | Mod: GC | Performed by: INTERNAL MEDICINE

## 2024-09-01 PROCEDURE — 99291 CRITICAL CARE FIRST HOUR: CPT | Mod: 25 | Performed by: INTERNAL MEDICINE

## 2024-09-01 PROCEDURE — 999N000075 HC STATISTIC IABP MONITORING

## 2024-09-01 RX ORDER — NICOTINE POLACRILEX 4 MG
15-30 LOZENGE BUCCAL
Status: DISCONTINUED | OUTPATIENT
Start: 2024-09-01 | End: 2024-09-01

## 2024-09-01 RX ORDER — NALOXONE HYDROCHLORIDE 0.4 MG/ML
0.2 INJECTION, SOLUTION INTRAMUSCULAR; INTRAVENOUS; SUBCUTANEOUS
Status: DISCONTINUED | OUTPATIENT
Start: 2024-09-01 | End: 2024-09-02

## 2024-09-01 RX ORDER — POTASSIUM CHLORIDE 29.8 MG/ML
20 INJECTION INTRAVENOUS ONCE
Status: COMPLETED | OUTPATIENT
Start: 2024-09-01 | End: 2024-09-01

## 2024-09-01 RX ORDER — FLUMAZENIL 0.1 MG/ML
0.2 INJECTION, SOLUTION INTRAVENOUS
Status: DISCONTINUED | OUTPATIENT
Start: 2024-09-01 | End: 2024-09-02

## 2024-09-01 RX ORDER — ISOSORBIDE DINITRATE 10 MG/1
30 TABLET ORAL 3 TIMES DAILY
Status: DISCONTINUED | OUTPATIENT
Start: 2024-09-01 | End: 2024-09-03

## 2024-09-01 RX ORDER — ATROPINE SULFATE 0.1 MG/ML
0.5 INJECTION INTRAVENOUS
Status: DISCONTINUED | OUTPATIENT
Start: 2024-09-01 | End: 2024-09-02

## 2024-09-01 RX ORDER — FENTANYL CITRATE 50 UG/ML
25 INJECTION, SOLUTION INTRAMUSCULAR; INTRAVENOUS
Status: DISCONTINUED | OUTPATIENT
Start: 2024-09-01 | End: 2024-09-02

## 2024-09-01 RX ORDER — DEXTROSE MONOHYDRATE 25 G/50ML
25-50 INJECTION, SOLUTION INTRAVENOUS
Status: DISCONTINUED | OUTPATIENT
Start: 2024-09-01 | End: 2024-09-01

## 2024-09-01 RX ORDER — HYDRALAZINE HYDROCHLORIDE 25 MG/1
50 TABLET, FILM COATED ORAL EVERY 6 HOURS
Status: DISCONTINUED | OUTPATIENT
Start: 2024-09-01 | End: 2024-09-02

## 2024-09-01 RX ORDER — NALOXONE HYDROCHLORIDE 0.4 MG/ML
0.4 INJECTION, SOLUTION INTRAMUSCULAR; INTRAVENOUS; SUBCUTANEOUS
Status: DISCONTINUED | OUTPATIENT
Start: 2024-09-01 | End: 2024-09-02

## 2024-09-01 RX ORDER — IOPAMIDOL 755 MG/ML
INJECTION, SOLUTION INTRAVASCULAR
Status: DISCONTINUED | OUTPATIENT
Start: 2024-09-01 | End: 2024-09-01 | Stop reason: HOSPADM

## 2024-09-01 RX ADMIN — BUMETANIDE 2 MG: 0.25 INJECTION INTRAMUSCULAR; INTRAVENOUS at 07:09

## 2024-09-01 RX ADMIN — MEROPENEM 1 G: 1 INJECTION, POWDER, FOR SOLUTION INTRAVENOUS at 07:35

## 2024-09-01 RX ADMIN — ISOSORBIDE DINITRATE 20 MG: 40 TABLET ORAL at 07:34

## 2024-09-01 RX ADMIN — PREDNISONE 30 MG: 20 TABLET ORAL at 07:33

## 2024-09-01 RX ADMIN — METOCLOPRAMIDE 5 MG: 5 INJECTION, SOLUTION INTRAMUSCULAR; INTRAVENOUS at 06:39

## 2024-09-01 RX ADMIN — BUMETANIDE 2 MG: 0.25 INJECTION INTRAMUSCULAR; INTRAVENOUS at 16:20

## 2024-09-01 RX ADMIN — Medication 37.5 MG: at 06:21

## 2024-09-01 RX ADMIN — HYDRALAZINE HYDROCHLORIDE 50 MG: 25 TABLET ORAL at 18:35

## 2024-09-01 RX ADMIN — DOBUTAMINE 5 MCG/KG/MIN: 12.5 INJECTION, SOLUTION, CONCENTRATE INTRAVENOUS at 16:19

## 2024-09-01 RX ADMIN — INSULIN GLARGINE 10 UNITS: 100 INJECTION, SOLUTION SUBCUTANEOUS at 21:36

## 2024-09-01 RX ADMIN — POTASSIUM CHLORIDE 40 MEQ: 1.5 POWDER, FOR SOLUTION ORAL at 20:22

## 2024-09-01 RX ADMIN — NYSTATIN 500000 UNITS: 100000 SUSPENSION ORAL at 20:21

## 2024-09-01 RX ADMIN — POTASSIUM CHLORIDE 20 MEQ: 29.8 INJECTION, SOLUTION INTRAVENOUS at 05:05

## 2024-09-01 RX ADMIN — ISOSORBIDE DINITRATE 30 MG: 10 TABLET ORAL at 20:22

## 2024-09-01 RX ADMIN — NYSTATIN 500000 UNITS: 100000 SUSPENSION ORAL at 07:35

## 2024-09-01 RX ADMIN — THIAMINE HCL TAB 100 MG 100 MG: 100 TAB at 07:34

## 2024-09-01 RX ADMIN — ISOSORBIDE DINITRATE 30 MG: 10 TABLET ORAL at 13:32

## 2024-09-01 RX ADMIN — RISPERIDONE 1.5 MG: 1 TABLET, FILM COATED ORAL at 20:28

## 2024-09-01 RX ADMIN — POTASSIUM CHLORIDE 40 MEQ: 1.5 POWDER, FOR SOLUTION ORAL at 07:33

## 2024-09-01 RX ADMIN — MEROPENEM 1 G: 1 INJECTION, POWDER, FOR SOLUTION INTRAVENOUS at 16:20

## 2024-09-01 RX ADMIN — HEPARIN SODIUM 850 UNITS/HR: 10000 INJECTION, SOLUTION INTRAVENOUS at 03:58

## 2024-09-01 RX ADMIN — SELENIUM SULFIDE: 10 SHAMPOO TOPICAL at 21:03

## 2024-09-01 RX ADMIN — Medication 1 TABLET: at 07:34

## 2024-09-01 RX ADMIN — NYSTATIN 500000 UNITS: 100000 SUSPENSION ORAL at 16:20

## 2024-09-01 RX ADMIN — RISPERIDONE 1 MG: 1 TABLET, FILM COATED ORAL at 07:35

## 2024-09-01 RX ADMIN — HYDRALAZINE HYDROCHLORIDE 50 MG: 25 TABLET ORAL at 12:49

## 2024-09-01 ASSESSMENT — ACTIVITIES OF DAILY LIVING (ADL)
ADLS_ACUITY_SCORE: 37

## 2024-09-01 NOTE — PLAN OF CARE
ICU End of Shift Summary 1900 - 0700  Labs drawn per orders and reviewed with results. MD informed of all critical labs and patient conditions as indicated throughout shift. See flowsheets for vital signs and detailed assessment.      Shift Events:   Neuro: A&Ox4, moves all extremities, follows commands, answers questions. Simple instructions provided. Sleep and rest promoted by clustering cares, reducing stimulation, and provided comfort measures. Denies any numbness/tingling.  CV:  ST 120s-150s throughout shift with frequent multifocal PVCs. IABP 1:2 100% argumentation without any alarms throughout shift. Dobutamine continues to infuse at 5. 6am NAYA CVP 10 PA 38/16 SvO2 74 CI 3.2 .  Pulmonary:  2L via NC just while sleeping, otherwise on RA. Lung sounds CTAB, diminished throughout.   GI/Nutrition:  Cardiac diet. Slow eating encouraged with sips of liquid. Medications crushed in pudding. Loose BM this shift.  Endocrinology:  Insulin gtt @ 2unit/hr - unadjusted. Blood sugars stable.  /Electrolytes:  Swan with adequate output. Receiving bumex q8hr with adequate response. Negative ~500cc so far today. CVP 10 at 0600.   Infectious Disease:  Afebrile. WBCs 23.6; Lactic 0.9 (down from 2.3 earlier shift.  Hematology:  Hgb stable 11.6, no overt signs of bleeding noted. When patient gets restless he will bend his left leg, causing a-line to bleed through dressing. A-line dressing replaced with quikclot covering.   Musc/Skin:  No new significant skin issue noted this shift.      Plan:   IABP 1:3 and possible discontinued today?   Sliding scale insulin  Continue current plan of care.    Goal Outcome Evaluation:      Plan of Care Reviewed With: patient    Overall Patient Progress: improvingOverall Patient Progress: improving    Outcome Evaluation: Orientated x4 throughout shift, able to make needs known, simple and clear directions/provided. Denies pain/discomfort. Sleep and rest promoted with minimal interruptions.  Continues on 2L via NC without any desaturations, was on RA and desat to mid-80s%. MAP maintained in goal. Dobutamine continues at straight rate.

## 2024-09-01 NOTE — PROGRESS NOTES
[facility]         Intra-Aortic Balloon Pump Discontinuation:     IABP support discontinued 9/1/2024 at 1804 in CCL.    Octavio William, RT  ECMO Specialist  9/1/2024 6:15 PM

## 2024-09-01 NOTE — PROGRESS NOTES
[facility]  Cardiology Heart Failure Service (Cards 2) Progress Note    Patient Name: Terrance Hidalgo    Medical Record Number: [id number]    YOB: 2000  PCP: Colt Flores    Admit Date/Time: 8/20/2024 10:13 PM   12    Assessment/Plan:    25 yo M with PMH autism who initially presented to [place] with cough, URI symptoms, and hypoxemia. He was intubated due to respiratory failure and transferred to [facility], where his hospital course transpired to mixed cardiogenic/septic shock requiring multiple pressor agents, inotropic support, and IV diuresis. Transferred again to [facility] CICU on 8/20 for continued management. Presentation initially highly suspicious for fulminant myocarditis base on elevated cardiac and inflammatory biomarkers, new (suspected), severe biventricular dysfunction w/ reduced cardiac output requiring intermittent inotropic support, and concurrent infectious/respiratory symptomotology c/b acute respiratory failure 2/2 ARDS vs cardiogenic etiology 2/2 miocarditis with superimposed aspiration PNM treated with sulbactam. Empirically pulsed with steroids. However, EMB 5/21 demonstrated unremarkable myocardial tissue with no evidence of inflammatory infiltrate.  Hospital course further complicated by worsening cardiogenic/septic shock with worsening renal and liver failure requiring IABP placement on 8/27. Jarocho weaned off on 8/28 and extubated.      - ECMO candidate     Changes:  - c/w IABP weaning and remove today or tomorrow (hold heparin at least 2h prior to removal, put IABP to 1:1 while of heparin)  - c/w dobutamine for biventricular inotropic support   - adjust hydralazine/isordil  - diuresis with CVP goal 10-12  - wean off HFNC  - last dose meropenem today   - remove RIJ    Neuro:     #AMS - resolved  #Sedation   - moving x 4 intermittent following commands     Plan:      - prn atarax  - c/w Seroquel       CV:    #Biventricular failure LVEF 25%    #Cardiogenic shock SCAI C  #Lactic acidosis   TTE (24): LVIDd 58mm. Biplane LVEF is 16%. Severe hypokinesis of mid to apical RV free wall.  RHC (24): RA mean 6, PA , PCWP 18, Wayne CO/CI 5.0/2.1, PVR 1.6, SVR 1270  EMB (24): Fragments of essentially unremarkable myocardial tissue with no evidence of inflammatory infiltrate   Coronary angiogram: NA   CMR: None - will obtain prior to discharge     DATE MAP CVP PAP PCWP Wayne CO Wayne CI SVR PVR Mvo2 LA Therapies    82 10  16 3.5 1.6 1800 3.7 54 1.4 IABP 1:1, dobutamine 5    80 12  12 6.1 2.7 984 <2 70 2 IABP1:1 dobutamine 5 nipride 0.75    75 15 40/14 12 5.9 2.6 1180  69 0.8 IABP 1:1  5, nipride 0.25    90 10 / 13 7.1 3.2 800 <2 73 0.9 IABP 1:2,  5                                   Plan:  - IABP removal   - c/w dobutamine for biventricular inotropic support   - adjust hydrla/isordil  - diuresis with CVP goal 10-  - c/w steroid taper- c/w nystatin/bactrim     Pulm:     #Acute hypoxic respiratory failure - resolving    #Pleular effusion - resolved   #concern for Aspiration PNM  - resolving      Plan:    - incentive spirometer, peridex   - abx per below    - wean off NC     GI/Nutrition:    #Transaminitis - resolving     1. Hepatomegaly with increased echogenicity of the liver, which may be  seen with intrinsic parenchymal disease such as steatosis.  2. Biliary sludge without sonographic evidence for acute  cholecystitis.  3. Normal antegrade flow in the extrahepatic and right portal vein.   4. The remainder of the findings are significantly limited secondary  to obscuring bowel gas and technical challenges related to patient  mobility.    Plan:    - monitor MELD labs  - treatment per above        Renal:    #HyperNa   #Hypervolemia  #LA    Plan:    - adjust FWF   - frequent BMP   - hold bactim for possible contributing factor to LA     ID:     #Concern for aspiration PNM   #Transaminitis   #Recurrent fevers of  unclear etiology     - Antimicrobials       > Unasyn 8/21 - 8/26       > Cefepime 8/20 - 8/21       > Azithromycin 8/20 - 08/24       > Vanco 8/20 - 8/21 s/p 8/26       > meropenem 8/26-9/1       > zosyn 8/26 - 8/28    Plan:  - observe off abx  - remova RIJ    Heme:    #Anemia of critical illness     Plan:  - H/H  - transfuse if hb < 7 or drop > 2 g     Endo:    Plan:  - FSG goal 140-160  - insulin   - start lantus     Family Meeting/Goals of Care:  - last time family updated: TBD   - next of kin/HCP:  Johann Hidalgo(TEMPORARY GUARDIAN TO 10-)  Brother  [phone]  [phone]) [phone] [facility] [address]  Legal guardian    Disposition:   - Unit   Code Status:   Full Code     Thank you for allowing me to care for this patient, please don't hesitate to contact me with any questions regarding this plan.     Patient was discussed and evaluated with Dr. Luis HOUSE, attending physician, who agrees with the assessment and plan above.    Matt Cabral MD, PhD, MSc  Cardiology Fellow    Physical Exam:    General appearance - Lying in bed; on NC  Head: Normocephalic and atraumatic.   Eyes: Pupils are equal, round, and reactive to light. No scleral icterus.  Neck: no JVD, bruit.  Cardiovascular: Tachycardiac regular rhythm. S1 and S2 auscultated. No murmurs, rub, or gallops.  Pulmonary/Chest: clear breath bilaterally   Abdominal: Bowel sounds present. Soft. No distension. No rigidity, rebound or guarding.   Extremities: Warm, no cyanosis, 2+ distal pulses bilaterally. Pitting edema b/l +1.   Skin: Skin is warm and not diaphoretic. No rash, bruising, or erythema.  Neuro: Sedated     Objective data:    Temp:  [99  F (37.2  C)-100  F (37.8  C)] 99.7  F (37.6  C)  Pulse:  [100-143] 117  Resp:  [13-33] 30  MAP:  [74 mmHg-100 mmHg] 88 mmHg  Arterial Line BP: ()/(57-80) 114/68  SpO2:  [93 %-100 %] 97 %    Vitals:    08/28/24 0000 08/29/24 0000 08/30/24 0400 08/31/24 0000   Weight: 94.2 kg (207 lb 10.8 oz)  95.1 kg (209 lb 10.5 oz) 96.1 kg (211 lb 13.8 oz) 96.2 kg (212 lb 1.3 oz)    09/01/24 0000   Weight: 96 kg (211 lb 10.3 oz)        Vent Settings:  Resp: 30 SpO2: 97 % O2 Device: Nasal cannula Oxygen Delivery: 2 LPM  Resp: 30    LABS Reviewed  IMAGES Reviewed    Current medications   Current Facility-Administered Medications   Medication Dose Route Frequency Provider Last Rate Last Admin    acetaminophen (TYLENOL) tablet 650 mg  650 mg Oral or Feeding Tube Q8H PRN Sudheer Rojas MD   650 mg at 08/30/24 0057    albuterol (PROVENTIL) neb solution 2.5 mg  2.5 mg Nebulization Q6H PRN Matt Cabral MD        bumetanide (BUMEX) injection 2 mg  2 mg Intravenous Q8H Matt Cabral MD   2 mg at 09/01/24 0709    dextrose 10% infusion   Intravenous Continuous PRN Colten Garay MD        glucose gel 15-30 g  15-30 g Oral Q15 Min PRN Colten Garay MD        Or    dextrose 50 % injection 25-50 mL  25-50 mL Intravenous Q15 Min PRN Colten Garay MD        Or    glucagon injection 1 mg  1 mg Subcutaneous Q15 Min PRN Colten Garay MD        DOBUTamine (DOBUTREX) 1,000 mg in D5W 250 mL infusion (adult MAX conc)  5 mcg/kg/min (Dosing Weight) Intravenous Continuous Matt Cabral MD 8.1 mL/hr at 09/01/24 1200 5 mcg/kg/min at 09/01/24 1200    [Held by provider] heparin 25,000 units in 0.45% NaCl 250 mL ANTICOAGULANT infusion  0-5,000 Units/hr Intravenous Continuous Matt Cabral MD 8.5 mL/hr at 09/01/24 1200 850 Units/hr at 09/01/24 1200    hydrALAZINE (APRESOLINE) half-tab 12.5 mg  12.5 mg Oral Once Shantanu Mclean MD        hydrALAZINE (APRESOLINE) tablet 50 mg  50 mg Oral or Feeding Tube Q6H Matt Cabral MD        hydrOXYzine HCl (ATARAX) tablet 10 mg  10 mg Oral TID PRN Matt Cabral MD   10 mg at 08/31/24 2013    insulin aspart (NovoLOG) injection (RAPID ACTING)  1-3 Units Subcutaneous TID AC Matt Cabral MD        insulin aspart (NovoLOG) injection (RAPID ACTING)  1-3 Units Subcutaneous At  Bedtime Matt Cabral MD        insulin glargine (LANTUS PEN) injection 10 Units  10 Units Subcutaneous At Bedtime Matt Cabral MD   10 Units at 08/31/24 2107    ipratropium - albuterol 0.5 mg/2.5 mg/3 mL (DUONEB) neb solution 3 mL  3 mL Nebulization Q4H PRN Sudheer Rojas MD   3 mL at 08/25/24 0536    isosorbide dinitrate (ISORDIL) tablet 30 mg  30 mg Oral TID Matt Cabral MD        lidocaine (LMX4) cream   Topical Q1H PRN Sudheer Rojas MD        lidocaine 1 % 0.1-1 mL  0.1-1 mL Other Q1H PRN Sudheer Rojas MD        medication instruction   Does not apply Continuous PRN Sudheer Rojas MD        meropenem (MERREM) 1 g vial to attach to  mL bag  1 g Intravenous Q8H Matt Cabral MD   1 g at 09/01/24 0735    metoclopramide (REGLAN) injection 5 mg  5 mg Intravenous Q6H PRN Shantanu Mclean MD   5 mg at 09/01/24 0639    multivitamin w/minerals (THERA-VIT-M) tablet 1 tablet  1 tablet Oral Daily Luis Smith MD   1 tablet at 09/01/24 0734    naloxone (NARCAN) injection 0.2 mg  0.2 mg Intravenous Q2 Min PRN Jani Martin MD        Or    naloxone (NARCAN) injection 0.4 mg  0.4 mg Intravenous Q2 Min PRN Jani Martin MD        Or    naloxone (NARCAN) injection 0.2 mg  0.2 mg Intramuscular Q2 Min PRN Jani Martin MD        Or    naloxone (NARCAN) injection 0.4 mg  0.4 mg Intramuscular Q2 Min PRN Jani Martin MD        nystatin (MYCOSTATIN) suspension 500,000 Units  500,000 Units Swish & Spit 4x Daily Susan Yeh MD   500,000 Units at 09/01/24 0735    ondansetron (ZOFRAN) injection 4 mg  4 mg Intravenous Q6H PRN Shantanu Mclean MD   4 mg at 08/31/24 1028    pantoprazole (PROTONIX) 2 mg/mL suspension 40 mg  40 mg Oral or Feeding Tube Daily Farrah Delgado MD   40 mg at 08/30/24 0804    [Held by provider] polyethylene glycol (MIRALAX) Packet 17 g  17 g Oral or Feeding Tube Daily Andree Becerril, APRN CNP   17 g at 08/23/24 1045    polyethylene glycol  (MIRALAX) Packet 17 g  17 g Oral Daily PRN Sudheer Rojas MD   17 g at 08/22/24 1430    potassium chloride (KLOR-CON) Packet 40 mEq  40 mEq Oral or Feeding Tube BID Matt Cabral MD   40 mEq at 09/01/24 0733    predniSONE (DELTASONE) tablet 30 mg  30 mg Oral or Feeding Tube Daily Patricia Dukes MD   30 mg at 09/01/24 0733    Followed by    [START ON 9/3/2024] predniSONE (DELTASONE) tablet 20 mg  20 mg Oral or Feeding Tube Daily Patricia Dukes MD        Followed by    [START ON 9/6/2024] predniSONE (DELTASONE) tablet 10 mg  10 mg Oral or Feeding Tube Daily Patricia Dukes MD        Followed by    [START ON 9/9/2024] predniSONE (DELTASONE) tablet 5 mg  5 mg Oral or Feeding Tube Daily Patricia Dukes MD        risperiDONE (risperDAL) tablet 1 mg  1 mg Oral or Feeding Tube Daily Patricia Dukes MD   1 mg at 09/01/24 0735    risperiDONE (risperDAL) tablet 1.5 mg  1.5 mg Oral or Feeding Tube QPM Patricia Dukes MD   1.5 mg at 08/31/24 2013    selenium sulfide (SELSUN) 1 % shampoo   Topical Daily PRN Matt Cabral MD   Given at 08/28/24 2316    senna-docusate (SENOKOT-S/PERICOLACE) 8.6-50 MG per tablet 1 tablet  1 tablet Oral or Feeding Tube At Bedtime Andree Becerril, APRN CNP   1 tablet at 08/27/24 2021    senna-docusate (SENOKOT-S/PERICOLACE) 8.6-50 MG per tablet 1 tablet  1 tablet Oral or Feeding Tube BID PRN Jani Martin MD        Or    senna-docusate (SENOKOT-S/PERICOLACE) 8.6-50 MG per tablet 2 tablet  2 tablet Oral or Feeding Tube BID PRN Jani Martin MD        sodium chloride (PF) 0.9% PF flush 3 mL  3 mL Intracatheter Q8H Sudheer Rojas MD   3 mL at 09/01/24 0740    sodium chloride (PF) 0.9% PF flush 3 mL  3 mL Intracatheter q1 min prn Sudheer Rojas MD        sodium chloride 0.9 % infusion   Intravenous Continuous Matt Cabral MD        thiamine (B-1) tablet 100 mg  100 mg Oral or Feeding Tube Daily Graciela Whittaker MD   100 mg at 09/01/24 0734       Medications Prior to Admission    Medication Sig Dispense Refill Last Dose    risperiDONE (RISPERDAL) 1 MG tablet Take 1 mg by mouth every morning.   Past Week at unknown    risperiDONE (RISPERDAL) 1 MG tablet Take 1.5 mg by mouth every evening.   Past Week at unknown

## 2024-09-01 NOTE — PLAN OF CARE
Goal Outcome Evaluation: Progressing     Patient has been cooperative with staff. Becomes anxious with personal cares but is redirectable. PERRL. -150s. IABP weaned 1:3 periodically. Now 1:1 in hopes to remove IABP down in cath lab. See flowsheets for hemodynamics. Pt on RA to time. Good appetite, UOP and rectal output. Education provided to patient and family.  Awaiting response from Cath lab for IABP removal time.

## 2024-09-02 ENCOUNTER — APPOINTMENT (OUTPATIENT)
Dept: GENERAL RADIOLOGY | Facility: CLINIC | Age: 24
DRG: 853 | End: 2024-09-02
Attending: STUDENT IN AN ORGANIZED HEALTH CARE EDUCATION/TRAINING PROGRAM
Payer: MEDICARE

## 2024-09-02 LAB
ABO/RH(D): NORMAL
ALBUMIN SERPL BCG-MCNC: 4.1 G/DL (ref 3.5–5.2)
ALLEN'S TEST: ABNORMAL
ALLEN'S TEST: ABNORMAL
ALP SERPL-CCNC: 120 U/L (ref 40–150)
ALT SERPL W P-5'-P-CCNC: 221 U/L (ref 0–70)
ANION GAP SERPL CALCULATED.3IONS-SCNC: 12 MMOL/L (ref 7–15)
ANION GAP SERPL CALCULATED.3IONS-SCNC: 12 MMOL/L (ref 7–15)
ANION GAP SERPL CALCULATED.3IONS-SCNC: 17 MMOL/L (ref 7–15)
ANTIBODY SCREEN: NEGATIVE
AST SERPL W P-5'-P-CCNC: 50 U/L (ref 0–45)
BACTERIA BLD CULT: NO GROWTH
BASE EXCESS BLDA CALC-SCNC: 3.3 MMOL/L (ref -3–3)
BASE EXCESS BLDA CALC-SCNC: 5.6 MMOL/L (ref -3–3)
BASE EXCESS BLDV CALC-SCNC: 6 MMOL/L (ref -3–3)
BASE EXCESS BLDV CALC-SCNC: 6.5 MMOL/L (ref -3–3)
BASE EXCESS BLDV CALC-SCNC: 6.7 MMOL/L (ref -3–3)
BASOPHILS # BLD AUTO: ABNORMAL 10*3/UL
BASOPHILS # BLD MANUAL: 0 10E3/UL (ref 0–0.2)
BASOPHILS NFR BLD AUTO: ABNORMAL %
BASOPHILS NFR BLD MANUAL: 0 %
BILIRUB DIRECT SERPL-MCNC: <0.2 MG/DL (ref 0–0.3)
BILIRUB SERPL-MCNC: 0.4 MG/DL
BUN SERPL-MCNC: 22.5 MG/DL (ref 6–20)
BUN SERPL-MCNC: 23.5 MG/DL (ref 6–20)
BUN SERPL-MCNC: 28.1 MG/DL (ref 6–20)
CA-I BLD-MCNC: 4.9 MG/DL (ref 4.4–5.2)
CALCIUM SERPL-MCNC: 10.1 MG/DL (ref 8.8–10.4)
CALCIUM SERPL-MCNC: 9.6 MG/DL (ref 8.8–10.4)
CALCIUM SERPL-MCNC: 9.7 MG/DL (ref 8.8–10.4)
CHLORIDE SERPL-SCNC: 104 MMOL/L (ref 98–107)
CHLORIDE SERPL-SCNC: 105 MMOL/L (ref 98–107)
CHLORIDE SERPL-SCNC: 97 MMOL/L (ref 98–107)
COHGB MFR BLD: 97.6 % (ref 96–97)
COHGB MFR BLD: 99.1 % (ref 96–97)
CREAT SERPL-MCNC: 0.6 MG/DL (ref 0.67–1.17)
CREAT SERPL-MCNC: 0.63 MG/DL (ref 0.67–1.17)
CREAT SERPL-MCNC: 0.68 MG/DL (ref 0.67–1.17)
EGFRCR SERPLBLD CKD-EPI 2021: >90 ML/MIN/1.73M2
EOSINOPHIL # BLD AUTO: ABNORMAL 10*3/UL
EOSINOPHIL # BLD MANUAL: 0 10E3/UL (ref 0–0.7)
EOSINOPHIL NFR BLD AUTO: ABNORMAL %
EOSINOPHIL NFR BLD MANUAL: 0 %
ERYTHROCYTE [DISTWIDTH] IN BLOOD BY AUTOMATED COUNT: 14.4 % (ref 10–15)
GLUCOSE BLDC GLUCOMTR-MCNC: 130 MG/DL (ref 70–99)
GLUCOSE BLDC GLUCOMTR-MCNC: 138 MG/DL (ref 70–99)
GLUCOSE BLDC GLUCOMTR-MCNC: 149 MG/DL (ref 70–99)
GLUCOSE BLDC GLUCOMTR-MCNC: 160 MG/DL (ref 70–99)
GLUCOSE SERPL-MCNC: 134 MG/DL (ref 70–99)
GLUCOSE SERPL-MCNC: 153 MG/DL (ref 70–99)
GLUCOSE SERPL-MCNC: 170 MG/DL (ref 70–99)
HCO3 BLD-SCNC: 26 MMOL/L (ref 21–28)
HCO3 BLD-SCNC: 29 MMOL/L (ref 21–28)
HCO3 BLDV-SCNC: 30 MMOL/L (ref 21–28)
HCO3 BLDV-SCNC: 31 MMOL/L (ref 21–28)
HCO3 BLDV-SCNC: 31 MMOL/L (ref 21–28)
HCO3 SERPL-SCNC: 26 MMOL/L (ref 22–29)
HCO3 SERPL-SCNC: 26 MMOL/L (ref 22–29)
HCO3 SERPL-SCNC: 27 MMOL/L (ref 22–29)
HCT VFR BLD AUTO: 36.2 % (ref 40–53)
HGB BLD-MCNC: 11.8 G/DL (ref 13.3–17.7)
IMM GRANULOCYTES # BLD: ABNORMAL 10*3/UL
IMM GRANULOCYTES NFR BLD: ABNORMAL %
LACTATE SERPL-SCNC: 2.8 MMOL/L (ref 0.7–2)
LACTATE SERPL-SCNC: 3 MMOL/L (ref 0.7–2)
LYMPHOCYTES # BLD AUTO: ABNORMAL 10*3/UL
LYMPHOCYTES # BLD MANUAL: 2.1 10E3/UL (ref 0.8–5.3)
LYMPHOCYTES NFR BLD AUTO: ABNORMAL %
LYMPHOCYTES NFR BLD MANUAL: 9 %
MAGNESIUM SERPL-MCNC: 2.2 MG/DL (ref 1.7–2.3)
MAGNESIUM SERPL-MCNC: 2.3 MG/DL (ref 1.7–2.3)
MCH RBC QN AUTO: 29.1 PG (ref 26.5–33)
MCHC RBC AUTO-ENTMCNC: 32.6 G/DL (ref 31.5–36.5)
MCV RBC AUTO: 89 FL (ref 78–100)
METAMYELOCYTES # BLD MANUAL: 0.5 10E3/UL
METAMYELOCYTES NFR BLD MANUAL: 2 %
MONOCYTES # BLD AUTO: ABNORMAL 10*3/UL
MONOCYTES # BLD MANUAL: 1.9 10E3/UL (ref 0–1.3)
MONOCYTES NFR BLD AUTO: ABNORMAL %
MONOCYTES NFR BLD MANUAL: 8 %
MYELOCYTES # BLD MANUAL: 0.2 10E3/UL
MYELOCYTES NFR BLD MANUAL: 1 %
NEUTROPHILS # BLD AUTO: ABNORMAL 10*3/UL
NEUTROPHILS # BLD MANUAL: 18.7 10E3/UL (ref 1.6–8.3)
NEUTROPHILS NFR BLD AUTO: ABNORMAL %
NEUTROPHILS NFR BLD MANUAL: 80 %
NRBC # BLD AUTO: 0 10E3/UL
NRBC # BLD AUTO: 0.2 10E3/UL
NRBC BLD AUTO-RTO: 0 /100
NRBC BLD MANUAL-RTO: 1 %
O2/TOTAL GAS SETTING VFR VENT: 0 %
O2/TOTAL GAS SETTING VFR VENT: 0 %
O2/TOTAL GAS SETTING VFR VENT: 21 %
OXYHGB MFR BLDV: 56 % (ref 70–75)
OXYHGB MFR BLDV: 60 % (ref 70–75)
OXYHGB MFR BLDV: 70 % (ref 70–75)
PCO2 BLD: 32 MM HG (ref 35–45)
PCO2 BLD: 38 MM HG (ref 35–45)
PCO2 BLDV: 40 MM HG (ref 40–50)
PCO2 BLDV: 41 MM HG (ref 40–50)
PCO2 BLDV: 42 MM HG (ref 40–50)
PH BLD: 7.5 [PH] (ref 7.35–7.45)
PH BLD: 7.52 [PH] (ref 7.35–7.45)
PH BLDV: 7.47 [PH] (ref 7.32–7.43)
PH BLDV: 7.48 [PH] (ref 7.32–7.43)
PH BLDV: 7.5 [PH] (ref 7.32–7.43)
PHOSPHATE SERPL-MCNC: 3.2 MG/DL (ref 2.5–4.5)
PLAT MORPH BLD: ABNORMAL
PLATELET # BLD AUTO: 223 10E3/UL (ref 150–450)
PO2 BLD: 82 MM HG (ref 80–105)
PO2 BLD: 94 MM HG (ref 80–105)
PO2 BLDV: 29 MM HG (ref 25–47)
PO2 BLDV: 31 MM HG (ref 25–47)
PO2 BLDV: 37 MM HG (ref 25–47)
POLYCHROMASIA BLD QL SMEAR: SLIGHT
POTASSIUM SERPL-SCNC: 3.6 MMOL/L (ref 3.4–5.3)
POTASSIUM SERPL-SCNC: 3.7 MMOL/L (ref 3.4–5.3)
POTASSIUM SERPL-SCNC: 4.1 MMOL/L (ref 3.4–5.3)
PROT SERPL-MCNC: 7.1 G/DL (ref 6.4–8.3)
RBC # BLD AUTO: 4.05 10E6/UL (ref 4.4–5.9)
RBC MORPH BLD: ABNORMAL
SAO2 % BLDA: 96 % (ref 92–100)
SAO2 % BLDA: 97 % (ref 92–100)
SAO2 % BLDV: 57.5 % (ref 70–75)
SAO2 % BLDV: 60.9 % (ref 70–75)
SAO2 % BLDV: 71.9 % (ref 70–75)
SODIUM SERPL-SCNC: 140 MMOL/L (ref 135–145)
SODIUM SERPL-SCNC: 142 MMOL/L (ref 135–145)
SODIUM SERPL-SCNC: 144 MMOL/L (ref 135–145)
SPECIMEN EXPIRATION DATE: NORMAL
UFH PPP CHRO-ACNC: <0.1 IU/ML
WBC # BLD AUTO: 23.4 10E3/UL (ref 4–11)

## 2024-09-02 PROCEDURE — 86900 BLOOD TYPING SEROLOGIC ABO: CPT | Performed by: INTERNAL MEDICINE

## 2024-09-02 PROCEDURE — 85027 COMPLETE CBC AUTOMATED: CPT | Performed by: STUDENT IN AN ORGANIZED HEALTH CARE EDUCATION/TRAINING PROGRAM

## 2024-09-02 PROCEDURE — 83605 ASSAY OF LACTIC ACID: CPT | Performed by: STUDENT IN AN ORGANIZED HEALTH CARE EDUCATION/TRAINING PROGRAM

## 2024-09-02 PROCEDURE — 200N000002 HC R&B ICU UMMC

## 2024-09-02 PROCEDURE — 250N000013 HC RX MED GY IP 250 OP 250 PS 637: Performed by: INTERNAL MEDICINE

## 2024-09-02 PROCEDURE — 82330 ASSAY OF CALCIUM: CPT | Performed by: STUDENT IN AN ORGANIZED HEALTH CARE EDUCATION/TRAINING PROGRAM

## 2024-09-02 PROCEDURE — 250N000011 HC RX IP 250 OP 636: Performed by: INTERNAL MEDICINE

## 2024-09-02 PROCEDURE — 250N000013 HC RX MED GY IP 250 OP 250 PS 637: Performed by: STUDENT IN AN ORGANIZED HEALTH CARE EDUCATION/TRAINING PROGRAM

## 2024-09-02 PROCEDURE — 82805 BLOOD GASES W/O2 SATURATION: CPT | Performed by: INTERNAL MEDICINE

## 2024-09-02 PROCEDURE — 85007 BL SMEAR W/DIFF WBC COUNT: CPT | Performed by: STUDENT IN AN ORGANIZED HEALTH CARE EDUCATION/TRAINING PROGRAM

## 2024-09-02 PROCEDURE — 80048 BASIC METABOLIC PNL TOTAL CA: CPT | Performed by: STUDENT IN AN ORGANIZED HEALTH CARE EDUCATION/TRAINING PROGRAM

## 2024-09-02 PROCEDURE — 250N000013 HC RX MED GY IP 250 OP 250 PS 637

## 2024-09-02 PROCEDURE — 71045 X-RAY EXAM CHEST 1 VIEW: CPT | Mod: 26 | Performed by: RADIOLOGY

## 2024-09-02 PROCEDURE — 85520 HEPARIN ASSAY: CPT | Performed by: INTERNAL MEDICINE

## 2024-09-02 PROCEDURE — 83735 ASSAY OF MAGNESIUM: CPT | Performed by: STUDENT IN AN ORGANIZED HEALTH CARE EDUCATION/TRAINING PROGRAM

## 2024-09-02 PROCEDURE — 99291 CRITICAL CARE FIRST HOUR: CPT | Mod: GC | Performed by: INTERNAL MEDICINE

## 2024-09-02 PROCEDURE — 80053 COMPREHEN METABOLIC PANEL: CPT | Performed by: STUDENT IN AN ORGANIZED HEALTH CARE EDUCATION/TRAINING PROGRAM

## 2024-09-02 PROCEDURE — 71045 X-RAY EXAM CHEST 1 VIEW: CPT

## 2024-09-02 PROCEDURE — 84100 ASSAY OF PHOSPHORUS: CPT | Performed by: STUDENT IN AN ORGANIZED HEALTH CARE EDUCATION/TRAINING PROGRAM

## 2024-09-02 PROCEDURE — 250N000011 HC RX IP 250 OP 636: Performed by: STUDENT IN AN ORGANIZED HEALTH CARE EDUCATION/TRAINING PROGRAM

## 2024-09-02 PROCEDURE — 82248 BILIRUBIN DIRECT: CPT

## 2024-09-02 PROCEDURE — 71045 X-RAY EXAM CHEST 1 VIEW: CPT | Mod: 76

## 2024-09-02 PROCEDURE — 250N000012 HC RX MED GY IP 250 OP 636 PS 637: Performed by: STUDENT IN AN ORGANIZED HEALTH CARE EDUCATION/TRAINING PROGRAM

## 2024-09-02 PROCEDURE — 86901 BLOOD TYPING SEROLOGIC RH(D): CPT | Performed by: INTERNAL MEDICINE

## 2024-09-02 PROCEDURE — 82805 BLOOD GASES W/O2 SATURATION: CPT | Performed by: STUDENT IN AN ORGANIZED HEALTH CARE EDUCATION/TRAINING PROGRAM

## 2024-09-02 PROCEDURE — 258N000003 HC RX IP 258 OP 636: Performed by: STUDENT IN AN ORGANIZED HEALTH CARE EDUCATION/TRAINING PROGRAM

## 2024-09-02 RX ORDER — PANTOPRAZOLE SODIUM 40 MG/1
40 TABLET, DELAYED RELEASE ORAL
Status: DISCONTINUED | OUTPATIENT
Start: 2024-09-02 | End: 2024-09-09

## 2024-09-02 RX ORDER — LOSARTAN POTASSIUM 25 MG/1
25 TABLET ORAL DAILY
Status: DISCONTINUED | OUTPATIENT
Start: 2024-09-02 | End: 2024-09-04

## 2024-09-02 RX ORDER — POTASSIUM CHLORIDE 29.8 MG/ML
20 INJECTION INTRAVENOUS ONCE
Status: COMPLETED | OUTPATIENT
Start: 2024-09-02 | End: 2024-09-02

## 2024-09-02 RX ORDER — HYDRALAZINE HYDROCHLORIDE 25 MG/1
50 TABLET, FILM COATED ORAL EVERY 6 HOURS SCHEDULED
Status: DISCONTINUED | OUTPATIENT
Start: 2024-09-02 | End: 2024-09-03

## 2024-09-02 RX ORDER — DIGOXIN 125 MCG
250 TABLET ORAL DAILY
Status: DISCONTINUED | OUTPATIENT
Start: 2024-09-02 | End: 2024-09-19 | Stop reason: HOSPADM

## 2024-09-02 RX ORDER — CHLOROTHIAZIDE SODIUM 500 MG/1
500 INJECTION INTRAVENOUS ONCE
Status: COMPLETED | OUTPATIENT
Start: 2024-09-02 | End: 2024-09-02

## 2024-09-02 RX ORDER — HYDRALAZINE HYDROCHLORIDE 25 MG/1
50 TABLET, FILM COATED ORAL EVERY 6 HOURS
Status: DISCONTINUED | OUTPATIENT
Start: 2024-09-02 | End: 2024-09-02

## 2024-09-02 RX ORDER — POTASSIUM CHLORIDE 1.5 G/1.58G
20 POWDER, FOR SOLUTION ORAL ONCE
Status: COMPLETED | OUTPATIENT
Start: 2024-09-02 | End: 2024-09-02

## 2024-09-02 RX ADMIN — RISPERIDONE 1.5 MG: 1 TABLET, FILM COATED ORAL at 20:35

## 2024-09-02 RX ADMIN — PANTOPRAZOLE SODIUM 40 MG: 40 TABLET, DELAYED RELEASE ORAL at 08:42

## 2024-09-02 RX ADMIN — THIAMINE HCL TAB 100 MG 100 MG: 100 TAB at 08:44

## 2024-09-02 RX ADMIN — ISOSORBIDE DINITRATE 30 MG: 10 TABLET ORAL at 08:43

## 2024-09-02 RX ADMIN — BUMETANIDE 2 MG: 0.25 INJECTION INTRAMUSCULAR; INTRAVENOUS at 17:05

## 2024-09-02 RX ADMIN — BUMETANIDE 2 MG: 0.25 INJECTION INTRAMUSCULAR; INTRAVENOUS at 00:02

## 2024-09-02 RX ADMIN — PREDNISONE 30 MG: 20 TABLET ORAL at 08:40

## 2024-09-02 RX ADMIN — BUMETANIDE 2 MG: 0.25 INJECTION INTRAMUSCULAR; INTRAVENOUS at 23:09

## 2024-09-02 RX ADMIN — HYDRALAZINE HYDROCHLORIDE 50 MG: 25 TABLET ORAL at 13:53

## 2024-09-02 RX ADMIN — NYSTATIN 500000 UNITS: 100000 SUSPENSION ORAL at 17:16

## 2024-09-02 RX ADMIN — DIGOXIN 250 MCG: 125 TABLET ORAL at 11:52

## 2024-09-02 RX ADMIN — NYSTATIN 500000 UNITS: 100000 SUSPENSION ORAL at 11:54

## 2024-09-02 RX ADMIN — LOSARTAN POTASSIUM 25 MG: 25 TABLET, FILM COATED ORAL at 11:52

## 2024-09-02 RX ADMIN — DOBUTAMINE 5 MCG/KG/MIN: 12.5 INJECTION, SOLUTION, CONCENTRATE INTRAVENOUS at 05:12

## 2024-09-02 RX ADMIN — ISOSORBIDE DINITRATE 30 MG: 10 TABLET ORAL at 13:53

## 2024-09-02 RX ADMIN — POTASSIUM CHLORIDE 20 MEQ: 1.5 POWDER, FOR SOLUTION ORAL at 23:09

## 2024-09-02 RX ADMIN — EMPAGLIFLOZIN 10 MG: 10 TABLET, FILM COATED ORAL at 13:53

## 2024-09-02 RX ADMIN — HEPARIN SODIUM 1150 UNITS/HR: 10000 INJECTION, SOLUTION INTRAVENOUS at 04:04

## 2024-09-02 RX ADMIN — HYDRALAZINE HYDROCHLORIDE 50 MG: 25 TABLET ORAL at 06:04

## 2024-09-02 RX ADMIN — BUMETANIDE 2 MG: 0.25 INJECTION INTRAMUSCULAR; INTRAVENOUS at 08:57

## 2024-09-02 RX ADMIN — POTASSIUM CHLORIDE 40 MEQ: 1.5 POWDER, FOR SOLUTION ORAL at 20:35

## 2024-09-02 RX ADMIN — POTASSIUM CHLORIDE 20 MEQ: 29.8 INJECTION, SOLUTION INTRAVENOUS at 04:52

## 2024-09-02 RX ADMIN — ISOSORBIDE DINITRATE 30 MG: 10 TABLET ORAL at 20:36

## 2024-09-02 RX ADMIN — POTASSIUM CHLORIDE 40 MEQ: 1.5 POWDER, FOR SOLUTION ORAL at 08:38

## 2024-09-02 RX ADMIN — CHLOROTHIAZIDE SODIUM 500 MG: 500 INJECTION, POWDER, LYOPHILIZED, FOR SOLUTION INTRAVENOUS at 16:55

## 2024-09-02 RX ADMIN — NYSTATIN 500000 UNITS: 100000 SUSPENSION ORAL at 09:07

## 2024-09-02 RX ADMIN — INSULIN GLARGINE 10 UNITS: 100 INJECTION, SOLUTION SUBCUTANEOUS at 23:09

## 2024-09-02 RX ADMIN — HYDRALAZINE HYDROCHLORIDE 50 MG: 25 TABLET ORAL at 00:02

## 2024-09-02 RX ADMIN — HYDRALAZINE HYDROCHLORIDE 50 MG: 25 TABLET ORAL at 23:09

## 2024-09-02 RX ADMIN — HYDRALAZINE HYDROCHLORIDE 50 MG: 25 TABLET ORAL at 18:39

## 2024-09-02 RX ADMIN — Medication 1 TABLET: at 08:44

## 2024-09-02 RX ADMIN — RISPERIDONE 1 MG: 1 TABLET, FILM COATED ORAL at 08:39

## 2024-09-02 ASSESSMENT — ACTIVITIES OF DAILY LIVING (ADL)
ADLS_ACUITY_SCORE: 37
ADLS_ACUITY_SCORE: 36
ADLS_ACUITY_SCORE: 37
ADLS_ACUITY_SCORE: 38
ADLS_ACUITY_SCORE: 37
ADLS_ACUITY_SCORE: 37
ADLS_ACUITY_SCORE: 36
ADLS_ACUITY_SCORE: 36

## 2024-09-02 NOTE — PLAN OF CARE
Calm and cooperative all night, afebrile, denies pain, MCALLISTER, follows commands, PERRLA. RA overnight sats %. -140s, occasional PVCs, continues on dobutamine gtt straight rate, Heparin gtt restarted, pulses palpable throughout, keeping MAP 65-95.  Hemodynamics: CVP 15-1, PA 44/17, 34/18, SVO2 68-70, CO 6.2-6.4, CI 2.8-2.9, -1006. UOP 40-250ml/hr.  Able to use bedpan overnight for continent BM x1. Replaced KCl x1.    Plans to discontinue fem A-line today and start PT/OT.   Will continue to update teams with any changes in condition per protocol.

## 2024-09-02 NOTE — PLAN OF CARE
ICU End of Shift Summary. See flowsheets for vital signs and detailed assessment.    Changes this shift: Assumed care [id number]. Able to make needs known, appropriate & cooperative with staff. Agreeable to nursing cares & interventions. Shifting wt in bed independently, remains on bedrest until arterial line removal. ST, 120s-160s, MAP goal of 60 maintained. Cards 2 adjusted BP meds to better control HR. [place] order obtained for removal - will remove this afternoon following BP trending. Dobutamine gtt remains @ 5, Hep gtt discontinued this AM. Had x1 soft stool incontinence. Swan remains in place for diuresis - will get removed following afternoon dose.    Plan: Deline this evening. Potential for [place] removal tomorrow. PT/OT to see.

## 2024-09-03 ENCOUNTER — APPOINTMENT (OUTPATIENT)
Dept: GENERAL RADIOLOGY | Facility: CLINIC | Age: 24
DRG: 853 | End: 2024-09-03
Attending: STUDENT IN AN ORGANIZED HEALTH CARE EDUCATION/TRAINING PROGRAM
Payer: MEDICARE

## 2024-09-03 ENCOUNTER — APPOINTMENT (OUTPATIENT)
Dept: CARDIOLOGY | Facility: CLINIC | Age: 24
DRG: 853 | End: 2024-09-03
Attending: STUDENT IN AN ORGANIZED HEALTH CARE EDUCATION/TRAINING PROGRAM
Payer: MEDICARE

## 2024-09-03 LAB
ALBUMIN SERPL BCG-MCNC: 4.2 G/DL (ref 3.5–5.2)
ALP SERPL-CCNC: 125 U/L (ref 40–150)
ALT SERPL W P-5'-P-CCNC: 190 U/L (ref 0–70)
ANION GAP SERPL CALCULATED.3IONS-SCNC: 15 MMOL/L (ref 7–15)
ANION GAP SERPL CALCULATED.3IONS-SCNC: 15 MMOL/L (ref 7–15)
ANION GAP SERPL CALCULATED.3IONS-SCNC: 18 MMOL/L (ref 7–15)
AST SERPL W P-5'-P-CCNC: 41 U/L (ref 0–45)
BASE EXCESS BLDV CALC-SCNC: 3.4 MMOL/L (ref -3–3)
BASOPHILS # BLD AUTO: ABNORMAL 10*3/UL
BASOPHILS # BLD MANUAL: 0 10E3/UL (ref 0–0.2)
BASOPHILS NFR BLD AUTO: ABNORMAL %
BASOPHILS NFR BLD MANUAL: 0 %
BILIRUB DIRECT SERPL-MCNC: <0.2 MG/DL (ref 0–0.3)
BILIRUB SERPL-MCNC: 0.4 MG/DL
BUN SERPL-MCNC: 31.1 MG/DL (ref 6–20)
BUN SERPL-MCNC: 31.3 MG/DL (ref 6–20)
BUN SERPL-MCNC: 32.7 MG/DL (ref 6–20)
CA-I BLD-MCNC: 4.9 MG/DL (ref 4.4–5.2)
CALCIUM SERPL-MCNC: 10 MG/DL (ref 8.8–10.4)
CALCIUM SERPL-MCNC: 9.4 MG/DL (ref 8.8–10.4)
CALCIUM SERPL-MCNC: 9.5 MG/DL (ref 8.8–10.4)
CHLORIDE SERPL-SCNC: 92 MMOL/L (ref 98–107)
CHLORIDE SERPL-SCNC: 98 MMOL/L (ref 98–107)
CHLORIDE SERPL-SCNC: 98 MMOL/L (ref 98–107)
CREAT SERPL-MCNC: 0.61 MG/DL (ref 0.67–1.17)
CREAT SERPL-MCNC: 0.66 MG/DL (ref 0.67–1.17)
CREAT SERPL-MCNC: 0.72 MG/DL (ref 0.67–1.17)
EGFRCR SERPLBLD CKD-EPI 2021: >90 ML/MIN/1.73M2
EOSINOPHIL # BLD AUTO: ABNORMAL 10*3/UL
EOSINOPHIL # BLD MANUAL: 0 10E3/UL (ref 0–0.7)
EOSINOPHIL NFR BLD AUTO: ABNORMAL %
EOSINOPHIL NFR BLD MANUAL: 0 %
ERYTHROCYTE [DISTWIDTH] IN BLOOD BY AUTOMATED COUNT: 14.7 % (ref 10–15)
GLUCOSE BLDC GLUCOMTR-MCNC: 111 MG/DL (ref 70–99)
GLUCOSE BLDC GLUCOMTR-MCNC: 121 MG/DL (ref 70–99)
GLUCOSE BLDC GLUCOMTR-MCNC: 123 MG/DL (ref 70–99)
GLUCOSE BLDC GLUCOMTR-MCNC: 128 MG/DL (ref 70–99)
GLUCOSE BLDC GLUCOMTR-MCNC: 175 MG/DL (ref 70–99)
GLUCOSE SERPL-MCNC: 117 MG/DL (ref 70–99)
GLUCOSE SERPL-MCNC: 149 MG/DL (ref 70–99)
GLUCOSE SERPL-MCNC: 173 MG/DL (ref 70–99)
HCO3 BLDV-SCNC: 27 MMOL/L (ref 21–28)
HCO3 SERPL-SCNC: 22 MMOL/L (ref 22–29)
HCO3 SERPL-SCNC: 23 MMOL/L (ref 22–29)
HCO3 SERPL-SCNC: 26 MMOL/L (ref 22–29)
HCT VFR BLD AUTO: 36.2 % (ref 40–53)
HGB BLD-MCNC: 11.8 G/DL (ref 13.3–17.7)
IMM GRANULOCYTES # BLD: ABNORMAL 10*3/UL
IMM GRANULOCYTES NFR BLD: ABNORMAL %
LACTATE SERPL-SCNC: 1.9 MMOL/L (ref 0.7–2)
LVEF ECHO: NORMAL
LYMPHOCYTES # BLD AUTO: ABNORMAL 10*3/UL
LYMPHOCYTES # BLD MANUAL: 2.4 10E3/UL (ref 0.8–5.3)
LYMPHOCYTES NFR BLD AUTO: ABNORMAL %
LYMPHOCYTES NFR BLD MANUAL: 7 %
MAGNESIUM SERPL-MCNC: 2.5 MG/DL (ref 1.7–2.3)
MAGNESIUM SERPL-MCNC: 2.7 MG/DL (ref 1.7–2.3)
MCH RBC QN AUTO: 29.3 PG (ref 26.5–33)
MCHC RBC AUTO-ENTMCNC: 32.6 G/DL (ref 31.5–36.5)
MCV RBC AUTO: 90 FL (ref 78–100)
MONOCYTES # BLD AUTO: ABNORMAL 10*3/UL
MONOCYTES # BLD MANUAL: 2.1 10E3/UL (ref 0–1.3)
MONOCYTES NFR BLD AUTO: ABNORMAL %
MONOCYTES NFR BLD MANUAL: 6 %
MYELOCYTES # BLD MANUAL: 0.7 10E3/UL
MYELOCYTES NFR BLD MANUAL: 2 %
NEUTROPHILS # BLD AUTO: ABNORMAL 10*3/UL
NEUTROPHILS # BLD MANUAL: 29.2 10E3/UL (ref 1.6–8.3)
NEUTROPHILS NFR BLD AUTO: ABNORMAL %
NEUTROPHILS NFR BLD MANUAL: 85 %
NRBC # BLD AUTO: 0 10E3/UL
NRBC BLD AUTO-RTO: 0 /100
O2/TOTAL GAS SETTING VFR VENT: 21 %
OXYHGB MFR BLDV: 69 % (ref 70–75)
PCO2 BLDV: 35 MM HG (ref 40–50)
PH BLDV: 7.49 [PH] (ref 7.32–7.43)
PHOSPHATE SERPL-MCNC: 4.7 MG/DL (ref 2.5–4.5)
PLAT MORPH BLD: ABNORMAL
PLATELET # BLD AUTO: 248 10E3/UL (ref 150–450)
PO2 BLDV: 37 MM HG (ref 25–47)
POLYCHROMASIA BLD QL SMEAR: SLIGHT
POTASSIUM SERPL-SCNC: 3.9 MMOL/L (ref 3.4–5.3)
POTASSIUM SERPL-SCNC: 4.3 MMOL/L (ref 3.4–5.3)
POTASSIUM SERPL-SCNC: 4.4 MMOL/L (ref 3.4–5.3)
PROT SERPL-MCNC: 7.3 G/DL (ref 6.4–8.3)
RBC # BLD AUTO: 4.03 10E6/UL (ref 4.4–5.9)
RBC MORPH BLD: ABNORMAL
SAO2 % BLDV: 70 % (ref 70–75)
SODIUM SERPL-SCNC: 132 MMOL/L (ref 135–145)
SODIUM SERPL-SCNC: 136 MMOL/L (ref 135–145)
SODIUM SERPL-SCNC: 139 MMOL/L (ref 135–145)
WBC # BLD AUTO: 34.4 10E3/UL (ref 4–11)

## 2024-09-03 PROCEDURE — 84100 ASSAY OF PHOSPHORUS: CPT | Performed by: STUDENT IN AN ORGANIZED HEALTH CARE EDUCATION/TRAINING PROGRAM

## 2024-09-03 PROCEDURE — 83735 ASSAY OF MAGNESIUM: CPT | Performed by: STUDENT IN AN ORGANIZED HEALTH CARE EDUCATION/TRAINING PROGRAM

## 2024-09-03 PROCEDURE — 82310 ASSAY OF CALCIUM: CPT | Performed by: STUDENT IN AN ORGANIZED HEALTH CARE EDUCATION/TRAINING PROGRAM

## 2024-09-03 PROCEDURE — 82805 BLOOD GASES W/O2 SATURATION: CPT | Performed by: STUDENT IN AN ORGANIZED HEALTH CARE EDUCATION/TRAINING PROGRAM

## 2024-09-03 PROCEDURE — 250N000013 HC RX MED GY IP 250 OP 250 PS 637: Performed by: INTERNAL MEDICINE

## 2024-09-03 PROCEDURE — 83605 ASSAY OF LACTIC ACID: CPT | Performed by: STUDENT IN AN ORGANIZED HEALTH CARE EDUCATION/TRAINING PROGRAM

## 2024-09-03 PROCEDURE — 258N000003 HC RX IP 258 OP 636: Performed by: STUDENT IN AN ORGANIZED HEALTH CARE EDUCATION/TRAINING PROGRAM

## 2024-09-03 PROCEDURE — 93308 TTE F-UP OR LMTD: CPT | Mod: 26 | Performed by: INTERNAL MEDICINE

## 2024-09-03 PROCEDURE — 120N000003 HC R&B IMCU UMMC

## 2024-09-03 PROCEDURE — 255N000002 HC RX 255 OP 636: Performed by: STUDENT IN AN ORGANIZED HEALTH CARE EDUCATION/TRAINING PROGRAM

## 2024-09-03 PROCEDURE — 999N000208 ECHOCARDIOGRAM LIMITED

## 2024-09-03 PROCEDURE — 80053 COMPREHEN METABOLIC PANEL: CPT | Performed by: STUDENT IN AN ORGANIZED HEALTH CARE EDUCATION/TRAINING PROGRAM

## 2024-09-03 PROCEDURE — 272N000452 HC KIT SHRLOCK 5FR POWER PICC TRIPLE LUMEN

## 2024-09-03 PROCEDURE — 36569 INSJ PICC 5 YR+ W/O IMAGING: CPT

## 2024-09-03 PROCEDURE — 71045 X-RAY EXAM CHEST 1 VIEW: CPT

## 2024-09-03 PROCEDURE — 250N000009 HC RX 250: Performed by: STUDENT IN AN ORGANIZED HEALTH CARE EDUCATION/TRAINING PROGRAM

## 2024-09-03 PROCEDURE — 82248 BILIRUBIN DIRECT: CPT

## 2024-09-03 PROCEDURE — 250N000013 HC RX MED GY IP 250 OP 250 PS 637: Performed by: STUDENT IN AN ORGANIZED HEALTH CARE EDUCATION/TRAINING PROGRAM

## 2024-09-03 PROCEDURE — 250N000011 HC RX IP 250 OP 636: Performed by: STUDENT IN AN ORGANIZED HEALTH CARE EDUCATION/TRAINING PROGRAM

## 2024-09-03 PROCEDURE — 250N000013 HC RX MED GY IP 250 OP 250 PS 637

## 2024-09-03 PROCEDURE — 85027 COMPLETE CBC AUTOMATED: CPT | Performed by: STUDENT IN AN ORGANIZED HEALTH CARE EDUCATION/TRAINING PROGRAM

## 2024-09-03 PROCEDURE — 99291 CRITICAL CARE FIRST HOUR: CPT | Mod: 25 | Performed by: INTERNAL MEDICINE

## 2024-09-03 PROCEDURE — 85007 BL SMEAR W/DIFF WBC COUNT: CPT | Performed by: STUDENT IN AN ORGANIZED HEALTH CARE EDUCATION/TRAINING PROGRAM

## 2024-09-03 PROCEDURE — 272N000473 HC KIT, VPS RHYTHM STYLET

## 2024-09-03 PROCEDURE — 99222 1ST HOSP IP/OBS MODERATE 55: CPT | Performed by: PSYCHIATRY & NEUROLOGY

## 2024-09-03 PROCEDURE — 71045 X-RAY EXAM CHEST 1 VIEW: CPT | Mod: 26 | Performed by: RADIOLOGY

## 2024-09-03 PROCEDURE — 250N000013 HC RX MED GY IP 250 OP 250 PS 637: Performed by: NURSE PRACTITIONER

## 2024-09-03 PROCEDURE — 250N000012 HC RX MED GY IP 250 OP 636 PS 637: Performed by: STUDENT IN AN ORGANIZED HEALTH CARE EDUCATION/TRAINING PROGRAM

## 2024-09-03 PROCEDURE — 999N000065 XR CHEST PORT 1 VIEW

## 2024-09-03 PROCEDURE — 80048 BASIC METABOLIC PNL TOTAL CA: CPT | Performed by: STUDENT IN AN ORGANIZED HEALTH CARE EDUCATION/TRAINING PROGRAM

## 2024-09-03 PROCEDURE — 82330 ASSAY OF CALCIUM: CPT | Performed by: STUDENT IN AN ORGANIZED HEALTH CARE EDUCATION/TRAINING PROGRAM

## 2024-09-03 RX ORDER — BUMETANIDE 0.25 MG/ML
2 INJECTION INTRAMUSCULAR; INTRAVENOUS EVERY 8 HOURS
Status: DISCONTINUED | OUTPATIENT
Start: 2024-09-03 | End: 2024-09-05

## 2024-09-03 RX ORDER — LOSARTAN POTASSIUM 25 MG/1
25 TABLET ORAL ONCE
Status: COMPLETED | OUTPATIENT
Start: 2024-09-03 | End: 2024-09-03

## 2024-09-03 RX ORDER — ENOXAPARIN SODIUM 100 MG/ML
40 INJECTION SUBCUTANEOUS EVERY 24 HOURS
Status: DISCONTINUED | OUTPATIENT
Start: 2024-09-03 | End: 2024-09-19 | Stop reason: HOSPADM

## 2024-09-03 RX ORDER — HYDRALAZINE HYDROCHLORIDE 25 MG/1
25 TABLET, FILM COATED ORAL EVERY 6 HOURS SCHEDULED
Status: DISCONTINUED | OUTPATIENT
Start: 2024-09-03 | End: 2024-09-04

## 2024-09-03 RX ORDER — HEPARIN SODIUM,PORCINE 10 UNIT/ML
5-20 VIAL (ML) INTRAVENOUS
Status: DISCONTINUED | OUTPATIENT
Start: 2024-09-03 | End: 2024-09-19 | Stop reason: HOSPADM

## 2024-09-03 RX ORDER — ISOSORBIDE DINITRATE 10 MG/1
20 TABLET ORAL 3 TIMES DAILY
Status: DISCONTINUED | OUTPATIENT
Start: 2024-09-03 | End: 2024-09-04

## 2024-09-03 RX ORDER — ISOSORBIDE DINITRATE 10 MG/1
20 TABLET ORAL 3 TIMES DAILY
Status: DISCONTINUED | OUTPATIENT
Start: 2024-09-03 | End: 2024-09-03

## 2024-09-03 RX ORDER — HEPARIN SODIUM,PORCINE 10 UNIT/ML
5-20 VIAL (ML) INTRAVENOUS EVERY 24 HOURS
Status: DISCONTINUED | OUTPATIENT
Start: 2024-09-03 | End: 2024-09-19 | Stop reason: HOSPADM

## 2024-09-03 RX ORDER — LIDOCAINE 40 MG/G
CREAM TOPICAL
Status: DISCONTINUED | OUTPATIENT
Start: 2024-09-03 | End: 2024-09-06

## 2024-09-03 RX ADMIN — SENNOSIDES AND DOCUSATE SODIUM 1 TABLET: 8.6; 5 TABLET ORAL at 20:53

## 2024-09-03 RX ADMIN — Medication 5 ML: at 17:28

## 2024-09-03 RX ADMIN — NYSTATIN 500000 UNITS: 100000 SUSPENSION ORAL at 20:56

## 2024-09-03 RX ADMIN — PREDNISONE 20 MG: 20 TABLET ORAL at 08:48

## 2024-09-03 RX ADMIN — BUMETANIDE 2 MG: 0.25 INJECTION INTRAMUSCULAR; INTRAVENOUS at 17:28

## 2024-09-03 RX ADMIN — Medication 1 TABLET: at 08:48

## 2024-09-03 RX ADMIN — LIDOCAINE HYDROCHLORIDE 5 ML: 10 INJECTION, SOLUTION EPIDURAL; INFILTRATION; INTRACAUDAL; PERINEURAL at 16:48

## 2024-09-03 RX ADMIN — HYDRALAZINE HYDROCHLORIDE 50 MG: 25 TABLET ORAL at 05:38

## 2024-09-03 RX ADMIN — INSULIN GLARGINE 10 UNITS: 100 INJECTION, SOLUTION SUBCUTANEOUS at 20:53

## 2024-09-03 RX ADMIN — POTASSIUM CHLORIDE 40 MEQ: 1.5 POWDER, FOR SOLUTION ORAL at 20:52

## 2024-09-03 RX ADMIN — POTASSIUM CHLORIDE 40 MEQ: 1.5 POWDER, FOR SOLUTION ORAL at 08:48

## 2024-09-03 RX ADMIN — NYSTATIN 500000 UNITS: 100000 SUSPENSION ORAL at 17:10

## 2024-09-03 RX ADMIN — THIAMINE HCL TAB 100 MG 100 MG: 100 TAB at 08:48

## 2024-09-03 RX ADMIN — HUMAN ALBUMIN MICROSPHERES AND PERFLUTREN 5 ML: 10; .22 INJECTION, SOLUTION INTRAVENOUS at 13:41

## 2024-09-03 RX ADMIN — DOBUTAMINE 5 MCG/KG/MIN: 12.5 INJECTION, SOLUTION, CONCENTRATE INTRAVENOUS at 05:39

## 2024-09-03 RX ADMIN — DIGOXIN 250 MCG: 125 TABLET ORAL at 08:48

## 2024-09-03 RX ADMIN — NYSTATIN 500000 UNITS: 100000 SUSPENSION ORAL at 08:48

## 2024-09-03 RX ADMIN — ENOXAPARIN SODIUM 40 MG: 40 INJECTION SUBCUTANEOUS at 11:25

## 2024-09-03 RX ADMIN — PANTOPRAZOLE SODIUM 40 MG: 40 TABLET, DELAYED RELEASE ORAL at 08:48

## 2024-09-03 RX ADMIN — LOSARTAN POTASSIUM 25 MG: 25 TABLET, FILM COATED ORAL at 17:28

## 2024-09-03 RX ADMIN — NYSTATIN 500000 UNITS: 100000 SUSPENSION ORAL at 11:24

## 2024-09-03 RX ADMIN — EMPAGLIFLOZIN 10 MG: 10 TABLET, FILM COATED ORAL at 08:49

## 2024-09-03 RX ADMIN — BUMETANIDE 2 MG: 0.25 INJECTION INTRAMUSCULAR; INTRAVENOUS at 11:25

## 2024-09-03 RX ADMIN — RISPERIDONE 1.5 MG: 1 TABLET, FILM COATED ORAL at 20:51

## 2024-09-03 RX ADMIN — RISPERIDONE 1 MG: 1 TABLET, FILM COATED ORAL at 08:49

## 2024-09-03 ASSESSMENT — ACTIVITIES OF DAILY LIVING (ADL)
ADLS_ACUITY_SCORE: 37
ADLS_ACUITY_SCORE: 36
ADLS_ACUITY_SCORE: 37
ADLS_ACUITY_SCORE: 36
ADLS_ACUITY_SCORE: 37

## 2024-09-03 NOTE — PLAN OF CARE
Goal Outcome Evaluation:      Plan of Care Reviewed With: parent    Overall Patient Progress: improvingOverall Patient Progress: improving       Neuro: A&Ox4. Follows commands. Makes needs known. Needs reinforcement at times.   Cardiac: -150. VSS. Afebrile.    Respiratory: Sating >92% on RA.  GI/: Adequate urine output via urinal. Smear BM's throughout the day.   Diet/appetite: Tolerating Cardiac diet. Eating well.  Activity:  Assist of 1-2, up to chair and in halls.  Pain: Denies pain   Skin: No new deficits noted.  LDA's: TL PICC   Gtts: Dobutamine @ 5    Plan: Continue with POC. Notify primary team with changes.

## 2024-09-03 NOTE — PROGRESS NOTES
Major Shift Events: pt in ST. Bumex and diuril given with good UOP. Minford waveform dampened- CXR showing good positioning. Cards 2 fellow pulled back swan 1 cm.  pt got up to the chair for the first time since admission and tolerated well. Ate 100% of his dinner.     Plan: increase activity as able tomorrow.   For vital signs and complete assessments, please see documentation flowsheets.         Writer assumed care for pt from [id number].

## 2024-09-03 NOTE — PROGRESS NOTES
Reviewed honoring choices note regarding guardian.  Per documentation Johann Hidalgo is the court appointed guardian.  Attempt made to reach Johann by phone left message.    Plan: PICC will be placed after consent obtained from guardian.

## 2024-09-03 NOTE — CONSULTS
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 may contain errors,  omissions and inadvertent word substitution.\"           "

## 2024-09-03 NOTE — PROGRESS NOTES
Attempt R arm for placing the picc line and unable to advance the catheter pass axillary area .    RN want to give him some time and come back after he has his lunch .

## 2024-09-03 NOTE — PROCEDURES
[facility]    Triple Lumen PICC Placement    Date/Time: 9/3/2024 4:35 PM    Performed by: Allan White RN  Authorized by: Matt Cabral MD  Indications: vascular access      UNIVERSAL PROTOCOL   Site Marked: Yes  Prior Images Obtained and Reviewed:  Yes  Required items: Required blood products, implants, devices and special equipment available    Patient identity confirmed:  Verbally with patient, arm band, provided demographic data, hospital-assigned identification number and anonymous protocol, patient vented/unresponsive  NA - No sedation, light sedation, or local anesthesia  Confirmation Checklist:  Patient's identity using two indicators, relevant allergies, procedure was appropriate and matched the consent or emergent situation and correct equipment/implants were available  Time out: Immediately prior to the procedure a time out was called (Allan)    Universal Protocol: the [facility] Universal Protocol was followed    Preparation: Patient was prepped and draped in usual sterile fashion       ANESTHESIA    Anesthesia:  Local infiltration  Local Anesthetic:  Lidocaine 1% without epinephrine  Anesthetic Total (mL):  5      SEDATION    Patient Sedated: No        Preparation: skin prepped with ChloraPrep  Skin prep agent: skin prep agent completely dried prior to procedure  Sterile barriers: maximum sterile barriers were used: cap, mask, sterile gown, sterile gloves, and large sterile sheet  Hand hygiene: hand hygiene performed prior to central venous catheter insertion  Type of line used: PICC  Catheter type: triple lumen  Lumen type: non-valved and power PICC  Lumen Identification: Gray, Red and White  Catheter size: 5 Fr  Brand: Bard  Lot number: [id number]  Placement method: venipuncture, MST, ultrasound and tip navigation system  Number of attempts: 1  Difficulty threading catheter: no  Successful placement: yes  Orientation: left  Catheter to Vein (%):  26  Location: basilic vein  Tip Location: SVC  Arm circumference: adults 10 cm  Extremity circumference: 28  Visible catheter length: 10  Total catheter length: 53  Dressing and securement: adhesive securement device, alcohol impregnated caps, blood cleaned with CHG, chlorhexidine patch applied, blood removed, fixation device, gloves changed prior to final dressing, glue, line secured, secure lock, securement device, site cleansed and transparent securement dressing  Post procedure assessment: blood return through all ports, free fluid flow and placement verified by 3CG technology  PROCEDURE Describe Procedure: Left basilic vein 0.67cm dm. Placement verified by [facility] 3CG.PICC okay to use.  Disposal: sharps and needle count correct at the end of procedure, needles and guidewire disposed in sharps container         No

## 2024-09-03 NOTE — PROGRESS NOTES
[facility]  Cardiology Heart Failure Service (Cards 2) Progress Note    Patient Name: Terrance Hidalgo    Medical Record Number: [id number]    YOB: 2000  PCP: Colt Flores    Admit Date/Time: 8/20/2024 10:13 PM   14    Assessment/Plan:    23 yo M with PMH autism who initially presented to [place] with cough, URI symptoms, and hypoxemia. He was intubated due to respiratory failure and transferred to [facility], where his hospital course transpired to mixed cardiogenic/septic shock requiring multiple pressor agents, inotropic support, and IV diuresis. Transferred again to [facility] CICU on 8/20 for continued management. Presentation initially highly suspicious for fulminant myocarditis base on elevated cardiac and inflammatory biomarkers, new (suspected), severe biventricular dysfunction w/ reduced cardiac output requiring intermittent inotropic support, and concurrent infectious/respiratory symptomotology c/b acute respiratory failure 2/2 ARDS vs cardiogenic etiology 2/2 miocarditis with superimposed aspiration PNM treated with sulbactam. Empirically pulsed with steroids. However, EMB 5/21 demonstrated unremarkable myocardial tissue with no evidence of inflammatory infiltrate.  Hospital course further complicated by worsening cardiogenic/septic shock with worsening renal and liver failure requiring IABP placement on 8/27. Jarocho weaned off on 8/28 and extubated.  IABP removed on 9/1 and closed with 8Fr angioseal.     - ECMO candidate     Changes:  - remove [place] after placing PICC  - hold hydralazine/isordil and increase losartan dose  if tolerated   - psych consult for stopping risperidone given possible relation to his myocarditis with a severe hemodynamic compromise     Neuro:     #AMS - resolved  #Sedation   - moving x 4 intermittent following commands     Plan:      - prn atarax  - c/w Seroquel       CV:    #Biventricular failure LVEF 25%   #Cardiogenic shock SCAI  C  #Lactic acidosis   TTE (24): LVIDd 58mm. Biplane LVEF is 16%. Severe hypokinesis of mid to apical RV free wall.  RHC (24): RA mean 6, PA , PCWP 18, Wayne CO/CI 5.0/2.1, PVR 1.6, SVR 1270  EMB (24): Fragments of essentially unremarkable myocardial tissue with no evidence of inflammatory infiltrate   Coronary angiogram: NA   CMR: None - will obtain prior to discharge     DATE MAP CVP PAP PCWP Wayne CO Wayne CI SVR PVR Mvo2 LA Therapies    82 10  16 3.5 1.6 1800 3.7 54 1.4 IABP 1:1, dobutamine 5    80 12  12 6.1 2.7 984 <2 70 2 IABP1:1 dobutamine 5 nipride 0.75    75 15 40/14 12 5.9 2.6 1180  69 0.8 IABP 1:1  5, nipride 0.25    90 10 38/ 13 7.1 3.2 800 <2 73 0.9 IABP 1:2,  5    84 11   6.2 2.8 1000  70   5                     Plan:  - c/w dobutamine for biventricular inotropic support   - hold hydrla/isordil  - increase losartan   - diuresis with CVP goal 8-10  - empa  - digoxin for inotropic support   - c/w steroid taper    Pulm:     #Acute hypoxic respiratory failure - resolving    #Pleular effusion - resolved   #concern for Aspiration PNM  - resolving      Plan:    - incentive spirometer, peridex   - abx per below    - wean off NC     GI/Nutrition:    #Transaminitis - resolving     1. Hepatomegaly with increased echogenicity of the liver, which may be  seen with intrinsic parenchymal disease such as steatosis.  2. Biliary sludge without sonographic evidence for acute  cholecystitis.  3. Normal antegrade flow in the extrahepatic and right portal vein.   4. The remainder of the findings are significantly limited secondary  to obscuring bowel gas and technical challenges related to patient  mobility.    Plan:    - monitor MELD labs  - treatment per above     Renal:    #HyperNa  - resolved  #Hypervolemia - improving   #LA    Plan:    - frequent BMP   - hold bactim for possible contributing factor to LA     ID:     #Concern for aspiration PNM    #Transaminitis   #Recurrent fevers of unclear etiology     - Antimicrobials       > Unasyn 8/21 - 8/26       > Cefepime 8/20 - 8/21       > Azithromycin 8/20 - 08/24       > Vanco 8/20 - 8/21 s/p 8/26       > meropenem 8/26-9/1       > zosyn 8/26 - 8/28    Plan:  - observe off abx    Heme:    #Anemia of critical illness     Plan:  - H/H  - transfuse if hb < 7 or drop > 2 g     Endo:    Plan:  - FSG goal 140-160  - insulin   - lantus     Family Meeting/Goals of Care:  - last time family updated: TBD   - next of kin/HCP:  Johann Hidalgo(TEMPORARY GUARDIAN TO 10-)  Brother  [phone]  [phone] [facility] [phone]  Legal guardian    Disposition:   - Unit   Code Status:   Full Code     Thank you for allowing me to care for this patient, please don't hesitate to contact me with any questions regarding this plan.     Patient was discussed and evaluated with Dr. Luis HOUSE, attending physician, who agrees with the assessment and plan above.    Matt Cabral MD, PhD, MSc  Cardiology Fellow    Physical Exam:    General appearance - Lying in bed; on NC  Head: Normocephalic and atraumatic.   Eyes: Pupils are equal, round, and reactive to light. No scleral icterus.  Neck: no JVD, bruit.  Cardiovascular: Tachycardiac regular rhythm. S1 and S2 auscultated. No murmurs, rub, or gallops.  Pulmonary/Chest: clear breath bilaterally   Abdominal: Bowel sounds present. Soft. No distension. No rigidity, rebound or guarding.   Extremities: Warm, no cyanosis, 2+ distal pulses bilaterally. Pitting edema b/l +1.  R groin site no local sdigns of infection or hematoma   Skin: Skin is warm and not diaphoretic. No rash, bruising, or erythema.  Neuro: Sedated     Objective data:    Temp:  [99  F (37.2  C)-99.9  F (37.7  C)] 99.3  F (37.4  C)  Pulse:  [104-155] 142  Resp:  [10-35] 12  BP: ()/(40-98) 89/57  MAP:  [67 mmHg-102 mmHg] 81 mmHg  Arterial Line BP: ()/(55-85) 103/69  SpO2:  [93 %-100 %] 95  %    Vitals:    08/29/24 0000 08/30/24 0400 08/31/24 0000 09/01/24 0000   Weight: 95.1 kg (209 lb 10.5 oz) 96.1 kg (211 lb 13.8 oz) 96.2 kg (212 lb 1.3 oz) 96 kg (211 lb 10.3 oz)    09/03/24 0000   Weight: 96.5 kg (212 lb 11.9 oz)        Vent Settings:  Resp: 12 SpO2: 95 % O2 Device: None (Room air)    Resp: 12    LABS Reviewed  IMAGES Reviewed    Current medications   Current Facility-Administered Medications   Medication Dose Route Frequency Provider Last Rate Last Admin    acetaminophen (TYLENOL) tablet 650 mg  650 mg Oral or Feeding Tube Q8H PRN Sudheer Rojas MD   650 mg at 08/30/24 0057    albuterol (PROVENTIL) neb solution 2.5 mg  2.5 mg Nebulization Q6H PRN Matt Cabral MD        bumetanide (BUMEX) injection 2 mg  2 mg Intravenous Q8H Matt Cabral MD        dextrose 10% infusion   Intravenous Continuous PRN Colten Garay MD        glucose gel 15-30 g  15-30 g Oral Q15 Min PRN Colten Garay MD        Or    dextrose 50 % injection 25-50 mL  25-50 mL Intravenous Q15 Min PRN Colten Garay MD        Or    glucagon injection 1 mg  1 mg Subcutaneous Q15 Min PRN Colten Garay MD        digoxin (LANOXIN) tablet 250 mcg  250 mcg Oral Daily Matt Cabral MD   250 mcg at 09/03/24 0848    DOBUTamine (DOBUTREX) 1,000 mg in D5W 250 mL infusion (adult MAX conc)  5 mcg/kg/min (Dosing Weight) Intravenous Continuous Matt Cabral MD 8.1 mL/hr at 09/03/24 0700 5 mcg/kg/min at 09/03/24 0700    empagliflozin (JARDIANCE) tablet 10 mg  10 mg Oral Daily Matt Cabral MD   10 mg at 09/03/24 0849    [Held by provider] hydrALAZINE (APRESOLINE) tablet 25 mg  25 mg Oral or Feeding Tube Q6H DARCY Matt Cabral MD        hydrOXYzine HCl (ATARAX) tablet 10 mg  10 mg Oral TID PRN Matt Cabral MD   10 mg at 08/31/24 2013    insulin aspart (NovoLOG) injection (RAPID ACTING)  1-3 Units Subcutaneous TID AC Matt Cabral MD   1 Units at 09/02/24 0847    insulin aspart (NovoLOG) injection (RAPID  ACTING)  1-3 Units Subcutaneous At Bedtime Matt Cabral MD        insulin glargine (LANTUS PEN) injection 10 Units  10 Units Subcutaneous At Bedtime Matt Cabral MD   10 Units at 09/02/24 2309    ipratropium - albuterol 0.5 mg/2.5 mg/3 mL (DUONEB) neb solution 3 mL  3 mL Nebulization Q4H PRN Sudheer Rojas MD   3 mL at 08/25/24 0536    isosorbide dinitrate (ISORDIL) tablet 20 mg  20 mg Oral TID Matt Cabral MD        lidocaine (LMX4) cream   Topical Q1H PRN Matt Cabral MD        lidocaine (LMX4) cream   Topical Q1H PRN Sudheer Rojas MD        lidocaine 1 % 0.1-1 mL  0.1-1 mL Other Q1H PRN Sudheer Rojas MD        lidocaine 1 % 0.1-5 mL  0.1-5 mL Other Q1H PRN Matt Cabral MD        losartan (COZAAR) tablet 25 mg  25 mg Oral Once Matt Cabral MD        losartan (COZAAR) tablet 25 mg  25 mg Oral Daily Matt Cabral MD   25 mg at 09/02/24 1152    medication instruction   Does not apply Continuous PRN Sudheer Rojas MD        metoclopramide (REGLAN) injection 5 mg  5 mg Intravenous Q6H PRN Shantanu Mclean MD   5 mg at 09/01/24 0639    multivitamin w/minerals (THERA-VIT-M) tablet 1 tablet  1 tablet Oral Daily Luis Smith MD   1 tablet at 09/03/24 0848    naloxone (NARCAN) injection 0.2 mg  0.2 mg Intravenous Q2 Min PRN Jani Martin MD        Or    naloxone (NARCAN) injection 0.4 mg  0.4 mg Intravenous Q2 Min PRN Jani Martin MD        Or    naloxone (NARCAN) injection 0.2 mg  0.2 mg Intramuscular Q2 Min PRN Jani Martin MD        Or    naloxone (NARCAN) injection 0.4 mg  0.4 mg Intramuscular Q2 Min PRN Jani Martin MD        nystatin (MYCOSTATIN) suspension 500,000 Units  500,000 Units Swish & Spit 4x Daily MutLeonel oliverhira, MD   500,000 Units at 09/03/24 0848    ondansetron (ZOFRAN) injection 4 mg  4 mg Intravenous Q6H PRN Shantanu Mclean MD   4 mg at 08/31/24 1028    pantoprazole (PROTONIX) EC tablet 40 mg  40 [facility] Luis,  MD Luis   40 mg at 09/03/24 0848    [Held by provider] polyethylene glycol (MIRALAX) Packet 17 g  17 g Oral or Feeding Tube Daily Andree Becerril, APRN CNP   17 g at 08/23/24 1049    polyethylene glycol (MIRALAX) Packet 17 g  17 g Oral Daily PRN Sudheer Rojas MD   17 g at 08/22/24 1430    potassium chloride (KLOR-CON) Packet 40 mEq  40 mEq Oral or Feeding Tube BID Matt Cabral MD   40 mEq at 09/03/24 0848    predniSONE (DELTASONE) tablet 20 mg  20 mg Oral or Feeding Tube Daily Patricia Dukes MD   20 mg at 09/03/24 0848    Followed by    [START ON 9/6/2024] predniSONE (DELTASONE) tablet 10 mg  10 mg Oral or Feeding Tube Daily Patricia Dukes MD        Followed by    [START ON 9/9/2024] predniSONE (DELTASONE) tablet 5 mg  5 mg Oral or Feeding Tube Daily Patricia Dukes MD        risperiDONE (risperDAL) tablet 1 mg  1 mg Oral or Feeding Tube Daily Patricia Dukes MD   1 mg at 09/03/24 0849    risperiDONE (risperDAL) tablet 1.5 mg  1.5 mg Oral or Feeding Tube QPM Patricia Dukes MD   1.5 mg at 09/02/24 2035    selenium sulfide (SELSUN) 1 % shampoo   Topical Daily PRN Matt Cabral MD   Given at 09/01/24 2103    senna-docusate (SENOKOT-S/PERICOLACE) 8.6-50 MG per tablet 1 tablet  1 tablet Oral or Feeding Tube At Bedtime Andree Becerril, APRN CNP   1 tablet at 08/27/24 2021    senna-docusate (SENOKOT-S/PERICOLACE) 8.6-50 MG per tablet 1 tablet  1 tablet Oral or Feeding Tube BID PRN Jani Martin MD        Or    senna-docusate (SENOKOT-S/PERICOLACE) 8.6-50 MG per tablet 2 tablet  2 tablet Oral or Feeding Tube BID PRN Jani Martin MD        sodium chloride (PF) 0.9% PF flush 10-40 mL  10-40 mL Intracatheter Once PRN Matt Cabral MD        sodium chloride (PF) 0.9% PF flush 3 mL  3 mL Intracatheter Q8H Sudheer Rojas MD   3 mL at 09/02/24 4205    sodium chloride (PF) 0.9% PF flush 3 mL  3 mL Intracatheter q1 min prn Sudheer Rojas MD        thiamine (B-1) tablet 100 mg  100 mg Oral or Feeding Tube  Daily Graciela Whittaker MD   100 mg at 09/03/24 0848       Medications Prior to Admission   Medication Sig Dispense Refill Last Dose    risperiDONE (RISPERDAL) 1 MG tablet Take 1 mg by mouth every morning.   Past Week at unknown    risperiDONE (RISPERDAL) 1 MG tablet Take 1.5 mg by mouth every evening.   Past Week at unknown

## 2024-09-03 NOTE — PLAN OF CARE
Neuro: A&Ox4, MCALLISTER. PERRLA, follows commands, agreeable, cooperative. Tmax 99.9. Provide simple instructions. Up to chair with Ax2 for line management    CV:   Rate/rhythm: -160; occasional PVCs  SWAN @46cm,   Keep MAPs <95 Last NAYA: CVP 7 , PA 27/16 , CI 1.9, SVR 1123, SvO2 56  Pulses dopplerable  Dobutamine @ 5    Pulm: RA, good cough    GI: Cardiac diet. 2gm Na+, large BM 9/2, requiring 1:1 assistance with meals. Denied nausea.    : Pulled dockery and due to void at 1100    Skin: scattered bruises. Lower back rash. Mepilex on sacrum    Lines:  L PIV   L cath lab swan with side port     Drips:   Dobutamine 5    Labs:   Trending lactics, K+ replaced        Goal Outcome Evaluation:      Plan of Care Reviewed With: patient    Overall Patient Progress: improvingOverall Patient Progress: improving    Outcome Evaluation: Pulled dockery, VSS on RA, MAP and BP within parameters with scheduled meds

## 2024-09-04 ENCOUNTER — APPOINTMENT (OUTPATIENT)
Dept: PHYSICAL THERAPY | Facility: CLINIC | Age: 24
DRG: 853 | End: 2024-09-04
Attending: STUDENT IN AN ORGANIZED HEALTH CARE EDUCATION/TRAINING PROGRAM
Payer: MEDICARE

## 2024-09-04 ENCOUNTER — APPOINTMENT (OUTPATIENT)
Dept: GENERAL RADIOLOGY | Facility: CLINIC | Age: 24
DRG: 853 | End: 2024-09-04
Attending: STUDENT IN AN ORGANIZED HEALTH CARE EDUCATION/TRAINING PROGRAM
Payer: MEDICARE

## 2024-09-04 LAB
ALBUMIN SERPL BCG-MCNC: 4.1 G/DL (ref 3.5–5.2)
ALP SERPL-CCNC: 116 U/L (ref 40–150)
ALT SERPL W P-5'-P-CCNC: 150 U/L (ref 0–70)
ANION GAP SERPL CALCULATED.3IONS-SCNC: 13 MMOL/L (ref 7–15)
ANION GAP SERPL CALCULATED.3IONS-SCNC: 13 MMOL/L (ref 7–15)
AST SERPL W P-5'-P-CCNC: 39 U/L (ref 0–45)
BACTERIA BLD CULT: NO GROWTH
BASE EXCESS BLDV CALC-SCNC: 2.2 MMOL/L (ref -3–3)
BASOPHILS # BLD AUTO: ABNORMAL 10*3/UL
BASOPHILS # BLD MANUAL: 0.5 10E3/UL (ref 0–0.2)
BASOPHILS NFR BLD AUTO: ABNORMAL %
BASOPHILS NFR BLD MANUAL: 2 %
BILIRUB DIRECT SERPL-MCNC: <0.2 MG/DL (ref 0–0.3)
BILIRUB SERPL-MCNC: 0.5 MG/DL
BUN SERPL-MCNC: 26.2 MG/DL (ref 6–20)
BUN SERPL-MCNC: 28.6 MG/DL (ref 6–20)
CA-I BLD-MCNC: 4.5 MG/DL (ref 4.4–5.2)
CALCIUM SERPL-MCNC: 8.9 MG/DL (ref 8.8–10.4)
CALCIUM SERPL-MCNC: 9.1 MG/DL (ref 8.8–10.4)
CHLORIDE SERPL-SCNC: 97 MMOL/L (ref 98–107)
CHLORIDE SERPL-SCNC: 98 MMOL/L (ref 98–107)
CREAT SERPL-MCNC: 0.3 MG/DL (ref 0.67–1.17)
CREAT SERPL-MCNC: 0.56 MG/DL (ref 0.67–1.17)
CREAT SERPL-MCNC: 0.56 MG/DL (ref 0.67–1.17)
EGFRCR SERPLBLD CKD-EPI 2021: >90 ML/MIN/1.73M2
EOSINOPHIL # BLD AUTO: ABNORMAL 10*3/UL
EOSINOPHIL # BLD MANUAL: 0 10E3/UL (ref 0–0.7)
EOSINOPHIL NFR BLD AUTO: ABNORMAL %
EOSINOPHIL NFR BLD MANUAL: 0 %
ERYTHROCYTE [DISTWIDTH] IN BLOOD BY AUTOMATED COUNT: 15.1 % (ref 10–15)
GLUCOSE BLDC GLUCOMTR-MCNC: 114 MG/DL (ref 70–99)
GLUCOSE BLDC GLUCOMTR-MCNC: 142 MG/DL (ref 70–99)
GLUCOSE BLDC GLUCOMTR-MCNC: 160 MG/DL (ref 70–99)
GLUCOSE BLDC GLUCOMTR-MCNC: 168 MG/DL (ref 70–99)
GLUCOSE SERPL-MCNC: 112 MG/DL (ref 70–99)
GLUCOSE SERPL-MCNC: 163 MG/DL (ref 70–99)
HCO3 BLDV-SCNC: 27 MMOL/L (ref 21–28)
HCO3 SERPL-SCNC: 23 MMOL/L (ref 22–29)
HCO3 SERPL-SCNC: 26 MMOL/L (ref 22–29)
HCT VFR BLD AUTO: 34.8 % (ref 40–53)
HGB BLD-MCNC: 11.3 G/DL (ref 13.3–17.7)
IMM GRANULOCYTES # BLD: ABNORMAL 10*3/UL
IMM GRANULOCYTES NFR BLD: ABNORMAL %
LACTATE SERPL-SCNC: 2.1 MMOL/L (ref 0.7–2)
LYMPHOCYTES # BLD AUTO: ABNORMAL 10*3/UL
LYMPHOCYTES # BLD MANUAL: 2.3 10E3/UL (ref 0.8–5.3)
LYMPHOCYTES NFR BLD AUTO: ABNORMAL %
LYMPHOCYTES NFR BLD MANUAL: 10 %
MAGNESIUM SERPL-MCNC: 2.5 MG/DL (ref 1.7–2.3)
MAGNESIUM SERPL-MCNC: 2.6 MG/DL (ref 1.7–2.3)
MCH RBC QN AUTO: 28.8 PG (ref 26.5–33)
MCHC RBC AUTO-ENTMCNC: 32.5 G/DL (ref 31.5–36.5)
MCV RBC AUTO: 89 FL (ref 78–100)
METAMYELOCYTES # BLD MANUAL: 0.2 10E3/UL
METAMYELOCYTES NFR BLD MANUAL: 1 %
MONOCYTES # BLD AUTO: ABNORMAL 10*3/UL
MONOCYTES # BLD MANUAL: 1.4 10E3/UL (ref 0–1.3)
MONOCYTES NFR BLD AUTO: ABNORMAL %
MONOCYTES NFR BLD MANUAL: 6 %
NEUTROPHILS # BLD AUTO: ABNORMAL 10*3/UL
NEUTROPHILS # BLD MANUAL: 18.5 10E3/UL (ref 1.6–8.3)
NEUTROPHILS NFR BLD AUTO: ABNORMAL %
NEUTROPHILS NFR BLD MANUAL: 81 %
NRBC # BLD AUTO: 0 10E3/UL
NRBC BLD AUTO-RTO: 0 /100
O2/TOTAL GAS SETTING VFR VENT: 21 %
OXYHGB MFR BLDV: 71 % (ref 70–75)
PCO2 BLDV: 42 MM HG (ref 40–50)
PH BLDV: 7.42 [PH] (ref 7.32–7.43)
PHOSPHATE SERPL-MCNC: 3.8 MG/DL (ref 2.5–4.5)
PLAT MORPH BLD: ABNORMAL
PLATELET # BLD AUTO: 228 10E3/UL (ref 150–450)
PO2 BLDV: 40 MM HG (ref 25–47)
POLYCHROMASIA BLD QL SMEAR: SLIGHT
POTASSIUM SERPL-SCNC: 3.7 MMOL/L (ref 3.4–5.3)
POTASSIUM SERPL-SCNC: 4.1 MMOL/L (ref 3.4–5.3)
PROT SERPL-MCNC: 7.1 G/DL (ref 6.4–8.3)
RBC # BLD AUTO: 3.92 10E6/UL (ref 4.4–5.9)
RBC MORPH BLD: ABNORMAL
SAO2 % BLDV: 72.4 % (ref 70–75)
SODIUM SERPL-SCNC: 133 MMOL/L (ref 135–145)
SODIUM SERPL-SCNC: 137 MMOL/L (ref 135–145)
WBC # BLD AUTO: 22.9 10E3/UL (ref 4–11)

## 2024-09-04 PROCEDURE — 82805 BLOOD GASES W/O2 SATURATION: CPT | Performed by: STUDENT IN AN ORGANIZED HEALTH CARE EDUCATION/TRAINING PROGRAM

## 2024-09-04 PROCEDURE — 999N000111 HC STATISTIC OT IP EVAL DEFER: Performed by: OCCUPATIONAL THERAPIST

## 2024-09-04 PROCEDURE — 97161 PT EVAL LOW COMPLEX 20 MIN: CPT | Mod: GP

## 2024-09-04 PROCEDURE — 97116 GAIT TRAINING THERAPY: CPT | Mod: GP

## 2024-09-04 PROCEDURE — 250N000013 HC RX MED GY IP 250 OP 250 PS 637: Performed by: STUDENT IN AN ORGANIZED HEALTH CARE EDUCATION/TRAINING PROGRAM

## 2024-09-04 PROCEDURE — 258N000003 HC RX IP 258 OP 636: Performed by: STUDENT IN AN ORGANIZED HEALTH CARE EDUCATION/TRAINING PROGRAM

## 2024-09-04 PROCEDURE — 82248 BILIRUBIN DIRECT: CPT

## 2024-09-04 PROCEDURE — 99291 CRITICAL CARE FIRST HOUR: CPT | Mod: GC | Performed by: INTERNAL MEDICINE

## 2024-09-04 PROCEDURE — 71045 X-RAY EXAM CHEST 1 VIEW: CPT | Mod: 26 | Performed by: RADIOLOGY

## 2024-09-04 PROCEDURE — 93005 ELECTROCARDIOGRAM TRACING: CPT

## 2024-09-04 PROCEDURE — 85027 COMPLETE CBC AUTOMATED: CPT | Performed by: STUDENT IN AN ORGANIZED HEALTH CARE EDUCATION/TRAINING PROGRAM

## 2024-09-04 PROCEDURE — 97530 THERAPEUTIC ACTIVITIES: CPT | Mod: GP

## 2024-09-04 PROCEDURE — 250N000013 HC RX MED GY IP 250 OP 250 PS 637: Performed by: INTERNAL MEDICINE

## 2024-09-04 PROCEDURE — 120N000003 HC R&B IMCU UMMC

## 2024-09-04 PROCEDURE — 82310 ASSAY OF CALCIUM: CPT | Performed by: STUDENT IN AN ORGANIZED HEALTH CARE EDUCATION/TRAINING PROGRAM

## 2024-09-04 PROCEDURE — 82565 ASSAY OF CREATININE: CPT | Performed by: STUDENT IN AN ORGANIZED HEALTH CARE EDUCATION/TRAINING PROGRAM

## 2024-09-04 PROCEDURE — 82330 ASSAY OF CALCIUM: CPT | Performed by: STUDENT IN AN ORGANIZED HEALTH CARE EDUCATION/TRAINING PROGRAM

## 2024-09-04 PROCEDURE — 83605 ASSAY OF LACTIC ACID: CPT | Performed by: STUDENT IN AN ORGANIZED HEALTH CARE EDUCATION/TRAINING PROGRAM

## 2024-09-04 PROCEDURE — 83735 ASSAY OF MAGNESIUM: CPT | Performed by: STUDENT IN AN ORGANIZED HEALTH CARE EDUCATION/TRAINING PROGRAM

## 2024-09-04 PROCEDURE — 250N000011 HC RX IP 250 OP 636: Performed by: STUDENT IN AN ORGANIZED HEALTH CARE EDUCATION/TRAINING PROGRAM

## 2024-09-04 PROCEDURE — 84100 ASSAY OF PHOSPHORUS: CPT | Performed by: STUDENT IN AN ORGANIZED HEALTH CARE EDUCATION/TRAINING PROGRAM

## 2024-09-04 PROCEDURE — 85007 BL SMEAR W/DIFF WBC COUNT: CPT | Performed by: STUDENT IN AN ORGANIZED HEALTH CARE EDUCATION/TRAINING PROGRAM

## 2024-09-04 PROCEDURE — 71045 X-RAY EXAM CHEST 1 VIEW: CPT

## 2024-09-04 PROCEDURE — 93010 ELECTROCARDIOGRAM REPORT: CPT | Performed by: INTERNAL MEDICINE

## 2024-09-04 PROCEDURE — 250N000012 HC RX MED GY IP 250 OP 636 PS 637: Performed by: STUDENT IN AN ORGANIZED HEALTH CARE EDUCATION/TRAINING PROGRAM

## 2024-09-04 PROCEDURE — 250N000013 HC RX MED GY IP 250 OP 250 PS 637

## 2024-09-04 RX ORDER — POTASSIUM CHLORIDE 1.5 G/1.58G
20 POWDER, FOR SOLUTION ORAL ONCE
Status: COMPLETED | OUTPATIENT
Start: 2024-09-04 | End: 2024-09-04

## 2024-09-04 RX ORDER — SPIRONOLACTONE 25 MG
12.5 TABLET ORAL DAILY
Status: DISCONTINUED | OUTPATIENT
Start: 2024-09-04 | End: 2024-09-04

## 2024-09-04 RX ORDER — POTASSIUM CHLORIDE 1.5 G/1.58G
40 POWDER, FOR SOLUTION ORAL DAILY
Status: DISCONTINUED | OUTPATIENT
Start: 2024-09-04 | End: 2024-09-09

## 2024-09-04 RX ORDER — LOSARTAN POTASSIUM 50 MG/1
50 TABLET ORAL DAILY
Status: DISCONTINUED | OUTPATIENT
Start: 2024-09-04 | End: 2024-09-06

## 2024-09-04 RX ORDER — SPIRONOLACTONE 25 MG
12.5 TABLET ORAL DAILY
Status: DISCONTINUED | OUTPATIENT
Start: 2024-09-04 | End: 2024-09-05

## 2024-09-04 RX ADMIN — RISPERIDONE 1 MG: 1 TABLET, FILM COATED ORAL at 07:33

## 2024-09-04 RX ADMIN — PANTOPRAZOLE SODIUM 40 MG: 40 TABLET, DELAYED RELEASE ORAL at 07:32

## 2024-09-04 RX ADMIN — LOSARTAN POTASSIUM 50 MG: 50 TABLET, FILM COATED ORAL at 07:45

## 2024-09-04 RX ADMIN — ENOXAPARIN SODIUM 40 MG: 40 INJECTION SUBCUTANEOUS at 11:34

## 2024-09-04 RX ADMIN — BUMETANIDE 2 MG: 0.25 INJECTION INTRAMUSCULAR; INTRAVENOUS at 18:02

## 2024-09-04 RX ADMIN — DOBUTAMINE 5 MCG/KG/MIN: 12.5 INJECTION, SOLUTION, CONCENTRATE INTRAVENOUS at 20:51

## 2024-09-04 RX ADMIN — THIAMINE HCL TAB 100 MG 100 MG: 100 TAB at 07:32

## 2024-09-04 RX ADMIN — RISPERIDONE 1.5 MG: 1 TABLET, FILM COATED ORAL at 20:23

## 2024-09-04 RX ADMIN — NYSTATIN 500000 UNITS: 100000 SUSPENSION ORAL at 11:34

## 2024-09-04 RX ADMIN — BUMETANIDE 2 MG: 0.25 INJECTION INTRAMUSCULAR; INTRAVENOUS at 01:21

## 2024-09-04 RX ADMIN — POTASSIUM CHLORIDE 20 MEQ: 1.5 POWDER, FOR SOLUTION ORAL at 07:46

## 2024-09-04 RX ADMIN — Medication 1 TABLET: at 07:32

## 2024-09-04 RX ADMIN — DIGOXIN 250 MCG: 125 TABLET ORAL at 07:32

## 2024-09-04 RX ADMIN — POTASSIUM CHLORIDE 40 MEQ: 1.5 POWDER, FOR SOLUTION ORAL at 07:46

## 2024-09-04 RX ADMIN — INSULIN GLARGINE 10 UNITS: 100 INJECTION, SOLUTION SUBCUTANEOUS at 20:25

## 2024-09-04 RX ADMIN — Medication 5 ML: at 17:03

## 2024-09-04 RX ADMIN — BUMETANIDE 2 MG: 0.25 INJECTION INTRAMUSCULAR; INTRAVENOUS at 11:33

## 2024-09-04 RX ADMIN — NYSTATIN 500000 UNITS: 100000 SUSPENSION ORAL at 20:23

## 2024-09-04 RX ADMIN — EMPAGLIFLOZIN 10 MG: 10 TABLET, FILM COATED ORAL at 07:32

## 2024-09-04 RX ADMIN — SPIRONOLACTONE 12.5 MG: 25 TABLET, FILM COATED ORAL at 11:35

## 2024-09-04 RX ADMIN — NYSTATIN 500000 UNITS: 100000 SUSPENSION ORAL at 07:33

## 2024-09-04 RX ADMIN — PREDNISONE 20 MG: 20 TABLET ORAL at 07:33

## 2024-09-04 RX ADMIN — NYSTATIN 500000 UNITS: 100000 SUSPENSION ORAL at 17:03

## 2024-09-04 ASSESSMENT — ACTIVITIES OF DAILY LIVING (ADL)
ADLS_ACUITY_SCORE: 40
ADLS_ACUITY_SCORE: 41
ADLS_ACUITY_SCORE: 40
ADLS_ACUITY_SCORE: 41
ADLS_ACUITY_SCORE: 40
ADLS_ACUITY_SCORE: 41
ADLS_ACUITY_SCORE: 40
ADLS_ACUITY_SCORE: 36
ADLS_ACUITY_SCORE: 40

## 2024-09-04 NOTE — PROGRESS NOTES
CLINICAL NUTRITION SERVICES - REASSESSMENT NOTE     Nutrition Prescription    RECOMMENDATIONS FOR MDs/PROVIDERS TO ORDER:  - Continue to encourage adequate PO intakes   - Total daily fluids/adjustments per MD     Malnutrition Status:    - Moderate malnutrition in the context of acute illness     Recommendations already ordered by Registered Dietitian (RD):  - Continue oral nutrition supplement: Strawberry Ensure per pt preference     Future/Additional Recommendations:  - PO/supp adequacy   - Weight trends      EVALUATION OF THE PROGRESS TOWARD GOALS   Diet: Low Saturated Fat/2400 mg Sodium + Ensure   Nutrition Support: Pivot 1.5 Akbar (or equivalent) @ goal of 50ml/hr (1200ml/day) provides: 1800 kcals, 112 g PRO, 900 ml free H20, 206 g CHO, and 9 g fiber daily + 2 [id number] = 1960 kcals (22 kcals/kg) and 152 gm PRO (1.7 gm/kg) -- OFF since 8/30  Intake: TF discontinued. 100% PO intake per flowsheets. Eating well per RN notes.      NEW FINDINGS   Nutrition/GI: Pt had EN via small bore ppFT placed 8/26 via [facility]. FT was dislodge 8/30 but pt currently tolerating oral diet and no longer requires enteral access. Tolerating PO and eating well.  GI: LFTs improved.     RE-ASSESSED NUTRITION NEEDS (maintenance; no longer ICU status or vented)  Estimated Energy Needs: 3813-1853 kcals/day (25 - 30 kcals/kg)   Justification: Maintenance vs Obese, and Vented   Estimated Protein Needs: 107-134+ grams protein/day (1.2 -1.5+ grams of pro/kg)   Justification: Increased needs   Estimated Fluid Needs:  (1 mL/kcal)   Justification: Maintenance and Per provider pending fluid status      CV: Biventricular failure LVEF 25%; Cardiogenic shock; TTE (8/21/24): LVEF is 16%. Thiamine was initiated. Transfer orders to floor.      Pulm: Acute hypoxic respiratory failure; now extubated.      Renal: Lytes improved. BUN down-trended from prior assessment: 28.6 (H).     Skin: No rash, bruising, or erythema.FT/securement device no longer in  place.     Labs/meds: Meds: Bumix; insulin; thera-vit-m; lytes; prednisone; thiamine; senokot; miralax. Labs: ALT: 150 (H); lactic acid WNL.     Weights:  Overall down from admit: 105.4 kg --> 96.8 kg = 8% decline over ~ 2 weeks.  Cannot r/o at least some role of fluid status changes in short-term weight decline. Weight trended up slightly from last assessment.    MALNUTRITION  % Intake: < 75% for > 7 days (moderate) --> improved   % Weight Loss: 1-2% in 1 week (moderate) - cannot r/o at least some role of fluid status changes in short-term weight changes. No weight loss since last assessment   Subcutaneous Fat Loss: None observed  Muscle Loss: None observed - pt with baseline lower LBM status due to medical condition per family. Noted lower LBM in lower extremities, in particular.   Fluid Accumulation/Edema: medically related vs none   Malnutrition Diagnosis: Moderate malnutrition in the context of acute illness     Previous Goals   Total avg nutritional intake to meet a minimum of 20 kcal/kg and 1.5 g PRO/kg daily (per dosing wt 89 kg).   Evaluation: Unable to evaluate --> now off TFs and on an oral diet.     Previous Nutrition Diagnosis  Inadequate protein-energy intake related to advancement of and disruptions to EN infusions as evidenced by pt not meeting goa provisions with 7-day averages meeting 12 kcals/kg and 1.1 gm PRO/kg dosing weight.    Evaluation: Improving --> tolerating 100% oral diet     CURRENT NUTRITION DIAGNOSIS  Predicted inadequate nutrient intake related to potential for decreased appetite and disruptions to PO as evidenced by prolonged hospitalization and/or risk of menu fatigue and decreased PO      INTERVENTIONS  Implementation  Medical food supplement therapy - continue as ordered  Monitor PO intakes  Monitor weight trends     Goals  Patient to consume % of nutritionally adequate meal trays TID, or the equivalent with supplements/snacks.    Monitoring/Evaluation  Progress toward  "goals will be monitored and evaluated per protocol.      Diya Rios RD, LD, [facility]  Neuro ICU Dietitian; 6A Neuro  Vocera \"4E Clinical Dietitian\"  Weekend/holiday \"Weekend Clinical Dietitian\"    "

## 2024-09-04 NOTE — PLAN OF CARE
OT: after chart review and conversation with PT post PT eval it is concluded that this pt has no acute OT needs. Pt lives in a group home and can receive assist as needed for ADL and to return to his baseline will only require one therapy. PT to follow for conditioning. Defer acute OT today.

## 2024-09-04 NOTE — PLAN OF CARE
Major Shift Events: AOx4, able to make needs known. MCALLISTER, up with SBA. VSS on RA, ex. Sinus Tach 120-150's. Afebrile. Dobutamine gtt @ 5. Cardiac diet. -BM. Voiding adequately via external cath. Incontinent of urine x1 overnight. L-TL PICC.     Plan: Transfer to  when able. Continue to treat per plan of care.     For vital signs and complete assessments, please see documentation flowsheets.    ---------------  Goal Outcome Evaluation:  Plan of Care Reviewed With: patient  Overall Patient Progress: improving

## 2024-09-04 NOTE — PROGRESS NOTES
[facility]  Cardiology Heart Failure Service (Cards 2) Progress Note    Patient Name: Terrance Hidalgo    Medical Record Number: [id number]    YOB: 2000  PCP: Colt Flores    Admit Date/Time: 8/20/2024 10:13 PM   15    Assessment/Plan:    25 yo M with PMH autism who initially presented to [place] with cough, URI symptoms, and hypoxemia. He was intubated due to respiratory failure and transferred to [facility], where his hospital course transpired to mixed cardiogenic/septic shock requiring multiple pressor agents, inotropic support, and IV diuresis. Transferred again to [facility] CICU on 8/20 for continued management. Presentation initially highly suspicious for fulminant myocarditis base on elevated cardiac and inflammatory biomarkers, new (suspected), severe biventricular dysfunction w/ reduced cardiac output requiring intermittent inotropic support, and concurrent infectious/respiratory symptomotology c/b acute respiratory failure 2/2 ARDS vs cardiogenic etiology 2/2 miocarditis with superimposed aspiration PNM treated with sulbactam. Empirically pulsed with steroids. However, EMB 5/21 demonstrated unremarkable myocardial tissue with no evidence of inflammatory infiltrate.  Hospital course further complicated by worsening cardiogenic/septic shock with worsening renal and liver failure requiring IABP placement on 8/27. Jarocho weaned off on 8/28 and extubated.  IABP removed on 9/1 and closed with 8Fr angioseal.     - ECMO candidate     Changes:  - increase losartan, stop hydralazine/isordil, add low dose aldactone  - needs ischemic workup before discharge and CMR    Neuro:     #AMS - resolved  #Sedation   - moving x 4 intermittent following commands     Plan:      - prn atarax  - c/w Seroquel       CV:    #Biventricular failure LVEF 25%   #Cardiogenic shock SCAI C  #Lactic acidosis   TTE (8/21/24): LVIDd 58mm. Biplane LVEF is 16%. Severe hypokinesis of mid to apical RV  free wall.  RHC (24): RA mean 6, PA , PCWP 18, Wayne CO/CI 5.0/2.1, PVR 1.6, SVR 1270  EMB (24): Fragments of essentially unremarkable myocardial tissue with no evidence of inflammatory infiltrate   Coronary angiogram: NA   CMR: None - will obtain prior to discharge     DATE MAP CVP PAP PCWP Wayne CO Wayne CI SVR PVR Mvo2 LA Therapies    82 10  16 3.5 1.6 1800 3.7 54 1.4 IABP 1:1, dobutamine 5    80 12 24/ 12 6.1 2.7 984 <2 70 2 IABP1:1 dobutamine 5 nipride 0.75    75 15 40/14 12 5.9 2.6 1180  69 0.8 IABP 1:1  5, nipride 0.25    90 10  13 7.1 3.2 800 <2 73 0.9 IABP 1:2,  5    84 11   6.2 2.8 1000  70   5                     Plan:  - c/w dobutamine for biventricular inotropic support   - increase losartan   - add aldactone  - diuresis with CVP goal 8-10  - empa  - digoxin for inotropic support   - c/w steroid taper    Pulm:     #Acute hypoxic respiratory failure - resolving    #Pleular effusion - resolved   #concern for Aspiration PNM  - resolving      Plan:    - incentive spirometer, peridex   - abx per below    - wean off NC     GI/Nutrition:    #Transaminitis - resolving     1. Hepatomegaly with increased echogenicity of the liver, which may be  seen with intrinsic parenchymal disease such as steatosis.  2. Biliary sludge without sonographic evidence for acute  cholecystitis.  3. Normal antegrade flow in the extrahepatic and right portal vein.   4. The remainder of the findings are significantly limited secondary  to obscuring bowel gas and technical challenges related to patient  mobility.    Plan:    - monitor MELD labs  - treatment per above     Renal:    #HyperNa  - resolved  #Hypervolemia - improving   #LA    Plan:    - frequent BMP   - hold bactim for possible contributing factor to LA     ID:     #Concern for aspiration PNM   #Transaminitis   #Recurrent fevers of unclear etiology     - Antimicrobials       > Unasyn  -        > Cefepime  8/20 - 8/21       > Azithromycin 8/20 - 08/24       > Vanco 8/20 - 8/21 s/p 8/26       > meropenem 8/26-9/1       > zosyn 8/26 - 8/28    Plan:  - observe off abx    Heme:    #Anemia of critical illness     Plan:  - H/H  - transfuse if hb < 7 or drop > 2 g     Endo:    Plan:  - FSG goal 140-160  - insulin   - lantus     Family Meeting/Goals of Care:  - last time family updated: TBD   - next of kin/HCP:  Johann Hidalgo(TEMPORARY GUARDIAN TO 10-)  Brother  [phone]  [phone])  [address] [facility] [id number]  Legal guardian    Disposition:   - Unit   Code Status:   Full Code     Thank you for allowing me to care for this patient, please don't hesitate to contact me with any questions regarding this plan.     Patient was discussed and evaluated with Dr. Lusi HOUSE, attending physician, who agrees with the assessment and plan above.    Matt Cabral MD, PhD, MSc  Cardiology Fellow    Physical Exam:    General appearance - Lying in bed; on NC  Head: Normocephalic and atraumatic.   Eyes: Pupils are equal, round, and reactive to light. No scleral icterus.  Neck: no JVD, bruit.  Cardiovascular: Tachycardiac regular rhythm. S1 and S2 auscultated. No murmurs, rub, or gallops.  Pulmonary/Chest: clear breath bilaterally   Abdominal: Bowel sounds present. Soft. No distension. No rigidity, rebound or guarding.   Extremities: Warm, no cyanosis, 2+ distal pulses bilaterally. Pitting edema b/l +1.  R groin site no local sdigns of infection or hematoma   Skin: Skin is warm and not diaphoretic. No rash, bruising, or erythema.  Neuro: Sedated     Objective data:    Temp:  [97.8  F (36.6  C)-99.4  F (37.4  C)] 98.4  F (36.9  C)  Pulse:  [112-149] 125  Resp:  [11-30] 23  BP: ()/(45-72) 107/72  SpO2:  [92 %-100 %] 98 %    Vitals:    08/30/24 0400 08/31/24 0000 09/01/24 0000 09/03/24 0000   Weight: 96.1 kg (211 lb 13.8 oz) 96.2 kg (212 lb 1.3 oz) 96 kg (211 lb 10.3 oz) 96.5 kg (212 lb 11.9 oz)    09/04/24 0400    Weight: 96.8 kg (213 lb 6.5 oz)        Vent Settings:  Resp: 23 SpO2: 98 % O2 Device: None (Room air)    Resp: 23    LABS Reviewed  IMAGES Reviewed    Current medications   Current Facility-Administered Medications   Medication Dose Route Frequency Provider Last Rate Last Admin    acetaminophen (TYLENOL) tablet 650 mg  650 mg Oral or Feeding Tube Q8H PRN Sudheer Rojas MD   650 mg at 08/30/24 0057    albuterol (PROVENTIL) neb solution 2.5 mg  2.5 mg Nebulization Q6H PRN Matt Cabral MD        bumetanide (BUMEX) injection 2 mg  2 mg Intravenous Q8H Matt Cabral MD   2 mg at 09/04/24 0121    dextrose 10% infusion   Intravenous Continuous PRN Colten Garay MD        glucose gel 15-30 g  15-30 g Oral Q15 Min PRN Colten Garay MD        Or    dextrose 50 % injection 25-50 mL  25-50 mL Intravenous Q15 Min PRN Colten Garay MD        Or    glucagon injection 1 mg  1 mg Subcutaneous Q15 Min PRN Colten Garay MD        digoxin (LANOXIN) tablet 250 mcg  250 mcg Oral Daily Matt Cabral MD   250 mcg at 09/04/24 0732    DOBUTamine (DOBUTREX) 1,000 mg in D5W 250 mL infusion (adult MAX conc)  5 mcg/kg/min (Dosing Weight) Intravenous Continuous Matt Cabral MD 8.1 mL/hr at 09/04/24 0600 5 mcg/kg/min at 09/04/24 0600    empagliflozin (JARDIANCE) tablet 10 mg  10 mg Oral Daily Matt Cabral MD   10 mg at 09/04/24 0732    enoxaparin ANTICOAGULANT (LOVENOX) injection 40 mg  40 mg Subcutaneous Q24H Matt Cabral MD   40 mg at 09/03/24 1125    heparin lock flush 10 unit/mL injection 5-20 mL  5-20 mL Intracatheter Q24H Matt Cabral MD   5 mL at 09/03/24 1728    heparin lock flush 10 unit/mL injection 5-20 mL  5-20 mL Intracatheter Q1H PRN Matt Cabral MD        hydrOXYzine HCl (ATARAX) tablet 10 mg  10 mg Oral TID PRN Matt Cabral MD   10 mg at 08/31/24 2013    insulin aspart (NovoLOG) injection (RAPID ACTING)  1-3 Units Subcutaneous TID AC Matt Cabral MD   1 Units at  09/02/24 0847    insulin aspart (NovoLOG) injection (RAPID ACTING)  1-3 Units Subcutaneous At Bedtime Matt Cabral MD        insulin glargine (LANTUS PEN) injection 10 Units  10 Units Subcutaneous At Bedtime Matt Cabral MD   10 Units at 09/03/24 2053    ipratropium - albuterol 0.5 mg/2.5 mg/3 mL (DUONEB) neb solution 3 mL  3 mL Nebulization Q4H PRN Sudheer Rojas MD   3 mL at 08/25/24 0536    lidocaine (LMX4) cream   Topical Q1H PRN Matt Cabral MD        lidocaine (LMX4) cream   Topical Q1H PRN Sudheer Rojas MD        lidocaine 1 % 0.1-1 mL  0.1-1 mL Other Q1H PRN Sudheer Rojas MD        lidocaine 1 % 0.1-5 mL  0.1-5 mL Other Q1H PRN Matt Cabral MD   5 mL at 09/03/24 1648    losartan (COZAAR) tablet 50 mg  50 mg Oral Daily Matt Cabral MD   50 mg at 09/04/24 0745    medication instruction   Does not apply Continuous PRN Sudheer Rojas MD        metoclopramide (REGLAN) injection 5 mg  5 mg Intravenous Q6H PRN Shantanu Mclean MD   5 mg at 09/01/24 0639    multivitamin w/minerals (THERA-VIT-M) tablet 1 tablet  1 tablet Oral Daily Luis Smith MD   1 tablet at 09/04/24 0732    naloxone (NARCAN) injection 0.2 mg  0.2 mg Intravenous Q2 Min PRN Jani Martin MD        Or    naloxone (NARCAN) injection 0.4 mg  0.4 mg Intravenous Q2 Min PRN Jani Martin MD        Or    naloxone (NARCAN) injection 0.2 mg  0.2 mg Intramuscular Q2 Min PRN Jani Martin MD        Or    naloxone (NARCAN) injection 0.4 mg  0.4 mg Intramuscular Q2 Min PRN Jani Martin MD        nystatin (MYCOSTATIN) suspension 500,000 Units  500,000 Units Swish & Spit 4x Daily Susan Yeh MD   500,000 Units at 09/04/24 0733    ondansetron (ZOFRAN) injection 4 mg  4 mg Intravenous Q6H PRN Shantanu Mclean MD   4 mg at 08/31/24 1028    pantoprazole (PROTONIX) EC tablet 40 mg  40 mg Oral QAM AC Luis Smith MD   40 mg at 09/04/24 0732    polyethylene glycol (MIRALAX) Packet 17 g  17 g  Oral or Feeding Tube Daily Matt Cabral MD   17 g at 08/23/24 1049    polyethylene glycol (MIRALAX) Packet 17 g  17 g Oral Daily PRN Sudheer Rojas MD   17 g at 08/22/24 1430    potassium chloride (KLOR-CON) Packet 40 mEq  40 mEq Oral or Feeding Tube Daily Matt Cabral MD   40 mEq at 09/04/24 0746    predniSONE (DELTASONE) tablet 20 mg  20 mg Oral or Feeding Tube Daily Patricia Dukes MD   20 mg at 09/04/24 0733    Followed by    [START ON 9/6/2024] predniSONE (DELTASONE) tablet 10 mg  10 mg Oral or Feeding Tube Daily Patricia Dukes MD        Followed by    [START ON 9/9/2024] predniSONE (DELTASONE) tablet 5 mg  5 mg Oral or Feeding Tube Daily Patricia Dukes MD        risperiDONE (risperDAL) tablet 1 mg  1 mg Oral or Feeding Tube Daily Patricia Dukes MD   1 mg at 09/04/24 0733    risperiDONE (risperDAL) tablet 1.5 mg  1.5 mg Oral or Feeding Tube QPM Patricia Dukes MD   1.5 mg at 09/03/24 2051    selenium sulfide (SELSUN) 1 % shampoo   Topical Daily PRN Matt Cabral MD   Given at 09/01/24 2103    senna-docusate (SENOKOT-S/PERICOLACE) 8.6-50 MG per tablet 1 tablet  1 tablet Oral or Feeding Tube At Bedtime Andree Becerril, APRN CNP   1 tablet at 09/03/24 2053    senna-docusate (SENOKOT-S/PERICOLACE) 8.6-50 MG per tablet 1 tablet  1 tablet Oral or Feeding Tube BID PRN Jani Martin MD        Or    senna-docusate (SENOKOT-S/PERICOLACE) 8.6-50 MG per tablet 2 tablet  2 tablet Oral or Feeding Tube BID PRN Jani Martin MD        sodium chloride (PF) 0.9% PF flush 10-40 mL  10-40 mL Intracatheter Once PRN Matt Cabral MD        sodium chloride (PF) 0.9% PF flush 3 mL  3 mL Intracatheter Q8H Sudheer Rojas MD   3 mL at 09/04/24 0457    sodium chloride (PF) 0.9% PF flush 3 mL  3 mL Intracatheter q1 min prn Sudheer Rojas MD        spironolactone (ALDACTONE) half-tab 12.5 mg  12.5 mg Oral Daily Matt Cabral MD        thiamine (B-1) tablet 100 mg  100 mg Oral or Feeding Tube Daily Gee  Graciela Kraus MD   100 mg at 09/04/24 0732       Medications Prior to Admission   Medication Sig Dispense Refill Last Dose    risperiDONE (RISPERDAL) 1 MG tablet Take 1 mg by mouth every morning.   Past Week at unknown    risperiDONE (RISPERDAL) 1 MG tablet Take 1.5 mg by mouth every evening.   Past Week at unknown

## 2024-09-04 NOTE — PROGRESS NOTES
Care Management Follow Up    Length of Stay (days): 15    Expected Discharge Date: 09/11/2024     Concerns to be Addressed: care coordination/care conferences, adjustment to diagnosis/illness, coping/stress, decision making, discharge planning, developmental     Patient plan of care discussed at interdisciplinary rounds: Yes    Anticipated Discharge Disposition: Group Home              Anticipated Discharge Services:  (TBD)  Anticipated Discharge DME:  (TBD)    Patient/family educated on Medicare website which has current facility and service quality ratings: no  Education Provided on the Discharge Plan: No  Patient/Family in Agreement with the Plan: unable to assess    Referrals Placed by CM/SW:    Private pay costs discussed: Not applicable    Discussed  Partnership in Safe Discharge Planning  document with patient/family: No     Handoff Completed: No, handoff not indicated or clinically appropriate    Additional Information:  Social Work follow-up on status of transfer of guardianship, to Aunt, Guerda and court hearing on 8/26. Aunt, Guerda, at bedside. Pt pleasant and engrossed in his video game. Aunt reports that final arguments occurred yesterday and  has up to 90 days to make a decision on transfer of guardianship. Informed Aunt that should they receive determination while pt is inpatient to let care team know and we will need the new guardianship paperwork. Pt's brother, Johann, remains legal guardian.    Next Steps:     Anticipate return to group home. Therapies recommending OPCR.     MILO Eddy, LICSW   4A/4E ICU   Phone: [phone]  Available via [employer]

## 2024-09-04 NOTE — PROGRESS NOTES
09/04/24 1040   Appointment Info   Signing Clinician's Name / Credentials (PT) DERREK Rinaldi   Student Supervision Direct Patient Contact Provided   Rehab Comments (PT) warn pt before physical contact, pt is impulsive   Living Environment   People in Home facility resident  (3 other residents)   Current Living Arrangements group home   Home Accessibility stairs within home;other (see comments)  (Walkin shower)   Number of Stairs, Within Home, Primary greater than 10 stairs  (15 steps to second floor)   Stair Railings, Within Home, Primary railing on right side (ascending)   Transportation Anticipated family or friend will provide  (Aunt or Brother, Parents not involved)   Living Environment Comments Pt lives on first floor. There is 1 other resident on the first floor, and 2 additional residents on the 2nd floor. Does not need to access 2nd floor. Has a walk in shower. Used no equipment previously.   Self-Care   Usual Activity Tolerance fair   Current Activity Tolerance poor   Regular Exercise No   Equipment Currently Used at Home none   Fall history within last six months no   Activity/Exercise/Self-Care Comment Pt has been sedentary at group home. Likes to play on Switch. Aunt reports that he likes water and laser tag. Steps to enter and exit pool for swimming. Previously IND with ADLs/IADLs but sometimes requires directions to complete tasks.   General Information   Onset of Illness/Injury or Date of Surgery 08/20/24   Referring Physician Matt Cabral MD   Patient/Family Therapy Goals Statement (PT) Leave hospital   Pertinent History of Current Problem (include personal factors and/or comorbidities that impact the POC) 25 yo M with PMH autism who initially presented to [place] with cough, URI symptoms, and hypoxemia. He was intubated due to respiratory failure and transferred to [facility], where his hospital course transpired to mixed cardiogenic/septic shock requiring multiple pressor agents,  inotropic support, and IV diuresis. Transferred again to [facility] CICU on 8/20 for continued management. Presentation initially highly suspicious for fulminant myocarditis base on elevated cardiac and inflammatory biomarkers, new (suspected), severe biventricular dysfunction w/ reduced cardiac output requiring intermittent inotropic support, and concurrent infectious/respiratory symptomotology c/b acute respiratory failure 2/2 ARDS vs cardiogenic etiology 2/2 miocarditis with superimposed aspiration PNM treated with sulbactam. Empirically pulsed with steroids. However, EMB 5/21 demonstrated unremarkable myocardial tissue with no evidence of inflammatory infiltrate.  Hospital course further complicated by worsening cardiogenic/septic shock with worsening renal and liver failure requiring IABP placement on 8/27. Jarocho weaned off on 8/28 and extubated.  IABP removed on 9/1 and closed with 8Fr angioseal.   Existing Precautions/Restrictions fall   Heart Disease Risk Factors Lack of physical activity;Overweight;Medical history;Gender   Cognition   Affect/Mental Status (Cognition) WFL;emotionally labile;flat/blunted affect   Orientation Status (Cognition) oriented x 3  (did not ask about situation)   Follows Commands (Cognition) follows multi-step commands;over 90% accuracy;verbal cues/prompting required  (pt is impulsive but will wait if explicitly told to)   Pain Assessment   Patient Currently in Pain No   Posture    Posture Forward head position;Protracted shoulders;Kyphosis   Range of Motion (ROM)   Range of Motion ROM is WFL   Strength (Manual Muscle Testing)   Strength (Manual Muscle Testing) strength is WFL   Bed Mobility   Comment, (Bed Mobility) IND with scooting laterally and up/down in bed and Supine > sit   Transfers   Comment, (Transfers) Sit > stand SBA   Gait/Stairs (Locomotion)   Comment, (Gait/Stairs) CGA for gait, decreased stance time on L and slight lateral lean to L. Stairs impaired per clinical reasoning    Balance   Balance Comments IND seated balance, SBA standing balance   Clinical Impression   Criteria for Skilled Therapeutic Intervention Yes, treatment indicated   PT Diagnosis (PT) impaired functional mobility   Influenced by the following impairments strength, activity tolerance, balance   Functional limitations due to impairments gait, stairs, transfers, functional endurance   Clinical Presentation (PT Evaluation Complexity) stable   Clinical Presentation Rationale clincal reasoning   Clinical Decision Making (Complexity) low complexity   Planned Therapy Interventions (PT) balance training;bed mobility training;gait training;home exercise program;patient/family education;stair training;strengthening;transfer training;risk factor education;progressive activity/exercise;home program guidelines   Risk & Benefits of therapy have been explained evaluation/treatment results reviewed;care plan/treatment goals reviewed;patient;caregiver;risks/benefits reviewed  (aunt present)   PT Total Evaluation Time   PT Eval, Low Complexity Minutes (39496) 7   Physical Therapy Goals   PT Frequency 5x/week   PT Predicted Duration/Target Date for Goal Attainment 09/25/24   PT Goals Bed Mobility;Transfers;Gait;Stairs   PT: Bed Mobility Independent;Supine to/from sit   PT: Transfers Independent;Bed to/from chair   PT: Gait Independent;Greater than 200 feet   PT: Stairs Independent;Greater than 10 stairs;Rail on right   Distance in Feet 465   PT Discharge Planning   PT Plan Gait, Step ups, Stairs   PT Discharge Recommendation (DC Rec) home with assist;home with outpatient cardiac rehab   PT Rationale for DC Rec Pt previously IND with functional mobility, currently presents below baseline. Pt has good family support (aunt and brother). Pt participates well with therapy and can benefit from continued therapy with outpatient cardiac in order to develop exercise routine to reduce cardiac risk due to current sedentary life style.   PT Brief  overview of current status SBA, encourage ambulation with nursing throughout the day   Total Session Time   Timed Code Treatment Minutes 32   Total Session Time (sum of timed and untimed services) 39

## 2024-09-05 ENCOUNTER — APPOINTMENT (OUTPATIENT)
Dept: PHYSICAL THERAPY | Facility: CLINIC | Age: 24
DRG: 853 | End: 2024-09-05
Attending: INTERNAL MEDICINE
Payer: MEDICARE

## 2024-09-05 ENCOUNTER — APPOINTMENT (OUTPATIENT)
Dept: GENERAL RADIOLOGY | Facility: CLINIC | Age: 24
DRG: 853 | End: 2024-09-05
Attending: STUDENT IN AN ORGANIZED HEALTH CARE EDUCATION/TRAINING PROGRAM
Payer: MEDICARE

## 2024-09-05 ENCOUNTER — APPOINTMENT (OUTPATIENT)
Dept: MRI IMAGING | Facility: CLINIC | Age: 24
DRG: 853 | End: 2024-09-05
Attending: STUDENT IN AN ORGANIZED HEALTH CARE EDUCATION/TRAINING PROGRAM
Payer: MEDICARE

## 2024-09-05 LAB
ALBUMIN SERPL BCG-MCNC: 4 G/DL (ref 3.5–5.2)
ALP SERPL-CCNC: 105 U/L (ref 40–150)
ALT SERPL W P-5'-P-CCNC: 149 U/L (ref 0–70)
ANION GAP SERPL CALCULATED.3IONS-SCNC: 14 MMOL/L (ref 7–15)
ANION GAP SERPL CALCULATED.3IONS-SCNC: 15 MMOL/L (ref 7–15)
AST SERPL W P-5'-P-CCNC: 50 U/L (ref 0–45)
BASE EXCESS BLDV CALC-SCNC: -0.1 MMOL/L (ref -3–3)
BASE EXCESS BLDV CALC-SCNC: 2 MMOL/L (ref -3–3)
BASE EXCESS BLDV CALC-SCNC: 2.3 MMOL/L (ref -3–3)
BASE EXCESS BLDV CALC-SCNC: 3.9 MMOL/L (ref -3–3)
BASOPHILS # BLD AUTO: ABNORMAL 10*3/UL
BASOPHILS # BLD MANUAL: 0 10E3/UL (ref 0–0.2)
BASOPHILS NFR BLD AUTO: ABNORMAL %
BASOPHILS NFR BLD MANUAL: 0 %
BILIRUB DIRECT SERPL-MCNC: <0.2 MG/DL (ref 0–0.3)
BILIRUB SERPL-MCNC: 0.4 MG/DL
BUN SERPL-MCNC: 24 MG/DL (ref 6–20)
BUN SERPL-MCNC: 24.8 MG/DL (ref 6–20)
CA-I BLD-MCNC: 4 MG/DL (ref 4.4–5.2)
CALCIUM SERPL-MCNC: 8.6 MG/DL (ref 8.8–10.4)
CALCIUM SERPL-MCNC: 8.8 MG/DL (ref 8.8–10.4)
CHLORIDE SERPL-SCNC: 96 MMOL/L (ref 98–107)
CHLORIDE SERPL-SCNC: 98 MMOL/L (ref 98–107)
CREAT SERPL-MCNC: 0.29 MG/DL (ref 0.67–1.17)
CREAT SERPL-MCNC: 0.51 MG/DL (ref 0.67–1.17)
EGFRCR SERPLBLD CKD-EPI 2021: >90 ML/MIN/1.73M2
EGFRCR SERPLBLD CKD-EPI 2021: >90 ML/MIN/1.73M2
EOSINOPHIL # BLD AUTO: ABNORMAL 10*3/UL
EOSINOPHIL # BLD MANUAL: 0.2 10E3/UL (ref 0–0.7)
EOSINOPHIL NFR BLD AUTO: ABNORMAL %
EOSINOPHIL NFR BLD MANUAL: 1 %
ERYTHROCYTE [DISTWIDTH] IN BLOOD BY AUTOMATED COUNT: 15 % (ref 10–15)
GLUCOSE BLDC GLUCOMTR-MCNC: 106 MG/DL (ref 70–99)
GLUCOSE BLDC GLUCOMTR-MCNC: 128 MG/DL (ref 70–99)
GLUCOSE BLDC GLUCOMTR-MCNC: 129 MG/DL (ref 70–99)
GLUCOSE BLDC GLUCOMTR-MCNC: 129 MG/DL (ref 70–99)
GLUCOSE SERPL-MCNC: 112 MG/DL (ref 70–99)
GLUCOSE SERPL-MCNC: 198 MG/DL (ref 70–99)
HCO3 BLDV-SCNC: 24 MMOL/L (ref 21–28)
HCO3 BLDV-SCNC: 27 MMOL/L (ref 21–28)
HCO3 BLDV-SCNC: 27 MMOL/L (ref 21–28)
HCO3 BLDV-SCNC: 29 MMOL/L (ref 21–28)
HCO3 SERPL-SCNC: 23 MMOL/L (ref 22–29)
HCO3 SERPL-SCNC: 24 MMOL/L (ref 22–29)
HCT VFR BLD AUTO: 34.8 % (ref 40–53)
HGB BLD-MCNC: 11.2 G/DL (ref 13.3–17.7)
IMM GRANULOCYTES # BLD: ABNORMAL 10*3/UL
IMM GRANULOCYTES NFR BLD: ABNORMAL %
LACTATE SERPL-SCNC: 1.9 MMOL/L (ref 0.7–2)
LACTATE SERPL-SCNC: 2.7 MMOL/L (ref 0.7–2)
LACTATE SERPL-SCNC: 2.9 MMOL/L (ref 0.7–2)
LYMPHOCYTES # BLD AUTO: ABNORMAL 10*3/UL
LYMPHOCYTES # BLD MANUAL: 2.4 10E3/UL (ref 0.8–5.3)
LYMPHOCYTES NFR BLD AUTO: ABNORMAL %
LYMPHOCYTES NFR BLD MANUAL: 12 %
MAGNESIUM SERPL-MCNC: 2.2 MG/DL (ref 1.7–2.3)
MAGNESIUM SERPL-MCNC: 2.4 MG/DL (ref 1.7–2.3)
MCH RBC QN AUTO: 28.7 PG (ref 26.5–33)
MCHC RBC AUTO-ENTMCNC: 32.2 G/DL (ref 31.5–36.5)
MCV RBC AUTO: 89 FL (ref 78–100)
METAMYELOCYTES # BLD MANUAL: 0.6 10E3/UL
METAMYELOCYTES NFR BLD MANUAL: 3 %
MONOCYTES # BLD AUTO: ABNORMAL 10*3/UL
MONOCYTES # BLD MANUAL: 1.8 10E3/UL (ref 0–1.3)
MONOCYTES NFR BLD AUTO: ABNORMAL %
MONOCYTES NFR BLD MANUAL: 9 %
NEUTROPHILS # BLD AUTO: ABNORMAL 10*3/UL
NEUTROPHILS # BLD MANUAL: 15 10E3/UL (ref 1.6–8.3)
NEUTROPHILS NFR BLD AUTO: ABNORMAL %
NEUTROPHILS NFR BLD MANUAL: 75 %
NRBC # BLD AUTO: 0 10E3/UL
NRBC BLD AUTO-RTO: 0 /100
O2/TOTAL GAS SETTING VFR VENT: 21 %
O2/TOTAL GAS SETTING VFR VENT: 97 %
OXYHGB MFR BLDV: 60 % (ref 70–75)
OXYHGB MFR BLDV: 62 % (ref 70–75)
OXYHGB MFR BLDV: 66 % (ref 70–75)
OXYHGB MFR BLDV: 72 % (ref 70–75)
PCO2 BLDV: 35 MM HG (ref 40–50)
PCO2 BLDV: 39 MM HG (ref 40–50)
PCO2 BLDV: 43 MM HG (ref 40–50)
PCO2 BLDV: 44 MM HG (ref 40–50)
PH BLDV: 7.41 [PH] (ref 7.32–7.43)
PH BLDV: 7.43 [PH] (ref 7.32–7.43)
PH BLDV: 7.44 [PH] (ref 7.32–7.43)
PH BLDV: 7.44 [PH] (ref 7.32–7.43)
PHOSPHATE SERPL-MCNC: 3.1 MG/DL (ref 2.5–4.5)
PLAT MORPH BLD: ABNORMAL
PLATELET # BLD AUTO: 234 10E3/UL (ref 150–450)
PO2 BLDV: 32 MM HG (ref 25–47)
PO2 BLDV: 35 MM HG (ref 25–47)
PO2 BLDV: 36 MM HG (ref 25–47)
PO2 BLDV: 39 MM HG (ref 25–47)
POLYCHROMASIA BLD QL SMEAR: SLIGHT
POTASSIUM SERPL-SCNC: 3.5 MMOL/L (ref 3.4–5.3)
POTASSIUM SERPL-SCNC: 4 MMOL/L (ref 3.4–5.3)
PROT SERPL-MCNC: 6.9 G/DL (ref 6.4–8.3)
RBC # BLD AUTO: 3.9 10E6/UL (ref 4.4–5.9)
RBC MORPH BLD: ABNORMAL
SAO2 % BLDV: 61.2 % (ref 70–75)
SAO2 % BLDV: 63.2 % (ref 70–75)
SAO2 % BLDV: 67.9 % (ref 70–75)
SAO2 % BLDV: 73.7 % (ref 70–75)
SODIUM SERPL-SCNC: 133 MMOL/L (ref 135–145)
SODIUM SERPL-SCNC: 137 MMOL/L (ref 135–145)
SPECIMEN EXPIRATION DATE: NORMAL
WBC # BLD AUTO: 20 10E3/UL (ref 4–11)

## 2024-09-05 PROCEDURE — 250N000013 HC RX MED GY IP 250 OP 250 PS 637: Performed by: STUDENT IN AN ORGANIZED HEALTH CARE EDUCATION/TRAINING PROGRAM

## 2024-09-05 PROCEDURE — 999N000044 HC STATISTIC CVC DRESSING CHANGE

## 2024-09-05 PROCEDURE — 255N000002 HC RX 255 OP 636: Performed by: STUDENT IN AN ORGANIZED HEALTH CARE EDUCATION/TRAINING PROGRAM

## 2024-09-05 PROCEDURE — 75565 CARD MRI VELOC FLOW MAPPING: CPT | Mod: MG

## 2024-09-05 PROCEDURE — 250N000011 HC RX IP 250 OP 636: Performed by: STUDENT IN AN ORGANIZED HEALTH CARE EDUCATION/TRAINING PROGRAM

## 2024-09-05 PROCEDURE — 87040 BLOOD CULTURE FOR BACTERIA: CPT | Performed by: STUDENT IN AN ORGANIZED HEALTH CARE EDUCATION/TRAINING PROGRAM

## 2024-09-05 PROCEDURE — 80053 COMPREHEN METABOLIC PANEL: CPT | Performed by: STUDENT IN AN ORGANIZED HEALTH CARE EDUCATION/TRAINING PROGRAM

## 2024-09-05 PROCEDURE — 82805 BLOOD GASES W/O2 SATURATION: CPT | Performed by: STUDENT IN AN ORGANIZED HEALTH CARE EDUCATION/TRAINING PROGRAM

## 2024-09-05 PROCEDURE — 75561 CARDIAC MRI FOR MORPH W/DYE: CPT | Mod: 26 | Performed by: INTERNAL MEDICINE

## 2024-09-05 PROCEDURE — A9585 GADOBUTROL INJECTION: HCPCS | Performed by: STUDENT IN AN ORGANIZED HEALTH CARE EDUCATION/TRAINING PROGRAM

## 2024-09-05 PROCEDURE — G1010 CDSM STANSON: HCPCS | Performed by: INTERNAL MEDICINE

## 2024-09-05 PROCEDURE — 97530 THERAPEUTIC ACTIVITIES: CPT | Mod: GP

## 2024-09-05 PROCEDURE — 83605 ASSAY OF LACTIC ACID: CPT | Performed by: STUDENT IN AN ORGANIZED HEALTH CARE EDUCATION/TRAINING PROGRAM

## 2024-09-05 PROCEDURE — 75561 CARDIAC MRI FOR MORPH W/DYE: CPT | Mod: MG

## 2024-09-05 PROCEDURE — 85007 BL SMEAR W/DIFF WBC COUNT: CPT | Performed by: STUDENT IN AN ORGANIZED HEALTH CARE EDUCATION/TRAINING PROGRAM

## 2024-09-05 PROCEDURE — 82330 ASSAY OF CALCIUM: CPT | Performed by: STUDENT IN AN ORGANIZED HEALTH CARE EDUCATION/TRAINING PROGRAM

## 2024-09-05 PROCEDURE — 250N000013 HC RX MED GY IP 250 OP 250 PS 637: Performed by: INTERNAL MEDICINE

## 2024-09-05 PROCEDURE — 82248 BILIRUBIN DIRECT: CPT

## 2024-09-05 PROCEDURE — 250N000013 HC RX MED GY IP 250 OP 250 PS 637

## 2024-09-05 PROCEDURE — 87077 CULTURE AEROBIC IDENTIFY: CPT | Performed by: STUDENT IN AN ORGANIZED HEALTH CARE EDUCATION/TRAINING PROGRAM

## 2024-09-05 PROCEDURE — 87149 DNA/RNA DIRECT PROBE: CPT | Performed by: STUDENT IN AN ORGANIZED HEALTH CARE EDUCATION/TRAINING PROGRAM

## 2024-09-05 PROCEDURE — 71045 X-RAY EXAM CHEST 1 VIEW: CPT | Mod: 26 | Performed by: STUDENT IN AN ORGANIZED HEALTH CARE EDUCATION/TRAINING PROGRAM

## 2024-09-05 PROCEDURE — 250N000012 HC RX MED GY IP 250 OP 636 PS 637: Performed by: STUDENT IN AN ORGANIZED HEALTH CARE EDUCATION/TRAINING PROGRAM

## 2024-09-05 PROCEDURE — 84100 ASSAY OF PHOSPHORUS: CPT | Performed by: STUDENT IN AN ORGANIZED HEALTH CARE EDUCATION/TRAINING PROGRAM

## 2024-09-05 PROCEDURE — 83735 ASSAY OF MAGNESIUM: CPT | Performed by: STUDENT IN AN ORGANIZED HEALTH CARE EDUCATION/TRAINING PROGRAM

## 2024-09-05 PROCEDURE — 84520 ASSAY OF UREA NITROGEN: CPT | Performed by: STUDENT IN AN ORGANIZED HEALTH CARE EDUCATION/TRAINING PROGRAM

## 2024-09-05 PROCEDURE — 120N000003 HC R&B IMCU UMMC

## 2024-09-05 PROCEDURE — 85027 COMPLETE CBC AUTOMATED: CPT | Performed by: STUDENT IN AN ORGANIZED HEALTH CARE EDUCATION/TRAINING PROGRAM

## 2024-09-05 PROCEDURE — 75565 CARD MRI VELOC FLOW MAPPING: CPT | Mod: 26 | Performed by: INTERNAL MEDICINE

## 2024-09-05 PROCEDURE — 71045 X-RAY EXAM CHEST 1 VIEW: CPT

## 2024-09-05 PROCEDURE — 250N000013 HC RX MED GY IP 250 OP 250 PS 637: Performed by: NURSE PRACTITIONER

## 2024-09-05 PROCEDURE — 83605 ASSAY OF LACTIC ACID: CPT

## 2024-09-05 PROCEDURE — 36415 COLL VENOUS BLD VENIPUNCTURE: CPT | Performed by: STUDENT IN AN ORGANIZED HEALTH CARE EDUCATION/TRAINING PROGRAM

## 2024-09-05 RX ORDER — POTASSIUM CHLORIDE 1.5 G/1.58G
20 POWDER, FOR SOLUTION ORAL ONCE
Status: COMPLETED | OUTPATIENT
Start: 2024-09-05 | End: 2024-09-05

## 2024-09-05 RX ORDER — GADOBUTROL 604.72 MG/ML
13 INJECTION INTRAVENOUS ONCE
Status: COMPLETED | OUTPATIENT
Start: 2024-09-05 | End: 2024-09-05

## 2024-09-05 RX ORDER — SPIRONOLACTONE 25 MG/1
25 TABLET ORAL DAILY
Status: DISCONTINUED | OUTPATIENT
Start: 2024-09-06 | End: 2024-09-19 | Stop reason: HOSPADM

## 2024-09-05 RX ORDER — BUMETANIDE 0.25 MG/ML
2 INJECTION INTRAMUSCULAR; INTRAVENOUS EVERY 8 HOURS
Status: DISCONTINUED | OUTPATIENT
Start: 2024-09-05 | End: 2024-09-06

## 2024-09-05 RX ADMIN — LOSARTAN POTASSIUM 50 MG: 50 TABLET, FILM COATED ORAL at 07:56

## 2024-09-05 RX ADMIN — BUMETANIDE 2 MG: 0.25 INJECTION INTRAMUSCULAR; INTRAVENOUS at 00:45

## 2024-09-05 RX ADMIN — DIGOXIN 250 MCG: 125 TABLET ORAL at 07:56

## 2024-09-05 RX ADMIN — BUMETANIDE 2 MG: 0.25 INJECTION INTRAMUSCULAR; INTRAVENOUS at 14:34

## 2024-09-05 RX ADMIN — PANTOPRAZOLE SODIUM 40 MG: 40 TABLET, DELAYED RELEASE ORAL at 07:56

## 2024-09-05 RX ADMIN — Medication 1 TABLET: at 07:56

## 2024-09-05 RX ADMIN — GADOBUTROL 13 ML: 604.72 INJECTION INTRAVENOUS at 14:16

## 2024-09-05 RX ADMIN — POTASSIUM CHLORIDE 20 MEQ: 1.5 POWDER, FOR SOLUTION ORAL at 07:56

## 2024-09-05 RX ADMIN — SPIRONOLACTONE 12.5 MG: 25 TABLET, FILM COATED ORAL at 07:58

## 2024-09-05 RX ADMIN — BUMETANIDE 2 MG: 0.25 INJECTION INTRAMUSCULAR; INTRAVENOUS at 21:04

## 2024-09-05 RX ADMIN — INSULIN GLARGINE 10 UNITS: 100 INJECTION, SOLUTION SUBCUTANEOUS at 21:04

## 2024-09-05 RX ADMIN — POTASSIUM CHLORIDE 40 MEQ: 1.5 POWDER, FOR SOLUTION ORAL at 08:06

## 2024-09-05 RX ADMIN — NYSTATIN 500000 UNITS: 100000 SUSPENSION ORAL at 07:56

## 2024-09-05 RX ADMIN — NYSTATIN 500000 UNITS: 100000 SUSPENSION ORAL at 19:58

## 2024-09-05 RX ADMIN — NYSTATIN 500000 UNITS: 100000 SUSPENSION ORAL at 15:49

## 2024-09-05 RX ADMIN — SENNOSIDES AND DOCUSATE SODIUM 1 TABLET: 8.6; 5 TABLET ORAL at 19:59

## 2024-09-05 RX ADMIN — RISPERIDONE 1.5 MG: 1 TABLET, FILM COATED ORAL at 19:59

## 2024-09-05 RX ADMIN — ENOXAPARIN SODIUM 40 MG: 40 INJECTION SUBCUTANEOUS at 11:12

## 2024-09-05 RX ADMIN — THIAMINE HCL TAB 100 MG 100 MG: 100 TAB at 07:57

## 2024-09-05 RX ADMIN — EMPAGLIFLOZIN 10 MG: 10 TABLET, FILM COATED ORAL at 07:56

## 2024-09-05 RX ADMIN — RISPERIDONE 1 MG: 1 TABLET, FILM COATED ORAL at 07:57

## 2024-09-05 ASSESSMENT — ACTIVITIES OF DAILY LIVING (ADL)
ADLS_ACUITY_SCORE: 40
ADLS_ACUITY_SCORE: 36
ADLS_ACUITY_SCORE: 40

## 2024-09-05 NOTE — PROGRESS NOTES
"/65 (BP Location: Right arm)   Pulse (!) 128   Temp 97.5  F (36.4  C) (Oral)   Resp 20   Ht 1.86 m (6' 1.23\")   Wt 96.8 kg (213 lb 6.5 oz)   SpO2 100%   BMI 27.98 kg/m      Neuro: A&Ox4.   Cardiac: Afebrile, VSS.   Respiratory: RA   GI/: Voiding spontaneously. No BM this shift.   Diet/appetite: Tolerating diet. Denies nausea   Activity: Up SBA  Pain: Denies   Skin: No new deficits noted.  Lines: PICC x3, Dobutamine @ 2.5  Drains: None  No new complaints. Will continue to monitor and follow plan of care.    "

## 2024-09-05 NOTE — PROGRESS NOTES
Neuro: A&Ox4, MCALLISTER. PERRLA, follows commands. Afebrile. Provide simple instructions. Reinforce reason for treatments - goal is to get home. Up with SBA   CV: Rate/rhythm: -140; PVCs. Did have a couple of episodes of HR up to 160's  Dobutamine @ 2.5 CVP/ScVO2 check BID. Last CVP 8 CI 2.4  Pulm: RA, Sats adequately   GI: Cardiac diet. 2gm Na+. Good appetite.    : Voiding adequately via urinal. Bumex q8hr.   Skin: scattered bruises. Lower back rash. Mepilex on saccrum  IVs:  L-TL PICC  Drips:   Dobutamine 2.5      Transfer  Transferred to:  Via:bed  Reason for transfer:Pt no longer appropriate for 4A- improved patient condition  Family: Aware of transfer  Belongings: Packed and sent with pt  Chart: Delivered with pt to next unit  Medications: Meds sent to new unit with pt  Report given to: Arian MOLINA

## 2024-09-05 NOTE — PLAN OF CARE
Goal Outcome Evaluation: Progressing     Neuro: A&Ox4, COURTNEY PERRLA, follows commands. Afebrile. Provide simple instructions. Reinforce reason for treatments - goal is to get home. Up with SBA   CV: Rate/rhythm: -140; PVCs  Dobutamine @ 5 CVP/ScVO2 check BID. Last CVP 10 CI 2.9  Pulm: RA, Sats adequately   GI: Cardiac diet. 2gm Na+. Good appetite.    : Voiding adequately via urinal. Bumex BID.   Skin: scattered bruises. Lower back rash. Mepilex on saccrum  IVs:  L-TL PICC  Drips:   Dobutamine 5

## 2024-09-05 NOTE — PLAN OF CARE
Major Shift Events: AOx4, able to make needs known. MCALLISTER, up with SBA. VSS on RA, ex. Sinus Tach 120-150's. Afebrile. Dobutamine gtt @ 5. NAYA with PICC: CVP =8, ScvO2 = 72%, CI = 3.2. Cardiac diet. -BM. Voiding adequately via external cath. L-TL PICC.     Plan: Transfer to  when able. Continue to treat per plan of care. Cardiac MRI today, plan to be NPO 3 hours prior.     For vital signs and complete assessments, please see documentation flowsheets.

## 2024-09-06 ENCOUNTER — APPOINTMENT (OUTPATIENT)
Dept: PHYSICAL THERAPY | Facility: CLINIC | Age: 24
DRG: 853 | End: 2024-09-06
Attending: INTERNAL MEDICINE
Payer: MEDICARE

## 2024-09-06 LAB
ALBUMIN SERPL BCG-MCNC: 4.1 G/DL (ref 3.5–5.2)
ALP SERPL-CCNC: 108 U/L (ref 40–150)
ALT SERPL W P-5'-P-CCNC: 149 U/L (ref 0–70)
ANION GAP SERPL CALCULATED.3IONS-SCNC: 12 MMOL/L (ref 7–15)
ANION GAP SERPL CALCULATED.3IONS-SCNC: 12 MMOL/L (ref 7–15)
AST SERPL W P-5'-P-CCNC: 49 U/L (ref 0–45)
BASE EXCESS BLDV CALC-SCNC: 1.4 MMOL/L (ref -3–3)
BASE EXCESS BLDV CALC-SCNC: 3 MMOL/L (ref -3–3)
BASE EXCESS BLDV CALC-SCNC: 4.3 MMOL/L (ref -3–3)
BASOPHILS # BLD AUTO: 0.1 10E3/UL (ref 0–0.2)
BASOPHILS NFR BLD AUTO: 1 %
BILIRUB DIRECT SERPL-MCNC: <0.2 MG/DL (ref 0–0.3)
BILIRUB SERPL-MCNC: 0.5 MG/DL
BUN SERPL-MCNC: 21.8 MG/DL (ref 6–20)
BUN SERPL-MCNC: 23.2 MG/DL (ref 6–20)
CA-I BLD-MCNC: 4.6 MG/DL (ref 4.4–5.2)
CALCIUM SERPL-MCNC: 8.8 MG/DL (ref 8.8–10.4)
CALCIUM SERPL-MCNC: 9.1 MG/DL (ref 8.8–10.4)
CHLORIDE SERPL-SCNC: 95 MMOL/L (ref 98–107)
CHLORIDE SERPL-SCNC: 99 MMOL/L (ref 98–107)
CREAT SERPL-MCNC: 0.48 MG/DL (ref 0.67–1.17)
CREAT SERPL-MCNC: 0.51 MG/DL (ref 0.67–1.17)
EGFRCR SERPLBLD CKD-EPI 2021: >90 ML/MIN/1.73M2
EGFRCR SERPLBLD CKD-EPI 2021: >90 ML/MIN/1.73M2
ENTEROCOCCUS FAECALIS: NOT DETECTED
ENTEROCOCCUS FAECIUM: NOT DETECTED
EOSINOPHIL # BLD AUTO: 0.1 10E3/UL (ref 0–0.7)
EOSINOPHIL NFR BLD AUTO: 1 %
ERYTHROCYTE [DISTWIDTH] IN BLOOD BY AUTOMATED COUNT: 14.9 % (ref 10–15)
GLUCOSE BLDC GLUCOMTR-MCNC: 111 MG/DL (ref 70–99)
GLUCOSE BLDC GLUCOMTR-MCNC: 114 MG/DL (ref 70–99)
GLUCOSE BLDC GLUCOMTR-MCNC: 115 MG/DL (ref 70–99)
GLUCOSE SERPL-MCNC: 113 MG/DL (ref 70–99)
GLUCOSE SERPL-MCNC: 120 MG/DL (ref 70–99)
HCO3 BLDV-SCNC: 27 MMOL/L (ref 21–28)
HCO3 BLDV-SCNC: 28 MMOL/L (ref 21–28)
HCO3 BLDV-SCNC: 30 MMOL/L (ref 21–28)
HCO3 SERPL-SCNC: 23 MMOL/L (ref 22–29)
HCO3 SERPL-SCNC: 26 MMOL/L (ref 22–29)
HCT VFR BLD AUTO: 33.8 % (ref 40–53)
HGB BLD-MCNC: 11.1 G/DL (ref 13.3–17.7)
IMM GRANULOCYTES # BLD: 0.8 10E3/UL
IMM GRANULOCYTES NFR BLD: 4 %
LACTATE SERPL-SCNC: 1.6 MMOL/L (ref 0.7–2)
LACTATE SERPL-SCNC: 1.9 MMOL/L (ref 0.7–2)
LISTERIA SPECIES (DETECTED/NOT DETECTED): NOT DETECTED
LYMPHOCYTES # BLD AUTO: 3.2 10E3/UL (ref 0.8–5.3)
LYMPHOCYTES NFR BLD AUTO: 17 %
MAGNESIUM SERPL-MCNC: 2.3 MG/DL (ref 1.7–2.3)
MAGNESIUM SERPL-MCNC: 2.3 MG/DL (ref 1.7–2.3)
MCH RBC QN AUTO: 28.9 PG (ref 26.5–33)
MCHC RBC AUTO-ENTMCNC: 32.8 G/DL (ref 31.5–36.5)
MCV RBC AUTO: 88 FL (ref 78–100)
MONOCYTES # BLD AUTO: 1.7 10E3/UL (ref 0–1.3)
MONOCYTES NFR BLD AUTO: 9 %
MRSA DNA SPEC QL NAA+PROBE: NEGATIVE
NEUTROPHILS # BLD AUTO: 13 10E3/UL (ref 1.6–8.3)
NEUTROPHILS NFR BLD AUTO: 68 %
NRBC # BLD AUTO: 0 10E3/UL
NRBC BLD AUTO-RTO: 0 /100
O2/TOTAL GAS SETTING VFR VENT: 0 %
O2/TOTAL GAS SETTING VFR VENT: 21 %
O2/TOTAL GAS SETTING VFR VENT: 21 %
OXYHGB MFR BLDV: 57 % (ref 70–75)
OXYHGB MFR BLDV: 68 % (ref 70–75)
OXYHGB MFR BLDV: 71 % (ref 70–75)
PCO2 BLDV: 44 MM HG (ref 40–50)
PCO2 BLDV: 45 MM HG (ref 40–50)
PCO2 BLDV: 46 MM HG (ref 40–50)
PH BLDV: 7.39 [PH] (ref 7.32–7.43)
PH BLDV: 7.41 [PH] (ref 7.32–7.43)
PH BLDV: 7.42 [PH] (ref 7.32–7.43)
PHOSPHATE SERPL-MCNC: 3.3 MG/DL (ref 2.5–4.5)
PLATELET # BLD AUTO: 251 10E3/UL (ref 150–450)
PO2 BLDV: 33 MM HG (ref 25–47)
PO2 BLDV: 38 MM HG (ref 25–47)
PO2 BLDV: 39 MM HG (ref 25–47)
POTASSIUM SERPL-SCNC: 3.5 MMOL/L (ref 3.4–5.3)
POTASSIUM SERPL-SCNC: 4 MMOL/L (ref 3.4–5.3)
PROT SERPL-MCNC: 7.2 G/DL (ref 6.4–8.3)
RBC # BLD AUTO: 3.84 10E6/UL (ref 4.4–5.9)
SA TARGET DNA: NEGATIVE
SAO2 % BLDV: 58.4 % (ref 70–75)
SAO2 % BLDV: 69.7 % (ref 70–75)
SAO2 % BLDV: 72.4 % (ref 70–75)
SODIUM SERPL-SCNC: 133 MMOL/L (ref 135–145)
SODIUM SERPL-SCNC: 134 MMOL/L (ref 135–145)
STAPHYLOCOCCUS AUREUS: NOT DETECTED
STAPHYLOCOCCUS EPIDERMIDIS: NOT DETECTED
STAPHYLOCOCCUS LUGDUNENSIS: NOT DETECTED
STAPHYLOCOCCUS SPECIES: NOT DETECTED
STREPTOCOCCUS AGALACTIAE: NOT DETECTED
STREPTOCOCCUS ANGINOSUS GROUP: NOT DETECTED
STREPTOCOCCUS PNEUMONIAE: NOT DETECTED
STREPTOCOCCUS PYOGENES: NOT DETECTED
STREPTOCOCCUS SPECIES: DETECTED
WBC # BLD AUTO: 18.9 10E3/UL (ref 4–11)

## 2024-09-06 PROCEDURE — 97116 GAIT TRAINING THERAPY: CPT | Mod: GP

## 2024-09-06 PROCEDURE — 85025 COMPLETE CBC W/AUTO DIFF WBC: CPT | Performed by: STUDENT IN AN ORGANIZED HEALTH CARE EDUCATION/TRAINING PROGRAM

## 2024-09-06 PROCEDURE — 83605 ASSAY OF LACTIC ACID: CPT | Performed by: STUDENT IN AN ORGANIZED HEALTH CARE EDUCATION/TRAINING PROGRAM

## 2024-09-06 PROCEDURE — 84100 ASSAY OF PHOSPHORUS: CPT | Performed by: STUDENT IN AN ORGANIZED HEALTH CARE EDUCATION/TRAINING PROGRAM

## 2024-09-06 PROCEDURE — 82805 BLOOD GASES W/O2 SATURATION: CPT | Performed by: STUDENT IN AN ORGANIZED HEALTH CARE EDUCATION/TRAINING PROGRAM

## 2024-09-06 PROCEDURE — 82248 BILIRUBIN DIRECT: CPT

## 2024-09-06 PROCEDURE — 120N000003 HC R&B IMCU UMMC

## 2024-09-06 PROCEDURE — 87641 MR-STAPH DNA AMP PROBE: CPT | Performed by: STUDENT IN AN ORGANIZED HEALTH CARE EDUCATION/TRAINING PROGRAM

## 2024-09-06 PROCEDURE — 250N000011 HC RX IP 250 OP 636: Performed by: STUDENT IN AN ORGANIZED HEALTH CARE EDUCATION/TRAINING PROGRAM

## 2024-09-06 PROCEDURE — 250N000013 HC RX MED GY IP 250 OP 250 PS 637: Performed by: INTERNAL MEDICINE

## 2024-09-06 PROCEDURE — 83735 ASSAY OF MAGNESIUM: CPT | Performed by: STUDENT IN AN ORGANIZED HEALTH CARE EDUCATION/TRAINING PROGRAM

## 2024-09-06 PROCEDURE — 97530 THERAPEUTIC ACTIVITIES: CPT | Mod: GP

## 2024-09-06 PROCEDURE — 250N000013 HC RX MED GY IP 250 OP 250 PS 637: Performed by: NURSE PRACTITIONER

## 2024-09-06 PROCEDURE — 36415 COLL VENOUS BLD VENIPUNCTURE: CPT

## 2024-09-06 PROCEDURE — 250N000013 HC RX MED GY IP 250 OP 250 PS 637: Performed by: STUDENT IN AN ORGANIZED HEALTH CARE EDUCATION/TRAINING PROGRAM

## 2024-09-06 PROCEDURE — 258N000003 HC RX IP 258 OP 636: Performed by: INTERNAL MEDICINE

## 2024-09-06 PROCEDURE — 250N000011 HC RX IP 250 OP 636: Performed by: INTERNAL MEDICINE

## 2024-09-06 PROCEDURE — 87040 BLOOD CULTURE FOR BACTERIA: CPT

## 2024-09-06 PROCEDURE — 250N000013 HC RX MED GY IP 250 OP 250 PS 637

## 2024-09-06 PROCEDURE — 82330 ASSAY OF CALCIUM: CPT | Performed by: STUDENT IN AN ORGANIZED HEALTH CARE EDUCATION/TRAINING PROGRAM

## 2024-09-06 PROCEDURE — 99233 SBSQ HOSP IP/OBS HIGH 50: CPT | Mod: GC | Performed by: INTERNAL MEDICINE

## 2024-09-06 PROCEDURE — 250N000012 HC RX MED GY IP 250 OP 636 PS 637: Performed by: STUDENT IN AN ORGANIZED HEALTH CARE EDUCATION/TRAINING PROGRAM

## 2024-09-06 RX ORDER — CEFTRIAXONE 2 G/1
2 INJECTION, POWDER, FOR SOLUTION INTRAMUSCULAR; INTRAVENOUS EVERY 24 HOURS
Status: COMPLETED | OUTPATIENT
Start: 2024-09-06 | End: 2024-09-07

## 2024-09-06 RX ORDER — BUMETANIDE 2 MG/1
2 TABLET ORAL
Status: DISCONTINUED | OUTPATIENT
Start: 2024-09-06 | End: 2024-09-06

## 2024-09-06 RX ORDER — VANCOMYCIN HYDROCHLORIDE
1500 EVERY 12 HOURS
Status: DISCONTINUED | OUTPATIENT
Start: 2024-09-06 | End: 2024-09-06

## 2024-09-06 RX ORDER — POTASSIUM CHLORIDE 750 MG/1
20 TABLET, EXTENDED RELEASE ORAL ONCE
Status: COMPLETED | OUTPATIENT
Start: 2024-09-06 | End: 2024-09-06

## 2024-09-06 RX ORDER — BUMETANIDE 2 MG/1
2 TABLET ORAL 3 TIMES DAILY
Status: DISCONTINUED | OUTPATIENT
Start: 2024-09-06 | End: 2024-09-07

## 2024-09-06 RX ORDER — PIPERACILLIN SODIUM, TAZOBACTAM SODIUM 3; .375 G/15ML; G/15ML
3.38 INJECTION, POWDER, LYOPHILIZED, FOR SOLUTION INTRAVENOUS EVERY 6 HOURS
Status: DISCONTINUED | OUTPATIENT
Start: 2024-09-06 | End: 2024-09-06

## 2024-09-06 RX ORDER — LOSARTAN POTASSIUM 25 MG/1
25 TABLET ORAL EVERY 24 HOURS
Status: DISCONTINUED | OUTPATIENT
Start: 2024-09-06 | End: 2024-09-06

## 2024-09-06 RX ORDER — BUMETANIDE 2 MG/1
2 TABLET ORAL 3 TIMES DAILY
Status: DISCONTINUED | OUTPATIENT
Start: 2024-09-06 | End: 2024-09-06

## 2024-09-06 RX ADMIN — PANTOPRAZOLE SODIUM 40 MG: 40 TABLET, DELAYED RELEASE ORAL at 08:48

## 2024-09-06 RX ADMIN — ENOXAPARIN SODIUM 40 MG: 40 INJECTION SUBCUTANEOUS at 13:03

## 2024-09-06 RX ADMIN — NYSTATIN 500000 UNITS: 100000 SUSPENSION ORAL at 21:21

## 2024-09-06 RX ADMIN — BUMETANIDE 2 MG: 2 TABLET ORAL at 15:19

## 2024-09-06 RX ADMIN — NYSTATIN 500000 UNITS: 100000 SUSPENSION ORAL at 12:50

## 2024-09-06 RX ADMIN — PREDNISONE 10 MG: 10 TABLET ORAL at 09:04

## 2024-09-06 RX ADMIN — NYSTATIN 500000 UNITS: 100000 SUSPENSION ORAL at 17:27

## 2024-09-06 RX ADMIN — LOSARTAN POTASSIUM 50 MG: 50 TABLET, FILM COATED ORAL at 08:48

## 2024-09-06 RX ADMIN — CEFTRIAXONE SODIUM 2 G: 2 INJECTION, POWDER, FOR SOLUTION INTRAMUSCULAR; INTRAVENOUS at 13:16

## 2024-09-06 RX ADMIN — POTASSIUM CHLORIDE 20 MEQ: 750 TABLET, EXTENDED RELEASE ORAL at 05:55

## 2024-09-06 RX ADMIN — BUMETANIDE 2 MG: 2 TABLET ORAL at 21:24

## 2024-09-06 RX ADMIN — RISPERIDONE 1 MG: 1 TABLET, FILM COATED ORAL at 09:06

## 2024-09-06 RX ADMIN — SENNOSIDES AND DOCUSATE SODIUM 1 TABLET: 8.6; 5 TABLET ORAL at 21:21

## 2024-09-06 RX ADMIN — NYSTATIN 500000 UNITS: 100000 SUSPENSION ORAL at 08:49

## 2024-09-06 RX ADMIN — RISPERIDONE 1.5 MG: 1 TABLET, FILM COATED ORAL at 21:22

## 2024-09-06 RX ADMIN — POTASSIUM CHLORIDE 40 MEQ: 1.5 POWDER, FOR SOLUTION ORAL at 08:49

## 2024-09-06 RX ADMIN — SPIRONOLACTONE 25 MG: 25 TABLET, FILM COATED ORAL at 08:48

## 2024-09-06 RX ADMIN — EMPAGLIFLOZIN 10 MG: 10 TABLET, FILM COATED ORAL at 08:49

## 2024-09-06 RX ADMIN — THIAMINE HCL TAB 100 MG 100 MG: 100 TAB at 08:49

## 2024-09-06 RX ADMIN — Medication 1 TABLET: at 08:49

## 2024-09-06 RX ADMIN — Medication 5 ML: at 17:30

## 2024-09-06 RX ADMIN — VANCOMYCIN HYDROCHLORIDE 2000 MG: 1 INJECTION, POWDER, LYOPHILIZED, FOR SOLUTION INTRAVENOUS at 10:51

## 2024-09-06 RX ADMIN — BUMETANIDE 2 MG: 0.25 INJECTION INTRAMUSCULAR; INTRAVENOUS at 05:54

## 2024-09-06 RX ADMIN — DIGOXIN 250 MCG: 125 TABLET ORAL at 08:48

## 2024-09-06 ASSESSMENT — ACTIVITIES OF DAILY LIVING (ADL)
ADLS_ACUITY_SCORE: 36
ADLS_ACUITY_SCORE: 33
ADLS_ACUITY_SCORE: 33
ADLS_ACUITY_SCORE: 36
ADLS_ACUITY_SCORE: 33
ADLS_ACUITY_SCORE: 36
ADLS_ACUITY_SCORE: 33
ADLS_ACUITY_SCORE: 34
ADLS_ACUITY_SCORE: 36
ADLS_ACUITY_SCORE: 33
ADLS_ACUITY_SCORE: 36
ADLS_ACUITY_SCORE: 36
ADLS_ACUITY_SCORE: 34
ADLS_ACUITY_SCORE: 34
ADLS_ACUITY_SCORE: 36
ADLS_ACUITY_SCORE: 36
ADLS_ACUITY_SCORE: 34
ADLS_ACUITY_SCORE: 36
ADLS_ACUITY_SCORE: 34
ADLS_ACUITY_SCORE: 36

## 2024-09-06 NOTE — CONSULTS
Discharge Pharmacy Test Claim    Patient's [employer] Part D plan covers Jardiance and Entresto, each with an expected monthly copay of $0.    Test Claim Copay   Jardiance 0.00   Entresto 0.00       Apoorva Wells  [facility] Pharmacy Liaison  Phone: [phone] Fax: [phone]  Available on Teams & Vocera     Neuro

## 2024-09-06 NOTE — PHARMACY-VANCOMYCIN DOSING SERVICE
Pharmacy Vancomycin Initial Note  Date of Service 2024  Patient's  2000  24 year old, male    Indication: Bacteremia, 1 of 1 bcx from  GPC/pairs and chains, verigene pending     Current estimated CrCl = Estimated Creatinine Clearance: 301.7 mL/min (A) (based on SCr of 0.51 mg/dL (L)).    Creatinine for last 3 days  9/3/2024: 12:19 PM Creatinine 0.61 mg/dL;  8:44 PM Creatinine 0.72 mg/dL  2024:  5:01 AM Creatinine 0.56 mg/dL; 12:37 PM Creatinine 0.56 mg/dL;  6:02 PM Creatinine 0.30 mg/dL  2024:  4:25 AM Creatinine 0.51 mg/dL;  3:38 PM Creatinine 0.29 mg/dL  2024:  4:16 AM Creatinine 0.51 mg/dL    Recent Vancomycin Level(s) for last 3 days  No results found for requested labs within last 3 days.      Vancomycin IV Administrations (past 72 hours)        No vancomycin orders with administrations in past 72 hours.                    Nephrotoxins and other renal medications (From now, onward)      Start     Dose/Rate Route Frequency Ordered Stop    24 1400  bumetanide (BUMEX) injection 2 mg         2 mg Intravenous EVERY 8 HOURS 24 1135      24 1230  empagliflozin (JARDIANCE) tablet 10 mg         10 mg Oral DAILY 24 1121      24 1000  DOBUTamine (DOBUTREX) 1,000 mg in D5W 250 mL infusion (adult MAX conc)         2.5 mcg/kg/min × 108 kg (Dosing Weight)  4.1 mL/hr  Intravenous CONTINUOUS 24 0936              Contrast Orders - past 72 hours (72h ago, onward)      Start     Dose/Rate Route Frequency Stop    24 1430  gadobutrol (GADAVIST) injection 13 mL         13 mL Intravenous ONCE 24 1416    24 1400  perflutren diluted 1mL to 2mL with saline (OPTISON) diluted injection 5 mL         5 mL Intravenous ONCE 24 1341            [employer] Prediction of Planned Initial Vancomycin Regimen          Plan:  Start vancomycin with loading dose 2000 mg IV x1 (21 mg/kg) followed by maintenance dosing vancomycin 1500 mg IV q12h   Vancomycin  monitoring method: AUC  Vancomycin therapeutic monitoring goal: 400-600 mg*h/L  Pharmacy will check vancomycin levels as appropriate in 1-3 Days.    Serum creatinine levels will be ordered daily for the first week of therapy and at least twice weekly for subsequent weeks.      Joshua Magaña, PGY-1 Pharmacy Resident

## 2024-09-06 NOTE — PLAN OF CARE
"/68 (BP Location: Right arm)   Pulse (!) 128   Temp 97.8  F (36.6  C) (Oral)   Resp 18   Ht 1.86 m (6' 1.23\")   Wt 96.8 kg (213 lb 6.5 oz)   SpO2 98%   BMI 27.98 kg/m       Neuro: A&Ox4. Aunt Guerda at bedside, legal guardian Johann (brother). DO NOT leave his mom w/ him alone.   Cardiac: ST (110s-150s). Afebrile. Denies chest pain, dizziness, SOB.   Respiratory: Sating 98% on RA.  GI/: Good UO w/ primofit during night. Last BM 9/5  Diet/appetite: Reg diet. Require feeding assistance- starts w/ soft items.   Activity:  SBA, up to chair  Pain: Denies   Skin: No new deficits noted.  LDA's: L PICC -Triple lumens (Gray: Dobutamine @2.5mcg/kg/min; Red: Transducer; White: SL)  Replacements: K (done); recheck Phos and Mg the next AM.   Labs: LA 1.9; Right hand blood culture- gram positive cocci in pairs and chains- will call back again in a few hours    Plan: BS ACHS & QID; CVP q12hr. Continue with POC. Notify primary team with changes.       Problem: Heart Failure  Goal: Stable Heart Rate and Rhythm  Outcome: Not Progressing     Problem: Pain Acute  Goal: Optimal Pain Control and Function  Outcome: Progressing  Intervention: Prevent or Manage Pain  Recent Flowsheet Documentation  Taken 9/5/2024 0117 by Xochitl Chaney, RN  Sensory Stimulation Regulation:   care clustered   auditory stimulation provided  Sleep/Rest Enhancement:   awakenings minimized   comfort measures   natural light exposure provided   noise level reduced   regular sleep/rest pattern promoted  Medication Review/Management:   high-risk medications identified   medications reviewed  Taken 9/5/2024 1953 by Xochitl Chaney, RN  Sensory Stimulation Regulation:   care clustered   auditory stimulation provided  Sleep/Rest Enhancement:   awakenings minimized   comfort measures   family presence promoted   natural light exposure provided  Medication Review/Management:   high-risk medications identified   medications reviewed  Intervention: Optimize " Psychosocial Wellbeing  Recent Flowsheet Documentation  Taken 9/5/2024 2347 by Xochitl Chaney RN  Supportive Measures:   active listening utilized   positive reinforcement provided   verbalization of feelings encouraged  Taken 9/5/2024 1953 by Xochitl Chaney RN  Supportive Measures:   active listening utilized   positive reinforcement provided   verbalization of feelings encouraged     Problem: Diabetes  Goal: Optimal Coping  Outcome: Progressing  Intervention: Support Wellbeing and Self-Management Success  Recent Flowsheet Documentation  Taken 9/5/2024 2347 by Xochitl Chaney RN  Supportive Measures:   active listening utilized   positive reinforcement provided   verbalization of feelings encouraged  Taken 9/5/2024 1953 by Xochitl Chaney RN  Supportive Measures:   active listening utilized   positive reinforcement provided   verbalization of feelings encouraged  Family/Support System Care:   caregiver stress acknowledged   involvement promoted   presence promoted   self-care encouraged   support provided  Goal: Optimal Functional Ability  Outcome: Progressing  Intervention: Optimize Functional Ability  Recent Flowsheet Documentation  Taken 9/5/2024 2347 by Xochitl Chaney RN  Assistive Device Utilized: gait belt  Activity Management: activity adjusted per tolerance  Activity Assistance Provided: assistance, stand-by  Taken 9/5/2024 1953 by Xochitl Chaney RN  Assistive Device Utilized: gait belt  Activity Management: activity adjusted per tolerance  Activity Assistance Provided: assistance, stand-by  Goal: Blood Glucose Level Within Target Range  Outcome: Progressing  Goal: Minimize Risk of Hypoglycemia  Outcome: Progressing     Problem: Heart Failure  Goal: Optimal Coping  Outcome: Progressing  Intervention: Support Psychosocial Response  Recent Flowsheet Documentation  Taken 9/5/2024 2347 by Xochitl Chaney RN  Supportive Measures:   active listening utilized   positive reinforcement provided   verbalization of feelings  encouraged  Taken 9/5/2024 1953 by Xochitl Chaney RN  Supportive Measures:   active listening utilized   positive reinforcement provided   verbalization of feelings encouraged  Family/Support System Care:   caregiver stress acknowledged   involvement promoted   presence promoted   self-care encouraged   support provided  Goal: Optimal Cardiac Output  Outcome: Progressing  Intervention: Optimize Cardiac Output  Recent Flowsheet Documentation  Taken 9/5/2024 2347 by Xochitl Chaney RN  Environmental Support:   calm environment promoted   caregiver consistency promoted   comfort object encouraged   environmental consistency promoted   personal routine supported   rest periods encouraged   rooming-in facilitated  Taken 9/5/2024 1953 by Xochitl Chaney RN  Environmental Support:   calm environment promoted   caregiver consistency promoted   comfort object encouraged   environmental consistency promoted   personal routine supported   rest periods encouraged   rooming-in facilitated  Goal: Optimal Functional Ability  Outcome: Progressing  Intervention: Optimize Functional Ability  Recent Flowsheet Documentation  Taken 9/5/2024 2347 by Xochitl Chaney RN  Activity Management: activity adjusted per tolerance  Taken 9/5/2024 1953 by Xochitl Chaney RN  Activity Management: activity adjusted per tolerance  Goal: Fluid and Electrolyte Balance  Outcome: Progressing  Goal: Improved Oral Intake  Outcome: Progressing  Goal: Effective Oxygenation and Ventilation  Outcome: Progressing  Intervention: Promote Airway Secretion Clearance  Recent Flowsheet Documentation  Taken 9/5/2024 2347 by Xochitl Chaney RN  Activity Management: activity adjusted per tolerance  Taken 9/5/2024 1953 by Xochitl Chaney RN  Activity Management: activity adjusted per tolerance  Intervention: Optimize Oxygenation and Ventilation  Recent Flowsheet Documentation  Taken 9/5/2024 2347 by Xochitl Chaney RN  Airway/Ventilation Management: airway patency maintained  Head of  "Bed (HOB) Positioning: HOB at 20 degrees  Taken 9/5/2024 1953 by Xochitl Chaney RN  Airway/Ventilation Management: airway patency maintained  Head of Bed (HOB) Positioning: HOB at 20 degrees  Goal: Effective Breathing Pattern During Sleep  Outcome: Progressing  Intervention: Monitor and Manage Obstructive Sleep Apnea  Recent Flowsheet Documentation  Taken 9/5/2024 2347 by Xochitl Chaney RN  Medication Review/Management:   high-risk medications identified   medications reviewed  Taken 9/5/2024 1953 by Xochitl Chaney RN  Medication Review/Management:   high-risk medications identified   medications reviewed     Problem: Infection  Goal: Absence of Infection Signs and Symptoms  Outcome: Progressing  Intervention: Prevent or Manage Infection  Recent Flowsheet Documentation  Taken 9/5/2024 2347 by Xochitl Chaney RN  Infection Management: aseptic technique maintained  Isolation Precautions: protective environment maintained  Taken 9/5/2024 1953 by Xochitl Chaney RN  Infection Management: aseptic technique maintained  Isolation Precautions: protective environment maintained     Problem: Adult Inpatient Plan of Care  Goal: Plan of Care Review  Description: The Plan of Care Review/Shift note should be completed every shift.  The Outcome Evaluation is a brief statement about your assessment that the patient is improving, declining, or no change.  This information will be displayed automatically on your shift  note.  Outcome: Progressing  Flowsheets (Taken 9/6/2024 0007)  Plan of Care Reviewed With:   patient   caregiver  Overall Patient Progress: improving  Goal: Patient-Specific Goal (Individualized)  Description: You can add care plan individualizations to a care plan. Examples of Individualization might be:  \"Parent requests to be called daily at 9am for status\", \"I have a hard time hearing out of my right ear\", or \"Do not touch me to wake me up as it startles  me\".  Outcome: Progressing  Goal: Absence of Hospital-Acquired " Illness or Injury  Outcome: Progressing  Intervention: Identify and Manage Fall Risk  Recent Flowsheet Documentation  Taken 9/5/2024 2347 by Xochitl Chaney RN  Safety Promotion/Fall Prevention:   activity supervised   clutter free environment maintained   supervised activity   increase visualization of patient   safety round/check completed  Taken 9/5/2024 1953 by Xochitl Chaney RN  Safety Promotion/Fall Prevention:   activity supervised   clutter free environment maintained   supervised activity   increase visualization of patient   safety round/check completed  Intervention: Prevent Skin Injury  Recent Flowsheet Documentation  Taken 9/5/2024 2347 by Xochitl Chaney RN  Body Position: position changed independently  Skin Protection:   adhesive use limited   silicone foam dressing in place  Device Skin Pressure Protection:   absorbent pad utilized/changed   tubing/devices free from skin contact   positioning supports utilized  Taken 9/5/2024 1953 by Xochitl Chaney RN  Body Position: position changed independently  Skin Protection:   adhesive use limited   silicone foam dressing in place  Device Skin Pressure Protection:   absorbent pad utilized/changed   tubing/devices free from skin contact   positioning supports utilized  Intervention: Prevent and Manage VTE (Venous Thromboembolism) Risk  Recent Flowsheet Documentation  Taken 9/5/2024 2347 by Xochitl Chaney RN  VTE Prevention/Management: SCDs off (sequential compression devices)  Taken 9/5/2024 1953 by Xochitl Chaney RN  VTE Prevention/Management: SCDs off (sequential compression devices)  Intervention: Prevent Infection  Recent Flowsheet Documentation  Taken 9/5/2024 2347 by Xochitl Chaney RN  Infection Prevention:   cohorting utilized   environmental surveillance performed   hand hygiene promoted   personal protective equipment utilized   rest/sleep promoted   single patient room provided  Taken 9/5/2024 1953 by Xochitl Chaney RN  Infection Prevention:   cohorting  utilized   environmental surveillance performed   hand hygiene promoted   personal protective equipment utilized   rest/sleep promoted   single patient room provided  Goal: Optimal Comfort and Wellbeing  Outcome: Progressing  Intervention: Provide Person-Centered Care  Recent Flowsheet Documentation  Taken 9/5/2024 2347 by Xochitl Chaney RN  Trust Relationship/Rapport:   care explained   choices provided   emotional support provided   empathic listening provided   questions answered   questions encouraged   reassurance provided   thoughts/feelings acknowledged  Taken 9/5/2024 1953 by Xochitl Chaney RN  Trust Relationship/Rapport:   care explained   choices provided   emotional support provided   empathic listening provided   questions answered   questions encouraged   reassurance provided   thoughts/feelings acknowledged  Goal: Readiness for Transition of Care  Outcome: Progressing     Problem: Gas Exchange Impaired  Goal: Optimal Gas Exchange  Outcome: Progressing  Intervention: Optimize Oxygenation and Ventilation  Recent Flowsheet Documentation  Taken 9/5/2024 2347 by Xochitl Chaney RN  Airway/Ventilation Management: airway patency maintained  Head of Bed (HOB) Positioning: HOB at 20 degrees  Taken 9/5/2024 1953 by Xochitl Chaney RN  Airway/Ventilation Management: airway patency maintained  Head of Bed (HOB) Positioning: HOB at 20 degrees     Problem: Cardiovascular Surgery  Goal: Improved Activity Tolerance  Outcome: Progressing  Intervention: Optimize Tolerance for Activity  Recent Flowsheet Documentation  Taken 9/5/2024 2347 by Xochitl Chaney RN  Environmental Support:   calm environment promoted   caregiver consistency promoted   comfort object encouraged   environmental consistency promoted   personal routine supported   rest periods encouraged   rooming-in facilitated  Taken 9/5/2024 1953 by Xochitl Chaney RN  Environmental Support:   calm environment promoted   caregiver consistency promoted   comfort object  encouraged   environmental consistency promoted   personal routine supported   rest periods encouraged   rooming-in facilitated  Goal: Optimal Coping with Heart Surgery  Outcome: Progressing  Intervention: Support Psychosocial Response to Surgery  Recent Flowsheet Documentation  Taken 9/5/2024 2347 by Xochitl Chaney RN  Supportive Measures:   active listening utilized   positive reinforcement provided   verbalization of feelings encouraged  Taken 9/5/2024 1953 by Xochitl Chaney RN  Supportive Measures:   active listening utilized   positive reinforcement provided   verbalization of feelings encouraged  Family/Support System Care:   caregiver stress acknowledged   involvement promoted   presence promoted   self-care encouraged   support provided  Goal: Absence of Bleeding  Outcome: Progressing  Goal: Effective Bowel Elimination  Outcome: Progressing  Goal: Effective Cardiac Function  Outcome: Progressing  Goal: Optimal Cerebral Tissue Perfusion  Outcome: Progressing  Intervention: Protect and Optimize Cerebral Perfusion  Recent Flowsheet Documentation  Taken 9/5/2024 2347 by Xochitl Chaney RN  Sensory Stimulation Regulation:   care clustered   auditory stimulation provided  Cerebral Perfusion Promotion: blood pressure monitored  Glycemic Management:   blood glucose monitored   supplemental insulin given  Head of Bed (HOB) Positioning: HOB at 20 degrees  Taken 9/5/2024 1953 by Xochitl Chaney RN  Sensory Stimulation Regulation:   care clustered   auditory stimulation provided  Cerebral Perfusion Promotion: blood pressure monitored  Glycemic Management:   blood glucose monitored   supplemental insulin given  Head of Bed (HOB) Positioning: HOB at 20 degrees  Goal: Fluid and Electrolyte Balance  Outcome: Progressing  Goal: Blood Glucose Level Within Targeted Range  Outcome: Progressing  Intervention: Optimize Glycemic Control  Recent Flowsheet Documentation  Taken 9/5/2024 2347 by Xochitl Chaney RN  Glycemic Management:    blood glucose monitored   supplemental insulin given  Taken 9/5/2024 1953 by Xochitl Chaney RN  Glycemic Management:   blood glucose monitored   supplemental insulin given  Goal: Absence of Infection Signs and Symptoms  Outcome: Progressing  Intervention: Prevent or Manage Infection  Recent Flowsheet Documentation  Taken 9/5/2024 2347 by Xochitl Chaney RN  Infection Prevention:   cohorting utilized   environmental surveillance performed   hand hygiene promoted   personal protective equipment utilized   rest/sleep promoted   single patient room provided  Taken 9/5/2024 1953 by Xochitl Chaney RN  Infection Prevention:   cohorting utilized   environmental surveillance performed   hand hygiene promoted   personal protective equipment utilized   rest/sleep promoted   single patient room provided  Goal: Anesthesia/Sedation Recovery  Outcome: Progressing  Intervention: Optimize Anesthesia Recovery  Recent Flowsheet Documentation  Taken 9/5/2024 2347 by Xochitl Chaney RN  Safety Promotion/Fall Prevention:   activity supervised   clutter free environment maintained   supervised activity   increase visualization of patient   safety round/check completed  Reorientation Measures:   calendar in view   clock in view   familiar social contact encouraged  Taken 9/5/2024 1953 by Xochitl Chaney RN  Safety Promotion/Fall Prevention:   activity supervised   clutter free environment maintained   supervised activity   increase visualization of patient   safety round/check completed  Reorientation Measures:   calendar in view   clock in view   familiar social contact encouraged  Goal: Acceptable Pain Control  Outcome: Progressing  Goal: Nausea and Vomiting Relief  Outcome: Progressing  Goal: Effective Urinary Elimination  Outcome: Progressing  Goal: Effective Oxygenation and Ventilation  Outcome: Progressing  Intervention: Promote Airway Secretion Clearance  Recent Flowsheet Documentation  Taken 9/5/2024 2347 by Xochitl Chaney,  RN  Airway/Ventilation Management: airway patency maintained  Taken 9/5/2024 1953 by Xochitl Chaney RN  Airway/Ventilation Management: airway patency maintained         Plan of Care Reviewed With: patient, caregiver    Overall Patient Progress: improvingOverall Patient Progress: improving

## 2024-09-06 NOTE — PROGRESS NOTES
"CLINICAL NUTRITION SERVICES     Attempted to check in with pt. Pt sleeping when attempting to see.     Diet: Cardiac, 2 L fluid restriction. Ensure Enlive (strawberry at 14:00 and supplements prn) is ordered.    INTERVENTIONS:  Implementation:  Left cardiac menu and oral supplement menu in pt's room (did not notice a surplus or oral supplements but did pt just transferred from the ICU).     Follow up/Monitoring:  Will continue to follow pt.    Sabrina Stanford, MS, RD, LD, CNSC      No longer available via pager  6C (beds [id number] and [id number]): Vocera \"6C Clinical Dietitian\"   Weekend/Holiday: Vocera \"Weekend Clinical Dietitian\"          "

## 2024-09-06 NOTE — PROGRESS NOTES
[facility]  Cardiology Heart Failure Service (Cards 2) Progress Note    Patient Name: Terrance Hidalgo    Medical Record Number: [id number]    YOB: 2000  PCP: Colt Flores    Admit Date/Time: 8/20/2024 10:13 PM   17    Assessment/Plan:    25 yo M with PMH autism who initially presented to [place] with cough, URI symptoms, and hypoxemia. He was intubated due to respiratory failure and transferred to [facility], where his hospital course transpired to mixed cardiogenic/septic shock requiring multiple pressor agents, inotropic support, and IV diuresis. Transferred again to [facility] CICU on 8/20 for continued management. Presentation initially highly suspicious for fulminant myocarditis base on elevated cardiac and inflammatory biomarkers, new (suspected), severe biventricular dysfunction w/ reduced cardiac output requiring intermittent inotropic support, and concurrent infectious/respiratory symptomotology c/b acute respiratory failure 2/2 ARDS vs cardiogenic etiology 2/2 miocarditis with superimposed aspiration PNM treated with sulbactam. Empirically pulsed with steroids. However, EMB 5/21 demonstrated unremarkable myocardial tissue with no evidence of inflammatory infiltrate.  Hospital course further complicated by worsening cardiogenic/septic shock with worsening renal and liver failure requiring IABP placement on 8/27. Jarocho weaned off on 8/28 and extubated.  IABP removed on 9/1 and closed with 8Fr angioseal. CMR on 9/5 showed mildly dilated left ventricle with severely reduced left ventricular function, LVEF 15%. normal right ventricular size and systolic function, RVEF 46%. no myocardial edema or replacement fibrosis. No MRI features of acute myocarditis. He has been undergoing optimization of GDMT and weaning of dobutamine.     - ECMO candidate     Changes:  - switch bumex iv to po  - Bx 9/5 came back Strep +  1/2 bottles > vancomycin, zosyn > CTX   - trial of  dobutamine stop and then remove PICC given positive blood cultures     Neuro:     #AMS - resolved  #Sedation   - moving x 4 intermittent following commands     Plan:      - c/w risperidone   - case was discussed with psychiatry regarding his medication and relation to cardiac decompensation given no clear culprit. However, given no safe alternative we would suggest continuation of risperidone as he has been on this medication for years without recent dose change and lack of evidence for risperidone to cause fulminant heart failure at the movement.      CV:    #Biventricular failure with severely reduce left ventricular function with RV recovery   #Cardiogenic shock SCAI C s/p IABP -  TTE (24): LVIDd 58mm. Biplane LVEF is 16%. Severe hypokinesis of mid to apical RV free wall.  RHC (24): RA mean 6, PA , PCWP 18, Wayne CO/CI 5.0/2.1, PVR 1.6, SVR 1270  EMB (24): Fragments of essentially unremarkable myocardial tissue with no evidence of inflammatory infiltrate   Coronary angiogram: NA   CMR: Mildly dilated left ventricle with severely reduced left ventricular function, LVEF 15%.  Normal right ventricular size and systolic function, RVEF 46%.   No myocardial edema or replacement fibrosis.   Specifically, no MRI features of acute myocarditis.     DATE MAP CVP PAP PCWP Wayne CO Wayne CI SVR PVR Mvo2 LA Therapies    82 10  16 3.5 1.6 1800 3.7 54 1.4 IABP 1:1, dobutamine 5    80 12 24 12 6.1 2.7 984 <2 70 2 IABP1:1 dobutamine 5 nipride 0.75    75 15 40/14 12 5.9 2.6 1180  69 0.8 IABP 1:1  5, nipride 0.25    90 10 38/ 13 7.1 3.2 800 <2 73 0.9 IABP 1:2,  5    84 11   6.2 2.8 1000  70   5                   Plan:  - trail of weaning off dobutamine @2.5  - c/w losartan 50mg AM and 25mg PM  - c/w aldactone 25mg daily  - c/w  empagliflozin 10mg daily   - c/w digoxin   - c/w steroid taper and nystatin    Pulm:     #Acute hypoxic respiratory failure -  resolving    #Pleular effusion - resolved   #concern for Aspiration PNM  - resolving      Plan:    - abx per below       GI/Nutrition:    #Transaminitis - resolving     1. Hepatomegaly with increased echogenicity of the liver, which may be  seen with intrinsic parenchymal disease such as steatosis.  2. Biliary sludge without sonographic evidence for acute  cholecystitis.  3. Normal antegrade flow in the extrahepatic and right portal vein.   4. The remainder of the findings are significantly limited secondary  to obscuring bowel gas and technical challenges related to patient  mobility.    Plan:    - monitor MELD labs  - treatment per above     Renal:    #HyperNa  - resolved  #Hypervolemia - improving   #LA    Plan:    - frequent BMP      ID:     #Concern for aspiration PNM   #Transaminitis   #Recurrent fevers of unclear etiology     #Strep Bacteremia 9/5 1/2 bottles     - Antimicrobials       > Unasyn 8/21 - 8/26       > Cefepime 8/20 - 8/21       > Azithromycin 8/20 - 08/24       > Vanco 8/20 - 8/21 s/p 8/26       > meropenem 8/26-9/1       > zosyn 8/26 - 8/28    Plan:  - CTX 2g every day x 5 days #1/5   - PICC needs to be removed eventually     Heme:    #Anemia of critical illness     Plan:  - H/H  - transfuse if hb < 7 or drop > 2 g     Endo:    Plan:  - FSG goal 140-160  - insulin   - lantus     Family Meeting/Goals of Care:  - last time family updated: TBD   - next of kin/HCP:  Johann Hidalgo(TEMPORARY GUARDIAN TO 10-)  Brother  [phone]  (+[phone]  [facility] [address]  Legal guardian    Disposition:   - Unit   Code Status:   Full Code     Thank you for allowing me to care for this patient, please don't hesitate to contact me with any questions regarding this plan.     Patient was discussed and evaluated with Dr. Carbajal, attending physician, who agrees with the assessment and plan above.    Matt Cabral MD, PhD, MSc  Cardiology Fellow    Physical Exam:    General appearance -  sitting in chair   Head: Normocephalic and atraumatic.   Eyes: Pupils are equal, round, and reactive to light. No scleral icterus.  Neck: no JVD, bruit.  Cardiovascular: Tachycardiac regular rhythm. S1 and S2 auscultated. No murmurs, rub, or gallops.  Pulmonary/Chest: clear breath bilaterally   Abdominal: Bowel sounds present. Soft. No distension. No rigidity, rebound or guarding.   Extremities: Warm, no cyanosis, 2+ distal pulses bilaterally. Pitting edema b/l +1.  R groin site no local sdigns of infection or hematoma   Skin: Skin is warm and not diaphoretic. No rash, bruising, or erythema.  Neuro: Sedated     Objective data:    Temp:  [97.5  F (36.4  C)-98.2  F (36.8  C)] 97.8  F (36.6  C)  Pulse:  [108-140] 139  Resp:  [17-20] 18  BP: ()/(49-93) 107/55  SpO2:  [96 %-100 %] 98 %    Vitals:    08/31/24 0000 09/01/24 0000 09/03/24 0000 09/04/24 0400   Weight: 96.2 kg (212 lb 1.3 oz) 96 kg (211 lb 10.3 oz) 96.5 kg (212 lb 11.9 oz) 96.8 kg (213 lb 6.5 oz)    09/06/24 0600   Weight: 95.5 kg (210 lb 8 oz)        Vent Settings:  Resp: 18 SpO2: 98 % O2 Device: None (Room air)    Resp: 18    LABS Reviewed  IMAGES Reviewed    Current medications   Current Facility-Administered Medications   Medication Dose Route Frequency Provider Last Rate Last Admin    acetaminophen (TYLENOL) tablet 650 mg  650 mg Oral or Feeding Tube Q8H PRN Sudheer Rojas MD   650 mg at 08/30/24 0057    albuterol (PROVENTIL) neb solution 2.5 mg  2.5 mg Nebulization Q6H PRN Matt Cabral MD        bumetanide (BUMEX) tablet 2 mg  2 mg Oral BID Matt Cabral MD        dextrose 10% infusion   Intravenous Continuous PRN Colten Garay MD        glucose gel 15-30 g  15-30 g Oral Q15 Min PRN Colten Garay MD        Or    dextrose 50 % injection 25-50 mL  25-50 mL Intravenous Q15 Min PRN Colten Garay MD        Or    glucagon injection 1 mg  1 mg Subcutaneous Q15 Min PRN Colten Garay MD        digoxin (LANOXIN) tablet 250 mcg  250 mcg  Oral Daily Matt Cabral MD   250 mcg at 09/05/24 0756    [Held by provider] DOBUTamine (DOBUTREX) 1,000 mg in D5W 250 mL infusion (adult MAX conc)  2.5 mcg/kg/min (Dosing Weight) Intravenous Continuous Patricia Dukes MD 4.1 mL/hr at 09/06/24 0710 2.5 mcg/kg/min at 09/06/24 0710    empagliflozin (JARDIANCE) tablet 10 mg  10 mg Oral Daily Matt Cabral MD   10 mg at 09/05/24 0756    enoxaparin ANTICOAGULANT (LOVENOX) injection 40 mg  40 mg Subcutaneous Q24H Matt Cabral MD   40 mg at 09/05/24 1112    heparin lock flush 10 unit/mL injection 5-20 mL  5-20 mL Intracatheter Q24H Matt Cabral MD   5 mL at 09/04/24 1703    heparin lock flush 10 unit/mL injection 5-20 mL  5-20 mL Intracatheter Q1H PRN aMtt Cabral MD        hydrOXYzine HCl (ATARAX) tablet 10 mg  10 mg Oral TID PRN Matt Cabral MD   10 mg at 08/31/24 2013    insulin aspart (NovoLOG) injection (RAPID ACTING)  1-3 Units Subcutaneous TID AC Matt Cabral MD   1 Units at 09/04/24 1808    insulin aspart (NovoLOG) injection (RAPID ACTING)  1-3 Units Subcutaneous At Bedtime Matt Cabral MD        insulin glargine (LANTUS PEN) injection 10 Units  10 Units Subcutaneous At Bedtime Matt Cabral MD        ipratropium - albuterol 0.5 mg/2.5 mg/3 mL (DUONEB) neb solution 3 mL  3 mL Nebulization Q4H PRN Sudheer Rojas MD   3 mL at 08/25/24 0536    lidocaine (LMX4) cream   Topical Q1H PRN Sudheer Rojas MD        lidocaine 1 % 0.1-1 mL  0.1-1 mL Other Q1H PRN Sudheer Rojas MD        losartan (COZAAR) tablet 50 mg  50 mg Oral Daily Matt Cabral MD   50 mg at 09/05/24 0756    medication instruction   Does not apply Continuous PRN Sudheer Rojas MD        metoclopramide (REGLAN) injection 5 mg  5 mg Intravenous Q6H PRN Shantanu Mclean MD   5 mg at 09/01/24 0639    multivitamin w/minerals (THERA-VIT-M) tablet 1 tablet  1 tablet Oral Daily Luis Smith MD   1 tablet at 09/05/24 0756    naloxone (NARCAN)  injection 0.2 mg  0.2 mg Intravenous Q2 Min PRN Jani Martin MD        Or    naloxone (NARCAN) injection 0.4 mg  0.4 mg Intravenous Q2 Min PRN Jani Martin MD        Or    naloxone (NARCAN) injection 0.2 mg  0.2 mg Intramuscular Q2 Min PRN Jani Martin MD        Or    naloxone (NARCAN) injection 0.4 mg  0.4 mg Intramuscular Q2 Min PRN Jani Martin MD        nystatin (MYCOSTATIN) suspension 500,000 Units  500,000 Units Swish & Spit 4x Daily Susan Yeh MD   500,000 Units at 09/05/24 1958    ondansetron (ZOFRAN) injection 4 mg  4 mg Intravenous Q6H PRN Shantanu Mclean MD   4 mg at 08/31/24 1028    pantoprazole (PROTONIX) EC tablet 40 mg  40 mg Oral QAM AC Luis Smith MD   40 mg at 09/05/24 0756    polyethylene glycol (MIRALAX) Packet 17 g  17 g Oral or Feeding Tube Daily Matt Cabral MD   17 g at 08/23/24 1049    polyethylene glycol (MIRALAX) Packet 17 g  17 g Oral Daily PRN Sudheer Rojas MD   17 g at 08/22/24 1430    potassium chloride (KLOR-CON) Packet 40 mEq  40 mEq Oral or Feeding Tube Daily Matt Cabral MD   40 mEq at 09/05/24 0806    predniSONE (DELTASONE) tablet 10 mg  10 mg Oral or Feeding Tube Daily Patricia Dukes MD        Followed by    [START ON 9/9/2024] predniSONE (DELTASONE) tablet 5 mg  5 mg Oral or Feeding Tube Daily Patricia Dukes MD        risperiDONE (risperDAL) tablet 1 mg  1 mg Oral or Feeding Tube Daily Patricia Dukes MD   1 mg at 09/05/24 0757    risperiDONE (risperDAL) tablet 1.5 mg  1.5 mg Oral or Feeding Tube QPM Patricia Dukes MD   1.5 mg at 09/05/24 1959    selenium sulfide (SELSUN) 1 % shampoo   Topical Daily PRN Matt Cabral MD   Given at 09/01/24 2103    senna-docusate (SENOKOT-S/PERICOLACE) 8.6-50 MG per tablet 1 tablet  1 tablet Oral or Feeding Tube At Bedtime Andree Becerril, APRN CNP   1 tablet at 09/05/24 1959    senna-docusate (SENOKOT-S/PERICOLACE) 8.6-50 MG per tablet 1 tablet  1 tablet Oral or Feeding Tube BID HOLLANDN Jani Martin MD         Or    senna-docusate (SENOKOT-S/PERICOLACE) 8.6-50 MG per tablet 2 tablet  2 tablet Oral or Feeding Tube BID PRN Jani Martin MD        sodium chloride (PF) 0.9% PF flush 10-40 mL  10-40 mL Intracatheter Once PRN Matt Cabral MD        sodium chloride (PF) 0.9% PF flush 3 mL  3 mL Intracatheter Q8H Sudheer Rojas MD   3 mL at 09/06/24 0415    sodium chloride (PF) 0.9% PF flush 3 mL  3 mL Intracatheter q1 min prn uSdheer Rojas MD        spironolactone (ALDACTONE) tablet 25 mg  25 mg Oral Daily Patricia Dukes MD        thiamine (B-1) tablet 100 mg  100 mg Oral or Feeding Tube Daily Graciela Whittaker MD   100 mg at 09/05/24 0757       Medications Prior to Admission   Medication Sig Dispense Refill Last Dose    risperiDONE (RISPERDAL) 1 MG tablet Take 1 mg by mouth every morning.   Past Week at unknown    risperiDONE (RISPERDAL) 1 MG tablet Take 1.5 mg by mouth every evening.   Past Week at unknown

## 2024-09-06 NOTE — PROVIDER NOTIFICATION
Provider was paged via [employer] and aware that the pt's CVP is 2mmHg. The pt was not following directions and was not lying flat. Number was artificially low.

## 2024-09-07 LAB
ALBUMIN SERPL BCG-MCNC: 3.9 G/DL (ref 3.5–5.2)
ALP SERPL-CCNC: 109 U/L (ref 40–150)
ALT SERPL W P-5'-P-CCNC: 132 U/L (ref 0–70)
ANION GAP SERPL CALCULATED.3IONS-SCNC: 12 MMOL/L (ref 7–15)
ANION GAP SERPL CALCULATED.3IONS-SCNC: 14 MMOL/L (ref 7–15)
AST SERPL W P-5'-P-CCNC: 38 U/L (ref 0–45)
BASE EXCESS BLDV CALC-SCNC: 4 MMOL/L (ref -3–3)
BASE EXCESS BLDV CALC-SCNC: 5.7 MMOL/L (ref -3–3)
BASOPHILS # BLD AUTO: 0.1 10E3/UL (ref 0–0.2)
BASOPHILS NFR BLD AUTO: 1 %
BILIRUB DIRECT SERPL-MCNC: <0.2 MG/DL (ref 0–0.3)
BILIRUB SERPL-MCNC: 0.5 MG/DL
BUN SERPL-MCNC: 18.3 MG/DL (ref 6–20)
BUN SERPL-MCNC: 22.3 MG/DL (ref 6–20)
CA-I BLD-MCNC: 4.5 MG/DL (ref 4.4–5.2)
CALCIUM SERPL-MCNC: 8.8 MG/DL (ref 8.8–10.4)
CALCIUM SERPL-MCNC: 8.9 MG/DL (ref 8.8–10.4)
CHLORIDE SERPL-SCNC: 98 MMOL/L (ref 98–107)
CHLORIDE SERPL-SCNC: 99 MMOL/L (ref 98–107)
CREAT SERPL-MCNC: 0.51 MG/DL (ref 0.67–1.17)
CREAT SERPL-MCNC: 0.52 MG/DL (ref 0.67–1.17)
EGFRCR SERPLBLD CKD-EPI 2021: >90 ML/MIN/1.73M2
EGFRCR SERPLBLD CKD-EPI 2021: >90 ML/MIN/1.73M2
EOSINOPHIL # BLD AUTO: 0.1 10E3/UL (ref 0–0.7)
EOSINOPHIL NFR BLD AUTO: 1 %
ERYTHROCYTE [DISTWIDTH] IN BLOOD BY AUTOMATED COUNT: 14.6 % (ref 10–15)
GLUCOSE BLDC GLUCOMTR-MCNC: 105 MG/DL (ref 70–99)
GLUCOSE BLDC GLUCOMTR-MCNC: 109 MG/DL (ref 70–99)
GLUCOSE BLDC GLUCOMTR-MCNC: 121 MG/DL (ref 70–99)
GLUCOSE BLDC GLUCOMTR-MCNC: 128 MG/DL (ref 70–99)
GLUCOSE SERPL-MCNC: 117 MG/DL (ref 70–99)
GLUCOSE SERPL-MCNC: 126 MG/DL (ref 70–99)
HCO3 BLDV-SCNC: 29 MMOL/L (ref 21–28)
HCO3 BLDV-SCNC: 30 MMOL/L (ref 21–28)
HCO3 SERPL-SCNC: 25 MMOL/L (ref 22–29)
HCO3 SERPL-SCNC: 25 MMOL/L (ref 22–29)
HCT VFR BLD AUTO: 33.7 % (ref 40–53)
HGB BLD-MCNC: 11.2 G/DL (ref 13.3–17.7)
IMM GRANULOCYTES # BLD: 0.4 10E3/UL
IMM GRANULOCYTES NFR BLD: 3 %
LACTATE SERPL-SCNC: 1.6 MMOL/L (ref 0.7–2)
LACTATE SERPL-SCNC: 1.8 MMOL/L (ref 0.7–2)
LYMPHOCYTES # BLD AUTO: 1.9 10E3/UL (ref 0.8–5.3)
LYMPHOCYTES NFR BLD AUTO: 13 %
MAGNESIUM SERPL-MCNC: 2.1 MG/DL (ref 1.7–2.3)
MAGNESIUM SERPL-MCNC: 2.1 MG/DL (ref 1.7–2.3)
MCH RBC QN AUTO: 29.3 PG (ref 26.5–33)
MCHC RBC AUTO-ENTMCNC: 33.2 G/DL (ref 31.5–36.5)
MCV RBC AUTO: 88 FL (ref 78–100)
MONOCYTES # BLD AUTO: 1.6 10E3/UL (ref 0–1.3)
MONOCYTES NFR BLD AUTO: 10 %
NEUTROPHILS # BLD AUTO: 11.1 10E3/UL (ref 1.6–8.3)
NEUTROPHILS NFR BLD AUTO: 72 %
NRBC # BLD AUTO: 0 10E3/UL
NRBC BLD AUTO-RTO: 0 /100
O2/TOTAL GAS SETTING VFR VENT: 21 %
O2/TOTAL GAS SETTING VFR VENT: 21 %
OXYHGB MFR BLDV: 58 % (ref 70–75)
OXYHGB MFR BLDV: 65 % (ref 70–75)
PCO2 BLDV: 43 MM HG (ref 40–50)
PCO2 BLDV: 44 MM HG (ref 40–50)
PH BLDV: 7.43 [PH] (ref 7.32–7.43)
PH BLDV: 7.45 [PH] (ref 7.32–7.43)
PHOSPHATE SERPL-MCNC: 3.1 MG/DL (ref 2.5–4.5)
PLATELET # BLD AUTO: 238 10E3/UL (ref 150–450)
PO2 BLDV: 31 MM HG (ref 25–47)
PO2 BLDV: 34 MM HG (ref 25–47)
POTASSIUM SERPL-SCNC: 3.2 MMOL/L (ref 3.4–5.3)
POTASSIUM SERPL-SCNC: 3.9 MMOL/L (ref 3.4–5.3)
POTASSIUM SERPL-SCNC: 3.9 MMOL/L (ref 3.4–5.3)
PROT SERPL-MCNC: 6.8 G/DL (ref 6.4–8.3)
RBC # BLD AUTO: 3.82 10E6/UL (ref 4.4–5.9)
SAO2 % BLDV: 59.2 % (ref 70–75)
SAO2 % BLDV: 66.6 % (ref 70–75)
SODIUM SERPL-SCNC: 136 MMOL/L (ref 135–145)
SODIUM SERPL-SCNC: 137 MMOL/L (ref 135–145)
WBC # BLD AUTO: 15.1 10E3/UL (ref 4–11)

## 2024-09-07 PROCEDURE — 83735 ASSAY OF MAGNESIUM: CPT | Performed by: STUDENT IN AN ORGANIZED HEALTH CARE EDUCATION/TRAINING PROGRAM

## 2024-09-07 PROCEDURE — 250N000011 HC RX IP 250 OP 636: Performed by: STUDENT IN AN ORGANIZED HEALTH CARE EDUCATION/TRAINING PROGRAM

## 2024-09-07 PROCEDURE — 80053 COMPREHEN METABOLIC PANEL: CPT | Performed by: STUDENT IN AN ORGANIZED HEALTH CARE EDUCATION/TRAINING PROGRAM

## 2024-09-07 PROCEDURE — 250N000013 HC RX MED GY IP 250 OP 250 PS 637: Performed by: INTERNAL MEDICINE

## 2024-09-07 PROCEDURE — 83605 ASSAY OF LACTIC ACID: CPT | Performed by: STUDENT IN AN ORGANIZED HEALTH CARE EDUCATION/TRAINING PROGRAM

## 2024-09-07 PROCEDURE — 250N000013 HC RX MED GY IP 250 OP 250 PS 637: Performed by: STUDENT IN AN ORGANIZED HEALTH CARE EDUCATION/TRAINING PROGRAM

## 2024-09-07 PROCEDURE — 250N000013 HC RX MED GY IP 250 OP 250 PS 637: Performed by: NURSE PRACTITIONER

## 2024-09-07 PROCEDURE — 99233 SBSQ HOSP IP/OBS HIGH 50: CPT | Mod: GC | Performed by: INTERNAL MEDICINE

## 2024-09-07 PROCEDURE — 85025 COMPLETE CBC W/AUTO DIFF WBC: CPT | Performed by: STUDENT IN AN ORGANIZED HEALTH CARE EDUCATION/TRAINING PROGRAM

## 2024-09-07 PROCEDURE — 84132 ASSAY OF SERUM POTASSIUM: CPT | Performed by: STUDENT IN AN ORGANIZED HEALTH CARE EDUCATION/TRAINING PROGRAM

## 2024-09-07 PROCEDURE — 82330 ASSAY OF CALCIUM: CPT | Performed by: STUDENT IN AN ORGANIZED HEALTH CARE EDUCATION/TRAINING PROGRAM

## 2024-09-07 PROCEDURE — 250N000013 HC RX MED GY IP 250 OP 250 PS 637

## 2024-09-07 PROCEDURE — 84100 ASSAY OF PHOSPHORUS: CPT | Performed by: STUDENT IN AN ORGANIZED HEALTH CARE EDUCATION/TRAINING PROGRAM

## 2024-09-07 PROCEDURE — 82805 BLOOD GASES W/O2 SATURATION: CPT | Performed by: STUDENT IN AN ORGANIZED HEALTH CARE EDUCATION/TRAINING PROGRAM

## 2024-09-07 PROCEDURE — 120N000003 HC R&B IMCU UMMC

## 2024-09-07 PROCEDURE — 250N000012 HC RX MED GY IP 250 OP 636 PS 637: Performed by: STUDENT IN AN ORGANIZED HEALTH CARE EDUCATION/TRAINING PROGRAM

## 2024-09-07 PROCEDURE — 250N000011 HC RX IP 250 OP 636

## 2024-09-07 PROCEDURE — 82248 BILIRUBIN DIRECT: CPT

## 2024-09-07 RX ORDER — POTASSIUM CHLORIDE 1.5 G/1.58G
40 POWDER, FOR SOLUTION ORAL ONCE
Status: COMPLETED | OUTPATIENT
Start: 2024-09-07 | End: 2024-09-07

## 2024-09-07 RX ORDER — POTASSIUM CHLORIDE 750 MG/1
20 TABLET, EXTENDED RELEASE ORAL ONCE
Status: COMPLETED | OUTPATIENT
Start: 2024-09-07 | End: 2024-09-07

## 2024-09-07 RX ORDER — BUMETANIDE 2 MG/1
2 TABLET ORAL
Status: DISCONTINUED | OUTPATIENT
Start: 2024-09-07 | End: 2024-09-09

## 2024-09-07 RX ADMIN — BUMETANIDE 2 MG: 2 TABLET ORAL at 09:07

## 2024-09-07 RX ADMIN — PANTOPRAZOLE SODIUM 40 MG: 40 TABLET, DELAYED RELEASE ORAL at 09:07

## 2024-09-07 RX ADMIN — SPIRONOLACTONE 25 MG: 25 TABLET, FILM COATED ORAL at 09:07

## 2024-09-07 RX ADMIN — Medication 5 ML: at 18:10

## 2024-09-07 RX ADMIN — CEFTRIAXONE SODIUM 2 G: 2 INJECTION, POWDER, FOR SOLUTION INTRAMUSCULAR; INTRAVENOUS at 12:56

## 2024-09-07 RX ADMIN — NYSTATIN 500000 UNITS: 100000 SUSPENSION ORAL at 12:56

## 2024-09-07 RX ADMIN — POTASSIUM CHLORIDE 40 MEQ: 1.5 POWDER, FOR SOLUTION ORAL at 11:50

## 2024-09-07 RX ADMIN — EMPAGLIFLOZIN 10 MG: 10 TABLET, FILM COATED ORAL at 09:06

## 2024-09-07 RX ADMIN — RISPERIDONE 1.5 MG: 1 TABLET, FILM COATED ORAL at 20:22

## 2024-09-07 RX ADMIN — RISPERIDONE 1 MG: 1 TABLET, FILM COATED ORAL at 09:07

## 2024-09-07 RX ADMIN — Medication 1 TABLET: at 09:07

## 2024-09-07 RX ADMIN — NYSTATIN 500000 UNITS: 100000 SUSPENSION ORAL at 09:05

## 2024-09-07 RX ADMIN — THIAMINE HCL TAB 100 MG 100 MG: 100 TAB at 09:07

## 2024-09-07 RX ADMIN — SACUBITRIL AND VALSARTAN 1 TABLET: 24; 26 TABLET, FILM COATED ORAL at 09:05

## 2024-09-07 RX ADMIN — SENNOSIDES AND DOCUSATE SODIUM 1 TABLET: 8.6; 5 TABLET ORAL at 20:17

## 2024-09-07 RX ADMIN — ENOXAPARIN SODIUM 40 MG: 40 INJECTION SUBCUTANEOUS at 12:56

## 2024-09-07 RX ADMIN — NYSTATIN 500000 UNITS: 100000 SUSPENSION ORAL at 22:35

## 2024-09-07 RX ADMIN — PREDNISONE 10 MG: 10 TABLET ORAL at 09:06

## 2024-09-07 RX ADMIN — NYSTATIN 500000 UNITS: 100000 SUSPENSION ORAL at 18:10

## 2024-09-07 RX ADMIN — POTASSIUM CHLORIDE 20 MEQ: 750 TABLET, EXTENDED RELEASE ORAL at 20:16

## 2024-09-07 RX ADMIN — SACUBITRIL AND VALSARTAN 1 TABLET: 24; 26 TABLET, FILM COATED ORAL at 20:15

## 2024-09-07 RX ADMIN — BUMETANIDE 2 MG: 2 TABLET ORAL at 18:09

## 2024-09-07 RX ADMIN — POTASSIUM CHLORIDE 40 MEQ: 1.5 POWDER, FOR SOLUTION ORAL at 09:06

## 2024-09-07 RX ADMIN — DIGOXIN 250 MCG: 125 TABLET ORAL at 09:06

## 2024-09-07 ASSESSMENT — ACTIVITIES OF DAILY LIVING (ADL)
ADLS_ACUITY_SCORE: 31
ADLS_ACUITY_SCORE: 32
ADLS_ACUITY_SCORE: 33
ADLS_ACUITY_SCORE: 32
ADLS_ACUITY_SCORE: 33
ADLS_ACUITY_SCORE: 32
ADLS_ACUITY_SCORE: 33
ADLS_ACUITY_SCORE: 33
ADLS_ACUITY_SCORE: 32
ADLS_ACUITY_SCORE: 33
ADLS_ACUITY_SCORE: 31
ADLS_ACUITY_SCORE: 33
ADLS_ACUITY_SCORE: 33
ADLS_ACUITY_SCORE: 32
ADLS_ACUITY_SCORE: 32

## 2024-09-07 NOTE — PLAN OF CARE
Neuro: A&Ox4. Aunt Guerda at bedside, legal guardian Johann (brother). DO NOT leave his mom w/ him alone.   Cardiac: ST (110s-150s).        Respiratory: RA. No respiratory complaints  GI/: Good UO. Patient transition to PO Diuretics. Large BM today  Diet/appetite: Reg diet. Feeding monitoring. Patient eats quickly and needs to slow down due to nausea   Activity:  SBA, up to chair  Pain: Denies   Skin: No new deficits noted.  LDA's: L PICC -Triple lumens (Gray: Hep locked Red: Transducer; White: SL)  Replacements: K (done); recheck Phos and Mg the next AM.   Labs: Notified Provider: Right hand blood culture- gram positive cocci in pairs and chains. ABX ordered.   Plan: BS ACHS & QID; CVP q12hr. Discontinued Dobutamine: Tolerating well.  Continue with POC. Notify primary team with changes.

## 2024-09-07 NOTE — PROGRESS NOTES
[facility]    Cardiology Progress Note- Cardiology 2        Date of Admission:  8/20/2024     Assessment & Plan: HVSL   25 yo M with PMH autism who initially presented to [place] with cough, URI symptoms, and hypoxemia. He was intubated due to respiratory failure and transferred to [facility], where his hospital course transpired to mixed cardiogenic/septic shock requiring multiple pressor agents, inotropic support, and IV diuresis. Transferred again to [facility] CICU on 8/20 for continued management. Presentation initially highly suspicious for fulminant myocarditis base on elevated cardiac and inflammatory biomarkers, new (suspected), severe biventricular dysfunction w/ reduced cardiac output requiring intermittent inotropic support, and concurrent infectious/respiratory symptomotology c/b acute respiratory failure 2/2 ARDS vs cardiogenic etiology 2/2 miocarditis with superimposed aspiration PNM treated with sulbactam. Empirically pulsed with steroids. However, EMB 5/21 demonstrated unremarkable myocardial tissue with no evidence of inflammatory infiltrate.  Hospital course further complicated by worsening cardiogenic/septic shock with worsening renal and liver failure requiring IABP placement on 8/27. Jarocho weaned off on 8/28 and extubated.  IABP removed on 9/1 and closed with 8Fr angioseal. CMR on 9/5 showed mildly dilated left ventricle with severely reduced left ventricular function, LVEF 15%. normal right ventricular size and systolic function, RVEF 46%. no myocardial edema or replacement fibrosis. No MRI features of acute myocarditis. Weaned off dobutamine, currently optimizing GDMT.     Today:  - Stop ceftriaxone, culture likely reflective of contaminant  - Bumex PO reduced to 2 mg BID (from TID)  - Continue prednisone taper  - First dose of Entresto, will monitor BP    # Biventricular failure with severely reduce left ventricular function with RV  recovery   # Cardiogenic shock SCAI C s/p IABP 8/27-9/1  TTE (8/21/24): LVIDd 58mm. Biplane LVEF is 16%. Severe hypokinesis of mid to apical RV free wall.  RHC (8/21/24): RA mean 6, PA 30/23/26, PCWP 18, Wayne CO/CI 5.0/2.1, PVR 1.6, SVR 1270  EMB (8/21/24): Fragments of essentially unremarkable myocardial tissue with no evidence of inflammatory infiltrate   Coronary angiogram: NA   CMR: Mildly dilated left ventricle with severely reduced left ventricular function, LVEF 15%.  Normal right ventricular size and systolic function, RVEF 46%.   No myocardial edema or replacement fibrosis.   Specifically, no MRI features of acute myocarditis.   - Remains off dobutamine with stable hemodynamics  - Start entresto 24/26 mg BID, first dose 9/7 AM  - c/w aldactone 25mg daily  - c/w  empagliflozin 10mg daily   - c/w digoxin   - c/w steroid taper and nystatin    # + Strep mitis in 1/2 bottles, suspected comtaminant  Blood culture from 9/5 grew strep mitis in 1/2 bottles. Repeat blood culture on 9/6 NGTD. Suspect contaminant. Stopped antibiotics as of 9/7.   - Antibiotics:   > Vanc 9/6   > Ceftriaxone 9/6 - 9/7  - Monitor off antibiotics    # Concern for aspiration pneumonia, resolved  # Recurrent fevers of unclear etiology, resolved  - Prior antibiotics       > Unasyn 8/21 - 8/26       > Cefepime 8/20 - 8/21       > Azithromycin 8/20 - 08/24       > Vanco 8/20 - 8/21 s/p 8/26       > meropenem 8/26-9/1       > zosyn 8/26 - 8/28    # Altered mental status, resolved  - c/w risperidone   - case was discussed with psychiatry regarding his medication and relation to cardiac decompensation given no clear culprit. However, given no safe alternative we would suggest continuation of risperidone as he has been on this medication for years without recent dose change and lack of evidence for risperidone to cause fulminant heart failure at the movement.     # Elevated transaminases  Improving. Imaging showed hepatomegaly with increased  "echogenicity, possible intrinsic parenchymal disease such as steatosis.   - Trend labs    # Hypernatremia, resolved  # Hypervolemia, improving  # Lactic acidosis, resolved  - Trend BMP  - Diuresis as above    # Acute hypoxemic respiratory failure, resolved  # Pleural effusion, resolved  See above for treatment of suspected aspiration pneumonia.     # Hyperglycemia  Likely related to stress and prednisone therapy.   - Continue lantus 10 units daily, could revisit need for this as outpatient       Diet: Snacks/Supplements Adult: Ensure Enlive; Between Meals  Fluid restriction 2000 ML FLUID  Low Saturated Fat Na <2400 mg    DVT Prophylaxis: Enoxaparin (Lovenox) SQ  Swan Catheter: Not present  Cardiac Monitoring: None  Code Status: Full Code          Clinically Significant Risk Factors        # Hypokalemia: Lowest K = 3.2 mmol/L in last 2 days, will replace as needed       # Hypoalbuminemia: Lowest albumin = 3.2 g/dL at 8/23/2024 12:52 PM, will monitor as appropriate      # End stage heart failure: Ventricular assist device (VAD) present          # Overweight: Estimated body mass index is 27.3 kg/m  as calculated from the following:    Height as of this encounter: 1.86 m (6' 1.23\").    Weight as of this encounter: 94.4 kg (208 lb 3.2 oz).   # Moderate Malnutrition: based on nutrition assessment    # Financial/Environmental Concerns: none               Disposition Plan   Expected discharge: 2 - 3 days, recommended to prior living arrangement once fluid volume status optimized on oral medication.    Entered: Jaxson Davis MD 09/07/2024, 7:30 AM   The patient's care was discussed with the Attending Physician, Dr. Carbajal .      Jaxsno Davis MD  PGY3, Internal Medicine  Cardiology [address] [place] [address]  ______________________________________________________________________    Interval History   No acute events overnight. Patient feeling well this morning. Sat up in chair without " issue. See eating breakfast. Denied any pain or SOB.     Physical Exam   Vital Signs: Temp: 98.3  F (36.8  C) Temp src: Oral BP: 110/51 Pulse: (!) 130   Resp: 16 SpO2: 99 % O2 Device: None (Room air)    Weight: 208 lbs 3.2 oz    Gen: No acute distress, sitting up in chair  HEENT: Normocephalic, atraumatic  CV: Regular rhythm with tachycardia, no murmurs  Pulm: CTAB, non-labored breathing on room air  Abd: Soft, non-tender to palpation  Extr: Trace pitting edema in bilateral lower extremities  Neuro: Alert and conversant    Medical Decision Making       Please see A&P for additional details of medical decision making.      Data   ------------------------- PAST 24 HR DATA REVIEWED -----------------------------------------------    I have personally reviewed the following data over the past 24 hrs:    15.1 (H)  \   11.2 (L)   / 238     137 98 18.3 /  121 (H)   3.2 (L) 25 0.52 (L) \     ALT: 132 (H) AST: 38 AP: 109 TBILI: 0.5   ALB: 3.9 TOT PROTEIN: 6.8 LIPASE: N/A     Procal: N/A CRP: N/A Lactic Acid: 1.8         Imaging results reviewed over the past 24 hrs:   No results found for this or any previous visit (from the past 24 hour(s)).

## 2024-09-07 NOTE — PLAN OF CARE
Neuro: A&Ox4, MCALLISTER, speech clear. Autistic, does not make eye contact.   Cardiac/Telemetry: Monitor with 's-120's, 150's with exertion.  BP stable, +2 pulses.  Respiratory: Lungs CTA on R/A. Denies SOB.  GI/: Primofit on for noc. Pt voided very large amt after Bumex po given. Complete bed change.Large BM 9/6 AM.  Endocrine: BS AC/HS, no s/s given this shift.  Diet/Appetite: On 2 gm NA diet with 2L FR.  Skin: No new deficits.  LDA: Triple lumen PICC.  Activity: Has remained in bed this shift.  Pain: Denies  Labs: in AM     Plan:  Goal Outcome Evaluation:      Plan of Care Reviewed With: patient  Overall Patient Progress: improving  Outcome Evaluation: Changed to PO Bumex with good urine output. VSS on RA, CVP is 5, Poss PICC removal in AM. BS stable.

## 2024-09-08 LAB
ALBUMIN SERPL BCG-MCNC: 3.7 G/DL (ref 3.5–5.2)
ALP SERPL-CCNC: 104 U/L (ref 40–150)
ALT SERPL W P-5'-P-CCNC: 103 U/L (ref 0–70)
ANION GAP SERPL CALCULATED.3IONS-SCNC: 11 MMOL/L (ref 7–15)
ANION GAP SERPL CALCULATED.3IONS-SCNC: 12 MMOL/L (ref 7–15)
AST SERPL W P-5'-P-CCNC: 28 U/L (ref 0–45)
ATRIAL RATE - MUSE: 130 BPM
BASE EXCESS BLDV CALC-SCNC: 2.8 MMOL/L (ref -3–3)
BASE EXCESS BLDV CALC-SCNC: 4.6 MMOL/L (ref -3–3)
BASOPHILS # BLD AUTO: 0.1 10E3/UL (ref 0–0.2)
BASOPHILS NFR BLD AUTO: 1 %
BILIRUB DIRECT SERPL-MCNC: <0.2 MG/DL (ref 0–0.3)
BILIRUB SERPL-MCNC: 0.4 MG/DL
BUN SERPL-MCNC: 15.8 MG/DL (ref 6–20)
BUN SERPL-MCNC: 16.3 MG/DL (ref 6–20)
CA-I BLD-MCNC: 4.4 MG/DL (ref 4.4–5.2)
CALCIUM SERPL-MCNC: 8.4 MG/DL (ref 8.8–10.4)
CALCIUM SERPL-MCNC: 8.7 MG/DL (ref 8.8–10.4)
CHLORIDE SERPL-SCNC: 99 MMOL/L (ref 98–107)
CHLORIDE SERPL-SCNC: 99 MMOL/L (ref 98–107)
CREAT SERPL-MCNC: 0.49 MG/DL (ref 0.67–1.17)
CREAT SERPL-MCNC: 0.49 MG/DL (ref 0.67–1.17)
DIASTOLIC BLOOD PRESSURE - MUSE: NORMAL MMHG
DIGOXIN SERPL-MCNC: 0.6 NG/ML (ref 0.6–2)
EGFRCR SERPLBLD CKD-EPI 2021: >90 ML/MIN/1.73M2
EGFRCR SERPLBLD CKD-EPI 2021: >90 ML/MIN/1.73M2
EOSINOPHIL # BLD AUTO: 0.2 10E3/UL (ref 0–0.7)
EOSINOPHIL NFR BLD AUTO: 2 %
ERYTHROCYTE [DISTWIDTH] IN BLOOD BY AUTOMATED COUNT: 14.6 % (ref 10–15)
GLUCOSE BLDC GLUCOMTR-MCNC: 101 MG/DL (ref 70–99)
GLUCOSE BLDC GLUCOMTR-MCNC: 106 MG/DL (ref 70–99)
GLUCOSE BLDC GLUCOMTR-MCNC: 108 MG/DL (ref 70–99)
GLUCOSE BLDC GLUCOMTR-MCNC: 110 MG/DL (ref 70–99)
GLUCOSE SERPL-MCNC: 112 MG/DL (ref 70–99)
GLUCOSE SERPL-MCNC: 115 MG/DL (ref 70–99)
HCO3 BLDV-SCNC: 28 MMOL/L (ref 21–28)
HCO3 BLDV-SCNC: 30 MMOL/L (ref 21–28)
HCO3 SERPL-SCNC: 24 MMOL/L (ref 22–29)
HCO3 SERPL-SCNC: 25 MMOL/L (ref 22–29)
HCT VFR BLD AUTO: 33.1 % (ref 40–53)
HGB BLD-MCNC: 10.9 G/DL (ref 13.3–17.7)
IMM GRANULOCYTES # BLD: 0.2 10E3/UL
IMM GRANULOCYTES NFR BLD: 2 %
INTERPRETATION ECG - MUSE: NORMAL
LACTATE SERPL-SCNC: 0.9 MMOL/L (ref 0.7–2)
LACTATE SERPL-SCNC: 1.2 MMOL/L (ref 0.7–2)
LYMPHOCYTES # BLD AUTO: 2.1 10E3/UL (ref 0.8–5.3)
LYMPHOCYTES NFR BLD AUTO: 16 %
MAGNESIUM SERPL-MCNC: 2 MG/DL (ref 1.7–2.3)
MAGNESIUM SERPL-MCNC: 2.2 MG/DL (ref 1.7–2.3)
MCH RBC QN AUTO: 29.2 PG (ref 26.5–33)
MCHC RBC AUTO-ENTMCNC: 32.9 G/DL (ref 31.5–36.5)
MCV RBC AUTO: 89 FL (ref 78–100)
MONOCYTES # BLD AUTO: 1.4 10E3/UL (ref 0–1.3)
MONOCYTES NFR BLD AUTO: 11 %
NEUTROPHILS # BLD AUTO: 8.6 10E3/UL (ref 1.6–8.3)
NEUTROPHILS NFR BLD AUTO: 68 %
NRBC # BLD AUTO: 0 10E3/UL
NRBC BLD AUTO-RTO: 0 /100
O2/TOTAL GAS SETTING VFR VENT: 21 %
O2/TOTAL GAS SETTING VFR VENT: 21 %
OXYHGB MFR BLDV: 67 % (ref 70–75)
OXYHGB MFR BLDV: 68 % (ref 70–75)
P AXIS - MUSE: 33 DEGREES
PCO2 BLDV: 44 MM HG (ref 40–50)
PCO2 BLDV: 47 MM HG (ref 40–50)
PH BLDV: 7.41 [PH] (ref 7.32–7.43)
PH BLDV: 7.41 [PH] (ref 7.32–7.43)
PHOSPHATE SERPL-MCNC: 2.9 MG/DL (ref 2.5–4.5)
PLATELET # BLD AUTO: 234 10E3/UL (ref 150–450)
PO2 BLDV: 36 MM HG (ref 25–47)
PO2 BLDV: 37 MM HG (ref 25–47)
POTASSIUM SERPL-SCNC: 3.3 MMOL/L (ref 3.4–5.3)
POTASSIUM SERPL-SCNC: 3.8 MMOL/L (ref 3.4–5.3)
POTASSIUM SERPL-SCNC: 3.9 MMOL/L (ref 3.4–5.3)
PR INTERVAL - MUSE: 124 MS
PROT SERPL-MCNC: 6.5 G/DL (ref 6.4–8.3)
QRS DURATION - MUSE: 72 MS
QT - MUSE: 310 MS
QTC - MUSE: 456 MS
R AXIS - MUSE: -40 DEGREES
RBC # BLD AUTO: 3.73 10E6/UL (ref 4.4–5.9)
SAO2 % BLDV: 68.6 % (ref 70–75)
SAO2 % BLDV: 69.3 % (ref 70–75)
SODIUM SERPL-SCNC: 134 MMOL/L (ref 135–145)
SODIUM SERPL-SCNC: 136 MMOL/L (ref 135–145)
SYSTOLIC BLOOD PRESSURE - MUSE: NORMAL MMHG
T AXIS - MUSE: 36 DEGREES
VENTRICULAR RATE- MUSE: 130 BPM
WBC # BLD AUTO: 12.6 10E3/UL (ref 4–11)

## 2024-09-08 PROCEDURE — 120N000003 HC R&B IMCU UMMC

## 2024-09-08 PROCEDURE — 99233 SBSQ HOSP IP/OBS HIGH 50: CPT | Mod: GC | Performed by: INTERNAL MEDICINE

## 2024-09-08 PROCEDURE — 82805 BLOOD GASES W/O2 SATURATION: CPT | Performed by: STUDENT IN AN ORGANIZED HEALTH CARE EDUCATION/TRAINING PROGRAM

## 2024-09-08 PROCEDURE — 250N000013 HC RX MED GY IP 250 OP 250 PS 637: Performed by: INTERNAL MEDICINE

## 2024-09-08 PROCEDURE — 250N000013 HC RX MED GY IP 250 OP 250 PS 637

## 2024-09-08 PROCEDURE — 250N000013 HC RX MED GY IP 250 OP 250 PS 637: Performed by: STUDENT IN AN ORGANIZED HEALTH CARE EDUCATION/TRAINING PROGRAM

## 2024-09-08 PROCEDURE — 80053 COMPREHEN METABOLIC PANEL: CPT | Performed by: STUDENT IN AN ORGANIZED HEALTH CARE EDUCATION/TRAINING PROGRAM

## 2024-09-08 PROCEDURE — 84100 ASSAY OF PHOSPHORUS: CPT | Performed by: STUDENT IN AN ORGANIZED HEALTH CARE EDUCATION/TRAINING PROGRAM

## 2024-09-08 PROCEDURE — 83735 ASSAY OF MAGNESIUM: CPT | Performed by: STUDENT IN AN ORGANIZED HEALTH CARE EDUCATION/TRAINING PROGRAM

## 2024-09-08 PROCEDURE — 250N000011 HC RX IP 250 OP 636: Performed by: STUDENT IN AN ORGANIZED HEALTH CARE EDUCATION/TRAINING PROGRAM

## 2024-09-08 PROCEDURE — 999N000007 HC SITE CHECK

## 2024-09-08 PROCEDURE — 258N000003 HC RX IP 258 OP 636

## 2024-09-08 PROCEDURE — 82248 BILIRUBIN DIRECT: CPT

## 2024-09-08 PROCEDURE — 83605 ASSAY OF LACTIC ACID: CPT | Performed by: STUDENT IN AN ORGANIZED HEALTH CARE EDUCATION/TRAINING PROGRAM

## 2024-09-08 PROCEDURE — 85025 COMPLETE CBC W/AUTO DIFF WBC: CPT | Performed by: STUDENT IN AN ORGANIZED HEALTH CARE EDUCATION/TRAINING PROGRAM

## 2024-09-08 PROCEDURE — 82330 ASSAY OF CALCIUM: CPT | Performed by: STUDENT IN AN ORGANIZED HEALTH CARE EDUCATION/TRAINING PROGRAM

## 2024-09-08 PROCEDURE — 80162 ASSAY OF DIGOXIN TOTAL: CPT | Performed by: INTERNAL MEDICINE

## 2024-09-08 PROCEDURE — 84132 ASSAY OF SERUM POTASSIUM: CPT | Performed by: STUDENT IN AN ORGANIZED HEALTH CARE EDUCATION/TRAINING PROGRAM

## 2024-09-08 PROCEDURE — 84132 ASSAY OF SERUM POTASSIUM: CPT

## 2024-09-08 PROCEDURE — 250N000013 HC RX MED GY IP 250 OP 250 PS 637: Performed by: NURSE PRACTITIONER

## 2024-09-08 PROCEDURE — 250N000012 HC RX MED GY IP 250 OP 636 PS 637: Performed by: STUDENT IN AN ORGANIZED HEALTH CARE EDUCATION/TRAINING PROGRAM

## 2024-09-08 RX ORDER — POTASSIUM CHLORIDE 750 MG/1
20 TABLET, EXTENDED RELEASE ORAL ONCE
Status: COMPLETED | OUTPATIENT
Start: 2024-09-08 | End: 2024-09-08

## 2024-09-08 RX ORDER — MAGNESIUM OXIDE 400 MG/1
400 TABLET ORAL EVERY 4 HOURS
Status: COMPLETED | OUTPATIENT
Start: 2024-09-08 | End: 2024-09-08

## 2024-09-08 RX ORDER — POTASSIUM CHLORIDE 1.5 G/1.58G
40 POWDER, FOR SOLUTION ORAL ONCE
Status: COMPLETED | OUTPATIENT
Start: 2024-09-08 | End: 2024-09-08

## 2024-09-08 RX ADMIN — HEPARIN, PORCINE (PF) 10 UNIT/ML INTRAVENOUS SYRINGE 5 ML: at 06:31

## 2024-09-08 RX ADMIN — NYSTATIN 500000 UNITS: 100000 SUSPENSION ORAL at 17:58

## 2024-09-08 RX ADMIN — SODIUM CHLORIDE, POTASSIUM CHLORIDE, SODIUM LACTATE AND CALCIUM CHLORIDE 500 ML: 600; 310; 30; 20 INJECTION, SOLUTION INTRAVENOUS at 09:48

## 2024-09-08 RX ADMIN — RISPERIDONE 1 MG: 1 TABLET, FILM COATED ORAL at 08:05

## 2024-09-08 RX ADMIN — MAGNESIUM OXIDE TAB 400 MG (241.3 MG ELEMENTAL MG) 400 MG: 400 (241.3 MG) TAB at 08:08

## 2024-09-08 RX ADMIN — NYSTATIN 500000 UNITS: 100000 SUSPENSION ORAL at 08:10

## 2024-09-08 RX ADMIN — EMPAGLIFLOZIN 10 MG: 10 TABLET, FILM COATED ORAL at 08:08

## 2024-09-08 RX ADMIN — PREDNISONE 10 MG: 10 TABLET ORAL at 08:08

## 2024-09-08 RX ADMIN — BUMETANIDE 2 MG: 2 TABLET ORAL at 08:07

## 2024-09-08 RX ADMIN — Medication 1 TABLET: at 08:08

## 2024-09-08 RX ADMIN — NYSTATIN 500000 UNITS: 100000 SUSPENSION ORAL at 11:23

## 2024-09-08 RX ADMIN — POTASSIUM CHLORIDE 20 MEQ: 750 TABLET, EXTENDED RELEASE ORAL at 17:59

## 2024-09-08 RX ADMIN — SPIRONOLACTONE 25 MG: 25 TABLET, FILM COATED ORAL at 08:05

## 2024-09-08 RX ADMIN — Medication 1 HALF-TAB: at 19:55

## 2024-09-08 RX ADMIN — NYSTATIN 500000 UNITS: 100000 SUSPENSION ORAL at 19:56

## 2024-09-08 RX ADMIN — SACUBITRIL AND VALSARTAN 1 TABLET: 24; 26 TABLET, FILM COATED ORAL at 08:07

## 2024-09-08 RX ADMIN — Medication 10 ML: at 16:22

## 2024-09-08 RX ADMIN — MAGNESIUM OXIDE TAB 400 MG (241.3 MG ELEMENTAL MG) 400 MG: 400 (241.3 MG) TAB at 11:23

## 2024-09-08 RX ADMIN — POTASSIUM CHLORIDE 40 MEQ: 1.5 POWDER, FOR SOLUTION ORAL at 08:13

## 2024-09-08 RX ADMIN — RISPERIDONE 1.5 MG: 1 TABLET, FILM COATED ORAL at 19:56

## 2024-09-08 RX ADMIN — DIGOXIN 250 MCG: 125 TABLET ORAL at 08:08

## 2024-09-08 RX ADMIN — THIAMINE HCL TAB 100 MG 100 MG: 100 TAB at 08:08

## 2024-09-08 RX ADMIN — SENNOSIDES AND DOCUSATE SODIUM 1 TABLET: 8.6; 5 TABLET ORAL at 19:55

## 2024-09-08 RX ADMIN — PANTOPRAZOLE SODIUM 40 MG: 40 TABLET, DELAYED RELEASE ORAL at 08:07

## 2024-09-08 RX ADMIN — POTASSIUM CHLORIDE 40 MEQ: 1.5 POWDER, FOR SOLUTION ORAL at 08:05

## 2024-09-08 RX ADMIN — HYDROXYZINE HYDROCHLORIDE 10 MG: 10 TABLET ORAL at 19:55

## 2024-09-08 RX ADMIN — ENOXAPARIN SODIUM 40 MG: 40 INJECTION SUBCUTANEOUS at 11:23

## 2024-09-08 ASSESSMENT — ACTIVITIES OF DAILY LIVING (ADL)
ADLS_ACUITY_SCORE: 31
ADLS_ACUITY_SCORE: 30
ADLS_ACUITY_SCORE: 31
ADLS_ACUITY_SCORE: 30
ADLS_ACUITY_SCORE: 31
ADLS_ACUITY_SCORE: 33
ADLS_ACUITY_SCORE: 31
ADLS_ACUITY_SCORE: 31
ADLS_ACUITY_SCORE: 30
ADLS_ACUITY_SCORE: 31
ADLS_ACUITY_SCORE: 33
ADLS_ACUITY_SCORE: 30
ADLS_ACUITY_SCORE: 33
ADLS_ACUITY_SCORE: 31
ADLS_ACUITY_SCORE: 33

## 2024-09-08 NOTE — PROGRESS NOTES
VAS HERE FOR SITE CHECK (PICC) DRESSING REINFORCED AT THE FAR END OF THE BORDER. JUST A SLIGHT LIFTING NOTED; THUS, REINFORCED.      Yovany Lamas, MSN

## 2024-09-08 NOTE — PLAN OF CARE
Neuro: A&Ox4. Aunt Guerda at bedside, legal guardian Johann (brother). DO NOT leave his mom w/ him alone.   Cardiac: ST (110s-150s).        Respiratory: RA. No respiratory complaints.  GI/: Good UO. PO Diuretics changed to BID. BM today. Please ask patient if he wants to urinate Q3 hours to prevent urgency. Patient has Primofit at night.   Diet/appetite: Reg diet. Feeding monitored. Patient eats quickly and needs to slow down due to nausea   Activity:  SBA, up to chair. Walked to bathroom to urinate. Up in chair most of the day.   Pain: Denies   Skin: No new deficits noted.  LDA's: L PICC -Triple lumens (Gray: Hep locked Red: Transducer; White: SL,Hep Locked)  Replacements: K (done); recheck Phos and Mg the next AM.   Plan: BS ACHS & QID; CVP q12hr. Continue with POC. Notify primary team with changes.

## 2024-09-08 NOTE — PLAN OF CARE
7pm -7am    Neuro: Pt is alert and oriented x 4, MCALLISTER, Speech clear.   Cardiac/Telemetry: Monitor with SR. +2 pulses.  Respiratory: Lungs CTA x faint crackles post bases.  GI/: voiding per primofit for noc due to diuretics.. Up to BR w/standby during day. Good urine output.  Endocrine: BS stable, received 10 units Lantus at HS.  Diet/Appetite: 2 gm sodium diet. 2L FR.  Skin: Appears to have faint rash on front of torso and back with few raised red bumps. Better this am. .Chlorhexidine wipes?  LDA: L PICC triple lumen with transducer attached to 1 port for CVPs. CVP 5 last night.  Activity: Up with standby in room and donahue.  Pain: denies pain.  Labs: in AM.     Plan:  Goal Outcome Evaluation:       Plan of Care Reviewed With: patient, other (see comments) (aunt)  Overall Patient Progress: improving  Outcome Evaluation: CVP tonight is 5. Continues to diurese. VSS. CVP is again 5 tonight. VSS.

## 2024-09-08 NOTE — PROGRESS NOTES
[facility]    Cardiology Progress Note- Cardiology 2        Date of Admission:  8/20/2024     Assessment & Plan: HVSL   25 yo M with PMH autism who initially presented to [facility] with cough, URI symptoms, and hypoxemia. He was intubated due to respiratory failure and transferred to [facility], where his hospital course transpired to mixed cardiogenic/septic shock requiring multiple pressor agents, inotropic support, and IV diuresis. Transferred again to [facility] CICU on 8/20 for continued management. Presentation initially highly suspicious for fulminant myocarditis base on elevated cardiac and inflammatory biomarkers, new (suspected), severe biventricular dysfunction w/ reduced cardiac output requiring intermittent inotropic support, and concurrent infectious/respiratory symptomotology c/b acute respiratory failure 2/2 ARDS vs cardiogenic etiology 2/2 miocarditis with superimposed aspiration PNM treated with sulbactam. Empirically pulsed with steroids. However, EMB 5/21 demonstrated unremarkable myocardial tissue with no evidence of inflammatory infiltrate.  Hospital course further complicated by worsening cardiogenic/septic shock with worsening renal and liver failure requiring IABP placement on 8/27. Jarocho weaned off on 8/28 and extubated.  IABP removed on 9/1 and closed with 8Fr angioseal. CMR on 9/5 showed mildly dilated left ventricle with severely reduced left ventricular function, LVEF 15%. normal right ventricular size and systolic function, RVEF 46%. no myocardial edema or replacement fibrosis. No MRI features of acute myocarditis. Weaned off dobutamine, currently optimizing GDMT.     Today:  - Hypotensive this AM, likely slight hypovolemia from diuresis, now holding diuretics and received 500 ml LR with improvement in BP  - Entresto decreased to 12/13 mg BID (from 24/26 mg BID)  - Continues to be in sinus tachycardia, stable  - Continue prednisone  taper    # Biventricular failure with severely reduce left ventricular function with RV recovery   # Cardiogenic shock SCAI C s/p IABP 8/27-9/1  TTE (8/21/24): LVIDd 58mm. Biplane LVEF is 16%. Severe hypokinesis of mid to apical RV free wall.  RHC (8/21/24): RA mean 6, PA 30/23/26, PCWP 18, Wayne CO/CI 5.0/2.1, PVR 1.6, SVR 1270  EMB (8/21/24): Fragments of essentially unremarkable myocardial tissue with no evidence of inflammatory infiltrate   Coronary angiogram: NA   CMR: Mildly dilated left ventricle with severely reduced left ventricular function, LVEF 15%.  Normal right ventricular size and systolic function, RVEF 46%.   No myocardial edema or replacement fibrosis.   Specifically, no MRI features of acute myocarditis.   - Remains off dobutamine with stable hemodynamics  - Holding off on beta blocker given recent need for dobutamine  - Decrease Entresto to 12/13 mg BID (from 24/26 mg BID) in setting of recent hypotension  - c/w aldactone 25mg daily  - c/w  empagliflozin 10mg daily   - c/w digoxin   - c/w steroid taper and nystatin    # Hypotension  Became hypotensive 9/8 AM after ambulating to bathroom. Asymptomatic. Tachycardic up to 160s from previous 130s (see below for discussion of sinus tachycardia). Suspect slight overdiuresis and hypovolemia given continued net negative volume status over last several days on oral diuretics.   - Hold diuretics 9/8, can consider resumption on 9/9 AM at reduced dose  - 500 ml LR with improvement in BP    # Sinus tachycardia  Has been in sinus tachycardia for most of admission. Asymptomatic. Confirmed with EP that rhythm is sinus. His tachycardia is likely helping with cardiac output in setting of poor systolic function. Holding off on beta blockade for now given recent wean off dobutamine, but if HR becomes prohibitive in patient's ability to be active (ambulate, etc), could consider initiation.   - Monitor for now    # + Strep mitis in 1/2 bottles, suspected  comtaminant  Blood culture from 9/5 grew strep mitis in 1/2 bottles. Repeat blood culture on 9/6 NGTD. Suspect contaminant. Stopped antibiotics as of 9/7.   - Antibiotics:   > Vanc 9/6   > Ceftriaxone 9/6 - 9/7  - Monitor off antibiotics    # Concern for aspiration pneumonia, resolved  # Recurrent fevers of unclear etiology, resolved  - Prior antibiotics       > Unasyn 8/21 - 8/26       > Cefepime 8/20 - 8/21       > Azithromycin 8/20 - 08/24       > Vanco 8/20 - 8/21 s/p 8/26       > meropenem 8/26-9/1       > zosyn 8/26 - 8/28    # Altered mental status, resolved  - c/w risperidone   - case was discussed with psychiatry regarding his medication and relation to cardiac decompensation given no clear culprit. However, given no safe alternative we would suggest continuation of risperidone as he has been on this medication for years without recent dose change and lack of evidence for risperidone to cause fulminant heart failure at the movement.     # Elevated transaminases  Improving. Imaging showed hepatomegaly with increased echogenicity, possible intrinsic parenchymal disease such as steatosis.   - Trend labs    # Hypernatremia, resolved  # Hypervolemia, improving  # Lactic acidosis, resolved  - Trend BMP  - Diuresis as above    # Acute hypoxemic respiratory failure, resolved  # Pleural effusion, resolved  See above for treatment of suspected aspiration pneumonia.     # Hyperglycemia  Likely related to stress and prednisone therapy. No history of diabetes. A1c was 5.6% on 8/23/2024.   - Continue lantus 10 units daily, could revisit need for this as outpatient vs prior to discharge depending on remaining length of stay       Diet: Snacks/Supplements Adult: Ensure Enlive; Between Meals  Fluid restriction 2000 ML FLUID  Low Saturated Fat Na <2400 mg    DVT Prophylaxis: Enoxaparin (Lovenox) SQ  Swan Catheter: Not present  Cardiac Monitoring: None  Code Status: Full Code          Clinically Significant Risk Factors     "    # Hypokalemia: Lowest K = 3.2 mmol/L in last 2 days, will replace as needed   # Hypocalcemia: Lowest Ca = 8.4 mg/dL in last 2 days, will monitor and replace as appropriate     # Hypoalbuminemia: Lowest albumin = 3.2 g/dL at 8/23/2024 12:52 PM, will monitor as appropriate        # End stage heart failure: Ventricular assist device (VAD) present          # Overweight: Estimated body mass index is 27.46 kg/m  as calculated from the following:    Height as of this encounter: 1.86 m (6' 1.23\").    Weight as of this encounter: 95 kg (209 lb 6.4 oz).   # Moderate Malnutrition: based on nutrition assessment      # Financial/Environmental Concerns: none               Disposition Plan   Expected discharge: 2 - 3 days, recommended to prior living arrangement once fluid volume status optimized on oral medication.    Entered: Jaxson Davis MD 09/08/2024, 2:10 PM   The patient's care was discussed with the Attending Physician, Dr. Carbajal .      Jaxson Davis MD  PGY3, Internal Medicine  Cardiology [address] [place] [address]  ______________________________________________________________________    Interval History   No acute events overnight. Patient feeling well this morning. Was lying in bed playing his Nintendo Switch when seen. Aunt at bedside and updated. Shortly after he ambulated to bathroom and returned, he became hypotensive with SBP down to 70 on one measurement. Patient remained asymptomatic.     Physical Exam   Vital Signs: Temp: 98.5  F (36.9  C) Temp src: Oral BP: 104/67 Pulse: (!) 130   Resp: 22 SpO2: 96 % O2 Device: None (Room air)    Weight: 209 lbs 6.4 oz    Gen: No acute distress, sitting up in chair  HEENT: Normocephalic, atraumatic  CV: Regular rhythm with tachycardia, no murmurs  Pulm: CTAB, non-labored breathing on room air  Abd: Soft, non-tender to palpation  Extr: No significant pitting edema in bilateral lower extremities  Neuro: Alert and conversant    Medical " Decision Making       Please see A&P for additional details of medical decision making.      Data   ------------------------- PAST 24 HR DATA REVIEWED -----------------------------------------------    I have personally reviewed the following data over the past 24 hrs:    12.6 (H)  \   10.9 (L)   / 234     136 99 15.8 /  108 (H)   3.9 25 0.49 (L) \     ALT: 103 (H) AST: 28 AP: 104 TBILI: 0.4   ALB: 3.7 TOT PROTEIN: 6.5 LIPASE: N/A     Procal: N/A CRP: N/A Lactic Acid: 0.9         Imaging results reviewed over the past 24 hrs:   No results found for this or any previous visit (from the past 24 hour(s)).

## 2024-09-09 LAB
ALBUMIN SERPL BCG-MCNC: 3.5 G/DL (ref 3.5–5.2)
ALP SERPL-CCNC: 102 U/L (ref 40–150)
ALT SERPL W P-5'-P-CCNC: 96 U/L (ref 0–70)
ANION GAP SERPL CALCULATED.3IONS-SCNC: 10 MMOL/L (ref 7–15)
AST SERPL W P-5'-P-CCNC: 33 U/L (ref 0–45)
BASE EXCESS BLDV CALC-SCNC: 3 MMOL/L (ref -3–3)
BASOPHILS # BLD AUTO: 0.1 10E3/UL (ref 0–0.2)
BASOPHILS NFR BLD AUTO: 1 %
BILIRUB DIRECT SERPL-MCNC: <0.2 MG/DL (ref 0–0.3)
BILIRUB SERPL-MCNC: 0.4 MG/DL
BUN SERPL-MCNC: 15.1 MG/DL (ref 6–20)
CA-I BLD-MCNC: 4.6 MG/DL (ref 4.4–5.2)
CALCIUM SERPL-MCNC: 8.7 MG/DL (ref 8.8–10.4)
CHLORIDE SERPL-SCNC: 101 MMOL/L (ref 98–107)
CREAT SERPL-MCNC: 0.48 MG/DL (ref 0.67–1.17)
EGFRCR SERPLBLD CKD-EPI 2021: >90 ML/MIN/1.73M2
EOSINOPHIL # BLD AUTO: 0.3 10E3/UL (ref 0–0.7)
EOSINOPHIL NFR BLD AUTO: 3 %
ERYTHROCYTE [DISTWIDTH] IN BLOOD BY AUTOMATED COUNT: 14.5 % (ref 10–15)
GLUCOSE BLDC GLUCOMTR-MCNC: 106 MG/DL (ref 70–99)
GLUCOSE BLDC GLUCOMTR-MCNC: 111 MG/DL (ref 70–99)
GLUCOSE BLDC GLUCOMTR-MCNC: 147 MG/DL (ref 70–99)
GLUCOSE SERPL-MCNC: 101 MG/DL (ref 70–99)
HCO3 BLDV-SCNC: 29 MMOL/L (ref 21–28)
HCO3 SERPL-SCNC: 25 MMOL/L (ref 22–29)
HCT VFR BLD AUTO: 32.7 % (ref 40–53)
HGB BLD-MCNC: 10.7 G/DL (ref 13.3–17.7)
IMM GRANULOCYTES # BLD: 0.2 10E3/UL
IMM GRANULOCYTES NFR BLD: 1 %
LYMPHOCYTES # BLD AUTO: 2.7 10E3/UL (ref 0.8–5.3)
LYMPHOCYTES NFR BLD AUTO: 24 %
MAGNESIUM SERPL-MCNC: 2.2 MG/DL (ref 1.7–2.3)
MCH RBC QN AUTO: 28.9 PG (ref 26.5–33)
MCHC RBC AUTO-ENTMCNC: 32.7 G/DL (ref 31.5–36.5)
MCV RBC AUTO: 88 FL (ref 78–100)
MONOCYTES # BLD AUTO: 1 10E3/UL (ref 0–1.3)
MONOCYTES NFR BLD AUTO: 9 %
NEUTROPHILS # BLD AUTO: 7.1 10E3/UL (ref 1.6–8.3)
NEUTROPHILS NFR BLD AUTO: 62 %
NRBC # BLD AUTO: 0 10E3/UL
NRBC BLD AUTO-RTO: 0 /100
O2/TOTAL GAS SETTING VFR VENT: 21 %
OXYHGB MFR BLDV: 66 % (ref 70–75)
PCO2 BLDV: 49 MM HG (ref 40–50)
PH BLDV: 7.38 [PH] (ref 7.32–7.43)
PHOSPHATE SERPL-MCNC: 3.3 MG/DL (ref 2.5–4.5)
PLATELET # BLD AUTO: 249 10E3/UL (ref 150–450)
PO2 BLDV: 37 MM HG (ref 25–47)
POTASSIUM SERPL-SCNC: 3.7 MMOL/L (ref 3.4–5.3)
PROT SERPL-MCNC: 6.2 G/DL (ref 6.4–8.3)
RBC # BLD AUTO: 3.7 10E6/UL (ref 4.4–5.9)
SAO2 % BLDV: 67.1 % (ref 70–75)
SODIUM SERPL-SCNC: 136 MMOL/L (ref 135–145)
WBC # BLD AUTO: 11.2 10E3/UL (ref 4–11)

## 2024-09-09 PROCEDURE — 83735 ASSAY OF MAGNESIUM: CPT | Performed by: STUDENT IN AN ORGANIZED HEALTH CARE EDUCATION/TRAINING PROGRAM

## 2024-09-09 PROCEDURE — 82805 BLOOD GASES W/O2 SATURATION: CPT

## 2024-09-09 PROCEDURE — 99233 SBSQ HOSP IP/OBS HIGH 50: CPT | Mod: GC | Performed by: INTERNAL MEDICINE

## 2024-09-09 PROCEDURE — 250N000013 HC RX MED GY IP 250 OP 250 PS 637: Performed by: STUDENT IN AN ORGANIZED HEALTH CARE EDUCATION/TRAINING PROGRAM

## 2024-09-09 PROCEDURE — 80053 COMPREHEN METABOLIC PANEL: CPT | Performed by: STUDENT IN AN ORGANIZED HEALTH CARE EDUCATION/TRAINING PROGRAM

## 2024-09-09 PROCEDURE — 120N000003 HC R&B IMCU UMMC

## 2024-09-09 PROCEDURE — 250N000013 HC RX MED GY IP 250 OP 250 PS 637: Performed by: INTERNAL MEDICINE

## 2024-09-09 PROCEDURE — 250N000013 HC RX MED GY IP 250 OP 250 PS 637

## 2024-09-09 PROCEDURE — 85004 AUTOMATED DIFF WBC COUNT: CPT | Performed by: STUDENT IN AN ORGANIZED HEALTH CARE EDUCATION/TRAINING PROGRAM

## 2024-09-09 PROCEDURE — 84100 ASSAY OF PHOSPHORUS: CPT | Performed by: STUDENT IN AN ORGANIZED HEALTH CARE EDUCATION/TRAINING PROGRAM

## 2024-09-09 PROCEDURE — 82248 BILIRUBIN DIRECT: CPT

## 2024-09-09 PROCEDURE — 250N000012 HC RX MED GY IP 250 OP 636 PS 637: Performed by: STUDENT IN AN ORGANIZED HEALTH CARE EDUCATION/TRAINING PROGRAM

## 2024-09-09 PROCEDURE — 250N000011 HC RX IP 250 OP 636: Performed by: STUDENT IN AN ORGANIZED HEALTH CARE EDUCATION/TRAINING PROGRAM

## 2024-09-09 PROCEDURE — 82330 ASSAY OF CALCIUM: CPT | Performed by: STUDENT IN AN ORGANIZED HEALTH CARE EDUCATION/TRAINING PROGRAM

## 2024-09-09 RX ORDER — FUROSEMIDE 20 MG
20 TABLET ORAL DAILY
Status: DISCONTINUED | OUTPATIENT
Start: 2024-09-09 | End: 2024-09-11

## 2024-09-09 RX ORDER — TORSEMIDE 20 MG/1
20 TABLET ORAL DAILY
Status: DISCONTINUED | OUTPATIENT
Start: 2024-09-09 | End: 2024-09-09

## 2024-09-09 RX ORDER — POTASSIUM CHLORIDE 1.5 G/1.58G
20 POWDER, FOR SOLUTION ORAL ONCE
Status: COMPLETED | OUTPATIENT
Start: 2024-09-09 | End: 2024-09-09

## 2024-09-09 RX ADMIN — ENOXAPARIN SODIUM 40 MG: 40 INJECTION SUBCUTANEOUS at 11:03

## 2024-09-09 RX ADMIN — RISPERIDONE 1.5 MG: 1 TABLET, FILM COATED ORAL at 20:18

## 2024-09-09 RX ADMIN — POTASSIUM CHLORIDE 40 MEQ: 1.5 POWDER, FOR SOLUTION ORAL at 08:49

## 2024-09-09 RX ADMIN — NYSTATIN 500000 UNITS: 100000 SUSPENSION ORAL at 11:07

## 2024-09-09 RX ADMIN — NYSTATIN 500000 UNITS: 100000 SUSPENSION ORAL at 20:19

## 2024-09-09 RX ADMIN — POTASSIUM CHLORIDE 20 MEQ: 1.5 POWDER, FOR SOLUTION ORAL at 08:49

## 2024-09-09 RX ADMIN — RISPERIDONE 1 MG: 1 TABLET, FILM COATED ORAL at 08:50

## 2024-09-09 RX ADMIN — Medication 1 HALF-TAB: at 20:23

## 2024-09-09 RX ADMIN — THIAMINE HCL TAB 100 MG 100 MG: 100 TAB at 08:52

## 2024-09-09 RX ADMIN — Medication 1 HALF-TAB: at 08:49

## 2024-09-09 RX ADMIN — PANTOPRAZOLE SODIUM 40 MG: 40 TABLET, DELAYED RELEASE ORAL at 08:52

## 2024-09-09 RX ADMIN — NYSTATIN 500000 UNITS: 100000 SUSPENSION ORAL at 08:55

## 2024-09-09 RX ADMIN — Medication 1 TABLET: at 08:51

## 2024-09-09 RX ADMIN — FUROSEMIDE 20 MG: 20 TABLET ORAL at 11:01

## 2024-09-09 RX ADMIN — PREDNISONE 5 MG: 5 TABLET ORAL at 08:52

## 2024-09-09 RX ADMIN — NYSTATIN 500000 UNITS: 100000 SUSPENSION ORAL at 17:00

## 2024-09-09 RX ADMIN — EMPAGLIFLOZIN 10 MG: 10 TABLET, FILM COATED ORAL at 08:52

## 2024-09-09 RX ADMIN — DIGOXIN 250 MCG: 125 TABLET ORAL at 08:49

## 2024-09-09 RX ADMIN — SPIRONOLACTONE 25 MG: 25 TABLET, FILM COATED ORAL at 08:50

## 2024-09-09 ASSESSMENT — ACTIVITIES OF DAILY LIVING (ADL)
ADLS_ACUITY_SCORE: 33

## 2024-09-09 NOTE — PROGRESS NOTES
[facility]    Cardiology Progress Note- Cardiology 2        Date of Admission:  8/20/2024     Assessment & Plan: HVSL   23 yo M with PMH autism who initially presented to [facility] with cough, URI symptoms, and hypoxemia. He was intubated due to respiratory failure and transferred to [facility], where his hospital course transpired to mixed cardiogenic/septic shock requiring multiple pressor agents, inotropic support, and IV diuresis. Transferred again to [facility] CICU on 8/20 for continued management. Presentation initially highly suspicious for fulminant myocarditis base on elevated cardiac and inflammatory biomarkers, new (suspected), severe biventricular dysfunction w/ reduced cardiac output requiring intermittent inotropic support, and concurrent infectious/respiratory symptomotology c/b acute respiratory failure 2/2 ARDS vs cardiogenic etiology 2/2 miocarditis with superimposed aspiration PNM treated with sulbactam. Empirically pulsed with steroids. However, EMB 5/21 demonstrated unremarkable myocardial tissue with no evidence of inflammatory infiltrate.  Hospital course further complicated by worsening cardiogenic/septic shock with worsening renal and liver failure requiring IABP placement on 8/27. Jarocho weaned off on 8/28 and extubated.  IABP removed on 9/1 and closed with 8Fr angioseal. CMR on 9/5 showed mildly dilated left ventricle with severely reduced left ventricular function, LVEF 15%. normal right ventricular size and systolic function, RVEF 46%. no myocardial edema or replacement fibrosis. No MRI features of acute myocarditis. Weaned off dobutamine, currently optimizing GDMT.     Today:  - lasix 20mg oral  to keep euvolemic state   - c/w current regiment  - BB initiation as an outpatient or before discharge if able to tolerate (would start with lorpessor and if tolerates then switch to torpolol xl 12.5mg daily)      # Biventricular failure with  severely reduce left ventricular function with RV recovery   # Cardiogenic shock SCAI C s/p IABP 8/27-9/1  TTE (8/21/24): LVIDd 58mm. Biplane LVEF is 16%. Severe hypokinesis of mid to apical RV free wall.  RHC (8/21/24): RA mean 6, PA 30/23/26, PCWP 18, Wayne CO/CI 5.0/2.1, PVR 1.6, SVR 1270  EMB (8/21/24): Fragments of essentially unremarkable myocardial tissue with no evidence of inflammatory infiltrate   Coronary angiogram: NA   CMR: Mildly dilated left ventricle with severely reduced left ventricular function, LVEF 15%.  Normal right ventricular size and systolic function, RVEF 46%.   No myocardial edema or replacement fibrosis.   Specifically, no MRI features of acute myocarditis.   - Remains off dobutamine with stable hemodynamics  - Holding off on beta blocker given recent need for dobutamine  - Entresto 12/13 mg BID   - c/w aldactone 25mg daily  - c/w  empagliflozin 10mg daily   - c/w digoxin   - c/w steroid taper and nystatin    # Hypotension  Became hypotensive 9/8 AM after ambulating to bathroom. Asymptomatic. Tachycardic up to 160s from previous 130s (see below for discussion of sinus tachycardia). Suspect slight overdiuresis and hypovolemia given continued net negative volume status over last several days on oral diuretics.   - Hold diuretics 9/8, can consider resumption on 9/9 AM at reduced dose  - 500 ml LR with improvement in BP    # Sinus tachycardia  - compensatory in the setting of LV dysfunction     # + Strep mitis in 1/2 bottles, suspected comtaminant  Blood culture from 9/5 grew strep mitis in 1/2 bottles. Repeat blood culture on 9/6 NGTD. Suspect contaminant. Stopped antibiotics as of 9/7.   - Antibiotics:   > Vanc 9/6   > Ceftriaxone 9/6 - 9/7  - Monitor off antibiotics    # Concern for aspiration pneumonia, resolved  # Recurrent fevers of unclear etiology, resolved  - Prior antibiotics       > Unasyn 8/21 - 8/26       > Cefepime 8/20 - 8/21       > Azithromycin 8/20 - 08/24       > Vanco 8/20  "- 8/21 s/p 8/26       > meropenem 8/26-9/1       > zosyn 8/26 - 8/28    # Altered mental status, resolved  - c/w risperidone   - case was discussed with psychiatry regarding his medication and relation to cardiac decompensation given no clear culprit. However, given no safe alternative we would suggest continuation of risperidone as he has been on this medication for years without recent dose change and lack of evidence for risperidone to cause fulminant heart failure at the movement.     # Elevated transaminases - resolved   Improving. Imaging showed hepatomegaly with increased echogenicity, possible intrinsic parenchymal disease such as steatosis.   - Trend labs    # Hypernatremia, resolved  # Hypervolemia, controlled  # Lactic acidosis, resolved  - Trend BMP  - Diuresis as above    # Acute hypoxemic respiratory failure, resolved  # Pleural effusion, resolved  See above for treatment of suspected aspiration pneumonia.     # Hyperglycemia  Likely related to stress and prednisone therapy. No history of diabetes. A1c was 5.6% on 8/23/2024.   - stop lantus and monitor fsg       Diet: Snacks/Supplements Adult: Ensure Enlive; Between Meals  Fluid restriction 2000 ML FLUID  Low Saturated Fat Na <2400 mg    DVT Prophylaxis: Enoxaparin (Lovenox) SQ  Swan Catheter: Not present  Cardiac Monitoring: None  Code Status: Full Code          Clinically Significant Risk Factors        # Hypokalemia: Lowest K = 3.3 mmol/L in last 2 days, will replace as needed   # Hypocalcemia: Lowest Ca = 8.4 mg/dL in last 2 days, will monitor and replace as appropriate     # Hypoalbuminemia: Lowest albumin = 3.2 g/dL at 8/23/2024 12:52 PM, will monitor as appropriate        # End stage heart failure: Ventricular assist device (VAD) present          # Overweight: Estimated body mass index is 27.76 kg/m  as calculated from the following:    Height as of this encounter: 1.86 m (6' 1.23\").    Weight as of this encounter: 96 kg (211 lb 11.2 oz).   # " Moderate Malnutrition: based on nutrition assessment      # Financial/Environmental Concerns: none        Thank you for allowing me to care for this patient, please don't hesitate to contact me with any questions regarding this plan.   Pt was discussed and evaluated with Patience Carrillo MD, attending physician, who agrees with the assessment and plan above.    Matt Cabral MD, PhD, MSc  Cardiology Fellow    ______________________________________________________________________    Interval History   No acute events overnight. Patient feeling well this morning.     Physical Exam   Vital Signs: Temp: 98  F (36.7  C) Temp src: Oral BP: 119/49 Pulse: (!) 148   Resp: 20 SpO2: 99 % O2 Device: None (Room air)    Weight: 211 lbs 11.2 oz    Gen: No acute distress, sitting up in chair  HEENT: Normocephalic, atraumatic  CV: Regular rhythm with tachycardia, no murmurs  Pulm: CTAB, non-labored breathing on room air  Abd: Soft, non-tender to palpation  Extr: No significant pitting edema in bilateral lower extremities  Neuro: Alert and conversant    Medical Decision Making       Please see A&P for additional details of medical decision making.      Data   ------------------------- PAST 24 HR DATA REVIEWED -----------------------------------------------    I have personally reviewed the following data over the past 24 hrs:    11.2 (H)  \   10.7 (L)   / 249     136 101 15.1 /  101 (H)   3.7 25 0.48 (L) \     ALT: 96 (H) AST: 33 AP: 102 TBILI: 0.4   ALB: 3.5 TOT PROTEIN: 6.2 (L) LIPASE: N/A     Procal: N/A CRP: N/A Lactic Acid: 1.2         Imaging results reviewed over the past 24 hrs:   No results found for this or any previous visit (from the past 24 hour(s)).

## 2024-09-09 NOTE — PLAN OF CARE
"Goal Outcome Evaluation:      Plan of Care Reviewed With: patient    Overall Patient Progress: no changeOverall Patient Progress: no change    /52 (BP Location: Right arm)   Pulse 119   Temp 97.5  F (36.4  C) (Axillary)   Resp 20   Ht 1.86 m (6' 1.23\")   Wt 95 kg (209 lb 6.4 oz)   SpO2 100%   BMI 27.46 kg/m        Intake/Output Summary (Last 24 hours) at 9/8/2024 1944  Last data filed at 9/8/2024 1800  Gross per 24 hour   Intake 3220 ml   Output 4630 ml   Net -1410 ml     Neuro: A&Ox4. Impulsive at times. Don't leave mom alone with pt in room  Cardiac: Sinus tachy. HR usually around 120 and rises to 150's when ambulating  Respiratory: Sating upper 90's on RA  GI/: Adequate urine output. BM X2. Primofit overnight for comfort  Diet/appetite: Tolerating cardiac diet. Eating well.  Activity:  Assist of standby, up to chair and bathroom  Pain: Pt denies pain during shift  Skin: Pt itchy after CHG bath, possible sensitivity.   LDA's: PICC HL except for CVP lumen    Shift Event: Pt had hypotensive episode after ambulating to bathroom today. Initial Map in 40's. CVP was 3-4. Provider notified and 500 ml bolus of LR given/bumex held. Pt pressures responded well and MAP has been above 65 since 1400.     Plan: Continue with POC. Aunt is in the process of taking over guardianship. Plan is for pt to eventually move back to [place] with her. Notify primary team with changes.     "

## 2024-09-09 NOTE — PLAN OF CARE
Hours of care: 7p-7a    Neuro: Alert and oriented. Able to make needs known. Explain interventions before performing. Mother and aunt visited and left early in shift.  Cardiac/Telemetry: Monitor with ST with PVC's. 's-120's in the bed. 130's 140's with any exertion at all. Occasional 150. CVP 8.  Respiratory: O2 sats stable on R/A. Lungs CTA.   GI/: 2 gram NA diet with 2l FR. Pt drinks whatever he is given very quickly. Primofit placed for noc at pt request. Denies nausea.  Endocrine: BS AC/HS. Received 10units lantus at hs. .  Diet/Appetite: 2 gram NA diet with 2L FR. Pt drinks 100% of whatever he is brought quickly,  Skin: Pt's skin intact. Pink chest and back. Petichiae noted to abdomen. C/o itching, atarax given with good result.  LDA: LA PICC triple lumen. Transducer to one port. The other two are SL/d .  Activity: Up in Room. Limited due to high HR with exertions  Pain: Denies pain  Labs: Drawn.     Plan: Possible discharge soon to previous housing situation.. Aunt is in process of gaining guardianship. Do not leave mother alone with pt.      Goal Outcome Evaluation:  Plan reviewed with pt and aunt. Mother present.    Overall Patient Progress: no change

## 2024-09-09 NOTE — PROGRESS NOTES
Care Management Follow Up    Length of Stay (days): 20    Expected Discharge Date: 09/11/2024     Concerns to be Addressed: Discharge planning  Patient plan of care discussed at interdisciplinary rounds: Yes    Anticipated Discharge Disposition: Return to group home vs TCU     Anticipated Discharge Services: Current rec OP CR  Anticipated Discharge DME: TBD    Patient/family educated on Medicare website which has current facility and service quality ratings: Not at this time  Education Provided on the Discharge Plan: yes  Patient/Family in Agreement with the Plan: yes    Additional Information:  Met with pt, pt's aunt and mother at bedside to update on discharge planning. Per pt's aunt pt's activity tolerance has been very limited over the weekend. Pt continues to be tachycardic, unable to work with therapies today. Per pt's aunt, pt resides in the lower level apartment of his group home which is a split level. Pt needs to be able to navigate two flights of stairs to get to the main level for toileting, bathing, etc. Updated PT so they can assess stairs trial and overall activity tolerance when pt medically appropriate. Will need to further discuss discharge planning with pt's legal guardian, brother Johann once pt able to work with therapies.    Updated Lydia group yassine RN that pending further medical optimization and therapy evaluation pt may be able to return to group home later this week. Lydia confirmed group home can provide transportation when pt ready.    [facility]  Nurse, Lydia Rodney: [phone]     CC will continue to monitor patient's medical condition and progress towards discharge.  Kristen Flynn RN BSN  6C Unit Care Coordinator  Phone number: [phone]    SEARCHABLE in [facility] - search CARE COORDINATOR      [place] [facility] [place] ([id number]) Saturday & Sunday; ([id number]) [facility] Recognized Holidays      Units: 5A Onc Vocera & 5C Vocera      Units: 6B Vocera & 6C Vocera       Units: 7A SOT RNCC  Vocera, 7B Med Surg Vocera, & 7C Med Surg Vocera      Units: 6A Vocera & 4A CVICU Vocera, 4C MICU Vocera, and 4E SICU Vocera       Units: 5 Ortho Vocera & 5 Med Surg Vocera      Units: 6 Med Surg Vocera & 8 Med Surg Vocera

## 2024-09-09 NOTE — PROGRESS NOTES
D/lying on his side watching a movie on his phone.   I/voided in urinal, we are to hold the urinal in place  A/He can get up from bed by himself and stands to void. Continues to be tachycardic at baseline and more so with activity, team is aware  D/family told me she changed his shorts a few times as he was wet, and was up to the bathroom once with them  P/monitor for changes, keep him and team updated

## 2024-09-09 NOTE — CONSULTS
"Terrance is a 24 year old male presenting from [facility] with acute systolic heart failure. CORE consult requested. Terrance is currently admitted with systolic heart failure    Terrance and his aunt in room was provided with a CHF book.  I reviewed the importance of daily weights, 2 gm sodium diet, 2L fluid restriction and compliance with medications upon hospital discharge.  CORE contact phone numbers provided and patient is encouraged to call with any questions or concerns, including any weight gain or loss of 2 or more pounds in 24 hours or 5 or more pounds in 1 week.      Spoke with aunt in room about follow up. They are trying to eventually get him back to [place] once guardianship transferred. She also was wondering if he needed to go to TCU as he isn't at his baseline and wondering if his group home will be able to handle it.    Discussed setting up appointment so that he has some follow up in place and she's in agreement with that.  I called guardian Johann to discuss getting follow up scheduled.   Left message and asked for call back,     Keyla Montes RN, CHFN  Cardiology Care Coordinator - Heart Failure/ [facility]  [facility]   Questions and [phone]   First press #1 for the [employer] and then press #4 for \"Send a message to your care team\"    "

## 2024-09-09 NOTE — PLAN OF CARE
Hours of care: 3139-6384  Problem: Adult Inpatient Plan of Care  Goal: Plan of Care Review  Recent Flowsheet Documentation  Taken 9/9/2024 1350 by Heydi Ellis, RN  Outcome Evaluation:   CVP monitoring discontinued, RHC planned for tomorrow.   Asymptomatic with elevated HR, parameters addressed by primary team.  Plan of Care Reviewed With:   patient   family   caregiver   Shift highlights:   -CVP 8; CVP monitoring discontinued.  -RHC tomorrow, NPO order in place starting tomorrow at 0900.  -HR parameters liberalized per order to 170 bpm, tele/monitor tech updated.  -Aunt and pt's mother at bedside today.  -K+ replaced per PRN protocol.    For complete assessment, please see flowsheets.

## 2024-09-10 ENCOUNTER — CARE COORDINATION (OUTPATIENT)
Dept: CARDIOLOGY | Facility: CLINIC | Age: 24
End: 2024-09-10
Payer: MEDICARE

## 2024-09-10 ENCOUNTER — APPOINTMENT (OUTPATIENT)
Dept: PHYSICAL THERAPY | Facility: CLINIC | Age: 24
DRG: 853 | End: 2024-09-10
Attending: STUDENT IN AN ORGANIZED HEALTH CARE EDUCATION/TRAINING PROGRAM
Payer: MEDICARE

## 2024-09-10 LAB
ALBUMIN SERPL BCG-MCNC: 3.6 G/DL (ref 3.5–5.2)
ALP SERPL-CCNC: 98 U/L (ref 40–150)
ALT SERPL W P-5'-P-CCNC: 91 U/L (ref 0–70)
ANION GAP SERPL CALCULATED.3IONS-SCNC: 10 MMOL/L (ref 7–15)
ANION GAP SERPL CALCULATED.3IONS-SCNC: 11 MMOL/L (ref 7–15)
AST SERPL W P-5'-P-CCNC: 29 U/L (ref 0–45)
BASE EXCESS BLDV CALC-SCNC: 2.9 MMOL/L (ref -3–3)
BASOPHILS # BLD AUTO: 0 10E3/UL (ref 0–0.2)
BASOPHILS NFR BLD AUTO: 0 %
BILIRUB DIRECT SERPL-MCNC: <0.2 MG/DL (ref 0–0.3)
BILIRUB SERPL-MCNC: 0.3 MG/DL
BUN SERPL-MCNC: 11.8 MG/DL (ref 6–20)
BUN SERPL-MCNC: 14.9 MG/DL (ref 6–20)
CA-I BLD-MCNC: 4.8 MG/DL (ref 4.4–5.2)
CALCIUM SERPL-MCNC: 8.8 MG/DL (ref 8.8–10.4)
CALCIUM SERPL-MCNC: 8.9 MG/DL (ref 8.8–10.4)
CHLORIDE SERPL-SCNC: 103 MMOL/L (ref 98–107)
CHLORIDE SERPL-SCNC: 104 MMOL/L (ref 98–107)
CREAT SERPL-MCNC: 0.46 MG/DL (ref 0.67–1.17)
CREAT SERPL-MCNC: 0.49 MG/DL (ref 0.67–1.17)
EGFRCR SERPLBLD CKD-EPI 2021: >90 ML/MIN/1.73M2
EGFRCR SERPLBLD CKD-EPI 2021: >90 ML/MIN/1.73M2
EOSINOPHIL # BLD AUTO: 0.3 10E3/UL (ref 0–0.7)
EOSINOPHIL NFR BLD AUTO: 3 %
ERYTHROCYTE [DISTWIDTH] IN BLOOD BY AUTOMATED COUNT: 14.6 % (ref 10–15)
GLUCOSE BLDC GLUCOMTR-MCNC: 101 MG/DL (ref 70–99)
GLUCOSE BLDC GLUCOMTR-MCNC: 102 MG/DL (ref 70–99)
GLUCOSE BLDC GLUCOMTR-MCNC: 102 MG/DL (ref 70–99)
GLUCOSE BLDC GLUCOMTR-MCNC: 92 MG/DL (ref 70–99)
GLUCOSE SERPL-MCNC: 105 MG/DL (ref 70–99)
GLUCOSE SERPL-MCNC: 126 MG/DL (ref 70–99)
HCO3 BLDV-SCNC: 29 MMOL/L (ref 21–28)
HCO3 SERPL-SCNC: 25 MMOL/L (ref 22–29)
HCO3 SERPL-SCNC: 25 MMOL/L (ref 22–29)
HCT VFR BLD AUTO: 33.3 % (ref 40–53)
HGB BLD-MCNC: 10.8 G/DL (ref 13.3–17.7)
IMM GRANULOCYTES # BLD: 0.1 10E3/UL
IMM GRANULOCYTES NFR BLD: 1 %
LYMPHOCYTES # BLD AUTO: 2.5 10E3/UL (ref 0.8–5.3)
LYMPHOCYTES NFR BLD AUTO: 25 %
MAGNESIUM SERPL-MCNC: 2.1 MG/DL (ref 1.7–2.3)
MAGNESIUM SERPL-MCNC: 2.2 MG/DL (ref 1.7–2.3)
MCH RBC QN AUTO: 29 PG (ref 26.5–33)
MCHC RBC AUTO-ENTMCNC: 32.4 G/DL (ref 31.5–36.5)
MCV RBC AUTO: 90 FL (ref 78–100)
MONOCYTES # BLD AUTO: 1 10E3/UL (ref 0–1.3)
MONOCYTES NFR BLD AUTO: 10 %
NEUTROPHILS # BLD AUTO: 6.3 10E3/UL (ref 1.6–8.3)
NEUTROPHILS NFR BLD AUTO: 61 %
NRBC # BLD AUTO: 0 10E3/UL
NRBC BLD AUTO-RTO: 0 /100
O2/TOTAL GAS SETTING VFR VENT: 21 %
OXYHGB MFR BLDV: 66 % (ref 70–75)
PCO2 BLDV: 49 MM HG (ref 40–50)
PH BLDV: 7.38 [PH] (ref 7.32–7.43)
PHOSPHATE SERPL-MCNC: 3.9 MG/DL (ref 2.5–4.5)
PLATELET # BLD AUTO: 269 10E3/UL (ref 150–450)
PO2 BLDV: 37 MM HG (ref 25–47)
POTASSIUM SERPL-SCNC: 3.5 MMOL/L (ref 3.4–5.3)
POTASSIUM SERPL-SCNC: 4 MMOL/L (ref 3.4–5.3)
PROT SERPL-MCNC: 6.2 G/DL (ref 6.4–8.3)
RBC # BLD AUTO: 3.72 10E6/UL (ref 4.4–5.9)
SAO2 % BLDV: 67.3 % (ref 70–75)
SODIUM SERPL-SCNC: 138 MMOL/L (ref 135–145)
SODIUM SERPL-SCNC: 140 MMOL/L (ref 135–145)
WBC # BLD AUTO: 10.3 10E3/UL (ref 4–11)

## 2024-09-10 PROCEDURE — 250N000013 HC RX MED GY IP 250 OP 250 PS 637: Performed by: INTERNAL MEDICINE

## 2024-09-10 PROCEDURE — 120N000003 HC R&B IMCU UMMC

## 2024-09-10 PROCEDURE — 82805 BLOOD GASES W/O2 SATURATION: CPT

## 2024-09-10 PROCEDURE — 250N000013 HC RX MED GY IP 250 OP 250 PS 637

## 2024-09-10 PROCEDURE — 82248 BILIRUBIN DIRECT: CPT

## 2024-09-10 PROCEDURE — 250N000012 HC RX MED GY IP 250 OP 636 PS 637: Performed by: STUDENT IN AN ORGANIZED HEALTH CARE EDUCATION/TRAINING PROGRAM

## 2024-09-10 PROCEDURE — 250N000013 HC RX MED GY IP 250 OP 250 PS 637: Performed by: STUDENT IN AN ORGANIZED HEALTH CARE EDUCATION/TRAINING PROGRAM

## 2024-09-10 PROCEDURE — 97110 THERAPEUTIC EXERCISES: CPT | Mod: GP | Performed by: PHYSICAL THERAPIST

## 2024-09-10 PROCEDURE — 80053 COMPREHEN METABOLIC PANEL: CPT | Performed by: STUDENT IN AN ORGANIZED HEALTH CARE EDUCATION/TRAINING PROGRAM

## 2024-09-10 PROCEDURE — 83735 ASSAY OF MAGNESIUM: CPT | Performed by: STUDENT IN AN ORGANIZED HEALTH CARE EDUCATION/TRAINING PROGRAM

## 2024-09-10 PROCEDURE — 97530 THERAPEUTIC ACTIVITIES: CPT | Mod: GP | Performed by: PHYSICAL THERAPIST

## 2024-09-10 PROCEDURE — 250N000013 HC RX MED GY IP 250 OP 250 PS 637: Performed by: NURSE PRACTITIONER

## 2024-09-10 PROCEDURE — 84100 ASSAY OF PHOSPHORUS: CPT | Performed by: STUDENT IN AN ORGANIZED HEALTH CARE EDUCATION/TRAINING PROGRAM

## 2024-09-10 PROCEDURE — 99233 SBSQ HOSP IP/OBS HIGH 50: CPT | Mod: GC | Performed by: INTERNAL MEDICINE

## 2024-09-10 PROCEDURE — 250N000011 HC RX IP 250 OP 636: Performed by: STUDENT IN AN ORGANIZED HEALTH CARE EDUCATION/TRAINING PROGRAM

## 2024-09-10 PROCEDURE — 82330 ASSAY OF CALCIUM: CPT | Performed by: STUDENT IN AN ORGANIZED HEALTH CARE EDUCATION/TRAINING PROGRAM

## 2024-09-10 PROCEDURE — 85004 AUTOMATED DIFF WBC COUNT: CPT | Performed by: STUDENT IN AN ORGANIZED HEALTH CARE EDUCATION/TRAINING PROGRAM

## 2024-09-10 RX ADMIN — Medication 1 HALF-TAB: at 08:53

## 2024-09-10 RX ADMIN — ENOXAPARIN SODIUM 40 MG: 40 INJECTION SUBCUTANEOUS at 14:01

## 2024-09-10 RX ADMIN — Medication 1 TABLET: at 08:53

## 2024-09-10 RX ADMIN — NYSTATIN 500000 UNITS: 100000 SUSPENSION ORAL at 08:54

## 2024-09-10 RX ADMIN — POLYETHYLENE GLYCOL 3350 17 G: 17 POWDER, FOR SOLUTION ORAL at 08:54

## 2024-09-10 RX ADMIN — DIGOXIN 250 MCG: 125 TABLET ORAL at 08:53

## 2024-09-10 RX ADMIN — THIAMINE HCL TAB 100 MG 100 MG: 100 TAB at 08:53

## 2024-09-10 RX ADMIN — HEPARIN, PORCINE (PF) 10 UNIT/ML INTRAVENOUS SYRINGE 5 ML: at 04:30

## 2024-09-10 RX ADMIN — SENNOSIDES AND DOCUSATE SODIUM 1 TABLET: 8.6; 5 TABLET ORAL at 20:05

## 2024-09-10 RX ADMIN — NYSTATIN 500000 UNITS: 100000 SUSPENSION ORAL at 14:01

## 2024-09-10 RX ADMIN — NYSTATIN 500000 UNITS: 100000 SUSPENSION ORAL at 18:24

## 2024-09-10 RX ADMIN — Medication 1 HALF-TAB: at 20:05

## 2024-09-10 RX ADMIN — PREDNISONE 5 MG: 5 TABLET ORAL at 08:53

## 2024-09-10 RX ADMIN — NYSTATIN 500000 UNITS: 100000 SUSPENSION ORAL at 21:59

## 2024-09-10 RX ADMIN — FUROSEMIDE 20 MG: 20 TABLET ORAL at 08:54

## 2024-09-10 RX ADMIN — RISPERIDONE 1 MG: 1 TABLET, FILM COATED ORAL at 08:53

## 2024-09-10 RX ADMIN — RISPERIDONE 1.5 MG: 1 TABLET, FILM COATED ORAL at 20:05

## 2024-09-10 RX ADMIN — SPIRONOLACTONE 25 MG: 25 TABLET, FILM COATED ORAL at 08:53

## 2024-09-10 RX ADMIN — EMPAGLIFLOZIN 10 MG: 10 TABLET, FILM COATED ORAL at 08:53

## 2024-09-10 ASSESSMENT — ACTIVITIES OF DAILY LIVING (ADL)
ADLS_ACUITY_SCORE: 33
ADLS_ACUITY_SCORE: 33
ADLS_ACUITY_SCORE: 29
ADLS_ACUITY_SCORE: 33
ADLS_ACUITY_SCORE: 33
ADLS_ACUITY_SCORE: 29
ADLS_ACUITY_SCORE: 33
ADLS_ACUITY_SCORE: 33
ADLS_ACUITY_SCORE: 29
ADLS_ACUITY_SCORE: 33
ADLS_ACUITY_SCORE: 33
ADLS_ACUITY_SCORE: 29
ADLS_ACUITY_SCORE: 29
ADLS_ACUITY_SCORE: 33
ADLS_ACUITY_SCORE: 29
ADLS_ACUITY_SCORE: 33
ADLS_ACUITY_SCORE: 29

## 2024-09-10 NOTE — PLAN OF CARE
Goal Outcome Evaluation: Hemodynamics    Plan of Care Reviewed With: patient, guardian, parent    Overall Patient Progress: no change  Outcome Evaluation: BPs remained soft, lowest MAPs 51. Closely monitored and team was updated. Asymptomatic. PT held d/t +ve orthostatics. On lasix.

## 2024-09-10 NOTE — PROGRESS NOTES
[facility]    Cardiology Progress Note- Cardiology 2        Date of Admission:  8/20/2024     Assessment & Plan: HVSL   23 yo M with PMHx autism  who was transferred to [facility] on 8/20 for decompensated HFrEF (15%) 2/2 suspected myocarditis. Hospital course was complicated by cardiogenic shock s/p vasopressors and IABP (8/27-9/1). Now optimizing GDMT.     Changes today:  - Continue PO lasix 20mg every day  - NPO at midnight for RHC in AM  - PT evaluation/discharge recommendations    ACTIVE PROBLEMS:  # Biventricular failure with severely reduce left ventricular function with RV recovery   # Cardiogenic shock SCAI C s/p IABP 8/27-9/1  TTE (8/21/24): LVIDd 58mm. Biplane LVEF is 16%. Severe hypokinesis of mid to apical RV free wall.  RHC (8/21/24): RA mean 6, PA 30/23/26, PCWP 18, Wayne CO/CI 5.0/2.1, PVR 1.6, SVR 1270  EMB (8/21/24): Fragments of essentially unremarkable myocardial tissue with no evidence of inflammatory infiltrate   Coronary angiogram: NA   CMR: Mildly dilated left ventricle with severely reduced left ventricular function, LVEF 15%.  Normal right ventricular size and systolic function, RVEF 46%. No myocardial edema or replacement fibrosis or MRI features of acute myocarditis. Remains off dobutamine with stable hemodynamics.  - GDMT  Holding off on beta blocker given recent need for dobutamine  Entresto 12/13 mg BID   Spironolactone 25 mg daily  Empagliflozin 10 mg daily   - Digoxin 250mg qd  - Prednisone 5mg every day (taper through 9/11)  - Continue nystatin (fungal infection prophylaxis)    # Sinus tachycardia  Suspect compensatory in the setting of LV dysfunction. Will evaluate for hypovolemia with RHC (planned 9/11)  - RHC on 9/11   NPO at midnight    STABLE/RESOLVED PROBLEMS:  # Hypotension, resolved  Became hypotensive 9/8 AM after ambulating to bathroom. Asymptomatic. Tachycardic up to 160s from previous 130s (see below for discussion of sinus  tachycardia). Suspect slight overdiuresis and hypovolemia given continued net negative volume status over last several days on oral diuretics.  Now resolved.     # + Strep mitis in 1/2 bottles, suspected comtaminant  Blood culture from 9/5 grew strep mitis in 1/2 bottles. Repeat blood culture on 9/6 NGTD. Suspect contaminant. Stopped antibiotics as of 9/7.   - Antibiotics:   > Vanc 9/6   > Ceftriaxone 9/6 - 9/7  - Monitor off antibiotics    # Concern for aspiration pneumonia, resolved  # Recurrent fevers of unclear etiology, resolved  - Prior antibiotics       > Unasyn 8/21 - 8/26       > Cefepime 8/20 - 8/21       > Azithromycin 8/20 - 08/24       > Vanco 8/20 - 8/21 s/p 8/26       > meropenem 8/26-9/1       > zosyn 8/26 - 8/28    # Altered mental status, resolved  - c/w risperidone   - case was discussed with psychiatry regarding his medication and relation to cardiac decompensation given no clear culprit. However, given no safe alternative we would suggest continuation of risperidone as he has been on this medication for years without recent dose change and lack of evidence for risperidone to cause fulminant heart failure at the movement.     # Elevated transaminases - resolved   Improving. Imaging showed hepatomegaly with increased echogenicity, possible intrinsic parenchymal disease such as steatosis.   - Trend labs    # Hypernatremia, resolved  # Hypervolemia, controlled  # Lactic acidosis, resolved  - Trend BMP  - Diuresis as above    # Acute hypoxemic respiratory failure, resolved  # Pleural effusion, resolved  See above for treatment of suspected aspiration pneumonia.     # Hyperglycemia, resolved  Likely related to stress and prednisone therapy. No history of diabetes. A1c was 5.6% on 8/23/2024.        Diet: Snacks/Supplements Adult: Ensure Enlive; Between Meals  Low Saturated Fat Na <2400 mg  NPO per Anesthesia Guidelines for Procedure/Surgery Except for: Meds, Ice Chips    DVT Prophylaxis: Enoxaparin  "(Lovenox) SQ  Swan Catheter: Not present  Cardiac Monitoring: None  Code Status: Full Code          Clinically Significant Risk Factors              # Hypoalbuminemia: Lowest albumin = 3.2 g/dL at 8/23/2024 12:52 PM, will monitor as appropriate        # End stage heart failure: Ventricular assist device (VAD) present          # Overweight: Estimated body mass index is 27.76 kg/m  as calculated from the following:    Height as of this encounter: 1.86 m (6' 1.23\").    Weight as of this encounter: 96 kg (211 lb 11.2 oz).   # Moderate Malnutrition: based on nutrition assessment      # Financial/Environmental Concerns: none        Thank you for allowing me to care for this patient, please don't hesitate to contact me with any questions regarding this plan.   Pt was discussed and evaluated with Patience Carrillo MD, attending physician, who agrees with the assessment and plan above.    Merline Rubio MD  PGY-2 Internal Medicine  [facility]  ______________________________________________________________________    Interval History   No acute events overnight. Patient feeling well this morning. Aunt at bedside notes that patient continues to become tachycardic with activity; she is worried about his discharge to behavioral group home without trained staff who can assist him with medical care.    Physical Exam   Vital Signs: Temp: 97.9  F (36.6  C) Temp src: Axillary BP: 92/50 Pulse: (!) 145   Resp: 18 SpO2: 100 % O2 Device: None (Room air)    Weight: 211 lbs 11.2 oz    Gen: No acute distress, sitting up in chair  HEENT: Normocephalic, atraumatic  CV: Regular rhythm with tachycardia, no murmurs  Pulm: CTAB, non-labored breathing on room air  Abd: Soft, non-tender to palpation  Extr: No significant pitting edema in bilateral lower extremities  Neuro: Alert and conversant    Medical Decision Making       Please see A&P for additional details of medical decision making.      Data   ------------------------- " PAST 24 HR DATA REVIEWED -----------------------------------------------    I have personally reviewed the following data over the past 24 hrs:    10.3  \   10.8 (L)   / 269     140 104 11.8 /  102 (H)   4.0 25 0.46 (L) \     ALT: 91 (H) AST: 29 AP: 98 TBILI: 0.3   ALB: 3.6 TOT PROTEIN: 6.2 (L) LIPASE: N/A       Imaging results reviewed over the past 24 hrs:   No results found for this or any previous visit (from the past 24 hour(s)).

## 2024-09-10 NOTE — PLAN OF CARE
[id number]    Goal Outcome Evaluation:    Neuro: A&OX4, up with SBA.  Cardiac: ST in 120s, denies CP.  Respiratory: On Room air, denies SOB.  GI/: Cardiac diet with 2L FR, primofit at HS with adequate output.   Plan: Awaiting TCU placement.

## 2024-09-10 NOTE — PROGRESS NOTES
"BP 96/53 (BP Location: Right arm)   Pulse (!) 145   Temp 97.5  F (36.4  C) (Oral)   Resp 18   Ht 1.86 m (6' 1.23\")   Wt 96 kg (211 lb 11.2 oz)   SpO2 100%   BMI 27.76 kg/m      4810-7995  Reason for admission: PMHx autism  who was transferred to [facility] on 8/20 for decompensated HFrEF (15%) 2/2 suspected myocarditis   Neuro: A&Ox4. Can communicate basic needs.Needs encouragement for ADLs.  Cardiac: Afebrile, VSS. Soft Bps.Tachy at baseline.Team aware.  Respiratory: RA   GI/: Voiding spontaneously in urinal.Needs help to hold it. No BM this shift.   Diet/appetite: Tolerating regular diet. Denies nausea   Activity: SBA  Pain: Denies   Skin: No new deficits noted.  Lines:L 3L PICC   Drains:External catheter  Lytes: On K, Mg and Phos protocols.  Pt has been resting comfortably. Will continue to monitor and follow plan of care.      "

## 2024-09-10 NOTE — PROGRESS NOTES
Inpatient referral for Dr. Martin who saw patient while hospitalized.  He will be seeing [facility] on 10/14 and will follow up on 10/29 with Dr. Martin.

## 2024-09-11 ENCOUNTER — APPOINTMENT (OUTPATIENT)
Dept: PHYSICAL THERAPY | Facility: CLINIC | Age: 24
DRG: 853 | End: 2024-09-11
Attending: INTERNAL MEDICINE
Payer: MEDICARE

## 2024-09-11 LAB
ALBUMIN SERPL BCG-MCNC: 3.6 G/DL (ref 3.5–5.2)
ALP SERPL-CCNC: 98 U/L (ref 40–150)
ALT SERPL W P-5'-P-CCNC: 75 U/L (ref 0–70)
ANION GAP SERPL CALCULATED.3IONS-SCNC: 10 MMOL/L (ref 7–15)
ANION GAP SERPL CALCULATED.3IONS-SCNC: 12 MMOL/L (ref 7–15)
AST SERPL W P-5'-P-CCNC: 21 U/L (ref 0–45)
BACTERIA BLD CULT: NO GROWTH
BASE EXCESS BLDV CALC-SCNC: 2.1 MMOL/L (ref -3–3)
BILIRUB DIRECT SERPL-MCNC: <0.2 MG/DL (ref 0–0.3)
BILIRUB SERPL-MCNC: 0.4 MG/DL
BUN SERPL-MCNC: 13.2 MG/DL (ref 6–20)
BUN SERPL-MCNC: 17.3 MG/DL (ref 6–20)
CA-I BLD-MCNC: 4.8 MG/DL (ref 4.4–5.2)
CALCIUM SERPL-MCNC: 8.9 MG/DL (ref 8.8–10.4)
CALCIUM SERPL-MCNC: 9.1 MG/DL (ref 8.8–10.4)
CHLORIDE SERPL-SCNC: 104 MMOL/L (ref 98–107)
CHLORIDE SERPL-SCNC: 105 MMOL/L (ref 98–107)
CREAT SERPL-MCNC: 0.5 MG/DL (ref 0.67–1.17)
CREAT SERPL-MCNC: 0.55 MG/DL (ref 0.67–1.17)
EGFRCR SERPLBLD CKD-EPI 2021: >90 ML/MIN/1.73M2
EGFRCR SERPLBLD CKD-EPI 2021: >90 ML/MIN/1.73M2
GLUCOSE BLDC GLUCOMTR-MCNC: 167 MG/DL (ref 70–99)
GLUCOSE BLDC GLUCOMTR-MCNC: 83 MG/DL (ref 70–99)
GLUCOSE BLDC GLUCOMTR-MCNC: 99 MG/DL (ref 70–99)
GLUCOSE SERPL-MCNC: 106 MG/DL (ref 70–99)
GLUCOSE SERPL-MCNC: 131 MG/DL (ref 70–99)
HCO3 BLDV-SCNC: 28 MMOL/L (ref 21–28)
HCO3 SERPL-SCNC: 24 MMOL/L (ref 22–29)
HCO3 SERPL-SCNC: 25 MMOL/L (ref 22–29)
MAGNESIUM SERPL-MCNC: 2 MG/DL (ref 1.7–2.3)
MAGNESIUM SERPL-MCNC: 2.1 MG/DL (ref 1.7–2.3)
O2/TOTAL GAS SETTING VFR VENT: 21 %
OXYHGB MFR BLDV: 65 % (ref 70–75)
PCO2 BLDV: 50 MM HG (ref 40–50)
PH BLDV: 7.36 [PH] (ref 7.32–7.43)
PHOSPHATE SERPL-MCNC: 4 MG/DL (ref 2.5–4.5)
PO2 BLDV: 42 MM HG (ref 25–47)
POTASSIUM SERPL-SCNC: 3.6 MMOL/L (ref 3.4–5.3)
POTASSIUM SERPL-SCNC: 3.7 MMOL/L (ref 3.4–5.3)
PROT SERPL-MCNC: 6.2 G/DL (ref 6.4–8.3)
SAO2 % BLDV: 66.9 % (ref 70–75)
SODIUM SERPL-SCNC: 139 MMOL/L (ref 135–145)
SODIUM SERPL-SCNC: 141 MMOL/L (ref 135–145)

## 2024-09-11 PROCEDURE — 250N000013 HC RX MED GY IP 250 OP 250 PS 637

## 2024-09-11 PROCEDURE — 97116 GAIT TRAINING THERAPY: CPT | Mod: GP

## 2024-09-11 PROCEDURE — 93451 RIGHT HEART CATH: CPT | Performed by: INTERNAL MEDICINE

## 2024-09-11 PROCEDURE — 250N000013 HC RX MED GY IP 250 OP 250 PS 637: Performed by: STUDENT IN AN ORGANIZED HEALTH CARE EDUCATION/TRAINING PROGRAM

## 2024-09-11 PROCEDURE — 4A133B3 MONITORING OF ARTERIAL PRESSURE, PULMONARY, PERCUTANEOUS APPROACH: ICD-10-PCS | Performed by: INTERNAL MEDICINE

## 2024-09-11 PROCEDURE — 4A1239Z MONITORING OF CARDIAC OUTPUT, PERCUTANEOUS APPROACH: ICD-10-PCS | Performed by: INTERNAL MEDICINE

## 2024-09-11 PROCEDURE — 272N000001 HC OR GENERAL SUPPLY STERILE: Performed by: INTERNAL MEDICINE

## 2024-09-11 PROCEDURE — 250N000013 HC RX MED GY IP 250 OP 250 PS 637: Performed by: INTERNAL MEDICINE

## 2024-09-11 PROCEDURE — 82805 BLOOD GASES W/O2 SATURATION: CPT

## 2024-09-11 PROCEDURE — 250N000009 HC RX 250: Performed by: INTERNAL MEDICINE

## 2024-09-11 PROCEDURE — 99233 SBSQ HOSP IP/OBS HIGH 50: CPT | Performed by: INTERNAL MEDICINE

## 2024-09-11 PROCEDURE — 83735 ASSAY OF MAGNESIUM: CPT | Performed by: STUDENT IN AN ORGANIZED HEALTH CARE EDUCATION/TRAINING PROGRAM

## 2024-09-11 PROCEDURE — 97530 THERAPEUTIC ACTIVITIES: CPT | Mod: GP

## 2024-09-11 PROCEDURE — 250N000013 HC RX MED GY IP 250 OP 250 PS 637: Performed by: NURSE PRACTITIONER

## 2024-09-11 PROCEDURE — 250N000011 HC RX IP 250 OP 636: Performed by: STUDENT IN AN ORGANIZED HEALTH CARE EDUCATION/TRAINING PROGRAM

## 2024-09-11 PROCEDURE — 4A023N6 MEASUREMENT OF CARDIAC SAMPLING AND PRESSURE, RIGHT HEART, PERCUTANEOUS APPROACH: ICD-10-PCS | Performed by: INTERNAL MEDICINE

## 2024-09-11 PROCEDURE — C1751 CATH, INF, PER/CENT/MIDLINE: HCPCS | Performed by: INTERNAL MEDICINE

## 2024-09-11 PROCEDURE — 82330 ASSAY OF CALCIUM: CPT | Performed by: STUDENT IN AN ORGANIZED HEALTH CARE EDUCATION/TRAINING PROGRAM

## 2024-09-11 PROCEDURE — 82248 BILIRUBIN DIRECT: CPT

## 2024-09-11 PROCEDURE — 02HP32Z INSERTION OF MONITORING DEVICE INTO PULMONARY TRUNK, PERCUTANEOUS APPROACH: ICD-10-PCS | Performed by: INTERNAL MEDICINE

## 2024-09-11 PROCEDURE — 93451 RIGHT HEART CATH: CPT | Mod: 26 | Performed by: INTERNAL MEDICINE

## 2024-09-11 PROCEDURE — 84100 ASSAY OF PHOSPHORUS: CPT | Performed by: STUDENT IN AN ORGANIZED HEALTH CARE EDUCATION/TRAINING PROGRAM

## 2024-09-11 PROCEDURE — 120N000003 HC R&B IMCU UMMC

## 2024-09-11 PROCEDURE — 250N000012 HC RX MED GY IP 250 OP 636 PS 637: Performed by: STUDENT IN AN ORGANIZED HEALTH CARE EDUCATION/TRAINING PROGRAM

## 2024-09-11 PROCEDURE — C1894 INTRO/SHEATH, NON-LASER: HCPCS | Performed by: INTERNAL MEDICINE

## 2024-09-11 PROCEDURE — 99233 SBSQ HOSP IP/OBS HIGH 50: CPT | Mod: 25 | Performed by: INTERNAL MEDICINE

## 2024-09-11 PROCEDURE — 99418 PROLNG IP/OBS E/M EA 15 MIN: CPT | Performed by: INTERNAL MEDICINE

## 2024-09-11 RX ORDER — AMOXICILLIN 500 MG/1
1000 CAPSULE ORAL EVERY 6 HOURS
Status: DISCONTINUED | OUTPATIENT
Start: 2024-09-11 | End: 2024-09-17

## 2024-09-11 RX ORDER — POTASSIUM CHLORIDE 750 MG/1
20 TABLET, EXTENDED RELEASE ORAL ONCE
Status: COMPLETED | OUTPATIENT
Start: 2024-09-11 | End: 2024-09-11

## 2024-09-11 RX ORDER — AMOXICILLIN 875 MG
TABLET ORAL
Status: DISPENSED
Start: 2024-09-11 | End: 2024-09-12

## 2024-09-11 RX ADMIN — Medication 15 ML: at 05:00

## 2024-09-11 RX ADMIN — POTASSIUM CHLORIDE 20 MEQ: 750 TABLET, EXTENDED RELEASE ORAL at 08:37

## 2024-09-11 RX ADMIN — SENNOSIDES AND DOCUSATE SODIUM 1 TABLET: 8.6; 5 TABLET ORAL at 20:39

## 2024-09-11 RX ADMIN — RISPERIDONE 1.5 MG: 1 TABLET, FILM COATED ORAL at 20:40

## 2024-09-11 RX ADMIN — Medication 1 HALF-TAB: at 20:39

## 2024-09-11 RX ADMIN — NYSTATIN 500000 UNITS: 100000 SUSPENSION ORAL at 08:38

## 2024-09-11 RX ADMIN — ENOXAPARIN SODIUM 40 MG: 40 INJECTION SUBCUTANEOUS at 11:03

## 2024-09-11 RX ADMIN — AMOXICILLIN 1000 MG: 500 CAPSULE ORAL at 18:25

## 2024-09-11 RX ADMIN — FUROSEMIDE 20 MG: 20 TABLET ORAL at 08:37

## 2024-09-11 RX ADMIN — Medication 12.5 MG: at 16:47

## 2024-09-11 RX ADMIN — DIGOXIN 250 MCG: 125 TABLET ORAL at 08:38

## 2024-09-11 RX ADMIN — PREDNISONE 5 MG: 5 TABLET ORAL at 08:37

## 2024-09-11 RX ADMIN — SPIRONOLACTONE 25 MG: 25 TABLET, FILM COATED ORAL at 08:37

## 2024-09-11 RX ADMIN — AMOXICILLIN 1000 MG: 500 CAPSULE ORAL at 23:46

## 2024-09-11 RX ADMIN — RISPERIDONE 1 MG: 1 TABLET, FILM COATED ORAL at 08:37

## 2024-09-11 RX ADMIN — THIAMINE HCL TAB 100 MG 100 MG: 100 TAB at 08:37

## 2024-09-11 RX ADMIN — Medication 1 HALF-TAB: at 08:37

## 2024-09-11 RX ADMIN — EMPAGLIFLOZIN 10 MG: 10 TABLET, FILM COATED ORAL at 08:38

## 2024-09-11 RX ADMIN — Medication 1 TABLET: at 08:37

## 2024-09-11 ASSESSMENT — ACTIVITIES OF DAILY LIVING (ADL)
ADLS_ACUITY_SCORE: 33
ADLS_ACUITY_SCORE: 34
ADLS_ACUITY_SCORE: 34
ADLS_ACUITY_SCORE: 33
ADLS_ACUITY_SCORE: 33
ADLS_ACUITY_SCORE: 34
ADLS_ACUITY_SCORE: 30
ADLS_ACUITY_SCORE: 34
ADLS_ACUITY_SCORE: 34
ADLS_ACUITY_SCORE: 30
ADLS_ACUITY_SCORE: 33
ADLS_ACUITY_SCORE: 34
ADLS_ACUITY_SCORE: 30
ADLS_ACUITY_SCORE: 34
ADLS_ACUITY_SCORE: 30
ADLS_ACUITY_SCORE: 33
ADLS_ACUITY_SCORE: 33
ADLS_ACUITY_SCORE: 34
ADLS_ACUITY_SCORE: 30
ADLS_ACUITY_SCORE: 34
ADLS_ACUITY_SCORE: 30

## 2024-09-11 NOTE — PROGRESS NOTES
CLINICAL NUTRITION SERVICES - REASSESSMENT NOTE     Nutrition Prescription    Malnutrition Status:    Patient does not meet two of the established criteria necessary for diagnosing malnutrition but is at risk for malnutrition    Recommendations already ordered by Registered Dietitian (RD):  - Continue oral nutrition supplement: Strawberry Ensure per pt preference      Future/Additional Recommendations:  - PO/supp adequacy   - Weight trends      EVALUATION OF THE PROGRESS TOWARD GOALS   Diet: NPO (Previous Diet: Low Saturated Fat Na < 2400 mg wit ensure enlive once daily and prn)  Intake: 100% intake per nursing documentation.   Patient is currently off floor.     Per health-touch patient on average is ordering > 1800 kcals and > 90 grams of protein (75% of estimated nutritional needs with 100% oral intake)      NEW FINDINGS   Labs: reviewed    Meds:   Thera-vit-m  Senokot-S  Thiamine    GI: LBM (9/10)    Weight: weight decreased since admission weight. Lowest weight this admission 94.4 kg. (9/7)  Dosing Weight: 94 kg (lowest weight this admission weight using 94.4 kg admit/current weight and IBW of 84 kg)     9/4: RE-ASSESSED NUTRITION NEEDS (maintenance; no longer ICU status or vented)   Estimated Energy Needs: 7316-6111 kcals/day (25 - 30 kcals/kg)   Justification: Maintenance vs Obese, and Vented   Estimated Protein Needs: 113-141+ grams protein/day (1.2 -1.5+ grams of pro/kg)   Justification: Increased needs   Estimated Fluid Needs:  (1 mL/kcal)   Justification: Maintenance and Per provider pending fluid status     MALNUTRITION  % Intake: Decreased intake does not meet criteria  % Weight Loss: difficult to assess   Subcutaneous Fat Loss: None observed - per previous RD note  Muscle Loss:  None observed - pt with baseline lower LBM status due to medical condition per family. Noted lower LBM in lower extremities, in particular. - per previous RD note   Fluid Accumulation/Edema: None noted - per chart review  "  Malnutrition Diagnosis: Patient does not meet two of the established criteria necessary for diagnosing malnutrition but is at risk for malnutrition    Previous Goals   Patient to consume % of nutritionally adequate meal trays TID, or the equivalent with supplements/snacks.  Evaluation: Met    Previous Nutrition Diagnosis  Predicted inadequate nutrient intake  Evaluation: No change    CURRENT NUTRITION DIAGNOSIS  Predicted inadequate nutrient intake related to potential for decreased appetite and disruptions to PO as evidenced by prolonged hospitalization and/or risk of menu fatigue and decreased PO      INTERVENTIONS  Implementation  Medical food supplement therapy - continue as ordered  Monitor PO intakes  Monitor weight trends     Goals  Patient to consume % of nutritionally adequate meal trays TID, or the equivalent with supplements/snacks.    Monitoring/Evaluation  Progress toward goals will be monitored and evaluated per protocol.    Priya Nevarez MS/RD/LD/CNSC  No longer available via pager  6C (beds 4546-7089 and 6301-2512): Vocera \"6C Clinical Dietitian\"   Weekend/Holiday: Vocera \"Weekend Clinical Dietitian\"                "

## 2024-09-11 NOTE — PROGRESS NOTES
[facility]    Cardiology Progress Note- Cardiology 2        Date of Admission:  8/20/2024     Assessment & Plan: HVSL   23 yo M with PMHx celina  who was transferred to [facility] on 8/20 for decompensated HFrEF (15%) 2/2 suspected myocarditis. Hospital course was complicated by cardiogenic shock s/p vasopressors and IABP (8/27-9/1). Now optimizing GDMT.     Changes today:  - Right heart cath with RA 2, will stop diuresis and remove fluid restriction  - Restart antibiotics for step mitis bacteremia with amoxicillin, per ID   - Start metoprolol succinate 12.5mg every day  - Consider discharge tomorrow, pending PT/OT discharge recs     ACTIVE PROBLEMS:  # Biventricular failure with severely reduce left ventricular function with RV recovery   # Cardiogenic shock SCAI C s/p IABP 8/27-9/1  TTE (8/21/24): LVIDd 58mm. Biplane LVEF is 16%. Severe hypokinesis of mid to apical RV free wall.  RHC (8/21/24): RA mean 6, PA 30/23/26, PCWP 18, Wayne CO/CI 5.0/2.1, PVR 1.6, SVR 1270  EMB (8/21/24): Fragments of essentially unremarkable myocardial tissue with no evidence of inflammatory infiltrate   Coronary angiogram: NA   CMR: Mildly dilated left ventricle with severely reduced left ventricular function, LVEF 15%.  Normal right ventricular size and systolic function, RVEF 46%. No myocardial edema or replacement fibrosis or MRI features of acute myocarditis. Remains off dobutamine with stable hemodynamics.  - Plan for discharge with diuresis as needed for hypervolemia  - GDMT  Start metoprolol succinate 12.5mg every day   Entresto 12/13 mg BID   Spironolactone 25 mg daily  Empagliflozin 10 mg daily   - Digoxin 250mg qd  - Finished prednisone 5mg taper  - Stop nystatin fungal infection prophylaxis    # Sinus tachycardia  Suspect compensatory in the setting of LV dysfunction. RHC (9/11) demonstrated hypovolemia, which could potentially have contributed to tachycardia with exertion. Plan  for discharge for cardiac rehab; outpatient vs TCU pending final PT/OT recs.   - Outpatient cardiac rehab vs discharge to TCU    STABLE/RESOLVED PROBLEMS:  # Hypotension, resolved  Became hypotensive 9/8 AM after ambulating to bathroom. Asymptomatic. Tachycardic up to 160s from previous 130s (see below for discussion of sinus tachycardia). Suspect slight overdiuresis and hypovolemia given continued net negative volume status over last several days on oral diuretics.  Now resolved.     # + Strep mitis in 1/2 bottles, suspected comtaminant  Blood culture from 9/5 grew strep mitis in 1/2 bottles. Repeat blood culture on 9/6 NGTD. Suspect contaminant. Stopped antibiotics as of 9/7.   - Antibiotics:   > Vanc 9/6   > Ceftriaxone 9/6 - 9/7   > Amoxicillin (9/11-9/25)    # Concern for aspiration pneumonia, resolved  # Recurrent fevers of unclear etiology, resolved  - Prior antibiotics       > Unasyn 8/21 - 8/26       > Cefepime 8/20 - 8/21       > Azithromycin 8/20 - 08/24       > Vanco 8/20 - 8/21 s/p 8/26       > meropenem 8/26-9/1       > zosyn 8/26 - 8/28    # Altered mental status, resolved  - c/w risperidone   - case was discussed with psychiatry regarding his medication and relation to cardiac decompensation given no clear culprit. However, given no safe alternative we would suggest continuation of risperidone as he has been on this medication for years without recent dose change and lack of evidence for risperidone to cause fulminant heart failure at the movement.     # Elevated transaminases - resolved   Improving. Imaging showed hepatomegaly with increased echogenicity, possible intrinsic parenchymal disease such as steatosis.   - Trend labs    # Hypernatremia, resolved  # Hypervolemia, controlled  # Lactic acidosis, resolved  - Trend BMP  - Diuresis as above    # Acute hypoxemic respiratory failure, resolved  # Pleural effusion, resolved  See above for treatment of suspected aspiration pneumonia.     #  "Hyperglycemia, resolved  Likely related to stress and prednisone therapy. No history of diabetes. A1c was 5.6% on 8/23/2024.        Diet: Snacks/Supplements Adult: Ensure Enlive; Between Meals  Low Saturated Fat Na <2400 mg    DVT Prophylaxis: Enoxaparin (Lovenox) SQ  Swan Catheter: Not present  Cardiac Monitoring: None  Code Status: Full Code          Clinically Significant Risk Factors              # Hypoalbuminemia: Lowest albumin = 3.2 g/dL at 8/23/2024 12:52 PM, will monitor as appropriate        # End stage heart failure: Ventricular assist device (VAD) present          # Overweight: Estimated body mass index is 28.03 kg/m  as calculated from the following:    Height as of this encounter: 1.86 m (6' 1.23\").    Weight as of this encounter: 97 kg (213 lb 12.8 oz).   # Moderate Malnutrition: based on nutrition assessment      # Financial/Environmental Concerns: none        Thank you for allowing me to care for this patient, please don't hesitate to contact me with any questions regarding this plan.   Pt was discussed and evaluated with Patience Carrillo MD, attending physician, who agrees with the assessment and plan above.    Merline Rubio MD  PGY-2 Internal Medicine  [facility]  ______________________________________________________________________    Interval History   No acute events overnight; feeling well this morning.     Physical Exam   Vital Signs: Temp: 97.8  F (36.6  C) Temp src: Oral BP: 101/57 Pulse: (!) 127   Resp: 18 SpO2: 96 % O2 Device: None (Room air)    Weight: 213 lbs 12.8 oz    Gen: No acute distress, sitting up in chair  HEENT: Normocephalic, atraumatic  CV: Regular rhythm with tachycardia, no murmurs  Pulm: CTAB, non-labored breathing on room air  Abd: Soft, non-tender to palpation  Extr: No significant pitting edema in bilateral lower extremities  Neuro: Alert and conversant    Medical Decision Making       Please see A&P for additional details of medical decision making. "      Data   ------------------------- PAST 24 HR DATA REVIEWED -----------------------------------------------    I have personally reviewed the following data over the past 24 hrs:    N/A  \   N/A   / N/A     139 105 13.2 /  83   3.6 24 0.50 (L) \     ALT: 75 (H) AST: 21 AP: 98 TBILI: 0.4   ALB: 3.6 TOT PROTEIN: 6.2 (L) LIPASE: N/A       Imaging results reviewed over the past 24 hrs:   Recent Results (from the past 24 hour(s))   Cardiac Catheterization    Narrative      Right sided filling pressures are normal.    Left sided filling pressures are normal.    Normal PA pressures.    Normal cardiac output level.    Findings discussed with inpatient heart failure team.

## 2024-09-11 NOTE — PLAN OF CARE
Transferred to [facility] on 8/20 for decompensated HFrEF (15%) 2/2 suspected myocarditis. Hospital course was complicated by cardiogenic shock s/p vasopressors and IABP (8/27-9/1). Now optimizing GDMT. PMHx of Autism    Code status: Full Code    Team: Cards 2     Neuro: AOx4, calm and cooperative. Autistic, can make needs known  Cardiac/Tele/VS: ST, 's-130's, denies chest pain, no remarkable cardiac events. Afebrile and other VSS  Respiratory: Room air, O2 sats > 92%. LS clear and dim on the bases  GI/: Voids spontaneously via the urinal. BM regular  Diet/Appetite: Cardiac diet. Good appetite  Skin: No skin deficits  Endocrine/Electrolytes: Replace lytes per protocols  LDAs: TL PICC heparin locked  Activity: Up sba  Pain: Denies pain     Plan: Continue POC per team    Goal Outcome Evaluation:    Plan of Care Reviewed With: family, guardian    Overall Patient Progress: improving Overall Patient Progress: improving    Outcome Evaluation: [facility] completed this AM, no complications post procedure. Discharge planning with  and care cordinator

## 2024-09-11 NOTE — PLAN OF CARE
Temp: 98  F (36.7  C) Temp src: Oral BP: 120/68 Pulse: 102   Resp: 18 SpO2: 100 % O2 Device: None (Room air)       Hours of care: 8037-4761    D: PMHx of autism who was transferred to [facility] on 8/20 for decompensated HFrEF (15%) 2/2 suspected myocarditis. Hospital course was complicated by cardiogenic shock s/p vasopressors and IABP (8/27-9/1).     I/A: Terrance (he/him) is A/O x4. Calls appropriately, calm and cooperative. Tele in place, ST, HR 90s-130s. VSS on RA. L TL PICC in place heparin locked. Scattered bruising. No new skin deficits noted. NPO since 0000 for RHC today. Up SBA, adequate UOP via primofit, no BM this shift. Appeared to sleep well overnight.    Changed:  Running:   PRN:    P: Continue to monitor and follow POC. Plan for RHC today (9/11). Notify CARDS 2 with changes.    Goal Outcome Evaluation:    Plan of Care Reviewed With: patient  Overall Patient Progress: improvingOverall Patient Progress: improving  Outcome Evaluation: VSS on RA. Denies pain. Primofit in use overnight. Plan for RHC this AM.

## 2024-09-11 NOTE — PROGRESS NOTES
Care Management Follow Up    Length of Stay (days): 22    Expected Discharge Date: 09/13/2024     Concerns to be Addressed: Discharge planning  Patient plan of care discussed at interdisciplinary rounds: Yes    Anticipated Discharge Disposition: Group Home vs TCU     Anticipated Discharge Services: TBD  Anticipated Discharge DME: TBD    Patient/family educated on Medicare website which has current facility and service quality ratings: N/A  Education Provided on the Discharge Plan: Yes  Patient/Family in Agreement with the Plan: Yes    Referrals Placed by CM/SW: None on this date  Private pay costs discussed: Not applicable    Additional Information:  Received update from PT that pt was able to walk in [place] today and do initial stair trial. On this date, they will continue to recommend TCU but if pt continues to demonstrate progress, pt would likely be able to return to group home.     This writer met with pt's aunt, Guerda to further discuss discharge planning. Guerda stated that she had a Teams meeting with pt's  and team who have concerns about being able to meet pt's physical and medical needs. Pt's group home is a behavioral health group home so only able to provide certain medical cares. This writer discussed that it would be beneficial to provide group home with updated clinical information and therapy evaluation. Guerda is going to call them to assist in arranging.    This writer also called to update pt's legal guardian, brother Johann. Johann stated he was on the Team's meeting earlier today with the group home as well. Johann agreed that if pt's group home can meet his needs that would be there preference. Johann would also be agreeable to short term rehab if pt unable to show sustained progress.    1420: Received update from pt's aunt Guerda that the group home team is able to do a Team's meeting with case management tomorrow at 12:30pm to re-assess if they can meet pt's needs. Aunt  Guerda will also attend this meeting. They will email link directly to ZACHARY Palmer.     [facility]  Nurse, Lydia Rodney: [phone]   Pharmacy: [facility] on [address]     , [place], Phone: [phone]    Legal Guardian, brother Johann Hidalgo, Phone: [phone]    Next Steps:   Attend teams meeting on 9/12 at 12:30pm with group home staff to re-assess if they can meet pt's current needs  Follow up with PT/OT tomorrow morning     CC will continue to monitor patient's medical condition and progress towards discharge.  Kristen Flynn RN BSN  6C Unit Care Coordinator  Phone number: [phone]    SEARCHABLE in [facility] - search CARE COORDINATOR      [place] [facility] [place] ([id number]) Saturday & Sunday; ([id number]) [facility] Recognized Holidays      Units: 5A Onc Vocera & 5C Vocera      Units: 6B Vocera & 6C Vocera       Units: 7A SOT RNCC Vocera, 7B Med Surg Vocera, & 7C Med Surg Vocera      Units: 6A Vocera & 4A CVICU Vocera, 4C MICU Vocera, and 4E SICU Vocera       Units: 5 Ortho Vocera & 5 Med Surg Vocera      Units: 6 Med Surg Vocera & 8 Med Surg Vocera

## 2024-09-11 NOTE — PROGRESS NOTES
[facility] PROGRESS NOTE     Patient:  Terrance Hidalgo   YOB: 2000, MRN: [id number]  Date of Visit: 09/11/2024  Date of Admission: 8/20/2024          ASSESSMENT AND PLAN     Terrance Hidalgo is a 24 year old male with autism. He presented with new diagnosis of heart failure Aug 21. Biopsy and histopath showed essentially unremarkable myocardial tissue with no evidence of inflammatory infiltrate. ID workup for myocarditis unremarkable. Parvo IgG positive, IgM negative. Likely old infection.     He has hospital acquired S mitis bacteremia, cultures performed due to high white count incidental finding. No fevers or symptoms. His HANDOC score is 2 for etiology and only one bacterial species. I doubt he has endocarditis with this low score. Technically, it could be a contaminant. However, due to his severe cardiac disease upon presentation and his propensity of S mitis to cause endocarditis. It would be safer and beneficial to treat him for full course of 2 weeks for bacteremia when compared to risks of antibiotic side effects. Oral antibiotics are non-inferior as per POET trial.             IMPRESSION  Streptococcus mitis/oralis bacteremia   Congestive hepatopathy , Cardiogenic shock with respiratory failure - resolved  Severe bacterial pneumonia, treated   New heart failure with reduced EF  Positive parvovirus IgG    RECOMMENDATIONS:  Start oral amoxicillin 1g every 6 hours. Continue until Sep 25, 2024.    REFERENCE:  Zac-Malissa S, Freddie A, Øcharisma L, et al. Prevalence of Infective Endocarditis in Streptococcal Bloodstream Infections Is Dependent on Streptococcal Species. Circulation. 2020;142(8):720-730.  Mateo T, Mj A, Russell M, Alok B, Dante M. HANDOC: A Handy Score to Determine the Need for Echocardiography in Non-?-Hemolytic Streptococcal Bacteremia. Clin Infect Dis. 2018;66(5):693-698.   Storm K, Jennifer N, Shey SALAZAR, et al. Partial Oral versus  Intravenous Antibiotic Treatment of Endocarditis. N Engl J Med. 2019;380(5):415-424.     ID will continue to follow with you. Please check [facility] for staff covering the service.     Racheal Son MD  Infectious Diseases  Vocera jena    65 MINUTES SPENT BY ME on the date of service doing chart review, history, exam, documentation & further activities per the note.           SUBJECTIVE      Interval History and Events:  Positive for Strep in blood. No joint pains, shortness of breath, chest pain, back pain.    Antimicrobial Treatment:  Ceftri 9/6-9/7         OBJECTIVE       Physical Examination:    Temp:  [97.5  F (36.4  C)-98.2  F (36.8  C)] 98.2  F (36.8  C)  Pulse:  [102-140] 127  Resp:  [18-19] 19  BP: ()/(53-81) 92/55  SpO2:  [100 %] 100 %    I/O last 3 completed shifts:  In: 1980 [P.O.:1980]  Out: 2430 [Urine:2430]    Vitals:    09/06/24 0600 09/07/24 0600 09/08/24 0607 09/09/24 0700   Weight: 95.5 kg (210 lb 8 oz) 94.4 kg (208 lb 3.2 oz) 95 kg (209 lb 6.4 oz) 96 kg (211 lb 11.2 oz)    09/11/24 1229   Weight: 97 kg (213 lb 12.8 oz)     Constitutional: Pleasant and cooperative. Awake, alert, interactive.  Respiratory: No increased work of breathing, CTAB,  Cardiovascular: RRR, no murmur noted. No peripheral edema.  GI: soft, non-distended and non-tender.  Musculoskeletal: No spine tenderness, knee, hip, elbow shoulder without limitation in ROM  Vascular access:  no surrounding erythema.    I reviewed the following laboratory data:    Normal WBC     Microbiology:  9/6 MRSA - neg   9/6 Bcx - no growth   9/5 Bcx - 1/2 Streptococcus mitis/oralis   8/30 Bcx - no growth   8/28 MRSA - neg   8/26 Parvo PCR - neg  8/26 C diff - neg   8/26 MRSA - neg   8/26 BDG -neg   8/26 Ucx - no growth   8/25 Scx - yeast   8/25 Bcx - no growth   8/21 Legio, Strep U - neg   8/21 Lyme ab - neg   Adeno, CMV - neg   EBV pcr- neg  HIV neg  8/21 Parvo Ig positive, IgM neg   Coxsackie ab -   Histo U - neg  RPP - neg   COVID - neg   8/21 Scx -  no growth

## 2024-09-12 ENCOUNTER — APPOINTMENT (OUTPATIENT)
Dept: PHYSICAL THERAPY | Facility: CLINIC | Age: 24
DRG: 853 | End: 2024-09-12
Attending: INTERNAL MEDICINE
Payer: MEDICARE

## 2024-09-12 LAB
ALBUMIN SERPL BCG-MCNC: 3.6 G/DL (ref 3.5–5.2)
ALP SERPL-CCNC: 93 U/L (ref 40–150)
ALT SERPL W P-5'-P-CCNC: 62 U/L (ref 0–70)
ANION GAP SERPL CALCULATED.3IONS-SCNC: 8 MMOL/L (ref 7–15)
ANION GAP SERPL CALCULATED.3IONS-SCNC: 9 MMOL/L (ref 7–15)
AST SERPL W P-5'-P-CCNC: 21 U/L (ref 0–45)
BASE EXCESS BLDV CALC-SCNC: 2.1 MMOL/L (ref -3–3)
BASOPHILS # BLD AUTO: 0 10E3/UL (ref 0–0.2)
BASOPHILS NFR BLD AUTO: 0 %
BILIRUB DIRECT SERPL-MCNC: <0.2 MG/DL (ref 0–0.3)
BILIRUB SERPL-MCNC: 0.3 MG/DL
BUN SERPL-MCNC: 11.5 MG/DL (ref 6–20)
BUN SERPL-MCNC: 12.5 MG/DL (ref 6–20)
CA-I BLD-MCNC: 4.8 MG/DL (ref 4.4–5.2)
CALCIUM SERPL-MCNC: 8.8 MG/DL (ref 8.8–10.4)
CALCIUM SERPL-MCNC: 8.9 MG/DL (ref 8.8–10.4)
CHLORIDE SERPL-SCNC: 105 MMOL/L (ref 98–107)
CHLORIDE SERPL-SCNC: 105 MMOL/L (ref 98–107)
CREAT SERPL-MCNC: 0.48 MG/DL (ref 0.67–1.17)
CREAT SERPL-MCNC: 0.49 MG/DL (ref 0.67–1.17)
EGFRCR SERPLBLD CKD-EPI 2021: >90 ML/MIN/1.73M2
EGFRCR SERPLBLD CKD-EPI 2021: >90 ML/MIN/1.73M2
EOSINOPHIL # BLD AUTO: 0.3 10E3/UL (ref 0–0.7)
EOSINOPHIL NFR BLD AUTO: 3 %
ERYTHROCYTE [DISTWIDTH] IN BLOOD BY AUTOMATED COUNT: 14.8 % (ref 10–15)
GLUCOSE BLDC GLUCOMTR-MCNC: 107 MG/DL (ref 70–99)
GLUCOSE BLDC GLUCOMTR-MCNC: 117 MG/DL (ref 70–99)
GLUCOSE BLDC GLUCOMTR-MCNC: 84 MG/DL (ref 70–99)
GLUCOSE SERPL-MCNC: 108 MG/DL (ref 70–99)
GLUCOSE SERPL-MCNC: 96 MG/DL (ref 70–99)
HCO3 BLDV-SCNC: 28 MMOL/L (ref 21–28)
HCO3 SERPL-SCNC: 26 MMOL/L (ref 22–29)
HCO3 SERPL-SCNC: 27 MMOL/L (ref 22–29)
HCT VFR BLD AUTO: 32.5 % (ref 40–53)
HGB BLD-MCNC: 10.5 G/DL (ref 13.3–17.7)
IMM GRANULOCYTES # BLD: 0.1 10E3/UL
IMM GRANULOCYTES NFR BLD: 1 %
LYMPHOCYTES # BLD AUTO: 2.1 10E3/UL (ref 0.8–5.3)
LYMPHOCYTES NFR BLD AUTO: 26 %
MAGNESIUM SERPL-MCNC: 2.1 MG/DL (ref 1.7–2.3)
MAGNESIUM SERPL-MCNC: 2.2 MG/DL (ref 1.7–2.3)
MCH RBC QN AUTO: 29.2 PG (ref 26.5–33)
MCHC RBC AUTO-ENTMCNC: 32.3 G/DL (ref 31.5–36.5)
MCV RBC AUTO: 90 FL (ref 78–100)
MONOCYTES # BLD AUTO: 0.9 10E3/UL (ref 0–1.3)
MONOCYTES NFR BLD AUTO: 12 %
NEUTROPHILS # BLD AUTO: 4.8 10E3/UL (ref 1.6–8.3)
NEUTROPHILS NFR BLD AUTO: 58 %
NRBC # BLD AUTO: 0 10E3/UL
NRBC BLD AUTO-RTO: 0 /100
O2/TOTAL GAS SETTING VFR VENT: 95 %
OXYHGB MFR BLDV: 62 % (ref 70–75)
PCO2 BLDV: 48 MM HG (ref 40–50)
PH BLDV: 7.37 [PH] (ref 7.32–7.43)
PHOSPHATE SERPL-MCNC: 4 MG/DL (ref 2.5–4.5)
PLATELET # BLD AUTO: 262 10E3/UL (ref 150–450)
PO2 BLDV: 35 MM HG (ref 25–47)
POTASSIUM SERPL-SCNC: 3.7 MMOL/L (ref 3.4–5.3)
POTASSIUM SERPL-SCNC: 4.1 MMOL/L (ref 3.4–5.3)
PROT SERPL-MCNC: 6.1 G/DL (ref 6.4–8.3)
RBC # BLD AUTO: 3.6 10E6/UL (ref 4.4–5.9)
SAO2 % BLDV: 64 % (ref 70–75)
SODIUM SERPL-SCNC: 140 MMOL/L (ref 135–145)
SODIUM SERPL-SCNC: 140 MMOL/L (ref 135–145)
WBC # BLD AUTO: 8.1 10E3/UL (ref 4–11)

## 2024-09-12 PROCEDURE — 250N000013 HC RX MED GY IP 250 OP 250 PS 637: Performed by: STUDENT IN AN ORGANIZED HEALTH CARE EDUCATION/TRAINING PROGRAM

## 2024-09-12 PROCEDURE — 250N000011 HC RX IP 250 OP 636: Performed by: STUDENT IN AN ORGANIZED HEALTH CARE EDUCATION/TRAINING PROGRAM

## 2024-09-12 PROCEDURE — 82805 BLOOD GASES W/O2 SATURATION: CPT

## 2024-09-12 PROCEDURE — 250N000013 HC RX MED GY IP 250 OP 250 PS 637

## 2024-09-12 PROCEDURE — 99233 SBSQ HOSP IP/OBS HIGH 50: CPT | Mod: GC | Performed by: INTERNAL MEDICINE

## 2024-09-12 PROCEDURE — 82310 ASSAY OF CALCIUM: CPT | Performed by: STUDENT IN AN ORGANIZED HEALTH CARE EDUCATION/TRAINING PROGRAM

## 2024-09-12 PROCEDURE — 84100 ASSAY OF PHOSPHORUS: CPT | Performed by: STUDENT IN AN ORGANIZED HEALTH CARE EDUCATION/TRAINING PROGRAM

## 2024-09-12 PROCEDURE — 97116 GAIT TRAINING THERAPY: CPT | Mod: GP

## 2024-09-12 PROCEDURE — 250N000013 HC RX MED GY IP 250 OP 250 PS 637: Performed by: NURSE PRACTITIONER

## 2024-09-12 PROCEDURE — 120N000003 HC R&B IMCU UMMC

## 2024-09-12 PROCEDURE — 250N000013 HC RX MED GY IP 250 OP 250 PS 637: Performed by: INTERNAL MEDICINE

## 2024-09-12 PROCEDURE — 999N000111 HC STATISTIC OT IP EVAL DEFER

## 2024-09-12 PROCEDURE — 85004 AUTOMATED DIFF WBC COUNT: CPT | Performed by: STUDENT IN AN ORGANIZED HEALTH CARE EDUCATION/TRAINING PROGRAM

## 2024-09-12 PROCEDURE — 82330 ASSAY OF CALCIUM: CPT | Performed by: STUDENT IN AN ORGANIZED HEALTH CARE EDUCATION/TRAINING PROGRAM

## 2024-09-12 PROCEDURE — 82248 BILIRUBIN DIRECT: CPT

## 2024-09-12 PROCEDURE — 999N000044 HC STATISTIC CVC DRESSING CHANGE

## 2024-09-12 PROCEDURE — 83735 ASSAY OF MAGNESIUM: CPT | Performed by: STUDENT IN AN ORGANIZED HEALTH CARE EDUCATION/TRAINING PROGRAM

## 2024-09-12 PROCEDURE — 97530 THERAPEUTIC ACTIVITIES: CPT | Mod: GP

## 2024-09-12 RX ORDER — DIPHENHYDRAMINE HCL 25 MG
25 CAPSULE ORAL EVERY 6 HOURS PRN
Status: DISCONTINUED | OUTPATIENT
Start: 2024-09-12 | End: 2024-09-15

## 2024-09-12 RX ORDER — POTASSIUM CHLORIDE 750 MG/1
20 TABLET, EXTENDED RELEASE ORAL ONCE
Status: COMPLETED | OUTPATIENT
Start: 2024-09-12 | End: 2024-09-12

## 2024-09-12 RX ADMIN — Medication 1 TABLET: at 08:29

## 2024-09-12 RX ADMIN — DIGOXIN 250 MCG: 125 TABLET ORAL at 08:29

## 2024-09-12 RX ADMIN — RISPERIDONE 1 MG: 1 TABLET, FILM COATED ORAL at 08:29

## 2024-09-12 RX ADMIN — Medication 1 HALF-TAB: at 19:43

## 2024-09-12 RX ADMIN — SPIRONOLACTONE 25 MG: 25 TABLET, FILM COATED ORAL at 08:29

## 2024-09-12 RX ADMIN — EMPAGLIFLOZIN 10 MG: 10 TABLET, FILM COATED ORAL at 08:29

## 2024-09-12 RX ADMIN — ENOXAPARIN SODIUM 40 MG: 40 INJECTION SUBCUTANEOUS at 11:23

## 2024-09-12 RX ADMIN — HEPARIN, PORCINE (PF) 10 UNIT/ML INTRAVENOUS SYRINGE 15 ML: at 13:15

## 2024-09-12 RX ADMIN — Medication 15 ML: at 06:31

## 2024-09-12 RX ADMIN — Medication 1 HALF-TAB: at 08:29

## 2024-09-12 RX ADMIN — RISPERIDONE 1.5 MG: 1 TABLET, FILM COATED ORAL at 19:43

## 2024-09-12 RX ADMIN — AMOXICILLIN 1000 MG: 500 CAPSULE ORAL at 18:11

## 2024-09-12 RX ADMIN — DIPHENHYDRAMINE HYDROCHLORIDE 25 MG: 25 CAPSULE ORAL at 10:31

## 2024-09-12 RX ADMIN — POTASSIUM CHLORIDE 20 MEQ: 750 TABLET, EXTENDED RELEASE ORAL at 13:14

## 2024-09-12 RX ADMIN — THIAMINE HCL TAB 100 MG 100 MG: 100 TAB at 08:29

## 2024-09-12 RX ADMIN — SENNOSIDES AND DOCUSATE SODIUM 1 TABLET: 8.6; 5 TABLET ORAL at 19:43

## 2024-09-12 RX ADMIN — Medication 12.5 MG: at 08:29

## 2024-09-12 ASSESSMENT — ACTIVITIES OF DAILY LIVING (ADL)
ADLS_ACUITY_SCORE: 35
ADLS_ACUITY_SCORE: 33
ADLS_ACUITY_SCORE: 35
ADLS_ACUITY_SCORE: 33
ADLS_ACUITY_SCORE: 35
ADLS_ACUITY_SCORE: 35
ADLS_ACUITY_SCORE: 33
ADLS_ACUITY_SCORE: 35
ADLS_ACUITY_SCORE: 33
ADLS_ACUITY_SCORE: 35
ADLS_ACUITY_SCORE: 33
ADLS_ACUITY_SCORE: 35
ADLS_ACUITY_SCORE: 33
ADLS_ACUITY_SCORE: 35
ADLS_ACUITY_SCORE: 35
ADLS_ACUITY_SCORE: 33
ADLS_ACUITY_SCORE: 35
ADLS_ACUITY_SCORE: 33
ADLS_ACUITY_SCORE: 33
ADLS_ACUITY_SCORE: 35
ADLS_ACUITY_SCORE: 35

## 2024-09-12 NOTE — PLAN OF CARE
[id number]    Neuro: AOx4, calm and cooperative. Autistic, can make needs known  Cardiac/Tele/VS: ST, VSS  Respiratory: Room air, O2 sats > 92%. LS clear and dim on the bases  GI/: Voids spontaneously via primafit. Last BM 9/11  Diet/Appetite: Cardiac diet. Good appetite  Skin: No skin deficits  Endocrine/Electrolytes: Replace electrollytes per protocols  LDAs: TL PICC SL  Activity: Up sba  Pain: Denies pain     Changes: Pt stated that he is allergic to amoxicillin.    Plan: Continue POC per team

## 2024-09-12 NOTE — PLAN OF CARE
Transferred to [facility] on 8/20 for decompensated HFrEF (15%) 2/2 suspected myocarditis. Hospital course was complicated by cardiogenic shock s/p vasopressors and IABP (8/27-9/1). Now optimizing GDMT. PMHx of Autism     Code status: Full Code    Team: Cards 2     Neuro: AOx4, calm and cooperative. Autistic, can make needs known  Cardiac/Tele/VS: ST, 's-130's, denies chest pain, no remarkable cardiac events. Afebrile and other VSS  Respiratory: Room air, O2 sats > 92%. LS clear and dim on the bases  GI/: Voids spontaneously via the urinal. BM regular  Diet/Appetite: Cardiac diet. Good appetite  Skin: No new skin deficits. Rash to feet and back  Endocrine/Electrolytes: Replace lytes per protocols  LDAs: TL PICC heparin locked  Activity: Up SBA  Pain: Denies pain     Plan: Continue POC per team    Goal Outcome Evaluation:    Plan of Care Reviewed With: family    Overall Patient Progress: improvingOverall Patient Progress: improving    Outcome Evaluation: Pt stable, up ambulating frequently. Pt still tachycardic 's-130's, team aware. Discharge planning underway. POC per team

## 2024-09-12 NOTE — PROGRESS NOTES
[facility]    Cardiology Progress Note- Cardiology 2        Date of Admission:  8/20/2024     Assessment & Plan: HVSL   25 yo M with PMHx autism  who was transferred to [facility] on 8/20 for decompensated HFrEF (15%) 2/2 suspected myocarditis. Hospital course was complicated by cardiogenic shock s/p vasopressors and IABP (8/27-9/1). Optimized on GDMT, discharge pending group home placement.    Changes today:  - Rash on bilateral lower extremities; monitoring with additional doses of amoxicillin  - Optimized on GDMT  - Discharge pending medical group home placement    ACTIVE PROBLEMS:  # Biventricular failure with severely reduce left ventricular function with RV recovery   # Cardiogenic shock SCAI C s/p IABP 8/27-9/1  TTE (8/21/24): LVIDd 58mm. Biplane LVEF is 16%. Severe hypokinesis of mid to apical RV free wall.  RHC (8/21/24): RA mean 6, PA 30/23/26, PCWP 18, [place] CO/CI 5.0/2.1, PVR 1.6, SVR 1270  EMB (8/21/24): Fragments of essentially unremarkable myocardial tissue with no evidence of inflammatory infiltrate   Coronary angiogram: NA   CMR: Mildly dilated left ventricle with severely reduced left ventricular function, LVEF 15%.  Normal right ventricular size and systolic function, RVEF 46%. No myocardial edema or replacement fibrosis or MRI features of acute myocarditis. Remains off dobutamine with stable hemodynamics.  - Plan for discharge with diuresis as needed for hypervolemia  - GDMT  Start metoprolol succinate 12.5mg every day   Entresto 12/13 mg BID   Spironolactone 25 mg daily  Empagliflozin 10 mg daily   - Digoxin 250mg qd  - Finished prednisone 5mg taper  - Stop nystatin fungal infection prophylaxis    # Sinus tachycardia  Suspect compensatory in the setting of LV dysfunction. RHC (9/11) demonstrated hypovolemia, which could potentially have contributed to tachycardia with exertion. Plan for discharge for cardiac rehab; outpatient vs TCU pending final  PT/OT recs.   - Outpatient cardiac rehab vs discharge to TCU    # Rash  Nursing noted rash on bilateral lower extremities 9/12; unclear if associated with amoxicillin (reported penicillin allergy; however patient has previously tolerated augmentin and zosyn, unclear time course of rash reported by Aunt). Discussed w/ pharmacy. Will try giving additional doses and track rash. If rash progresses, will switch to alternate therapy (cefadroxil 1g BID through 09/25/2024).  - Monitor symptoms    STABLE/RESOLVED PROBLEMS:  # Hypotension, resolved  Became hypotensive 9/8 AM after ambulating to bathroom. Asymptomatic. Tachycardic up to 160s from previous 130s (see below for discussion of sinus tachycardia). Suspect slight overdiuresis and hypovolemia given continued net negative volume status over last several days on oral diuretics.  Now resolved.     # + Strep mitis in 1/2 bottles, suspected comtaminant  Blood culture from 9/5 grew strep mitis in 1/2 bottles. Repeat blood culture on 9/6 NGTD. Suspect contaminant. Stopped antibiotics as of 9/7.   - Antibiotics:   > s/p Vanc 9/6   > s/p Ceftriaxone 9/6 - 9/7   > Amoxicillin (9/11-9/25)    # Concern for aspiration pneumonia, resolved  # Recurrent fevers of unclear etiology, resolved  - Prior antibiotics       > Unasyn 8/21 - 8/26       > Cefepime 8/20 - 8/21       > Azithromycin 8/20 - 08/24       > Vanco 8/20 - 8/21 s/p 8/26       > meropenem 8/26-9/1       > zosyn 8/26 - 8/28    # Altered mental status, resolved  - c/w risperidone   - case was discussed with psychiatry regarding his medication and relation to cardiac decompensation given no clear culprit. However, given no safe alternative we would suggest continuation of risperidone as he has been on this medication for years without recent dose change and lack of evidence for risperidone to cause fulminant heart failure at the movement.     # Elevated transaminases - resolved   Improving. Imaging showed hepatomegaly with  "increased echogenicity, possible intrinsic parenchymal disease such as steatosis.   - Trend labs    # Hypernatremia, resolved  # Hypervolemia, controlled  # Lactic acidosis, resolved  - Trend BMP  - Diuresis as above    # Acute hypoxemic respiratory failure, resolved  # Pleural effusion, resolved  See above for treatment of suspected aspiration pneumonia.     # Hyperglycemia, resolved  Likely related to stress and prednisone therapy. No history of diabetes. A1c was 5.6% on 8/23/2024.        Diet: Snacks/Supplements Adult: Ensure Enlive; Between Meals  Low Saturated Fat Na <2400 mg    DVT Prophylaxis: Enoxaparin (Lovenox) SQ  Swan Catheter: Not present  Cardiac Monitoring: None  Code Status: Full Code          Clinically Significant Risk Factors              # Hypoalbuminemia: Lowest albumin = 3.2 g/dL at 8/23/2024 12:52 PM, will monitor as appropriate        # End stage heart failure: Ventricular assist device (VAD) present          # Overweight: Estimated body mass index is 27.74 kg/m  as calculated from the following:    Height as of this encounter: 1.86 m (6' 1.23\").    Weight as of this encounter: 96 kg (211 lb 9.6 oz).   # Moderate Malnutrition: based on nutrition assessment      # Financial/Environmental Concerns: none        Thank you for allowing me to care for this patient, please don't hesitate to contact me with any questions regarding this plan.     Pt was discussed and evaluated with Patience Carrillo MD, attending physician, who agrees with the assessment and plan above.    Merline Rubio MD  PGY-2 Internal Medicine  [facility]  ______________________________________________________________________    Interval History   Walked with physical therapy, including climbing stairs and taking a long walk. Feeling good. Nursing noted progressive rash on bilateral lower extremities; patient reports rash is pruritic.    Physical Exam   Vital Signs: Temp: 97.7  F (36.5  C) Temp src: Oral BP: (!) " 127/97 Pulse: 117   Resp: 20 SpO2: 98 % O2 Device: None (Room air)    Weight: 211 lbs 9.6 oz    Gen: No acute distress, sitting up in chair  HEENT: Normocephalic, atraumatic  CV: Regular rhythm with tachycardia, no murmurs  Pulm: CTAB, non-labored breathing on room air  Abd: Soft, non-tender to palpation  Extr: No significant pitting edema in bilateral lower extremities  Skin: Morbilliform rash on bilateral lower legs, extending to knee on right lower extremity, extending mid-lower leg on left lower extremity  Neuro: Alert and conversant    Medical Decision Making       Please see A&P for additional details of medical decision making.      Data   ------------------------- PAST 24 HR DATA REVIEWED -----------------------------------------------    I have personally reviewed the following data over the past 24 hrs:    8.1  \   10.5 (L)   / 262     140 105 12.5 /  108 (H)   3.7 26 0.48 (L) \     ALT: 62 AST: 21 AP: 93 TBILI: 0.3   ALB: 3.6 TOT PROTEIN: 6.1 (L) LIPASE: N/A       Imaging results reviewed over the past 24 hrs:   No results found for this or any previous visit (from the past 24 hour(s)).

## 2024-09-12 NOTE — CARE PLAN
Occupational Therapy: Orders received. Chart reviewed and discussed with PT and RN.? Occupational Therapy not indicated due to pt at functional baseline -- at baseline, pt needs v/c to engage in ADL tasks, but remains able to physically do ADL tasks himself.?PT is following for strengthening and endurance building. Defer discharge recommendations to PT.? Will complete orders.

## 2024-09-12 NOTE — PLAN OF CARE
Regarding gait(ambulation) - Pt has scoliosis, walks with a trendelenburg pattern(limp). This is his baseline walking pattern and has been for years in discussing with his aunt. Additionally as he gets excited or happy his speed with increase. He has had no LOB and is not unstable with this pattern.    Please encourage patient to do his own daily cares and dressing. He is able to complete these tasks IND but needs verbal instructions/reminders and will default to allowing staff to do everything if not directed.

## 2024-09-12 NOTE — PROGRESS NOTES
"Care Conference  [facility] teams video visit per Guerda's phone on 6C conference room.  Attendees: [facility] [place]  Is a Behavioral --they do NOT have skilled nursing on site. They do provide him with his medication. They provide rides.  Nurse, Lydia Rodney: [phone]   DD , Tri, Phone: [phone]   Legal Guardian, brother Johann Hidalgo, Phone: [phone]  Aunt Guerda Freedtesfaye:  manager  [employer]    6C RN CC: Asia Hernandez 2 MD: Minesh Pereira    Goal: Behavioral  staff and DD CM need a medical update to determine if they can accept him back. They will not, as they do not have skilled nursing. They have physical and verbally aggressive clients.    Updated GH that PT: Halie Nagel worked with him yesterday and this morning and she is recommending OP CR as she feels he is back to his baseline, gait is unsteady at baseline, he follows commands--walked 400 feet, did 3 steps x3 today w cga, 1 break due to fatigue--did the same yesterday but HR went up to 160-- very concerned about this.    Dr Pereira informed them that he is medically stable and because the pt is young the increased heart rate is an appropriate compensation and he is not concerned. Monitoring pt's vital signs at the  is not necessary.  He will be on new lasix approx 3 days/week and should have labs drawn and PCP visit 7 days after discharge and then does not tj labs, as they have been stable here.  He will be on oral amoxicillin until 9/25/24     P: Tri, Phone: [phone] is going to search for a new \"Medical GH\" for pt.  Komaldian brother Johann: the only question that he asked was: when we would \"kick him out\" and I said that when we deem a pt medically stable we discharge them. He and Tri and Guerda asked if he could stay until Monday, 9/16 or until a new \"medical-behvioral GH\" is found.  Tri called me after the CC and asked me to email her:  MD notes, procedures, med " "list and current needs to: [email or web address]---CANNOT DO THIS WITHOUT RACQUEL'S APPROVAL______  Then Tri called me @ 2:11pm and informed me that she talked with her supervisor who said :  ---that \"we\" have to find him a new GH as a \"collaborative effort\".  ---we need to allow them the timeframe to find new housing  --we need to agree that he can stay inpatient @ [facility].    Tri said that pt was placed in this GH as a \"Emergent Crisis Respite\", he has lived there 1 year.  And Tri is demanding that I send her an email saying that [facility] will not discharge him until we find him proper accomodations and he cannot leave the hospital as he will be homeless. I informed Tri that I have never sent anyone a letter saying that and verified with my supervisor: Bautista Nicholson and she said no we do not send letters that say that and I am to call the [facility] to report that this GH will not take him back.    [facility] for [facility]: Sami Joseph ph: [phone] left him a voice message approx 3:30pm  email: [email or web address].  Issue: [facility] refused to take him back today--they have not given racquel Hoff) 30 day written notice.  GH saying they do not have skilled RN on site--he doesn't need this.   does provide him his medication through Pharmacy: [facility] [address] pharmacy:PTA meds: Resperidol 1mg every am and 1.5mg every evening  ?Seroquel before bed (not on pharmacists med rec but in [facility] Psychiatrist's note)    New meds: oral amoxicillin every 6 hours thru 9/25/24  Oral lasix 3 days/week (MWF)  Spironolactone QD  (Entresto)Sacubitril-valsartan 12 mg BID  Digoxin 250 mcg every day  Jardiance 10mg every day  Metoprolol 12.5mg every day  Multivitamin every day  Thyamine every day  ---verified w 6C pharmacist Nuris      PT says he is back to his functional baseline and have discharged him today. He needs verbal cueing to do his ADL's, He has a unsteady " gait.  Has used a primofit while here as he doesn't always use his call light and this is to prevent falls at night while here. Does not need at the .  Currently has a PICC which is only used for lab draws and will be pulled prior to discharge.    Dr Pereira cards 2--will need a follow up PCP visit with labs in 1 week, then will not need scheduled labs. He and Dr Carbajal are aware  is refusing to take back at this time.    Sami called me back approx 4:15pm  His  [address]  [place] [address] Ph: [phone] Fax: [phone] is listed as under [address]--is licensed and also licensed for foster care.  ---they have to take him back, it is their clients right.  ---I am to ask  IF they have to give pt/gaurdian a 30 day notice of termination(/[facility] should get a copy of it) and the gaurdian has the right to appeal___________    is responsible to get new services in place for client or to find a new setting.  Sami can see there is an active gaurdianship case with [facility], but he cannot open it.  ---they are to provide RN oversite---and the RN can add medications with orders from an MD.    Sami asked for DD 's name/# Tri, Phone: [phone] and he will call her now and get back to me tomorrow.

## 2024-09-12 NOTE — PLAN OF CARE
Goal Outcome Evaluation:    Plan of Care Reviewed With: family    Overall Patient Progress: improving Overall Patient Progress: improving    Outcome Evaluation: Pt stable, up ambulating frequently. Pt still tachycardic 's-130's, team aware. Discharge planning underway. POC per team

## 2024-09-12 NOTE — PROGRESS NOTES
General Infectious Disease Service  [facility] Team  Patient:  Terrance Hidalgo  Date of birth 2000  Medical record number [id number]  Date of Admission: 8/20/2024  Date of Visit:  9/12/2024  Requesting Provider: Patience Carbajal         Assessment and Recommendations:     Problem List:  Rash, unclear etiology   Streptococcus mitis bacteremia, unlikely endocarditis  HFrEF (EF 20%) due to myocarditis (ID workup negative), improving   Prior exposure to parvovirus (IgG positive, IgM negative)  Severe bacterial pneumonia, treated     Discussion:  Terrance Hidalgo is a 24 year old male patient with autism spectrum disorder who was admitted with new heart failure with severely reduced EF due to presumed cardiomyopathy. His workup, including cardiac biopsy, was unremarkable. However, later on one of this blood cultures grew Streptococcus mitis.     No fevers or symptoms. His HANDOC score is 2 for etiology and only one bacterial species. Given his severe cardiac disease upon presentation and the propensity of S mitis to cause endocarditis, we recommended to treat him for full course of 2 weeks for bacteremia as the benefits of treatment here likely out weight the risks of antibiotic side effects.      Plan:  Continue oral amoxicillin 4x a day until Sep 25, 2024.     Recommendations discussed with supervising attending physician, Dr. Son.    Thank you for this consult. The General ID team will continue to follow this patient. Please feel free to call with any questions.     Hayden Chino MD   Infectious Disease Fellow   Date of Service: 9/12/2024  Available on 99tests    References:   Taking the route less traveled: on the way to [facility] ([email or web address])     Physician Attestation   I personally examined and evaluated this patient.  I discussed the patient with the resident/fellow and care team, and agree with the assessment and plan of care as documented in the note.     Stahl findings:   Terrance Hidalgo is a 24 year old male with Strep  "mitis bacteremia. Unclear significance but due to underlying cardiac disease, would be be reasonable to treat as real pathogen. I clarified the history of the rash with his aunt who said that it started 2 days ago which was before he was even started on amoxicillin. I would not switch him now as to prevent additional exposure to other drugs in case this is an allergy.     50 MINUTES SPENT BY ME on the date of service doing chart review, history, exam, documentation & further activities per the note.    MANGO VEGA MD  Date of Service (when I saw the patient): 09/12/24          Interval History:     His mother was at bedside and reported that Terrance developed a rash which is a know reaction to amoxicillin that runs in his family.     Microbiology:    9/6 MRSA - neg   9/6 Bcx - no growth   9/5 Bcx - 1/2 Streptococcus mitis/oralis   8/30 Bcx - no growth   8/28 MRSA - neg   8/26 Parvo PCR - neg  8/26 C diff - neg   8/26 MRSA - neg   8/26 BDG -neg   8/26 Ucx - no growth   8/25 Scx - yeast   8/25 Bcx - no growth   8/21 Legio, Strep U - neg   8/21 Lyme ab - neg   Adeno, CMV - neg   EBV pcr- neg  HIV neg  8/21 Parvo Ig positive, IgM neg   Coxsackie ab -   Histo U - neg  RPP - neg   COVID - neg   8/21 Scx - no growth     Antibiotics:     Ceftriaxone 9/6-9/7          Physical Exam:     BP (!) 127/97 (BP Location: Right arm)   Pulse 117   Temp 97.9  F (36.6  C) (Oral)   Resp 18   Ht 1.86 m (6' 1.23\")   Wt 96 kg (211 lb 9.6 oz)   SpO2 99%   BMI 27.74 kg/m       Constitutional: Pleasant and cooperative. Awake, alert, interactive.  Respiratory: No increased work of breathing, CTAB.  Cardiovascular: RRR, no murmur noted. No peripheral edema.  GI: soft, non-distended and non-tender.  Musculoskeletal: No spine tenderness, knee, hip, elbow shoulder without limitation in ROM  Vascular access:  no surrounding erythema.  Skin: LE rash, maculopapular.   "

## 2024-09-13 ENCOUNTER — APPOINTMENT (OUTPATIENT)
Dept: PHYSICAL THERAPY | Facility: CLINIC | Age: 24
DRG: 853 | End: 2024-09-13
Attending: INTERNAL MEDICINE
Payer: MEDICARE

## 2024-09-13 LAB
ALBUMIN SERPL BCG-MCNC: 3.5 G/DL (ref 3.5–5.2)
ALP SERPL-CCNC: 92 U/L (ref 40–150)
ALT SERPL W P-5'-P-CCNC: 56 U/L (ref 0–70)
ANION GAP SERPL CALCULATED.3IONS-SCNC: 10 MMOL/L (ref 7–15)
ANION GAP SERPL CALCULATED.3IONS-SCNC: 9 MMOL/L (ref 7–15)
AST SERPL W P-5'-P-CCNC: 21 U/L (ref 0–45)
BACTERIA BLD CULT: ABNORMAL
BACTERIA BLD CULT: ABNORMAL
BASE EXCESS BLDV CALC-SCNC: 2.6 MMOL/L (ref -3–3)
BASOPHILS # BLD AUTO: 0 10E3/UL (ref 0–0.2)
BASOPHILS NFR BLD AUTO: 0 %
BILIRUB DIRECT SERPL-MCNC: <0.2 MG/DL (ref 0–0.3)
BILIRUB SERPL-MCNC: 0.3 MG/DL
BUN SERPL-MCNC: 11.6 MG/DL (ref 6–20)
BUN SERPL-MCNC: 11.6 MG/DL (ref 6–20)
CA-I BLD-MCNC: 4.9 MG/DL (ref 4.4–5.2)
CALCIUM SERPL-MCNC: 8.7 MG/DL (ref 8.8–10.4)
CALCIUM SERPL-MCNC: 9 MG/DL (ref 8.8–10.4)
CHLORIDE SERPL-SCNC: 104 MMOL/L (ref 98–107)
CHLORIDE SERPL-SCNC: 105 MMOL/L (ref 98–107)
CREAT SERPL-MCNC: 0.45 MG/DL (ref 0.67–1.17)
CREAT SERPL-MCNC: 0.51 MG/DL (ref 0.67–1.17)
EGFRCR SERPLBLD CKD-EPI 2021: >90 ML/MIN/1.73M2
EGFRCR SERPLBLD CKD-EPI 2021: >90 ML/MIN/1.73M2
EOSINOPHIL # BLD AUTO: 0.3 10E3/UL (ref 0–0.7)
EOSINOPHIL NFR BLD AUTO: 4 %
ERYTHROCYTE [DISTWIDTH] IN BLOOD BY AUTOMATED COUNT: 15.1 % (ref 10–15)
GLUCOSE SERPL-MCNC: 105 MG/DL (ref 70–99)
GLUCOSE SERPL-MCNC: 123 MG/DL (ref 70–99)
HCO3 BLDV-SCNC: 29 MMOL/L (ref 21–28)
HCO3 SERPL-SCNC: 26 MMOL/L (ref 22–29)
HCO3 SERPL-SCNC: 26 MMOL/L (ref 22–29)
HCT VFR BLD AUTO: 33.1 % (ref 40–53)
HGB BLD-MCNC: 10.7 G/DL (ref 13.3–17.7)
IMM GRANULOCYTES # BLD: 0 10E3/UL
IMM GRANULOCYTES NFR BLD: 1 %
LYMPHOCYTES # BLD AUTO: 2 10E3/UL (ref 0.8–5.3)
LYMPHOCYTES NFR BLD AUTO: 26 %
MAGNESIUM SERPL-MCNC: 2.2 MG/DL (ref 1.7–2.3)
MCH RBC QN AUTO: 29.4 PG (ref 26.5–33)
MCHC RBC AUTO-ENTMCNC: 32.3 G/DL (ref 31.5–36.5)
MCV RBC AUTO: 91 FL (ref 78–100)
MONOCYTES # BLD AUTO: 0.9 10E3/UL (ref 0–1.3)
MONOCYTES NFR BLD AUTO: 12 %
NEUTROPHILS # BLD AUTO: 4.5 10E3/UL (ref 1.6–8.3)
NEUTROPHILS NFR BLD AUTO: 57 %
NRBC # BLD AUTO: 0 10E3/UL
NRBC BLD AUTO-RTO: 0 /100
O2/TOTAL GAS SETTING VFR VENT: 98 %
OXYHGB MFR BLDV: 67 % (ref 70–75)
PCO2 BLDV: 51 MM HG (ref 40–50)
PH BLDV: 7.36 [PH] (ref 7.32–7.43)
PHOSPHATE SERPL-MCNC: 4.5 MG/DL (ref 2.5–4.5)
PLATELET # BLD AUTO: 284 10E3/UL (ref 150–450)
PO2 BLDV: 38 MM HG (ref 25–47)
POTASSIUM SERPL-SCNC: 3.9 MMOL/L (ref 3.4–5.3)
POTASSIUM SERPL-SCNC: 3.9 MMOL/L (ref 3.4–5.3)
PROT SERPL-MCNC: 6 G/DL (ref 6.4–8.3)
RBC # BLD AUTO: 3.64 10E6/UL (ref 4.4–5.9)
SAO2 % BLDV: 68.5 % (ref 70–75)
SODIUM SERPL-SCNC: 140 MMOL/L (ref 135–145)
SODIUM SERPL-SCNC: 140 MMOL/L (ref 135–145)
WBC # BLD AUTO: 7.7 10E3/UL (ref 4–11)

## 2024-09-13 PROCEDURE — 250N000011 HC RX IP 250 OP 636: Performed by: STUDENT IN AN ORGANIZED HEALTH CARE EDUCATION/TRAINING PROGRAM

## 2024-09-13 PROCEDURE — 120N000003 HC R&B IMCU UMMC

## 2024-09-13 PROCEDURE — 83735 ASSAY OF MAGNESIUM: CPT | Performed by: STUDENT IN AN ORGANIZED HEALTH CARE EDUCATION/TRAINING PROGRAM

## 2024-09-13 PROCEDURE — 82248 BILIRUBIN DIRECT: CPT

## 2024-09-13 PROCEDURE — 97110 THERAPEUTIC EXERCISES: CPT | Mod: GP | Performed by: PHYSICAL THERAPIST

## 2024-09-13 PROCEDURE — 82330 ASSAY OF CALCIUM: CPT | Performed by: STUDENT IN AN ORGANIZED HEALTH CARE EDUCATION/TRAINING PROGRAM

## 2024-09-13 PROCEDURE — 82805 BLOOD GASES W/O2 SATURATION: CPT

## 2024-09-13 PROCEDURE — 250N000013 HC RX MED GY IP 250 OP 250 PS 637: Performed by: STUDENT IN AN ORGANIZED HEALTH CARE EDUCATION/TRAINING PROGRAM

## 2024-09-13 PROCEDURE — 250N000013 HC RX MED GY IP 250 OP 250 PS 637

## 2024-09-13 PROCEDURE — 82310 ASSAY OF CALCIUM: CPT | Performed by: STUDENT IN AN ORGANIZED HEALTH CARE EDUCATION/TRAINING PROGRAM

## 2024-09-13 PROCEDURE — 97530 THERAPEUTIC ACTIVITIES: CPT | Mod: GP | Performed by: PHYSICAL THERAPIST

## 2024-09-13 PROCEDURE — 85025 COMPLETE CBC W/AUTO DIFF WBC: CPT | Performed by: STUDENT IN AN ORGANIZED HEALTH CARE EDUCATION/TRAINING PROGRAM

## 2024-09-13 PROCEDURE — 80053 COMPREHEN METABOLIC PANEL: CPT | Performed by: STUDENT IN AN ORGANIZED HEALTH CARE EDUCATION/TRAINING PROGRAM

## 2024-09-13 PROCEDURE — 250N000013 HC RX MED GY IP 250 OP 250 PS 637: Performed by: INTERNAL MEDICINE

## 2024-09-13 PROCEDURE — 84100 ASSAY OF PHOSPHORUS: CPT | Performed by: STUDENT IN AN ORGANIZED HEALTH CARE EDUCATION/TRAINING PROGRAM

## 2024-09-13 PROCEDURE — 99233 SBSQ HOSP IP/OBS HIGH 50: CPT | Performed by: INTERNAL MEDICINE

## 2024-09-13 RX ORDER — METOPROLOL SUCCINATE 25 MG/1
25 TABLET, EXTENDED RELEASE ORAL DAILY
Status: DISCONTINUED | OUTPATIENT
Start: 2024-09-14 | End: 2024-09-19 | Stop reason: HOSPADM

## 2024-09-13 RX ADMIN — Medication 12.5 MG: at 14:24

## 2024-09-13 RX ADMIN — AMOXICILLIN 1000 MG: 500 CAPSULE ORAL at 00:00

## 2024-09-13 RX ADMIN — Medication 1 HALF-TAB: at 07:54

## 2024-09-13 RX ADMIN — Medication 1 TABLET: at 07:54

## 2024-09-13 RX ADMIN — AMOXICILLIN 1000 MG: 500 CAPSULE ORAL at 06:12

## 2024-09-13 RX ADMIN — POLYETHYLENE GLYCOL 3350 17 G: 17 POWDER, FOR SOLUTION ORAL at 07:54

## 2024-09-13 RX ADMIN — Medication 12.5 MG: at 07:54

## 2024-09-13 RX ADMIN — Medication 15 ML: at 06:06

## 2024-09-13 RX ADMIN — EMPAGLIFLOZIN 10 MG: 10 TABLET, FILM COATED ORAL at 07:55

## 2024-09-13 RX ADMIN — Medication 1 HALF-TAB: at 20:03

## 2024-09-13 RX ADMIN — SPIRONOLACTONE 25 MG: 25 TABLET, FILM COATED ORAL at 07:54

## 2024-09-13 RX ADMIN — RISPERIDONE 1.5 MG: 1 TABLET, FILM COATED ORAL at 20:03

## 2024-09-13 RX ADMIN — AMOXICILLIN 1000 MG: 500 CAPSULE ORAL at 12:10

## 2024-09-13 RX ADMIN — RISPERIDONE 1 MG: 1 TABLET, FILM COATED ORAL at 07:54

## 2024-09-13 RX ADMIN — THIAMINE HCL TAB 100 MG 100 MG: 100 TAB at 07:54

## 2024-09-13 RX ADMIN — ENOXAPARIN SODIUM 40 MG: 40 INJECTION SUBCUTANEOUS at 12:10

## 2024-09-13 RX ADMIN — AMOXICILLIN 1000 MG: 500 CAPSULE ORAL at 23:56

## 2024-09-13 RX ADMIN — AMOXICILLIN 1000 MG: 500 CAPSULE ORAL at 18:01

## 2024-09-13 RX ADMIN — DIGOXIN 250 MCG: 125 TABLET ORAL at 07:53

## 2024-09-13 NOTE — PROGRESS NOTES
"Full          Hidalgo \"malka\"           Cards 2  [facility]          8/20  24ys    Dx: pneumonia, intubated, septic shock transferred here from [facility] with HR dx with EF 15%    Hx: autism    Neuro: AOx4, calm and cooperative              Autistic, can make needs known              SBA              Denies pain    Cardiac: ST                  lovenox    Respiratory: Room air    GI/: Voids spontaneously via primafit at night. Last BM 9/12              Cardiac diet. Good appetite    Skin: rash to ble    IV's:  TL PICC (orders to remove, patient and family decline/ MD aware)    K/MG/Phos replacement--0500    Plan: here til Monday looking for group home with higher acquity     "

## 2024-09-13 NOTE — PROGRESS NOTES
Patient declines allowing me remove picc, states he doesn't want to be stuck for labs. Aunt also requests to keep in til they discharge on Monday.  Message sent to resident to inform, and confirm if they agree to remove picc do we need piv or order for no iv access. Awaiting response.

## 2024-09-13 NOTE — PLAN OF CARE
Problem: Pain Acute  Goal: Optimal Pain Control and Function  Outcome: Progressing     Problem: Diabetes  Goal: Optimal Coping  Outcome: Progressing  Intervention: Support Wellbeing and Self-Management Success  Recent Flowsheet Documentation  Taken 9/13/2024 0800 by Tre Lawrence RN  Family/Support System Care: involvement promoted  Goal: Optimal Functional Ability  Outcome: Progressing  Intervention: Optimize Functional Ability  Recent Flowsheet Documentation  Taken 9/13/2024 0800 by Tre Lawrence RN  Self-Care Promotion: independence encouraged  Assistive Device Utilized: lift device  Activity Management: activity adjusted per tolerance  Activity Assistance Provided: assistance, 1 person  Goal: Blood Glucose Level Within Target Range  Outcome: Progressing  Goal: Minimize Risk of Hypoglycemia  Outcome: Progressing     Problem: Heart Failure  Goal: Optimal Coping  Outcome: Progressing  Intervention: Support Psychosocial Response  Recent Flowsheet Documentation  Taken 9/13/2024 0800 by Tre Lawrence RN  Family/Support System Care: involvement promoted  Goal: Optimal Cardiac Output  Outcome: Progressing  Goal: Stable Heart Rate and Rhythm  Outcome: Progressing  Goal: Optimal Functional Ability  Outcome: Progressing  Intervention: Optimize Functional Ability  Recent Flowsheet Documentation  Taken 9/13/2024 0800 by Tre Lawrence RN  Self-Care Promotion: independence encouraged  Activity Management: activity adjusted per tolerance  Goal: Fluid and Electrolyte Balance  Outcome: Progressing  Goal: Improved Oral Intake  Outcome: Progressing  Goal: Effective Oxygenation and Ventilation  Outcome: Progressing  Intervention: Promote Airway Secretion Clearance  Recent Flowsheet Documentation  Taken 9/13/2024 0800 by Tre Lawrence RN  Activity Management: activity adjusted per tolerance  Intervention: Optimize Oxygenation and Ventilation  Recent Flowsheet Documentation  Taken 9/13/2024 0800 by  "Tre Lawrence, RN  Head of Bed (HOB) Positioning: HOB at 15 degrees  Goal: Effective Breathing Pattern During Sleep  Outcome: Progressing     Problem: Infection  Goal: Absence of Infection Signs and Symptoms  Outcome: Progressing     Problem: Adult Inpatient Plan of Care  Goal: Plan of Care Review  Description: The Plan of Care Review/Shift note should be completed every shift.  The Outcome Evaluation is a brief statement about your assessment that the patient is improving, declining, or no change.  This information will be displayed automatically on your shift  note.  Outcome: Progressing  Flowsheets (Taken 9/13/2024 1217)  Plan of Care Reviewed With:   family   patient  Overall Patient Progress: improving  Goal: Patient-Specific Goal (Individualized)  Description: You can add care plan individualizations to a care plan. Examples of Individualization might be:  \"Parent requests to be called daily at 9am for status\", \"I have a hard time hearing out of my right ear\", or \"Do not touch me to wake me up as it startles  me\".  Outcome: Progressing  Goal: Absence of Hospital-Acquired Illness or Injury  Outcome: Progressing  Intervention: Prevent Skin Injury  Recent Flowsheet Documentation  Taken 9/13/2024 0800 by Tre Lawrence, RN  Body Position: position changed independently  Intervention: Prevent Infection  Recent Flowsheet Documentation  Taken 9/13/2024 0800 by Tre Lawrence, RN  Infection Prevention: environmental surveillance performed  Goal: Optimal Comfort and Wellbeing  Outcome: Progressing  Intervention: Provide Person-Centered Care  Recent Flowsheet Documentation  Taken 9/13/2024 0800 by Tre Lawrence, RN  Trust Relationship/Rapport:   care explained   choices provided   empathic listening provided  Goal: Readiness for Transition of Care  Outcome: Progressing     Problem: Gas Exchange Impaired  Goal: Optimal Gas Exchange  Outcome: Progressing  Intervention: Optimize Oxygenation and " Ventilation  Recent Flowsheet Documentation  Taken 9/13/2024 0800 by Tre Lawrence RN  Head of Bed (HOB) Positioning: HOB at 15 degrees     Problem: Cardiovascular Surgery  Goal: Improved Activity Tolerance  Outcome: Progressing  Intervention: Optimize Tolerance for Activity  Recent Flowsheet Documentation  Taken 9/13/2024 0800 by Tre Lawrence RN  Self-Care Promotion: independence encouraged  Goal: Optimal Coping with Heart Surgery  Outcome: Progressing  Intervention: Support Psychosocial Response to Surgery  Recent Flowsheet Documentation  Taken 9/13/2024 0800 by Tre Lawrence RN  Family/Support System Care: involvement promoted  Goal: Absence of Bleeding  Outcome: Progressing  Goal: Effective Bowel Elimination  Outcome: Progressing  Goal: Effective Cardiac Function  Outcome: Progressing  Goal: Optimal Cerebral Tissue Perfusion  Outcome: Progressing  Intervention: Protect and Optimize Cerebral Perfusion  Recent Flowsheet Documentation  Taken 9/13/2024 0800 by Tre Lawrence RN  Cerebral Perfusion Promotion: blood pressure monitored  Head of Bed (HOB) Positioning: HOB at 15 degrees  Goal: Fluid and Electrolyte Balance  Outcome: Progressing  Goal: Blood Glucose Level Within Targeted Range  Outcome: Progressing  Goal: Absence of Infection Signs and Symptoms  Outcome: Progressing  Intervention: Prevent or Manage Infection  Recent Flowsheet Documentation  Taken 9/13/2024 0800 by Tre Lawrence RN  Infection Prevention: environmental surveillance performed  Goal: Anesthesia/Sedation Recovery  Outcome: Progressing  Goal: Acceptable Pain Control  Outcome: Progressing  Goal: Nausea and Vomiting Relief  Outcome: Progressing  Goal: Effective Urinary Elimination  Outcome: Progressing  Goal: Effective Oxygenation and Ventilation  Outcome: Progressing   Goal Outcome Evaluation:      Plan of Care Reviewed With: family, patient    Overall Patient Progress: improvingOverall Patient Progress:  improving    Awaiting discharge placement--? monday

## 2024-09-13 NOTE — PROGRESS NOTES
[facility] PROGRESS NOTE     Patient:  Terrance Hidalgo   YOB: 2000, MRN: [id number]  Date of Visit: 09/13/2024  Date of Admission: 8/20/2024          ASSESSMENT AND PLAN     Terrance Hidalgo is a 24 year old male patient with autism spectrum disorder who was admitted with new heart failure with severely reduced EF due to presumed cardiomyopathy. His workup, including cardiac biopsy, was unremarkable. However, later on one of this blood cultures grew Streptococcus mitis.      No fevers or symptoms. His HANDOC score is 2 for etiology and only one bacterial species. Given his severe cardiac disease upon presentation and the propensity of S mitis to cause endocarditis, we recommended to treat him for full course of 2 weeks for bacteremia as the benefits of treatment here likely out weight the risks of antibiotic side effects.      IMPRESSION  Rash, resolving   Streptococcus mitis bacteremia, unlikely endocarditis  HFrEF (EF 20%) due to myocarditis (ID workup negative), improving   Prior exposure to parvovirus (IgG positive, IgM negative)  Severe bacterial pneumonia, treated     RECOMMENDATIONS:  Continue oral amoxicillin 4x a day until Sep 25, 2024.     ID will sign off. Please check [facility] for staff covering the service.     Racheal Son MD  Infectious Diseases  Vocera jena    MDM: >3 notes and tests reviewed, discussed with primary team, acute life threatening, high risk for complication          SUBJECTIVE      Interval History and Events:  Rash is better. No new complaints.     Antimicrobial Treatment:  Amox 9/11-  Ceftriaxone 9/6-9/7          OBJECTIVE       Physical Examination:    Temp:  [97.5  F (36.4  C)-98.6  F (37  C)] 98.6  F (37  C)  Pulse:  [105-140] 105  Resp:  [16-20] 18  BP: (101-112)/(59-84) 104/70  SpO2:  [97 %-99 %] 97 %    I/O last 3 completed shifts:  In: 1440 [P.O.:1440]  Out: 1225 [Urine:1225]    Vitals:    09/08/24 0607 09/09/24 0700 09/11/24 1229 09/12/24 0650    Weight: 95 kg (209 lb 6.4 oz) 96 kg (211 lb 11.2 oz) 97 kg (213 lb 12.8 oz) 96 kg (211 lb 9.6 oz)    09/13/24 0610   Weight: 95.9 kg (211 lb 6.4 oz)     Skin: Erythematous rash over both feet better. No tenderness or warmth     I reviewed the following laboratory data:    Microbiology:  9/6 MRSA - neg   9/6 Bcx - no growth   9/5 Bcx - 1/2 Streptococcus mitis/oralis   8/30 Bcx - no growth   8/28 MRSA - neg   8/26 Parvo PCR - neg  8/26 C diff - neg   8/26 MRSA - neg   8/26 BDG -neg   8/26 Ucx - no growth   8/25 Scx - yeast   8/25 Bcx - no growth   8/21 Legio, Strep U - neg   8/21 Lyme ab - neg   Adeno, CMV - neg   EBV pcr- neg  HIV neg  8/21 Parvo Ig positive, IgM neg   Coxsackie ab -   Histo U - neg  RPP - neg   COVID - neg   8/21 Scx - no growth

## 2024-09-13 NOTE — PROGRESS NOTES
[facility]    Cardiology Progress Note- Cardiology 2        Date of Admission:  8/20/2024     Assessment & Plan: HVSL   25 yo M with PMHx autism  who was transferred to [facility] on 8/20 for decompensated HFrEF (15%) 2/2 suspected myocarditis. Hospital course was complicated by cardiogenic shock s/p vasopressors and IABP (8/27-9/1). Now optimizing GDMT.     Changes today:  - medically cleared  - pending placement     ACTIVE PROBLEMS:  # Biventricular failure with severely reduce left ventricular function with RV recovery   # Cardiogenic shock SCAI C s/p IABP 8/27-9/1  TTE (8/21/24): LVIDd 58mm. Biplane LVEF is 16%. Severe hypokinesis of mid to apical RV free wall.  RHC (8/21/24): RA mean 6, PA 30/23/26, PCWP 18, [place] CO/CI 5.0/2.1, PVR 1.6, SVR 1270  EMB (8/21/24): Fragments of essentially unremarkable myocardial tissue with no evidence of inflammatory infiltrate   Coronary angiogram: NA   CMR: Mildly dilated left ventricle with severely reduced left ventricular function, LVEF 15%.  Normal right ventricular size and systolic function, RVEF 46%. No myocardial edema or replacement fibrosis or MRI features of acute myocarditis. Remains off dobutamine with stable hemodynamics.  - Plan for discharge with diuresis as needed for hypervolemia  - GDMT  metoprolol succinate 12.5mg daily   Entresto 12/13 mg BID   Spironolactone 25 mg daily  Empagliflozin 10 mg daily   Digoxin 250mg daily     # + Strep mitis in 1/2 bottles, suspected comtaminant  Blood culture from 9/5 grew strep mitis in 1/2 bottles. Repeat blood culture on 9/6 NGTD. Suspect contaminant. Stopped antibiotics as of 9/7.   - Antibiotics:   > Vanc 9/6   > Ceftriaxone 9/6 - 9/7   > Amoxicillin (9/11-9/25)  - complete course of amoxicillin  per ID     # Concern for aspiration pneumonia, resolved  # Recurrent fevers of unclear etiology, resolved  - Prior antibiotics       > Unasyn 8/21 - 8/26       > Cefepime 8/20 -  "8/21       > Azithromycin 8/20 - 08/24       > Vanco 8/20 - 8/21 s/p 8/26       > meropenem 8/26-9/1       > zosyn 8/26 - 8/28    # Altered mental status, resolved  #  - c/w risperidone   - case was discussed with psychiatry regarding his medication and relation to cardiac decompensation given no clear culprit. However, given no safe alternative we would suggest continuation of risperidone as he has been on this medication for years without recent dose change and lack of evidence for risperidone to cause fulminant heart failure at the movement.     # Hypernatremia, resolved  # Hypervolemia, controlled  # Lactic acidosis, resolved  - Trend BMP  - Diuresis as above    # Acute hypoxemic respiratory failure, resolved  # Pleural effusion, resolved  See above for treatment     # Hyperglycemia, resolved  Likely related to stress and prednisone therapy. No history of diabetes. A1c was 5.6% on 8/23/2024.        Diet: Snacks/Supplements Adult: Ensure Enlive; Between Meals  Low Saturated Fat Na <2400 mg    DVT Prophylaxis: Enoxaparin (Lovenox) SQ  Swan Catheter: Not present  Cardiac Monitoring: None  Code Status: Full Code          Clinically Significant Risk Factors              # Hypoalbuminemia: Lowest albumin = 3.2 g/dL at 8/23/2024 12:52 PM, will monitor as appropriate        # End stage heart failure: Ventricular assist device (VAD) present          # Overweight: Estimated body mass index is 27.72 kg/m  as calculated from the following:    Height as of this encounter: 1.86 m (6' 1.23\").    Weight as of this encounter: 95.9 kg (211 lb 6.4 oz).   # Moderate Malnutrition: based on nutrition assessment      # Financial/Environmental Concerns: none          Thank you for allowing me to care for this patient, please don't hesitate to contact me with any questions regarding this plan.   Pt was discussed and evaluated with Dr. Mario HOUSE, attending physician, who agrees with the assessment and plan above.    Matt Cabral MD, " PhD, MSc  Cardiology Fellow    ______________________________________________________________________    Interval History   No acute events overnight; feeling well this morning. Rash improving on his legs.     Physical Exam   Vital Signs: Temp: 98.6  F (37  C) Temp src: Oral BP: 104/70 Pulse: 105   Resp: 18 SpO2: 97 % O2 Device: None (Room air)    Weight: 211 lbs 6.4 oz    Gen: No acute distress, sitting up in chair  HEENT: Normocephalic, atraumatic  CV: Regular rhythm with tachycardia, no murmurs  Pulm: CTAB, non-labored breathing on room air  Abd: Soft, non-tender to palpation  Extr: No significant pitting edema in bilateral lower extremities  Neuro: Alert and conversant    Medical Decision Making       Please see A&P for additional details of medical decision making.      Data   ------------------------- PAST 24 HR DATA REVIEWED -----------------------------------------------    I have personally reviewed the following data over the past 24 hrs:    7.7  \   10.7 (L)   / 284     140 105 11.6 /  105 (H)   3.9 26 0.45 (L) \     ALT: 56 AST: 21 AP: 92 TBILI: 0.3   ALB: 3.5 TOT PROTEIN: 6.0 (L) LIPASE: N/A       Imaging results reviewed over the past 24 hrs:   No results found for this or any previous visit (from the past 24 hour(s)).

## 2024-09-13 NOTE — PROGRESS NOTES
Care Coordinator  D/I: [facility]: Sami Joseph called me back @ 0942 and he is meeting with the [facility], Patrick Hidalgo and  @ 11am this morning.  P: Sami requested H&P/MD note 9/12/PT/OT notes/Med list. Emailed to: [email or web address] from 6C backroom printer and  printer to make sure he gets it.  11:35am--per Aunt Guerda Gallardo and GH want another CC on Monday, 9/16 @ 12 noon. I gave Guerda Flynn's email to add her to the teams meeting and sent Kristen and email.

## 2024-09-13 NOTE — PLAN OF CARE
[id number]     Neuro: AOx4, calm and cooperative. Autistic, can make needs known  Cardiac/Tele/VS: ST, VSS  Respiratory: Room air, O2 sats > 92%. LS clear and dim on the bases  GI/: Voids spontaneously via primafit at night. Last BM 9/12  Diet/Appetite: Cardiac diet. Good appetite  Skin: No skin deficits  Endocrine/Electrolytes: Replace electrollytes per protocols  LDAs: TL PICC SL  Activity: Up sba  Pain: Denies pain     Plan: Continue POC per team. Discharge planning underway

## 2024-09-14 LAB
ALBUMIN SERPL BCG-MCNC: 3.7 G/DL (ref 3.5–5.2)
ALP SERPL-CCNC: 88 U/L (ref 40–150)
ALT SERPL W P-5'-P-CCNC: 49 U/L (ref 0–70)
ANION GAP SERPL CALCULATED.3IONS-SCNC: 11 MMOL/L (ref 7–15)
ANION GAP SERPL CALCULATED.3IONS-SCNC: 11 MMOL/L (ref 7–15)
AST SERPL W P-5'-P-CCNC: 19 U/L (ref 0–45)
BASOPHILS # BLD AUTO: 0 10E3/UL (ref 0–0.2)
BASOPHILS NFR BLD AUTO: 1 %
BILIRUB DIRECT SERPL-MCNC: <0.2 MG/DL (ref 0–0.3)
BILIRUB SERPL-MCNC: 0.3 MG/DL
BUN SERPL-MCNC: 8.3 MG/DL (ref 6–20)
BUN SERPL-MCNC: 9.4 MG/DL (ref 6–20)
CA-I BLD-MCNC: 4.7 MG/DL (ref 4.4–5.2)
CALCIUM SERPL-MCNC: 8.7 MG/DL (ref 8.8–10.4)
CALCIUM SERPL-MCNC: 9.2 MG/DL (ref 8.8–10.4)
CHLORIDE SERPL-SCNC: 103 MMOL/L (ref 98–107)
CHLORIDE SERPL-SCNC: 105 MMOL/L (ref 98–107)
CREAT SERPL-MCNC: 0.47 MG/DL (ref 0.67–1.17)
CREAT SERPL-MCNC: 0.53 MG/DL (ref 0.67–1.17)
EGFRCR SERPLBLD CKD-EPI 2021: >90 ML/MIN/1.73M2
EGFRCR SERPLBLD CKD-EPI 2021: >90 ML/MIN/1.73M2
EOSINOPHIL # BLD AUTO: 0.3 10E3/UL (ref 0–0.7)
EOSINOPHIL NFR BLD AUTO: 4 %
ERYTHROCYTE [DISTWIDTH] IN BLOOD BY AUTOMATED COUNT: 15.1 % (ref 10–15)
GLUCOSE SERPL-MCNC: 106 MG/DL (ref 70–99)
GLUCOSE SERPL-MCNC: 113 MG/DL (ref 70–99)
HCO3 SERPL-SCNC: 24 MMOL/L (ref 22–29)
HCO3 SERPL-SCNC: 25 MMOL/L (ref 22–29)
HCT VFR BLD AUTO: 33.8 % (ref 40–53)
HGB BLD-MCNC: 10.7 G/DL (ref 13.3–17.7)
IMM GRANULOCYTES # BLD: 0.1 10E3/UL
IMM GRANULOCYTES NFR BLD: 1 %
LYMPHOCYTES # BLD AUTO: 1.8 10E3/UL (ref 0.8–5.3)
LYMPHOCYTES NFR BLD AUTO: 23 %
MAGNESIUM SERPL-MCNC: 2.1 MG/DL (ref 1.7–2.3)
MCH RBC QN AUTO: 28.6 PG (ref 26.5–33)
MCHC RBC AUTO-ENTMCNC: 31.7 G/DL (ref 31.5–36.5)
MCV RBC AUTO: 90 FL (ref 78–100)
MONOCYTES # BLD AUTO: 0.8 10E3/UL (ref 0–1.3)
MONOCYTES NFR BLD AUTO: 10 %
NEUTROPHILS # BLD AUTO: 4.9 10E3/UL (ref 1.6–8.3)
NEUTROPHILS NFR BLD AUTO: 61 %
NRBC # BLD AUTO: 0 10E3/UL
NRBC BLD AUTO-RTO: 0 /100
PHOSPHATE SERPL-MCNC: 4.2 MG/DL (ref 2.5–4.5)
PLATELET # BLD AUTO: 293 10E3/UL (ref 150–450)
POTASSIUM SERPL-SCNC: 4.1 MMOL/L (ref 3.4–5.3)
POTASSIUM SERPL-SCNC: 4.6 MMOL/L (ref 3.4–5.3)
PROT SERPL-MCNC: 6.1 G/DL (ref 6.4–8.3)
RBC # BLD AUTO: 3.74 10E6/UL (ref 4.4–5.9)
SODIUM SERPL-SCNC: 139 MMOL/L (ref 135–145)
SODIUM SERPL-SCNC: 140 MMOL/L (ref 135–145)
WBC # BLD AUTO: 7.9 10E3/UL (ref 4–11)

## 2024-09-14 PROCEDURE — 82330 ASSAY OF CALCIUM: CPT | Performed by: STUDENT IN AN ORGANIZED HEALTH CARE EDUCATION/TRAINING PROGRAM

## 2024-09-14 PROCEDURE — 120N000003 HC R&B IMCU UMMC

## 2024-09-14 PROCEDURE — 250N000011 HC RX IP 250 OP 636: Performed by: STUDENT IN AN ORGANIZED HEALTH CARE EDUCATION/TRAINING PROGRAM

## 2024-09-14 PROCEDURE — 250N000013 HC RX MED GY IP 250 OP 250 PS 637: Performed by: STUDENT IN AN ORGANIZED HEALTH CARE EDUCATION/TRAINING PROGRAM

## 2024-09-14 PROCEDURE — 82248 BILIRUBIN DIRECT: CPT

## 2024-09-14 PROCEDURE — 85025 COMPLETE CBC W/AUTO DIFF WBC: CPT | Performed by: STUDENT IN AN ORGANIZED HEALTH CARE EDUCATION/TRAINING PROGRAM

## 2024-09-14 PROCEDURE — 84100 ASSAY OF PHOSPHORUS: CPT | Performed by: INTERNAL MEDICINE

## 2024-09-14 PROCEDURE — 83735 ASSAY OF MAGNESIUM: CPT | Performed by: INTERNAL MEDICINE

## 2024-09-14 PROCEDURE — 80053 COMPREHEN METABOLIC PANEL: CPT | Performed by: STUDENT IN AN ORGANIZED HEALTH CARE EDUCATION/TRAINING PROGRAM

## 2024-09-14 PROCEDURE — 250N000013 HC RX MED GY IP 250 OP 250 PS 637

## 2024-09-14 PROCEDURE — 250N000013 HC RX MED GY IP 250 OP 250 PS 637: Performed by: INTERNAL MEDICINE

## 2024-09-14 RX ADMIN — Medication 1 HALF-TAB: at 08:36

## 2024-09-14 RX ADMIN — DIGOXIN 250 MCG: 125 TABLET ORAL at 08:35

## 2024-09-14 RX ADMIN — ENOXAPARIN SODIUM 40 MG: 40 INJECTION SUBCUTANEOUS at 12:39

## 2024-09-14 RX ADMIN — RISPERIDONE 1.5 MG: 1 TABLET, FILM COATED ORAL at 20:56

## 2024-09-14 RX ADMIN — DIPHENHYDRAMINE HYDROCHLORIDE 25 MG: 25 CAPSULE ORAL at 13:21

## 2024-09-14 RX ADMIN — DIPHENHYDRAMINE HYDROCHLORIDE 25 MG: 25 CAPSULE ORAL at 23:49

## 2024-09-14 RX ADMIN — RISPERIDONE 1 MG: 1 TABLET, FILM COATED ORAL at 08:35

## 2024-09-14 RX ADMIN — POLYETHYLENE GLYCOL 3350 17 G: 17 POWDER, FOR SOLUTION ORAL at 08:35

## 2024-09-14 RX ADMIN — Medication 1 TABLET: at 08:35

## 2024-09-14 RX ADMIN — AMOXICILLIN 1000 MG: 500 CAPSULE ORAL at 12:39

## 2024-09-14 RX ADMIN — METOPROLOL SUCCINATE 25 MG: 25 TABLET, EXTENDED RELEASE ORAL at 08:35

## 2024-09-14 RX ADMIN — Medication 1 HALF-TAB: at 20:55

## 2024-09-14 RX ADMIN — AMOXICILLIN 1000 MG: 500 CAPSULE ORAL at 05:34

## 2024-09-14 RX ADMIN — THIAMINE HCL TAB 100 MG 100 MG: 100 TAB at 08:35

## 2024-09-14 RX ADMIN — SPIRONOLACTONE 25 MG: 25 TABLET, FILM COATED ORAL at 08:35

## 2024-09-14 RX ADMIN — Medication 15 ML: at 05:22

## 2024-09-14 RX ADMIN — EMPAGLIFLOZIN 10 MG: 10 TABLET, FILM COATED ORAL at 08:35

## 2024-09-14 RX ADMIN — AMOXICILLIN 1000 MG: 500 CAPSULE ORAL at 23:49

## 2024-09-14 RX ADMIN — AMOXICILLIN 1000 MG: 500 CAPSULE ORAL at 17:54

## 2024-09-14 NOTE — PLAN OF CARE
Problem: Pain Acute  Goal: Optimal Pain Control and Function  Outcome: Progressing     Problem: Diabetes  Goal: Optimal Coping  Outcome: Progressing  Goal: Optimal Functional Ability  Outcome: Progressing  Intervention: Optimize Functional Ability  Recent Flowsheet Documentation  Taken 9/14/2024 1200 by Tre Lawrence RN  Self-Care Promotion: independence encouraged  Activity Management:   up in chair   ambulated outside room  Activity Assistance Provided: assistance, stand-by  Taken 9/14/2024 0800 by Tre Lawrence RN  Self-Care Promotion: independence encouraged  Activity Management: sitting, edge of bed  Activity Assistance Provided: assistance, stand-by  Goal: Blood Glucose Level Within Target Range  Outcome: Progressing  Goal: Minimize Risk of Hypoglycemia  Outcome: Progressing     Problem: Heart Failure  Goal: Optimal Coping  Outcome: Progressing  Goal: Optimal Cardiac Output  Outcome: Progressing  Goal: Stable Heart Rate and Rhythm  Outcome: Progressing  Goal: Optimal Functional Ability  Outcome: Progressing  Intervention: Optimize Functional Ability  Recent Flowsheet Documentation  Taken 9/14/2024 1200 by Tre Lawrence RN  Self-Care Promotion: independence encouraged  Activity Management:   up in chair   ambulated outside room  Taken 9/14/2024 0800 by Tre Lawrence RN  Self-Care Promotion: independence encouraged  Activity Management: sitting, edge of bed  Goal: Fluid and Electrolyte Balance  Outcome: Progressing  Goal: Improved Oral Intake  Outcome: Progressing  Goal: Effective Oxygenation and Ventilation  Outcome: Progressing  Intervention: Promote Airway Secretion Clearance  Recent Flowsheet Documentation  Taken 9/14/2024 1200 by Tre Lawrence RN  Cough And Deep Breathing: done independently per patient  Activity Management:   up in chair   ambulated outside room  Taken 9/14/2024 0800 by Tre Lawrence RN  Cough And Deep Breathing: done independently per  "patient  Activity Management: sitting, edge of bed  Intervention: Optimize Oxygenation and Ventilation  Recent Flowsheet Documentation  Taken 9/14/2024 0800 by Tre Lawrence, RN  Head of Bed (HOB) Positioning: HOB lowered  Goal: Effective Breathing Pattern During Sleep  Outcome: Progressing     Problem: Infection  Goal: Absence of Infection Signs and Symptoms  Outcome: Progressing     Problem: Adult Inpatient Plan of Care  Goal: Plan of Care Review  Description: The Plan of Care Review/Shift note should be completed every shift.  The Outcome Evaluation is a brief statement about your assessment that the patient is improving, declining, or no change.  This information will be displayed automatically on your shift  note.  Outcome: Progressing  Flowsheets (Taken 9/14/2024 1419)  Plan of Care Reviewed With:   family   patient  Overall Patient Progress: improving  Goal: Patient-Specific Goal (Individualized)  Description: You can add care plan individualizations to a care plan. Examples of Individualization might be:  \"Parent requests to be called daily at 9am for status\", \"I have a hard time hearing out of my right ear\", or \"Do not touch me to wake me up as it startles  me\".  Outcome: Progressing  Goal: Absence of Hospital-Acquired Illness or Injury  Outcome: Progressing  Intervention: Prevent Skin Injury  Recent Flowsheet Documentation  Taken 9/14/2024 0800 by Tre Lawrence, RN  Body Position: position changed independently  Intervention: Prevent Infection  Recent Flowsheet Documentation  Taken 9/14/2024 1200 by Tre Lawrence, RN  Infection Prevention:   environmental surveillance performed   rest/sleep promoted   single patient room provided  Taken 9/14/2024 0800 by Tre Lawrence, RN  Infection Prevention:   environmental surveillance performed   rest/sleep promoted   single patient room provided  Goal: Optimal Comfort and Wellbeing  Outcome: Progressing  Intervention: Provide Person-Centered " Care  Recent Flowsheet Documentation  Taken 9/14/2024 1200 by Tre Lawrence RN  Trust Relationship/Rapport:   care explained   choices provided   questions answered   questions encouraged   thoughts/feelings acknowledged  Taken 9/14/2024 0800 by Tre Lawrence RN  Trust Relationship/Rapport:   care explained   choices provided   questions answered   questions encouraged   thoughts/feelings acknowledged  Goal: Readiness for Transition of Care  Outcome: Progressing     Problem: Gas Exchange Impaired  Goal: Optimal Gas Exchange  Outcome: Progressing  Intervention: Optimize Oxygenation and Ventilation  Recent Flowsheet Documentation  Taken 9/14/2024 0800 by Tre Lawrence RN  Head of Bed (HOB) Positioning: HOB lowered     Problem: Cardiovascular Surgery  Goal: Improved Activity Tolerance  Outcome: Progressing  Intervention: Optimize Tolerance for Activity  Recent Flowsheet Documentation  Taken 9/14/2024 1200 by Tre Lawrence RN  Self-Care Promotion: independence encouraged  Taken 9/14/2024 0800 by Tre Lawrence RN  Self-Care Promotion: independence encouraged  Goal: Optimal Coping with Heart Surgery  Outcome: Progressing  Goal: Absence of Bleeding  Outcome: Progressing  Goal: Effective Bowel Elimination  Outcome: Progressing  Goal: Effective Cardiac Function  Outcome: Progressing  Goal: Optimal Cerebral Tissue Perfusion  Outcome: Progressing  Intervention: Protect and Optimize Cerebral Perfusion  Recent Flowsheet Documentation  Taken 9/14/2024 1200 by Tre Lawrence RN  Cerebral Perfusion Promotion: blood pressure monitored  Taken 9/14/2024 0800 by Tre Lawrence RN  Cerebral Perfusion Promotion: blood pressure monitored  Head of Bed (HOB) Positioning: HOB lowered  Goal: Fluid and Electrolyte Balance  Outcome: Progressing  Goal: Blood Glucose Level Within Targeted Range  Outcome: Progressing  Goal: Absence of Infection Signs and Symptoms  Outcome: Progressing  Intervention:  Prevent or Manage Infection  Recent Flowsheet Documentation  Taken 9/14/2024 1200 by Tre Lawrence RN  Infection Prevention:   environmental surveillance performed   rest/sleep promoted   single patient room provided  Taken 9/14/2024 0800 by Tre Lawrence RN  Infection Prevention:   environmental surveillance performed   rest/sleep promoted   single patient room provided  Goal: Anesthesia/Sedation Recovery  Outcome: Progressing  Intervention: Optimize Anesthesia Recovery  Recent Flowsheet Documentation  Taken 9/14/2024 1200 by Tre Lawrence RN  Reorientation Measures: clock in view  Taken 9/14/2024 0800 by Tre Lawrence RN  Reorientation Measures: clock in view  Goal: Acceptable Pain Control  Outcome: Progressing  Goal: Nausea and Vomiting Relief  Outcome: Progressing  Goal: Effective Urinary Elimination  Outcome: Progressing  Goal: Effective Oxygenation and Ventilation  Outcome: Progressing  Intervention: Promote Airway Secretion Clearance  Recent Flowsheet Documentation  Taken 9/14/2024 1200 by Tre Lawrence RN  Cough And Deep Breathing: done independently per patient  Taken 9/14/2024 0800 by Tre Lawrence RN  Cough And Deep Breathing: done independently per patient   Goal Outcome Evaluation:      Plan of Care Reviewed With: family, patient    Overall Patient Progress: improvingOverall Patient Progress: improving

## 2024-09-14 NOTE — PLAN OF CARE
"9/13/2024 @ [id number]    Shift Notes:  7400: Paged provider regarding pt taking amoxicillin when allergic to penicillin, per pt. Provider educated, \"benefits of treating bacteremia outweigh risk of antibiotic side effect.\"  0435: Pt had 13 beat VTACH, pt asleep & had no s/s. Provider notified and aware.      Neuro: A&O x 4. SBA. Autistic, able to make needs known.   Cardiac: ST, -130s, up to 140s @ intermittent times. SBP 90-100s. Denies chest pain.   GI/: BM x 1. Low sat. fat. <2.4g. Primofit overnight; taken off @ 0530 per pt, adequate output.    Skin: Rash/Petechiae BLE.  Pain: Denies.   Labs: No replacements needed, next draw tomorrow AM 9/15/2024.  LDA's: L TL PICC, HL.     All other body systems WDL.     Plan: Continue with POC; possible disposition Monday 9/16/2024 to group home, pending placement. Notify care team of any changes.     For vital signs and complete assessments, please see documentation flowsheets.      Halina Barros RN   Cardiology     Problem: Adult Inpatient Plan of Care  Goal: Plan of Care Review  Outcome: Progressing    "

## 2024-09-14 NOTE — PROGRESS NOTES
[facility]    Cardiology Progress Note- Cardiology 2        Date of Admission:  8/20/2024     Assessment & Plan: HVSL   25 yo M with PMHx autism  who was transferred to [facility] on 8/20 for decompensated HFrEF (15%) 2/2 suspected myocarditis. Hospital course was complicated by cardiogenic shock s/p vasopressors and IABP (8/27-9/1). Optimized on GDMT, discharge pending group home placement.    Changes today:  - Liberalized diet to regular, per patient request  - Discharge pending medical group home placement    ACTIVE PROBLEMS:  # Biventricular failure with severely reduce left ventricular function with RV recovery   # Cardiogenic shock SCAI C s/p IABP 8/27-9/1  TTE (8/21/24): LVIDd 58mm. Biplane LVEF is 16%. Severe hypokinesis of mid to apical RV free wall.  RHC (8/21/24): RA mean 6, PA 30/23/26, PCWP 18, Wayne CO/CI 5.0/2.1, PVR 1.6, SVR 1270  EMB (8/21/24): Fragments of essentially unremarkable myocardial tissue with no evidence of inflammatory infiltrate   Coronary angiogram: NA   CMR: Mildly dilated left ventricle with severely reduced left ventricular function, LVEF 15%.  Normal right ventricular size and systolic function, RVEF 46%. No myocardial edema or replacement fibrosis or MRI features of acute myocarditis.  Completed prednisone taper. Remains off dobutamine with stable hemodynamics.  - Plan for discharge with low-dose diuresis as needed for hypervolemia  - GDMT  Metoprolol succinate 12.5mg every day   Entresto 12/13 mg BID   Spironolactone 25 mg daily  Empagliflozin 10 mg daily   - Digoxin 250mg qd  - Finished prednisone 5mg taper    # Sinus tachycardia  Suspect compensatory in the setting of LV dysfunction. RHC (9/11) demonstrated hypovolemia, which could potentially have contributed to tachycardia with exertion. Plan for discharge for cardiac rehab; outpatient vs TCU pending final PT/OT recs.   - Outpatient cardiac rehab vs discharge to  TCU    STABLE/RESOLVED PROBLEMS:  # Hypotension, resolved  Became hypotensive 9/8 AM after ambulating to bathroom. Asymptomatic. Tachycardic up to 160s from previous 130s (see below for discussion of sinus tachycardia). Suspect slight overdiuresis and hypovolemia given continued net negative volume status over last several days on oral diuretics.  Now resolved.     # + Strep mitis in 1/2 bottles, suspected comtaminant  Blood culture from 9/5 grew strep mitis in 1/2 bottles. Repeat blood culture on 9/6 NGTD. Suspect contaminant. Stopped antibiotics as of 9/7.   - Antibiotics:   > s/p Vanc 9/6   > s/p Ceftriaxone 9/6 - 9/7   > Discharge with Amoxicillin (9/11-9/25)    # Concern for aspiration pneumonia, resolved  # Recurrent fevers of unclear etiology, resolved  - Prior antibiotics       > Unasyn 8/21 - 8/26       > Cefepime 8/20 - 8/21       > Azithromycin 8/20 - 08/24       > Vanco 8/20 - 8/21 s/p 8/26       > meropenem 8/26-9/1       > zosyn 8/26 - 8/28    # Altered mental status, resolved  - c/w risperidone   - case was discussed with psychiatry regarding his medication and relation to cardiac decompensation given no clear culprit. However, given no safe alternative we would suggest continuation of risperidone as he has been on this medication for years without recent dose change and lack of evidence for risperidone to cause fulminant heart failure at the movement.     # Elevated transaminases - resolved   Improving. Imaging showed hepatomegaly with increased echogenicity, possible intrinsic parenchymal disease such as steatosis.   - Trend labs    # Hypernatremia, resolved  # Hypervolemia, controlled  # Lactic acidosis, resolved  - Trend BMP  - Diuresis as above    # Acute hypoxemic respiratory failure, resolved  # Pleural effusion, resolved  See above for treatment of suspected aspiration pneumonia.     # Hyperglycemia, resolved  Likely related to stress and prednisone therapy. No history of diabetes. A1c was  "5.6% on 8/23/2024.        Diet: Snacks/Supplements Adult: Ensure Enlive; Between Meals  Regular Diet Adult    DVT Prophylaxis: Enoxaparin (Lovenox) SQ  Swan Catheter: Not present  Cardiac Monitoring: None  Code Status: Full Code          Clinically Significant Risk Factors              # Hypoalbuminemia: Lowest albumin = 3.2 g/dL at 8/23/2024 12:52 PM, will monitor as appropriate        # End stage heart failure: Ventricular assist device (VAD) present          # Overweight: Estimated body mass index is 27.67 kg/m  as calculated from the following:    Height as of this encounter: 1.86 m (6' 1.23\").    Weight as of this encounter: 95.7 kg (211 lb).   # Moderate Malnutrition: based on nutrition assessment      # Financial/Environmental Concerns: none        Thank you for allowing me to care for this patient, please don't hesitate to contact me with any questions regarding this plan.     Pt was discussed and evaluated with Jani Herr MD, attending physician, who agrees with the assessment and plan above.    Merline Rubio MD  PGY-2 Internal Medicine  [facility]  ______________________________________________________________________    Interval History   Feeling ok this morning. Requesting regular diet. Rash is improving; no itching that he has noticed.    Physical Exam   Vital Signs: Temp: 97.8  F (36.6  C) Temp src: Oral BP: (!) 86/60 Pulse: 108   Resp: 20 SpO2: 100 % O2 Device: None (Room air)    Weight: 211 lbs 0 oz    Gen: No acute distress, sitting up in chair  HEENT: Normocephalic, atraumatic  CV: Regular rhythm with tachycardia, no murmurs  Pulm: CTAB, non-labored breathing on room air  Abd: Soft, non-tender to palpation  Extr: No significant pitting edema in bilateral lower extremities  Skin: Resolving morbilliform rash on bilateral lower legs, trace in bilateral ankles  Neuro: Alert and conversant    Medical Decision Making       Please see A&P for additional details of medical decision " making.      Data   ------------------------- PAST 24 HR DATA REVIEWED -----------------------------------------------    I have personally reviewed the following data over the past 24 hrs:    7.9  \   10.7 (L)   / 293     140 105 8.3 /  106 (H)   4.1 24 0.47 (L) \     ALT: 49 AST: 19 AP: 88 TBILI: 0.3   ALB: 3.7 TOT PROTEIN: 6.1 (L) LIPASE: N/A       Imaging results reviewed over the past 24 hrs:   No results found for this or any previous visit (from the past 24 hour(s)).

## 2024-09-14 NOTE — PROGRESS NOTES
"Full          Hidalgo \"malka\"           Cards 2  [facility]          8/20  24ys     Dx: pneumonia, intubated, septic shock transferred here from [place] with HR dx with EF 15%     Hx: autism     Neuro: AOx4, calm and cooperative              Autistic, can make needs known              SBA- up in chair/amb in [place]              Denies pain     Cardiac: ST                  lovenox     Respiratory: Room air     GI/: Voids spontaneously via primafit at night. Last BM 9/12              Cardiac diet. Good appetite     Skin: rash to ble     IV's:  TL PICC (orders to remove, patient and family decline/ MD aware)     K/MG/Phos replacement--0500     Plan: here til Monday looking for group home with higher acquity  "

## 2024-09-15 LAB
ALBUMIN SERPL BCG-MCNC: 3.5 G/DL (ref 3.5–5.2)
ALP SERPL-CCNC: 84 U/L (ref 40–150)
ALT SERPL W P-5'-P-CCNC: 42 U/L (ref 0–70)
ANION GAP SERPL CALCULATED.3IONS-SCNC: 8 MMOL/L (ref 7–15)
ANION GAP SERPL CALCULATED.3IONS-SCNC: 9 MMOL/L (ref 7–15)
AST SERPL W P-5'-P-CCNC: 18 U/L (ref 0–45)
BASOPHILS # BLD AUTO: 0 10E3/UL (ref 0–0.2)
BASOPHILS NFR BLD AUTO: 1 %
BILIRUB DIRECT SERPL-MCNC: <0.2 MG/DL (ref 0–0.3)
BILIRUB SERPL-MCNC: 0.2 MG/DL
BUN SERPL-MCNC: 11.1 MG/DL (ref 6–20)
BUN SERPL-MCNC: 9.2 MG/DL (ref 6–20)
CA-I BLD-MCNC: 4.9 MG/DL (ref 4.4–5.2)
CALCIUM SERPL-MCNC: 8.8 MG/DL (ref 8.8–10.4)
CALCIUM SERPL-MCNC: 9.2 MG/DL (ref 8.8–10.4)
CHLORIDE SERPL-SCNC: 103 MMOL/L (ref 98–107)
CHLORIDE SERPL-SCNC: 104 MMOL/L (ref 98–107)
CREAT SERPL-MCNC: 0.49 MG/DL (ref 0.67–1.17)
CREAT SERPL-MCNC: 0.55 MG/DL (ref 0.67–1.17)
EGFRCR SERPLBLD CKD-EPI 2021: >90 ML/MIN/1.73M2
EGFRCR SERPLBLD CKD-EPI 2021: >90 ML/MIN/1.73M2
EOSINOPHIL # BLD AUTO: 0.3 10E3/UL (ref 0–0.7)
EOSINOPHIL NFR BLD AUTO: 5 %
ERYTHROCYTE [DISTWIDTH] IN BLOOD BY AUTOMATED COUNT: 15.2 % (ref 10–15)
GLUCOSE SERPL-MCNC: 102 MG/DL (ref 70–99)
GLUCOSE SERPL-MCNC: 103 MG/DL (ref 70–99)
HCO3 SERPL-SCNC: 26 MMOL/L (ref 22–29)
HCO3 SERPL-SCNC: 28 MMOL/L (ref 22–29)
HCT VFR BLD AUTO: 32.5 % (ref 40–53)
HGB BLD-MCNC: 10.2 G/DL (ref 13.3–17.7)
IMM GRANULOCYTES # BLD: 0 10E3/UL
IMM GRANULOCYTES NFR BLD: 1 %
LYMPHOCYTES # BLD AUTO: 1.5 10E3/UL (ref 0.8–5.3)
LYMPHOCYTES NFR BLD AUTO: 24 %
MAGNESIUM SERPL-MCNC: 2.1 MG/DL (ref 1.7–2.3)
MCH RBC QN AUTO: 28.7 PG (ref 26.5–33)
MCHC RBC AUTO-ENTMCNC: 31.4 G/DL (ref 31.5–36.5)
MCV RBC AUTO: 92 FL (ref 78–100)
MONOCYTES # BLD AUTO: 0.8 10E3/UL (ref 0–1.3)
MONOCYTES NFR BLD AUTO: 13 %
NEUTROPHILS # BLD AUTO: 3.7 10E3/UL (ref 1.6–8.3)
NEUTROPHILS NFR BLD AUTO: 56 %
NRBC # BLD AUTO: 0 10E3/UL
NRBC BLD AUTO-RTO: 0 /100
PHOSPHATE SERPL-MCNC: 4.8 MG/DL (ref 2.5–4.5)
PLATELET # BLD AUTO: 287 10E3/UL (ref 150–450)
POTASSIUM SERPL-SCNC: 4 MMOL/L (ref 3.4–5.3)
POTASSIUM SERPL-SCNC: 4.2 MMOL/L (ref 3.4–5.3)
PROT SERPL-MCNC: 5.8 G/DL (ref 6.4–8.3)
RBC # BLD AUTO: 3.55 10E6/UL (ref 4.4–5.9)
SODIUM SERPL-SCNC: 139 MMOL/L (ref 135–145)
SODIUM SERPL-SCNC: 139 MMOL/L (ref 135–145)
WBC # BLD AUTO: 6.4 10E3/UL (ref 4–11)

## 2024-09-15 PROCEDURE — 83735 ASSAY OF MAGNESIUM: CPT | Performed by: INTERNAL MEDICINE

## 2024-09-15 PROCEDURE — 250N000013 HC RX MED GY IP 250 OP 250 PS 637: Performed by: STUDENT IN AN ORGANIZED HEALTH CARE EDUCATION/TRAINING PROGRAM

## 2024-09-15 PROCEDURE — 250N000011 HC RX IP 250 OP 636: Performed by: STUDENT IN AN ORGANIZED HEALTH CARE EDUCATION/TRAINING PROGRAM

## 2024-09-15 PROCEDURE — 120N000003 HC R&B IMCU UMMC

## 2024-09-15 PROCEDURE — 250N000013 HC RX MED GY IP 250 OP 250 PS 637

## 2024-09-15 PROCEDURE — 250N000013 HC RX MED GY IP 250 OP 250 PS 637: Performed by: INTERNAL MEDICINE

## 2024-09-15 PROCEDURE — 82248 BILIRUBIN DIRECT: CPT

## 2024-09-15 PROCEDURE — 85025 COMPLETE CBC W/AUTO DIFF WBC: CPT | Performed by: STUDENT IN AN ORGANIZED HEALTH CARE EDUCATION/TRAINING PROGRAM

## 2024-09-15 PROCEDURE — 82330 ASSAY OF CALCIUM: CPT | Performed by: STUDENT IN AN ORGANIZED HEALTH CARE EDUCATION/TRAINING PROGRAM

## 2024-09-15 PROCEDURE — 80053 COMPREHEN METABOLIC PANEL: CPT | Performed by: STUDENT IN AN ORGANIZED HEALTH CARE EDUCATION/TRAINING PROGRAM

## 2024-09-15 PROCEDURE — 84100 ASSAY OF PHOSPHORUS: CPT | Performed by: INTERNAL MEDICINE

## 2024-09-15 RX ORDER — CLOBETASOL PROPIONATE 0.5 MG/G
CREAM TOPICAL 2 TIMES DAILY PRN
Status: DISCONTINUED | OUTPATIENT
Start: 2024-09-15 | End: 2024-09-18

## 2024-09-15 RX ORDER — CETIRIZINE HYDROCHLORIDE 5 MG/1
5 TABLET ORAL EVERY 6 HOURS PRN
Status: DISCONTINUED | OUTPATIENT
Start: 2024-09-15 | End: 2024-09-15

## 2024-09-15 RX ORDER — CETIRIZINE HCL 5 MG
2.5 TABLET ORAL EVERY 6 HOURS PRN
Status: DISCONTINUED | OUTPATIENT
Start: 2024-09-15 | End: 2024-09-15

## 2024-09-15 RX ORDER — CETIRIZINE HYDROCHLORIDE 5 MG/1
5 TABLET ORAL EVERY 6 HOURS PRN
Status: DISCONTINUED | OUTPATIENT
Start: 2024-09-15 | End: 2024-09-17

## 2024-09-15 RX ADMIN — Medication 1 TABLET: at 07:42

## 2024-09-15 RX ADMIN — CETIRIZINE HYDROCHLORIDE 5 MG: 5 TABLET ORAL at 23:26

## 2024-09-15 RX ADMIN — EMPAGLIFLOZIN 10 MG: 10 TABLET, FILM COATED ORAL at 07:42

## 2024-09-15 RX ADMIN — RISPERIDONE 1.5 MG: 1 TABLET, FILM COATED ORAL at 21:11

## 2024-09-15 RX ADMIN — ENOXAPARIN SODIUM 40 MG: 40 INJECTION SUBCUTANEOUS at 12:49

## 2024-09-15 RX ADMIN — Medication 15 ML: at 05:34

## 2024-09-15 RX ADMIN — Medication 1 HALF-TAB: at 21:11

## 2024-09-15 RX ADMIN — METOPROLOL SUCCINATE 25 MG: 25 TABLET, EXTENDED RELEASE ORAL at 07:42

## 2024-09-15 RX ADMIN — SPIRONOLACTONE 25 MG: 25 TABLET, FILM COATED ORAL at 07:42

## 2024-09-15 RX ADMIN — THIAMINE HCL TAB 100 MG 100 MG: 100 TAB at 07:42

## 2024-09-15 RX ADMIN — CETIRIZINE HYDROCHLORIDE 5 MG: 5 TABLET ORAL at 17:48

## 2024-09-15 RX ADMIN — CLOBETASOL PROPIONATE: 0.5 CREAM TOPICAL at 19:12

## 2024-09-15 RX ADMIN — Medication 1 HALF-TAB: at 07:42

## 2024-09-15 RX ADMIN — DIPHENHYDRAMINE HYDROCHLORIDE 25 MG: 25 CAPSULE ORAL at 05:48

## 2024-09-15 RX ADMIN — RISPERIDONE 1 MG: 1 TABLET, FILM COATED ORAL at 07:42

## 2024-09-15 RX ADMIN — AMOXICILLIN 1000 MG: 500 CAPSULE ORAL at 17:48

## 2024-09-15 RX ADMIN — AMOXICILLIN 1000 MG: 500 CAPSULE ORAL at 05:49

## 2024-09-15 RX ADMIN — POLYETHYLENE GLYCOL 3350 17 G: 17 POWDER, FOR SOLUTION ORAL at 07:41

## 2024-09-15 RX ADMIN — DIGOXIN 250 MCG: 125 TABLET ORAL at 07:42

## 2024-09-15 NOTE — PLAN OF CARE
9/14/2024 @ [id number]    Neuro: A&O x 4. SBA. Autistic, able to make needs known.   Cardiac: ST, -130s. SBP 90-100s. Denies chest pain.   GI/: LBM 9/14/2024. Low sat. fat. <2.4g. Primofit overnight; taken off @ 0530 per pt, adequate output.    Skin: Rash/Petechiae scattered, PRN benadryl given x 2.  Pain: Denies.   Labs: No replacements needed, next draw tomorrow AM 9/16/2024.   LDA's: L TL PICC, HL.     All other body systems WDL.     Plan: Continue with POC; possible disposition Monday 9/16/2024 to group home, pending placement. Notify care team of any changes.     For vital signs and complete assessments, please see documentation flowsheets.      Halina Barros RN   Cardiology     Problem: Adult Inpatient Plan of Care  Goal: Plan of Care Review  Outcome: Progressing

## 2024-09-15 NOTE — PROGRESS NOTES
"Full          Hidalgo \"malka\"           Cards 2  [facility]          8/20  24ys     Dx: pneumonia, intubated, septic shock transferred here from [place] with HR dx with EF 15%     Hx: autism     Neuro: AOx4, calm and cooperative              Autistic, can make needs known              SBA- up in chair/amb in [place]              Denies pain     Cardiac: ST                  lovenox     Respiratory: Room air     GI/: Voids spontaneously via primafit at night. Last BM 9/15              Cardiac diet. Good appetite     Skin: rash to ble--cream for itching (zyrtec prn) Cont amoxicillin     IV's:  TL PICC (orders to remove, patient and family decline/ MD aware)     K/MG/Phos replacement--0500     Plan: here til Monday looking for group home with higher acquity    **mother is never to be left in room alone with patient**           "

## 2024-09-15 NOTE — PLAN OF CARE
Problem: Adult Inpatient Plan of Care  Goal: Plan of Care Review  Description: The Plan of Care Review/Shift note should be completed every shift.  The Outcome Evaluation is a brief statement about your assessment that the patient is improving, declining, or no change.  This information will be displayed automatically on your shift  note.  Outcome: Progressing   Goal Outcome Evaluation:      Plan of Care Reviewed With: family, patient    Overall Patient Progress: improvingOverall Patient Progress: improving

## 2024-09-15 NOTE — PROGRESS NOTES
[facility]    Cardiology Progress Note- Cardiology 2        Date of Admission:  8/20/2024     Assessment & Plan: HVSL   25 yo M with PMHx autism  who was transferred to [facility] on 8/20 for decompensated HFrEF (15%) 2/2 suspected myocarditis. Hospital course was complicated by cardiogenic shock s/p vasopressors and IABP (8/27-9/1). Optimized on GDMT, discharge pending group home placement.    Changes today:  - Awaiting group home placement   - Will trial managing rash with cetrizine and clobetasol cream PRN    ACTIVE PROBLEMS:  # Biventricular failure with severely reduce left ventricular function with RV recovery   # Cardiogenic shock SCAI C s/p IABP 8/27-9/1  TTE (8/21/24): LVIDd 58mm. Biplane LVEF is 16%. Severe hypokinesis of mid to apical RV free wall.  RHC (8/21/24): RA mean 6, PA 30/23/26, PCWP 18, Wayne CO/CI 5.0/2.1, PVR 1.6, SVR 1270  EMB (8/21/24): Fragments of essentially unremarkable myocardial tissue with no evidence of inflammatory infiltrate   Coronary angiogram: NA   CMR: Mildly dilated left ventricle with severely reduced left ventricular function, LVEF 15%.  Normal right ventricular size and systolic function, RVEF 46%. No myocardial edema or replacement fibrosis or MRI features of acute myocarditis.  Completed prednisone taper. Remains off dobutamine with stable hemodynamics.  - Plan for discharge with low-dose diuresis as needed for hypervolemia  - GDMT  Metoprolol succinate 12.5mg every day   Entresto 12/13 mg BID   Spironolactone 25 mg daily  Empagliflozin 10 mg daily   - Digoxin 250mg qd  - Finished prednisone 5mg taper    # Sinus tachycardia  Suspect compensatory in the setting of LV dysfunction. RHC (9/11) demonstrated hypovolemia, which could potentially have contributed to tachycardia with exertion. Plan for discharge for cardiac rehab; outpatient vs TCU pending final PT/OT recs.   - Outpatient cardiac rehab    # Rash  Nursing noted rash on  bilateral lower extremities 9/12; ongoing 9/15 (on bilateral feet, hands, and upper back; pictures in chart). Discussed with pharmacy; based on timing and characteristics of rash, do suspect medication-associated with amoxicillin. Will trial pretreatment with antihistamines/steroid topical ointment.   - Cetrizine 5 mg q6H PRN (can give 30 minutes prior to amoxicillin as needed for rash)  - Clobetasol ointment BID PRN  - Monitor symptoms    # + Strep mitis in 1/2 bottles, suspected comtaminant  Blood culture from 9/5 grew strep mitis in 1/2 bottles. Repeat blood culture on 9/6 NGTD. Suspect contaminant. Stopped antibiotics as of 9/7. ID noted ultimate recommendation to complete preventative two-week course of antibiotics with amoxicillin.  - Antibiotics:   > s/p Vanc 9/6   > s/p Ceftriaxone 9/6 - 9/7   > Discharge with Amoxicillin (9/11-9/25)    STABLE/RESOLVED PROBLEMS:  # Hypotension, resolved  Became hypotensive 9/8 AM after ambulating to bathroom. Asymptomatic. Tachycardic up to 160s from previous 130s (see below for discussion of sinus tachycardia). Suspect slight overdiuresis and hypovolemia given continued net negative volume status over last several days on oral diuretics.  Now resolved.     # Concern for aspiration pneumonia, resolved  # Recurrent fevers of unclear etiology, resolved  - Prior antibiotics       > Unasyn 8/21 - 8/26       > Cefepime 8/20 - 8/21       > Azithromycin 8/20 - 08/24       > Vanco 8/20 - 8/21 s/p 8/26       > meropenem 8/26-9/1       > zosyn 8/26 - 8/28    # Altered mental status, resolved  - c/w risperidone   - case was discussed with psychiatry regarding his medication and relation to cardiac decompensation given no clear culprit. However, given no safe alternative we would suggest continuation of risperidone as he has been on this medication for years without recent dose change and lack of evidence for risperidone to cause fulminant heart failure at the movement.     # Elevated  "transaminases - resolved   Improving. Imaging showed hepatomegaly with increased echogenicity, possible intrinsic parenchymal disease such as steatosis.   - Trend labs    # Hypernatremia, resolved  # Hypervolemia, controlled  # Lactic acidosis, resolved  - Trend BMP  - Diuresis as above    # Acute hypoxemic respiratory failure, resolved  # Pleural effusion, resolved  See above for treatment of suspected aspiration pneumonia.     # Hyperglycemia, resolved  Likely related to stress and prednisone therapy. No history of diabetes. A1c was 5.6% on 8/23/2024.        Diet: Snacks/Supplements Adult: Ensure Enlive; Between Meals  Regular Diet Adult    DVT Prophylaxis: Enoxaparin (Lovenox) SQ  Swan Catheter: Not present  Cardiac Monitoring: None  Code Status: Full Code          Clinically Significant Risk Factors              # Hypoalbuminemia: Lowest albumin = 3.2 g/dL at 8/23/2024 12:52 PM, will monitor as appropriate        # End stage heart failure: Ventricular assist device (VAD) present          # Overweight: Estimated body mass index is 28.11 kg/m  as calculated from the following:    Height as of this encounter: 1.86 m (6' 1.23\").    Weight as of this encounter: 97.3 kg (214 lb 6.4 oz).   # Moderate Malnutrition: based on nutrition assessment      # Financial/Environmental Concerns: none        Thank you for allowing me to care for this patient, please don't hesitate to contact me with any questions regarding this plan.     Pt was discussed and evaluated with Jani Herr MD, attending physician, who agrees with the assessment and plan above.    Merline Rubio MD  PGY-2 Internal Medicine  [facility]  ______________________________________________________________________    Interval History   Rash is progressive, affecting bilateral feet, hands, and upper back. Concern from patient and Aunt that he is having allergic response to amoxicillin. Otherwise feeling ok.    Physical Exam   Vital Signs: Temp: " 98.5  F (36.9  C) Temp src: Oral BP: 92/58 Pulse: (!) 138   Resp: 18 SpO2: 100 % O2 Device: None (Room air)    Weight: 214 lbs 6.4 oz    Gen: No acute distress, sitting up in chair  HEENT: Normocephalic, atraumatic  CV: Regular rhythm with tachycardia, no murmurs  Pulm: CTAB, non-labored breathing on room air  Abd: Soft, non-tender to palpation  Extr: No significant pitting edema in bilateral lower extremities  Skin: New hives in bilateral feet, erythema on bilateral hands/upper back  Neuro: Alert and conversant    Medical Decision Making       Please see A&P for additional details of medical decision making.      Data   ------------------------- PAST 24 HR DATA REVIEWED -----------------------------------------------    I have personally reviewed the following data over the past 24 hrs:    6.4  \   10.2 (L)   / 287     139 104 9.2 /  102 (H)   4.0 26 0.49 (L) \     ALT: 42 AST: 18 AP: 84 TBILI: 0.2   ALB: 3.5 TOT PROTEIN: 5.8 (L) LIPASE: N/A       Imaging results reviewed over the past 24 hrs:   No results found for this or any previous visit (from the past 24 hour(s)).

## 2024-09-16 ENCOUNTER — APPOINTMENT (OUTPATIENT)
Dept: PHYSICAL THERAPY | Facility: CLINIC | Age: 24
DRG: 853 | End: 2024-09-16
Attending: INTERNAL MEDICINE
Payer: MEDICARE

## 2024-09-16 LAB
ALBUMIN SERPL BCG-MCNC: 3.8 G/DL (ref 3.5–5.2)
ALP SERPL-CCNC: 89 U/L (ref 40–150)
ALT SERPL W P-5'-P-CCNC: 40 U/L (ref 0–70)
ANION GAP SERPL CALCULATED.3IONS-SCNC: 11 MMOL/L (ref 7–15)
ANION GAP SERPL CALCULATED.3IONS-SCNC: 12 MMOL/L (ref 7–15)
AST SERPL W P-5'-P-CCNC: 19 U/L (ref 0–45)
BASOPHILS # BLD AUTO: 0 10E3/UL (ref 0–0.2)
BASOPHILS NFR BLD AUTO: 0 %
BILIRUB DIRECT SERPL-MCNC: <0.2 MG/DL (ref 0–0.3)
BILIRUB SERPL-MCNC: 0.3 MG/DL
BUN SERPL-MCNC: 11.2 MG/DL (ref 6–20)
BUN SERPL-MCNC: 9.5 MG/DL (ref 6–20)
CA-I BLD-MCNC: 4.7 MG/DL (ref 4.4–5.2)
CALCIUM SERPL-MCNC: 8.8 MG/DL (ref 8.8–10.4)
CALCIUM SERPL-MCNC: 8.9 MG/DL (ref 8.8–10.4)
CHLORIDE SERPL-SCNC: 103 MMOL/L (ref 98–107)
CHLORIDE SERPL-SCNC: 105 MMOL/L (ref 98–107)
CREAT SERPL-MCNC: 0.51 MG/DL (ref 0.67–1.17)
CREAT SERPL-MCNC: 0.52 MG/DL (ref 0.67–1.17)
EGFRCR SERPLBLD CKD-EPI 2021: >90 ML/MIN/1.73M2
EGFRCR SERPLBLD CKD-EPI 2021: >90 ML/MIN/1.73M2
EOSINOPHIL # BLD AUTO: 0.3 10E3/UL (ref 0–0.7)
EOSINOPHIL NFR BLD AUTO: 5 %
ERYTHROCYTE [DISTWIDTH] IN BLOOD BY AUTOMATED COUNT: 15.3 % (ref 10–15)
GLUCOSE SERPL-MCNC: 101 MG/DL (ref 70–99)
GLUCOSE SERPL-MCNC: 99 MG/DL (ref 70–99)
HCO3 SERPL-SCNC: 25 MMOL/L (ref 22–29)
HCO3 SERPL-SCNC: 27 MMOL/L (ref 22–29)
HCT VFR BLD AUTO: 33.3 % (ref 40–53)
HGB BLD-MCNC: 10.7 G/DL (ref 13.3–17.7)
IMM GRANULOCYTES # BLD: 0.1 10E3/UL
IMM GRANULOCYTES NFR BLD: 1 %
LYMPHOCYTES # BLD AUTO: 2 10E3/UL (ref 0.8–5.3)
LYMPHOCYTES NFR BLD AUTO: 30 %
MAGNESIUM SERPL-MCNC: 2.2 MG/DL (ref 1.7–2.3)
MCH RBC QN AUTO: 29.3 PG (ref 26.5–33)
MCHC RBC AUTO-ENTMCNC: 32.1 G/DL (ref 31.5–36.5)
MCV RBC AUTO: 91 FL (ref 78–100)
MONOCYTES # BLD AUTO: 0.9 10E3/UL (ref 0–1.3)
MONOCYTES NFR BLD AUTO: 13 %
NEUTROPHILS # BLD AUTO: 3.5 10E3/UL (ref 1.6–8.3)
NEUTROPHILS NFR BLD AUTO: 51 %
NRBC # BLD AUTO: 0 10E3/UL
NRBC BLD AUTO-RTO: 0 /100
PHOSPHATE SERPL-MCNC: 4.5 MG/DL (ref 2.5–4.5)
PLATELET # BLD AUTO: 285 10E3/UL (ref 150–450)
POTASSIUM SERPL-SCNC: 4 MMOL/L (ref 3.4–5.3)
POTASSIUM SERPL-SCNC: 4.4 MMOL/L (ref 3.4–5.3)
PROT SERPL-MCNC: 6.3 G/DL (ref 6.4–8.3)
RBC # BLD AUTO: 3.65 10E6/UL (ref 4.4–5.9)
SODIUM SERPL-SCNC: 139 MMOL/L (ref 135–145)
SODIUM SERPL-SCNC: 144 MMOL/L (ref 135–145)
WBC # BLD AUTO: 6.8 10E3/UL (ref 4–11)

## 2024-09-16 PROCEDURE — 83735 ASSAY OF MAGNESIUM: CPT | Performed by: INTERNAL MEDICINE

## 2024-09-16 PROCEDURE — 250N000013 HC RX MED GY IP 250 OP 250 PS 637: Performed by: INTERNAL MEDICINE

## 2024-09-16 PROCEDURE — 250N000011 HC RX IP 250 OP 636: Performed by: STUDENT IN AN ORGANIZED HEALTH CARE EDUCATION/TRAINING PROGRAM

## 2024-09-16 PROCEDURE — 80053 COMPREHEN METABOLIC PANEL: CPT | Performed by: STUDENT IN AN ORGANIZED HEALTH CARE EDUCATION/TRAINING PROGRAM

## 2024-09-16 PROCEDURE — 85025 COMPLETE CBC W/AUTO DIFF WBC: CPT | Performed by: STUDENT IN AN ORGANIZED HEALTH CARE EDUCATION/TRAINING PROGRAM

## 2024-09-16 PROCEDURE — 250N000013 HC RX MED GY IP 250 OP 250 PS 637: Performed by: STUDENT IN AN ORGANIZED HEALTH CARE EDUCATION/TRAINING PROGRAM

## 2024-09-16 PROCEDURE — 82310 ASSAY OF CALCIUM: CPT | Performed by: STUDENT IN AN ORGANIZED HEALTH CARE EDUCATION/TRAINING PROGRAM

## 2024-09-16 PROCEDURE — 97110 THERAPEUTIC EXERCISES: CPT | Mod: GP

## 2024-09-16 PROCEDURE — 250N000013 HC RX MED GY IP 250 OP 250 PS 637

## 2024-09-16 PROCEDURE — 84100 ASSAY OF PHOSPHORUS: CPT | Performed by: INTERNAL MEDICINE

## 2024-09-16 PROCEDURE — 82330 ASSAY OF CALCIUM: CPT | Performed by: STUDENT IN AN ORGANIZED HEALTH CARE EDUCATION/TRAINING PROGRAM

## 2024-09-16 PROCEDURE — 120N000003 HC R&B IMCU UMMC

## 2024-09-16 PROCEDURE — 82248 BILIRUBIN DIRECT: CPT

## 2024-09-16 RX ADMIN — ENOXAPARIN SODIUM 40 MG: 40 INJECTION SUBCUTANEOUS at 11:54

## 2024-09-16 RX ADMIN — CETIRIZINE HYDROCHLORIDE 5 MG: 5 TABLET ORAL at 05:00

## 2024-09-16 RX ADMIN — HEPARIN, PORCINE (PF) 10 UNIT/ML INTRAVENOUS SYRINGE 5 ML: at 17:37

## 2024-09-16 RX ADMIN — Medication 5 ML: at 05:17

## 2024-09-16 RX ADMIN — AMOXICILLIN 1000 MG: 500 CAPSULE ORAL at 05:35

## 2024-09-16 RX ADMIN — Medication 1 HALF-TAB: at 20:33

## 2024-09-16 RX ADMIN — EMPAGLIFLOZIN 10 MG: 10 TABLET, FILM COATED ORAL at 09:15

## 2024-09-16 RX ADMIN — RISPERIDONE 1.5 MG: 1 TABLET, FILM COATED ORAL at 20:33

## 2024-09-16 RX ADMIN — CETIRIZINE HYDROCHLORIDE 5 MG: 5 TABLET ORAL at 23:34

## 2024-09-16 RX ADMIN — RISPERIDONE 1 MG: 1 TABLET, FILM COATED ORAL at 09:15

## 2024-09-16 RX ADMIN — AMOXICILLIN 1000 MG: 500 CAPSULE ORAL at 12:44

## 2024-09-16 RX ADMIN — AMOXICILLIN 1000 MG: 500 CAPSULE ORAL at 00:06

## 2024-09-16 RX ADMIN — CETIRIZINE HYDROCHLORIDE 5 MG: 5 TABLET ORAL at 11:52

## 2024-09-16 RX ADMIN — AMOXICILLIN 1000 MG: 500 CAPSULE ORAL at 18:20

## 2024-09-16 RX ADMIN — CETIRIZINE HYDROCHLORIDE 5 MG: 5 TABLET ORAL at 17:33

## 2024-09-16 RX ADMIN — THIAMINE HCL TAB 100 MG 100 MG: 100 TAB at 09:14

## 2024-09-16 RX ADMIN — DIGOXIN 250 MCG: 125 TABLET ORAL at 09:16

## 2024-09-16 RX ADMIN — Medication 1 TABLET: at 09:16

## 2024-09-16 RX ADMIN — CLOBETASOL PROPIONATE: 0.5 CREAM TOPICAL at 18:22

## 2024-09-16 ASSESSMENT — ACTIVITIES OF DAILY LIVING (ADL)
ADLS_ACUITY_SCORE: 31
ADLS_ACUITY_SCORE: 35
ADLS_ACUITY_SCORE: 31

## 2024-09-16 NOTE — PROGRESS NOTES
Care Management Follow Up    Length of Stay (days): 27    Expected Discharge Date: TBD     Concerns to be Addressed: discharge planning  Patient plan of care discussed at interdisciplinary rounds: Yes    Anticipated Discharge Disposition: Group Home     Anticipated Discharge Services: TBD  Anticipated Discharge DME: None anticipated    Patient/family educated on Medicare website which has current facility and service quality ratings: N/A  Education Provided on the Discharge Plan: Yes, with guardian and aunt  Patient/Family in Agreement with the Plan: Yes    Additional Information:  This writer took part in a discharge planning conference. The message took place via video conference. Those in attendance included:  Pt's legal guardian, brother, Johann Hidalgo  Pt's aunt, [facility], [facility] , Tri (pt's DD waiver CM)  [facility] Supervisor, Sabrina Grullon  [facility] , Alia [facility]  [facility] [place] Nurse, Lydia [facility]    Meeting started with introductions and an update that pt remains medically stable for discharge and has continued to make progress with PT. Per PT, pt now requiring only verbal cues to complete ADLs which he required prior to admission. PT recommending home with OP CR, anticipate pt is at his functional baseline. Per cardiology, pt with ongoing sinus tachycardia which they suspect to be compensatory for his LV dysfunction and no further interventions, pt hemodynamically stable.    Tri and Sami confirmed they received a copy of the service suspension and letter of termination from the  on Friday. Group home citing that they are unable to meet pt's current physical and medical needs.    Tri discussed that new  placement can take weeks (or months) and that she will start to send referrals. Tri inquired about the possibility of TCU placement. This writer discussed that pt has progressed beyond having a skilled need for  LON Gallardo, the [facility] requested that [facility] group home reassess as due to pt's progress they may still be able to meet his needs. Discussed that pt has resided there for quite some time and that they already have services and funding in place for him there.     Pt's aunt, Guerda discussed that she would be willing to care for pt locally for 7-10 days if that was helpful but at some point soon she needs to return home to [place], [place]. [facility] staff discussed that this would likely not be enough time to secure new placement if needed.     Guerda also continuing to work with the court to try to expedite the guardianship case in progress. Per Johann and Guerda, the long term goal is still to transfer guardianship to Margaret and for pt to return to [place] and live with her. They are hoping to have a decision in the next couple weeks, but this could take up to 90 days if not expedited.     Plan:  1.Continue PT   2.[facility] staff to re-discuss case with   3.[facility] Case Management initiating new group home referrals  4. Next discharge planning meeting scheduled for Wednesday 9/18 at 1:30 pm with the same people requested to attend.      CC will continue to monitor patient's medical condition and progress towards discharge.  Kristen Flynn RN BSN  6C Unit Care Coordinator  Phone number: [phone]    SEARCHABLE in [facility] - search CARE COORDINATOR      [place] ([phone]) Saturday & Sunday; ([phone]) [facility] Recognized Holidays      Units: 5A Onc Vocera & 5C Vocera      Units: 6B Vocera & 6C Vocera       Units: 7A SOT RNCC Vocera, 7B Med Surg Vocera, & 7C Med Surg Vocera      Units: 6A Vocera & 4A CVICU Vocera, 4C MICU Vocera, and 4E SICU Vocera       Units: 5 Ortho Vocera & 5 Med Surg Vocera      Units: 6 Med Surg Vocera & 8 Med Surg Vocera

## 2024-09-16 NOTE — PLAN OF CARE
"[id number] Goal Outcome Evaluation:         Overall Patient Progress: no change    VS:  BP (!) 89/55 (BP Location: Left arm)   Pulse (!) 121   Temp 99  F (37.2  C) (Axillary)   Resp 16   Ht 1.86 m (6' 1.23\")   Wt 97.1 kg (214 lb)   SpO2 94%   BMI 28.06 kg/m      Cardiac:  Sinus tachy w/ -144, can jump go up to 150s w/ activity, soft BP /42-70, denied CP or palpitation   Respiratory:  Sating >93% on RA, denied SOB, LSCTA   GI/:  No BM this shift, up to bathroom, voids w/o difficulty   Neuro:  A&O x4, denied new numbness or tingling   Pain/PRN:  Pt denied; PRN Zyrtec given prior to scheduled amoxicillin   Activity:  SBA-Ind, ambulating ind in room   Skin:  Pls see flowsheet for details   Dressing:  L PICC dressing CDI   Diet:  Reg w/ thin; denied N/V   LDA:  L TL PICC HL   Plan:  Cont POC:   SW following pt to help w/ group home discharge, next discharge planning scheduled 9/18 @ 1330   Additional Info:  Leelee above protocol, no replacement needed this shift, recheck in AM       "

## 2024-09-16 NOTE — PROGRESS NOTES
D: 8/20 Septic/Cardiogenic shock with worsening renal and liver failure requiring IABP placement.     I: Monitored vitals and assessed pt status.   Changed: Afebrile, -150's depending on activity. Autistic, A&O*4. Primafit placed @ HS per pt request. Needs help using urinal. Rash to bilat arms, legs, feet fading, topicals applied. Denies pain or nausea. Pt slept all night. No acute events. Waiting for group home placement for higher acuity of care  Running: none  PRN: none    A: Neuro: A&O*4  Tele: -150's  Lung: RA  GI: Last BM 9/15  : Urinal during day with help. Primofit at bedtime. Adequate urine output.  Skin: Rash to bilat arms, legs, feet, chest. Appears to be fading. Topicals applied  Pain: Denies  Independent/SBA    I/O this shift:  In: 354 [P.O.:354]  Out: 900 [Urine:900]    Temp:  [97  F (36.1  C)-98.5  F (36.9  C)] 97.6  F (36.4  C)  Pulse:  [112-138] 112  Resp:  [16-18] 18  BP: ()/(48-69) 98/61  SpO2:  [96 %-100 %] 98 %      P: Continue to monitor Pt status and report changes to treatment team.

## 2024-09-16 NOTE — PROGRESS NOTES
[facility]    Cardiology Progress Note- Cardiology 2        Date of Admission:  8/20/2024     Assessment & Plan: HVSL   25 yo M with PMHx autism  who was transferred to [facility] on 8/20 for decompensated HFrEF (15%) 2/2 suspected myocarditis. Hospital course was complicated by cardiogenic shock s/p vasopressors and IABP (8/27-9/1). Optimized on GDMT, discharge pending group home placement.    Changes today:  - Awaiting group home placement     ACTIVE PROBLEMS:  # Biventricular failure with severely reduce left ventricular function with RV recovery   # Cardiogenic shock SCAI C s/p IABP 8/27-9/1  TTE (8/21/24): LVIDd 58mm. Biplane LVEF is 16%. Severe hypokinesis of mid to apical RV free wall.  RHC (8/21/24): RA mean 6, PA 30/23/26, PCWP 18, [place] CO/CI 5.0/2.1, PVR 1.6, SVR 1270  EMB (8/21/24): Fragments of essentially unremarkable myocardial tissue with no evidence of inflammatory infiltrate   Coronary angiogram: NA   CMR: Mildly dilated left ventricle with severely reduced left ventricular function, LVEF 15%.  Normal right ventricular size and systolic function, RVEF 46%. No myocardial edema or replacement fibrosis or MRI features of acute myocarditis.  Completed prednisone taper. Remains off dobutamine with stable hemodynamics.  - Plan for discharge with low-dose diuresis as needed for hypervolemia  - GDMT  Metoprolol succinate 12.5mg every day   Entresto 12/13 mg BID   Spironolactone 25 mg daily  Empagliflozin 10 mg daily   - Digoxin 250mg qd    # Sinus tachycardia  Suspect compensatory in the setting of LV dysfunction. RHC (9/11) demonstrated hypovolemia, which could potentially have contributed to tachycardia with exertion. Plan for discharge for cardiac rehab; outpatient vs TCU pending final PT/OT recs.   - Outpatient cardiac rehab    # Rash, stable  Nursing noted rash on bilateral lower extremities 9/12; ongoing 9/15 (on bilateral feet, hands, and upper back;  pictures in chart). Discussed with pharmacy; based on timing and characteristics of rash, suspect medication-associated rash secondary to amoxicillin. Will trial pretreatment with antihistamines/steroid topical ointment.   - Continue cetrizine 5 mg q6H PRN (can give 30 minutes prior to amoxicillin as needed for rash)  - Continue clobetasol ointment BID PRN  - Monitor symptoms    # + Strep mitis in 1/2 bottles, suspected comtaminant  Blood culture from 9/5 grew strep mitis in 1/2 bottles. Repeat blood culture on 9/6 NGTD. Suspect contaminant. Stopped antibiotics as of 9/7. ID noted ultimate recommendation to complete preventative two-week course of antibiotics with amoxicillin.  - Antibiotics:   > s/p Vanc 9/6   > s/p Ceftriaxone 9/6 - 9/7   > Discharge with Amoxicillin (9/11-9/25)    STABLE/RESOLVED PROBLEMS:  # Hypotension, resolved  Became hypotensive 9/8 AM after ambulating to bathroom. Asymptomatic. Tachycardic up to 160s from previous 130s (see below for discussion of sinus tachycardia). Suspect slight overdiuresis and hypovolemia given continued net negative volume status over last several days on oral diuretics.  Now resolved.     # Concern for aspiration pneumonia, resolved  # Recurrent fevers of unclear etiology, resolved  - Prior antibiotics       > Unasyn 8/21 - 8/26       > Cefepime 8/20 - 8/21       > Azithromycin 8/20 - 08/24       > Vanco 8/20 - 8/21 s/p 8/26       > meropenem 8/26-9/1       > zosyn 8/26 - 8/28    # Altered mental status, resolved  - c/w risperidone   - case was discussed with psychiatry regarding his medication and relation to cardiac decompensation given no clear culprit. However, given no safe alternative we would suggest continuation of risperidone as he has been on this medication for years without recent dose change and lack of evidence for risperidone to cause fulminant heart failure at the movement.     # Elevated transaminases - resolved   Improving. Imaging showed  "hepatomegaly with increased echogenicity, possible intrinsic parenchymal disease such as steatosis.   - Trend labs    # Hypernatremia, resolved  # Hypervolemia, controlled  # Lactic acidosis, resolved  - Trend BMP  - Diuresis as above    # Acute hypoxemic respiratory failure, resolved  # Pleural effusion, resolved  See above for treatment of suspected aspiration pneumonia.     # Hyperglycemia, resolved  Likely related to stress and prednisone therapy. No history of diabetes. A1c was 5.6% on 8/23/2024.        Diet: Snacks/Supplements Adult: Ensure Enlive; Between Meals  Regular Diet Adult    DVT Prophylaxis: Enoxaparin (Lovenox) SQ  Swan Catheter: Not present  Cardiac Monitoring: None  Code Status: Full Code          Clinically Significant Risk Factors              # Hypoalbuminemia: Lowest albumin = 3.2 g/dL at 8/23/2024 12:52 PM, will monitor as appropriate        # End stage heart failure: Ventricular assist device (VAD) present          # Overweight: Estimated body mass index is 28.06 kg/m  as calculated from the following:    Height as of this encounter: 1.86 m (6' 1.23\").    Weight as of this encounter: 97.1 kg (214 lb).   # Moderate Malnutrition: based on nutrition assessment      # Financial/Environmental Concerns: none        Thank you for allowing me to care for this patient, please don't hesitate to contact me with any questions regarding this plan.     Pt was discussed and evaluated with Jani Herr MD, attending physician, who agrees with the assessment and plan above.    Merline Rubio MD  PGY-2 Internal Medicine  [facility]  ______________________________________________________________________    Interval History   Reports that he is feeling ok.     Physical Exam   Vital Signs: Temp: 99  F (37.2  C) Temp src: Axillary BP: (!) 89/55 Pulse: (!) 121   Resp: 16 SpO2: 94 % O2 Device: None (Room air)    Weight: 214 lbs 0 oz    Gen: No acute distress, sitting up in chair  HEENT: " Normocephalic, atraumatic  CV: Regular rhythm with tachycardia, no murmurs  Pulm: CTAB, non-labored breathing on room air  Abd: Soft, non-tender to palpation  Extr: No significant pitting edema in bilateral lower extremities  Skin: New hives in bilateral feet, erythema on bilateral hands/upper back  Neuro: Alert and conversant    Medical Decision Making       Please see A&P for additional details of medical decision making.      Data   ------------------------- PAST 24 HR DATA REVIEWED -----------------------------------------------    I have personally reviewed the following data over the past 24 hrs:    6.8  \   10.7 (L)   / 285     139 103 9.5 /  101 (H)   4.4 25 0.51 (L) \     ALT: 40 AST: 19 AP: 89 TBILI: 0.3   ALB: 3.8 TOT PROTEIN: 6.3 (L) LIPASE: N/A       Imaging results reviewed over the past 24 hrs:   No results found for this or any previous visit (from the past 24 hour(s)).

## 2024-09-17 LAB
ALBUMIN SERPL BCG-MCNC: 3.7 G/DL (ref 3.5–5.2)
ALP SERPL-CCNC: 81 U/L (ref 40–150)
ALT SERPL W P-5'-P-CCNC: 36 U/L (ref 0–70)
ANION GAP SERPL CALCULATED.3IONS-SCNC: 12 MMOL/L (ref 7–15)
ANION GAP SERPL CALCULATED.3IONS-SCNC: 14 MMOL/L (ref 7–15)
AST SERPL W P-5'-P-CCNC: 17 U/L (ref 0–45)
BASOPHILS # BLD AUTO: 0 10E3/UL (ref 0–0.2)
BASOPHILS NFR BLD AUTO: 1 %
BILIRUB DIRECT SERPL-MCNC: <0.2 MG/DL (ref 0–0.3)
BILIRUB SERPL-MCNC: 0.2 MG/DL
BUN SERPL-MCNC: 9.5 MG/DL (ref 6–20)
BUN SERPL-MCNC: 9.8 MG/DL (ref 6–20)
CA-I BLD-MCNC: 4.7 MG/DL (ref 4.4–5.2)
CALCIUM SERPL-MCNC: 8.7 MG/DL (ref 8.8–10.4)
CALCIUM SERPL-MCNC: 9.1 MG/DL (ref 8.8–10.4)
CHLORIDE SERPL-SCNC: 102 MMOL/L (ref 98–107)
CHLORIDE SERPL-SCNC: 105 MMOL/L (ref 98–107)
CREAT SERPL-MCNC: 0.49 MG/DL (ref 0.67–1.17)
CREAT SERPL-MCNC: 0.54 MG/DL (ref 0.67–1.17)
EGFRCR SERPLBLD CKD-EPI 2021: >90 ML/MIN/1.73M2
EGFRCR SERPLBLD CKD-EPI 2021: >90 ML/MIN/1.73M2
EOSINOPHIL # BLD AUTO: 0.3 10E3/UL (ref 0–0.7)
EOSINOPHIL NFR BLD AUTO: 5 %
ERYTHROCYTE [DISTWIDTH] IN BLOOD BY AUTOMATED COUNT: 15.1 % (ref 10–15)
GLUCOSE SERPL-MCNC: 122 MG/DL (ref 70–99)
GLUCOSE SERPL-MCNC: 99 MG/DL (ref 70–99)
HCO3 SERPL-SCNC: 25 MMOL/L (ref 22–29)
HCO3 SERPL-SCNC: 26 MMOL/L (ref 22–29)
HCT VFR BLD AUTO: 32.4 % (ref 40–53)
HGB BLD-MCNC: 10.3 G/DL (ref 13.3–17.7)
IMM GRANULOCYTES # BLD: 0 10E3/UL
IMM GRANULOCYTES NFR BLD: 1 %
LYMPHOCYTES # BLD AUTO: 1.7 10E3/UL (ref 0.8–5.3)
LYMPHOCYTES NFR BLD AUTO: 27 %
MAGNESIUM SERPL-MCNC: 2.2 MG/DL (ref 1.7–2.3)
MCH RBC QN AUTO: 28.9 PG (ref 26.5–33)
MCHC RBC AUTO-ENTMCNC: 31.8 G/DL (ref 31.5–36.5)
MCV RBC AUTO: 91 FL (ref 78–100)
MONOCYTES # BLD AUTO: 0.6 10E3/UL (ref 0–1.3)
MONOCYTES NFR BLD AUTO: 10 %
NEUTROPHILS # BLD AUTO: 3.6 10E3/UL (ref 1.6–8.3)
NEUTROPHILS NFR BLD AUTO: 56 %
NRBC # BLD AUTO: 0 10E3/UL
NRBC BLD AUTO-RTO: 0 /100
PHOSPHATE SERPL-MCNC: 4.7 MG/DL (ref 2.5–4.5)
PLATELET # BLD AUTO: 268 10E3/UL (ref 150–450)
POTASSIUM SERPL-SCNC: 3.7 MMOL/L (ref 3.4–5.3)
POTASSIUM SERPL-SCNC: 4 MMOL/L (ref 3.4–5.3)
PROT SERPL-MCNC: 6.1 G/DL (ref 6.4–8.3)
RBC # BLD AUTO: 3.57 10E6/UL (ref 4.4–5.9)
SODIUM SERPL-SCNC: 142 MMOL/L (ref 135–145)
SODIUM SERPL-SCNC: 142 MMOL/L (ref 135–145)
WBC # BLD AUTO: 6.3 10E3/UL (ref 4–11)

## 2024-09-17 PROCEDURE — 84100 ASSAY OF PHOSPHORUS: CPT | Performed by: INTERNAL MEDICINE

## 2024-09-17 PROCEDURE — 250N000013 HC RX MED GY IP 250 OP 250 PS 637

## 2024-09-17 PROCEDURE — 250N000013 HC RX MED GY IP 250 OP 250 PS 637: Performed by: INTERNAL MEDICINE

## 2024-09-17 PROCEDURE — 120N000003 HC R&B IMCU UMMC

## 2024-09-17 PROCEDURE — 82248 BILIRUBIN DIRECT: CPT

## 2024-09-17 PROCEDURE — 250N000011 HC RX IP 250 OP 636: Performed by: STUDENT IN AN ORGANIZED HEALTH CARE EDUCATION/TRAINING PROGRAM

## 2024-09-17 PROCEDURE — 80053 COMPREHEN METABOLIC PANEL: CPT | Performed by: STUDENT IN AN ORGANIZED HEALTH CARE EDUCATION/TRAINING PROGRAM

## 2024-09-17 PROCEDURE — 250N000013 HC RX MED GY IP 250 OP 250 PS 637: Performed by: STUDENT IN AN ORGANIZED HEALTH CARE EDUCATION/TRAINING PROGRAM

## 2024-09-17 PROCEDURE — 82330 ASSAY OF CALCIUM: CPT | Performed by: STUDENT IN AN ORGANIZED HEALTH CARE EDUCATION/TRAINING PROGRAM

## 2024-09-17 PROCEDURE — 83735 ASSAY OF MAGNESIUM: CPT | Performed by: INTERNAL MEDICINE

## 2024-09-17 PROCEDURE — 250N000013 HC RX MED GY IP 250 OP 250 PS 637: Performed by: NURSE PRACTITIONER

## 2024-09-17 PROCEDURE — 85025 COMPLETE CBC W/AUTO DIFF WBC: CPT | Performed by: STUDENT IN AN ORGANIZED HEALTH CARE EDUCATION/TRAINING PROGRAM

## 2024-09-17 RX ORDER — POTASSIUM CHLORIDE 750 MG/1
20 TABLET, EXTENDED RELEASE ORAL ONCE
Status: COMPLETED | OUTPATIENT
Start: 2024-09-17 | End: 2024-09-17

## 2024-09-17 RX ORDER — CEFADROXIL 500 MG/1
1000 CAPSULE ORAL EVERY 12 HOURS SCHEDULED
Status: DISCONTINUED | OUTPATIENT
Start: 2024-09-17 | End: 2024-09-18

## 2024-09-17 RX ORDER — CETIRIZINE HCL 5 MG
2.5 TABLET ORAL EVERY 6 HOURS PRN
Status: DISCONTINUED | OUTPATIENT
Start: 2024-09-17 | End: 2024-09-19 | Stop reason: HOSPADM

## 2024-09-17 RX ORDER — CEFTRIAXONE 2 G/1
2 INJECTION, POWDER, FOR SOLUTION INTRAMUSCULAR; INTRAVENOUS EVERY 24 HOURS
Status: DISCONTINUED | OUTPATIENT
Start: 2024-09-17 | End: 2024-09-17

## 2024-09-17 RX ORDER — CEFADROXIL 1000 MG/1
1 TABLET ORAL 2 TIMES DAILY
Status: DISCONTINUED | OUTPATIENT
Start: 2024-09-17 | End: 2024-09-17

## 2024-09-17 RX ADMIN — METOPROLOL SUCCINATE 25 MG: 25 TABLET, EXTENDED RELEASE ORAL at 08:06

## 2024-09-17 RX ADMIN — ENOXAPARIN SODIUM 40 MG: 40 INJECTION SUBCUTANEOUS at 11:20

## 2024-09-17 RX ADMIN — CLOBETASOL PROPIONATE: 0.5 CREAM TOPICAL at 16:52

## 2024-09-17 RX ADMIN — HYDROXYZINE HYDROCHLORIDE 10 MG: 10 TABLET ORAL at 16:52

## 2024-09-17 RX ADMIN — HYDROXYZINE HYDROCHLORIDE 10 MG: 10 TABLET ORAL at 20:33

## 2024-09-17 RX ADMIN — AMOXICILLIN 1000 MG: 500 CAPSULE ORAL at 05:48

## 2024-09-17 RX ADMIN — RISPERIDONE 1.5 MG: 1 TABLET, FILM COATED ORAL at 20:34

## 2024-09-17 RX ADMIN — RISPERIDONE 1 MG: 1 TABLET, FILM COATED ORAL at 08:05

## 2024-09-17 RX ADMIN — DIGOXIN 250 MCG: 125 TABLET ORAL at 08:05

## 2024-09-17 RX ADMIN — CETIRIZINE HYDROCHLORIDE 5 MG: 5 TABLET ORAL at 05:19

## 2024-09-17 RX ADMIN — THIAMINE HCL TAB 100 MG 100 MG: 100 TAB at 08:05

## 2024-09-17 RX ADMIN — Medication 1 HALF-TAB: at 08:05

## 2024-09-17 RX ADMIN — AMOXICILLIN 1000 MG: 500 CAPSULE ORAL at 00:13

## 2024-09-17 RX ADMIN — POLYETHYLENE GLYCOL 3350 17 G: 17 POWDER, FOR SOLUTION ORAL at 08:05

## 2024-09-17 RX ADMIN — Medication 1 HALF-TAB: at 20:35

## 2024-09-17 RX ADMIN — CLOBETASOL PROPIONATE: 0.5 CREAM TOPICAL at 13:27

## 2024-09-17 RX ADMIN — CETIRIZINE HYDROCHLORIDE 5 MG: 5 TABLET ORAL at 11:20

## 2024-09-17 RX ADMIN — POTASSIUM CHLORIDE 20 MEQ: 750 TABLET, EXTENDED RELEASE ORAL at 20:34

## 2024-09-17 RX ADMIN — SPIRONOLACTONE 25 MG: 25 TABLET, FILM COATED ORAL at 08:06

## 2024-09-17 RX ADMIN — CEFADROXIL 1000 MG: 500 CAPSULE ORAL at 20:33

## 2024-09-17 RX ADMIN — Medication 1 TABLET: at 08:05

## 2024-09-17 RX ADMIN — EMPAGLIFLOZIN 10 MG: 10 TABLET, FILM COATED ORAL at 08:06

## 2024-09-17 RX ADMIN — Medication 5 ML: at 05:21

## 2024-09-17 RX ADMIN — SENNOSIDES AND DOCUSATE SODIUM 1 TABLET: 8.6; 5 TABLET ORAL at 20:34

## 2024-09-17 RX ADMIN — AMOXICILLIN 1000 MG: 500 CAPSULE ORAL at 11:20

## 2024-09-17 ASSESSMENT — ACTIVITIES OF DAILY LIVING (ADL)
ADLS_ACUITY_SCORE: 35
ADLS_ACUITY_SCORE: 39
ADLS_ACUITY_SCORE: 31
ADLS_ACUITY_SCORE: 35
ADLS_ACUITY_SCORE: 35
ADLS_ACUITY_SCORE: 39
ADLS_ACUITY_SCORE: 39
ADLS_ACUITY_SCORE: 31
ADLS_ACUITY_SCORE: 31
ADLS_ACUITY_SCORE: 35
ADLS_ACUITY_SCORE: 39
ADLS_ACUITY_SCORE: 31
ADLS_ACUITY_SCORE: 37
ADLS_ACUITY_SCORE: 39
ADLS_ACUITY_SCORE: 37
ADLS_ACUITY_SCORE: 35
ADLS_ACUITY_SCORE: 31
ADLS_ACUITY_SCORE: 35
ADLS_ACUITY_SCORE: 35
ADLS_ACUITY_SCORE: 31
ADLS_ACUITY_SCORE: 35

## 2024-09-17 NOTE — PROGRESS NOTES
D:patient tolerated walking the hallway, urgent gait when walking. ST up to 170's tolerated   I:pace activities  A:patient expressed he felt fine with ambulation  P:promote activity as tolerated

## 2024-09-17 NOTE — PLAN OF CARE
Problem: Pain Acute  Goal: Optimal Pain Control and Function  Outcome: Adequate for Care Transition  Intervention: Prevent or Manage Pain  Recent Flowsheet Documentation  Taken 9/17/2024 0800 by Tre Lawrence RN  Sensory Stimulation Regulation:   care clustered   quiet environment promoted  Bowel Elimination Promotion: ambulation promoted     Problem: Diabetes  Goal: Optimal Coping  Outcome: Adequate for Care Transition  Intervention: Support Wellbeing and Self-Management Success  Recent Flowsheet Documentation  Taken 9/17/2024 0800 by Tre Lawrence RN  Family/Support System Care:   involvement promoted   self-care encouraged   support provided   presence promoted  Goal: Optimal Functional Ability  Outcome: Adequate for Care Transition  Intervention: Optimize Functional Ability  Recent Flowsheet Documentation  Taken 9/17/2024 0800 by Tre Lawrence RN  Activity Management:   activity adjusted per tolerance   activity encouraged  Activity Assistance Provided:   assistance, stand-by   independent  Goal: Blood Glucose Level Within Target Range  Outcome: Adequate for Care Transition  Goal: Minimize Risk of Hypoglycemia  Outcome: Adequate for Care Transition     Problem: Heart Failure  Goal: Optimal Coping  Outcome: Adequate for Care Transition  Intervention: Support Psychosocial Response  Recent Flowsheet Documentation  Taken 9/17/2024 0800 by Tre Lawrence RN  Family/Support System Care:   involvement promoted   self-care encouraged   support provided   presence promoted  Goal: Optimal Cardiac Output  Outcome: Adequate for Care Transition  Goal: Stable Heart Rate and Rhythm  Outcome: Adequate for Care Transition  Goal: Optimal Functional Ability  Outcome: Adequate for Care Transition  Intervention: Optimize Functional Ability  Recent Flowsheet Documentation  Taken 9/17/2024 0800 by Tre Lawrence RN  Activity Management:   activity adjusted per tolerance   activity encouraged  Goal:  "Fluid and Electrolyte Balance  Outcome: Adequate for Care Transition  Goal: Improved Oral Intake  Outcome: Adequate for Care Transition  Goal: Effective Oxygenation and Ventilation  Outcome: Adequate for Care Transition  Intervention: Promote Airway Secretion Clearance  Recent Flowsheet Documentation  Taken 9/17/2024 0800 by Tre Lawrence, RN  Cough And Deep Breathing: done independently per patient  Activity Management:   activity adjusted per tolerance   activity encouraged  Goal: Effective Breathing Pattern During Sleep  Outcome: Adequate for Care Transition     Problem: Infection  Goal: Absence of Infection Signs and Symptoms  Outcome: Adequate for Care Transition     Problem: Adult Inpatient Plan of Care  Goal: Plan of Care Review  Description: The Plan of Care Review/Shift note should be completed every shift.  The Outcome Evaluation is a brief statement about your assessment that the patient is improving, declining, or no change.  This information will be displayed automatically on your shift  note.  Outcome: Adequate for Care Transition  Flowsheets (Taken 9/17/2024 1324)  Plan of Care Reviewed With: patient  Overall Patient Progress: no change  Goal: Patient-Specific Goal (Individualized)  Description: You can add care plan individualizations to a care plan. Examples of Individualization might be:  \"Parent requests to be called daily at 9am for status\", \"I have a hard time hearing out of my right ear\", or \"Do not touch me to wake me up as it startles  me\".  Outcome: Adequate for Care Transition  Goal: Absence of Hospital-Acquired Illness or Injury  Outcome: Adequate for Care Transition  Intervention: Prevent Skin Injury  Recent Flowsheet Documentation  Taken 9/17/2024 0800 by Tre Lawrence, RN  Body Position: position changed independently  Intervention: Prevent and Manage VTE (Venous Thromboembolism) Risk  Recent Flowsheet Documentation  Taken 9/17/2024 0800 by Tre Lawrence, RN  VTE " Prevention/Management: (Lovenox SQ) other (see comments)  Intervention: Prevent Infection  Recent Flowsheet Documentation  Taken 9/17/2024 0800 by Tre Lawrence, RN  Infection Prevention:   environmental surveillance performed   equipment surfaces disinfected   hand hygiene promoted   rest/sleep promoted   single patient room provided  Goal: Optimal Comfort and Wellbeing  Outcome: Adequate for Care Transition  Intervention: Provide Person-Centered Care  Recent Flowsheet Documentation  Taken 9/17/2024 0800 by Tre Lawrence RN  Trust Relationship/Rapport:   care explained   choices provided   questions answered   questions encouraged   thoughts/feelings acknowledged   reassurance provided  Goal: Readiness for Transition of Care  Outcome: Adequate for Care Transition     Problem: Gas Exchange Impaired  Goal: Optimal Gas Exchange  Outcome: Adequate for Care Transition     Problem: Cardiovascular Surgery  Goal: Improved Activity Tolerance  Outcome: Adequate for Care Transition  Goal: Optimal Coping with Heart Surgery  Outcome: Adequate for Care Transition  Intervention: Support Psychosocial Response to Surgery  Recent Flowsheet Documentation  Taken 9/17/2024 0800 by Tre Lawrence, RN  Family/Support System Care:   involvement promoted   self-care encouraged   support provided   presence promoted  Goal: Absence of Bleeding  Outcome: Adequate for Care Transition  Goal: Effective Bowel Elimination  Outcome: Adequate for Care Transition  Goal: Effective Cardiac Function  Outcome: Adequate for Care Transition  Goal: Optimal Cerebral Tissue Perfusion  Outcome: Adequate for Care Transition  Intervention: Protect and Optimize Cerebral Perfusion  Recent Flowsheet Documentation  Taken 9/17/2024 0800 by Tre Lawrence RN  Sensory Stimulation Regulation:   care clustered   quiet environment promoted  Cerebral Perfusion Promotion: blood pressure monitored  Goal: Fluid and Electrolyte Balance  Outcome:  Adequate for Care Transition  Goal: Blood Glucose Level Within Targeted Range  Outcome: Adequate for Care Transition  Goal: Absence of Infection Signs and Symptoms  Outcome: Adequate for Care Transition  Intervention: Prevent or Manage Infection  Recent Flowsheet Documentation  Taken 9/17/2024 0800 by Tre Lawrence, RN  Infection Prevention:   environmental surveillance performed   equipment surfaces disinfected   hand hygiene promoted   rest/sleep promoted   single patient room provided  Goal: Anesthesia/Sedation Recovery  Outcome: Adequate for Care Transition  Intervention: Optimize Anesthesia Recovery  Recent Flowsheet Documentation  Taken 9/17/2024 0800 by Tre Lawrence RN  Reorientation Measures:   calendar in view   clock in view  Goal: Acceptable Pain Control  Outcome: Adequate for Care Transition  Goal: Nausea and Vomiting Relief  Outcome: Adequate for Care Transition  Goal: Effective Urinary Elimination  Outcome: Adequate for Care Transition  Goal: Effective Oxygenation and Ventilation  Outcome: Adequate for Care Transition  Intervention: Promote Airway Secretion Clearance  Recent Flowsheet Documentation  Taken 9/17/2024 0800 by Tre Lawrence, RN  Cough And Deep Breathing: done independently per patient   Goal Outcome Evaluation:      Plan of Care Reviewed With: patient    Overall Patient Progress: no changeOverall Patient Progress: no change

## 2024-09-17 NOTE — PLAN OF CARE
Goal Outcome Evaluation:      Plan of Care Reviewed With: patient    Overall Patient Progress: no changeOverall Patient Progress: no change    Outcome Evaluation: Sinus tachycardic, other VSS. Pt awaiting group home placement for discharge.    Neuro: A&Ox4. Pt on autism spectrum, can make needs known.   Cardiac: Sinus tachy -150s. Denied of chest pain.   Respiratory: Sating >96% on room air. Denied shortness of breath.   GI/: Purewick from 11pm-5am, good output. Voids without difficulty. No BM during shift.   Diet/appetite: Regular diet  Activity: SBA to independent. Ambulated in room.   Pain: Denied of pain during shift.    Skin: Redness/rash on upper and lower extremities - see flowsheets. PRN zyrtec given 30 minutes before amoxicillin administration.  LDA's: L triple lumen PICC    Plan: Continue with POC. Notify primary team with changes.

## 2024-09-17 NOTE — PROGRESS NOTES
Care Management Follow Up    Length of Stay (days): 28    Expected Discharge Date: TBD     Concerns to be Addressed: Discharge planning  Patient plan of care discussed at interdisciplinary rounds: Yes    Anticipated Discharge Disposition: Group Home (return to previous vs new placement)     Anticipated Discharge Services: OP CR  Anticipated Discharge DME: None    Education Provided on the Discharge Plan: No  Patient/Family in Agreement with the Plan: unable to assess    Additional Information:  Pt remains medically stable for discharge. Awaiting update from [facility] on whether they will rescind notice of suspension and termination of services since pt has continued to progress with PT and is medically stable. This writer called [facility], pt's Luciano Santos  and [place] Celia Johnson to see if they have received updates, they have not yet. Plan for repeat discharge planning meeting tomorrow.   Pt continuing to progress with PT and per PT is at baseline. Was able to ambulate and complete stairs independently during session yesterday. Walking halls with nursing and family.     Next Steps:   Follow up discharge planning meeting with aunt Guerda, brother/legal guardian Johann, [facility] directors and nurse and Alex tomorrow, 9/18 at 1:30pm. Will also request that PT and MD attend if able.   Pt continuing to work with PT    Discharge planning contacts:  [facility]  NurseLydia: [phone]   Pharmacy: [facility] on [address]     Lucinao Santos  (DD walukas), Tri, Phone: [phone]     Legal Guardian, brother Johann , Phone: [phone]    [facility]: Sami Joseph ph: [phone]   1120: Left voicemail with Sami to see if he has received an update from the director at [facility] as he requested them to re-review case during meeting yesterday.     Addendum 1700: Received update from pt's aunt, Guerda that she has been in  contact with Alia, director at [facility] this afternoon. They have agreed to come to the hospital on Thursday 9/19 from noon to 2pm for an in person assessment. Will plan to also proceed with 1:30pm meeting tomorrow to discuss plan for assessment on Thursday.    CC will continue to monitor patient's medical condition and progress towards discharge.  Kristen Flynn RN BSN  6C Unit Care Coordinator  Phone number: [phone]    SEARCHABLE in [facility] - search CARE COORDINATOR      [place] [employer] [place] (0800-1630) Saturday & Sunday; (0800-1630)  Recognized Holidays      Units: 5A Onc Vocera & 5C Vocera      Units: 6B Vocera & 6C Vocera       Units: 7A SOT RNCC Vocera, 7B Med Surg Vocera, & 7C Med Surg Vocera      Units: 6A Vocera & 4A CVICU Vocera, 4C MICU Vocera, and 4E SICU Vocera       Units: 5 Ortho Vocera & 5 Med Surg Vocera      Units: 6 Med Surg Vocera & 8 Med Surg Vocera

## 2024-09-17 NOTE — PROGRESS NOTES
[facility]    Cardiology Progress Note- Cardiology 2        Date of Admission:  8/20/2024     Assessment & Plan: HVSL   23 yo M with PMHx autism  who was transferred to [facility] on 8/20 for decompensated HFrEF (15%) 2/2 suspected myocarditis. Hospital course was complicated by cardiogenic shock s/p vasopressors and IABP (8/27-9/1). Optimized on GDMT, discharge pending group home placement.    Changes today:  - Awaiting group home placement   - Transition antibiotics to IV ceftriaxone daily for management of hives    ACTIVE PROBLEMS:  # Biventricular failure with severely reduce left ventricular function with RV recovery   # Cardiogenic shock SCAI C s/p IABP 8/27-9/1  TTE (8/21/24): LVIDd 58mm. Biplane LVEF is 16%. Severe hypokinesis of mid to apical RV free wall.  RHC (8/21/24): RA mean 6, PA 30/23/26, PCWP 18, Wayne CO/CI 5.0/2.1, PVR 1.6, SVR 1270  EMB (8/21/24): Fragments of essentially unremarkable myocardial tissue with no evidence of inflammatory infiltrate   Coronary angiogram: NA   CMR: Mildly dilated left ventricle with severely reduced left ventricular function, LVEF 15%.  Normal right ventricular size and systolic function, RVEF 46%. No myocardial edema or replacement fibrosis or MRI features of acute myocarditis.  Completed prednisone taper. Remains off dobutamine with stable hemodynamics.  - Plan for discharge with low-dose diuresis as needed for hypervolemia  - GDMT  Metoprolol succinate 12.5mg every day   Entresto 12/13 mg BID   Spironolactone 25 mg daily  Empagliflozin 10 mg daily   - Digoxin 250mg qd    # Sinus tachycardia  Suspect compensatory in the setting of LV dysfunction. RHC (9/11) demonstrated hypovolemia, which could potentially have contributed to tachycardia with exertion. Plan for discharge for cardiac rehab; outpatient vs TCU pending final PT/OT recs.   - Outpatient cardiac rehab    # + Strep mitis in 1/2 bottles, suspected  comtaminant  Blood culture from 9/5 grew strep mitis in 1/2 bottles. Repeat blood culture on 9/6 NGTD. Suspect contaminant. Stopped antibiotics as of 9/7. ID noted ultimate recommendation to complete preventative two-week course of antibiotics with amoxicillin. Developed hives with amoxicillin; discussed switch to IV ceftriaxone with Pharmacy.   - Antibiotics:   > s/p Vanc 9/6   > s/p Ceftriaxone 9/6 - 9/7   > s/p amoxicillin 9/11-9/17   > Ceftriaxone 9/17-9/25    # Rash, stable  Nursing noted rash on bilateral lower extremities 9/12; ongoing 9/15 (on bilateral feet, hands, and upper back; pictures in chart). Discussed with pharmacy; based on timing and characteristics of rash, suspect medication-associated rash secondary to amoxicillin. Tried pretreatment with antihistamines/steroid topical ointment (9/15-9/17) without significant improvement of rash and increased patient somnolence with antihistamine therapy (both with benadryl and cetirizine).  Will switch off amoxicillin.  - Cetrizine 2.5 mg q6H PRN (give as needed for hives/pruritic rash)  - Clobetasol ointment BID PRN  - Monitor symptoms    # Disposition  Will need discharge to group home. Behavioral group home discharged patient due to concern they would not be able to address his medical needs;  social work consulted for assistance with disposition planning.   - Social work consult, appreciate assistance    STABLE/RESOLVED PROBLEMS:  # Hypotension, resolved  Became hypotensive 9/8 AM after ambulating to bathroom. Asymptomatic. Tachycardic up to 160s from previous 130s (see below for discussion of sinus tachycardia). Suspect slight overdiuresis and hypovolemia given continued net negative volume status over last several days on oral diuretics.  Now resolved.     # Concern for aspiration pneumonia, resolved  # Recurrent fevers of unclear etiology, resolved  - Prior antibiotics       > Unasyn 8/21 - 8/26       > Cefepime 8/20 - 8/21       > Azithromycin 8/20 -  "08/24       > Vanco 8/20 - 8/21 s/p 8/26       > meropenem 8/26-9/1       > zosyn 8/26 - 8/28    # Altered mental status, resolved  - c/w risperidone   - case was discussed with psychiatry regarding his medication and relation to cardiac decompensation given no clear culprit. However, given no safe alternative we would suggest continuation of risperidone as he has been on this medication for years without recent dose change and lack of evidence for risperidone to cause fulminant heart failure at the movement.     # Elevated transaminases - resolved   Improving. Imaging showed hepatomegaly with increased echogenicity, possible intrinsic parenchymal disease such as steatosis.   - Trend labs    # Hypernatremia, resolved  # Hypervolemia, controlled  # Lactic acidosis, resolved  - Trend BMP  - Diuresis as above    # Acute hypoxemic respiratory failure, resolved  # Pleural effusion, resolved  See above for treatment of suspected aspiration pneumonia.     # Hyperglycemia, resolved  Likely related to stress and prednisone therapy. No history of diabetes. A1c was 5.6% on 8/23/2024.        Diet: Snacks/Supplements Adult: Ensure Enlive; Between Meals  Regular Diet Adult    DVT Prophylaxis: Enoxaparin (Lovenox) SQ  Swan Catheter: Not present  Cardiac Monitoring: None  Code Status: Full Code          Clinically Significant Risk Factors              # Hypoalbuminemia: Lowest albumin = 3.2 g/dL at 8/23/2024 12:52 PM, will monitor as appropriate        # End stage heart failure: Ventricular assist device (VAD) present          # Overweight: Estimated body mass index is 28.19 kg/m  as calculated from the following:    Height as of this encounter: 1.86 m (6' 1.23\").    Weight as of this encounter: 97.5 kg (215 lb).   # Moderate Malnutrition: based on nutrition assessment      # Financial/Environmental Concerns: none        Thank you for allowing me to care for this patient, please don't hesitate to contact me with any questions " regarding this plan.     Pt was discussed and evaluated with Jani Herr MD, attending physician, who agrees with the assessment and plan above.    Merline Rubio MD  PGY-2 Internal Medicine  [facility]  ______________________________________________________________________    Interval History   Continuing to feel very sleepy. Rash hasn't improved with ointment or allergy medication.    Physical Exam   Vital Signs: Temp: 97.7  F (36.5  C) Temp src: Axillary BP: 103/68 Pulse: (!) 137   Resp: 21 SpO2: 98 % O2 Device: None (Room air)    Weight: 215 lbs 0 oz    Gen: No acute distress, laying in bed under blanket  HEENT: Normocephalic, atraumatic  CV: Regular rhythm with tachycardia, no murmurs  Pulm: CTAB, non-labored breathing on room air  Abd: Soft, non-tender to palpation  Extr: No significant pitting edema in bilateral lower extremities  Skin: Ongoing hives in bilateral feet, not significantly improved  Neuro: Drowsy, arousable to voice    Medical Decision Making       Please see A&P for additional details of medical decision making.      Data   ------------------------- PAST 24 HR DATA REVIEWED -----------------------------------------------    I have personally reviewed the following data over the past 24 hrs:    6.3  \   10.3 (L)   / 268     142 105 9.5 /  99   4.0 25 0.49 (L) \     ALT: 36 AST: 17 AP: 81 TBILI: 0.2   ALB: 3.7 TOT PROTEIN: 6.1 (L) LIPASE: N/A       Imaging results reviewed over the past 24 hrs:   No results found for this or any previous visit (from the past 24 hour(s)).

## 2024-09-18 ENCOUNTER — APPOINTMENT (OUTPATIENT)
Dept: PHYSICAL THERAPY | Facility: CLINIC | Age: 24
DRG: 853 | End: 2024-09-18
Attending: INTERNAL MEDICINE
Payer: MEDICARE

## 2024-09-18 LAB
ALBUMIN SERPL BCG-MCNC: 3.8 G/DL (ref 3.5–5.2)
ALP SERPL-CCNC: 87 U/L (ref 40–150)
ALT SERPL W P-5'-P-CCNC: 37 U/L (ref 0–70)
ANION GAP SERPL CALCULATED.3IONS-SCNC: 11 MMOL/L (ref 7–15)
AST SERPL W P-5'-P-CCNC: 19 U/L (ref 0–45)
BASOPHILS # BLD AUTO: 0 10E3/UL (ref 0–0.2)
BASOPHILS NFR BLD AUTO: 1 %
BILIRUB DIRECT SERPL-MCNC: <0.2 MG/DL (ref 0–0.3)
BILIRUB SERPL-MCNC: 0.2 MG/DL
BUN SERPL-MCNC: 8 MG/DL (ref 6–20)
CA-I BLD-MCNC: 4.9 MG/DL (ref 4.4–5.2)
CALCIUM SERPL-MCNC: 9.1 MG/DL (ref 8.8–10.4)
CHLORIDE SERPL-SCNC: 106 MMOL/L (ref 98–107)
CREAT SERPL-MCNC: 0.55 MG/DL (ref 0.67–1.17)
CRP SERPL-MCNC: 4.04 MG/L
EGFRCR SERPLBLD CKD-EPI 2021: >90 ML/MIN/1.73M2
EOSINOPHIL # BLD AUTO: 0.4 10E3/UL (ref 0–0.7)
EOSINOPHIL NFR BLD AUTO: 5 %
ERYTHROCYTE [DISTWIDTH] IN BLOOD BY AUTOMATED COUNT: 15.5 % (ref 10–15)
GLUCOSE BLDC GLUCOMTR-MCNC: 107 MG/DL (ref 70–99)
GLUCOSE SERPL-MCNC: 111 MG/DL (ref 70–99)
HCO3 SERPL-SCNC: 25 MMOL/L (ref 22–29)
HCT VFR BLD AUTO: 35.3 % (ref 40–53)
HGB BLD-MCNC: 11.1 G/DL (ref 13.3–17.7)
IMM GRANULOCYTES # BLD: 0.1 10E3/UL
IMM GRANULOCYTES NFR BLD: 1 %
LYMPHOCYTES # BLD AUTO: 2.1 10E3/UL (ref 0.8–5.3)
LYMPHOCYTES NFR BLD AUTO: 25 %
MAGNESIUM SERPL-MCNC: 2.1 MG/DL (ref 1.7–2.3)
MCH RBC QN AUTO: 28.5 PG (ref 26.5–33)
MCHC RBC AUTO-ENTMCNC: 31.4 G/DL (ref 31.5–36.5)
MCV RBC AUTO: 91 FL (ref 78–100)
MONOCYTES # BLD AUTO: 0.9 10E3/UL (ref 0–1.3)
MONOCYTES NFR BLD AUTO: 11 %
NEUTROPHILS # BLD AUTO: 4.9 10E3/UL (ref 1.6–8.3)
NEUTROPHILS NFR BLD AUTO: 58 %
NRBC # BLD AUTO: 0 10E3/UL
NRBC BLD AUTO-RTO: 0 /100
PHOSPHATE SERPL-MCNC: 4.9 MG/DL (ref 2.5–4.5)
PLATELET # BLD AUTO: 282 10E3/UL (ref 150–450)
POTASSIUM SERPL-SCNC: 4.4 MMOL/L (ref 3.4–5.3)
PROT SERPL-MCNC: 6.5 G/DL (ref 6.4–8.3)
RBC # BLD AUTO: 3.89 10E6/UL (ref 4.4–5.9)
SODIUM SERPL-SCNC: 142 MMOL/L (ref 135–145)
WBC # BLD AUTO: 8.5 10E3/UL (ref 4–11)

## 2024-09-18 PROCEDURE — 97110 THERAPEUTIC EXERCISES: CPT | Mod: GP

## 2024-09-18 PROCEDURE — 250N000011 HC RX IP 250 OP 636: Performed by: STUDENT IN AN ORGANIZED HEALTH CARE EDUCATION/TRAINING PROGRAM

## 2024-09-18 PROCEDURE — 97116 GAIT TRAINING THERAPY: CPT | Mod: GP

## 2024-09-18 PROCEDURE — 120N000003 HC R&B IMCU UMMC

## 2024-09-18 PROCEDURE — 250N000013 HC RX MED GY IP 250 OP 250 PS 637: Performed by: STUDENT IN AN ORGANIZED HEALTH CARE EDUCATION/TRAINING PROGRAM

## 2024-09-18 PROCEDURE — 84100 ASSAY OF PHOSPHORUS: CPT | Performed by: INTERNAL MEDICINE

## 2024-09-18 PROCEDURE — 83735 ASSAY OF MAGNESIUM: CPT | Performed by: INTERNAL MEDICINE

## 2024-09-18 PROCEDURE — 250N000013 HC RX MED GY IP 250 OP 250 PS 637: Performed by: INTERNAL MEDICINE

## 2024-09-18 PROCEDURE — 80053 COMPREHEN METABOLIC PANEL: CPT | Performed by: STUDENT IN AN ORGANIZED HEALTH CARE EDUCATION/TRAINING PROGRAM

## 2024-09-18 PROCEDURE — 82248 BILIRUBIN DIRECT: CPT

## 2024-09-18 PROCEDURE — 86140 C-REACTIVE PROTEIN: CPT

## 2024-09-18 PROCEDURE — 85004 AUTOMATED DIFF WBC COUNT: CPT | Performed by: STUDENT IN AN ORGANIZED HEALTH CARE EDUCATION/TRAINING PROGRAM

## 2024-09-18 PROCEDURE — 250N000013 HC RX MED GY IP 250 OP 250 PS 637

## 2024-09-18 PROCEDURE — 82330 ASSAY OF CALCIUM: CPT | Performed by: STUDENT IN AN ORGANIZED HEALTH CARE EDUCATION/TRAINING PROGRAM

## 2024-09-18 PROCEDURE — 99222 1ST HOSP IP/OBS MODERATE 55: CPT | Mod: GC | Performed by: DERMATOLOGY

## 2024-09-18 RX ORDER — RISPERIDONE 1 MG/1
1 TABLET ORAL DAILY
Qty: 30 TABLET | Refills: 3 | Status: SHIPPED | OUTPATIENT
Start: 2024-09-19

## 2024-09-18 RX ORDER — METOPROLOL SUCCINATE 25 MG/1
25 TABLET, EXTENDED RELEASE ORAL DAILY
Qty: 30 TABLET | Refills: 3 | Status: SHIPPED | OUTPATIENT
Start: 2024-09-19

## 2024-09-18 RX ORDER — TRIAMCINOLONE ACETONIDE 1 MG/G
OINTMENT TOPICAL 2 TIMES DAILY
Qty: 80 G | Refills: 3 | Status: SHIPPED | OUTPATIENT
Start: 2024-09-18

## 2024-09-18 RX ORDER — DIGOXIN 250 MCG
250 TABLET ORAL DAILY
Qty: 30 TABLET | Refills: 3 | Status: SHIPPED | OUTPATIENT
Start: 2024-09-19

## 2024-09-18 RX ORDER — POLYETHYLENE GLYCOL 3350 17 G/17G
17 POWDER, FOR SOLUTION ORAL DAILY
Qty: 510 G | Refills: 3 | Status: SHIPPED | OUTPATIENT
Start: 2024-09-18 | End: 2024-09-19

## 2024-09-18 RX ORDER — LEVOFLOXACIN 750 MG/1
750 TABLET, FILM COATED ORAL DAILY
Status: DISCONTINUED | OUTPATIENT
Start: 2024-09-18 | End: 2024-09-19 | Stop reason: HOSPADM

## 2024-09-18 RX ORDER — SPIRONOLACTONE 25 MG/1
25 TABLET ORAL DAILY
Qty: 30 TABLET | Refills: 3 | Status: SHIPPED | OUTPATIENT
Start: 2024-09-19

## 2024-09-18 RX ORDER — ACETAMINOPHEN 325 MG/1
650 TABLET ORAL EVERY 8 HOURS PRN
Qty: 90 TABLET | Refills: 2 | Status: SHIPPED | OUTPATIENT
Start: 2024-09-18

## 2024-09-18 RX ORDER — AMOXICILLIN 250 MG
1 CAPSULE ORAL AT BEDTIME
Qty: 30 TABLET | Refills: 2 | Status: SHIPPED | OUTPATIENT
Start: 2024-09-18 | End: 2024-09-19

## 2024-09-18 RX ORDER — LANOLIN ALCOHOL/MO/W.PET/CERES
100 CREAM (GRAM) TOPICAL DAILY
Qty: 30 TABLET | Refills: 3 | Status: SHIPPED | OUTPATIENT
Start: 2024-09-19

## 2024-09-18 RX ORDER — RISPERIDONE 0.5 MG/1
1.5 TABLET ORAL EVERY EVENING
Qty: 90 TABLET | Refills: 3 | Status: SHIPPED | OUTPATIENT
Start: 2024-09-18

## 2024-09-18 RX ORDER — LEVOFLOXACIN 750 MG/1
750 TABLET, FILM COATED ORAL DAILY
Qty: 7 TABLET | Refills: 0 | Status: ACTIVE | OUTPATIENT
Start: 2024-09-18 | End: 2024-09-25

## 2024-09-18 RX ORDER — MULTIPLE VITAMINS W/ MINERALS TAB 9MG-400MCG
1 TAB ORAL DAILY
Qty: 30 TABLET | Refills: 3 | Status: SHIPPED | OUTPATIENT
Start: 2024-09-19

## 2024-09-18 RX ORDER — SACUBITRIL AND VALSARTAN 24; 26 MG/1; MG/1
0.5 TABLET, FILM COATED ORAL 2 TIMES DAILY
Qty: 30 TABLET | Refills: 3 | Status: SHIPPED | OUTPATIENT
Start: 2024-09-18

## 2024-09-18 RX ORDER — CETIRIZINE HYDROCHLORIDE 5 MG/1
2.5 TABLET ORAL EVERY 6 HOURS PRN
Qty: 90 TABLET | Refills: 2 | Status: SHIPPED | OUTPATIENT
Start: 2024-09-18

## 2024-09-18 RX ORDER — HYDROXYZINE HYDROCHLORIDE 10 MG/1
10 TABLET, FILM COATED ORAL 3 TIMES DAILY PRN
Qty: 90 TABLET | Refills: 3 | Status: SHIPPED | OUTPATIENT
Start: 2024-09-18 | End: 2024-09-19

## 2024-09-18 RX ORDER — TRIAMCINOLONE ACETONIDE 1 MG/G
OINTMENT TOPICAL 2 TIMES DAILY
Status: DISCONTINUED | OUTPATIENT
Start: 2024-09-18 | End: 2024-09-19 | Stop reason: HOSPADM

## 2024-09-18 RX ADMIN — METOPROLOL SUCCINATE 25 MG: 25 TABLET, EXTENDED RELEASE ORAL at 07:54

## 2024-09-18 RX ADMIN — RISPERIDONE 1 MG: 1 TABLET, FILM COATED ORAL at 07:54

## 2024-09-18 RX ADMIN — THIAMINE HCL TAB 100 MG 100 MG: 100 TAB at 07:54

## 2024-09-18 RX ADMIN — Medication 1 HALF-TAB: at 20:20

## 2024-09-18 RX ADMIN — Medication 1 TABLET: at 07:54

## 2024-09-18 RX ADMIN — EMPAGLIFLOZIN 10 MG: 10 TABLET, FILM COATED ORAL at 07:54

## 2024-09-18 RX ADMIN — Medication 1 HALF-TAB: at 07:54

## 2024-09-18 RX ADMIN — Medication 5 ML: at 05:54

## 2024-09-18 RX ADMIN — POLYETHYLENE GLYCOL 3350 17 G: 17 POWDER, FOR SOLUTION ORAL at 07:54

## 2024-09-18 RX ADMIN — DIGOXIN 250 MCG: 125 TABLET ORAL at 07:54

## 2024-09-18 RX ADMIN — LEVOFLOXACIN 750 MG: 750 TABLET, FILM COATED ORAL at 20:20

## 2024-09-18 RX ADMIN — CLOBETASOL PROPIONATE: 0.5 CREAM TOPICAL at 07:54

## 2024-09-18 RX ADMIN — CEFADROXIL 1000 MG: 500 CAPSULE ORAL at 07:54

## 2024-09-18 RX ADMIN — RISPERIDONE 1.5 MG: 1 TABLET, FILM COATED ORAL at 20:20

## 2024-09-18 RX ADMIN — ENOXAPARIN SODIUM 40 MG: 40 INJECTION SUBCUTANEOUS at 10:59

## 2024-09-18 RX ADMIN — SPIRONOLACTONE 25 MG: 25 TABLET, FILM COATED ORAL at 07:54

## 2024-09-18 RX ADMIN — HYDROXYZINE HYDROCHLORIDE 10 MG: 10 TABLET ORAL at 18:30

## 2024-09-18 ASSESSMENT — ACTIVITIES OF DAILY LIVING (ADL)
ADLS_ACUITY_SCORE: 37

## 2024-09-18 NOTE — PROGRESS NOTES
Care Management Follow Up    Length of Stay (days): 29    Expected Discharge Date: 09/19/2024     Concerns to be Addressed: care coordination/care conferences, adjustment to diagnosis/illness, coping/stress, decision making, discharge planning, developmental     Patient plan of care discussed at interdisciplinary rounds: Yes    Anticipated Discharge Disposition: Group Home     Anticipated Discharge Services: OP CR  Anticipated Discharge DME: None    Patient/family educated on Medicare website which has current facility and service quality ratings: N/A  Education Provided on the Discharge Plan: Yes  Patient/Family in Agreement with the Plan:Yes    Additional Information:    This writer took part in a follow up discharge planning conference. The message took place via video conference. Those in attendance included:  Pt's aunt, Guerda  Cardiology MD- Dr. Rubio  RNCC  Luciano Santos , Tri (pt's EZEQUIEL denton CM)  Luciano Santos Supervisor, Sabrina Grullon  [facility], Alia [facility]  [facility] Nurse, Lydia     The meeting started with [facility] and nurse asking their follow up medical questions to Dr. Rubio. Discussed recommended frequency for vital signs (daily or q other day) and weights (weekly). Discussed heart failure signs and symptoms to watch for and when to call into clinic. Lydia requested we fax them a MAR today and that all discharge medication orders be sent to [facility] asap so they can be filled. If ordered today the group home anticipates they can get all medications delivered tomorrow (also willing to  if needed). Lydia will follow up with [facility] to watch for orders.    Alia confirmed that she is coming to the hospital tomorrow, 9/18 from about 12pm to 2pm to do an in person assessment. She would like observe/ensure what level of assistance pt needs for shower, dressing, ambulation and stairs. PT worked with pt again today and pt walked outside and  completed stairs trial again independently. Per Alia, they can still take pt back even if needing SBA for certain tasks they just want to observe his tolerance in person.    If medications are delivered to group home and in person assessment goes well, Alia stated they can take pt back as soon as tomorrow afternoon. Alia will have the [employer] vehicle so could stay and transport pt back tomorrow afternoon.    Reviewed rec for OP CR. Alia stated [facility] would be their preferred location.    Next Steps:   RNCC to fax OP Referral to [facility]  RNCC faxed MAR to NIKI Freeman nurse for review  MD to send medication orders today to [facility] [place] on [address]   4.   Alia coming for in person assessment on Thursday 9/18/24 at 12pm.     Requested OP CR referral orders, will need to fax referral to:  [facility]: Cardiac Rehab  Phone: [phone]  Fax: [phone]    CC will continue to monitor patient's medical condition and progress towards discharge.  Kristen Flynn RN BSN  6C Unit Care Coordinator  Phone number: [phone]    SEARCHABLE in [facility] - search CARE COORDINATOR      [place] [facility] [place] ([id number]) Saturday & Sunday; ([id number]) [facility] Recognized Holidays      Units: 5A Onc Vocera & 5C Vocera      Units: 6B Vocera & 6C Vocera       Units: 7A SOT RNCC Vocera, 7B Med Surg Vocera, & 7C Med Surg Vocera      Units: 6A Vocera & 4A CVICU Vocera, 4C MICU Vocera, and 4E SICU Vocera       Units: 5 Ortho Vocera & 5 Med Surg Vocera      Units: 6 Med Surg Vocera & 8 Med Surg Vocera

## 2024-09-18 NOTE — PROVIDER NOTIFICATION
Issue: soft BPs    Assessment: Bps 76/48 (56), 84/45 (57), and 100/53 (63) -- pt asymptomatic, got up to bathroom with no dizziness. Pt tachy at baseline, O2 sats > 95% on RA.     Notified: Jinny Michaels text    Intervention: no response from provider

## 2024-09-18 NOTE — PROGRESS NOTES
"Full          Hidalgo \"malka\"           Cards 2  Pn          8/20  24ys     Dx: pneumonia, intubated, septic shock transferred here from [place] with HR dx with EF 15%     Hx: autism     Neuro: AOx4, calm and cooperative              Autistic, can make needs known              SBA- up in chair/amb in [place]              Denies pain     Cardiac: ST                  lovenox     Respiratory: Room air     GI/: Void Last BM 9/15              Cardiac diet     Skin: rash to ble--cream for itching (zyrtec prn) oint PRN (changed atb today)     IV's:  TL PICC (orders to remove, patient and family decline/ MD aware)     K/MG/Phos replacement--0500     Plan: here til wednesday looking for group home with higher acquity     **mother is never to be left in room alone with patient**  "

## 2024-09-18 NOTE — PROGRESS NOTES
[facility]    Cardiology Progress Note- Cardiology 2        Date of Admission:  8/20/2024     Assessment & Plan: HVSL   25 yo M with PMHx celina  who was transferred to [facility] on 8/20 for decompensated HFrEF (15%) 2/2 suspected myocarditis. Hospital course was complicated by cardiogenic shock s/p vasopressors and IABP (8/27-9/1). Optimized on GDMT, discharge pending group home placement.    Changes today:  - Discharge pending group home acceptance; potentially as soon as tomorrow afternoon  - Dermatology consult  - Transition to levofloxacin, per discussion with pharmacy    ACTIVE PROBLEMS:  # Biventricular failure with severely reduce left ventricular function with RV recovery   # Cardiogenic shock SCAI C s/p IABP 8/27-9/1  TTE (8/21/24): LVIDd 58mm. Biplane LVEF is 16%. Severe hypokinesis of mid to apical RV free wall.  RHC (8/21/24): RA mean 6, PA 30/23/26, PCWP 18, Wayne CO/CI 5.0/2.1, PVR 1.6, SVR 1270  EMB (8/21/24): Fragments of essentially unremarkable myocardial tissue with no evidence of inflammatory infiltrate   Coronary angiogram: NA   CMR: Mildly dilated left ventricle with severely reduced left ventricular function, LVEF 15%.  Normal right ventricular size and systolic function, RVEF 46%. No myocardial edema or replacement fibrosis or MRI features of acute myocarditis.  Completed prednisone taper. Remains off dobutamine with stable hemodynamics.  - Plan for discharge with low-dose diuresis as needed for hypervolemia  - GDMT  Metoprolol succinate 12.5mg every day   Entresto 12/13 mg BID   Spironolactone 25 mg daily  Empagliflozin 10 mg daily   - Digoxin 250mg every day  - On discharge, outpatient cardiology follow-up    # Sinus tachycardia  Suspect compensatory in the setting of LV dysfunction. RHC (9/11) demonstrated hypovolemia, which could potentially have contributed to tachycardia with exertion. Plan for discharge for cardiac rehab; outpatient vs  TCU pending final PT/OT recs.   - Outpatient cardiac rehab    # + Strep mitis in 1/2 bottles, possible contaminant  Blood culture from 9/5 grew strep mitis in 1/2 bottles. Repeat blood culture on 9/6 NGTD. Possible contaminant. Stopped antibiotics, however ID ultimately recommended preventative two-week course of antibiotics. Developed hives with amoxicillin; switched to levofloxacin to minimize cross-reactivity, per discussion with pharmacy.  - Antibiotics:   > s/p Vanc 9/6   > s/p Ceftriaxone 9/6 - 9/7   > s/p Amoxicillin 9/11-9/17   > s/p Cefadroxil 9/18  > Levofloxacin 750mg every day 9/18-9/25    # Urticaria 2/2 amoxicillin, arms/trunk  # Bacteremia-associated rash, bilateral feet, mildly improving  Nursing noted rash on bilateral lower extremities 9/12; ongoing 9/15 (on bilateral feet, hands, and upper back; pictures in chart). Discussed with dermatology; based on timing and characteristics of rash, suspect medication-associated rash secondary to amoxicillin on arms/trunk and possible bacteremia-associated rash on bilateral feet. Tried pretreatment with antihistamines/steroid topical ointment (9/15-9/17) without significant improvement of rash and increased patient somnolence with antihistamine therapy (both with benadryl and cetirizine).  Plan to stop amoxicillin, give antihistamines as needed, and use topical steroid ointment for rash on feet until resolution.   - Dermatology consult, appreciate input  Triamcinolone 0.1% BID, apply to feet until rash resolved  Cetrizine 2.5 mg q6H as needed for hives on abdomen/chest  - On discharge   Dermatology follow-up if rash does not resolve    # Disposition  Behavioral group home discharged patient due to concern they would not be able to address his medical needs; reassured with follow-up appointment. Social work consulted for assistance with disposition planning.   - Social work consult, appreciate assistance  - Possible discharge tomorrow afternoon, pending group  home acceptance   Discharge med rec completed in advance of acceptance    STABLE/RESOLVED PROBLEMS:  # Hypotension, resolved  Became hypotensive 9/8 AM after ambulating to bathroom. Asymptomatic. Tachycardic up to 160s from previous 130s (see below for discussion of sinus tachycardia). Suspect slight overdiuresis and hypovolemia given continued net negative volume status over last several days on oral diuretics.  Now resolved.     # Concern for aspiration pneumonia, resolved  # Recurrent fevers of unclear etiology, resolved  - Prior antibiotics       > Unasyn 8/21 - 8/26       > Cefepime 8/20 - 8/21       > Azithromycin 8/20 - 08/24       > Vanco 8/20 - 8/21 s/p 8/26       > meropenem 8/26-9/1       > zosyn 8/26 - 8/28    # Altered mental status, resolved  # Autism  - c/w risperidone   - case was discussed with psychiatry regarding his medication and relation to cardiac decompensation given no clear culprit. However, given no safe alternative we would suggest continuation of risperidone as he has been on this medication for years without recent dose change and lack of evidence for risperidone to cause fulminant heart failure at the movement.     # Elevated transaminases - resolved   Improving. Imaging showed hepatomegaly with increased echogenicity, possible intrinsic parenchymal disease such as steatosis.   - Trend labs    # Hypernatremia, resolved  # Hypervolemia, controlled  # Lactic acidosis, resolved  - Trend BMP  - Diuresis as above    # Acute hypoxemic respiratory failure, resolved  # Pleural effusion, resolved  See above for treatment of suspected aspiration pneumonia.     # Hyperglycemia, resolved  Likely related to stress and prednisone therapy. No history of diabetes. A1c was 5.6% on 8/23/2024.        Diet: Snacks/Supplements Adult: Ensure Enlive; Between Meals  Regular Diet Adult  Diet    DVT Prophylaxis: Enoxaparin (Lovenox) SQ  Swan Catheter: Not present  Cardiac Monitoring: None  Code Status: Full  "Code          Clinically Significant Risk Factors              # Hypoalbuminemia: Lowest albumin = 3.2 g/dL at 8/23/2024 12:52 PM, will monitor as appropriate        # End stage heart failure: Ventricular assist device (VAD) present          # Overweight: Estimated body mass index is 27.95 kg/m  as calculated from the following:    Height as of this encounter: 1.86 m (6' 1.23\").    Weight as of this encounter: 96.7 kg (213 lb 3.2 oz).   # Moderate Malnutrition: based on nutrition assessment      # Financial/Environmental Concerns: none        Thank you for allowing me to care for this patient, please don't hesitate to contact me with any questions regarding this plan.     Pt was discussed and evaluated with Jani Herr MD, attending physician, who agrees with the assessment and plan above.    Merline Rubio MD  PGY-2 Internal Medicine  [facility]  ______________________________________________________________________    Interval History   Rash slowly improving with steroid cream/allergy medication.  Still feeling drowsy with reduced-dose antihistamines.    Physical Exam   Vital Signs: Temp: 97.8  F (36.6  C) Temp src: Axillary BP: 98/60 Pulse: (!) 141   Resp: 21 SpO2: 97 % O2 Device: None (Room air)    Weight: 213 lbs 3.2 oz    Gen: No acute distress, laying in bed under blanket  HEENT: Normocephalic, atraumatic  CV: Regular rhythm with tachycardia, no murmurs  Pulm: CTAB, non-labored breathing on room air  Abd: Soft, non-tender to palpation  Extr: No significant pitting edema in bilateral lower extremities  Skin: Less edematous rash bilateral feet  Neuro: Drowsy, arousable to voice         Medical Decision Making       Please see A&P for additional details of medical decision making.      Data   ------------------------- PAST 24 HR DATA REVIEWED -----------------------------------------------    I have personally reviewed the following data over the past 24 hrs:    8.5  \   11.1 (B)   / 703     142 " 106 8.0 /  107 (H)   4.4 25 0.55 (L) \     ALT: 37 AST: 19 AP: 87 TBILI: 0.2   ALB: 3.8 TOT PROTEIN: 6.5 LIPASE: N/A     Procal: N/A CRP: 4.04 Lactic Acid: N/A         Imaging results reviewed over the past 24 hrs:   No results found for this or any previous visit (from the past 24 hour(s)).

## 2024-09-18 NOTE — PROGRESS NOTES
"Full          Hidalgo \"malka\"           Cards 2  Pn          8/20  24ys     Dx: pneumonia, intubated, septic shock transferred here from [place] with HR dx with EF 15%     Hx: autism     Neuro: AOx4, calm and cooperative              Autistic, can make needs known              SBA- up in chair/amb in [place]              Denies pain     Cardiac: ST                  lovenox     Respiratory: Room air     GI/: Void Last BM 9/15              Cardiac diet     Skin: rash to ble--cream for itching (zyrtec prn) oint PRN (changed atb today)     IV's:  TL PICC (orders to remove, patient and family decline/ MD aware)     K/MG/Phos replacement--0500     Plan: discharge tmrw to group home     **mother is never to be left in room alone with patient**           "

## 2024-09-18 NOTE — CONSULTS
[facility] Inpatient Consult Dermatology Note    Impression/Plan:  1. Urticaria, arms/trunk  Most likely secondary to amoxicillin given history of penicillin allergy and timing of eruption. Recommend use of antihistamines, either hydroxyzine or diphenhydramine as needed for itching, now that amoxicillin has been discontinued.    2. 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 Recommend treating symptomatically with antihistamines as above as well as applying triamcinolone 0.1% ointment twice daily to the affected lower extremities/feet until resolved.    Thank you for the dermatology consultation. Please do not hesitate to contact the dermatology resident/faculty on call for any additional questions or concerns. We will plan to sign off, though please let us know if the notably worsens/changes despite this treatment.    Patient seen and evaluated with attending physician, Dr. Russell.    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 Terrance Hidalgo is a 24 year old male with autism spectrum disorder (temporary guardian is his brother) who was admitted with new heart failure with severely reduced EF due to presumed cardiomyopathy. His workup, including cardiac biopsy, was unremarkable. However, later on one of this blood cultures grew Streptococcus mitis. Treated with ceftriaxone 9/6-9/7, then amoxicillin 9/11 until 9/17 when he was switched to cefadroxil. Dermatology was consulted for a lower  Sister Joy has an appointment for Wednesday 9/30 at 8:10.  She states that she was told she has \"orbital cellulitis\" and would like to be seen sooner.  Please advise. Routed to Dr. Erazo.   extremity rash which started 9/10 (prior to receiving amoxicillin).     Per family, patient has been experiencing two types of rashes since he was in the hospital. One involves pink itchy swollen bumps that come and go within a day, located on his chest, forearms, and flanks - these have been present after starting on amoxicillin, and appear to be going away after making the switch to cefadroxil.     The other developed in the hospital the day before starting amoxicillin, and has presented as itchy pink bumps on the lower legs and feet that appear to be staying in place for days. The toes were very swollen yesterday (the day where this rash was at its worst), though are starting to improve since being off of the amoxicillin. Today rash appears to involve only the feet, and lower leg rash has resolved.    Past Medical History:   Patient Active Problem List   Diagnosis    Cardiogenic shock (H)    Acute kidney failure, unspecified (H)     No past medical history on file.  Past Surgical History:   Procedure Laterality Date    CV HEART BIOPSY N/A 8/21/2024    Procedure: Heart Biopsy;  Surgeon: Yohan Aponte MD;  Location:  HEART CARDIAC CATH LAB    CV INTRA AORTIC BALLOON N/A 08/27/2024    Procedure: Intra aortic Balloon Pump Insertion;  Surgeon: Davis Disla MD;  Location:  HEART CARDIAC CATH LAB    CV INTRA AORTIC BALLOON REMOVAL N/A 09/01/2024    Procedure: Intra aortic Balloon Pump Removal;  Surgeon: Jairo Squires MD;  Location:  HEART CARDIAC CATH LAB    CV RIGHT HEART CATH MEASUREMENTS RECORDED N/A 8/21/2024    Procedure: Right Heart Catheterization;  Surgeon: Yohan Aponte MD;  Location:  HEART CARDIAC CATH LAB    CV RIGHT HEART CATH MEASUREMENTS RECORDED N/A 9/11/2024    Procedure: Right Heart Catheterization;  Surgeon: Jani Martin MD;  Location:  HEART CARDIAC CATH LAB    CV SWAN REANNA PROCEDURE N/A 8/21/2024    Procedure: [place]anna Procedure;  Surgeon:  Yohan Aponte MD;  Location:  HEART CARDIAC CATH LAB    PICC INSERTION - TRIPLE LUMEN Left 09/03/2024    Left basilic vein 53cm total 10cm external.     Family History:  No family history on file.    Medications:  Current Facility-Administered Medications   Medication Dose Route Frequency Provider Last Rate Last Admin    acetaminophen (TYLENOL) tablet 650 mg  650 mg Oral or Feeding Tube Q8H PRN Sudheer Rojas MD   650 mg at 08/30/24 0057    albuterol (PROVENTIL) neb solution 2.5 mg  2.5 mg Nebulization Q6H PRN Matt Cabral MD        cefadroxil (DURICEF) capsule 1,000 mg  1,000 mg Oral Q12H DARCY (08/20) Merline Rubio MD   1,000 mg at 09/18/24 0754    cetirizine (zyrTEC) half-tab 2.5 mg  2.5 mg Oral Q6H PRN Merline Rubio MD        clobetasol propionate (TEMOVATE) 0.05 % cream   Topical BID PRN Merline Rubio MD   Given at 09/18/24 0754    dextrose 10% infusion   Intravenous Continuous PRN Colten Garay MD        glucose gel 15-30 g  15-30 g Oral Q15 Min PRN Colten Garay MD        Or    dextrose 50 % injection 25-50 mL  25-50 mL Intravenous Q15 Min PRN Colten Garay MD        Or    glucagon injection 1 mg  1 mg Subcutaneous Q15 Min PRN Colten Garay MD        digoxin (LANOXIN) tablet 250 mcg  250 mcg Oral Daily Matt Cabral MD   250 mcg at 09/18/24 0754    empagliflozin (JARDIANCE) tablet 10 mg  10 mg Oral Daily Matt Cabral MD   10 mg at 09/18/24 0754    enoxaparin ANTICOAGULANT (LOVENOX) injection 40 mg  40 mg Subcutaneous Q24H Matt Cabral MD   40 mg at 09/18/24 1059    heparin lock flush 10 unit/mL injection 5-20 mL  5-20 mL Intracatheter Q24H Matt Cabral MD   5 mL at 09/18/24 0554    heparin lock flush 10 unit/mL injection 5-20 mL  5-20 mL Intracatheter Q1H PRN Matt Cabral MD   5 mL at 09/16/24 1737    hydrOXYzine HCl (ATARAX) tablet 10 mg  10 mg Oral TID PRN Matt Cabral MD   10 mg at 09/17/24 2033    ipratropium - albuterol 0.5 mg/2.5 mg/3 mL (DUONEB) neb  solution 3 mL  3 mL Nebulization Q4H PRN Sudheer Rojas MD   3 mL at 08/25/24 0536    lidocaine (LMX4) cream   Topical Q1H PRN Sudheer Rojas MD        lidocaine 1 % 0.1-1 mL  0.1-1 mL Other Q1H PRN Sudheer Rojas MD        medication instruction   Does not apply Continuous PRN Sudheer Rojas MD        metoclopramide (REGLAN) injection 5 mg  5 mg Intravenous Q6H PRN Shantanu Mclean MD   5 mg at 09/01/24 0639    metoprolol succinate ER (TOPROL XL) 24 hr tablet 25 mg  25 mg Oral Daily Matt Cabral MD   25 mg at 09/18/24 0754    multivitamin w/minerals (THERA-VIT-M) tablet 1 tablet  1 tablet Oral Daily Luis Smith MD   1 tablet at 09/18/24 0754    naloxone (NARCAN) injection 0.2 mg  0.2 mg Intravenous Q2 Min PRN Jani Martin MD        Or    naloxone (NARCAN) injection 0.4 mg  0.4 mg Intravenous Q2 Min PRN Jani Martin MD        Or    naloxone (NARCAN) injection 0.2 mg  0.2 mg Intramuscular Q2 Min PRN Jani Martin MD        Or    naloxone (NARCAN) injection 0.4 mg  0.4 mg Intramuscular Q2 Min PRN Jani Martin MD        ondansetron (ZOFRAN) injection 4 mg  4 mg Intravenous Q6H PRN Shantanu Mclean MD   4 mg at 08/31/24 1028    polyethylene glycol (MIRALAX) Packet 17 g  17 g Oral or Feeding Tube Daily Matt Cabral MD   17 g at 09/18/24 0754    polyethylene glycol (MIRALAX) Packet 17 g  17 g Oral Daily PRN Sudheer Rojas MD   17 g at 08/22/24 1430    risperiDONE (risperDAL) tablet 1 mg  1 mg Oral or Feeding Tube Daily Patricia Dukes MD   1 mg at 09/18/24 0754    risperiDONE (risperDAL) tablet 1.5 mg  1.5 mg Oral or Feeding Tube QPM Patricia Dukes MD   1.5 mg at 09/17/24 2034    sacubitril-valsartan half-tab 12-13 mg  1 half-tab Oral BID Nikunj Pereira MD   1 half-tab at 09/18/24 0754    selenium sulfide (SELSUN) 1 % shampoo   Topical Daily PRN Matt Cabral MD   Given at 09/01/24 2103    senna-docusate (SENOKOT-S/PERICOLACE) 8.6-50 MG per tablet 1 tablet  1 tablet Oral  "or Feeding Tube At Bedtime Andree Becerril, APRN CNP   1 tablet at 09/17/24 2034    senna-docusate (SENOKOT-S/PERICOLACE) 8.6-50 MG per tablet 1 tablet  1 tablet Oral or Feeding Tube BID PRN Jani Martin MD        Or    senna-docusate (SENOKOT-S/PERICOLACE) 8.6-50 MG per tablet 2 tablet  2 tablet Oral or Feeding Tube BID PRN Jani Martin MD        sodium chloride (PF) 0.9% PF flush 3 mL  3 mL Intracatheter Q8H Sudheer Rojas MD   3 mL at 09/18/24 0554    sodium chloride (PF) 0.9% PF flush 3 mL  3 mL Intracatheter q1 min prn Sudheer Rojas MD        spironolactone (ALDACTONE) tablet 25 mg  25 mg Oral Daily Patricia Dukes MD   25 mg at 09/18/24 0754    thiamine (B-1) tablet 100 mg  100 mg Oral or Feeding Tube Daily Graciela Whittaker MD   100 mg at 09/18/24 0754        Allergies   Allergen Reactions    Implanon [Etonogestrel]     Penicillin V Hives     Tolerated ampicillin/sulbactam 08/21/2024     Review of Systems:  -As per HPI    Physical exam:  Vitals: /57 (BP Location: Right arm)   Pulse (!) 132   Temp 98.2  F (36.8  C) (Oral)   Resp 20   Ht 1.86 m (6' 1.23\")   Wt 96.7 kg (213 lb 3.2 oz)   SpO2 95%   BMI 27.95 kg/m    GEN: This is a well developed, well-nourished male in no acute distress.  SKIN: Total skin excluding the undergarment areas was performed. The exam included the head/face, neck, both arms, chest, back, abdomen, both legs, digits and/or nails.   - There are numerous pink excoriated papules coalescing into plaques on the dorsal aspect of the bilateral feet.  - Subtle pink edematous papules and plaques along the bilateral forearms and L flank.  -No other lesions of concern on areas examined.                           Laboratory:  Results for orders placed or performed during the hospital encounter of 08/20/24 (from the past 24 hour(s))   Basic metabolic panel   Result Value Ref Range    Sodium 142 135 - 145 mmol/L    Potassium 3.7 3.4 - 5.3 mmol/L    Chloride 102 98 - 107 " mmol/L    Carbon Dioxide (CO2) 26 22 - 29 mmol/L    Anion Gap 14 7 - 15 mmol/L    Urea Nitrogen 9.8 6.0 - 20.0 mg/dL    Creatinine 0.54 (L) 0.67 - 1.17 mg/dL    GFR Estimate >90 >60 mL/min/1.73m2    Calcium 9.1 8.8 - 10.4 mg/dL    Glucose 122 (H) 70 - 99 mg/dL   CBC with Platelets & Differential    Narrative    The following orders were created for panel order CBC with Platelets & Differential.  Procedure                               Abnormality         Status                     ---------                               -----------         ------                     CBC with platelets and d...[[id number]]  Abnormal            Final result                 Please view results for these tests on the individual orders.   Ionized Calcium   Result Value Ref Range    Calcium Ionized Whole Blood 4.9 4.4 - 5.2 mg/dL   Hepatic panel   Result Value Ref Range    Protein Total 6.5 6.4 - 8.3 g/dL    Albumin 3.8 3.5 - 5.2 g/dL    Bilirubin Total 0.2 <=1.2 mg/dL    Alkaline Phosphatase 87 40 - 150 U/L    AST 19 0 - 45 U/L    ALT 37 0 - 70 U/L    Bilirubin Direct <0.20 0.00 - 0.30 mg/dL   Basic metabolic panel   Result Value Ref Range    Sodium 142 135 - 145 mmol/L    Potassium 4.4 3.4 - 5.3 mmol/L    Chloride 106 98 - 107 mmol/L    Carbon Dioxide (CO2) 25 22 - 29 mmol/L    Anion Gap 11 7 - 15 mmol/L    Urea Nitrogen 8.0 6.0 - 20.0 mg/dL    Creatinine 0.55 (L) 0.67 - 1.17 mg/dL    GFR Estimate >90 >60 mL/min/1.73m2    Calcium 9.1 8.8 - 10.4 mg/dL    Glucose 111 (H) 70 - 99 mg/dL   Magnesium   Result Value Ref Range    Magnesium 2.1 1.7 - 2.3 mg/dL   CBC with platelets and differential   Result Value Ref Range    WBC Count 8.5 4.0 - 11.0 10e3/uL    RBC Count 3.89 (L) 4.40 - 5.90 10e6/uL    Hemoglobin 11.1 (L) 13.3 - 17.7 g/dL    Hematocrit 35.3 (L) 40.0 - 53.0 %    MCV 91 78 - 100 fL    MCH 28.5 26.5 - 33.0 pg    MCHC 31.4 (L) 31.5 - 36.5 g/dL    RDW 15.5 (H) 10.0 - 15.0 %    Platelet Count 282 150 - 450 10e3/uL    % Neutrophils 58 %     % Lymphocytes 25 %    % Monocytes 11 %    % Eosinophils 5 %    % Basophils 1 %    % Immature Granulocytes 1 %    NRBCs per 100 WBC 0 <1 /100    Absolute Neutrophils 4.9 1.6 - 8.3 10e3/uL    Absolute Lymphocytes 2.1 0.8 - 5.3 10e3/uL    Absolute Monocytes 0.9 0.0 - 1.3 10e3/uL    Absolute Eosinophils 0.4 0.0 - 0.7 10e3/uL    Absolute Basophils 0.0 0.0 - 0.2 10e3/uL    Absolute Immature Granulocytes 0.1 <=0.4 10e3/uL    Absolute NRBCs 0.0 10e3/uL   Phosphorus   Result Value Ref Range    Phosphorus 4.9 (H) 2.5 - 4.5 mg/dL   Glucose by meter   Result Value Ref Range    GLUCOSE BY METER POCT 107 (H) 70 - 99 mg/dL     Dr. Russell staffed the patient.    Staff Involved:  Resident/Staff

## 2024-09-18 NOTE — PROGRESS NOTES
Hours of Care: 2300 - 0700    Changed: no changes this shift  Tele: -150, rPVCs  Mobility: SBA    Neuro: A/O x 4. Call light appropriate. Able to make needs known.  Respiratory: On room air. Lung sounds clear. Denies shortness of breath at rest.  Cardiac: VSS. Soft Bps in evening, team paged.  GI/: Last BM 9/17. No report of N/V. Urinating adequate amounts of clear, yellow urine.  Endo: Blood sugars ACHS.  Skin: See PCS for assessment and treatment of wounds and surgical incisions.  LDA: L PICC triple lumen   Electrolytes: RN managed Mg, K, Phos.   Pain: Denies  Diet: Regular    P: Continue to follow POC and report changes to Cards 2.

## 2024-09-19 ENCOUNTER — TELEPHONE (OUTPATIENT)
Dept: DERMATOLOGY | Facility: CLINIC | Age: 24
End: 2024-09-19
Payer: MEDICARE

## 2024-09-19 ENCOUNTER — APPOINTMENT (OUTPATIENT)
Dept: PHYSICAL THERAPY | Facility: CLINIC | Age: 24
DRG: 853 | End: 2024-09-19
Attending: INTERNAL MEDICINE
Payer: MEDICARE

## 2024-09-19 VITALS
SYSTOLIC BLOOD PRESSURE: 115 MMHG | OXYGEN SATURATION: 98 % | TEMPERATURE: 98.8 F | RESPIRATION RATE: 16 BRPM | HEART RATE: 127 BPM | BODY MASS INDEX: 28.19 KG/M2 | WEIGHT: 212.7 LBS | HEIGHT: 73 IN | DIASTOLIC BLOOD PRESSURE: 63 MMHG

## 2024-09-19 LAB — ERYTHROCYTE [SEDIMENTATION RATE] IN BLOOD BY WESTERGREN METHOD: 16 MM/HR (ref 0–15)

## 2024-09-19 PROCEDURE — 250N000013 HC RX MED GY IP 250 OP 250 PS 637: Performed by: INTERNAL MEDICINE

## 2024-09-19 PROCEDURE — 250N000013 HC RX MED GY IP 250 OP 250 PS 637: Performed by: STUDENT IN AN ORGANIZED HEALTH CARE EDUCATION/TRAINING PROGRAM

## 2024-09-19 PROCEDURE — 85652 RBC SED RATE AUTOMATED: CPT

## 2024-09-19 PROCEDURE — 97110 THERAPEUTIC EXERCISES: CPT | Mod: GP | Performed by: REHABILITATION PRACTITIONER

## 2024-09-19 PROCEDURE — 250N000013 HC RX MED GY IP 250 OP 250 PS 637

## 2024-09-19 PROCEDURE — 250N000011 HC RX IP 250 OP 636: Performed by: STUDENT IN AN ORGANIZED HEALTH CARE EDUCATION/TRAINING PROGRAM

## 2024-09-19 RX ORDER — POLYETHYLENE GLYCOL 3350 17 G/17G
17 POWDER, FOR SOLUTION ORAL DAILY PRN
Qty: 510 G | Refills: 3 | Status: SHIPPED | OUTPATIENT
Start: 2024-09-19

## 2024-09-19 RX ORDER — AMOXICILLIN 250 MG
1 CAPSULE ORAL DAILY PRN
Qty: 30 TABLET | Refills: 2 | Status: SHIPPED | OUTPATIENT
Start: 2024-09-19

## 2024-09-19 RX ADMIN — METOPROLOL SUCCINATE 25 MG: 25 TABLET, EXTENDED RELEASE ORAL at 09:06

## 2024-09-19 RX ADMIN — Medication 10 ML: at 05:01

## 2024-09-19 RX ADMIN — THIAMINE HCL TAB 100 MG 100 MG: 100 TAB at 09:06

## 2024-09-19 RX ADMIN — Medication 1 TABLET: at 09:06

## 2024-09-19 RX ADMIN — SPIRONOLACTONE 25 MG: 25 TABLET, FILM COATED ORAL at 09:06

## 2024-09-19 RX ADMIN — Medication 1 HALF-TAB: at 09:08

## 2024-09-19 RX ADMIN — TRIAMCINOLONE ACETONIDE: 1 OINTMENT TOPICAL at 09:08

## 2024-09-19 RX ADMIN — RISPERIDONE 1 MG: 1 TABLET, FILM COATED ORAL at 09:06

## 2024-09-19 RX ADMIN — ENOXAPARIN SODIUM 40 MG: 40 INJECTION SUBCUTANEOUS at 12:23

## 2024-09-19 RX ADMIN — EMPAGLIFLOZIN 10 MG: 10 TABLET, FILM COATED ORAL at 09:06

## 2024-09-19 RX ADMIN — DIGOXIN 250 MCG: 125 TABLET ORAL at 09:06

## 2024-09-19 ASSESSMENT — ACTIVITIES OF DAILY LIVING (ADL)
ADLS_ACUITY_SCORE: 37

## 2024-09-19 NOTE — TELEPHONE ENCOUNTER
[facility] Call Center    Phone Message    May a detailed message be left on voicemail: no - [facility] @ [phone]    Reason for Call: Appointment Intake    Referring Provider Name: Merline Rubio MD @ [facility]  Diagnosis and/or Symptoms: Rash [R21] (Suspected bacteremia-associated rash on bilateral feet as well as urticaria 2/2 amoxicillin)    Confirmed with brother/Johann that would like to schedule hospital follow up for rash. He is requesting you contact [facility] at [phone] to schedule patient. Thank you!    Action Taken: Message routed to:  [facility]    Travel Screening: Not Applicable     Date of Service: ED 8/20/24

## 2024-09-19 NOTE — PROGRESS NOTES
Spoke with Terrance's legal guardian Johann Hidalgo over the phone on [employer]'s cellphone. Johann gave bedside RN full permission to go over all discharge information, care information and future appointments with [facility] [place].

## 2024-09-19 NOTE — DISCHARGE SUMMARY
[facility]  Cardiology Heart Failure Service (Cards 2)  Discharge Summary       Patient Name: Terrance Hidalgo    Medical Record Number: [id number]    YOB: 2000  Date of admission:  8/20/2024  Date of discharge: 9/19/2024    Admitting provider: Jani Martin MD  Discharge provider: Jani Martin  Primary provider: Colt Flores    DISCHARGE DIAGNOSES:   1. Acute heart biventricular systolic diastolic failure  2. Bacteremia  3. Transaminitis   4. Acute kidney injury   5. Aspiration pneumonia   6. Lactic acidosis    ITEMS FOR OUTPATIENT FOLLOW UP:     - CBC and BMP in 1 week  - check weight daily and gaining > 3lb in 48h call [facility] clinic   - repeat TTE in 1 month  - to be seen by his PCP in 1-2 weeks      - needs outpatient appointment with [facility] > appt on 10/02/2024    - for [facility] > call first Johann Hidalgo (legal guardian ) for approval to call group [facility] (Ms. Gurrola to arrange travel ) to arrange appointments     HPI: Please see the detailed H & P from 8/20/2024. Briefly, Mr. Hidalgo is a 25 yo M with PMH autism who initially presented to [place] with cough, URI symptoms, and hypoxemia. He was intubated due to respiratory failure and transferred to [facility], where his hospital course transpired to mixed cardiogenic/septic shock requiring multiple pressor agents, inotropic support, and IV diuresis. Transferred again to [facility] CICU on 8/20 for continued management.     Presentation initially highly suspicious for fulminant myocarditis base on elevated cardiac and inflammatory biomarkers, new (suspected), severe biventricular dysfunction w/ reduced cardiac output requiring intermittent inotropic support, and concurrent infectious/respiratory symptomotology c/b acute respiratory failure 2/2 ARDS and cardiogenic etiology 2/2 miocarditis with superimposed aspiration PNM treated with sulbactam. Empirically pulsed with steroids.  However, EMB 5/21 demonstrated unremarkable myocardial tissue with no evidence of inflammatory infiltrate.      Hospital course further complicated by worsening cardiogenic/septic shock with worsening renal and liver failure requiring IABP placement on 8/27. I NO weaned off on 8/28 and patient was extubated without complications. IABP removed on 9/1 and closed with 8Fr angioseal. Dobutamine was successfully weaned off and his GDMT initialed. CMR on 9/5 showed mildly dilated left ventricle with severely reduced left ventricular function, LVEF 15%. normal right ventricular size and systolic function, RVEF 46%. no myocardial edema or replacement fibrosis. No MRI features of acute myocarditis. He has been undergoing optimization of GDMT. Blood cultures noted to be positive for strep mitis for which he has been treated with prolonged course of antibiotics.     HOSPITAL COURSE BY PROBLEM:     # Biventricular failure with severely reduce left ventricular function with RV recovery   # Cardiogenic shock SCAI C s/p IABP 8/27-9/1  TTE (8/21/24): LVIDd 58mm. Biplane LVEF is 16%. Severe hypokinesis of mid to apical RV free wall.  RHC (8/21/24): RA mean 6, PA 30/23/26, PCWP 18, [place] CO/CI 5.0/2.1, PVR 1.6, SVR 1270  EMB (8/21/24): Fragments of essentially unremarkable myocardial tissue with no evidence of inflammatory infiltrate   Coronary angiogram: NA   CMR: Mildly dilated left ventricle with severely reduced left ventricular function, LVEF 15%.  Normal right ventricular size and systolic function, RVEF 46%. No myocardial edema or replacement fibrosis or MRI features of acute myocarditis.  Completed prednisone taper. Remains off dobutamine with stable hemodynamics.  - Plan for discharge with low-dose diuresis as needed for hypervolemia  - GDMT  Metoprolol succinate 25mg every day   Entresto 12/13 mg BID   Spironolactone 25 mg daily  Empagliflozin 10 mg daily   - Digoxin 250mg every day  - On discharge, outpatient cardiology  follow-up  - Repeat TTE in 1 month     # Sinus tachycardia  - Outpatient cardiac rehab     # Strep mitis in 1/2 bottles, possible contaminant but given unclear significance we will treat as vasquez infection with prolonged abx course per discussion with ID.   Blood culture from 9/5 grew strep mitis in 1/2 bottles. Repeat blood culture on 9/6 NGTD. Possible contaminant. Stopped antibiotics, however ID ultimately recommended preventative two-week course of antibiotics. Developed hives with amoxicillin; switched to levofloxacin to minimize cross-reactivity, per discussion with pharmacy.  - Antibiotics:                 > s/p Vanc 9/6                 > s/p Ceftriaxone 9/6 - 9/7                 > s/p Amoxicillin 9/11-9/17                 > s/p Cefadroxil 9/18  > Levofloxacin 750mg every day 9/18-9/25     # Urticaria 2/2 amoxicillin, arms/trunk  # Bacteremia-associated rash, bilateral feet, mildly improving  Nursing noted rash on bilateral lower extremities 9/12; ongoing 9/15 (on bilateral feet, hands, and upper back; pictures in chart). Discussed with dermatology; based on timing and characteristics of rash, suspect medication-associated rash secondary to amoxicillin on arms/trunk and possible bacteremia-associated rash on bilateral feet. Tried pretreatment with antihistamines/steroid topical ointment (9/15-9/17) without significant improvement of rash and increased patient somnolence with antihistamine therapy (both with benadryl and cetirizine).  Plan to stop amoxicillin, give antihistamines as needed, and use topical steroid ointment for rash on feet until resolution.   - Dermatology consult, appreciate input  Triamcinolone 0.1% BID, apply to feet until rash resolved  Cetrizine 2.5 mg q6H as needed for hives on abdomen/chest     # Concern for aspiration pneumonia, resolved  # Recurrent fevers of unclear etiology, resolved  - Prior antibiotics       > Unasyn 8/21 - 8/26       > Cefepime 8/20 - 8/21       > Azithromycin 8/20  "- 08/24       > Vanco 8/20 - 8/21 s/p 8/26       > meropenem 8/26-9/1       > zosyn 8/26 - 8/28     # Altered mental status, resolved  # Autism  - c/w risperidone   - case was discussed with psychiatry regarding his medication and relation to cardiac decompensation given no clear culprit. However, given no safe alternative we would suggest continuation of risperidone as he has been on this medication for years without recent dose change and lack of evidence for risperidone to cause fulminant heart failure at the movement.      # Elevated transaminases - resolved      # Hypernatremia, resolved  # Hypervolemia, controlled  # Lactic acidosis, resolved  - Diuresis as above     # Acute hypoxemic respiratory failure, resolved  # Pleural effusion, resolved  See above for treatment of suspected aspiration pneumonia.      # Hyperglycemia, resolved  Likely related to stress and prednisone therapy. No history of diabetes. A1c was 5.6% on 8/23/2024.     PHYSICAL EXAM:  Blood pressure 111/59, pulse (!) 136, temperature 97.2  F (36.2  C), temperature source Oral, resp. rate 16, height 1.86 m (6' 1.23\"), weight 96.5 kg (212 lb 11.2 oz), SpO2 97%.    General appearance - sitting in bed; appears in no acute distress.  Head: Normocephalic and atraumatic.   Eyes: Pupils are equal, round, and reactive to light. Extraocular movement intact. No conjunctival pallor. No scleral icterus.  Neck: No JVD, bruit.  Cardiovascular: Regular rhythm. S1 and S2 auscultated. No murmurs, rub, or gallops.  Pulmonary/Chest: Normal resp effort on RA, speaking in full sentences. Clear breath sounds to auscultation bilaterally. No wheezes, crackles, or rhonchi. No chest tenderness.   Abdominal: Bowel sounds present. Soft. No distension. No rigidity, rebound or guarding. No organomegaly, hernias or palpable masses on deep palpation. No CVA tenderness.  Extremities: Warm, no cyanosis, 2+ distal pulses bilaterally. No edema.   Skin: Skin is warm and not " diaphoretic. Blanching rash over both feet improving.   Neuro: Alert and oriented to person, place, and time. No focal neurological deficit.    LABS:   Last CBC:   Recent Labs   Lab Test 09/18/24  0553   WBC 8.5   RBC 3.89*   HGB 11.1*   HCT 35.3*   MCV 91   MCH 28.5   MCHC 31.4*   RDW 15.5*          Last CMP:  Recent Labs   Lab Test 09/18/24  0557 09/18/24  0553   NA  --  142   POTASSIUM  --  4.4   CHLORIDE  --  106   ALEXANDER  --  9.1   CO2  --  25   BUN  --  8.0   CR  --  0.55*   * 111*   AST  --  19   ALT  --  37   BILITOTAL  --  0.2   ALBUMIN  --  3.8   PROTTOTAL  --  6.5   ALKPHOS  --  87     RHC 9/11:    BP: 111/52 mmHg    RA: 2/4/2 mmHg  RV: 22/2 mmHg  PA: 20/14 (16) mmHg  W: 10/13/10 mmHg  PA sat: 63 %  [place] CO/CI: 6.0/2.7  [place] CO/CI: 5.3/2.4  PVR: 0.99 GROVE     PROCEDURES:     - RHC  - IABP placement    CONSULTATIONS:     - ID   - CICU  - dermatology  - psychiatry     PENDING RESULTS:  None    IMAGING:  Results for orders placed or performed during the hospital encounter of 08/20/24   XR Chest Port 1 View    Narrative    Exam: XR CHEST PORT 1 VIEW, 8/20/2024 11:13 PM    Indication: check ETT positioning, placed at OSH    Comparison: None    Findings:   The patient is rotated. Endotracheal tube tip is in the midthoracic  trachea approximately 4.1 cm above the mauri. Right IJ central venous  catheter tip terminates in the high SVC, gastric tube courses below  the diaphragm out of the field of view.    Midline trachea. Massive cardiomegaly. Dense interstitial and airspace  opacities involving all lobes. No significant right effusion, the left  costophrenic angle are silhouetted by the heart. No definitive  pneumothorax. Visualized upper abdomen and bones are unremarkable.      Impression    Impression:   1. Endotracheal tube tip is in the midthoracic trachea approximately  4.1 cm above the mauri. Additional support devices as per above.  2. Diffuse interstitial airspace opacities consistent with  pulmonary  edema in the context of cardiomegaly. Mild patchy opacity at the right  lung base which may represent focal infectious/inflammatory etiology.  3. Small left effusion, no definite pneumothorax.    I have personally reviewed the examination and initial interpretation  and I agree with the findings.    KUN SALAZAR MD         SYSTEM ID:  [id number]   XR Abdomen Port 1 View    Narrative    Exam: XR ABDOMEN PORT 1 VIEW, 8/20/2024 11:16 PM    Indication: confirm OGT positioning, placed at OSH    Comparison: Same day x-ray chest    Findings:   Frontal supine radiograph of the abdomen. Nasogastric tube tip and  sidehole projected over the stomach, below the diaphragm. Partially  visualized central venous catheter tip terminates in the mid SVC.  Cardiomegaly and diffuse airspace opacities of the chest, as described  on the same day chest x-ray. Within the visualized abdomen,  nonspecific gasless appearance. No portal venous gas. Bones and soft  tissues are unremarkable.       Impression    Impression:   1. Nasogastric tube tip and sidehole project over the stomach.  2. Nonspecific gasless abdomen.  3. Cardiomegaly and diffuse airspace opacities as seen on same-day  chest x-ray.    I have personally reviewed the examination and initial interpretation  and I agree with the findings.    KUN SALAZAR MD         SYSTEM ID:  [id number]   CT Head w/o Contrast    Narrative    EXAM: CT HEAD W/O CONTRAST  8/21/2024 1:20 AM     HISTORY:  altered mental status       COMPARISON:  None    TECHNIQUE: Using multidetector thin collimation helical acquisition  technique, axial, coronal and sagittal CT images from the skull base  to the vertex were obtained without intravenous contrast.   (topogram) image(s) also obtained and reviewed.    FINDINGS:  No acute intracranial hemorrhage, mass effect, or midline shift. No  acute loss of gray-white matter differentiation in the cerebral  hemispheres. No hydrocephalus. There is slight  diffuse partial  effacement of the cerebral sulci of both cerebral hemispheres,  although the basal cisterns remain clear, and the ventricles are not  effaced. No downward translation of the cerebellar tonsils.    The bony calvaria and the bones of the skull base are normal. Mildly  hyperostotic appearance of the calvarium. The visualized portions of  the paranasal sinuses and mastoid air cells are clear. Grossly normal  orbits.       Impression    IMPRESSION:  No acute intracranial pathology.    I have personally reviewed the examination and initial interpretation  and I agree with the findings.    DARRION SEQUEIRA MD         SYSTEM ID:  [id number]   CT Chest Abdomen Pelvis w/o & w Contrast    Narrative    EXAMINATION: CT CHEST ABDOMEN PELVIS W/O & W CONTRAST, 8/21/2024  1:20 AM    TECHNIQUE:  Helical CT images from the thoracic inlet through the  symphysis pubis were obtained with contrast. Contrast dose: 135ml  isovue 370    COMPARISON: X-ray chest 8/20/2024.    HISTORY: worsening hypoxia, sepsis of unknown origin    FINDINGS:    -Endotracheal tube tip is in the mid thoracic trachea.  -Gastric tube tip terminates within the distal stomach.  -Right IJ central venous catheter tip terminates in the low SVC.  -Swan catheter balloon inflated within the bladder.    Chest:  Thyroid is unremarkable.  No axillary adenopathy, a few prominent supraclavicular lymph nodes,  including 1.3 x 1.5 left supraclavicular lymph node (6/47). A few  prominent mediastinal lymph nodes the paratracheal chain, bilateral  hilar adenopathy.    Cardiomegaly. Physiologic pericardial fluid, no large effusion. Aorta  and main pulmonary artery are normal caliber, normal branching  pattern, patent and branch vasculature. No central pulmonary embolism.    Central tracheobronchial tree is patent without obstruction,  intubated. Complete lobar atelectasis of the left lower lobe, and  majority of the right lower lobe. Near total atelectatic collapse  of  the left upper lobe, within the aerated portions there are  peribronchial consolidative opacities and scattered ground glass  opacities (8/44). Within the aerated right upper and partially aerated  right middle lobe, question micronodular opacity (8/45). Moderate left  pleural effusion, seen coursing along the fissure. Moderate right  effusion. Effusions extend from the base to the apex bilaterally.    Abdomen and pelvis:  Liver: Hepatic steatosis, no focal hepatic masses.  Biliary System: Unremarkable, no signs of acute cholecystitis, no  cholelithiasis. Phrygian cap. No biliary ductal dilation.  Pancreas: Fatty infiltration, no focal mass. Normal caliber pancreatic  duct.  Adrenal glands: Unremarkable  Spleen: Unremarkable  Kidneys: Symmetric cortical medullary enhancement, prominent medullary  phase opacification. Water attenuating renal cyst superior pole left  kidney measuring 2.2 cm (6/321). Ureters and bladder are unremarkable.  Nondistended bladder. Swan in place.    Gastrointestinal tract: Esophagus unremarkable, nondistended stomach.  Trace fluid and stranding noted along the lateral aspect of the second  portion of the duodenum (6/237). Normal caliber small and large bowel.  Centripetal enhancement throughout. Redundant sigmoid colon. Thickened  appearance of the colonic wall most predominant at the cecum.  Unremarkable appendix.    Mesentery/peritoneum/retroperitoneum: No free intra-abdominal air, no  intra-abdominal fluid collections. No abdominal masses.  Lymph nodes: A few nonenlarged right lower quadrant lymph nodes, no  secondary signs of inflammation in the right lower quadrant.  Vasculature: Nonaneurysmal abdominal aorta, patent branch vasculature,  no significant atherosclerotic calcifications. Portal vein, splenic  vein, superior mesenteric vein patent without obstruction.  Pelvis: Unremarkable, no fluid, no masses. Pelvic phleboliths.    Bones: No fractures or high-grade lesions. No  significant degenerative  changes of the spine.     Soft Tissues: Mild soft tissue anasarca along the posterior spinal  soft tissues (5/202).      Impression    IMPRESSION:   1. Patchy and nodular opacity of the right upper and right middle lobe  suspicious for pneumonia. Additionally, significant atelectasis with  complete collapse of the left lower lobe with near total collapse of  the left upper, and right lower lobes. Moderate bilateral pleural  effusions extending from the base to the apices.   2. Multiple enlarged mediastinal and hilar lymph nodes, which are  likely reactive.  3. Mild thickening of the colonic mucosa most notably at the level of  the cecum, without significant surrounding inflammatory change.  Nonspecific finding which can be seen with colitis.   4. Mild soft tissue anasarca.   5. Enlarged left supraclavicular lymph node, indeterminate for  etiology. This may be reactive, attention on follow-up. Nonemergent  ultrasound would be helpful for further characterization.  6. Cardiomegaly.  7. Hepatic steatosis.      I have personally reviewed the examination and initial interpretation  and I agree with the findings.    KUN SALAZAR MD         SYSTEM ID:  [id number]   XR Chest Port 1 View    Narrative    Examination: XR CHEST PORT 1 VIEW 8/21/2024 1:29 PM    Indication: CXR    Comparison:CT 8/21/2024. XR 8/20/2024.    Findings:  AP supine portable chest. Left IJ [place]-Sha catheter terminates over  the main pulmonary artery/very proximal right pulmonary artery. Right  IJ CVC terminates over the low SVC. Endotracheal tube terminates over  the the mid thoracic trachea. Esophageal temperature probe terminates  over the mid thoracic esophagus. Trachea is midline. Stable cardiac  silhouette in correlation to CT today. Small right and trace left  pleural fluid with bibasilar consolidative atelectasis with  silhouetting of the left hemidiaphragm. No acute osseous abnormality.      Impression    Impression:  Small pleural effusions and bibasilar opacities, including  likely left lower lung collapse. Additional scattered hazy/patchy  opacities which may represent additional atelectasis and/or infection  throughout the lung fields, correlating with CT today.    I have personally reviewed the examination and initial interpretation  and I agree with the findings.    KATIE BOBBY MD         SYSTEM ID:  [id number]   XR Chest Port 1 View    Narrative    Exam: XR CHEST PORT 1 VIEW, 8/22/2024 2:55 AM    Indication: Pulmonary status + device stability    Comparison: X-ray chest 8/21/2024, multiple priors.    Findings:   Frontal radiograph of the chest, 0120 hours. Endotracheal tube tip is  in the high thoracic trachea approximately 6.5 cm above the mauri.  Stable placement of left IJ [place]-Sha catheter in the proximal right  pulmonary artery, esophageal temperature probe in the mid esophagus,  gastric tube which courses below the diaphragm, side hole projects  over the stomach. Right IJ central venous catheter tip terminates in  the high SVC.    The trachea is midline. Heart size and shape is stable. Moderate left  layering effusion, no significant right effusion. No discernible  pneumothorax. Continued patchy airspace opacities.      Impression    Impression:   1. Stable support devices.  2. Continued retrocardiac opacity, atelectasis versus infection.  Associated moderate left layering effusion.    I have personally reviewed the examination and initial interpretation  and I agree with the findings.    KARLA ALVES MD         SYSTEM ID:  [id number]   XR Chest Port 1 View    Narrative    Exam: XR CHEST PORT 1 VIEW, 8/22/2024 7:29 AM    Indication: Emergent intubation    Comparison: X-ray chest 8/22/2024, 0120 hours.    Findings:   Frontal radiograph of the chest, 0712 hours. Endotracheal tube tip is  in the lower thoracic trachea approximately 2.8 cm above the mauri.  Left IJ [place]-Sha catheter tip terminates in the  right pulmonary  artery. Right IJ central venous catheter tip terminates in the high  SVC. Gastric tube courses below the diaphragm out of the field of  view. Esophageal temperature probe in the mid thoracic esophagus.  Midline trachea. Pulmonary vascular congestion. The heart size and  shape stable priors. Continued patchy basilar opacities and  streakiness. No new consolidations. Small left effusion. No  discernible pneumothorax.      Impression    Impression:   1. Endotracheal tube tip is in the lower thoracic trachea  approximately 2.8 cm above the mauri. Stable placement of remaining  support devices  2. Continued basilar opacities, small left layering effusion. No  pneumothorax.  3. Pulmonary vascular congestion.    I have personally reviewed the examination and initial interpretation  and I agree with the findings.    KARLA ALVES MD         SYSTEM ID:  [id number]   XR Abdomen Port 1 View    Narrative    EXAMINATION:  XR ABDOMEN PORT 1 VIEW 8/22/2024     COMPARISON: 8/21/2024 CT chest abdomen and pelvis    HISTORY: OG placement.    TECHNIQUE: One frontal supine view of the abdomen.    FINDINGS: Gastric tube tip and sidehole project over the stomach. No  abnormally dilated air-filled loops of bowel. Bilateral pulmonary  opacities, greater on the left. Unchanged partially visualized support  devices in the lower chest.      Impression    IMPRESSION:   Gastric tube tip and sidehole are within the stomach.    I have personally reviewed the examination and initial interpretation  and I agree with the findings.    KARLA ALVES MD         SYSTEM ID:  [id number]   XR Chest Port 1 View    Narrative    Exam: XR CHEST PORT 1 VIEW, 8/23/2024 7:02 AM    Indication: intubated, cardiogenic shock    Comparison: X-ray chest 8/20/2024, multiple priors.    Findings:   Frontal radiograph of the chest, 0121 hours. Endotracheal tube tipIs  in the midthoracic trachea approximately 6.7 cm above the mauri.  Stable  placement left IJ [place]-Sha catheter, esophageal temperature  probe, gastric tube, and right IJ central venous catheter.    The trachea is midline. Stable enlarged cardiac silhouette. Unchanged  low lung volumes. No significant change in the streaky basilar and  patchy airspace opacities throughout the lungs. Layering effusions in  the right lung base, there is silhouetting of the left lung base. No  discernible pneumothorax.      Impression    Impression:   1. Stable support devices. Endotracheal tube tip is approximately 6.7  cm above the mauri.  2. Continued basilar atelectasis, and diffuse hazy opacities likely  representing pulmonary edema.  3. Stable cardiomegaly.    I have personally reviewed the examination and initial interpretation  and I agree with the findings.    DIDI GERMAN MD         SYSTEM ID:  [id number]   XR Chest Port 1 View    Narrative    EXAM: XR CHEST PORT 1 VIEW  8/23/2024 3:39 PM      HISTORY: Hypoxia    COMPARISON: Same-day radiograph    FINDINGS: Single view of the chest. The endotracheal tube tip projects  of the mid thoracic trachea. Esophageal temperature probe tip projects  in the mid thoracic esophagus. Left IJ approach [place]-Sha catheter  with tip projecting over the right pulmonary artery. Right IJ CVC with  tip projecting over the low SVC. Gastric tube courses outside the  field-of-view.    Trachea is midline. Stable enlarged cardiac silhouette. Continued  perihilar and bibasilar mixed pulmonary opacities. Slight increase in  right lower zone opacities. No discernible pneumothorax. Left  costophrenic angle is not completely included.      Impression    IMPRESSION:     1. Slight increased right basilar opacities, may represent increasing  edema, infiltrate or aspiration.  2. Stable support devices and similar streaky perihilar opacities    I have personally reviewed the examination and initial interpretation  and I agree with the findings.    DAKSHA MARQUEZ MD         SYSTEM ID:   [id number]   XR Chest Port 1 View    Narrative    Exam: XR CHEST PORT 1 VIEW, 8/24/2024 3:49 AM    Indication: intubated, cardiogenic shock    Comparison: X-ray chest 8/23/2024, multiple priors.    Findings:   Frontal radiograph of the chest, endotracheal tube tip terminates in  the midthoracic trachea approximately 4.8 cm above the mauri. Stable  placement of esophageal temperature probe, gastric tube, left IJ  [place]-Sha catheter, right IJ central venous catheter.    Trachea remains midline, heart size and shape is unchanged. Low lung  volumes, decreased from prior. Small left, trace right pleural  effusions. No definitive pneumothorax. Decreased right basilar hazy  and streaky opacities, no dense consolidations.      Impression    Impression:     1. Stable support devices, endotracheal tube tip is in the mid  thoracic trachea.  2. Decreased lung volumes compared to immediately prior chest x-ray  with decreased right basilar opacities. No new focal consolidations.  3. Small left pleural effusion    I have personally reviewed the examination and initial interpretation  and I agree with the findings.    DAKSHA MARQUEZ MD         SYSTEM ID:  [id number]   XR Chest Port 1 View    Narrative    Exam: XR CHEST PORT 1 VIEW, 8/25/2024 3:56 AM    Indication: intubated, cardiogenic shock    Comparison: X-ray chest 8/24/2024, multiple priors.    Findings:   Frontal radiograph of the chest, 0142 hours. Endotracheal tube tip is  in the midthoracic trachea approximately 5.7 cm above the mauri.  Stable placement of the left IJ [place]-Sha catheter, right IJ central  venous catheter, and gastric tube. There is been interval advancement  of the esophageal temperature probe into the distal esophagus.    The trachea is midline. Heart size and shape is stable continue low  lung volumes with hazy basilar opacities. Small bilateral effusions.  No new dense consolidations. No definitive pneumothorax.      Impression    Impression:   1.  Slight advancement of the esophageal temperature probe into the  distal esophagus. Remaining support devices are stable.  2. Hazy basilar and retrocardiac opacities suggestive of basilar  atelectasis. With superimposed small layering bilateral effusions.    I have personally reviewed the examination and initial interpretation  and I agree with the findings.    AZEEM ALEJO MD         SYSTEM ID:  [id number]   XR Chest Port 1 View    Narrative    Portable chest    INDICATION: Intubated, cardiogenic shock    COMPARISON: Yesterday    FINDINGS: Multiple support tubes and devices again noted including  left IJ [place]-Sha catheter with tip in the distal right main branch  pulmonary artery and endotracheal tube with tip approximately 6.3 cm  above the mauri. Esophageal temperature probe in the distal  esophagus. Right IJ catheter tip in the low SVC. Hazy opacities in the  lungs appear similar.      Impression    IMPRESSION: No significant interval changes radiographically    KATIE BOBBY MD         SYSTEM ID:  [id number]   US Abdomen Limited w Doppler Complete    Narrative    EXAMINATION: Limited Abdominal Ultrasound, 8/26/2024 2:31 PM     HISTORY: transaminitis       TECHNIQUE: The abdomen was scanned in standard fashion with  specialized ultrasound transducer(s) using both gray-scale, color  Doppler, and spectral flow techniques.    COMPARISON: 8/21/2024 CT CAP    FINDINGS:   Exam is significantly limited by obscuring bowel gas and limited  patient mobility.    Fluid: Trace right pleural effusion. No evidence of ascites.    Liver: The liver demonstrates homogenous echotexture with increased  echogenicity, measuring 17.8 cm in craniocaudal dimension. There is no  focal mass.     Extrahepatic portal vein flow is antegrade at 26 cm/s.  Right portal vein flow is antegrade, measuring 7 cm/s.  Left portal vein flow is not seen.    The hepatic artery is not visualized.     Gallbladder: There is no wall thickening,  pericholecystic fluid,  positive sonographic Givens's sign or evidence for cholelithiasis.  There is biliary sludge.    Bile Ducts: Both the intra- and extrahepatic biliary system are of  normal caliber.  The common bile duct could not be measured and is  obscured by overlying bowel gas.     Pancreas: The pancreas could not be visualized in this study.    Kidney: The right kidney measures 12.4 cm long. There is no  hydronephrosis or hydroureter, no shadowing renal calculi, cystic  lesion or mass.     The hepatic veins drain towards the normal direction of the IVC. The  IVC cannot be visualized in the study.      Impression    IMPRESSION:     1. Hepatomegaly with increased echogenicity of the liver, which may be  seen with intrinsic parenchymal disease such as steatosis.    2. Biliary sludge without sonographic evidence for acute  cholecystitis.    3. Normal antegrade flow in the extrahepatic and right portal vein.     4. The remainder of the findings are significantly limited secondary  to obscuring bowel gas and technical challenges related to patient  mobility.    I have personally reviewed the examination and initial interpretation  and I agree with the findings.    HENRY MORALES MD         SYSTEM ID:  [id number]   XR Chest Port 1 View    Narrative    Portable chest 8/26/2024 0905 hours    INDICATION: Endotracheal tube advanced    COMPARISON: 0607 hours earlier today    FINDINGS: No significant change in appearance of the heart or lungs or  many of the support devices. Endotracheal tube advanced with tip now  projects a 4.7 cm above the mauri.      Impression    IMPRESSION: Endotracheal tube slightly advanced just under 5 cm above  the mauri.    KATIE BOBBY MD         SYSTEM ID:  [id number]   XR Abdomen Port 1 View    Narrative    Exam: XR ABDOMEN PORT 1 VIEW, 8/26/2024 12:36 PM    Indication: Verify small bowel feeding tube bedside placement    Comparison: Abdomen 8/22/2024.    Findings:   Portable AP  supine. Feeding tube has been inserted with tip towards  the DJ flexure. Gastric tube tip and sidehole overlying stomach.  Nonobstructive bowel gas pattern. Please see separate chest  radiograph.      Impression    Impression: Feeding tube tip towards the DJ flexure. Gastric tube tip  and sidehole overlying stomach.    LUMA MATHEWS MD         SYSTEM ID:  [id number]   XR Chest Port 1 View    Narrative    EXAM: XR CHEST PORT 1 VIEW 8/27/2024 2:32 AM      HISTORY: intubated, cardiogenic shock.    COMPARISON: Previous day.     TECHNIQUE: Frontal view of the chest.    FINDINGS:   Left internal jugular [place]-Sha catheter with tip projecting over the  right pulmonary artery. Endotracheal tube in the midthoracic trachea.  Enteric tubes coursing beyond the field of view. Right internal  jugular central venous catheter with tip projecting over the SVC.  Trachea is midline. Cardiac silhouette appears stable. No significant  pleural effusion. Decreased mild retrocardiac and right basilar  opacities.        Impression    IMPRESSION:   1. Stable support devices.  2. Slightly decreased mild bibasilar atelectasis.    I have personally reviewed the examination and initial interpretation  and I agree with the findings.    DARRION FALL MD         SYSTEM ID:  [id number]   CT Chest Abdomen Pelvis w/o Contrast    Narrative    EXAMINATION: CT CHEST ABDOMEN PELVIS W/O CONTRAST, 8/27/2024 4:22 PM    TECHNIQUE:  Helical CT images from the thoracic inlet through the  symphysis pubis were obtained without IV contrast.    COMPARISON: CT 8/21/2024    HISTORY: identify source of infection septic/cardiogenic shock    FINDINGS:    Devices: [place]-Sha catheter tip projects over the distal right  pulmonary artery. Intra-aortic balloon pump. Feeding and gastric  tubes. Right IJ CVC. Endotracheal tube. Left femoral inferior approach  arterial line with tip in the external iliac artery.    CHEST:  MEDIASTINUM: Normal heart size. No pericardial  effusion. Normal  caliber ascending aorta. Normal caliber main pulmonary artery. No  suspicious lymphadenopathy.    LUNGS: Central tracheobronchial tree is patent. No pneumothorax or  pleural effusion. Dependent atelectasis. Mild groundglass attenuation  of the lung parenchyma without focal consolidation. No suspicious  pulmonary nodule.    Normal thyroid.    ABDOMEN/PELVIS:  LIVER: Hepatic steatosis. No suspicious hepatic lesion. No  intrahepatic or extrahepatic biliary ductal dilation.    GALLBLADDER: Hyperattenuating material throughout the gallbladder  lumen likely representing vicarious excretion of contrast.     PANCREAS: No focal pancreatic lesion. The main pancreatic duct is not  dilated.    SPLEEN: Within normal limits. Accessory splenule.    ADRENAL GLANDS: No focal adrenal nodule.    URINARY TRACT: No suspicious renal lesion on noncontrast images.  Slightly lobulated renal contours bilaterally. Simple attenuating cyst  of the left upper pole measuring up to 2.2 cm. No hydronephrosis or  hydroureter.  No nephrolithiasis.    BLADDER: Decompressed around a Swan balloon.    REPRODUCTIVE ORGANS: Within normal limits.    GASTROINTESTINAL TRACT: Normal caliber large and small bowel.  Submucosal fatty deposition throughout the colon. Colonic  diverticulosis with no evidence of acute diverticulitis. Rectal tube.   Normal appendix.    PERITONEUM/MESENTERY: No free fluid. No free air.    VESSELS: The aorta and major branch vessels are patent. The main  portal, splenic and proximal superior mesenteric veins are patent. No  significant atherosclerotic disease.    LYMPH NODES: Within normal limits.    BONES: No acute or suspicious osseous lesions.    SOFT TISSUES: No soft tissue mass identified.      Impression    IMPRESSION:   1. Essentially resolved dense consolidative/atelectatic opacities when  compared to 8/21/2024 CT. Pleural effusions have resolved as well.  2. No acute findings in the abdomen or pelvis.  3.  Findings of hypovolemia with a flattened appearance of the IVC.  4. Submucosal fatty deposition throughout the colon, which can be a  sequelae of previous infectious/inflammatory insults or may be  iatrogenic. No findings to suggest an active inflammatory/infectious  process.  5. Hepatic steatosis.     I have personally reviewed the examination and initial interpretation  and I agree with the findings.    LUMA MATHEWS MD         SYSTEM ID:  [id number]   US Upper Ext Arterial Duplex Bilateral    Narrative    ULTRASOUND UPPER EXTREMITY ARTERIAL DUPLEX BILATERAL 8/27/2024 10:09  AM    CLINICAL HISTORY: Eval for Impella 5.5 implantation.     COMPARISONS: None available.    REFERRING PROVIDER: RUIZ HOLGUIN    TECHNIQUE: Bilateral subclavian and axillary arteries evaluated with  grayscale, color Doppler, and spectral pulsed wave Doppler ultrasound.    FINDINGS:   RIGHT:  Subclavian artery, central: 148/0 cm/s, triphasic, 5.8 mm  Subclavian artery, peripheral: 190/0 cm/s, triphasic, 4.7 mm    Axillary artery: 220/0 cm/s, triphasic, 4.0 mm    LEFT:  Subclavian artery, central: 166/25 cm/s, triphasic, 5.3 mm  Subclavian artery, peripheral: 146/13 cm/s, triphasic, 5.2 mm    Axillary artery: 182/29 cm/s, triphasic, 5.2 mm      Impression    IMPRESSION: Patent subclavian and axillary arteries with measurements  as in the report.    I have personally reviewed the examination and initial interpretation  and I agree with the findings.    LADONNA THORNTON MD         SYSTEM ID:  [id number]   XR Chest Port 1 View    Narrative    EXAM: XR CHEST PORT 1 VIEW  8/27/2024 5:21 PM      HISTORY: s/p IABP placement    COMPARISON: Same day CT    FINDINGS: Single view of the chest. The endotracheal tube tip projects  6.3 cm from the mauri. Feeding tube courses outside of the field of  view. Gastric tube tip and sidehole projected over the stomach. Left  IJ approach [place]-Sha catheter with tip projecting over the right  pulmonary artery. Right  IJ CVC with tip projecting near the superior  cavoatrial junction. IABP with marker at the level of the mauri.    Trachea is midline. Enlarged cardiac silhouette. Low lung volumes. No  substantial pleural effusion. No discernible pneumothorax. Streaky  perihilar and bibasilar opacities.      Impression    IMPRESSION:   1. IABP marker projects at the level of the mauri. Additional support  devices as detailed above.  2. Low lung volumes with streaky perihilar and bibasilar opacities.  Findings likely not substantially changed from same day CT.    I have personally reviewed the examination and initial interpretation  and I agree with the findings.    LUMA MATHEWS MD         SYSTEM ID:  [id number]   XR Chest Port 1 View    Narrative    EXAM: XR CHEST PORT 1 VIEW  8/27/2024 5:32 PM      HISTORY: s/p bedside swan and IABP repositioning    COMPARISON: Same day radiograph    FINDINGS: Single view of the chest. IABP marker projects 2.3 cm  superior to the mauri. Left IJ approach [place]-Sha catheter has been  minimally retracted. Otherwise stable findings.      Impression    IMPRESSION:   1. IABP marker projects 2.3 cm superior to the mauri.  2. Left IJ approach [place]-Sha catheter with tip projecting over the  right pulmonary artery.    I have personally reviewed the examination and initial interpretation  and I agree with the findings.    LUMA MATHEWS MD         SYSTEM ID:  [id number]   XR Chest Port 1 View    Narrative    EXAM: XR CHEST PORT 1 VIEW 8/28/2024 12:45 AM      HISTORY: daily IABP check.    COMPARISON: Previous day.     TECHNIQUE: Frontal view of the chest.    FINDINGS: Intraoperative balloon pump superior marker projects at  approximately the level of the mauri. Endotracheal tube in the  midthoracic trachea. Enteric tubes course beyond the field-of-view.  Left internal jugular [place]-Sha catheter with tip projecting over the  right pulmonary artery. Low lung lungs. Stable cardiac silhouette.  Right  convex scoliotic curvature of the thoracic spine. Hazy opacity  in the retrocardiac region. No significant pleural effusion. No  appreciable pneumothorax.      Impression    IMPRESSION:   Intra-aortic balloon pump marker projects at the level of the mauri.   Hazy retrocardiac opacity likely representing atelectasis.    I have personally reviewed the examination and initial interpretation  and I agree with the findings.    HENRY MORALES MD         SYSTEM ID:  [id number]   XR Abdomen Port 1 View    Narrative    Exam: XR ABDOMEN PORT 1 VIEW, 8/28/2024 7:04 PM    Indication: Verify small bowel feeding tube bedside placement    Comparison: CT chest abdomen and pelvis 8/27/2024, multiple priors.    Findings:   Frontal radiograph of the abdomen. Feeding tube tip projects post  pyloric over the small bowel. Relative paucity of gas about the  abdomen. Trace gas noted throughout the left colon. No portal venous  gas or definite pneumatosis. Visualized lung bases are unremarkable.      Impression    Impression:   1. Feeding tube tip projects over the proximal small bowel.  2. Nonspecific gasless abdomen.    I have personally reviewed the examination and initial interpretation  and I agree with the findings.    OLI BERGER MD         SYSTEM ID:  [id number]   XR Chest Port 1 View    Narrative    EXAM: XR CHEST PORT 1 VIEW 8/29/2024 1:35 AM      HISTORY: daily IABP check.    COMPARISON: Previous day.     TECHNIQUE: Frontal view of the chest.    FINDINGS: Left internal jugular [place]-Sha catheter with tip projecting  over the right pulmonary artery. Intraaortic balloon pump marker at  the level of the mauri. Interval removal of endotracheal tube.  Enteric tube courses below the field of view. Low lung volumes. Stable  cardiac silhouette. No significant pleural effusion. No appreciable  pneumothorax. Hazy retrocardiac opacity appears stable.      Impression    IMPRESSION:   Intra-aortic balloon pump marker projects at the  level of the mauri.  Stable hazy retrocardiac opacity.    I have personally reviewed the examination and initial interpretation  and I agree with the findings.    OLI BERGER MD         SYSTEM ID:  [id number]   XR Chest Port 1 View    Narrative    EXAM: XR CHEST PORT 1 VIEW 8/30/2024 1:45 AM      HISTORY: daily IABP check.    COMPARISON: Previous day.     TECHNIQUE: Frontal view of the chest.    FINDINGS: Intraaortic balloon pump marker projects approximately 1 cm  below the mauri. Left internal jugular [place]-Sha catheter tip  projects over the right main pulmonary artery. Enteric tube courses  through the stomach and beyond the field-of-view. Low lung volumes.  Stable cardiac enlargement. No significant pleural effusion. No  appreciable pneumothorax. Streaky retrocardiac opacity appears stable.      Impression    IMPRESSION: Intra-aortic balloon pump marker projects 1 cm below the  mauri. Streaky retrocardiac opacity, likely atelectasis.    I have personally reviewed the examination and initial interpretation  and I agree with the findings.    OLI BERGER MD         SYSTEM ID:  [id number]   XR Chest Port 1 View    Narrative    Examination: XR CHEST PORT 1 VIEW 8/30/2024 9:43 AM    Indication: IABP advanced    Comparison: Radiograph 8/30/2024 at 0140    Findings:  AP supine portable chest. Right IJ [place]-Sha catheter tip terminates  over the main pulmonary artery/very proximal right main branch  pulmonary artery. Right IJ CVC terminates over the SVC-atrial  junction. Intraaortic balloon pump distal marker terminates over the  aortic arch approximately 1.1 cm above the mauri level. No  significant pleural effusion. No appreciable pneumothorax. Continued  mild perihilar and basilar atelectasis. Unchanged osseous structures.  Similar gaseous distention of the stomach. Enteric tube courses  inferiorly out of view.      Impression    Impression: Intraoperative balloon pump distal marker terminates 1.1  cm  above the level of the mauri at the aortic arch. Stable additional  support devices. Stable mild perihilar and retrocardiac atelectasis.    I have personally reviewed the examination and initial interpretation  and I agree with the findings.    AZEEM ALEJO MD         SYSTEM ID:  [id number]   XR Chest Port 1 View    Narrative    EXAM: XR CHEST PORT 1 VIEW 8/31/2024 6:38 AM      HISTORY: daily IABP check.    COMPARISON: Previous day.     TECHNIQUE: Frontal view of the chest.    FINDINGS: Intra-aortic pump marker projects roughly 2 cm above the  mauri over the midportion of the expected aortic knob, presumably  The junction of the distalmost aspect of the aortic arch and proximal  aspect of the descending thoracic aorta. Left internal jugular  [place]-Sha catheter tip terminates over the right pulmonary artery.  Right internal jugular central venous catheter terminates over the  cavoatrial junction. Hazy basilar opacity obscuring the costophrenic  angle. Right costophrenic angle appears clear. No appreciable  pneumothorax. Low lung volumes.      Impression    IMPRESSION:   Intra-aortic balloon pump marker projects 2 cm above the mauri. Left  basilar opacity, likely to represent atelectasis and/or edema.    I have personally reviewed the examination and initial interpretation  and I agree with the findings.    KATIE BOBBY MD         SYSTEM ID:  [id number]   XR Chest Port 1 View    Narrative    EXAM: XR CHEST PORT 1 VIEW 9/1/2024 6:43 AM      HISTORY: daily IABP check.    COMPARISON: Previous day.     TECHNIQUE: Frontal view of the chest.    FINDINGS: Intraoperative balloon pump marker project approximately 3  cm above the mauri projecting over the descending aorta/aortic knob.  Left internal jugular [place]-Sha catheter with tip projecting over the  right pulmonary artery. Right internal jugular central venous catheter  tip terminates near the superior cavoatrial junction. Improved left  basilar hazy opacity.  Stable cardiac silhouette. Low lung volumes.  Costophrenic angles appear clear. No appreciable pneumothorax. No  focal consolidative opacity. Streaky perihilar and bibasilar  opacities.      Impression    IMPRESSION:   Intra-aortic balloon pump marker projects approximately 3 cm above the  mauri. Improved left basilar opacity. Low lung volumes with streaky  perihilar and bibasilar opacities likely represent atelectasis and/or  edema.    I have personally reviewed the examination and initial interpretation  and I agree with the findings.    KATIE BOBBY MD         SYSTEM ID:  [id number]   XR Chest Port 1 View    Narrative    Portable chest    INDICATION: Kofi    COMPARISON: Yesterday    FINDINGS: Low inspiratory volumes. Heart borders are indistinct. Hazy  opacities throughout the lungs slightly more notable which may  indicate some component of technique but certainly cannot exclude  worsening edema. Left IJ [place]-Sha catheter tip in the right main  branch pulmonary artery. No pneumothorax. Removal of previous right IJ  venous catheter with no pneumothorax.      Impression    IMPRESSION: Removal right IJ catheter. Left IJ [place]-Sha catheter tip  remaining in right main branch pulmonary artery. Possible increasing  edema in the lungs.    KATIE BOBBY MD         SYSTEM ID:  [id number]   XR Chest Port 1 View    Narrative    Portable chest 9/2/2024 at 1550 hours    INDICATION: Colbert position    COMPARISON: 9/2/2024 at 0925 hours    FINDINGS: Facial lungs similar with low inspiratory volume. Heart  mildly enlarged. Left IJ [place]-Sha catheter tip again in the right  main branch pulmonary artery. No pneumothorax.      Impression    IMPRESSION: No significant changes with [place]-Sha catheter tip again  in the right main and branch pulmonary arteries.    KATIE BOBBY MD         SYSTEM ID:  [id number]   XR Chest Port 1 View    Narrative    Exam: XR CHEST PORT 1 VIEW, 9/3/2024 6:10 AM    Indication:  swan    Comparison: Chest x-ray 9/2/2024    Findings: Frontal radiograph of the chest, leftward rotated. Left IJ  [place]-Sha catheter with the tip in the main pulmonary artery. Cardiac  mediastinal silhouette is enlarged. No significant pneumothorax.  Unchanged opacities.      Impression    Impression: [place]-Sha catheter tip in the main pulmonary artery. No  acute consolidative opacities.    I have personally reviewed the examination and initial interpretation  and I agree with the findings.    BHARATI ASCENCIO DO         SYSTEM ID:  [id number]   XR Chest Port 1 View    Narrative    Exam: XR CHEST PORT 1 VIEW, 9/3/2024 6:14 PM    Comparison: Same-day chest x-ray at 0604 hours    History: PICC placement    Findings:  Single view of the chest. New left upper extremity PICC tip projects  over the right atrium. Left IJ CVC [place]-Sha catheter remains over the  main pulmonary artery. Trachea is midline. Cardiomediastinal  silhouette is within normal limits. No focal airspace opacity. No  pneumothorax or pleural effusion. The visualized upper abdomen is  unremarkable. No acute osseous abnormalities.      Impression    Impression: New left upper extremity PICC tip projects over the right  atrium. Otherwise no changes from earlier same day chest x-ray.    I have personally reviewed the examination and initial interpretation  and I agree with the findings.    DIDI GERMAN MD         SYSTEM ID:  [id number]   XR Chest Port 1 View    Narrative    Exam: XR CHEST PORT 1 VIEW, 9/4/2024 2:32 AM    Indication: swan    Comparison: Chest x-ray 9/3/2024    Findings: Frontal radiograph of the chest, leftward rotated. Interval  removal of the IJ [place]-Sha catheter. Cardiac mediastinal silhouette  is enlarged. No significant pneumothorax. Unchanged mild patchy  perihilar and bibasilar opacities.      Impression    Impression: Interval removal of the [place]-Sha catheter. Unchanged mild  patchy perihilar and bibasilar opacities.    I have  personally reviewed the examination and initial interpretation  and I agree with the findings.    HENRY MORALES MD         SYSTEM ID:  [id number]   MR CARDIAC W CONTRAST W FLOW QUANT    [facility]                                                     CMR Report      MRN:             [id number]                                  Name:        TRISH BURTON:                                              Scan Date:      2024-Sep-05                                  Electronically signed by Linnea Mehta 2024-Sep-05 14:27:26    SUMMARY   ==========================================================================================================    Clinical history: 24M with URI c/b cardiogenic shock, assess for myocarditis  Comparison CMR: none    1. The left ventricle is mildly dilated with normal wall thickness.  There is severe diffuse hypokinesis.  The global systolic function is severely reduced. The LVEF is 15%.    2. The right ventricle is normal in cavity size. The global systolic function is normal. The RVEF is 46%.     3. Both atria are normal in size.    4. There is no significant valvular disease.     5. Late gadolinium enhancement imaging:   Parametric mapping: native T1 value 1152 ms, ECV = 31% (mildly elevated).  T2 =  42-46 ms (normal range).     6. There is no pericardial effusion.    7. There is no intracardiac thrombus.    CONCLUSIONS:  Mildly dilated left ventricle with severely reduced left ventricular function, LVEF 15%.  Normal right ventricular size and systolic function, RVEF 46%.   No myocardial edema or replacement fibrosis.   Specifically, no MRI features of acute myocarditis.     CORE EXAM   ==========================================================================================================    MEASUREMENTS    ----------------------------------------------------------------------------------------      VOLUMETRIC ANALYSIS       ----------------------------------------------  .----------------------------------------------------------.                   LV     Reference   RV    Reference    +-----+-----------+-------+------------+------+------------+   EDV  ml         194.4   (126-208)  99.3   (127-227)         ml/m^2      88.0   ()   44.9   ()     ESV  ml         166.0   (35-80)    54.3   (38-98)           ml/m^2      75.1   (19-41)    24.5   (21-50)      CO   L/min       3.41              5.41                     L/min/m^2   1.54              2.45                     g/m^2                                             SV   ml          28.4   ()   45.1   ()          ml/m^2      12.9   (44-68)    20.4   (40-72)      EF   %           14.6   (57-74)    45.4   (48-74)     '-----+-----------+-------+------------+------+------------'            CARDIAC OUTPUT HR:  120 bpm      SCAN INFO   ==========================================================================================================    GENERAL   ----------------------------------------------------------------------------------------      CONTRAST AGENT       ----------------------------------------------          TYPE:  Gadavist          GD CONCENTRATION:  0.5 M        VITALS       ----------------------------------------------          HEIGHT:  73.0 in          HEIGHT:  185.4 cm          WEIGHT:  213.0 lbs          WEIGHT:  96.6 kgs          BSA:  2.21 m^2        SETUP       ----------------------------------------------          REFERRING PHYSICIAN:  DAVIE ALEGRIA          ATTENDING PHYSICIAN:  SARBJIT LUJAN      Report generated by [employer], a product of [employer]   XR Chest Port 1 View    Narrative    Exam: XR CHEST PORT 1 VIEW, 9/5/2024 12:40  AM    Indication: swan    Comparison: Chest x-ray 2024    Findings: Frontal radiograph of the chest, leftward rotated. Left  approach PICC with the tip at the mid/distal SVC. Cardiac mediastinal  silhouette is enlarged. No significant pneumothorax. Unchanged mild  patchy perihilar and bibasilar opacities.      Impression    Impression: Unchanged mild basilar atelectasis.    I have personally reviewed the examination and initial interpretation  and I agree with the findings.    JANAK JOSHUA DO         SYSTEM ID:  [id number]   Echo Complete     Value    Biplane LVEF 16%    Narrative    [id number]  Critical access hospital  [id number]  [id number]^THOMAS^RAMY^ERIN     [facility],[place]  Echocardiography Laboratory  [address] [place] [address]     Name: TRISH BURTON  MRN: [id number]  : 2000  Study Date: 2024 07:54 AM  Age: 24 yrs  Gender: Male  Patient Location: [facility]  Reason For Study: Heart Failure - Left  Ordering Physician: RAMY GUZMAN  Referring Physician: DAVIE ALEGRIA  Performed By: Rocio Hearn     BSA: 2.3 m2  Height: 73 in  Weight: 232 lb  HR: 145  BP: 115/78 mmHg  ______________________________________________________________________________  Procedure  Complete Portable Echo Adult. Contrast Optison. Optison (NDC #0672-9551-84)  given intravenously. Patient was given 6 ml mixture of 3 ml Optison and 6 ml  saline. 3 ml wasted.  ______________________________________________________________________________  Interpretation Summary  Tachycardia at 144BPM during study.  Mild left ventricular dilation is present.LVIDd 58mm.  Biplane LVEF is 16%.  Severe hypokinesis of mid to apical RV free wall.  Pulmonary artery systolic pressure is normal.  The inferior vena cava is normal.  No pericardial effusion is present.  There is no prior study for direct comparison.  ______________________________________________________________________________  Left Ventricle  Biplane LVEF  is 16%. Left ventricular wall thickness is normal. Mild left  ventricular dilation is present. Diastolic function not assessed due to  tachycardia. Severe diffuse hypokinesis is present.     Right Ventricle  The right ventricle is normal size. Severe hypokinesis of mid to apical RV  free wall.     Atria  Both atria appear normal. The atrial septum is intact as assessed by color  Doppler .     Mitral Valve  The mitral valve is normal. Mild mitral insufficiency is present.     Aortic Valve  Aortic valve is normal in structure and function.     Tricuspid Valve  The tricuspid valve is normal. Mild tricuspid insufficiency is present. The  right ventricular systolic pressure is approximated at 29.2 mmHg plus the  right atrial pressure. Pulmonary artery systolic pressure is normal.     Pulmonic Valve  The pulmonic valve is normal. Mild pulmonic insufficiency is present.     Vessels  The aorta root is normal. The thoracic aorta is normal. The pulmonary artery  is normal. The inferior vena cava is normal.     Pericardium  No pericardial effusion is present.     Compared to Previous Study  There is no prior study for direct comparison.  ______________________________________________________________________________  MMode/2D Measurements & Calculations  IVSd: 0.72 cm     LVIDd: 5.8 cm  LVIDs: 5.0 cm  LVPWd: 0.75 cm  FS: 13.3 %  LV mass(C)d: 159.3 grams  LV mass(C)dI: 69.5 grams/m2  asc Aorta Diam: 2.4 cm  LVOT diam: 2.1 cm  LVOT area: 3.5 cm2  Asc Ao diam index BSA (cm/m2): 1.0  Asc Ao diam index Ht(cm/m): 1.3  EF Biplane: 16.2 %  LA Volume (BP): 49.2 ml  LA Volume Index (BP): 21.5 ml/m2     RWT: 0.26  TAPSE: 1.2 cm     Doppler Measurements & Calculations  PA acc time: 0.08 sec  TR max ayan: 270.0 cm/sec  TR max P.2 mmHg  RV S Ayan: 9.1 cm/sec     ______________________________________________________________________________  Report approved by: Dara Cardona 2024 08:42 AM         Echo Limited     Value    LVEF   20-25% (severely reduced)    [facility]    [id number]  [id number]  [id number]  [id number]^TAI^SARBJIT     [facility],[place]  Echocardiography Laboratory  [address] [place] [address]     Name: TRISH BURTON  MRN: [id number]  : 2000  Study Date: 2024 08:40 AM  Age: 24 yrs  Gender: Male  Patient Location: [facility]  Reason For Study: Shock  Ordering Physician: SARBJIT LUJAN  Referring Physician: DAVIE ALEGRIA  Performed By: Jeana Rodriguez     BSA: 2.3 m2  Height: 73 in  Weight: 227 lb  HR: 123  ______________________________________________________________________________  Procedure  Limited Portable Echo Adult. Contrast Optison. Optison (NDC #8350-1482-35)  given intravenously. Patient was given 6 ml mixture of 3 ml Optison and 6 ml  saline. 3 ml wasted.  ______________________________________________________________________________  Interpretation Summary  Left ventricular function is decreased. The ejection fraction is 20-25%  (severely reduced).  The right ventricle is normal size.  Global right ventricular function is moderately to severely reduced.  No pericardial effusion is present.  ______________________________________________________________________________  Left Ventricle  Left ventricular function is decreased. The ejection fraction is 20-25%  (severely reduced).     Right Ventricle  The right ventricle is normal size. Global right ventricular function is  moderately to severely reduced.     Mitral Valve  Mild mitral insufficiency is present.     Tricuspid Valve  Mild tricuspid insufficiency is present. The right ventricular systolic  pressure is approximated at 43.8 mmHg plus the right atrial pressure.     Pericardium  No pericardial effusion is present.  ______________________________________________________________________________  Doppler Measurements & Calculations  TR max kimberly: 330.8 cm/sec  TR max P.8 mmHg      ______________________________________________________________________________  Report approved by: Dara Cardona 2024 10:08 AM         Echocardiogram Limited    Narrative    [id number]  [id number]  [id number]  [id number]^EZIO^RUIZ     [facility],[place]  Echocardiography Laboratory  [address] [place] [address]     Name: TRISH BURTON  MRN: [id number]  : 2000  Study Date: 2024 08:45 AM  Age: 24 yrs  Gender: Male  Patient Location: [facility]  Reason For Study: Heart Failure  Ordering Physician: RUIZ HOLGUIN  Referring Physician: DAVIE ALEGRIA  Performed By: Meet Appiah     BSA: 2.2 m2  Height: 73 in  Weight: 212 lb  BP: 111/74 mmHg  ______________________________________________________________________________  Procedure  Limited Echocardiogram with portions of two-dimensional, color and spectral  Doppler performed. Contrast Optison. Optison (NDC #4192-3328-34) given  intravenously. Patient was given 6 ml mixture of 3 ml Optison and 6 ml saline.  3 ml wasted.  ______________________________________________________________________________  Interpretation Summary  Normal left ventricular size. The visually estimated left ventricular ejection  fraction is 30-35%. There is severe diffuse hypokinesis.  The right ventricle was not well visualized. Normal right ventricular size.  Unable to visualize inferior vena cava.     Compared to the prior study dated 2024, the left ventricular function has  mildly improved. There is improved function of the lateral wall segments.  ______________________________________________________________________________  Vessels  Unable to assess mean RA pressure given the patient is on a ventilator.  ______________________________________________________________________________  MMode/2D Measurements & Calculations  LVOT diam: 2.0 cm  LVOT area: 3.3 cm2     Doppler Measurements & Calculations  LV V1 max P.5 mmHg  LV  V1 max: 60.7 cm/sec  LV V1 VTI: 6.7 cm  SV(LVOT): 22.1 ml  SI(LVOT): 10.0 ml/m2     ______________________________________________________________________________  Report approved by: Dr Sudheer Torres 2024 09:45 AM         Echocardiogram Limited     Value    LVEF  10-15% (severely reduced)    Narrative    [id number]  [id number]  [id number]  [id number]^EZIO^RUIZ     [facility],[place]  Echocardiography Laboratory  [address] [place] [address]     Name: TRISH BURTON  MRN: [id number]  : 2000  Study Date: 2024 01:29 PM  Age: 24 yrs  Gender: Male  Patient Location: [facility]  Reason For Study: Heart Failure  Ordering Physician: RUIZ HOLGUIN  Referring Physician: DAVIE ALEGRIA  Performed By: Earl Lopez     BSA: 2.2 m2  Height: 73 in  Weight: 211 lb  ______________________________________________________________________________  Procedure  Limited Portable Echo Adult. Contrast Optison. Optison (NDC #7368-7267-09)  given intravenously. Patient was given 5 ml mixture of 3 ml Optison and 6 ml  saline. 4 ml wasted. The patient's rhythm is sinus tachycardia.  ______________________________________________________________________________  Interpretation Summary  The patient's rhythm is sinus tachycardia. 131 bpm     Left ventricular function is decreased. The ejection fraction is 10-15%  (severely reduced).  Severe diffuse hypokinesis is present.  The right ventricle is normal size.  Global right ventricular function is normal.     This study was compared with the study from 2024 .RV improved, LV  unchanged.  ______________________________________________________________________________  Left Ventricle  Mild left ventricular dilation is present. Left ventricular function is  decreased. The ejection fraction is 10-15% (severely reduced). Severe diffuse  hypokinesis is present.     Right Ventricle  The right ventricle is normal size. Global right ventricular  function is  normal.     Mitral Valve  The mitral valve is normal. Trace mitral insufficiency is present.     Pericardium  No pericardial effusion is present.     Compared to Previous Study  This study was compared with the study from 8/21/2024 . RV improved, LV  unchanged.  ______________________________________________________________________________  Report approved by: Patel COX 09/03/2024 02:37 PM     ______________________________________________________________________________      Cardiac Catheterization    Narrative      Right sided filling pressures are normal.    Left sided filling pressures are mildly elevated.    Mild elevated pulmonary hypertension.    Reduced cardiac output level.    Successful endomyocardial biopsy. Results pending.    Fluoroscopy was used to visualize the left internal jugular vein.    - Elevated left sided filling pressures with mildly reduced cardiac index   - Uncomplicated EMB with 4 samples collected     Cardiac Catheterization    Narrative    1.successful deployment of intra-aortic balloon pump via right femoral   arterial access     Cardiac Catheterization    Narrative    Successful IABP removal via the right common femoral artery using a 8   French Angio-Seal device achieve hemostasis.     Cardiac Catheterization    Narrative      Right sided filling pressures are normal.    Left sided filling pressures are normal.    Normal PA pressures.    Normal cardiac output level.    Findings discussed with inpatient heart failure team.        DISCHARGE MEDICATIONS:  Current Discharge Medication List        START taking these medications    Details   acetaminophen (TYLENOL) 325 MG tablet Take 2 tablets (650 mg) by mouth or Feeding Tube every 8 hours as needed for mild pain.  Qty: 90 tablet, Refills: 2    Associated Diagnoses: Pain      benzocaine-menthol (CHLORASEPTIC MAX) 15-10 MG lozenge Place 1 lozenge inside cheek every hour as needed (cough).  Qty: 30 lozenge, Refills: 0     Associated Diagnoses: Dry cough      cetirizine (ZYRTEC) 5 MG tablet Take 0.5 tablets (2.5 mg) by mouth every 6 hours as needed for allergies (Give 30 minutes prior to each amoxicillin dose for rash).  Qty: 90 tablet, Refills: 2    Associated Diagnoses: Rash      digoxin (LANOXIN) 250 MCG tablet Take 1 tablet (250 mcg) by mouth daily.  Qty: 30 tablet, Refills: 3    Associated Diagnoses: Heart failure with reduced ejection fraction, NYHA class II (H)      empagliflozin (JARDIANCE) 10 MG TABS tablet Take 1 tablet (10 mg) by mouth daily.  Qty: 90 tablet, Refills: 1    Associated Diagnoses: Heart failure with reduced ejection fraction, NYHA class II (H)      hydrOXYzine HCl (ATARAX) 10 MG tablet Take 1 tablet (10 mg) by mouth 3 times daily as needed for anxiety.  Qty: 90 tablet, Refills: 3    Associated Diagnoses: Anxiety      levofloxacin (LEVAQUIN) 750 MG tablet Take 1 tablet (750 mg) by mouth daily for 7 days.  Qty: 7 tablet, Refills: 0    Associated Diagnoses: Bacteremia due to Streptococcus      metoprolol succinate ER (TOPROL XL) 25 MG 24 hr tablet Take 1 tablet (25 mg) by mouth daily.  Qty: 30 tablet, Refills: 3    Associated Diagnoses: Heart failure with reduced ejection fraction, NYHA class II (H)      multivitamin w/minerals (THERA-VIT-M) tablet Take 1 tablet by mouth daily.  Qty: 30 tablet, Refills: 3    Associated Diagnoses: Takes dietary supplements      polyethylene glycol (MIRALAX) 17 GM/Dose powder Take 17 g by mouth daily.  Qty: 510 g, Refills: 3    Associated Diagnoses: Constipation, unspecified constipation type      sacubitril-valsartan (ENTRESTO) 24-26 MG per tablet Take 0.5 tablets by mouth 2 times daily.  Qty: 30 tablet, Refills: 3    Associated Diagnoses: Heart failure with reduced ejection fraction, NYHA class II (H)      selenium sulfide (SELSUN) 1 % LOTN shampoo Apply topically daily as needed for itching.  Qty: 420 mL, Refills: 3    Associated Diagnoses: Dry scalp      senna-docusate  (SENOKOT-S/PERICOLACE) 8.6-50 MG tablet Take 1 tablet by mouth or Feeding Tube at bedtime.  Qty: 30 tablet, Refills: 2    Associated Diagnoses: Constipation, unspecified constipation type      spironolactone (ALDACTONE) 25 MG tablet Take 1 tablet (25 mg) by mouth daily.  Qty: 30 tablet, Refills: 3    Associated Diagnoses: Heart failure with reduced ejection fraction, NYHA class II (H)      thiamine (B-1) 100 MG tablet Take 1 tablet (100 mg) by mouth daily.  Qty: 30 tablet, Refills: 3    Associated Diagnoses: Takes dietary supplements      triamcinolone (KENALOG) 0.1 % external ointment Apply topically 2 times daily.  Qty: 80 g, Refills: 3    Associated Diagnoses: Rash           CONTINUE these medications which have CHANGED    Details   !! risperiDONE (RISPERDAL) 0.5 MG tablet Take 3 tablets (1.5 mg) by mouth every evening.  Qty: 90 tablet, Refills: 3    Associated Diagnoses: Autism      !! risperiDONE (RISPERDAL) 1 MG tablet Take 1 tablet (1 mg) by mouth daily.  Qty: 30 tablet, Refills: 3    Associated Diagnoses: Autism       !! - Potential duplicate medications found. Please discuss with provider.          DISCHARGE DISPOSITION: Terrance Hidalgo will discharge to home in stable condition.     DISCHARGE INSTRUCTIONS:  Discharge Procedure Orders   CBC with platelets   Standing Status: Future Standing Exp. Date: 09/19/25     Basic metabolic panel   Standing Status: Future Standing Exp. Date: 09/19/25     Cardiac Rehab  Referral   Standing Status: Future   Referral Priority: Routine: Next available opening Referral Type: Rehab Therapy Cardiac Therapy   Number of Visits Requested: 1     Adult Cardiology Eval  Referral   Standing Status: Future   Referral Priority: Routine: Next available opening Referral Type: CV Cardio consult   Requested Specialty: Cardiovascular Disease   Number of Visits Requested: 1     Adult Dermatology  Referral   Standing Status: Future   Referral Priority: Routine: Next  available opening Referral Type: Consultation   Requested Specialty: Dermatology   Number of Visits Requested: 1     General info for SNF   Order Comments: Length of Stay Estimate: Long Term Care  Condition at Discharge: Stable  Level of care:board and care  Rehabilitation Potential: Good  Admission H&P remains valid and up-to-date: Yes  Recent Chemotherapy: N/A  Use Nursing Home Standing Orders: Yes     Activity - Ambulate in hallway   Order Comments: Every shift     Order Specific Question Answer Comments   Is discharge order? Yes      Activity - Up ad eugenio     Order Specific Question Answer Comments   Is discharge order? 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 You needed support of your heart function with an intraaortic balloon pump (8/27-9/1/2024). Your heart function improved and were started on guideline-directed medical therapy (GDMT) for heart failure. You have a fast heart rate with activity that is related to heart failure. Following discharge, we recommend that you continue to follow-up in outpatient cardiology clinic and complete outpatient cardiac rehab to strengthen the function of your heart. There was concern that you had bacteria in your blood; you were started on antibiotics (amoxicillin) which caused a rash; the antibiotics were changed. Please complete antibiotics [facility] 9/25/2024 and treat rash with topical steroid ointment and anti-allergy medications. If your rash continues for more than two weeks, please follow-up as an outpatient with Dermatology.     Additional Discharge Instructions   Order Comments: If patient develops shortness of breath when laying flat, increasing edema in his legs, weight gain more than 5lbs over one week, or heart rate at rest >140 beats per minute, I recommend outpatient  follow-up with cardiology. If patient is hypoxic, hypotensive (systolic blood pressure <90, diastolic blood pressure <50), or has developed an irregular heart rhythm on pulse check, I recommend presenting to urgent care or emergency room for additional workup.     Follow Up ([facility])   Order Comments: Follow up with primary care provider, Colt Flores, within 7 days for hospital follow- up.  The following labs/tests are recommended: CBC, BMP     ____________________________________________________________________________________    Follow-up with outpatient cardiology within 1-4 weeks after discharge to establish care and evaluate cardiac function.    With rash on bilateral feet persistent for two weeks, recommend outpatient follow-up with Dermatology.    Appointments on [place] and/or [facility] (with [facility] or [facility] provider or service). Call [phone] if you haven't heard regarding these appointments within 7 days of discharge.     Full Code     Order Specific Question Answer Comments   Code status determined by: Discussion with patient/ legal decision maker      Echocardiogram Complete   Standing Status: Future Standing Exp. Date: 25   Order Comments: Administration of IV contrast will be tailored to this examination per the appropriate written protocol listed in the Echocardiography department Protocol Book, or by the supervising Cardiologist. This may result in an order change.    Use of contrast is at the discretion of the supervising Cardiologist.   Scheduling Instructions: Please call your clinic of choice to schedule this procedure:    [facility]:   [phone]  [facility]:   [phone]  [facility]:  [phone]  [facility]): [phone]  [facility]:  [phone]  [facility]): [phone]  [facility] [phone]     Order Specific Question Answer Comments   Preferred procedure? Echo Complete    Procedure to be  scheduled at [facility] [12]    Perform bubble study? No    Use strain protocol if clinically indicated? 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 Yes        It was our pleasure to care for Terrance Hidalgo during this hospitalization. Please do not hesitate to contact me should there be questions regarding the hospital course or discharge plan.      Thank you for allowing me to care for this patient, please don't hesitate to contact me with any questions regarding this plan.     Patient was discussed and evaluated with Jani Herr MD, attending physician, who agrees with the assessment and plan above.    Matt Cabral MD, PhD, MSc  Cardiology Fellow

## 2024-09-19 NOTE — PROGRESS NOTES
Physical Therapy Discharge Summary    Reason for therapy discharge:    Discharged to home with outpatient therapy.    Progress towards therapy goal(s). See goals on Care Plan in [facility] electronic health record for goal details.  Goals met    Therapy recommendation(s):    Continued therapy is recommended.  Rationale/Recommendations:  Pt would benefit from further OP Cardiac Rehab at discharge.

## 2024-09-19 NOTE — PROGRESS NOTES
Care Management Discharge Note    Discharge Date: 09/19/2024  Brother Johann is gaurdian: I called him and he gives permission for [facility] staff to give [facility] the information that they need.  Johann updated that PT says pt is at his baseline and can return to  and Johann agrees with his discharge today.    [facility]--MD sent new meds script there.     Discharge Disposition: [facility]    Discharge Services: OP CR-- prefers [facility] location-- will call .    Discharge DME:  none    Discharge Transportation:  staff here with van and will take him home this afternoon --family or friend will provide (Aunt or Brother, Parents not involved)    Does the patient's insurance plan have a 3 day qualifying hospital stay waiver?  No    PAS Confirmation Code:  n/a  Patient/family educated on Medicare website which has current facility and service quality ratings: no    Education Provided on the Discharge Plan: Yes to Dr Matt Bran of [facility] 2 and to Aunt Guerda and to brother Johann and to PT Farrah Lutz.  Persons Notified of Discharge Plans:  Yes to Dr Matt Bran of [facility] 2 and to Aunt Guerda and to brother Johann and to PT Farrah Lutz.  Patient/Family in Agreement with the Plan: yes    Handoff Referral Completed:  has AVS and will make PCP appointment with lab draw.    Additional Information:  Dr Matt bran came up to talk with  staff approx 3pm and he says pt stable for discharge--orders are signed.  Pt to follow up w [facility] 10/2 and 10/29 with Dr Martin.    Asia Pal RN

## 2024-09-19 NOTE — PROGRESS NOTES
[facility]    Cardiology Progress Note- Cardiology 2        Date of Admission:  8/20/2024     Assessment & Plan: HVSL   23 yo M with PMHx autism  who was transferred to [facility] on 8/20 for decompensated HFrEF (15%) 2/2 suspected myocarditis. Hospital course was complicated by cardiogenic shock s/p vasopressors and IABP (8/27-9/1). Optimized on GDMT, discharge pending group home placement.    Changes today:  - Discharge pending group home acceptance; potentially as soon as tomorrow afternoon  - Dermatology consult  - Transition to levofloxacin, per discussion with pharmacy    ACTIVE PROBLEMS:  # Biventricular failure with severely reduce left ventricular function with RV recovery   # Cardiogenic shock SCAI C s/p IABP 8/27-9/1  TTE (8/21/24): LVIDd 58mm. Biplane LVEF is 16%. Severe hypokinesis of mid to apical RV free wall.  RHC (8/21/24): RA mean 6, PA 30/23/26, PCWP 18, [place] CO/CI 5.0/2.1, PVR 1.6, SVR 1270  EMB (8/21/24): Fragments of essentially unremarkable myocardial tissue with no evidence of inflammatory infiltrate   Coronary angiogram: NA   CMR: Mildly dilated left ventricle with severely reduced left ventricular function, LVEF 15%.  Normal right ventricular size and systolic function, RVEF 46%. No myocardial edema or replacement fibrosis or MRI features of acute myocarditis.  Completed prednisone taper. Remains off dobutamine with stable hemodynamics.  - Plan for discharge with low-dose diuresis as needed for hypervolemia  - GDMT  Metoprolol succinate 25mg every day   Entresto 12/13 mg BID   Spironolactone 25 mg daily  Empagliflozin 10 mg daily   - Digoxin 250mg every day  - On discharge, outpatient cardiology follow-up  - Repeat TTE in 1 month    # Sinus tachycardia  Suspect compensatory in the setting of LV dysfunction. RHC (9/11) demonstrated hypovolemia, which could potentially have contributed to tachycardia with exertion. Plan for discharge for cardiac  rehab; outpatient vs TCU pending final PT/OT recs.   - Outpatient cardiac rehab    # + Strep mitis in 1/2 bottles, possible contaminant but given unclear significance we will treat as vasquez infection with prolonged abx course per discussion with ID.   Blood culture from 9/5 grew strep mitis in 1/2 bottles. Repeat blood culture on 9/6 NGTD. Possible contaminant. Stopped antibiotics, however ID ultimately recommended preventative two-week course of antibiotics. Developed hives with amoxicillin; switched to levofloxacin to minimize cross-reactivity, per discussion with pharmacy.  - Antibiotics:   > s/p Vanc 9/6   > s/p Ceftriaxone 9/6 - 9/7   > s/p Amoxicillin 9/11-9/17   > s/p Cefadroxil 9/18  > Levofloxacin 750mg every day 9/18-9/25    # Urticaria 2/2 amoxicillin, arms/trunk  # Bacteremia-associated rash, bilateral feet, mildly improving  Nursing noted rash on bilateral lower extremities 9/12; ongoing 9/15 (on bilateral feet, hands, and upper back; pictures in chart). Discussed with dermatology; based on timing and characteristics of rash, suspect medication-associated rash secondary to amoxicillin on arms/trunk and possible bacteremia-associated rash on bilateral feet. Tried pretreatment with antihistamines/steroid topical ointment (9/15-9/17) without significant improvement of rash and increased patient somnolence with antihistamine therapy (both with benadryl and cetirizine).  Plan to stop amoxicillin, give antihistamines as needed, and use topical steroid ointment for rash on feet until resolution.   - Dermatology consult, appreciate input  Triamcinolone 0.1% BID, apply to feet until rash resolved  Cetrizine 2.5 mg q6H as needed for hives on abdomen/chest    # Disposition  Behavioral group home discharged patient due to concern they would not be able to address his medical needs; reassured with follow-up appointment. Social work consulted for assistance with disposition planning.   - Social work consult, appreciate  assistance  - Possible discharge tomorrow afternoon, pending group home acceptance   Discharge med rec completed in advance of acceptance    STABLE/RESOLVED PROBLEMS:  # Hypotension, resolved  Became hypotensive 9/8 AM after ambulating to bathroom. Asymptomatic. Tachycardic up to 160s from previous 130s (see below for discussion of sinus tachycardia). Suspect slight overdiuresis and hypovolemia given continued net negative volume status over last several days on oral diuretics.  Now resolved.     # Concern for aspiration pneumonia, resolved  # Recurrent fevers of unclear etiology, resolved  - Prior antibiotics       > Unasyn 8/21 - 8/26       > Cefepime 8/20 - 8/21       > Azithromycin 8/20 - 08/24       > Vanco 8/20 - 8/21 s/p 8/26       > meropenem 8/26-9/1       > zosyn 8/26 - 8/28    # Altered mental status, resolved  # Autism  - c/w risperidone   - case was discussed with psychiatry regarding his medication and relation to cardiac decompensation given no clear culprit. However, given no safe alternative we would suggest continuation of risperidone as he has been on this medication for years without recent dose change and lack of evidence for risperidone to cause fulminant heart failure at the movement.     # Elevated transaminases - resolved   Improving. Imaging showed hepatomegaly with increased echogenicity, possible intrinsic parenchymal disease such as steatosis.   - Trend labs    # Hypernatremia, resolved  # Hypervolemia, controlled  # Lactic acidosis, resolved  - Trend BMP  - Diuresis as above    # Acute hypoxemic respiratory failure, resolved  # Pleural effusion, resolved  See above for treatment of suspected aspiration pneumonia.     # Hyperglycemia, resolved  Likely related to stress and prednisone therapy. No history of diabetes. A1c was 5.6% on 8/23/2024.        Diet: Snacks/Supplements Adult: Ensure Enlive; Between Meals  Regular Diet Adult  Diet    DVT Prophylaxis: Enoxaparin (Lovenox) SQ  [place]  "Catheter: Not present  Cardiac Monitoring: None  Code Status: Full Code          Clinically Significant Risk Factors              # Hypoalbuminemia: Lowest albumin = 3.2 g/dL at 8/23/2024 12:52 PM, will monitor as appropriate        # End stage heart failure: Ventricular assist device (VAD) present          # Overweight: Estimated body mass index is 27.89 kg/m  as calculated from the following:    Height as of this encounter: 1.86 m (6' 1.23\").    Weight as of this encounter: 96.5 kg (212 lb 11.2 oz).   # Moderate Malnutrition: based on nutrition assessment      # Financial/Environmental Concerns: none        Thank you for allowing me to care for this patient, please don't hesitate to contact me with any questions regarding this plan.     Pt was discussed and evaluated with Jani Herr MD, attending physician, who agrees with the assessment and plan above.    Matt Cabral MD, PhD, MSc  Cardiology Fellow    ______________________________________________________________________    Interval History   Rash slowly improving with steroid cream/allergy medication.  Still feeling drowsy with reduced-dose antihistamines.    Physical Exam   Vital Signs: Temp: 98.2  F (36.8  C) Temp src: Oral BP: 105/59 Pulse: (!) 159   Resp: 16 SpO2: 97 % O2 Device: None (Room air)    Weight: 212 lbs 11.2 oz    Gen: No acute distress, laying in bed under blanket  HEENT: Normocephalic, atraumatic  CV: Regular rhythm with tachycardia, no murmurs  Pulm: CTAB, non-labored breathing on room air  Abd: Soft, non-tender to palpation  Extr: No significant pitting edema in bilateral lower extremities  Skin: Less edematous rash bilateral feet  Neuro: Drowsy, arousable to voice      Medical Decision Making       Please see A&P for additional details of medical decision making.      Data   ------------------------- PAST 24 HR DATA REVIEWED -----------------------------------------------    I have personally reviewed the following data over the " past 24 hrs:    Procal: N/A CRP: N/A Lactic Acid: N/A         Imaging results reviewed over the past 24 hrs:   No results found for this or any previous visit (from the past 24 hour(s)).

## 2024-09-19 NOTE — PROGRESS NOTES
Unable to chart on careplan- all documentation was already completed. Plan is for patient to discharge later today.

## 2024-09-19 NOTE — DISCHARGE SUMMARY
Shift Summary: 7-1650  VSS, afebrile and denies pain. Stable on room air, lung sounds clear bilaterally. Voiding spontaneously, no BM this shift. Sinus tach up to 150's with activity. Rash from antibiotic on feet improving from yesterday. Terrance ate all of his meals, adequate po intake. Pt took a shower and PICC line was removed for discharge.    Discharge  Discharge instructions reviewed and understood by patient's aunt Guerda and [facility] PICC removed, new medications reviewed and understood, patient has all belongings. Patient left unit ambulatory with aunt to go back to group home.

## 2024-09-19 NOTE — PLAN OF CARE
NURSING PROGRESS NOTE  Shift Summary      Date: September 19, 2024     Neuro/Musculoskeletal:  A&Ox4. Autistic pt, can make needs known.  Cardiac:  SR.  VSS.     Respiratory:  Sating in the 90s on RA.  GI/:  Adequate urine output.  LBM: Yesterday  Diet/Appetite:  Tolerating Cardiac diet.  Activity: SBA   Pain:  Denies.   Skin:  No new deficits noted.   LDAs + Drips/IVF:  Triple lumen PICC Left  Protocols/Labs:  K+, Phos, Mag q 48 hr    Pertinent Shift Updates:  Uneventful night with no acute changes.       Plan:  Discharge today to [facility]. Family aware.       Macario Winkler RN  .................................................... September 19, 2024   5:18 AM  [facility] ([facility]): [facility] ICU (Unit 6D)    Problem: Adult Inpatient Plan of Care  Goal: Absence of Hospital-Acquired Illness or Injury  Intervention: Prevent Skin Injury  Recent Flowsheet Documentation  Taken 9/19/2024 0508 by Macario Winkler RN  Body Position: position changed independently  Taken 9/19/2024 0000 by Macario Winkler RN  Body Position: position changed independently  Taken 9/18/2024 2337 by Macario Winkler RN  Body Position: position changed independently  Taken 9/18/2024 1905 by Macario Winkler RN  Body Position: position changed independently  Intervention: Prevent and Manage VTE (Venous Thromboembolism) Risk  Recent Flowsheet Documentation  Taken 9/19/2024 0508 by Macario Winkler RN  VTE Prevention/Management: (Lovenox SQ) other (see comments)  Taken 9/19/2024 0000 by Macario Winkler RN  VTE Prevention/Management: (Lovenox SQ) other (see comments)  Taken 9/18/2024 1905 by Macario Winkler RN  VTE Prevention/Management: (Lovenox SQ) other (see comments)  Intervention: Prevent Infection  Recent Flowsheet Documentation  Taken 9/19/2024 0508 by Obwatho, Macario, RN  Infection Prevention:   environmental surveillance performed   equipment surfaces disinfected   hand hygiene  promoted   rest/sleep promoted   single patient room provided  Taken 9/19/2024 0000 by Macario Winkler RN  Infection Prevention:   environmental surveillance performed   equipment surfaces disinfected   hand hygiene promoted   rest/sleep promoted   single patient room provided  Taken 9/18/2024 1905 by Macario Winkler RN  Infection Prevention:   environmental surveillance performed   equipment surfaces disinfected   hand hygiene promoted   rest/sleep promoted   single patient room provided  Goal: Optimal Comfort and Wellbeing  Intervention: Monitor Pain and Promote Comfort  Recent Flowsheet Documentation  Taken 9/19/2024 0508 by Macario Winkler RN  Pain Management Interventions:   repositioned   rest  Taken 9/18/2024 2337 by Macario Winkler RN  Pain Management Interventions:   repositioned   rest  Taken 9/18/2024 1905 by Macario Winkler RN  Pain Management Interventions:   repositioned   rest  Intervention: Provide Person-Centered Care  Recent Flowsheet Documentation  Taken 9/19/2024 0508 by Macario Winkler RN  Trust Relationship/Rapport:   care explained   choices provided   questions answered   questions encouraged   thoughts/feelings acknowledged   reassurance provided  Taken 9/19/2024 0000 by Macario Winkler RN  Trust Relationship/Rapport:   care explained   choices provided   questions answered   questions encouraged   thoughts/feelings acknowledged   reassurance provided  Taken 9/18/2024 1905 by Macario Winkler RN  Trust Relationship/Rapport:   care explained   choices provided   questions answered   questions encouraged   thoughts/feelings acknowledged   reassurance provided     Problem: Risk for Delirium  Goal: Optimal Coping  Intervention: Optimize Psychosocial Adjustment to Delirium  Recent Flowsheet Documentation  Taken 9/19/2024 0508 by Macario Winkler RN  Family/Support System Care:   involvement promoted   self-care encouraged   support provided   presence promoted  Taken 9/19/2024 0000 by  Obwatho, Macario, RN  Family/Support System Care:   involvement promoted   self-care encouraged   support provided   presence promoted  Taken 9/18/2024 1905 by Macario Winkler RN  Family/Support System Care:   involvement promoted   self-care encouraged   support provided   presence promoted  Goal: Improved Behavioral Control  Intervention: Prevent and Manage Agitation  Recent Flowsheet Documentation  Taken 9/19/2024 0508 by Macario Winkler RN  Complementary Therapy: (playing video games) other (see comments)  Taken 9/19/2024 0000 by Macario Winkler RN  Complementary Therapy: (playing video games) other (see comments)  Taken 9/18/2024 1905 by Macario Winkler RN  Complementary Therapy: (playing video games) other (see comments)  Intervention: Minimize Safety Risk  Recent Flowsheet Documentation  Taken 9/19/2024 0508 by Macario Winkler RN  Communication Enhancement Strategies:   call light answered in person   extra time allowed for response  Trust Relationship/Rapport:   care explained   choices provided   questions answered   questions encouraged   thoughts/feelings acknowledged   reassurance provided  Taken 9/19/2024 0000 by Macario Winkler RN  Communication Enhancement Strategies:   call light answered in person   extra time allowed for response  Trust Relationship/Rapport:   care explained   choices provided   questions answered   questions encouraged   thoughts/feelings acknowledged   reassurance provided  Taken 9/18/2024 1905 by Macario Winkler RN  Communication Enhancement Strategies:   call light answered in person   extra time allowed for response  Trust Relationship/Rapport:   care explained   choices provided   questions answered   questions encouraged   thoughts/feelings acknowledged   reassurance provided  Goal: Improved Attention and Thought Clarity  Intervention: Maximize Cognitive Function  Recent Flowsheet Documentation  Taken 9/19/2024 0508 by Macario Winkler RN  Sensory Stimulation  Regulation:   care clustered   quiet environment promoted  Reorientation Measures:   calendar in view   clock in view  Taken 9/19/2024 0000 by Macario Winkler RN  Sensory Stimulation Regulation:   care clustered   quiet environment promoted  Reorientation Measures:   calendar in view   clock in view  Taken 9/18/2024 1905 by Macario Winkler RN  Sensory Stimulation Regulation:   care clustered   quiet environment promoted  Reorientation Measures:   calendar in view   clock in view     Problem: Restraint, Nonviolent  Goal: Absence of Harm or Injury  Intervention: Implement Least Restrictive Safety Strategies  Recent Flowsheet Documentation  Taken 9/19/2024 0508 by Macario Winkler RN  Medical Device Protection:   torso covered   tubing secured  Taken 9/19/2024 0000 by Macario Winkler RN  Medical Device Protection:   torso covered   tubing secured  Taken 9/18/2024 1905 by Macario Winkler RN  Medical Device Protection:   torso covered   tubing secured  Intervention: Protect Dignity, Rights and Personal Wellbeing  Recent Flowsheet Documentation  Taken 9/19/2024 0508 by Macario Winkler RN  Trust Relationship/Rapport:   care explained   choices provided   questions answered   questions encouraged   thoughts/feelings acknowledged   reassurance provided  Taken 9/19/2024 0000 by Macario Winkler RN  Trust Relationship/Rapport:   care explained   choices provided   questions answered   questions encouraged   thoughts/feelings acknowledged   reassurance provided  Taken 9/18/2024 1905 by Macario Winkler RN  Trust Relationship/Rapport:   care explained   choices provided   questions answered   questions encouraged   thoughts/feelings acknowledged   reassurance provided  Intervention: Protect Skin and Joint Integrity  Recent Flowsheet Documentation  Taken 9/19/2024 0508 by Macario Winkler RN  Body Position: position changed independently  Range of Motion: active ROM (range of motion) encouraged  Taken 9/19/2024 0000 by  Macario Winkler RN  Body Position: position changed independently  Range of Motion: active ROM (range of motion) encouraged  Taken 9/18/2024 2337 by Macario Winkler RN  Body Position: position changed independently  Taken 9/18/2024 1905 by Macario Winkler RN  Body Position: position changed independently  Range of Motion: active ROM (range of motion) encouraged     Problem: Heart Failure  Goal: Optimal Coping  Intervention: Support Psychosocial Response  Recent Flowsheet Documentation  Taken 9/19/2024 0508 by Macario Winkler RN  Family/Support System Care:   involvement promoted   self-care encouraged   support provided   presence promoted  Taken 9/19/2024 0000 by Macario Winkler RN  Family/Support System Care:   involvement promoted   self-care encouraged   support provided   presence promoted  Taken 9/18/2024 1905 by Macario Winkler RN  Family/Support System Care:   involvement promoted   self-care encouraged   support provided   presence promoted  Goal: Optimal Functional Ability  Intervention: Optimize Functional Ability  Recent Flowsheet Documentation  Taken 9/19/2024 0508 by Macario Winkler RN  Self-Care Promotion: independence encouraged  Activity Management: ambulated outside room  Taken 9/19/2024 0000 by Macario Winkler RN  Self-Care Promotion: independence encouraged  Activity Management: ambulated outside room  Taken 9/18/2024 2337 by Macario Winkler RN  Activity Management: ambulated outside room  Taken 9/18/2024 1905 by Macario Winkler RN  Self-Care Promotion: independence encouraged  Activity Management: ambulated outside room  Goal: Effective Oxygenation and Ventilation  Intervention: Promote Airway Secretion Clearance  Recent Flowsheet Documentation  Taken 9/19/2024 0508 by Macario Winkler RN  Cough And Deep Breathing: done independently per patient  Activity Management: ambulated outside room  Taken 9/19/2024 0000 by Macario Winkler RN  Cough And Deep Breathing: done independently per  patient  Activity Management: ambulated outside room  Taken 9/18/2024 2337 by Macario Winkler RN  Activity Management: ambulated outside room  Taken 9/18/2024 1905 by Macario Winkler RN  Cough And Deep Breathing: done independently per patient  Activity Management: ambulated outside room  Intervention: Optimize Oxygenation and Ventilation  Recent Flowsheet Documentation  Taken 9/19/2024 0508 by Macario Winkler RN  Head of Bed (HOB) Positioning: HOB flat  Taken 9/19/2024 0000 by Macario Winkler RN  Head of Bed (HOB) Positioning: HOB flat  Taken 9/18/2024 2337 by Macario Winkler RN  Head of Bed (HOB) Positioning: HOB flat  Taken 9/18/2024 1905 by Macario Winkler RN  Head of Bed (HOB) Positioning: HOB flat     Problem: Infection  Goal: Absence of Infection Signs and Symptoms  Intervention: Prevent or Manage Infection  Recent Flowsheet Documentation  Taken 9/19/2024 0508 by Macario Winkler RN  Fever Reduction/Comfort Measures: lightweight bedding  Isolation Precautions: protective environment maintained  Taken 9/19/2024 0000 by Macario Winkler RN  Fever Reduction/Comfort Measures: lightweight bedding  Isolation Precautions: protective environment maintained  Taken 9/18/2024 1905 by Macario Winkler RN  Fever Reduction/Comfort Measures: lightweight bedding  Isolation Precautions: protective environment maintained     Problem: Gas Exchange Impaired  Goal: Optimal Gas Exchange  Intervention: Optimize Oxygenation and Ventilation  Recent Flowsheet Documentation  Taken 9/19/2024 0508 by Macario Winkler RN  Head of Bed (HOB) Positioning: HOB flat  Taken 9/19/2024 0000 by Macario Winkler RN  Head of Bed (HOB) Positioning: HOB flat  Taken 9/18/2024 2337 by Macario Winkler RN  Head of Bed (HOB) Positioning: HOB flat  Taken 9/18/2024 1905 by Macario Winkler RN  Head of Bed (HOB) Positioning: HOB flat     Problem: Cardiovascular Surgery  Goal: Improved Activity Tolerance  Intervention: Optimize Tolerance for  Activity  Recent Flowsheet Documentation  Taken 9/19/2024 0508 by Macario Winkler RN  Self-Care Promotion: independence encouraged  Taken 9/19/2024 0000 by Macario Winkler RN  Self-Care Promotion: independence encouraged  Taken 9/18/2024 1905 by Macario Winkler RN  Self-Care Promotion: independence encouraged  Goal: Optimal Coping with Heart Surgery  Intervention: Support Psychosocial Response to Surgery  Recent Flowsheet Documentation  Taken 9/19/2024 0508 by Macario Winkler RN  Family/Support System Care:   involvement promoted   self-care encouraged   support provided   presence promoted  Taken 9/19/2024 0000 by Macario Winkler RN  Family/Support System Care:   involvement promoted   self-care encouraged   support provided   presence promoted  Taken 9/18/2024 1905 by Macario Winkler RN  Family/Support System Care:   involvement promoted   self-care encouraged   support provided   presence promoted  Goal: Optimal Cerebral Tissue Perfusion  Intervention: Protect and Optimize Cerebral Perfusion  Recent Flowsheet Documentation  Taken 9/19/2024 0508 by Macario Winkler RN  Fever Reduction/Comfort Measures: lightweight bedding  Sensory Stimulation Regulation:   care clustered   quiet environment promoted  Cerebral Perfusion Promotion: blood pressure monitored  Head of Bed (HOB) Positioning: HOB flat  Taken 9/19/2024 0000 by Macario Winkler RN  Fever Reduction/Comfort Measures: lightweight bedding  Sensory Stimulation Regulation:   care clustered   quiet environment promoted  Cerebral Perfusion Promotion: blood pressure monitored  Head of Bed (HOB) Positioning: HOB flat  Taken 9/18/2024 2337 by Macario Winkler RN  Head of Bed (HOB) Positioning: HOB flat  Taken 9/18/2024 1905 by Macario Winkler RN  Fever Reduction/Comfort Measures: lightweight bedding  Sensory Stimulation Regulation:   care clustered   quiet environment promoted  Cerebral Perfusion Promotion: blood pressure monitored  Head of Bed (HOB)  Positioning: HOB flat  Goal: Absence of Infection Signs and Symptoms  Intervention: Prevent or Manage Infection  Recent Flowsheet Documentation  Taken 9/19/2024 0508 by Macario Winkler RN  Fever Reduction/Comfort Measures: lightweight bedding  Infection Prevention:   environmental surveillance performed   equipment surfaces disinfected   hand hygiene promoted   rest/sleep promoted   single patient room provided  Taken 9/19/2024 0000 by Macario Winkler RN  Fever Reduction/Comfort Measures: lightweight bedding  Infection Prevention:   environmental surveillance performed   equipment surfaces disinfected   hand hygiene promoted   rest/sleep promoted   single patient room provided  Taken 9/18/2024 1905 by Macario Winkler RN  Fever Reduction/Comfort Measures: lightweight bedding  Infection Prevention:   environmental surveillance performed   equipment surfaces disinfected   hand hygiene promoted   rest/sleep promoted   single patient room provided  Goal: Anesthesia/Sedation Recovery  Intervention: Optimize Anesthesia Recovery  Recent Flowsheet Documentation  Taken 9/19/2024 0508 by Macario Winkler RN  Reorientation Measures:   calendar in view   clock in view  Taken 9/19/2024 0000 by Macario Winkler RN  Reorientation Measures:   calendar in view   clock in view  Taken 9/18/2024 1905 by Macario Winkler RN  Reorientation Measures:   calendar in view   clock in view  Goal: Acceptable Pain Control  Intervention: Prevent or Manage Pain  Recent Flowsheet Documentation  Taken 9/19/2024 0508 by Macario Winkler RN  Pain Management Interventions:   repositioned   rest  Complementary Therapy: (playing video games) other (see comments)  Taken 9/19/2024 0000 by Macario Winkler RN  Complementary Therapy: (playing video games) other (see comments)  Taken 9/18/2024 2337 by Macario Winkler RN  Pain Management Interventions:   repositioned   rest  Taken 9/18/2024 1905 by Macario Winkler RN  Pain Management Interventions:    repositioned   rest  Complementary Therapy: (playing video games) other (see comments)  Goal: Effective Oxygenation and Ventilation  Intervention: Promote Airway Secretion Clearance  Recent Flowsheet Documentation  Taken 9/19/2024 0508 by Macario Winkler RN  Cough And Deep Breathing: done independently per patient  Taken 9/19/2024 0000 by Macario Winkler RN  Cough And Deep Breathing: done independently per patient  Taken 9/18/2024 1905 by Macario Winkler RN  Cough And Deep Breathing: done independently per patient     Problem: Pain Acute  Goal: Optimal Pain Control and Function  Intervention: Develop Pain Management Plan  Recent Flowsheet Documentation  Taken 9/19/2024 0508 by Macario Winkler RN  Pain Management Interventions:   repositioned   rest  Taken 9/18/2024 2337 by Macario Winkler RN  Pain Management Interventions:   repositioned   rest  Taken 9/18/2024 1905 by Macario Winkler RN  Pain Management Interventions:   repositioned   rest  Intervention: Prevent or Manage Pain  Recent Flowsheet Documentation  Taken 9/19/2024 0508 by Macario Winkler RN  Sensory Stimulation Regulation:   care clustered   quiet environment promoted  Bowel Elimination Promotion: ambulation promoted  Complementary Therapy: (playing video games) other (see comments)  Taken 9/19/2024 0000 by Macario Winkler RN  Sensory Stimulation Regulation:   care clustered   quiet environment promoted  Bowel Elimination Promotion: ambulation promoted  Complementary Therapy: (playing video games) other (see comments)  Taken 9/18/2024 1905 by Macario Winkler RN  Sensory Stimulation Regulation:   care clustered   quiet environment promoted  Bowel Elimination Promotion: ambulation promoted  Complementary Therapy: (playing video games) other (see comments)     Problem: Diabetes  Goal: Optimal Coping  Intervention: Support Wellbeing and Self-Management Success  Recent Flowsheet Documentation  Taken 9/19/2024 0508 by Macario Winkler  RN  Family/Support System Care:   involvement promoted   self-care encouraged   support provided   presence promoted  Taken 9/19/2024 0000 by Macario Winkler RN  Family/Support System Care:   involvement promoted   self-care encouraged   support provided   presence promoted  Taken 9/18/2024 1905 by Macario Winkler RN  Family/Support System Care:   involvement promoted   self-care encouraged   support provided   presence promoted  Goal: Optimal Functional Ability  Intervention: Optimize Functional Ability  Recent Flowsheet Documentation  Taken 9/19/2024 0508 by Macario Winkler RN  Self-Care Promotion: independence encouraged  Assistive Device Utilized: lift device  Activity Management: ambulated outside room  Activity Assistance Provided:   assistance, stand-by   independent  Taken 9/19/2024 0000 by Macario Winkler RN  Self-Care Promotion: independence encouraged  Assistive Device Utilized: lift device  Activity Management: ambulated outside room  Activity Assistance Provided:   assistance, stand-by   independent  Taken 9/18/2024 2337 by Macario Winkler RN  Assistive Device Utilized: lift device  Activity Management: ambulated outside room  Activity Assistance Provided:   assistance, stand-by   independent  Taken 9/18/2024 1905 by Macario Winkler RN  Self-Care Promotion: independence encouraged  Assistive Device Utilized: lift device  Activity Management: ambulated outside room  Activity Assistance Provided:   assistance, stand-by   independent     Problem: Heart Failure  Goal: Optimal Coping  Intervention: Support Psychosocial Response  Recent Flowsheet Documentation  Taken 9/19/2024 0508 by Macario Winkler RN  Family/Support System Care:   involvement promoted   self-care encouraged   support provided   presence promoted  Taken 9/19/2024 0000 by Macario Winkler RN  Family/Support System Care:   involvement promoted   self-care encouraged   support provided   presence promoted  Taken 9/18/2024 1905 by  Obwatho, Macario, RN  Family/Support System Care:   involvement promoted   self-care encouraged   support provided   presence promoted  Goal: Optimal Functional Ability  Intervention: Optimize Functional Ability  Recent Flowsheet Documentation  Taken 9/19/2024 0508 by Macario Winkler RN  Self-Care Promotion: independence encouraged  Activity Management: ambulated outside room  Taken 9/19/2024 0000 by Macario Winkler RN  Self-Care Promotion: independence encouraged  Activity Management: ambulated outside room  Taken 9/18/2024 2337 by Macario Winkler RN  Activity Management: ambulated outside room  Taken 9/18/2024 1905 by Macario Winkler RN  Self-Care Promotion: independence encouraged  Activity Management: ambulated outside room  Goal: Effective Oxygenation and Ventilation  Intervention: Promote Airway Secretion Clearance  Recent Flowsheet Documentation  Taken 9/19/2024 0508 by Macario Winkler RN  Cough And Deep Breathing: done independently per patient  Activity Management: ambulated outside room  Taken 9/19/2024 0000 by Macario Winkler RN  Cough And Deep Breathing: done independently per patient  Activity Management: ambulated outside room  Taken 9/18/2024 2337 by Macario Winkler RN  Activity Management: ambulated outside room  Taken 9/18/2024 1905 by Macario Winkler RN  Cough And Deep Breathing: done independently per patient  Activity Management: ambulated outside room  Intervention: Optimize Oxygenation and Ventilation  Recent Flowsheet Documentation  Taken 9/19/2024 0508 by Macario Winkler RN  Head of Bed ([facility]) Positioning: HOB flat  Taken 9/19/2024 0000 by Macario Winkler RN  Head of Bed ([facility]) Positioning: HOB flat  Taken 9/18/2024 2337 by Macario Winkler RN  Head of Bed ([facility]) Positioning: HOB flat  Taken 9/18/2024 1905 by Macario Winkler RN  Head of Bed ([facility]) Positioning: HOB flat     Problem: Infection  Goal: Absence of Infection Signs and Symptoms  Intervention: Prevent or Manage  Infection  Recent Flowsheet Documentation  Taken 9/19/2024 0508 by Macario Winkler RN  Fever Reduction/Comfort Measures: lightweight bedding  Isolation Precautions: protective environment maintained  Taken 9/19/2024 0000 by Macario Winkler RN  Fever Reduction/Comfort Measures: lightweight bedding  Isolation Precautions: protective environment maintained  Taken 9/18/2024 1905 by Macario Winkler RN  Fever Reduction/Comfort Measures: lightweight bedding  Isolation Precautions: protective environment maintained     Problem: Adult Inpatient Plan of Care  Goal: Absence of Hospital-Acquired Illness or Injury  Intervention: Prevent Skin Injury  Recent Flowsheet Documentation  Taken 9/19/2024 0508 by Macario Winkler RN  Body Position: position changed independently  Taken 9/19/2024 0000 by Macario Winkler RN  Body Position: position changed independently  Taken 9/18/2024 2337 by Macario Winkler RN  Body Position: position changed independently  Taken 9/18/2024 1905 by Macario Winkler RN  Body Position: position changed independently  Intervention: Prevent and Manage VTE (Venous Thromboembolism) Risk  Recent Flowsheet Documentation  Taken 9/19/2024 0508 by Macario Winkler RN  VTE Prevention/Management: (Lovenox SQ) other (see comments)  Taken 9/19/2024 0000 by Macario Winkler RN  VTE Prevention/Management: (Lovenox SQ) other (see comments)  Taken 9/18/2024 1905 by Macario Winkler RN  VTE Prevention/Management: (Lovenox SQ) other (see comments)  Intervention: Prevent Infection  Recent Flowsheet Documentation  Taken 9/19/2024 0508 by Macario Winkler RN  Infection Prevention:   environmental surveillance performed   equipment surfaces disinfected   hand hygiene promoted   rest/sleep promoted   single patient room provided  Taken 9/19/2024 0000 by Macario Winkler RN  Infection Prevention:   environmental surveillance performed   equipment surfaces disinfected   hand hygiene promoted   rest/sleep promoted   single  patient room provided  Taken 9/18/2024 1905 by Macario Winkler RN  Infection Prevention:   environmental surveillance performed   equipment surfaces disinfected   hand hygiene promoted   rest/sleep promoted   single patient room provided  Goal: Optimal Comfort and Wellbeing  Intervention: Monitor Pain and Promote Comfort  Recent Flowsheet Documentation  Taken 9/19/2024 0508 by Macario Winkler RN  Pain Management Interventions:   repositioned   rest  Taken 9/18/2024 2337 by Macario Winkler RN  Pain Management Interventions:   repositioned   rest  Taken 9/18/2024 1905 by Macario Winkler RN  Pain Management Interventions:   repositioned   rest  Intervention: Provide Person-Centered Care  Recent Flowsheet Documentation  Taken 9/19/2024 0508 by Macario Winkler RN  Trust Relationship/Rapport:   care explained   choices provided   questions answered   questions encouraged   thoughts/feelings acknowledged   reassurance provided  Taken 9/19/2024 0000 by Macario Winkler RN  Trust Relationship/Rapport:   care explained   choices provided   questions answered   questions encouraged   thoughts/feelings acknowledged   reassurance provided  Taken 9/18/2024 1905 by Macario Winkler RN  Trust Relationship/Rapport:   care explained   choices provided   questions answered   questions encouraged   thoughts/feelings acknowledged   reassurance provided     Problem: Gas Exchange Impaired  Goal: Optimal Gas Exchange  Intervention: Optimize Oxygenation and Ventilation  Recent Flowsheet Documentation  Taken 9/19/2024 0508 by Macario Winkler RN  Head of Bed (HOB) Positioning: HOB flat  Taken 9/19/2024 0000 by Macario Winkler RN  Head of Bed (HOB) Positioning: HOB flat  Taken 9/18/2024 2337 by Macario Winkler RN  Head of Bed (HOB) Positioning: HOB flat  Taken 9/18/2024 1905 by Macario Winkler RN  Head of Bed (HOB) Positioning: HOB flat     Problem: Cardiovascular Surgery  Goal: Improved Activity Tolerance  Intervention: Optimize  Tolerance for Activity  Recent Flowsheet Documentation  Taken 9/19/2024 0508 by Macario Winkler RN  Self-Care Promotion: independence encouraged  Taken 9/19/2024 0000 by Macario Winkler RN  Self-Care Promotion: independence encouraged  Taken 9/18/2024 1905 by Macario Winkler RN  Self-Care Promotion: independence encouraged  Goal: Optimal Coping with Heart Surgery  Intervention: Support Psychosocial Response to Surgery  Recent Flowsheet Documentation  Taken 9/19/2024 0508 by Macario Winkler RN  Family/Support System Care:   involvement promoted   self-care encouraged   support provided   presence promoted  Taken 9/19/2024 0000 by Macario Winkler RN  Family/Support System Care:   involvement promoted   self-care encouraged   support provided   presence promoted  Taken 9/18/2024 1905 by Macario Winkler RN  Family/Support System Care:   involvement promoted   self-care encouraged   support provided   presence promoted  Goal: Optimal Cerebral Tissue Perfusion  Intervention: Protect and Optimize Cerebral Perfusion  Recent Flowsheet Documentation  Taken 9/19/2024 0508 by Macario Winkler RN  Fever Reduction/Comfort Measures: lightweight bedding  Sensory Stimulation Regulation:   care clustered   quiet environment promoted  Cerebral Perfusion Promotion: blood pressure monitored  Head of Bed (HOB) Positioning: HOB flat  Taken 9/19/2024 0000 by Macario Winkler RN  Fever Reduction/Comfort Measures: lightweight bedding  Sensory Stimulation Regulation:   care clustered   quiet environment promoted  Cerebral Perfusion Promotion: blood pressure monitored  Head of Bed (HOB) Positioning: HOB flat  Taken 9/18/2024 2337 by Macario Winkler RN  Head of Bed (HOB) Positioning: HOB flat  Taken 9/18/2024 1905 by Macario Winkler RN  Fever Reduction/Comfort Measures: lightweight bedding  Sensory Stimulation Regulation:   care clustered   quiet environment promoted  Cerebral Perfusion Promotion: blood pressure monitored  Head of Bed  (HOB) Positioning: HOB flat  Goal: Absence of Infection Signs and Symptoms  Intervention: Prevent or Manage Infection  Recent Flowsheet Documentation  Taken 9/19/2024 0508 by Macario Winkler RN  Fever Reduction/Comfort Measures: lightweight bedding  Infection Prevention:   environmental surveillance performed   equipment surfaces disinfected   hand hygiene promoted   rest/sleep promoted   single patient room provided  Taken 9/19/2024 0000 by Macario Winkler RN  Fever Reduction/Comfort Measures: lightweight bedding  Infection Prevention:   environmental surveillance performed   equipment surfaces disinfected   hand hygiene promoted   rest/sleep promoted   single patient room provided  Taken 9/18/2024 1905 by Macario Winkler RN  Fever Reduction/Comfort Measures: lightweight bedding  Infection Prevention:   environmental surveillance performed   equipment surfaces disinfected   hand hygiene promoted   rest/sleep promoted   single patient room provided  Goal: Anesthesia/Sedation Recovery  Intervention: Optimize Anesthesia Recovery  Recent Flowsheet Documentation  Taken 9/19/2024 0508 by Macario Winkler RN  Reorientation Measures:   calendar in view   clock in view  Taken 9/19/2024 0000 by Macraio Winkler RN  Reorientation Measures:   calendar in view   clock in view  Taken 9/18/2024 1905 by Macario Winkler RN  Reorientation Measures:   calendar in view   clock in view  Goal: Acceptable Pain Control  Intervention: Prevent or Manage Pain  Recent Flowsheet Documentation  Taken 9/19/2024 0508 by Macario Winkler RN  Pain Management Interventions:   repositioned   rest  Complementary Therapy: (playing video games) other (see comments)  Taken 9/19/2024 0000 by Macario Winkler RN  Complementary Therapy: (playing video games) other (see comments)  Taken 9/18/2024 2337 by Macario Winkler RN  Pain Management Interventions:   repositioned   rest  Taken 9/18/2024 1905 by Macario Winkler RN  Pain Management Interventions:    repositioned   rest  Complementary Therapy: (playing video games) other (see comments)  Goal: Effective Oxygenation and Ventilation  Intervention: Promote Airway Secretion Clearance  Recent Flowsheet Documentation  Taken 9/19/2024 0508 by Macario Winkler RN  Cough And Deep Breathing: done independently per patient  Taken 9/19/2024 0000 by Macario Winkler RN  Cough And Deep Breathing: done independently per patient  Taken 9/18/2024 1905 by Macario Winkler RN  Cough And Deep Breathing: done independently per patient   Goal Outcome Evaluation:

## 2024-09-23 ENCOUNTER — OFFICE VISIT (OUTPATIENT)
Dept: DERMATOLOGY | Facility: CLINIC | Age: 24
End: 2024-09-23
Payer: MEDICARE

## 2024-09-23 DIAGNOSIS — R21 RASH: Primary | ICD-10-CM

## 2024-09-23 PROCEDURE — 99202 OFFICE O/P NEW SF 15 MIN: CPT | Mod: GC | Performed by: DERMATOLOGY

## 2024-09-23 ASSESSMENT — PAIN SCALES - GENERAL: PAINLEVEL: NO PAIN (0)

## 2024-09-23 NOTE — LETTER
9/23/2024       RE: Terrance Hidalgo  [address]  [place] [address]     Dear Colleague,    Thank you for referring your patient, Terrance Hidalgo, to the [facility] [place] [facility]. Please see a copy of my visit note below.    [facility] Dermatology Note  Encounter Date: Sep 23, 2024  Office Visit     Dermatology Problem List:  # Urticaria, arms/trunk - suspected 2/2 amoxicillin; resolve  # Dermatitis, feet - suspect autoeczematization 2/2 Strep mitis bacteremia    ____________________________________________    Assessment & Plan:   # Urticaria, arms/trunk  - Urticarial rash has resolved. No further treatment required. Timeline and resolution fits with amoxicillin as culprit.       # Dermatitis, feet  - Initial suspicion at time of consult was id dermatitis 2/2 bacteremia. Rash has improved with triamcinolone, today largely just post-inflammatory hyperpigmentation on the dorsal feet; asymptomatic.  - No further treatment required.  - Follow-up prn if rash returns.         Procedures Performed:   None      Follow-up: prn    Staff and Resident:     Resident:  I saw and discussed the patient with the attending physician, Dr. Yan VARMA, Marily Heredia MD, saw this patient with the resident and agree with the resident s findings and plan of care as documented in the resident s note.      Amaury Maldonado MD, PhD  PGY-4 Dermatology Resident     ____________________________________________    CC: No chief complaint on file.      HPI:  Mr. Terrance Hidalgo is a(n) 24 year old male who presents today as a new patient for hospital follow-up:    - Seen in hospital 9/18/24 for urticaria (felt 2/2 amoxicillin), foot rash (suspected id reaction 2/2 bacteremia).   - Urticarial rash has resolved. Thinks he's had a similar reaction to amoxicillin in the past.  - Rash on the feet is largely resolved. No pruritus, pain, or  burning. Still using triam 0.1% ointment daily.         Labs Reviewed:  N/A    Physical Exam:  Vitals: There were no vitals taken for this visit.  SKIN: Focused exam of face, neck, trunk, arms, lower legs and feet  - Dull pink macules/small patches on the dorsal feet without scale.           Medications:  Current Outpatient Medications   Medication Sig Dispense Refill     acetaminophen (TYLENOL) 325 MG tablet Take 2 tablets (650 mg) by mouth or Feeding Tube every 8 hours as needed for mild pain. 90 tablet 2     benzocaine-menthol (CHLORASEPTIC MAX) 15-10 MG lozenge Place 1 lozenge inside cheek every hour as needed (cough). 30 lozenge 0     cetirizine (ZYRTEC) 5 MG tablet Take 0.5 tablets (2.5 mg) by mouth every 6 hours as needed for allergies (Give 30 minutes prior to each amoxicillin dose for rash). 90 tablet 2     digoxin (LANOXIN) 250 MCG tablet Take 1 tablet (250 mcg) by mouth daily. 30 tablet 3     empagliflozin (JARDIANCE) 10 MG TABS tablet Take 1 tablet (10 mg) by mouth daily. 90 tablet 1     levofloxacin (LEVAQUIN) 750 MG tablet Take 1 tablet (750 mg) by mouth daily for 7 days. 7 tablet 0     metoprolol succinate ER (TOPROL XL) 25 MG 24 hr tablet Take 1 tablet (25 mg) by mouth daily. 30 tablet 3     multivitamin w/minerals (THERA-VIT-M) tablet Take 1 tablet by mouth daily. 30 tablet 3     polyethylene glycol (MIRALAX) 17 GM/Dose powder Take 17 g by mouth daily as needed for constipation. 510 g 3     risperiDONE (RISPERDAL) 0.5 MG tablet Take 3 tablets (1.5 mg) by mouth every evening. 90 tablet 3     risperiDONE (RISPERDAL) 1 MG tablet Take 1 tablet (1 mg) by mouth daily. 30 tablet 3     sacubitril-valsartan (ENTRESTO) 24-26 MG per tablet Take 0.5 tablets by mouth 2 times daily. 30 tablet 3     selenium sulfide (SELSUN) 1 % LOTN shampoo Apply topically daily as needed for itching. 420 mL 3     senna-docusate (SENOKOT-S/PERICOLACE) 8.6-50 MG tablet Take 1 tablet by mouth or Feeding Tube daily as needed for  constipation. 30 tablet 2     spironolactone (ALDACTONE) 25 MG tablet Take 1 tablet (25 mg) by mouth daily. 30 tablet 3     thiamine (B-1) 100 MG tablet Take 1 tablet (100 mg) by mouth daily. 30 tablet 3     triamcinolone (KENALOG) 0.1 % external ointment Apply topically 2 times daily. 80 g 3     No current facility-administered medications for this visit.        Past Medical History:   Patient Active Problem List   Diagnosis     Cardiogenic shock (H)     Acute kidney failure, unspecified (H)     No past medical history on file.    CC Referred Self, MD  No address on file on close of this encounter.        Again, thank you for allowing me to participate in the care of your patient.      Sincerely,    Marily Heredia MD

## 2024-09-23 NOTE — NURSING NOTE
Dermatology Rooming Note    Terrance Hidalgo's goals for this visit include:   Chief Complaint   Patient presents with    Derm Problem     Rash- On both feet.      Milan Paula, EMT  Clinic Support  [facility]     [phone]    Employed by [facility]

## 2024-09-23 NOTE — PROGRESS NOTES
[facility] Dermatology Note  Encounter Date: Sep 23, 2024  Office Visit     Dermatology Problem List:  # Urticaria, arms/trunk - suspected 2/2 amoxicillin; resolve  # Dermatitis, feet - suspect autoeczematization 2/2 Strep mitis bacteremia    ____________________________________________    Assessment & Plan:   # Urticaria, arms/trunk  - Urticarial rash has resolved. No further treatment required. Timeline and resolution fits with amoxicillin as culprit.       # Dermatitis, feet  - Initial suspicion at time of consult was id dermatitis 2/2 bacteremia. Rash has improved with triamcinolone, today largely just post-inflammatory hyperpigmentation on the dorsal feet; asymptomatic.  - No further treatment required.  - Follow-up prn if rash returns.         Procedures Performed:   None      Follow-up: prn    Staff and Resident:     Resident:  I saw and discussed the patient with the attending physician, Dr. Yan VARMA, Marily Heredia MD, saw this patient with the resident and agree with the resident s findings and plan of care as documented in the resident s note.      Amaury Maldonado MD, PhD  PGY-4 Dermatology Resident     ____________________________________________    CC: No chief complaint on file.      HPI:  Mr. Terrance Hidalgo is a(n) 24 year old male who presents today as a new patient for hospital follow-up:    - Seen in hospital 9/18/24 for urticaria (felt 2/2 amoxicillin), foot rash (suspected id reaction 2/2 bacteremia).   - Urticarial rash has resolved. Thinks he's had a similar reaction to amoxicillin in the past.  - Rash on the feet is largely resolved. No pruritus, pain, or burning. Still using triam 0.1% ointment daily.         Labs Reviewed:  N/A    Physical Exam:  Vitals: There were no vitals taken for this visit.  SKIN: Focused exam of face, neck, trunk, arms, lower legs and feet  - Dull pink macules/small patches on the dorsal feet without scale.            Medications:  Current Outpatient Medications   Medication Sig Dispense Refill    acetaminophen (TYLENOL) 325 MG tablet Take 2 tablets (650 mg) by mouth or Feeding Tube every 8 hours as needed for mild pain. 90 tablet 2    benzocaine-menthol (CHLORASEPTIC MAX) 15-10 MG lozenge Place 1 lozenge inside cheek every hour as needed (cough). 30 lozenge 0    cetirizine (ZYRTEC) 5 MG tablet Take 0.5 tablets (2.5 mg) by mouth every 6 hours as needed for allergies (Give 30 minutes prior to each amoxicillin dose for rash). 90 tablet 2    digoxin (LANOXIN) 250 MCG tablet Take 1 tablet (250 mcg) by mouth daily. 30 tablet 3    empagliflozin (JARDIANCE) 10 MG TABS tablet Take 1 tablet (10 mg) by mouth daily. 90 tablet 1    levofloxacin (LEVAQUIN) 750 MG tablet Take 1 tablet (750 mg) by mouth daily for 7 days. 7 tablet 0    metoprolol succinate ER (TOPROL XL) 25 MG 24 hr tablet Take 1 tablet (25 mg) by mouth daily. 30 tablet 3    multivitamin w/minerals (THERA-VIT-M) tablet Take 1 tablet by mouth daily. 30 tablet 3    polyethylene glycol (MIRALAX) 17 GM/Dose powder Take 17 g by mouth daily as needed for constipation. 510 g 3    risperiDONE (RISPERDAL) 0.5 MG tablet Take 3 tablets (1.5 mg) by mouth every evening. 90 tablet 3    risperiDONE (RISPERDAL) 1 MG tablet Take 1 tablet (1 mg) by mouth daily. 30 tablet 3    sacubitril-valsartan (ENTRESTO) 24-26 MG per tablet Take 0.5 tablets by mouth 2 times daily. 30 tablet 3    selenium sulfide (SELSUN) 1 % LOTN shampoo Apply topically daily as needed for itching. 420 mL 3    senna-docusate (SENOKOT-S/PERICOLACE) 8.6-50 MG tablet Take 1 tablet by mouth or Feeding Tube daily as needed for constipation. 30 tablet 2    spironolactone (ALDACTONE) 25 MG tablet Take 1 tablet (25 mg) by mouth daily. 30 tablet 3    thiamine (B-1) 100 MG tablet Take 1 tablet (100 mg) by mouth daily. 30 tablet 3    triamcinolone (KENALOG) 0.1 % external ointment Apply topically 2 times daily. 80 g 3     No  current facility-administered medications for this visit.        Past Medical History:   Patient Active Problem List   Diagnosis    Cardiogenic shock (H)    Acute kidney failure, unspecified (H)     No past medical history on file.    CC Referred Self, MD  No address on file on close of this encounter.

## 2024-09-26 ENCOUNTER — TRANSFERRED RECORDS (OUTPATIENT)
Dept: HEALTH INFORMATION MANAGEMENT | Facility: CLINIC | Age: 24
End: 2024-09-26
Payer: MEDICARE

## 2024-09-26 ENCOUNTER — MEDICAL CORRESPONDENCE (OUTPATIENT)
Dept: HEALTH INFORMATION MANAGEMENT | Facility: CLINIC | Age: 24
End: 2024-09-26
Payer: MEDICARE

## 2024-09-27 ENCOUNTER — TRANSCRIBE ORDERS (OUTPATIENT)
Dept: OTHER | Age: 24
End: 2024-09-27

## 2024-09-27 DIAGNOSIS — I50.42 CHRONIC COMBINED SYSTOLIC AND DIASTOLIC CONGESTIVE HEART FAILURE (H): Primary | ICD-10-CM

## 2024-10-01 ENCOUNTER — TELEPHONE (OUTPATIENT)
Dept: CARDIOLOGY | Facility: CLINIC | Age: 24
End: 2024-10-01
Payer: MEDICARE

## 2024-10-01 ENCOUNTER — DOCUMENTATION ONLY (OUTPATIENT)
Dept: CARDIOLOGY | Facility: CLINIC | Age: 24
End: 2024-10-01
Payer: MEDICARE

## 2024-10-01 NOTE — TELEPHONE ENCOUNTER
10/1/2024 4:40PM Sharee Zamudio  Patient's Group [facility] ([facility]) confirmed rescheduled appointment:  Date: 10/2/2024  Time: 2:45PM  Visit type: New Heart Failure  Provider: Dr. Delgado  [place] [address] [place] [address]  Testing/imaging: Labs (1st Floor Imaging) prior at 1:45PM  Additional notes: 10/1 Rescheduled New Heart Failure w/ Dr. Delgado 10/2 w/ labs prior. HESHAM Zamudio 10/1/2024 4:40PM

## 2024-10-01 NOTE — TELEPHONE ENCOUNTER
----- Message from Hailee PAPPAS sent at 10/1/2024  3:49 PM CDT -----  Regarding: offer to move appt urgent for 10/2  Hello-  We would like to move this appt tomorrow to earlier if patient able to. Currently at 515pm, would like to offer held spot at 245pm.   Could you call him (or his brother who is his guardian) to ask?   If he can't come early, ok to stay at 515pm.    thanks

## 2024-10-02 ENCOUNTER — LAB (OUTPATIENT)
Dept: LAB | Facility: CLINIC | Age: 24
End: 2024-10-02
Attending: STUDENT IN AN ORGANIZED HEALTH CARE EDUCATION/TRAINING PROGRAM
Payer: MEDICARE

## 2024-10-02 ENCOUNTER — OFFICE VISIT (OUTPATIENT)
Dept: CARDIOLOGY | Facility: CLINIC | Age: 24
End: 2024-10-02
Attending: STUDENT IN AN ORGANIZED HEALTH CARE EDUCATION/TRAINING PROGRAM
Payer: MEDICARE

## 2024-10-02 ENCOUNTER — PRE VISIT (OUTPATIENT)
Dept: CARDIOLOGY | Facility: CLINIC | Age: 24
End: 2024-10-02

## 2024-10-02 DIAGNOSIS — I50.20 HEART FAILURE WITH REDUCED EJECTION FRACTION, NYHA CLASS II (H): ICD-10-CM

## 2024-10-02 DIAGNOSIS — F84.0 AUTISM: ICD-10-CM

## 2024-10-02 DIAGNOSIS — I42.8 NONISCHEMIC CARDIOMYOPATHY (H): ICD-10-CM

## 2024-10-02 DIAGNOSIS — F41.9 ANXIETY: ICD-10-CM

## 2024-10-02 DIAGNOSIS — I50.21 ACUTE HFREF (HEART FAILURE WITH REDUCED EJECTION FRACTION) (H): Primary | ICD-10-CM

## 2024-10-02 LAB
ANION GAP SERPL CALCULATED.3IONS-SCNC: 11 MMOL/L (ref 7–15)
BUN SERPL-MCNC: 15.7 MG/DL (ref 6–20)
CALCIUM SERPL-MCNC: 9.6 MG/DL (ref 8.8–10.4)
CHLORIDE SERPL-SCNC: 103 MMOL/L (ref 98–107)
CREAT SERPL-MCNC: 0.67 MG/DL (ref 0.67–1.17)
EGFRCR SERPLBLD CKD-EPI 2021: >90 ML/MIN/1.73M2
ERYTHROCYTE [DISTWIDTH] IN BLOOD BY AUTOMATED COUNT: 14.8 % (ref 10–15)
GLUCOSE SERPL-MCNC: 109 MG/DL (ref 70–99)
HCO3 SERPL-SCNC: 26 MMOL/L (ref 22–29)
HCT VFR BLD AUTO: 40.3 % (ref 40–53)
HGB BLD-MCNC: 13.2 G/DL (ref 13.3–17.7)
MCH RBC QN AUTO: 29.2 PG (ref 26.5–33)
MCHC RBC AUTO-ENTMCNC: 32.8 G/DL (ref 31.5–36.5)
MCV RBC AUTO: 89 FL (ref 78–100)
PLATELET # BLD AUTO: 291 10E3/UL (ref 150–450)
POTASSIUM SERPL-SCNC: 4.4 MMOL/L (ref 3.4–5.3)
RBC # BLD AUTO: 4.52 10E6/UL (ref 4.4–5.9)
SODIUM SERPL-SCNC: 140 MMOL/L (ref 135–145)
WBC # BLD AUTO: 10.9 10E3/UL (ref 4–11)

## 2024-10-02 PROCEDURE — 85027 COMPLETE CBC AUTOMATED: CPT | Performed by: PATHOLOGY

## 2024-10-02 PROCEDURE — 36415 COLL VENOUS BLD VENIPUNCTURE: CPT | Performed by: PATHOLOGY

## 2024-10-02 PROCEDURE — G2211 COMPLEX E/M VISIT ADD ON: HCPCS | Performed by: STUDENT IN AN ORGANIZED HEALTH CARE EDUCATION/TRAINING PROGRAM

## 2024-10-02 PROCEDURE — 99215 OFFICE O/P EST HI 40 MIN: CPT | Performed by: STUDENT IN AN ORGANIZED HEALTH CARE EDUCATION/TRAINING PROGRAM

## 2024-10-02 PROCEDURE — 80048 BASIC METABOLIC PNL TOTAL CA: CPT | Performed by: PATHOLOGY

## 2024-10-02 PROCEDURE — G0463 HOSPITAL OUTPT CLINIC VISIT: HCPCS | Performed by: STUDENT IN AN ORGANIZED HEALTH CARE EDUCATION/TRAINING PROGRAM

## 2024-10-02 RX ORDER — FUROSEMIDE 20 MG/1
20 TABLET ORAL DAILY PRN
Qty: 30 TABLET | Refills: 1 | Status: SHIPPED | OUTPATIENT
Start: 2024-10-02

## 2024-10-02 ASSESSMENT — PAIN SCALES - GENERAL: PAINLEVEL: NO PAIN (0)

## 2024-10-02 NOTE — PATIENT INSTRUCTIONS
Cardiology Providers you saw during your visit:  Dr. Delgado     Medication changes:  1- Lasix (furosemide) 20mg daily as needed (for weight above 215lb, swelling or bloating).    *WOrk on drinking more fluids up to 64 ounces per day.  *Okay to increase more sodium up to 2000mg daily        Follow up:  1- Follow up with Dr Martin as scheduled.        Please call if you have:  1. Weight gain of more than 2 pounds in a day or 5 pounds in a week  2. Increased shortness of breath, swelling or bloating  3. Dizziness, lightheadedness   4. Any questions or concerns.      Heart Failure Support Group  Support group is held virtually. Please reach out if you would like to attend and we can get you the information you need to log in.   2024 dates for support group meetings:    Monday, October 7th, 1-2pm     Monday, November 4th, 1-2pm     Monday, December 2nd, 1-2pm     Follow the American Heart Association Diet and Lifestyle recommendations:  Limit saturated fat, trans fat, sodium, red meat, sweets and sugar-sweetened beverages. If you choose to eat red meat, compare labels and select the leanest cuts available.  Aim for at least 150 minutes of moderate physical activity or 75 minutes of vigorous physical activity - or an equal combination of both - each week.     During business hours: [phone], press option # 1 to schedule an appointment or send a message to your care team     After hours, weekends or holidays: On Call Cardiologist- [phone]   option #4 and ask to speak to the on-call Cardiologist. Inform them you are a CORE/heart failure patient at the [facility].     Hailee Narayanan, RN BSN  Cardiology Nurse Care Coordinator (Heart Failure / C.O.R.E.)

## 2024-10-02 NOTE — NURSING NOTE
Chief Complaint   Patient presents with    New Patient     NEW HF: 24 year old male presents with biventricualr heart failure, systolic heart failure, ef 15% for hospital follow up with labs prior     Vitals were taken and medications reconciled.    Amaury Valle, EMT  2:45 PM

## 2024-10-02 NOTE — PROGRESS NOTES
Advanced Heart Failure/Transplant Clinic Note    HPI  Dear colleagues,     I had the pleasure of seeing Mr. Terrance Hidalgo in the Cardiology clinic.  As you know, Mr. Terrance Hidalgo is a pleasant 24 year old male with a past medical history of autism and acute HFrEF 2/2 NICM who presents as new referral for HFrEF. He is accompanied by one of the group home caregivers to assist with history.    Patient initially presented to [facility] ED in August '24 with cough, URI symptoms, and hypoxemia. He was quickly intubated due to respiratory failure and transferred to [facility], where his hospital course transpired to mixed cardiogenic/septic shock requiring multiple pressor agents, inotropic support, and IV diuresis. He transferred again to [facility] CICU on 8/20/24 for continued management. Presentation initially highly suspicious for fulminant myocarditis base on elevated cardiac and inflammatory biomarkers, new (suspected), severe biventricular dysfunction w/ reduced cardiac output requiring intermittent inotropic support, and concurrent infectious/respiratory symptomotology c/b acute respiratory failure 2/2 ARDS and cardiogenic etiology 2/2 miocarditis with superimposed aspiration PNM treated with sulbactam. Empirically pulsed with steroids. However, EMB 8/21/24 demonstrated unremarkable myocardial tissue with no evidence of inflammatory infiltrate. Hospital course further complicated by worsening cardiogenic/septic shock with worsening renal and liver failure requiring IABP placement on 8/27. Oriana weaned off on 8/28 and patient was extubated without complications. IABP removed on 9/1 and closed with 8Fr angioseal. Dobutamine was successfully weaned off and his GDMT initialed. CMR on 9/5 showed mildly dilated left ventricle with severely reduced left ventricular function, LVEF 15%, normal right ventricular size and systolic function, RVEF 46%, no myocardial edema or replacement fibrosis. No MRI features of acute  myocarditis. Blood cultures noted to be positive for strep mitis for which he has been treated with prolonged course of antibiotics.     Patient has since been discharged and living at his group home facility and overall doing well given prolonged hospital course and time in the ICU. Patient has been very careful with his sodium and fluid intake and has been struggling with how restricted this is. He does not like taking so many medications. He has been walking around the group home and outside when the weather is nice. He denies feeling short of breath walking to a tree outside multiple times per day. He denies presyncope, syncope, chest pain, palpitations, orthopnea, PND, abdominal pain, nausea, emesis, LE edema or weight gain. He has been taking all his medications.    ROS:  A complete 12-point ROS was negative except as above.    PAST MEDICAL HISTORY:  Patient Active Problem List   Diagnosis    Cardiogenic shock (H)    Acute kidney failure, unspecified (H)        FAMILY HISTORY:  Family History   Problem Relation Age of Onset    Melanoma No family hx of     Skin Cancer No family hx of        SOCIAL HISTORY:  Social History     Tobacco Use    Smoking status: Never    Smokeless tobacco: Never    No tobacco, ETOH, or illicit substances. Currently lives in a group home. There is an ongoing court kendrick for guardianship of Terrance.    ALLERGIES:  Allergies   Allergen Reactions    Amoxicillin Hives     (Pictures in media tab) Confirmed by dermatology 9/18/2024    Implanon [Etonogestrel]     Penicillin V Hives     Tolerated ampicillin/sulbactam 08/21/2024       CURRENT MEDICATIONS:  Current Outpatient Medications   Medication Sig Dispense Refill    cetirizine (ZYRTEC) 5 MG tablet Take 0.5 tablets (2.5 mg) by mouth every 6 hours as needed for allergies (Give 30 minutes prior to each amoxicillin dose for rash). 90 tablet 2    digoxin (LANOXIN) 250 MCG tablet Take 1 tablet (250 mcg) by mouth daily. 30 tablet 3     empagliflozin (JARDIANCE) 10 MG TABS tablet Take 1 tablet (10 mg) by mouth daily. 90 tablet 1    furosemide (LASIX) 20 MG tablet Take 1 tablet (20 mg) by mouth daily as needed (weight above 215lb, swelling or bloating). 30 tablet 1    metoprolol succinate ER (TOPROL XL) 25 MG 24 hr tablet Take 1 tablet (25 mg) by mouth daily. 30 tablet 3    multivitamin w/minerals (THERA-VIT-M) tablet Take 1 tablet by mouth daily. 30 tablet 3    reason aspirin not prescribed, intentional, Please choose reason not prescribed from choices below.      risperiDONE (RISPERDAL) 0.5 MG tablet Take 3 tablets (1.5 mg) by mouth every evening. 90 tablet 3    risperiDONE (RISPERDAL) 1 MG tablet Take 1 tablet (1 mg) by mouth daily. 30 tablet 3    sacubitril-valsartan (ENTRESTO) 24-26 MG per tablet Take 0.5 tablets by mouth 2 times daily. 30 tablet 3    selenium sulfide (SELSUN) 1 % LOTN shampoo Apply topically daily as needed for itching. 420 mL 3    senna-docusate (SENOKOT-S/PERICOLACE) 8.6-50 MG tablet Take 1 tablet by mouth or Feeding Tube daily as needed for constipation. 30 tablet 2    spironolactone (ALDACTONE) 25 MG tablet Take 1 tablet (25 mg) by mouth daily. 30 tablet 3    STATIN NOT PRESCRIBED (INTENTIONAL) Please choose reason not prescribed from choices below.      thiamine (B-1) 100 MG tablet Take 1 tablet (100 mg) by mouth daily. 30 tablet 3    triamcinolone (KENALOG) 0.1 % external ointment Apply topically 2 times daily. 80 g 3    acetaminophen (TYLENOL) 325 MG tablet Take 2 tablets (650 mg) by mouth or Feeding Tube every 8 hours as needed for mild pain. (Patient not taking: Reported on 9/23/2024) 90 tablet 2    benzocaine-menthol (CHLORASEPTIC MAX) 15-10 MG lozenge Place 1 lozenge inside cheek every hour as needed (cough). (Patient not taking: Reported on 9/23/2024) 30 lozenge 0    polyethylene glycol (MIRALAX) 17 GM/Dose powder Take 17 g by mouth daily as needed for constipation. (Patient not taking: Reported on 9/23/2024) 510 g  "3       EXAM:  BP 91/55 (BP Location: Right arm, Patient Position: Chair, Cuff Size: Adult Regular)   Pulse 120   Wt 96.8 kg (213 lb 6.4 oz)   SpO2 95%   BMI 27.98 kg/m      General: appears comfortable, alert and interactive, in no acute distress  Head: normocephalic, atraumatic  Eyes: anicteric sclera, EOMI  Mouth: MMM  Neck: supple, no cervical adenopathy  CV: regular rate and rhythm, no murmur, gallop, rub, estimated JVP ~8 cm  Resp: clear, no rales or wheezing  GI: soft, nontender, nondistended  Extremities: warm, no peripheral edema, 2+ bilateral radial pulses  Neurological: alert and oriented, no focal deficits  Psych: normal mood and affect  Derm: no rashes on exposed surfaces    Weight  Wt Readings from Last 10 Encounters:   10/02/24 96.8 kg (213 lb 6.4 oz)   09/19/24 96.5 kg (212 lb 11.2 oz)       I personally reviewed recent labs and data as below and discussed the results with the patient in clinic today.  Labs:  CBC RESULTS:  Lab Results   Component Value Date    WBC 10.9 10/02/2024    RBC 4.52 10/02/2024    HGB 13.2 (L) 10/02/2024    HCT 40.3 10/02/2024    MCV 89 10/02/2024    MCH 29.2 10/02/2024    MCHC 32.8 10/02/2024    RDW 14.8 10/02/2024     10/02/2024       CMP RESULTS:  Lab Results   Component Value Date     10/02/2024     (HH) 08/25/2024    POTASSIUM 4.4 10/02/2024    CHLORIDE 103 10/02/2024    CO2 26 10/02/2024    ANIONGAP 11 10/02/2024     (H) 10/02/2024     (H) 09/18/2024    BUN 15.7 10/02/2024    CR 0.67 10/02/2024    GFRESTIMATED >90 10/02/2024    ALEXANDER 9.6 10/02/2024    BILITOTAL 0.2 09/18/2024    ALBUMIN 3.8 09/18/2024    ALKPHOS 87 09/18/2024    ALT 37 09/18/2024    AST 19 09/18/2024        No results for input(s): \"CHOL\", \"HDL\", \"LDL\", \"TRIG\", \"CHOLHDLRATIO\" in the last 74942 hours.     Testing/Procedures:  I personally visualized and interpreted:  Echocardiogram 8/21/24  Interpretation Summary  Tachycardia at 144BPM during study.  Mild left " ventricular dilation is present.LVIDd 58mm.  Biplane LVEF is 16%.  Severe hypokinesis of mid to apical RV free wall.  Pulmonary artery systolic pressure is normal.  The inferior vena cava is normal.  No pericardial effusion is present.  There is no prior study for direct comparison.    [facility] 8/21/24  RIGHT HEART CATHETERIZATION:  === Off pressors vented Fio2 80% ====  /71/87 mmHg  RA mean of 6 mmHg with a V wave of 8 mmhg  PA 30/23/26 mmHg  PCWP 18 mmHg with a V wave of 20 mmhg  Wayne CO/CI 5.0/2.1 L/min/m2   PVR 1.6 GROVE   SVR 1270 dynes/sec/cm-5  PA sat 70%   Hgb 14.6 g/dL     TTE 8/23/24  Interpretation Summary  Left ventricular function is decreased. The ejection fraction is 20-25%  (severely reduced).  The right ventricle is normal size.  Global right ventricular function is moderately to severely reduced.  No pericardial effusion is present.    TTE 8/27/24  Interpretation Summary  Normal left ventricular size. The visually estimated left ventricular ejection fraction is 30-35%. There is severe diffuse hypokinesis.  The right ventricle was not well visualized. Normal right ventricular size.  Unable to visualize inferior vena cava.  Compared to the prior study dated 8/23/2024, the left ventricular function has mildly improved. There is improved function of the lateral wall segments.    IABP Placement 8/27/24  1.successful deployment of intra-aortic balloon pump via right femoral arterial access     TTE 9/3/24  Interpretation Summary  The patient's rhythm is sinus tachycardia. 131 bpm  Left ventricular function is decreased. The ejection fraction is 10-15%  (severely reduced).  Severe diffuse hypokinesis is present.  The right ventricle is normal size.  Global right ventricular function is normal.  This study was compared with the study from 8/21/2024 .RV improved, LV unchanged.    cMRI  9/5/24  SUMMARY  ==========================================================================================================  Clinical history: 24M with URI c/b cardiogenic shock, assess for myocarditis  Comparison CMR: none  1. The left ventricle is mildly dilated with normal wall thickness.  There is severe diffuse hypokinesis.  The global systolic function is severely reduced. The LVEF is 15%.  2. The right ventricle is normal in cavity size. The global systolic function is normal. The RVEF is 46%.   3. Both atria are normal in size.  4. There is no significant valvular disease.   5. Late gadolinium enhancement imaging:   Parametric mapping: native T1 value 1152 ms, ECV = 31% (mildly elevated).  T2 =  42-46 ms (normal range).   6. There is no pericardial effusion.  7. There is no intracardiac thrombus.  CONCLUSIONS:  Mildly dilated left ventricle with severely reduced left ventricular function, LVEF 15%.  Normal right ventricular size and systolic function, RVEF 46%.   No myocardial edema or replacement fibrosis.   Specifically, no MRI features of acute myocarditis.    [facility] 9/11/24  BP: 111/52 mmHg  RA: 2/4/2 mmHg  RV: 22/2 mmHg  PA: 20/14 (16) mmHg  W: 10/13/10 mmHg  PA sat: 63 %  Wayne CO/CI: 6.0/2.7  Thermo CO/CI: 5.3/2.4  PVR: 0.99 GROVE     Outside results of note:  Outside records were obtained and relevant results/notes have been incorporated into HPI.    Assessment and Plan:     In summary, 24 year old male with a past medical history of autism and acute HFrEF 2/2 NICM who presents as new referral for HFrEF. He is accompanied by one of the group home caregivers to assist with history.    Acute systolic heart failure/HFrEF (EF 15%) secondary to nonischemic cardiomyopathy  NYHA Symptom Class III  Stage C  ACE-I/ARB/ARNi: Continue Entresto 24-26 mg BID  BB: Continue metoprolol XL 25 mg daily  Aldosterone antagonist: Continue spironolactone 25 mg daily  SGLT2i: Continue Jardiance 10 mg daily  RV Support: Continue  digoxin 250 mcg daily  SCD prophylaxis: Will reassess LVEF once on optimal GDMT for 3 months  %BiV pacing: N/A  Fluid status: euvolemic to mildly hypovolemic on lasix 20 mg daily, will change to PRN, but has not been drinking enough water  Cardiac Rehab: Has been referred  Remote PA Pressure Monitoring (CardioMems): Being evaluated for this  - No indications for advanced therapies at this time, but will see how patient does on GDMT  - Recommend 2L fluid and 2g Na, but patient has been well under these at this time  - Recommend increasing exercise as able    Autism  Anxiety  - Remains on risperidone    Optimal Vascular Metrics    Blood Pressure   BP < 140/90 Yes    On Aspirin  No: Contraindicated due to: Not indicated based on age    On Statin  No: Contraindicated due to: Heart failure    Tobacco use  No       To Do:  - Change lasix to PRN  - Drink more fluids (currently doing ~32 oz daily) and can increase sodium to 2g daily  - Follow up on 10/29/24 as scheduled    The patient states understanding and is agreeable with plan.   Feel free to contact myself regarding questions or concerns. It was a pleasure to see this patient today.    A total of 72 minutes was spent on the day of the visit, which includes preparation for the visit (reviewing previous medical records, laboratories and investigations), in conjunction with the actual clinic visit with the patient, which includes obtaining a history and physical exam, creating and reviewing the care plan, patient education (and family if present), counseling, documenting clinical information in the electronic health record and care coordination.     The longitudinal plan of care for the diagnosis(es)/condition(s) as documented were addressed during this visit. Due to the added complexity in care, I will continue to support Terrance in the subsequent management and with ongoing continuity of care.     Farrah Delgado MD   of Medicine, [facility]  [place]  Advanced Heart Failure and Transplant Cardiology     CC  Nikunj Hearn, Jani Toscano

## 2024-10-02 NOTE — NURSING NOTE
Diet: Patient instructed regarding a heart failure healthy diet, including discussion of reduced fat and 2000 mg daily sodium restriction, daily weights, medication purpose and compliance, fluid restrictions and resources for patient and family to access for assistance with heart failure management.       Labs: Patient was given results of the laboratory testing obtained today and patient was instructed about when to return for the next laboratory testing.     Med Reconcile: Reviewed and verified all current medications with the patient. The updated medication list was printed and given to the patient. Lasix 20mg daily PRN for weight above 215lb,swellign or bloating.      Return Appointment: Patient given instructions regarding scheduling next clinic visit. RTC to see DR Martin as scheduled.     Patient stated he understood all health information given and agreed to call with further questions or concerns.     Hailee Narayanan RN

## 2024-10-02 NOTE — LETTER
10/2/2024      RE: Terrance Hidalgo  [address] [facility] [id number]       Dear Colleague,    Thank you for the opportunity to participate in the care of your patient, Terrance Hidalgo, at the [facility] [place] at [facility]. Please see a copy of my visit note below.    Advanced Heart Failure/Transplant Clinic Note    HPI  Dear colleagues,     I had the pleasure of seeing Mr. Terrance Hidalgo in the Cardiology clinic.  As you know, Mr. Terrance Hidalgo is a pleasant 24 year old male with a past medical history of autism and acute HFrEF 2/2 NICM who presents as new referral for HFrEF. He is accompanied by one of the group home caregivers to assist with history.    Patient initially presented to [facility] ED in August '24 with cough, URI symptoms, and hypoxemia. He was quickly intubated due to respiratory failure and transferred to [facility], where his hospital course transpired to mixed cardiogenic/septic shock requiring multiple pressor agents, inotropic support, and IV diuresis. He transferred again to [facility] CICU on 8/20/24 for continued management. Presentation initially highly suspicious for fulminant myocarditis base on elevated cardiac and inflammatory biomarkers, new (suspected), severe biventricular dysfunction w/ reduced cardiac output requiring intermittent inotropic support, and concurrent infectious/respiratory symptomotology c/b acute respiratory failure 2/2 ARDS and cardiogenic etiology 2/2 miocarditis with superimposed aspiration PNM treated with sulbactam. Empirically pulsed with steroids. However, EMB 8/21/24 demonstrated unremarkable myocardial tissue with no evidence of inflammatory infiltrate. Hospital course further complicated by worsening cardiogenic/septic shock with worsening renal and liver failure requiring IABP placement on 8/27. Oriana weaned off on 8/28 and patient was extubated without complications. IABP removed on 9/1 and  closed with 8Fr angioseal. Dobutamine was successfully weaned off and his GDMT initialed. CMR on 9/5 showed mildly dilated left ventricle with severely reduced left ventricular function, LVEF 15%, normal right ventricular size and systolic function, RVEF 46%, no myocardial edema or replacement fibrosis. No MRI features of acute myocarditis. Blood cultures noted to be positive for strep mitis for which he has been treated with prolonged course of antibiotics.     Patient has since been discharged and living at his group home facility and overall doing well given prolonged hospital course and time in the ICU. Patient has been very careful with his sodium and fluid intake and has been struggling with how restricted this is. He does not like taking so many medications. He has been walking around the group home and outside when the weather is nice. He denies feeling short of breath walking to a tree outside multiple times per day. He denies presyncope, syncope, chest pain, palpitations, orthopnea, PND, abdominal pain, nausea, emesis, LE edema or weight gain. He has been taking all his medications.    ROS:  A complete 12-point ROS was negative except as above.    PAST MEDICAL HISTORY:  Patient Active Problem List   Diagnosis     Cardiogenic shock (H)     Acute kidney failure, unspecified (H)        FAMILY HISTORY:  Family History   Problem Relation Age of Onset     Melanoma No family hx of      Skin Cancer No family hx of        SOCIAL HISTORY:  Social History     Tobacco Use     Smoking status: Never     Smokeless tobacco: Never    No tobacco, ETOH, or illicit substances. Currently lives in a group home. There is an ongoing court kendrick for guardianship of Terrance.    ALLERGIES:  Allergies   Allergen Reactions     Amoxicillin Hives     (Pictures in media tab) Confirmed by dermatology 9/18/2024     Implanon [Etonogestrel]      Penicillin V Hives     Tolerated ampicillin/sulbactam 08/21/2024       CURRENT  MEDICATIONS:  Current Outpatient Medications   Medication Sig Dispense Refill     cetirizine (ZYRTEC) 5 MG tablet Take 0.5 tablets (2.5 mg) by mouth every 6 hours as needed for allergies (Give 30 minutes prior to each amoxicillin dose for rash). 90 tablet 2     digoxin (LANOXIN) 250 MCG tablet Take 1 tablet (250 mcg) by mouth daily. 30 tablet 3     empagliflozin (JARDIANCE) 10 MG TABS tablet Take 1 tablet (10 mg) by mouth daily. 90 tablet 1     furosemide (LASIX) 20 MG tablet Take 1 tablet (20 mg) by mouth daily as needed (weight above 215lb, swelling or bloating). 30 tablet 1     metoprolol succinate ER (TOPROL XL) 25 MG 24 hr tablet Take 1 tablet (25 mg) by mouth daily. 30 tablet 3     multivitamin w/minerals (THERA-VIT-M) tablet Take 1 tablet by mouth daily. 30 tablet 3     reason aspirin not prescribed, intentional, Please choose reason not prescribed from choices below.       risperiDONE (RISPERDAL) 0.5 MG tablet Take 3 tablets (1.5 mg) by mouth every evening. 90 tablet 3     risperiDONE (RISPERDAL) 1 MG tablet Take 1 tablet (1 mg) by mouth daily. 30 tablet 3     sacubitril-valsartan (ENTRESTO) 24-26 MG per tablet Take 0.5 tablets by mouth 2 times daily. 30 tablet 3     selenium sulfide (SELSUN) 1 % LOTN shampoo Apply topically daily as needed for itching. 420 mL 3     senna-docusate (SENOKOT-S/PERICOLACE) 8.6-50 MG tablet Take 1 tablet by mouth or Feeding Tube daily as needed for constipation. 30 tablet 2     spironolactone (ALDACTONE) 25 MG tablet Take 1 tablet (25 mg) by mouth daily. 30 tablet 3     STATIN NOT PRESCRIBED (INTENTIONAL) Please choose reason not prescribed from choices below.       thiamine (B-1) 100 MG tablet Take 1 tablet (100 mg) by mouth daily. 30 tablet 3     triamcinolone (KENALOG) 0.1 % external ointment Apply topically 2 times daily. 80 g 3     acetaminophen (TYLENOL) 325 MG tablet Take 2 tablets (650 mg) by mouth or Feeding Tube every 8 hours as needed for mild pain. (Patient not  taking: Reported on 9/23/2024) 90 tablet 2     benzocaine-menthol (CHLORASEPTIC MAX) 15-10 MG lozenge Place 1 lozenge inside cheek every hour as needed (cough). (Patient not taking: Reported on 9/23/2024) 30 lozenge 0     polyethylene glycol (MIRALAX) 17 GM/Dose powder Take 17 g by mouth daily as needed for constipation. (Patient not taking: Reported on 9/23/2024) 510 g 3       EXAM:  BP 91/55 (BP Location: Right arm, Patient Position: Chair, Cuff Size: Adult Regular)   Pulse 120   Wt 96.8 kg (213 lb 6.4 oz)   SpO2 95%   BMI 27.98 kg/m      General: appears comfortable, alert and interactive, in no acute distress  Head: normocephalic, atraumatic  Eyes: anicteric sclera, EOMI  Mouth: MMM  Neck: supple, no cervical adenopathy  CV: regular rate and rhythm, no murmur, gallop, rub, estimated JVP ~8 cm  Resp: clear, no rales or wheezing  GI: soft, nontender, nondistended  Extremities: warm, no peripheral edema, 2+ bilateral radial pulses  Neurological: alert and oriented, no focal deficits  Psych: normal mood and affect  Derm: no rashes on exposed surfaces    Weight  Wt Readings from Last 10 Encounters:   10/02/24 96.8 kg (213 lb 6.4 oz)   09/19/24 96.5 kg (212 lb 11.2 oz)       I personally reviewed recent labs and data as below and discussed the results with the patient in clinic today.  Labs:  CBC RESULTS:  Lab Results   Component Value Date    WBC 10.9 10/02/2024    RBC 4.52 10/02/2024    HGB 13.2 (L) 10/02/2024    HCT 40.3 10/02/2024    MCV 89 10/02/2024    MCH 29.2 10/02/2024    MCHC 32.8 10/02/2024    RDW 14.8 10/02/2024     10/02/2024       CMP RESULTS:  Lab Results   Component Value Date     10/02/2024     (HH) 08/25/2024    POTASSIUM 4.4 10/02/2024    CHLORIDE 103 10/02/2024    CO2 26 10/02/2024    ANIONGAP 11 10/02/2024     (H) 10/02/2024     (H) 09/18/2024    BUN 15.7 10/02/2024    CR 0.67 10/02/2024    GFRESTIMATED >90 10/02/2024    ALEXANDER 9.6 10/02/2024    BILITOTAL 0.2  "09/18/2024    ALBUMIN 3.8 09/18/2024    ALKPHOS 87 09/18/2024    ALT 37 09/18/2024    AST 19 09/18/2024        No results for input(s): \"CHOL\", \"HDL\", \"LDL\", \"TRIG\", \"CHOLHDLRATIO\" in the last 23671 hours.     Testing/Procedures:  I personally visualized and interpreted:  Echocardiogram 8/21/24  Interpretation Summary  Tachycardia at 144BPM during study.  Mild left ventricular dilation is present.LVIDd 58mm.  Biplane LVEF is 16%.  Severe hypokinesis of mid to apical RV free wall.  Pulmonary artery systolic pressure is normal.  The inferior vena cava is normal.  No pericardial effusion is present.  There is no prior study for direct comparison.    RHC 8/21/24  RIGHT HEART CATHETERIZATION:  === Off pressors vented Fio2 80% ====  /71/87 mmHg  RA mean of 6 mmHg with a V wave of 8 mmhg  PA 30/23/26 mmHg  PCWP 18 mmHg with a V wave of 20 mmhg  Wayne CO/CI 5.0/2.1 L/min/m2   PVR 1.6 GROVE   SVR 1270 dynes/sec/cm-5  PA sat 70%   Hgb 14.6 g/dL     TTE 8/23/24  Interpretation Summary  Left ventricular function is decreased. The ejection fraction is 20-25%  (severely reduced).  The right ventricle is normal size.  Global right ventricular function is moderately to severely reduced.  No pericardial effusion is present.    TTE 8/27/24  Interpretation Summary  Normal left ventricular size. The visually estimated left ventricular ejection fraction is 30-35%. There is severe diffuse hypokinesis.  The right ventricle was not well visualized. Normal right ventricular size.  Unable to visualize inferior vena cava.  Compared to the prior study dated 8/23/2024, the left ventricular function has mildly improved. There is improved function of the lateral wall segments.    IABP Placement 8/27/24  1.successful deployment of intra-aortic balloon pump via right femoral arterial access     TTE 9/3/24  Interpretation Summary  The patient's rhythm is sinus tachycardia. 131 bpm  Left ventricular function is decreased. The ejection fraction is " 10-15%  (severely reduced).  Severe diffuse hypokinesis is present.  The right ventricle is normal size.  Global right ventricular function is normal.  This study was compared with the study from 8/21/2024 .RV improved, LV unchanged.    cMRI 9/5/24  SUMMARY  ==========================================================================================================  Clinical history: 24M with URI c/b cardiogenic shock, assess for myocarditis  Comparison CMR: none  1. The left ventricle is mildly dilated with normal wall thickness.  There is severe diffuse hypokinesis.  The global systolic function is severely reduced. The LVEF is 15%.  2. The right ventricle is normal in cavity size. The global systolic function is normal. The RVEF is 46%.   3. Both atria are normal in size.  4. There is no significant valvular disease.   5. Late gadolinium enhancement imaging:   Parametric mapping: native T1 value 1152 ms, ECV = 31% (mildly elevated).  T2 =  42-46 ms (normal range).   6. There is no pericardial effusion.  7. There is no intracardiac thrombus.  CONCLUSIONS:  Mildly dilated left ventricle with severely reduced left ventricular function, LVEF 15%.  Normal right ventricular size and systolic function, RVEF 46%.   No myocardial edema or replacement fibrosis.   Specifically, no MRI features of acute myocarditis.    RHC 9/11/24  BP: 111/52 mmHg  RA: 2/4/2 mmHg  RV: 22/2 mmHg  PA: 20/14 (16) mmHg  W: 10/13/10 mmHg  PA sat: 63 %  Wayne CO/CI: 6.0/2.7  Thermo CO/CI: 5.3/2.4  PVR: 0.99 GROVE     Outside results of note:  Outside records were obtained and relevant results/notes have been incorporated into HPI.    Assessment and Plan:     In summary, 24 year old male with a past medical history of autism and acute HFrEF 2/2 NICM who presents as new referral for HFrEF. He is accompanied by one of the group home caregivers to assist with history.    Acute systolic heart failure/HFrEF (EF 15%) secondary to nonischemic  cardiomyopathy  NYHA Symptom Class III  Stage C  ACE-I/ARB/ARNi: Continue Entresto 24-26 mg BID  BB: Continue metoprolol XL 25 mg daily  Aldosterone antagonist: Continue spironolactone 25 mg daily  SGLT2i: Continue Jardiance 10 mg daily  RV Support: Continue digoxin 250 mcg daily  SCD prophylaxis: Will reassess LVEF once on optimal GDMT for 3 months  %BiV pacing: N/A  Fluid status: euvolemic to mildly hypovolemic on lasix 20 mg daily, will change to PRN, but has not been drinking enough water  Cardiac Rehab: Has been referred  Remote PA Pressure Monitoring (CardioMems): Being evaluated for this  - No indications for advanced therapies at this time, but will see how patient does on GDMT  - Recommend 2L fluid and 2g Na, but patient has been well under these at this time  - Recommend increasing exercise as able    Autism  Anxiety  - Remains on risperidone    Optimal Vascular Metrics    Blood Pressure   BP < 140/90 Yes    On Aspirin  No: Contraindicated due to: Not indicated based on age    On Statin  No: Contraindicated due to: Heart failure    Tobacco use  No       To Do:  - Change lasix to PRN  - Drink more fluids (currently doing ~32 oz daily) and can increase sodium to 2g daily  - Follow up on 10/29/24 as scheduled    The patient states understanding and is agreeable with plan.   Feel free to contact myself regarding questions or concerns. It was a pleasure to see this patient today.    A total of 72 minutes was spent on the day of the visit, which includes preparation for the visit (reviewing previous medical records, laboratories and investigations), in conjunction with the actual clinic visit with the patient, which includes obtaining a history and physical exam, creating and reviewing the care plan, patient education (and family if present), counseling, documenting clinical information in the electronic health record and care coordination.     The longitudinal plan of care for the diagnosis(es)/condition(s) as  documented were addressed during this visit. Due to the added complexity in care, I will continue to support Terrance in the subsequent management and with ongoing continuity of care.     Farrah Delgado MD   of Medicine, [facility]  Advanced Heart Failure and Transplant Cardiology     CC  Nikunj Hearn, Jani Toscano         Please do not hesitate to contact me if you have any questions/concerns.     Sincerely,     Farrah Delgado MD

## 2024-10-05 VITALS
SYSTOLIC BLOOD PRESSURE: 91 MMHG | DIASTOLIC BLOOD PRESSURE: 55 MMHG | BODY MASS INDEX: 27.98 KG/M2 | HEART RATE: 120 BPM | OXYGEN SATURATION: 95 % | WEIGHT: 213.4 LBS

## 2024-10-23 ENCOUNTER — CARE COORDINATION (OUTPATIENT)
Dept: CARDIOLOGY | Facility: CLINIC | Age: 24
End: 2024-10-23
Payer: MEDICARE

## 2024-10-23 DIAGNOSIS — I50.21 ACUTE HFREF (HEART FAILURE WITH REDUCED EJECTION FRACTION) (H): Primary | ICD-10-CM

## 2024-10-24 ENCOUNTER — ANCILLARY PROCEDURE (OUTPATIENT)
Dept: CARDIOLOGY | Facility: CLINIC | Age: 24
End: 2024-10-24
Attending: STUDENT IN AN ORGANIZED HEALTH CARE EDUCATION/TRAINING PROGRAM
Payer: MEDICARE

## 2024-10-24 DIAGNOSIS — I50.20 HEART FAILURE WITH REDUCED EJECTION FRACTION, NYHA CLASS II (H): ICD-10-CM

## 2024-10-24 LAB — LVEF ECHO: NORMAL

## 2024-10-24 PROCEDURE — 93306 TTE W/DOPPLER COMPLETE: CPT | Performed by: STUDENT IN AN ORGANIZED HEALTH CARE EDUCATION/TRAINING PROGRAM

## 2024-10-24 PROCEDURE — 99207 PR STATISTIC IV PUSH SINGLE INITIAL SUBSTANCE: CPT | Performed by: STUDENT IN AN ORGANIZED HEALTH CARE EDUCATION/TRAINING PROGRAM

## 2024-10-24 RX ADMIN — Medication 5 ML: at 12:27

## 2024-10-27 NOTE — PROGRESS NOTES
Advanced Heart Failure/Transplant Clinic Note    October 29, 2024    Dear colleagues,     I had the pleasure of seeing Mr. Terrance Hidalgo in the Cardiology clinic.  As you know, Mr. Terrance Hidalgo is a pleasant 24 year old male with a past medical history of autism and acute HFrEF 2/2 NICM who was admitted for mixed cardiogenic/septic shock in August 2024 with prolonged hospital course who recently established care in our HF clinic earlier this month. He returns for one month follow-up and is acompanied by one of the group home caregivers.      Patient initially presented to [facility] ED in August '24 with cough, URI symptoms, and hypoxemia. He was quickly intubated due to respiratory failure and transferred to [facility], where his hospital course transpired to mixed cardiogenic/septic shock requiring multiple pressor agents, inotropic support, and IV diuresis. He transferred again to [facility] CICU on 8/20/24 for continued management. Presentation initially highly suspicious for fulminant myocarditis base on elevated cardiac and inflammatory biomarkers, new (suspected), severe biventricular dysfunction w/ reduced cardiac output requiring intermittent inotropic support, and concurrent infectious/respiratory symptomotology c/b acute respiratory failure 2/2 ARDS and cardiogenic etiology 2/2 myocarditis with superimposed aspiration PNA treated with sulbactam. Empirically pulsed with steroids. However, EMB 8/21/24 demonstrated unremarkable myocardial tissue with no evidence of inflammatory infiltrate. Hospital course further complicated by worsening cardiogenic/septic shock with worsening renal and liver failure requiring IABP placement on 8/27. Oriana weaned off on 8/28 and patient was extubated without complications. IABP removed on 9/1 and closed with 8Fr angioseal. Dobutamine was successfully weaned off and his GDMT initialed. CMR on 9/5 showed mildly dilated left ventricle with severely reduced left ventricular  "function, LVEF 15%, normal right ventricular size and systolic function, RVEF 46%, no myocardial edema or replacement fibrosis. No MRI features of acute myocarditis. Blood cultures noted to be positive for strep mitis for which he has been treated with prolonged course of antibiotics.     At his HF visit earlier this month, the only medication adjustment at that time was switching from scheduled maintenance loop diuretic to an as needed dose of lasix 20mg for weight gain.     TTE last week with LVEF 15-20%. No significant changes when compared to the echo from 9/3/24.    Currently, he states that he is doing reasonably well. He had one episode of \"heavy breathing\" yesterday that was preceding by coughing. Lasted 3 hours. Improved when he laid flat. No difficulty breathing this AM. No chest pain. No lightheadedness, dizziness, or falls. No lower extremity swelling.  He is following 2g sodium restricted diet with his group home caregivers. He has not had any restaurant or fast food since discharge. He has lost over 10 pounds. Home SBP 80-90.     ROS:  A complete 12-point ROS was negative except as above.    PAST MEDICAL HISTORY:  Patient Active Problem List   Diagnosis    Cardiogenic shock (H)    Acute kidney failure, unspecified (H)        FAMILY HISTORY:  Family History   Problem Relation Age of Onset    Melanoma No family hx of     Skin Cancer No family hx of        SOCIAL HISTORY:  Social History     Tobacco Use    Smoking status: Never    Smokeless tobacco: Never    No tobacco, ETOH, or illicit substances. Currently lives in a group home.     ALLERGIES:  Allergies   Allergen Reactions    Amoxicillin Hives     (Pictures in media tab) Confirmed by dermatology 9/18/2024    Implanon [Etonogestrel]     Penicillin V Hives     Tolerated ampicillin/sulbactam 08/21/2024       CURRENT MEDICATIONS:  Current Outpatient Medications   Medication Sig Dispense Refill    acetaminophen (TYLENOL) 325 MG tablet Take 2 tablets (650 " mg) by mouth or Feeding Tube every 8 hours as needed for mild pain. 90 tablet 2    benzocaine-menthol (CHLORASEPTIC MAX) 15-10 MG lozenge Place 1 lozenge inside cheek every hour as needed (cough). 30 lozenge 0    cetirizine (ZYRTEC) 5 MG tablet Take 0.5 tablets (2.5 mg) by mouth every 6 hours as needed for allergies (Give 30 minutes prior to each amoxicillin dose for rash). 90 tablet 2    digoxin (LANOXIN) 250 MCG tablet Take 1 tablet (250 mcg) by mouth daily. 30 tablet 3    empagliflozin (JARDIANCE) 10 MG TABS tablet Take 1 tablet (10 mg) by mouth daily. 90 tablet 1    furosemide (LASIX) 20 MG tablet Take 1 tablet (20 mg) by mouth daily as needed (weight above 215lb, swelling or bloating). 30 tablet 1    metoprolol succinate ER (TOPROL XL) 25 MG 24 hr tablet Take 1 tablet (25 mg) by mouth daily. 30 tablet 3    multivitamin w/minerals (THERA-VIT-M) tablet Take 1 tablet by mouth daily. 30 tablet 3    risperiDONE (RISPERDAL) 0.5 MG tablet Take 3 tablets (1.5 mg) by mouth every evening. 90 tablet 3    risperiDONE (RISPERDAL) 1 MG tablet Take 1 tablet (1 mg) by mouth daily. 30 tablet 3    sacubitril-valsartan (ENTRESTO) 24-26 MG per tablet Take 0.5 tablets by mouth 2 times daily. 30 tablet 3    selenium sulfide (SELSUN) 1 % LOTN shampoo Apply topically daily as needed for itching. 420 mL 3    spironolactone (ALDACTONE) 25 MG tablet Take 1 tablet (25 mg) by mouth daily. 30 tablet 3    thiamine (B-1) 100 MG tablet Take 1 tablet (100 mg) by mouth daily. 30 tablet 3    triamcinolone (KENALOG) 0.1 % external ointment Apply topically 2 times daily. 80 g 3    polyethylene glycol (MIRALAX) 17 GM/Dose powder Take 17 g by mouth daily as needed for constipation. (Patient not taking: Reported on 10/29/2024) 510 g 3    reason aspirin not prescribed, intentional, Please choose reason not prescribed from choices below. (Patient not taking: Reported on 10/29/2024)      senna-docusate (SENOKOT-S/PERICOLACE) 8.6-50 MG tablet Take 1  tablet by mouth or Feeding Tube daily as needed for constipation. 30 tablet 2    STATIN NOT PRESCRIBED (INTENTIONAL) Please choose reason not prescribed from choices below. (Patient not taking: Reported on 10/29/2024)         EXAM:  BP (!) 81/54 (BP Location: Right arm, Patient Position: Chair, Cuff Size: Adult Regular)   Pulse 110   Wt 95.1 kg (209 lb 9.6 oz)   SpO2 95%   BMI 27.48 kg/m      I personally rechecked his BP and it was 87/53.     General: appears comfortable, alert and interactive, in no acute distress. Accompanied by group home staff.   Head: normocephalic, atraumatic  Eyes: anicteric sclera, EOMI  Mouth: MMM  Neck: supple, no cervical adenopathy  CV: regular rate and rhythm, no murmur, gallop, rub, estimated JVP ~8 cm  Resp: clear, no rales or wheezing, speaking in full sentences, breathing comfortably on room air  GI: soft, nontender, nondistended  Extremities: warm, no peripheral edema, 2+ bilateral radial pulses  Neurological: alert and oriented, no focal deficits  Psych: calm, cooperative  Derm: no rashes on exposed surfaces    Weight  Wt Readings from Last 10 Encounters:   10/29/24 95.1 kg (209 lb 9.6 oz)   10/02/24 96.8 kg (213 lb 6.4 oz)   09/19/24 96.5 kg (212 lb 11.2 oz)       I personally reviewed recent labs and data as below and discussed the results with the patient in clinic today.  Labs:  CBC RESULTS:  Lab Results   Component Value Date    WBC 8.6 10/29/2024    RBC 5.50 10/29/2024    HGB 15.2 10/29/2024    HCT 47.2 10/29/2024    MCV 86 10/29/2024    MCH 27.6 10/29/2024    MCHC 32.2 10/29/2024    RDW 13.7 10/29/2024     10/29/2024       CMP RESULTS:  Lab Results   Component Value Date     10/29/2024     (HH) 08/25/2024    POTASSIUM 4.4 10/29/2024    CHLORIDE 108 (H) 10/29/2024    CO2 24 10/29/2024    ANIONGAP 10 10/29/2024     (H) 10/29/2024     (H) 09/18/2024    BUN 14.8 10/29/2024    CR 0.61 (L) 10/29/2024    GFRESTIMATED >90 10/29/2024    ALEXANDER 9.3  "10/29/2024    BILITOTAL 0.4 10/29/2024    ALBUMIN 4.0 10/29/2024    ALKPHOS 79 10/29/2024    ALT 23 10/29/2024    AST 21 10/29/2024        No results for input(s): \"CHOL\", \"HDL\", \"LDL\", \"TRIG\", \"CHOLHDLRATIO\" in the last 17336 hours.     Testing/Procedures:  I personally visualized and interpreted:  Echocardiogram 8/21/24  Interpretation Summary  Tachycardia at 144BPM during study.  Mild left ventricular dilation is present.LVIDd 58mm.  Biplane LVEF is 16%.  Severe hypokinesis of mid to apical RV free wall.  Pulmonary artery systolic pressure is normal.  The inferior vena cava is normal.  No pericardial effusion is present.  There is no prior study for direct comparison.    RHC 8/21/24  RIGHT HEART CATHETERIZATION:  === Off pressors vented Fio2 80% ====  /71/87 mmHg  RA mean of 6 mmHg with a V wave of 8 mmhg  PA 30/23/26 mmHg  PCWP 18 mmHg with a V wave of 20 mmhg  [facility] CO/CI 5.0/2.1 L/min/m2   PVR 1.6 GROVE   SVR 1270 dynes/sec/cm-5  PA sat 70%   Hgb 14.6 g/dL     TTE 8/23/24  Interpretation Summary  Left ventricular function is decreased. The ejection fraction is 20-25%  (severely reduced).  The right ventricle is normal size.  Global right ventricular function is moderately to severely reduced.  No pericardial effusion is present.    TTE 8/27/24  Interpretation Summary  Normal left ventricular size. The visually estimated left ventricular ejection fraction is 30-35%. There is severe diffuse hypokinesis.  The right ventricle was not well visualized. Normal right ventricular size.  Unable to visualize inferior vena cava.  Compared to the prior study dated 8/23/2024, the left ventricular function has mildly improved. There is improved function of the lateral wall segments.    IABP Placement 8/27/24  1.successful deployment of intra-aortic balloon pump via right femoral arterial access     TTE 9/3/24  Interpretation Summary  The patient's rhythm is sinus tachycardia. 131 bpm  Left ventricular function is " decreased. The ejection fraction is 10-15%  (severely reduced).  Severe diffuse hypokinesis is present.  The right ventricle is normal size.  Global right ventricular function is normal.  This study was compared with the study from 8/21/2024 .RV improved, LV unchanged.    cMRI 9/5/24  SUMMARY  ==========================================================================================================  Clinical history: 24M with URI c/b cardiogenic shock, assess for myocarditis  Comparison CMR: none  1. The left ventricle is mildly dilated with normal wall thickness.  There is severe diffuse hypokinesis.  The global systolic function is severely reduced. The LVEF is 15%.  2. The right ventricle is normal in cavity size. The global systolic function is normal. The RVEF is 46%.   3. Both atria are normal in size.  4. There is no significant valvular disease.   5. Late gadolinium enhancement imaging:   Parametric mapping: native T1 value 1152 ms, ECV = 31% (mildly elevated).  T2 =  42-46 ms (normal range).   6. There is no pericardial effusion.  7. There is no intracardiac thrombus.  CONCLUSIONS:  Mildly dilated left ventricle with severely reduced left ventricular function, LVEF 15%.  Normal right ventricular size and systolic function, RVEF 46%.   No myocardial edema or replacement fibrosis.   Specifically, no MRI features of acute myocarditis.    RHC 9/11/24  BP: 111/52 mmHg  RA: 2/4/2 mmHg  RV: 22/2 mmHg  PA: 20/14 (16) mmHg  W: 10/13/10 mmHg  PA sat: 63 %  [facility] CO/CI: 6.0/2.7  [facility] CO/CI: 5.3/2.4  PVR: 0.99 GROVE     TTE 10/24/24:  Interpretation Summary  Left ventricular function is decreased. The ejection fraction is 15-20%  (severely reduced). Severe diffuse hypokinesis is present. Grade II or  moderate diastolic dysfunction. Diastolic Doppler findings (E/E' ratio and/or  other parameters) suggest left ventricular filling pressures are increased.  The right ventricle is normal size. Global right ventricular  function is  normal.  No significant valvular abnormalities present.  The inferior vena cava cannot be assessed.  This study was compared with the study from 09/03/2024. No significant changes  noted.      Assessment and Plan:     In summary, 24 year old male with a past medical history of autism and acute HFrEF 2/2 NICM (EF 15-20%) who presents for HF follow-up.     #Acute systolic heart failure/HFrEF (EF 15%) secondary to nonischemic cardiomyopathy  NYHA Symptom Class III  Stage C  ACE-I/ARB/ARNi: entresto 12/13 BID  BB: Continue metoprolol XL 25 mg daily  Aldosterone antagonist: Continue spironolactone 25 mg daily  SGLT2i: Continue Jardiance 10 mg daily  RV Support: Continue digoxin 250 mcg daily  SCD prophylaxis: Will refer to EP for consideration of ICD   %BiV pacing: N/A  Fluid status: Euvolemic  Cardiac Rehab: Has been referred  Remote PA Pressure Monitoring (CardioMems): Being evaluated for this  - No indications for advanced therapies at this time, but will see how patient does on GDMT  - Recommend 2L fluid and 2g Na  - Recommend increasing exercise as able    Plan:   - Continue current GDMT regimen. There is no room for further uptitration of his regimen with his current blood pressures.  - Will send genetic testing to further evaluate the etiology of his non-ischemic cardiomyopathy  - Refer to EP for consideration of ICD  - Will try to obtain CPX, however after discussion will try to defer this for now. Will reevaluate feasibility  - Note that I called patients aunt over the phone for the duration of the visit.  - RTC 1 month     Patient seen and discussed with attending physician, Dr. Jani Martin. Please see his attestation for final plan.    Iglesia Emanuel MD  Cardiology Fellow      I have seen and evaluated the patient and agree with the assessment and plan as documented above.  I appreciate the opportunity to participate in the care of Fredy Ellison . Please do not hesitate to contact me with any  further questions.  I have spent a total of 40 minutes today reviewing labs, imaging studies, discussing with colleagues, face-to-face time with patient and documentation in the medical record.  The longitudinal plan of care for the diagnosis(es)/condition(s) as documented were addressed during this visit. Due to the added complexity in care, I will continue to support Hiram in the subsequent management and with ongoing continuity of care.    Sincerely,   Jani Martin MD  [facility] Division of Cardiology

## 2024-10-29 ENCOUNTER — LAB (OUTPATIENT)
Dept: LAB | Facility: CLINIC | Age: 24
End: 2024-10-29
Payer: MEDICARE

## 2024-10-29 ENCOUNTER — OFFICE VISIT (OUTPATIENT)
Dept: CARDIOLOGY | Facility: CLINIC | Age: 24
End: 2024-10-29
Attending: INTERNAL MEDICINE
Payer: MEDICARE

## 2024-10-29 VITALS
BODY MASS INDEX: 27.48 KG/M2 | DIASTOLIC BLOOD PRESSURE: 54 MMHG | WEIGHT: 209.6 LBS | HEART RATE: 110 BPM | OXYGEN SATURATION: 95 % | SYSTOLIC BLOOD PRESSURE: 81 MMHG

## 2024-10-29 DIAGNOSIS — I42.0 DILATED CARDIOMYOPATHY (H): Primary | ICD-10-CM

## 2024-10-29 DIAGNOSIS — I50.21 ACUTE HFREF (HEART FAILURE WITH REDUCED EJECTION FRACTION) (H): ICD-10-CM

## 2024-10-29 DIAGNOSIS — I50.20 HEART FAILURE WITH REDUCED EJECTION FRACTION, NYHA CLASS II (H): ICD-10-CM

## 2024-10-29 DIAGNOSIS — Z98.890 HISTORY OF BRONCHOSCOPY: ICD-10-CM

## 2024-10-29 DIAGNOSIS — K59.00 CONSTIPATION, UNSPECIFIED CONSTIPATION TYPE: ICD-10-CM

## 2024-10-29 DIAGNOSIS — F41.9 ANXIETY: ICD-10-CM

## 2024-10-29 DIAGNOSIS — I42.8 NONISCHEMIC CARDIOMYOPATHY (H): ICD-10-CM

## 2024-10-29 DIAGNOSIS — R57.0 CARDIOGENIC SHOCK (H): ICD-10-CM

## 2024-10-29 DIAGNOSIS — R21 RASH: ICD-10-CM

## 2024-10-29 LAB
ALBUMIN SERPL BCG-MCNC: 4 G/DL (ref 3.5–5.2)
ALP SERPL-CCNC: 79 U/L (ref 40–150)
ALT SERPL W P-5'-P-CCNC: 23 U/L (ref 0–70)
ANION GAP SERPL CALCULATED.3IONS-SCNC: 10 MMOL/L (ref 7–15)
AST SERPL W P-5'-P-CCNC: 21 U/L (ref 0–45)
BILIRUB SERPL-MCNC: 0.4 MG/DL
BUN SERPL-MCNC: 14.8 MG/DL (ref 6–20)
CALCIUM SERPL-MCNC: 9.3 MG/DL (ref 8.8–10.4)
CHLORIDE SERPL-SCNC: 108 MMOL/L (ref 98–107)
CREAT SERPL-MCNC: 0.61 MG/DL (ref 0.67–1.17)
EGFRCR SERPLBLD CKD-EPI 2021: >90 ML/MIN/1.73M2
ERYTHROCYTE [DISTWIDTH] IN BLOOD BY AUTOMATED COUNT: 13.7 % (ref 10–15)
GLUCOSE SERPL-MCNC: 110 MG/DL (ref 70–99)
HCO3 SERPL-SCNC: 24 MMOL/L (ref 22–29)
HCT VFR BLD AUTO: 47.2 % (ref 40–53)
HGB BLD-MCNC: 15.2 G/DL (ref 13.3–17.7)
MCH RBC QN AUTO: 27.6 PG (ref 26.5–33)
MCHC RBC AUTO-ENTMCNC: 32.2 G/DL (ref 31.5–36.5)
MCV RBC AUTO: 86 FL (ref 78–100)
NT-PROBNP SERPL-MCNC: 1296 PG/ML (ref 0–450)
PLATELET # BLD AUTO: 273 10E3/UL (ref 150–450)
POTASSIUM SERPL-SCNC: 4.4 MMOL/L (ref 3.4–5.3)
PROT SERPL-MCNC: 6.4 G/DL (ref 6.4–8.3)
RBC # BLD AUTO: 5.5 10E6/UL (ref 4.4–5.9)
SODIUM SERPL-SCNC: 142 MMOL/L (ref 135–145)
WBC # BLD AUTO: 8.6 10E3/UL (ref 4–11)

## 2024-10-29 PROCEDURE — 99215 OFFICE O/P EST HI 40 MIN: CPT | Mod: GC | Performed by: INTERNAL MEDICINE

## 2024-10-29 PROCEDURE — G2211 COMPLEX E/M VISIT ADD ON: HCPCS | Performed by: INTERNAL MEDICINE

## 2024-10-29 PROCEDURE — 85027 COMPLETE CBC AUTOMATED: CPT | Performed by: PATHOLOGY

## 2024-10-29 PROCEDURE — G0463 HOSPITAL OUTPT CLINIC VISIT: HCPCS | Performed by: INTERNAL MEDICINE

## 2024-10-29 PROCEDURE — 36415 COLL VENOUS BLD VENIPUNCTURE: CPT | Performed by: PATHOLOGY

## 2024-10-29 PROCEDURE — 80053 COMPREHEN METABOLIC PANEL: CPT | Performed by: PATHOLOGY

## 2024-10-29 PROCEDURE — 83880 ASSAY OF NATRIURETIC PEPTIDE: CPT | Performed by: PATHOLOGY

## 2024-10-29 ASSESSMENT — PAIN SCALES - GENERAL: PAINLEVEL_OUTOF10: NO PAIN (0)

## 2024-10-29 NOTE — LETTER
10/29/2024      RE: Terrance Hidalgo  [address]  [facility] [address]       Dear Colleague,    Thank you for the opportunity to participate in the care of your patient, Terrance Hidalgo, at the [facility] [place] [facility]. Please see a copy of my visit note below.    Advanced Heart Failure/Transplant Clinic Note    October 29, 2024    Dear colleagues,     I had the pleasure of seeing Mr. Terrance Hidalgo in the Cardiology clinic.  As you know, Mr. Terrance Hidalgo is a pleasant 24 year old male with a past medical history of autism and acute HFrEF 2/2 NICM who was admitted for mixed cardiogenic/septic shock in August 2024 with prolonged hospital course who recently established care in our HF clinic earlier this month. He returns for one month follow-up and is acompanied by one of the group home caregivers.      Patient initially presented to [facility] ED in August '24 with cough, URI symptoms, and hypoxemia. He was quickly intubated due to respiratory failure and transferred to [facility], where his hospital course transpired to mixed cardiogenic/septic shock requiring multiple pressor agents, inotropic support, and IV diuresis. He transferred again to [facility] on 8/20/24 for continued management. Presentation initially highly suspicious for fulminant myocarditis base on elevated cardiac and inflammatory biomarkers, new (suspected), severe biventricular dysfunction w/ reduced cardiac output requiring intermittent inotropic support, and concurrent infectious/respiratory symptomotology c/b acute respiratory failure 2/2 ARDS and cardiogenic etiology 2/2 myocarditis with superimposed aspiration PNA treated with sulbactam. Empirically pulsed with steroids. However, EMB 8/21/24 demonstrated unremarkable myocardial tissue with no evidence of inflammatory infiltrate. Hospital course further complicated by worsening cardiogenic/septic shock with worsening  "renal and liver failure requiring IABP placement on 8/27. Oriana weaned off on 8/28 and patient was extubated without complications. IABP removed on 9/1 and closed with 8Fr angioseal. Dobutamine was successfully weaned off and his GDMT initialed. CMR on 9/5 showed mildly dilated left ventricle with severely reduced left ventricular function, LVEF 15%, normal right ventricular size and systolic function, RVEF 46%, no myocardial edema or replacement fibrosis. No MRI features of acute myocarditis. Blood cultures noted to be positive for strep mitis for which he has been treated with prolonged course of antibiotics.     At his HF visit earlier this month, the only medication adjustment at that time was switching from scheduled maintenance loop diuretic to an as needed dose of lasix 20mg for weight gain.     TTE last week with LVEF 15-20%. No significant changes when compared to the echo from 9/3/24.    Currently, he states that he is doing reasonably well. He had one episode of \"heavy breathing\" yesterday that was preceding by coughing. Lasted 3 hours. Improved when he laid flat. No difficulty breathing this AM. No chest pain. No lightheadedness, dizziness, or falls. No lower extremity swelling.  He is following 2g sodium restricted diet with his group home caregivers. He has not had any restaurant or fast food since discharge. He has lost over 10 pounds. Home SBP 80-90.     ROS:  A complete 12-point ROS was negative except as above.    PAST MEDICAL HISTORY:  Patient Active Problem List   Diagnosis     Cardiogenic shock (H)     Acute kidney failure, unspecified (H)        FAMILY HISTORY:  Family History   Problem Relation Age of Onset     Melanoma No family hx of      Skin Cancer No family hx of        SOCIAL HISTORY:  Social History     Tobacco Use     Smoking status: Never     Smokeless tobacco: Never    No tobacco, ETOH, or illicit substances. Currently lives in a group home.     ALLERGIES:  Allergies   Allergen " Reactions     Amoxicillin Hives     (Pictures in media tab) Confirmed by dermatology 9/18/2024     Implanon [Etonogestrel]      Penicillin V Hives     Tolerated ampicillin/sulbactam 08/21/2024       CURRENT MEDICATIONS:  Current Outpatient Medications   Medication Sig Dispense Refill     acetaminophen (TYLENOL) 325 MG tablet Take 2 tablets (650 mg) by mouth or Feeding Tube every 8 hours as needed for mild pain. 90 tablet 2     benzocaine-menthol (CHLORASEPTIC MAX) 15-10 MG lozenge Place 1 lozenge inside cheek every hour as needed (cough). 30 lozenge 0     cetirizine (ZYRTEC) 5 MG tablet Take 0.5 tablets (2.5 mg) by mouth every 6 hours as needed for allergies (Give 30 minutes prior to each amoxicillin dose for rash). 90 tablet 2     digoxin (LANOXIN) 250 MCG tablet Take 1 tablet (250 mcg) by mouth daily. 30 tablet 3     empagliflozin (JARDIANCE) 10 MG TABS tablet Take 1 tablet (10 mg) by mouth daily. 90 tablet 1     furosemide (LASIX) 20 MG tablet Take 1 tablet (20 mg) by mouth daily as needed (weight above 215lb, swelling or bloating). 30 tablet 1     metoprolol succinate ER (TOPROL XL) 25 MG 24 hr tablet Take 1 tablet (25 mg) by mouth daily. 30 tablet 3     multivitamin w/minerals (THERA-VIT-M) tablet Take 1 tablet by mouth daily. 30 tablet 3     risperiDONE (RISPERDAL) 0.5 MG tablet Take 3 tablets (1.5 mg) by mouth every evening. 90 tablet 3     risperiDONE (RISPERDAL) 1 MG tablet Take 1 tablet (1 mg) by mouth daily. 30 tablet 3     sacubitril-valsartan (ENTRESTO) 24-26 MG per tablet Take 0.5 tablets by mouth 2 times daily. 30 tablet 3     selenium sulfide (SELSUN) 1 % LOTN shampoo Apply topically daily as needed for itching. 420 mL 3     spironolactone (ALDACTONE) 25 MG tablet Take 1 tablet (25 mg) by mouth daily. 30 tablet 3     thiamine (B-1) 100 MG tablet Take 1 tablet (100 mg) by mouth daily. 30 tablet 3     triamcinolone (KENALOG) 0.1 % external ointment Apply topically 2 times daily. 80 g 3     polyethylene  glycol (MIRALAX) 17 GM/Dose powder Take 17 g by mouth daily as needed for constipation. (Patient not taking: Reported on 10/29/2024) 510 g 3     reason aspirin not prescribed, intentional, Please choose reason not prescribed from choices below. (Patient not taking: Reported on 10/29/2024)       senna-docusate (SENOKOT-S/PERICOLACE) 8.6-50 MG tablet Take 1 tablet by mouth or Feeding Tube daily as needed for constipation. 30 tablet 2     STATIN NOT PRESCRIBED (INTENTIONAL) Please choose reason not prescribed from choices below. (Patient not taking: Reported on 10/29/2024)         EXAM:  BP (!) 81/54 (BP Location: Right arm, Patient Position: Chair, Cuff Size: Adult Regular)   Pulse 110   Wt 95.1 kg (209 lb 9.6 oz)   SpO2 95%   BMI 27.48 kg/m      I personally rechecked his BP and it was 87/53.     General: appears comfortable, alert and interactive, in no acute distress. Accompanied by group home staff.   Head: normocephalic, atraumatic  Eyes: anicteric sclera, EOMI  Mouth: MMM  Neck: supple, no cervical adenopathy  CV: regular rate and rhythm, no murmur, gallop, rub, estimated JVP ~8 cm  Resp: clear, no rales or wheezing, speaking in full sentences, breathing comfortably on room air  GI: soft, nontender, nondistended  Extremities: warm, no peripheral edema, 2+ bilateral radial pulses  Neurological: alert and oriented, no focal deficits  Psych: calm, cooperative  Derm: no rashes on exposed surfaces    Weight  Wt Readings from Last 10 Encounters:   10/29/24 95.1 kg (209 lb 9.6 oz)   10/02/24 96.8 kg (213 lb 6.4 oz)   09/19/24 96.5 kg (212 lb 11.2 oz)       I personally reviewed recent labs and data as below and discussed the results with the patient in clinic today.  Labs:  CBC RESULTS:  Lab Results   Component Value Date    WBC 8.6 10/29/2024    RBC 5.50 10/29/2024    HGB 15.2 10/29/2024    HCT 47.2 10/29/2024    MCV 86 10/29/2024    MCH 27.6 10/29/2024    MCHC 32.2 10/29/2024    RDW 13.7 10/29/2024      "10/29/2024       CMP RESULTS:  Lab Results   Component Value Date     10/29/2024     (HH) 08/25/2024    POTASSIUM 4.4 10/29/2024    CHLORIDE 108 (H) 10/29/2024    CO2 24 10/29/2024    ANIONGAP 10 10/29/2024     (H) 10/29/2024     (H) 09/18/2024    BUN 14.8 10/29/2024    CR 0.61 (L) 10/29/2024    GFRESTIMATED >90 10/29/2024    ALEXANDER 9.3 10/29/2024    BILITOTAL 0.4 10/29/2024    ALBUMIN 4.0 10/29/2024    ALKPHOS 79 10/29/2024    ALT 23 10/29/2024    AST 21 10/29/2024        No results for input(s): \"CHOL\", \"HDL\", \"LDL\", \"TRIG\", \"CHOLHDLRATIO\" in the last 28415 hours.     Testing/Procedures:  I personally visualized and interpreted:  Echocardiogram 8/21/24  Interpretation Summary  Tachycardia at 144BPM during study.  Mild left ventricular dilation is present.LVIDd 58mm.  Biplane LVEF is 16%.  Severe hypokinesis of mid to apical RV free wall.  Pulmonary artery systolic pressure is normal.  The inferior vena cava is normal.  No pericardial effusion is present.  There is no prior study for direct comparison.    RHC 8/21/24  RIGHT HEART CATHETERIZATION:  === Off pressors vented Fio2 80% ====  /71/87 mmHg  RA mean of 6 mmHg with a V wave of 8 mmhg  PA 30/23/26 mmHg  PCWP 18 mmHg with a V wave of 20 mmhg  Wayne CO/CI 5.0/2.1 L/min/m2   PVR 1.6 GROVE   SVR 1270 dynes/sec/cm-5  PA sat 70%   Hgb 14.6 g/dL     TTE 8/23/24  Interpretation Summary  Left ventricular function is decreased. The ejection fraction is 20-25%  (severely reduced).  The right ventricle is normal size.  Global right ventricular function is moderately to severely reduced.  No pericardial effusion is present.    TTE 8/27/24  Interpretation Summary  Normal left ventricular size. The visually estimated left ventricular ejection fraction is 30-35%. There is severe diffuse hypokinesis.  The right ventricle was not well visualized. Normal right ventricular size.  Unable to visualize inferior vena cava.  Compared to the prior study dated " 8/23/2024, the left ventricular function has mildly improved. There is improved function of the lateral wall segments.    IABP Placement 8/27/24  1.successful deployment of intra-aortic balloon pump via right femoral arterial access     TTE 9/3/24  Interpretation Summary  The patient's rhythm is sinus tachycardia. 131 bpm  Left ventricular function is decreased. The ejection fraction is 10-15%  (severely reduced).  Severe diffuse hypokinesis is present.  The right ventricle is normal size.  Global right ventricular function is normal.  This study was compared with the study from 8/21/2024 .RV improved, LV unchanged.    cMRI 9/5/24  SUMMARY  ==========================================================================================================  Clinical history: 24M with URI c/b cardiogenic shock, assess for myocarditis  Comparison CMR: none  1. The left ventricle is mildly dilated with normal wall thickness.  There is severe diffuse hypokinesis.  The global systolic function is severely reduced. The LVEF is 15%.  2. The right ventricle is normal in cavity size. The global systolic function is normal. The RVEF is 46%.   3. Both atria are normal in size.  4. There is no significant valvular disease.   5. Late gadolinium enhancement imaging:   Parametric mapping: native T1 value 1152 ms, ECV = 31% (mildly elevated).  T2 =  42-46 ms (normal range).   6. There is no pericardial effusion.  7. There is no intracardiac thrombus.  CONCLUSIONS:  Mildly dilated left ventricle with severely reduced left ventricular function, LVEF 15%.  Normal right ventricular size and systolic function, RVEF 46%.   No myocardial edema or replacement fibrosis.   Specifically, no MRI features of acute myocarditis.    RHC 9/11/24  BP: 111/52 mmHg  RA: 2/4/2 mmHg  RV: 22/2 mmHg  PA: 20/14 (16) mmHg  W: 10/13/10 mmHg  PA sat: 63 %  Wayne CO/CI: 6.0/2.7  Thermo CO/CI: 5.3/2.4  PVR: 0.99 GROVE     TTE 10/24/24:  Interpretation Summary  Left  ventricular function is decreased. The ejection fraction is 15-20%  (severely reduced). Severe diffuse hypokinesis is present. Grade II or  moderate diastolic dysfunction. Diastolic Doppler findings (E/E' ratio and/or  other parameters) suggest left ventricular filling pressures are increased.  The right ventricle is normal size. Global right ventricular function is  normal.  No significant valvular abnormalities present.  The inferior vena cava cannot be assessed.  This study was compared with the study from 09/03/2024. No significant changes  noted.      Assessment and Plan:     In summary, 24 year old male with a past medical history of autism and acute HFrEF 2/2 NICM (EF 15-20%) who presents for HF follow-up.     #Acute systolic heart failure/HFrEF (EF 15%) secondary to nonischemic cardiomyopathy  NYHA Symptom Class III  Stage C  ACE-I/ARB/ARNi: entresto 12/13 BID  BB: Continue metoprolol XL 25 mg daily  Aldosterone antagonist: Continue spironolactone 25 mg daily  SGLT2i: Continue Jardiance 10 mg daily  RV Support: Continue digoxin 250 mcg daily  SCD prophylaxis: Will refer to EP for consideration of ICD   %BiV pacing: N/A  Fluid status: Euvolemic  Cardiac Rehab: Has been referred  Remote PA Pressure Monitoring (CardioMems): Being evaluated for this  - No indications for advanced therapies at this time, but will see how patient does on GDMT  - Recommend 2L fluid and 2g Na  - Recommend increasing exercise as able    Plan:   - Continue current GDMT regimen. There is no room for further uptitration of his regimen with his current blood pressures.  - Will send genetic testing to further evaluate the etiology of his non-ischemic cardiomyopathy  - Refer to EP for consideration of ICD  - Will try to obtain CPX, however after discussion will try to defer this for now. Will reevaluate feasibility  - Note that I called patients aunt over the phone for the duration of the visit.  - RTC 1 month     Patient seen and  discussed with attending physician, Dr. Jani Martin. Please see his attestation for final plan.    Iglesia Emanuel MD  Cardiology Fellow      I have seen and evaluated the patient and agree with the assessment and plan as documented above.  I appreciate the opportunity to participate in the care of Fredy Ellison . Please do not hesitate to contact me with any further questions.  I have spent a total of 40 minutes today reviewing labs, imaging studies, discussing with colleagues, face-to-face time with patient and documentation in the medical record.  The longitudinal plan of care for the diagnosis(es)/condition(s) as documented were addressed during this visit. Due to the added complexity in care, I will continue to support Hiram in the subsequent management and with ongoing continuity of care.    Sincerely,   Jani Martin MD  [facility] Division of Cardiology       Please do not hesitate to contact me if you have any questions/concerns.     Sincerely,     Jani Martin MD

## 2024-10-29 NOTE — PATIENT INSTRUCTIONS
Dr. Martin recommends:    Referral to Genetics.    EP referral for consideration of ICD.    Follow up with Advanced CORE provider, [facility], in one month.    Follow up with Dr. Martin first available, tawanda labs prior.    Thank you for your visit today.  Please call me with any questions or concerns.   Ravi Whittington RN  Cardiology Care Coordinator  [phone]

## 2024-10-29 NOTE — NURSING NOTE
Chief Complaint   Patient presents with    New Patient     10/29/24--Dr. Martin: HF       Vitals were taken and medications reconciled.    Iglesia Avelar, EMT  10:58 AM

## 2024-11-05 ENCOUNTER — TELEPHONE (OUTPATIENT)
Dept: CARDIOLOGY | Facility: CLINIC | Age: 24
End: 2024-11-05
Payer: MEDICARE

## 2024-11-05 NOTE — TELEPHONE ENCOUNTER
[facility] Call Center    Phone Message    May a detailed message be left on voicemail: yes     Reason for Call: Other: Pt's  called and wanted to let Dr Martin know that patient was in the hospital for pneumonia. They have some questions in regards to some of the after visit notes, as well as medication questions. Please call Alia back to discuss     Action Taken: Other: [facility] Cardio    Travel Screening: Not Applicable     Date of Service:

## 2024-11-26 DIAGNOSIS — I50.20 HEART FAILURE WITH REDUCED EJECTION FRACTION, NYHA CLASS II (H): Primary | ICD-10-CM

## 2024-12-05 ENCOUNTER — APPOINTMENT (OUTPATIENT)
Dept: GENERAL RADIOLOGY | Facility: CLINIC | Age: 24
DRG: 291 | End: 2024-12-05
Attending: STUDENT IN AN ORGANIZED HEALTH CARE EDUCATION/TRAINING PROGRAM
Payer: MEDICARE

## 2024-12-05 ENCOUNTER — HOSPITAL ENCOUNTER (INPATIENT)
Facility: CLINIC | Age: 24
DRG: 291 | End: 2024-12-05
Attending: STUDENT IN AN ORGANIZED HEALTH CARE EDUCATION/TRAINING PROGRAM | Admitting: INTERNAL MEDICINE
Payer: MEDICARE

## 2024-12-05 ENCOUNTER — ANCILLARY PROCEDURE (OUTPATIENT)
Dept: ULTRASOUND IMAGING | Facility: CLINIC | Age: 24
End: 2024-12-05
Attending: STUDENT IN AN ORGANIZED HEALTH CARE EDUCATION/TRAINING PROGRAM
Payer: MEDICARE

## 2024-12-05 VITALS
BODY MASS INDEX: 27.67 KG/M2 | TEMPERATURE: 95.8 F | RESPIRATION RATE: 16 BRPM | DIASTOLIC BLOOD PRESSURE: 70 MMHG | SYSTOLIC BLOOD PRESSURE: 104 MMHG | WEIGHT: 211 LBS | HEART RATE: 118 BPM | OXYGEN SATURATION: 94 %

## 2024-12-05 DIAGNOSIS — I50.23 ACUTE ON CHRONIC SYSTOLIC CONGESTIVE HEART FAILURE (H): ICD-10-CM

## 2024-12-05 DIAGNOSIS — R63.5 WEIGHT GAIN: ICD-10-CM

## 2024-12-05 DIAGNOSIS — R05.9 COUGH, UNSPECIFIED TYPE: ICD-10-CM

## 2024-12-05 DIAGNOSIS — B49 FUNGAL INFECTION: ICD-10-CM

## 2024-12-05 DIAGNOSIS — R57.0 CARDIOGENIC SHOCK (H): Primary | ICD-10-CM

## 2024-12-05 DIAGNOSIS — R53.83 OTHER FATIGUE: ICD-10-CM

## 2024-12-05 DIAGNOSIS — J18.9 PNEUMONIA OF BOTH LUNGS DUE TO INFECTIOUS ORGANISM, UNSPECIFIED PART OF LUNG: ICD-10-CM

## 2024-12-05 DIAGNOSIS — R53.1 WEAKNESS: ICD-10-CM

## 2024-12-05 LAB
ALBUMIN SERPL BCG-MCNC: 4.1 G/DL (ref 3.5–5.2)
ALP SERPL-CCNC: 88 U/L (ref 40–150)
ALT SERPL W P-5'-P-CCNC: 21 U/L (ref 0–70)
ANION GAP SERPL CALCULATED.3IONS-SCNC: 12 MMOL/L (ref 7–15)
APTT PPP: 29 SECONDS (ref 22–38)
AST SERPL W P-5'-P-CCNC: 21 U/L (ref 0–45)
ATRIAL RATE - MUSE: 121 BPM
BASE EXCESS BLDV CALC-SCNC: 1.9 MMOL/L (ref -3–3)
BASOPHILS # BLD AUTO: 0.1 10E3/UL (ref 0–0.2)
BASOPHILS NFR BLD AUTO: 1 %
BILIRUB SERPL-MCNC: 0.3 MG/DL
BUN SERPL-MCNC: 19.1 MG/DL (ref 6–20)
CALCIUM SERPL-MCNC: 9.3 MG/DL (ref 8.8–10.4)
CHLORIDE SERPL-SCNC: 103 MMOL/L (ref 98–107)
CREAT SERPL-MCNC: 0.77 MG/DL (ref 0.67–1.17)
DIASTOLIC BLOOD PRESSURE - MUSE: NORMAL MMHG
EGFRCR SERPLBLD CKD-EPI 2021: >90 ML/MIN/1.73M2
EOSINOPHIL # BLD AUTO: 0.2 10E3/UL (ref 0–0.7)
EOSINOPHIL NFR BLD AUTO: 1 %
ERYTHROCYTE [DISTWIDTH] IN BLOOD BY AUTOMATED COUNT: 13.7 % (ref 10–15)
GLUCOSE SERPL-MCNC: 117 MG/DL (ref 70–99)
HCO3 BLDV-SCNC: 26 MMOL/L (ref 21–28)
HCO3 SERPL-SCNC: 23 MMOL/L (ref 22–29)
HCT VFR BLD AUTO: 45.5 % (ref 40–53)
HGB BLD-MCNC: 14.9 G/DL (ref 13.3–17.7)
IMM GRANULOCYTES # BLD: 0.1 10E3/UL
IMM GRANULOCYTES NFR BLD: 0 %
INR PPP: 1.07 (ref 0.85–1.15)
INTERPRETATION ECG - MUSE: NORMAL
LACTATE SERPL-SCNC: 1.2 MMOL/L (ref 0.7–2)
LYMPHOCYTES # BLD AUTO: 2.9 10E3/UL (ref 0.8–5.3)
LYMPHOCYTES NFR BLD AUTO: 23 %
MAGNESIUM SERPL-MCNC: 2.1 MG/DL (ref 1.7–2.3)
MCH RBC QN AUTO: 27.5 PG (ref 26.5–33)
MCHC RBC AUTO-ENTMCNC: 32.7 G/DL (ref 31.5–36.5)
MCV RBC AUTO: 84 FL (ref 78–100)
MONOCYTES # BLD AUTO: 1 10E3/UL (ref 0–1.3)
MONOCYTES NFR BLD AUTO: 8 %
NEUTROPHILS # BLD AUTO: 8.4 10E3/UL (ref 1.6–8.3)
NEUTROPHILS NFR BLD AUTO: 66 %
NRBC # BLD AUTO: 0 10E3/UL
NRBC BLD AUTO-RTO: 0 /100
NT-PROBNP SERPL-MCNC: 4686 PG/ML (ref 0–450)
O2/TOTAL GAS SETTING VFR VENT: 21 %
OXYHGB MFR BLDV: 90 % (ref 70–75)
P AXIS - MUSE: 29 DEGREES
PCO2 BLDV: 38 MM HG (ref 40–50)
PH BLDV: 7.44 [PH] (ref 7.32–7.43)
PLATELET # BLD AUTO: 267 10E3/UL (ref 150–450)
PO2 BLDV: 64 MM HG (ref 25–47)
POTASSIUM SERPL-SCNC: 4.1 MMOL/L (ref 3.4–5.3)
PR INTERVAL - MUSE: 156 MS
PROCALCITONIN SERPL IA-MCNC: 0.05 NG/ML
PROT SERPL-MCNC: 6 G/DL (ref 6.4–8.3)
QRS DURATION - MUSE: 82 MS
QT - MUSE: 300 MS
QTC - MUSE: 426 MS
R AXIS - MUSE: -50 DEGREES
RBC # BLD AUTO: 5.42 10E6/UL (ref 4.4–5.9)
SAO2 % BLDV: 92.1 % (ref 70–75)
SARS-COV-2 RNA RESP QL NAA+PROBE: NEGATIVE
SODIUM SERPL-SCNC: 138 MMOL/L (ref 135–145)
SYSTOLIC BLOOD PRESSURE - MUSE: NORMAL MMHG
T AXIS - MUSE: 66 DEGREES
TROPONIN T SERPL HS-MCNC: 14 NG/L
VENTRICULAR RATE- MUSE: 121 BPM
WBC # BLD AUTO: 12.6 10E3/UL (ref 4–11)

## 2024-12-05 PROCEDURE — 83880 ASSAY OF NATRIURETIC PEPTIDE: CPT | Performed by: STUDENT IN AN ORGANIZED HEALTH CARE EDUCATION/TRAINING PROGRAM

## 2024-12-05 PROCEDURE — 84484 ASSAY OF TROPONIN QUANT: CPT | Performed by: STUDENT IN AN ORGANIZED HEALTH CARE EDUCATION/TRAINING PROGRAM

## 2024-12-05 PROCEDURE — 87040 BLOOD CULTURE FOR BACTERIA: CPT | Performed by: STUDENT IN AN ORGANIZED HEALTH CARE EDUCATION/TRAINING PROGRAM

## 2024-12-05 PROCEDURE — 83605 ASSAY OF LACTIC ACID: CPT | Performed by: STUDENT IN AN ORGANIZED HEALTH CARE EDUCATION/TRAINING PROGRAM

## 2024-12-05 PROCEDURE — 99285 EMERGENCY DEPT VISIT HI MDM: CPT | Mod: 25 | Performed by: STUDENT IN AN ORGANIZED HEALTH CARE EDUCATION/TRAINING PROGRAM

## 2024-12-05 PROCEDURE — 85025 COMPLETE CBC W/AUTO DIFF WBC: CPT | Performed by: STUDENT IN AN ORGANIZED HEALTH CARE EDUCATION/TRAINING PROGRAM

## 2024-12-05 PROCEDURE — 87635 SARS-COV-2 COVID-19 AMP PRB: CPT | Performed by: STUDENT IN AN ORGANIZED HEALTH CARE EDUCATION/TRAINING PROGRAM

## 2024-12-05 PROCEDURE — 93308 TTE F-UP OR LMTD: CPT | Performed by: STUDENT IN AN ORGANIZED HEALTH CARE EDUCATION/TRAINING PROGRAM

## 2024-12-05 PROCEDURE — 76604 US EXAM CHEST: CPT | Mod: 26 | Performed by: STUDENT IN AN ORGANIZED HEALTH CARE EDUCATION/TRAINING PROGRAM

## 2024-12-05 PROCEDURE — 82947 ASSAY GLUCOSE BLOOD QUANT: CPT | Performed by: STUDENT IN AN ORGANIZED HEALTH CARE EDUCATION/TRAINING PROGRAM

## 2024-12-05 PROCEDURE — 36415 COLL VENOUS BLD VENIPUNCTURE: CPT | Performed by: STUDENT IN AN ORGANIZED HEALTH CARE EDUCATION/TRAINING PROGRAM

## 2024-12-05 PROCEDURE — 83735 ASSAY OF MAGNESIUM: CPT | Performed by: STUDENT IN AN ORGANIZED HEALTH CARE EDUCATION/TRAINING PROGRAM

## 2024-12-05 PROCEDURE — 85041 AUTOMATED RBC COUNT: CPT | Performed by: STUDENT IN AN ORGANIZED HEALTH CARE EDUCATION/TRAINING PROGRAM

## 2024-12-05 PROCEDURE — 84145 PROCALCITONIN (PCT): CPT | Performed by: STUDENT IN AN ORGANIZED HEALTH CARE EDUCATION/TRAINING PROGRAM

## 2024-12-05 PROCEDURE — 85730 THROMBOPLASTIN TIME PARTIAL: CPT | Performed by: STUDENT IN AN ORGANIZED HEALTH CARE EDUCATION/TRAINING PROGRAM

## 2024-12-05 PROCEDURE — 87486 CHLMYD PNEUM DNA AMP PROBE: CPT | Performed by: STUDENT IN AN ORGANIZED HEALTH CARE EDUCATION/TRAINING PROGRAM

## 2024-12-05 PROCEDURE — 85610 PROTHROMBIN TIME: CPT | Performed by: STUDENT IN AN ORGANIZED HEALTH CARE EDUCATION/TRAINING PROGRAM

## 2024-12-05 PROCEDURE — 82247 BILIRUBIN TOTAL: CPT | Performed by: STUDENT IN AN ORGANIZED HEALTH CARE EDUCATION/TRAINING PROGRAM

## 2024-12-05 PROCEDURE — 82040 ASSAY OF SERUM ALBUMIN: CPT | Performed by: STUDENT IN AN ORGANIZED HEALTH CARE EDUCATION/TRAINING PROGRAM

## 2024-12-05 PROCEDURE — 87581 M.PNEUMON DNA AMP PROBE: CPT | Performed by: STUDENT IN AN ORGANIZED HEALTH CARE EDUCATION/TRAINING PROGRAM

## 2024-12-05 PROCEDURE — 93005 ELECTROCARDIOGRAM TRACING: CPT | Mod: 59 | Performed by: STUDENT IN AN ORGANIZED HEALTH CARE EDUCATION/TRAINING PROGRAM

## 2024-12-05 PROCEDURE — 250N000011 HC RX IP 250 OP 636: Performed by: STUDENT IN AN ORGANIZED HEALTH CARE EDUCATION/TRAINING PROGRAM

## 2024-12-05 PROCEDURE — 71045 X-RAY EXAM CHEST 1 VIEW: CPT

## 2024-12-05 PROCEDURE — 82805 BLOOD GASES W/O2 SATURATION: CPT | Performed by: STUDENT IN AN ORGANIZED HEALTH CARE EDUCATION/TRAINING PROGRAM

## 2024-12-05 PROCEDURE — 120N000005 HC R&B MS OVERFLOW UMMC

## 2024-12-05 RX ORDER — FUROSEMIDE 10 MG/ML
40 INJECTION INTRAMUSCULAR; INTRAVENOUS ONCE
Status: COMPLETED | OUTPATIENT
Start: 2024-12-05 | End: 2024-12-05

## 2024-12-05 RX ADMIN — FUROSEMIDE 40 MG: 10 INJECTION, SOLUTION INTRAMUSCULAR; INTRAVENOUS at 23:40

## 2024-12-05 ASSESSMENT — COLUMBIA-SUICIDE SEVERITY RATING SCALE - C-SSRS
6. HAVE YOU EVER DONE ANYTHING, STARTED TO DO ANYTHING, OR PREPARED TO DO ANYTHING TO END YOUR LIFE?: NO
2. HAVE YOU ACTUALLY HAD ANY THOUGHTS OF KILLING YOURSELF IN THE PAST MONTH?: NO
1. IN THE PAST MONTH, HAVE YOU WISHED YOU WERE DEAD OR WISHED YOU COULD GO TO SLEEP AND NOT WAKE UP?: NO

## 2024-12-05 ASSESSMENT — ACTIVITIES OF DAILY LIVING (ADL)
ADLS_ACUITY_SCORE: 58
ADLS_ACUITY_SCORE: 58

## 2024-12-06 LAB
ALBUMIN UR-MCNC: NEGATIVE MG/DL
ANION GAP SERPL CALCULATED.3IONS-SCNC: 12 MMOL/L (ref 7–15)
APPEARANCE UR: CLEAR
ATRIAL RATE - MUSE: 121 BPM
BILIRUB UR QL STRIP: NEGATIVE
BUN SERPL-MCNC: 17.7 MG/DL (ref 6–20)
C PNEUM DNA SPEC QL NAA+PROBE: NOT DETECTED
CALCIUM SERPL-MCNC: 9 MG/DL (ref 8.8–10.4)
CHLORIDE SERPL-SCNC: 103 MMOL/L (ref 98–107)
COLOR UR AUTO: ABNORMAL
CREAT SERPL-MCNC: 0.81 MG/DL (ref 0.67–1.17)
DIASTOLIC BLOOD PRESSURE - MUSE: NORMAL MMHG
DIGOXIN SERPL-MCNC: 0.8 NG/ML (ref 0.6–1.2)
EGFRCR SERPLBLD CKD-EPI 2021: >90 ML/MIN/1.73M2
ERYTHROCYTE [DISTWIDTH] IN BLOOD BY AUTOMATED COUNT: 13.7 % (ref 10–15)
FLUAV H1 2009 PAND RNA SPEC QL NAA+PROBE: NOT DETECTED
FLUAV H1 RNA SPEC QL NAA+PROBE: NOT DETECTED
FLUAV H3 RNA SPEC QL NAA+PROBE: NOT DETECTED
FLUAV RNA SPEC QL NAA+PROBE: NOT DETECTED
FLUBV RNA SPEC QL NAA+PROBE: NOT DETECTED
GLUCOSE SERPL-MCNC: 96 MG/DL (ref 70–99)
GLUCOSE UR STRIP-MCNC: >=1000 MG/DL
HADV DNA SPEC QL NAA+PROBE: NOT DETECTED
HCO3 SERPL-SCNC: 25 MMOL/L (ref 22–29)
HCOV PNL SPEC NAA+PROBE: NOT DETECTED
HCT VFR BLD AUTO: 44.8 % (ref 40–53)
HGB BLD-MCNC: 14.8 G/DL (ref 13.3–17.7)
HGB UR QL STRIP: NEGATIVE
HMPV RNA SPEC QL NAA+PROBE: NOT DETECTED
HOLD SPECIMEN: NORMAL
HPIV1 RNA SPEC QL NAA+PROBE: NOT DETECTED
HPIV2 RNA SPEC QL NAA+PROBE: NOT DETECTED
HPIV3 RNA SPEC QL NAA+PROBE: NOT DETECTED
HPIV4 RNA SPEC QL NAA+PROBE: NOT DETECTED
INTERPRETATION ECG - MUSE: NORMAL
KETONES UR STRIP-MCNC: NEGATIVE MG/DL
LEUKOCYTE ESTERASE UR QL STRIP: NEGATIVE
M PNEUMO DNA SPEC QL NAA+PROBE: NOT DETECTED
MAGNESIUM SERPL-MCNC: 2.2 MG/DL (ref 1.7–2.3)
MCH RBC QN AUTO: 27.6 PG (ref 26.5–33)
MCHC RBC AUTO-ENTMCNC: 33 G/DL (ref 31.5–36.5)
MCV RBC AUTO: 84 FL (ref 78–100)
MUCOUS THREADS #/AREA URNS LPF: PRESENT /LPF
NITRATE UR QL: NEGATIVE
P AXIS - MUSE: 29 DEGREES
PH UR STRIP: 5 [PH] (ref 5–7)
PLATELET # BLD AUTO: 255 10E3/UL (ref 150–450)
POTASSIUM SERPL-SCNC: 3.8 MMOL/L (ref 3.4–5.3)
PR INTERVAL - MUSE: 156 MS
QRS DURATION - MUSE: 82 MS
QT - MUSE: 300 MS
QTC - MUSE: 426 MS
R AXIS - MUSE: -50 DEGREES
RBC # BLD AUTO: 5.36 10E6/UL (ref 4.4–5.9)
RBC URINE: <1 /HPF
RSV RNA SPEC QL NAA+PROBE: NOT DETECTED
RSV RNA SPEC QL NAA+PROBE: NOT DETECTED
RV+EV RNA SPEC QL NAA+PROBE: NOT DETECTED
SODIUM SERPL-SCNC: 140 MMOL/L (ref 135–145)
SP GR UR STRIP: 1.02 (ref 1–1.03)
SQUAMOUS EPITHELIAL: 2 /HPF
SYSTOLIC BLOOD PRESSURE - MUSE: NORMAL MMHG
T AXIS - MUSE: 66 DEGREES
TROPONIN T SERPL HS-MCNC: 14 NG/L
UROBILINOGEN UR STRIP-MCNC: NORMAL MG/DL
VENTRICULAR RATE- MUSE: 121 BPM
WBC # BLD AUTO: 11.9 10E3/UL (ref 4–11)
WBC URINE: 2 /HPF

## 2024-12-06 PROCEDURE — 80162 ASSAY OF DIGOXIN TOTAL: CPT | Performed by: STUDENT IN AN ORGANIZED HEALTH CARE EDUCATION/TRAINING PROGRAM

## 2024-12-06 PROCEDURE — 80048 BASIC METABOLIC PNL TOTAL CA: CPT | Performed by: INTERNAL MEDICINE

## 2024-12-06 PROCEDURE — 83735 ASSAY OF MAGNESIUM: CPT | Performed by: STUDENT IN AN ORGANIZED HEALTH CARE EDUCATION/TRAINING PROGRAM

## 2024-12-06 PROCEDURE — 250N000013 HC RX MED GY IP 250 OP 250 PS 637: Performed by: PHYSICIAN ASSISTANT

## 2024-12-06 PROCEDURE — 85014 HEMATOCRIT: CPT | Performed by: INTERNAL MEDICINE

## 2024-12-06 PROCEDURE — 36415 COLL VENOUS BLD VENIPUNCTURE: CPT | Performed by: STUDENT IN AN ORGANIZED HEALTH CARE EDUCATION/TRAINING PROGRAM

## 2024-12-06 PROCEDURE — 36415 COLL VENOUS BLD VENIPUNCTURE: CPT | Performed by: INTERNAL MEDICINE

## 2024-12-06 PROCEDURE — 120N000005 HC R&B MS OVERFLOW UMMC

## 2024-12-06 PROCEDURE — 99223 1ST HOSP IP/OBS HIGH 75: CPT | Mod: AI | Performed by: INTERNAL MEDICINE

## 2024-12-06 PROCEDURE — 84484 ASSAY OF TROPONIN QUANT: CPT | Performed by: STUDENT IN AN ORGANIZED HEALTH CARE EDUCATION/TRAINING PROGRAM

## 2024-12-06 PROCEDURE — 82565 ASSAY OF CREATININE: CPT | Performed by: INTERNAL MEDICINE

## 2024-12-06 PROCEDURE — 99223 1ST HOSP IP/OBS HIGH 75: CPT | Mod: GC | Performed by: STUDENT IN AN ORGANIZED HEALTH CARE EDUCATION/TRAINING PROGRAM

## 2024-12-06 PROCEDURE — 250N000011 HC RX IP 250 OP 636: Performed by: INTERNAL MEDICINE

## 2024-12-06 PROCEDURE — 81003 URINALYSIS AUTO W/O SCOPE: CPT | Performed by: STUDENT IN AN ORGANIZED HEALTH CARE EDUCATION/TRAINING PROGRAM

## 2024-12-06 PROCEDURE — 250N000013 HC RX MED GY IP 250 OP 250 PS 637: Performed by: INTERNAL MEDICINE

## 2024-12-06 PROCEDURE — 250N000013 HC RX MED GY IP 250 OP 250 PS 637: Performed by: STUDENT IN AN ORGANIZED HEALTH CARE EDUCATION/TRAINING PROGRAM

## 2024-12-06 RX ORDER — LIDOCAINE 40 MG/G
CREAM TOPICAL
Status: DISCONTINUED | OUTPATIENT
Start: 2024-12-06 | End: 2024-12-09 | Stop reason: HOSPADM

## 2024-12-06 RX ORDER — POTASSIUM CHLORIDE 750 MG/1
20 TABLET, EXTENDED RELEASE ORAL ONCE
Status: COMPLETED | OUTPATIENT
Start: 2024-12-06 | End: 2024-12-06

## 2024-12-06 RX ORDER — MULTIPLE VITAMINS W/ MINERALS TAB 9MG-400MCG
1 TAB ORAL DAILY
Status: DISCONTINUED | OUTPATIENT
Start: 2024-12-06 | End: 2024-12-09 | Stop reason: HOSPADM

## 2024-12-06 RX ORDER — METOPROLOL SUCCINATE 25 MG/1
25 TABLET, EXTENDED RELEASE ORAL DAILY
Status: DISCONTINUED | OUTPATIENT
Start: 2024-12-06 | End: 2024-12-06

## 2024-12-06 RX ORDER — ACETAMINOPHEN 325 MG/1
650 TABLET ORAL EVERY 4 HOURS PRN
Status: DISCONTINUED | OUTPATIENT
Start: 2024-12-06 | End: 2024-12-09 | Stop reason: HOSPADM

## 2024-12-06 RX ORDER — ONDANSETRON 4 MG/1
4 TABLET, ORALLY DISINTEGRATING ORAL EVERY 6 HOURS PRN
Status: DISCONTINUED | OUTPATIENT
Start: 2024-12-06 | End: 2024-12-09 | Stop reason: HOSPADM

## 2024-12-06 RX ORDER — ONDANSETRON 2 MG/ML
4 INJECTION INTRAMUSCULAR; INTRAVENOUS EVERY 6 HOURS PRN
Status: DISCONTINUED | OUTPATIENT
Start: 2024-12-06 | End: 2024-12-09 | Stop reason: HOSPADM

## 2024-12-06 RX ORDER — RISPERIDONE 0.5 MG/1
1 TABLET, ORALLY DISINTEGRATING ORAL DAILY
Status: DISCONTINUED | OUTPATIENT
Start: 2024-12-06 | End: 2024-12-09 | Stop reason: HOSPADM

## 2024-12-06 RX ORDER — SPIRONOLACTONE 25 MG/1
25 TABLET ORAL DAILY
Status: DISCONTINUED | OUTPATIENT
Start: 2024-12-06 | End: 2024-12-09 | Stop reason: HOSPADM

## 2024-12-06 RX ORDER — AMOXICILLIN 250 MG
1 CAPSULE ORAL 2 TIMES DAILY PRN
Status: DISCONTINUED | OUTPATIENT
Start: 2024-12-06 | End: 2024-12-09 | Stop reason: HOSPADM

## 2024-12-06 RX ORDER — FUROSEMIDE 10 MG/ML
40 INJECTION INTRAMUSCULAR; INTRAVENOUS ONCE
Status: COMPLETED | OUTPATIENT
Start: 2024-12-06 | End: 2024-12-06

## 2024-12-06 RX ORDER — CALCIUM CARBONATE 500 MG/1
1000 TABLET, CHEWABLE ORAL 4 TIMES DAILY PRN
Status: DISCONTINUED | OUTPATIENT
Start: 2024-12-06 | End: 2024-12-09 | Stop reason: HOSPADM

## 2024-12-06 RX ORDER — DIGOXIN 125 MCG
250 TABLET ORAL DAILY
Status: DISCONTINUED | OUTPATIENT
Start: 2024-12-06 | End: 2024-12-09 | Stop reason: HOSPADM

## 2024-12-06 RX ORDER — ACETAMINOPHEN 650 MG/1
650 SUPPOSITORY RECTAL EVERY 4 HOURS PRN
Status: DISCONTINUED | OUTPATIENT
Start: 2024-12-06 | End: 2024-12-09 | Stop reason: HOSPADM

## 2024-12-06 RX ORDER — AMOXICILLIN 250 MG
2 CAPSULE ORAL 2 TIMES DAILY PRN
Status: DISCONTINUED | OUTPATIENT
Start: 2024-12-06 | End: 2024-12-09 | Stop reason: HOSPADM

## 2024-12-06 RX ADMIN — RISPERIDONE 1.5 MG: 1 TABLET, FILM COATED ORAL at 20:22

## 2024-12-06 RX ADMIN — THIAMINE HCL TAB 100 MG 100 MG: 100 TAB at 10:31

## 2024-12-06 RX ADMIN — Medication 1 TABLET: at 10:30

## 2024-12-06 RX ADMIN — FUROSEMIDE 40 MG: 10 INJECTION, SOLUTION INTRAMUSCULAR; INTRAVENOUS at 10:29

## 2024-12-06 RX ADMIN — Medication 1 HALF-TAB: at 21:57

## 2024-12-06 RX ADMIN — SPIRONOLACTONE 25 MG: 25 TABLET ORAL at 10:30

## 2024-12-06 RX ADMIN — POTASSIUM CHLORIDE 20 MEQ: 750 TABLET, EXTENDED RELEASE ORAL at 13:27

## 2024-12-06 RX ADMIN — DIGOXIN 250 MCG: 250 TABLET ORAL at 10:30

## 2024-12-06 RX ADMIN — EMPAGLIFLOZIN 10 MG: 10 TABLET, FILM COATED ORAL at 10:31

## 2024-12-06 RX ADMIN — RISPERIDONE 1 MG: 0.5 TABLET, ORALLY DISINTEGRATING ORAL at 10:32

## 2024-12-06 ASSESSMENT — ACTIVITIES OF DAILY LIVING (ADL)
ADLS_ACUITY_SCORE: 58

## 2024-12-06 NOTE — CONSULTS
Cardiology Inpatient Consultation  December 6, 2024    Reason for Consult:  A cardiology consult wasto provide clinical guidance regarding acute decompensated heart failure.    Assessment:  Acute on chronic  systolic heart failure/HFrEF (EF 15%) secondary to nonischemic cardiomyopathy  Prior RV dysfunction now with recovery  Sinus tachycardia    Discussion:    Terrance is presented with acute on chronic decompensated heart failure with symptoms of worsening SOB He has no end organ injury but is currently in sinus tachycardia which worries us that he has little reserve and this is a compensatory mechanism. We will hold betablocker for now due to concern he might be in a low flow state. We would continue other GDMT incuding very low dose entresto, spironolactone, jardiance  for now. Also continue digoxin. We will have low threshold to start dobutamine to augment cardiac output. He would benefit from a transfer to [facility]. We have already discussed this with admitting cardiologist on HF service Dr. Carbajal. Since discharge in August he this is his third admission. Would appreciate once he is here, Cards2 input regarding advanced therapy options vs home inotrope.       TTE (8/21/24): LVIDd 58mm. Biplane LVEF is 16%. Severe hypokinesis of mid to apical RV free wall.  RHC (8/21/24): RA mean 6, PA 30/23/26, PCWP 18, Wayne CO/CI 5.0/2.1, PVR 1.6, SVR 1270  9/11/2024: RA: 2/4/2 mmHg RV: 22/2 mmHg PA: 20/14 (16) mmHg W: 10/13/10 mmHg PA sat: 63 % Wayne CO/CI: 6.0/2.7 Thermo CO/CI: 5.3/2.4 PVR: 0.99 GROVE   Coronary angiogram: NA   EMB (8/21/24): Fragments of essentially unremarkable myocardial tissue with no evidence of inflammatory infiltrate   CMR 9/5/2024: Mildly dilated left ventricle with severely reduced left ventricular function, LVEF 15%.  Normal right ventricular size and systolic function, RVEF 46%. No myocardial edema or replacement fibrosis or MRI features of acute myocarditis.    NYHA Symptom Class III  Stage  C-D  ACE-I/ARB/ARNi: Restart entresto 12/13 BID  BB: HOLD metoprolol XL 25 mg daily due to concern of being in a low flow state  Aldosterone antagonist: Continue spironolactone 25 mg daily  SGLT2i: Continue Jardiance 10 mg daily  RV Support: Continue digoxin 250 mcg daily, check level  SCD prophylaxis: Would benefit from an ICD, can consider EP consult(QRS 82)  %BiV pacing: N/A  Fluid status: unable to assess but appears near dry weight  Cardiac Rehab: refer on discharge  - Following with advanced heart failure team, continue discussing regarding advanced therapies.   - Recommend 2L fluid and 2g Na  - Recommend increasing exercise as able    Plan of care discussed with Dr. Brunson, who agrees with above plan.    Thank you for consulting the cardiovascular services at the [facility]. Please do not hesitate to call us with any questions.     Huy Delgado MD  Cardiology Fellow  Pager: [phone]    HPI:   Terrance Hidalgo is a 24 year old male non-ischemic dilate cardiomyopathy, autism who presents with HfrEF 2/2 NICM who presents with worsening shortness of breaht of one day duration. Interview was performed via video call. Patient notes adherence to his home medications. Patient denies chest pain, orthopnea, PND, lightheadedness, or syncope.     Terrance presented to the [facility] in August 2024 with cough, upper respiratory infection symptoms, and hypoxemia. He was intubated due to respiratory failure and transferred to [facility], where he developed mixed cardiogenic and septic shock, requiring multiple pressor agents, inotropic support, and IV diuresis. He was then transferred to [facility] CICU on August 20, 2024, for further management. Initial concerns were for fulminant myocarditis, based on elevated cardiac and inflammatory markers, new severe biventricular dysfunction with reduced cardiac output, and respiratory failure likely due to ARDS, possibly related to  myocarditis and aspiration pneumonia. He was treated empirically with steroids and sulbactam.  However, endomyocardial biopsy on August 21, 2024, showed unremarkable myocardial tissue with no inflammatory infiltrate. His hospital course was complicated by worsening cardiogenic and septic shock, renal and liver failure, and the need for IABP placement on August 27. He was weaned off inhaled nitric oxide by August 28 and extubated without complications. The IABP was removed on September 1 and the site closed. Dobutamine was successfully weaned, and his guideline-directed medical therapy (GDMT) was initiated. A CMR scan on September 5 revealed a mildly dilated left ventricle with severely reduced LVEF 15%, normal right ventricular size and RVEF 46%, and no evidence of acute myocarditis or myocardial edema. Blood cultures grew Streptococcus mitis, and the patient was treated with a prolonged course of antibiotics. He was ultimately discharged to a group home facility.     He was also admitted on 9/28 for hypotension, but was quickly discharged given absence of symptoms and improvement in blood pressure. .     Recently admitted on 10/31 with sore throat, cough and dyspnea at OSH. His HR 140s at the time. ED vitals at the time showed temp 94.5,  sinus, tachypnea, /50 then 94/63. . He has been treated with empirical antibiotics and IVF. He temporarily required pressor support with increased supplemental oxygen requirements. There was no evidence of cardiogenic shock with normal lactate, renal and liver function .    Socially: He lives in a group home. His brother Johann is his guardian.       Review of Systems:    Complete review of systems was performed and negative except per HPI.    PMH:  No past medical history on file.  Active Problems:  Patient Active Problem List    Diagnosis Date Noted    Acute on chronic systolic congestive heart failure (H) 12/05/2024     Priority: Medium    Acute kidney  failure, unspecified (H) 08/27/2024     Priority: Medium    Cardiogenic shock (H) 08/20/2024     Priority: Medium     Social History:  Social History     Tobacco Use    Smoking status: Never    Smokeless tobacco: Never     Family History:  Family History   Problem Relation Age of Onset    Melanoma No family hx of     Skin Cancer No family hx of        Medications:  Current Facility-Administered Medications   Medication Dose Route Frequency Provider Last Rate Last Admin    digoxin (LANOXIN) tablet 250 mcg  250 mcg Oral Daily Negrito Alcantara MD        empagliflozin (JARDIANCE) tablet 10 mg  10 mg Oral Daily Negrito Alcantara MD        furosemide (LASIX) injection 40 mg  40 mg Intravenous Once Negrito Alcantara MD        metoprolol succinate ER (TOPROL-XL) 24 hr half-tab 12.5 mg  12.5 mg Oral Daily Vahid Quarles MD        multivitamin w/minerals (THERA-VIT-M) tablet 1 tablet  1 tablet Oral Daily Negrito Alcantara MD        risperiDONE (risperDAL M-TABS) ODT tab 1 mg  1 mg Oral Daily Negrito Alcantara MD        risperiDONE (risperDAL) tablet 1.5 mg  1.5 mg Oral QPM Negrito Alcantara MD        sodium chloride (PF) 0.9% PF flush 3 mL  3 mL Intracatheter Q8H Negrito Alcantara MD   3 mL at 12/06/24 0102    spironolactone (ALDACTONE) tablet 25 mg  25 mg Oral Daily Negrito Alcantara MD        thiamine (B-1) tablet 100 mg  100 mg Oral Daily Negrito Alcantara MD           Current Facility-Administered Medications   Medication Dose Route Frequency Provider Last Rate Last Admin       Physical Exam:  Temp:  [95.8  F (35.4  C)] 95.8  F (35.4  C)  Pulse:  [105-128] 113  Resp:  [16] 16  BP: ()/(48-74) 109/61  SpO2:  [90 %-98 %] 97 %    Intake/Output Summary (Last 24 hours) at 12/6/2024 0834  Last data filed at 12/6/2024 0300  Gross per 24 hour   Intake 15 ml   Output 700 ml   Net -685 ml     GEN: Does not appear to be in distress and not using accessory muscles.   This was performed over video call.         Diagnostics:  All labs  "and imaging were reviewed, of note:    CMP  Recent Labs   Lab 12/06/24  0628 12/05/24  2228    138   POTASSIUM 3.8 4.1   CHLORIDE 103 103   CO2 25 23   ANIONGAP 12 12   GLC 96 117*   BUN 17.7 19.1   CR 0.81 0.77   GFRESTIMATED >90 >90   ALEXANDER 9.0 9.3   MAG  --  2.1   PROTTOTAL  --  6.0*   ALBUMIN  --  4.1   BILITOTAL  --  0.3   ALKPHOS  --  88   AST  --  21   ALT  --  21     CBC  Recent Labs   Lab 12/06/24  0628 12/05/24  2229   WBC 11.9* 12.6*   RBC 5.36 5.42   HGB 14.8 14.9   HCT 44.8 45.5   MCV 84 84   MCH 27.6 27.5   MCHC 33.0 32.7   RDW 13.7 13.7    267     INR  Recent Labs   Lab 12/05/24  2229   INR 1.07     Arterial Blood Gas  Recent Labs   Lab 12/05/24  2228   O2PER 21       No results found for: \"TROPI\", \"TROPONIN\", \"TROPR\", \"TROPN\"    RHC 8/21/24  RIGHT HEART CATHETERIZATION:  === Off pressors vented Fio2 80% ====  /71/87 mmHg  RA mean of 6 mmHg with a V wave of 8 mmhg  PA 30/23/26 mmHg  PCWP 18 mmHg with a V wave of 20 mmhg  Wayne CO/CI 5.0/2.1 L/min/m2   PVR 1.6 GROVE   SVR 1270 dynes/sec/cm-5  PA sat 70%   Hgb 14.6 g/dL      TTE 8/23/24  Interpretation Summary  Left ventricular function is decreased. The ejection fraction is 20-25%  (severely reduced).  The right ventricle is normal size.  Global right ventricular function is moderately to severely reduced.  No pericardial effusion is present.     TTE 8/27/24  Interpretation Summary  Normal left ventricular size. The visually estimated left ventricular ejection fraction is 30-35%. There is severe diffuse hypokinesis.  The right ventricle was not well visualized. Normal right ventricular size.  Unable to visualize inferior vena cava.  Compared to the prior study dated 8/23/2024, the left ventricular function has mildly improved. There is improved function of the lateral wall segments.     IABP Placement 8/27/24  1.successful deployment of intra-aortic balloon pump via right femoral arterial access      TTE 9/3/24  Interpretation Summary  The " patient's rhythm is sinus tachycardia. 131 bpm  Left ventricular function is decreased. The ejection fraction is 10-15%  (severely reduced).  Severe diffuse hypokinesis is present.  The right ventricle is normal size.  Global right ventricular function is normal.  This study was compared with the study from 8/21/2024 .RV improved, LV unchanged.     cMRI 9/5/24  SUMMARY  ==========================================================================================================  Clinical history: 24M with URI c/b cardiogenic shock, assess for myocarditis  Comparison CMR: none  1. The left ventricle is mildly dilated with normal wall thickness.  There is severe diffuse hypokinesis.  The global systolic function is severely reduced. The LVEF is 15%.  2. The right ventricle is normal in cavity size. The global systolic function is normal. The RVEF is 46%.   3. Both atria are normal in size.  4. There is no significant valvular disease.   5. Late gadolinium enhancement imaging:   Parametric mapping: native T1 value 1152 ms, ECV = 31% (mildly elevated).  T2 =  42-46 ms (normal range).   6. There is no pericardial effusion.  7. There is no intracardiac thrombus.  CONCLUSIONS:  Mildly dilated left ventricle with severely reduced left ventricular function, LVEF 15%.  Normal right ventricular size and systolic function, RVEF 46%.   No myocardial edema or replacement fibrosis.   Specifically, no MRI features of acute myocarditis.     RHC 9/11/24  BP: 111/52 mmHg  RA: 2/4/2 mmHg  RV: 22/2 mmHg  PA: 20/14 (16) mmHg  W: 10/13/10 mmHg  PA sat: 63 %  Wayne CO/CI: 6.0/2.7  Thermo CO/CI: 5.3/2.4  PVR: 0.99 GROVE      TTE 10/24/24:  Interpretation Summary  Left ventricular function is decreased. The ejection fraction is 15-20%  (severely reduced). Severe diffuse hypokinesis is present. Grade II or  moderate diastolic dysfunction. Diastolic Doppler findings (E/E' ratio and/or  other parameters) suggest left ventricular filling pressures are  increased.  The right ventricle is normal size. Global right ventricular function is  normal.  No significant valvular abnormalities present.  The inferior vena cava cannot be assessed.  This study was compared with the study from 09/03/2024. No significant changes  noted.

## 2024-12-06 NOTE — ED TRIAGE NOTES
Triage Assessment (Adult)       Row Name 12/05/24 7201          Triage Assessment    Airway WDL WDL        Respiratory WDL    Respiratory WDL X;cough     Cough Type dry        Skin Circulation/Temperature WDL    Skin Circulation/Temperature WDL WDL        Cardiac WDL    Cardiac WDL WDL        Peripheral/Neurovascular WDL    Peripheral Neurovascular WDL WDL        Cognitive/Neuro/Behavioral WDL    Cognitive/Neuro/Behavioral WDL WDL

## 2024-12-06 NOTE — H&P
[facility]    History and Physical - Hospitalist Service       Date of Admission:  12/5/2024    Assessment & Plan      Terrance Hidalgo is a 24 year old male with NICM with EF 15% admitted on 12/5/2024. He is admitted for acute hypoxic respiratory failure due to acute exacerbation of heart failure with reduced ejection fraction. Currently needing 2L O2.    Acute Hypoxic Respiratory Failure due to Pulmonary Edema from heart failure  - treat heart failure as below  - wean O2 as able    Acute exacerbation of chronic heart failure with reduced ejection fraction  Non ischemic cardiomyopathy  NYHA III, Stage C  - follows with Dr. Martin  - BNP elevated on admission, CXR without consolidation but with prominent vasculature per my read, ED POCUS with B lines  - Continue metoprolol  - Continue spironolactone  - Continue jardiance  - Continue digoxin for RV support  - Hold entresto (Epic will not allow the order of 1/2 pill BID)  - PT consulted  - BMP in the AM  - Mg and K replacement protocols  - monitor I/Os, daily weights  - Tele for 48 hours  - Continue home 2g NA and 2L fluid restrictions    Cough  - likely due to pulmonary edema  - COVID negative  - CXR without pneumonia    Autism Spectrum Disorder  - continue risperidone 1mg AM, 1.5mg PM            Diet:  General diet, 2g NA restriction, 2L fluid restriction  DVT Prophylaxis: Pneumatic Compression Devices  Swan Catheter: Not present  Lines: None     Cardiac Monitoring: None  Code Status:  FULL CODE    Clinically Significant Risk Factors Present on Admission   { TIP  This section helps capture the illness of the patient on admission.     - Review diagnoses highlighted in blue; right click, edit & delete if not appropriate   - If blank, no additional diagnoses identified   :[id number]}                 # Chronic heart failure with reduced ejection fraction: last echo with EF <40%              # Financial/Environmental Concerns:            Disposition Plan   {TIP  It is advised to update the Medical Readiness for Discharge [MRD] daily, until the patient is 'Ready Now.' Last Documentation- Anticipated in 2-4 Days  . Use the SmartList below to update for today:[id number]}  Medically Ready for Discharge: Anticipated in 2-4 Days           Negrito Alcantara MD  Hospitalist Service  [facility]  Securely message with [place] (more info)  Text page via [facility] Paging/Directory     ______________________________________________________________________    Chief Complaint   Shortness of breath with increasing cough    History is obtained from the patient    History of Present Illness   Terrance Hidalgo is a 24 year old male with NICM with Ef 15% NYHA Class III who presented to the ED on 12/5/2024 for shortness of breath and increased cough. Cough has been present for 2 months on and off, but noticed worsening shortness of breath in the last few days. He has noted a 4lb weight gain since yesterday. He is adherent to his meds and takes them as ordered at his group home. He tries to follow at 2L fluid restriction and 2g Na restriction. There has been no chest pain, no abdominal pain, no arm pain/numbness, no diaphoresis, no nausea, no vomiting. He has noted slight worsening of his exercise tolerance. He has been referred to cardiac  rehab but has not begun yet. Follows with Dr. Martin from cardiology.     In the ED patient was noted to be hypoxic to 88% on RA, started on 2L. Elevated BNP and B lines seen on POCUS. Given 40mg IV lasix. Discussed with cardiology who noted no beds on [place], thought it would be appropriate to admit to [facility] with cardiology consult.       Past Medical History    No past medical history on file.    Past Surgical History   Past Surgical History:   Procedure Laterality Date    CV HEART BIOPSY N/A 8/21/2024    Procedure: Heart Biopsy;  Surgeon: Yohan Aponte MD;  Location: [facility]  CARDIAC CATH LAB    CV INTRA AORTIC BALLOON N/A 08/27/2024    Procedure: Intra aortic Balloon Pump Insertion;  Surgeon: Davis Disla MD;  Location: [facility] CARDIAC CATH LAB    CV INTRA AORTIC BALLOON REMOVAL N/A 09/01/2024    Procedure: Intra aortic Balloon Pump Removal;  Surgeon: Jairo Squires MD;  Location: [facility] CARDIAC CATH LAB    CV RIGHT HEART CATH MEASUREMENTS RECORDED N/A 9/11/2024    Procedure: Right Heart Catheterization;  Surgeon: Jani Martin MD;  Location: [facility] CARDIAC CATH LAB    CV RIGHT HEART CATH MEASUREMENTS RECORDED N/A 8/21/2024    Procedure: Right Heart Catheterization;  Surgeon: Yohan Aponte MD;  Location: [facility] CARDIAC CATH LAB    CV SWAN REANNA PROCEDURE N/A 8/21/2024    Procedure: Winslow Reanna Procedure;  Surgeon: Yohan Aponte MD;  Location: [facility] CARDIAC CATH LAB    PICC INSERTION - TRIPLE LUMEN Left 09/03/2024    Left basilic vein 53cm total 10cm external.       Prior to Admission Medications   Prior to Admission Medications   Prescriptions Last Dose Informant Patient Reported? Taking?   STATIN NOT PRESCRIBED (INTENTIONAL) 12/5/2024  Yes Yes   Sig: Please choose reason not prescribed from choices below.   acetaminophen (TYLENOL) 325 MG tablet 12/4/2024  No Yes   Sig: Take 2 tablets (650 mg) by mouth or Feeding Tube every 8 hours as needed for mild pain.   cetirizine (ZYRTEC) 5 MG tablet Past Month  No Yes   Sig: Take 0.5 tablets (2.5 mg) by mouth every 6 hours as needed for allergies (Give 30 minutes prior to each amoxicillin dose for rash).   digoxin (LANOXIN) 250 MCG tablet 12/5/2024  No Yes   Sig: Take 1 tablet (250 mcg) by mouth daily.   empagliflozin (JARDIANCE) 10 MG TABS tablet 12/5/2024  No Yes   Sig: Take 1 tablet (10 mg) by mouth daily.   furosemide (LASIX) 20 MG tablet 12/5/2024  No Yes   Sig: Take 1 tablet (20 mg) by mouth daily as needed (weight above 215lb, swelling or bloating).   metoprolol succinate ER (TOPROL XL) 25 MG  24 hr tablet 12/5/2024  No Yes   Sig: Take 1 tablet (25 mg) by mouth daily.   multivitamin w/minerals (THERA-VIT-M) tablet 12/5/2024  No Yes   Sig: Take 1 tablet by mouth daily.   polyethylene glycol (MIRALAX) 17 GM/Dose powder Unknown  No No   Sig: Take 17 g by mouth daily as needed for constipation.   Patient not taking: Reported on 9/23/2024   reason aspirin not prescribed, intentional, Unknown  Yes No   Sig: Please choose reason not prescribed from choices below.   Patient not taking: Reported on 10/29/2024   risperiDONE (RISPERDAL) 0.5 MG tablet 12/4/2024  No Yes   Sig: Take 3 tablets (1.5 mg) by mouth every evening.   risperiDONE (RISPERDAL) 1 MG tablet 12/5/2024 Morning  No Yes   Sig: Take 1 tablet (1 mg) by mouth daily.   sacubitril-valsartan (ENTRESTO) 24-26 MG per tablet 12/5/2024  No Yes   Sig: Take 0.5 tablets by mouth 2 times daily.   selenium sulfide (SELSUN) 1 % LOTN shampoo More than a month  No Yes   Sig: Apply topically daily as needed for itching.   senna-docusate (SENOKOT-S/PERICOLACE) 8.6-50 MG tablet Past Month  No Yes   Sig: Take 1 tablet by mouth or Feeding Tube daily as needed for constipation.   spironolactone (ALDACTONE) 25 MG tablet 12/5/2024  No Yes   Sig: Take 1 tablet (25 mg) by mouth daily.   thiamine (B-1) 100 MG tablet 12/5/2024  No Yes   Sig: Take 1 tablet (100 mg) by mouth daily.   triamcinolone (KENALOG) 0.1 % external ointment Unknown  No No   Sig: Apply topically 2 times daily.      Facility-Administered Medications: None        Social History   I have reviewed this patient's social history and updated it with pertinent information if needed.  Social History     Tobacco Use    Smoking status: Never    Smokeless tobacco: Never        Physical Exam   Vital Signs: Temp: (!) 95.8  F (35.4  C) Temp src: Tympanic BP: 104/70 Pulse: 118   Resp: 16 SpO2: 94 % O2 Device: None (Room air)    Weight: 211 lbs 0 oz    Gen: NAD, sitting comfortably in bed  Eyes: PERRLA, EOMI, conjuctiva  clear  CV: tachycardia, no murmurs, 2+ radial pulses  RESP: CTA bilaterally, no w/r/c  Abd: soft, nontender, nondistended  Ext: 2+ edema bilaterally***  Neuro: CNII-CNXII intact     Medical Decision Making   { TIP   MDM Calculator    MDM grid (w/ times)    Coding Support Chat  Billing is now based on time OR medical decision making complexity. Medical decision making included in your A&P does NOT need to be re-documented here.    :[id number]}    80 MINUTES SPENT BY ME on the date of service doing chart review, history, exam, documentation & further activities per the note.      Data     I have personally reviewed the following data over the past 24 hrs:    12.6 (H)  \   14.9   / 267     138 103 19.1 /  117 (H)   4.1 23 0.77 \     ALT: 21 AST: 21 AP: 88 TBILI: 0.3   ALB: 4.1 TOT PROTEIN: 6.0 (L) LIPASE: N/A     Trop: 14 BNP: 4,686 (H)     Procal: 0.05 CRP: N/A Lactic Acid: 1.2       INR:  1.07 PTT:  29   D-dimer:  N/A Fibrinogen:  N/A       Imaging results reviewed over the past 24 hrs:   Recent Results (from the past 24 hours)   XR Chest Port 1 View    Narrative    EXAM: XR CHEST PORT 1 VIEW  LOCATION: [facility]  DATE: 12/5/2024    INDICATION: Severe CHF, dyspnea, volume overload  COMPARISON: 09/05/2024      Impression    IMPRESSION: Cardiomegaly, unchanged. Pulmonary vascularity normal. The lungs are clear. No infiltrates or effusions. Scoliosis.

## 2024-12-06 NOTE — ED PROVIDER NOTES
ED Provider Note  [facility]      History     Chief Complaint   Patient presents with    Cough     Cough on and off since September.  Oxygen saturations 89 at home.  Not on oxygen.  To be fitted for a CPAP.  Four pound weight gain since yesterday.     HPI  Terrance Hidalgo is a 24 year old male PMH of acute respiratory failure with hypoxia, austism, cardiomyopathy, sepsis, acute kidney failure, cardiogenic shock who presents to the ER for evaluation of a cough.    Past Medical History  No past medical history on file.  Past Surgical History:   Procedure Laterality Date    CV HEART BIOPSY N/A 8/21/2024    Procedure: Heart Biopsy;  Surgeon: Yohan Aponte MD;  Location:  HEART CARDIAC CATH LAB    CV INTRA AORTIC BALLOON N/A 08/27/2024    Procedure: Intra aortic Balloon Pump Insertion;  Surgeon: Davis Disla MD;  Location:  HEART CARDIAC CATH LAB    CV INTRA AORTIC BALLOON REMOVAL N/A 09/01/2024    Procedure: Intra aortic Balloon Pump Removal;  Surgeon: Jairo Squires MD;  Location:  HEART CARDIAC CATH LAB    CV RIGHT HEART CATH MEASUREMENTS RECORDED N/A 9/11/2024    Procedure: Right Heart Catheterization;  Surgeon: Jani Martin MD;  Location:  HEART CARDIAC CATH LAB    CV RIGHT HEART CATH MEASUREMENTS RECORDED N/A 8/21/2024    Procedure: Right Heart Catheterization;  Surgeon: Yohan Aponte MD;  Location:  HEART CARDIAC CATH LAB    CV SWAN REANNA PROCEDURE N/A 8/21/2024    Procedure: Twilight Reanna Procedure;  Surgeon: Yohan Aponte MD;  Location: [facility] CARDIAC CATH LAB    PICC INSERTION - TRIPLE LUMEN Left 09/03/2024    Left basilic vein 53cm total 10cm external.     acetaminophen (TYLENOL) 325 MG tablet  benzocaine-menthol (CHLORASEPTIC MAX) 15-10 MG lozenge  cetirizine (ZYRTEC) 5 MG tablet  digoxin (LANOXIN) 250 MCG tablet  empagliflozin (JARDIANCE) 10 MG TABS tablet  furosemide (LASIX) 20 MG tablet  metoprolol succinate ER (TOPROL XL)  "25 MG 24 hr tablet  multivitamin w/minerals (THERA-VIT-M) tablet  polyethylene glycol (MIRALAX) 17 GM/Dose powder  reason aspirin not prescribed, intentional,  risperiDONE (RISPERDAL) 0.5 MG tablet  risperiDONE (RISPERDAL) 1 MG tablet  sacubitril-valsartan (ENTRESTO) 24-26 MG per tablet  selenium sulfide (SELSUN) 1 % LOTN shampoo  senna-docusate (SENOKOT-S/PERICOLACE) 8.6-50 MG tablet  spironolactone (ALDACTONE) 25 MG tablet  STATIN NOT PRESCRIBED (INTENTIONAL)  thiamine (B-1) 100 MG tablet  triamcinolone (KENALOG) 0.1 % external ointment      Allergies   Allergen Reactions    Amoxicillin Hives     (Pictures in media tab) Confirmed by dermatology 9/18/2024    Implanon [Etonogestrel]     Penicillin V Hives     Tolerated ampicillin/sulbactam 08/21/2024     Family History  Family History   Problem Relation Age of Onset    Melanoma No family hx of     Skin Cancer No family hx of      Social History   Social History     Tobacco Use    Smoking status: Never    Smokeless tobacco: Never      A medically appropriate review of systems was performed with pertinent positives and negatives noted in the HPI, and all other systems negative.    Physical Exam   BP: (!) 81/60  Pulse: (!) 126  Temp:  (unable to obtain)  Resp: 16  Weight: 95.7 kg (211 lb)  SpO2: 93 %  Physical Exam  Vital Signs Reviewed  Gen: Well nourished, well developed, resting comfortably, no acute distress***  HEENT: NC/AT, PERRL, EOMI, MMM  Neck: Supple, FROM  CV: Regular Rate  Lungs/Chest: Normal Effort  Abd: Non-distended  MSK/Back: FROM, no visible deformity  Neuro: A&Ox3, GCS 15, CN II-XII unremarkable. Strength and sensation globally intact.***  Skin: Warm, Dry, Intact, no visible lesions      ED Course, Procedures, & Data      Procedures       {ED Course Selections (Optional):495604}  {ED Sepsis CMS Documentation (Optional):131104::\" \"}       No results found for any visits on 12/05/24.  Medications - No data to display  Labs Ordered and Resulted from Time " of ED Arrival to Time of ED Departure - No data to display  No orders to display          {Critical Care Performed?:512070}    Assessment & Plan    ***    I have reviewed the nursing notes. I have reviewed the findings, diagnosis, plan and need for follow up with the patient.    New Prescriptions    No medications on file       Final diagnoses:   None       Dave Peck MD  [facility] EMERGENCY DEPARTMENT  12/5/2024   effusions. Scoliosis.   POC US ECHO LIMITED     Status: None    Impression    Limited Bedside Cardiac Ultrasound, performed and interpreted by me.   Indication: Weakness.  Parasternal long axis, parasternal short axis, apical 4 chamber, and subcostal views were acquired.   Image quality was satisfactory.    Findings:    Abnormal -severely reduced left ventricular ejection fraction with plump IVC.  No visualized sonographic evidence of right heart strain.  No significant pericardial effusion appreciated.  Predominance of B-lines and visible lung tissue.    IMPRESSION:   Abnormal -severely reduced left ventricular ejection fraction with plump IVC.  No visualized sonographic evidence of right heart strain.  No significant pericardial effusion appreciated.  Predominance of B-lines and visible lung tissue.       INR     Status: Normal   Result Value Ref Range    INR 1.07 0.85 - 1.15   Partial thromboplastin time     Status: Normal   Result Value Ref Range    aPTT 29 22 - 38 Seconds   Comprehensive metabolic panel     Status: Abnormal   Result Value Ref Range    Sodium 138 135 - 145 mmol/L    Potassium 4.1 3.4 - 5.3 mmol/L    Carbon Dioxide (CO2) 23 22 - 29 mmol/L    Anion Gap 12 7 - 15 mmol/L    Urea Nitrogen 19.1 6.0 - 20.0 mg/dL    Creatinine 0.77 0.67 - 1.17 mg/dL    GFR Estimate >90 >60 mL/min/1.73m2    Calcium 9.3 8.8 - 10.4 mg/dL    Chloride 103 98 - 107 mmol/L    Glucose 117 (H) 70 - 99 mg/dL    Alkaline Phosphatase 88 40 - 150 U/L    AST 21 0 - 45 U/L    ALT 21 0 - 70 U/L    Protein Total 6.0 (L) 6.4 - 8.3 g/dL    Albumin 4.1 3.5 - 5.2 g/dL    Bilirubin Total 0.3 <=1.2 mg/dL   Lactic acid whole blood with 1x repeat in 2 hr when >2     Status: Normal   Result Value Ref Range    Lactic Acid, Initial 1.2 0.7 - 2.0 mmol/L   Troponin T, High Sensitivity     Status: Normal   Result Value Ref Range    Troponin T, High Sensitivity 14 <=22 ng/L   Blood gas venous     Status: Abnormal   Result Value Ref Range    pH Venous  7.44 (H) 7.32 - 7.43    pCO2 Venous 38 (L) 40 - 50 mm Hg    pO2 Venous 64 (H) 25 - 47 mm Hg    Bicarbonate Venous 26 21 - 28 mmol/L    Base Excess/Deficit Venous 1.9 -3.0 - 3.0 mmol/L    FIO2 21     Oxyhemoglobin Venous 90 (H) 70 - 75 %    O2 Sat, Venous 92.1 (H) 70.0 - 75.0 %    Narrative    In healthy individuals, oxyhemoglobin (O2Hb) and oxygen saturation (SO2) are approximately equal. In the presence of dyshemoglobins, oxyhemoglobin can be considerably lower than oxygen saturation.   Nt probnp inpatient (BNP)     Status: Abnormal   Result Value Ref Range    N terminal Pro BNP Inpatient 4,686 (H) 0 - 450 pg/mL   Procalcitonin     Status: Normal   Result Value Ref Range    Procalcitonin 0.05 <0.50 ng/mL   Magnesium     Status: Normal   Result Value Ref Range    Magnesium 2.1 1.7 - 2.3 mg/dL   UA with Microscopic reflex to Culture     Status: Abnormal    Specimen: Urine, Midstream   Result Value Ref Range    Color Urine Light Yellow Colorless, Straw, Light Yellow, Yellow    Appearance Urine Clear Clear    Glucose Urine >=1000 (A) Negative mg/dL    Bilirubin Urine Negative Negative    Ketones Urine Negative Negative mg/dL    Specific Gravity Urine 1.022 1.003 - 1.035    Blood Urine Negative Negative    pH Urine 5.0 5.0 - 7.0    Protein Albumin Urine Negative Negative mg/dL    Urobilinogen Urine Normal Normal, 2.0 mg/dL    Nitrite Urine Negative Negative    Leukocyte Esterase Urine Negative Negative    Mucus Urine Present (A) None Seen /LPF    RBC Urine <1 <=2 /HPF    WBC Urine 2 <=5 /HPF    Squamous Epithelials Urine 2 (H) <=1 /HPF    Narrative    Urine Culture not indicated   COVID-19 Virus (Coronavirus) by PCR Nasopharyngeal     Status: Normal    Specimen: Nasopharyngeal; Swab   Result Value Ref Range    SARS CoV2 PCR Negative Negative    Narrative    Testing was performed using the Xpert Xpress SARS-CoV-2 Assay on the Cepheid Gene-Xpert Instrument Systems. Additional information about this Emergency Use  Authorization (EUA) assay can be found via the Lab Guide. This test should be ordered for the detection of SARS-CoV-2 in individuals who meet SARS-CoV-2 clinical and/or epidemiological criteria as well as from individuals without symptoms or other reasons to suspect COVID-19. Test performance for asymptomatic patients has only been established in anterior nasal swab specimens. This test is for in vitro diagnostic use under the FDA EUA for laboratories certified under CLIA to perform high complexity testing. This test has not been FDA cleared or approved. A negative result does not rule out the presence of PCR inhibitors in the specimen or target RNA concentration below the limit of detection for the assay. The possibility of a false negative should be considered if the patient's recent exposure or clinical presentation suggests COVID-19. This test was validated by the [facility]. This laboratory is certified under the Clinical Laboratory Improvement Amendments (CLIA) as qualified to perform high complexity laboratory testing.     CBC with platelets and differential     Status: Abnormal   Result Value Ref Range    WBC Count 12.6 (H) 4.0 - 11.0 10e3/uL    RBC Count 5.42 4.40 - 5.90 10e6/uL    Hemoglobin 14.9 13.3 - 17.7 g/dL    Hematocrit 45.5 40.0 - 53.0 %    MCV 84 78 - 100 fL    MCH 27.5 26.5 - 33.0 pg    MCHC 32.7 31.5 - 36.5 g/dL    RDW 13.7 10.0 - 15.0 %    Platelet Count 267 150 - 450 10e3/uL    % Neutrophils 66 %    % Lymphocytes 23 %    % Monocytes 8 %    % Eosinophils 1 %    % Basophils 1 %    % Immature Granulocytes 0 %    NRBCs per 100 WBC 0 <1 /100    Absolute Neutrophils 8.4 (H) 1.6 - 8.3 10e3/uL    Absolute Lymphocytes 2.9 0.8 - 5.3 10e3/uL    Absolute Monocytes 1.0 0.0 - 1.3 10e3/uL    Absolute Eosinophils 0.2 0.0 - 0.7 10e3/uL    Absolute Basophils 0.1 0.0 - 0.2 10e3/uL    Absolute Immature Granulocytes 0.1 <=0.4 10e3/uL    Absolute NRBCs 0.0 10e3/uL   [place] Draw      Status: None    Narrative    The following orders were created for panel order [place] Draw.  Procedure                               Abnormality         Status                     ---------                               -----------         ------                     Extra Red Top Tube[[id number]]                               Final result                 Please view results for these tests on the individual orders.   Extra Red Top Tube     Status: None   Result Value Ref Range    Hold Specimen [facility]    EKG 12-lead, tracing only     Status: None (Preliminary result)   Result Value Ref Range    Systolic Blood Pressure  mmHg    Diastolic Blood Pressure  mmHg    Ventricular Rate 121 BPM    Atrial Rate 121 BPM    ND Interval 156 ms    QRS Duration 82 ms     ms    QTc 426 ms    P Axis 29 degrees    R AXIS -50 degrees    T Axis 66 degrees    Interpretation ECG       Sinus tachycardia  Left axis deviation  Inferior infarct , age undetermined  Abnormal ECG     CBC with platelets differential     Status: Abnormal    Narrative    The following orders were created for panel order CBC with platelets differential.  Procedure                               Abnormality         Status                     ---------                               -----------         ------                     CBC with platelets and d...[[id number]]  Abnormal            Final result                 Please view results for these tests on the individual orders.     Medications   digoxin (LANOXIN) tablet 250 mcg (has no administration in time range)   empagliflozin (JARDIANCE) tablet 10 mg (has no administration in time range)   furosemide (LASIX) injection 40 mg (has no administration in time range)   metoprolol succinate ER (TOPROL XL) 24 hr tablet 25 mg (has no administration in time range)   multivitamin w/minerals (THERA-VIT-M) tablet 1 tablet (has no administration in time range)   risperiDONE (risperDAL) tablet 1.5 mg (has no administration in  time range)   risperiDONE (risperDAL) tablet 1 mg (has no administration in time range)   spironolactone (ALDACTONE) tablet 25 mg (has no administration in time range)   thiamine (B-1) tablet 100 mg (has no administration in time range)   lidocaine 1 % 0.1-1 mL (has no administration in time range)   lidocaine (LMX4) cream (has no administration in time range)   sodium chloride (PF) 0.9% PF flush 3 mL (has no administration in time range)   sodium chloride (PF) 0.9% PF flush 3 mL (has no administration in time range)   senna-docusate (SENOKOT-S/PERICOLACE) 8.6-50 MG per tablet 1 tablet (has no administration in time range)     Or   senna-docusate (SENOKOT-S/PERICOLACE) 8.6-50 MG per tablet 2 tablet (has no administration in time range)   calcium carbonate (TUMS) chewable tablet 1,000 mg (has no administration in time range)   acetaminophen (TYLENOL) tablet 650 mg (has no administration in time range)     Or   acetaminophen (TYLENOL) Suppository 650 mg (has no administration in time range)   ondansetron (ZOFRAN ODT) ODT tab 4 mg (has no administration in time range)     Or   ondansetron (ZOFRAN) injection 4 mg (has no administration in time range)   furosemide (LASIX) injection 40 mg (40 mg Intravenous $Given 12/5/24 3275)     Labs Ordered and Resulted from Time of ED Arrival to Time of ED Departure   COMPREHENSIVE METABOLIC PANEL - Abnormal       Result Value    Sodium 138      Potassium 4.1      Carbon Dioxide (CO2) 23      Anion Gap 12      Urea Nitrogen 19.1      Creatinine 0.77      GFR Estimate >90      Calcium 9.3      Chloride 103      Glucose 117 (*)     Alkaline Phosphatase 88      AST 21      ALT 21      Protein Total 6.0 (*)     Albumin 4.1      Bilirubin Total 0.3     BLOOD GAS VENOUS - Abnormal    pH Venous 7.44 (*)     pCO2 Venous 38 (*)     pO2 Venous 64 (*)     Bicarbonate Venous 26      Base Excess/Deficit Venous 1.9      FIO2 21      Oxyhemoglobin Venous 90 (*)     O2 Sat, Venous 92.1 (*)    NT  PROBNP INPATIENT - Abnormal    N terminal Pro BNP Inpatient 4,686 (*)    ROUTINE UA WITH MICROSCOPIC REFLEX TO CULTURE - Abnormal    Color Urine Light Yellow      Appearance Urine Clear      Glucose Urine >=1000 (*)     Bilirubin Urine Negative      Ketones Urine Negative      Specific Gravity Urine 1.022      Blood Urine Negative      pH Urine 5.0      Protein Albumin Urine Negative      Urobilinogen Urine Normal      Nitrite Urine Negative      Leukocyte Esterase Urine Negative      Mucus Urine Present (*)     RBC Urine <1      WBC Urine 2      Squamous Epithelials Urine 2 (*)    CBC WITH PLATELETS AND DIFFERENTIAL - Abnormal    WBC Count 12.6 (*)     RBC Count 5.42      Hemoglobin 14.9      Hematocrit 45.5      MCV 84      MCH 27.5      MCHC 32.7      RDW 13.7      Platelet Count 267      % Neutrophils 66      % Lymphocytes 23      % Monocytes 8      % Eosinophils 1      % Basophils 1      % Immature Granulocytes 0      NRBCs per 100 WBC 0      Absolute Neutrophils 8.4 (*)     Absolute Lymphocytes 2.9      Absolute Monocytes 1.0      Absolute Eosinophils 0.2      Absolute Basophils 0.1      Absolute Immature Granulocytes 0.1      Absolute NRBCs 0.0     INR - Normal    INR 1.07     PARTIAL THROMBOPLASTIN TIME - Normal    aPTT 29     LACTIC ACID WHOLE BLOOD WITH 1X REPEAT IN 2 HR WHEN >2 - Normal    Lactic Acid, Initial 1.2     TROPONIN T, HIGH SENSITIVITY - Normal    Troponin T, High Sensitivity 14     PROCALCITONIN - Normal    Procalcitonin 0.05     MAGNESIUM - Normal    Magnesium 2.1     COVID-19 VIRUS (CORONAVIRUS) BY PCR - Normal    SARS CoV2 PCR Negative     TROPONIN T, HIGH SENSITIVITY   RESPIRATORY PANEL PCR   BLOOD CULTURE     XR Chest Port 1 View   Final Result   IMPRESSION: Cardiomegaly, unchanged. Pulmonary vascularity normal. The lungs are clear. No infiltrates or effusions. Scoliosis.      POC US ECHO LIMITED   Final Result   Limited Bedside Cardiac Ultrasound, performed and interpreted by me.     Indication: Weakness.   Parasternal long axis, parasternal short axis, apical 4 chamber, and subcostal views were acquired.    Image quality was satisfactory.      Findings:     Abnormal -severely reduced left ventricular ejection fraction with plump IVC.  No visualized sonographic evidence of right heart strain.  No significant pericardial effusion appreciated.  Predominance of B-lines and visible lung tissue.      IMPRESSION:    Abnormal -severely reduced left ventricular ejection fraction with plump IVC.  No visualized sonographic evidence of right heart strain.  No significant pericardial effusion appreciated.  Predominance of B-lines and visible lung tissue.                   Critical care was not performed.     Medical Decision Making  The patient's presentation was of high complexity (a chronic illness severe exacerbation, progression, or side effect of treatment).    The patient's evaluation involved:  ordering and/or review of 3+ test(s) in this encounter (see separate area of note for details)  discussion of management or test interpretation with another health professional (Cardiology, Medicine)    The patient's management necessitated high risk (a decision regarding hospitalization).    Assessment & Plan    Terrance Hidalgo is a 24 year old male PMH of acute respiratory failure with hypoxia, austism, cardiomyopathy, sepsis, acute kidney failure, cardiogenic shock who presents to the ER for evaluation of a cough.  Patient has associated weakness fatigue and weight gain.  Symptoms are concerning for possible exacerbation of chronic heart failure.  His prior records were reviewed and he has a remarkable history of very severe cardiomyopathy after an infection.  He has been following up with the advanced heart failure team at the [facility] after discharge from long hospitalization.  He is on light doses of diuretics with no apparent missed doses but has gained weight and is presenting with  symptoms of CHF exacerbation.    EKG showing sinus tachycardia with no obvious CHRIS.  Bedside echocardiogram showing severely reduced left ventricular ejection fraction with large minimally variable IVC and B-lines predominance of visible lung tissue suggestive of fluid overload.  Patient given a single dose of 40 mg IV Lasix to initiate some diuresis.  Blood pressure and heart rate are near his reported baseline.  Given his severe heart failure and volume overload I suspect his blood pressure may improve with diuresis and he will need to be monitored closely.    Further labs are notable for a negative troponin suggesting no acute myocarditis or severe strain on myocardial tissue.  His BNP is severely elevated it is down from the last time it is checked but unclear if it has gotten better and is now rising again or if this is still coming down from prior insult.  Venous blood gas overall fairly reassuring.  Patient is doing okay on room air.    I discussed management with Dr. Carbajal who is on for the advance cardiology team.  Patient is not likely to require specific advanced heart failure therapies at this time and given logistics and [facility] capacity states that admitting to the medicine service with cardiology consult would be appropriate.  I discussed this further with Dr. Alcantara on for [facility] medicine and we will keep patient as a [facility] patient at this time due to expected bed availability with general cardiology consult with plans to move to [place] if significant decompensation occurs.    The patient's labs are also notable for leukocytosis 12.6 with elevated neutrophils, no bandemia.  Lactic acid is not elevated.  COVID is negative respiratory panel pending.  Blood culture obtained.  X-ray does not suggest a pneumonia.  Procalcitonin is negative.  Unclear etiology of this.  Will continue to monitor and expand infectious workup.  Antibiotics do not appear indicated at this time.    Repeat  vital signs with some improvement in heart rate otherwise fairly stable vital signs.    New Prescriptions    No medications on file       Final diagnoses:   Acute on chronic systolic congestive heart failure (H)       Dave Estevez Jr., MD   [facility] EMERGENCY DEPARTMENT  12/5/2024     Dave Estevez MD  12/06/24 0105

## 2024-12-06 NOTE — ED NOTES
Pt waiting for transfer to EB. Pt will transfer to cardiac unit. Pt had iPad meeting with cardiology team this AM.

## 2024-12-07 ENCOUNTER — APPOINTMENT (OUTPATIENT)
Dept: PHYSICAL THERAPY | Facility: CLINIC | Age: 24
DRG: 291 | End: 2024-12-07
Attending: INTERNAL MEDICINE
Payer: MEDICARE

## 2024-12-07 LAB
ALBUMIN SERPL BCG-MCNC: 3.7 G/DL (ref 3.5–5.2)
ALP SERPL-CCNC: 78 U/L (ref 40–150)
ALT SERPL W P-5'-P-CCNC: 14 U/L (ref 0–70)
ANION GAP SERPL CALCULATED.3IONS-SCNC: 10 MMOL/L (ref 7–15)
AST SERPL W P-5'-P-CCNC: 13 U/L (ref 0–45)
BILIRUB SERPL-MCNC: 0.6 MG/DL
BUN SERPL-MCNC: 19.6 MG/DL (ref 6–20)
CALCIUM SERPL-MCNC: 8.8 MG/DL (ref 8.8–10.4)
CHLORIDE SERPL-SCNC: 107 MMOL/L (ref 98–107)
CREAT SERPL-MCNC: 0.82 MG/DL (ref 0.67–1.17)
EGFRCR SERPLBLD CKD-EPI 2021: >90 ML/MIN/1.73M2
ERYTHROCYTE [DISTWIDTH] IN BLOOD BY AUTOMATED COUNT: 13.9 % (ref 10–15)
GLUCOSE SERPL-MCNC: 96 MG/DL (ref 70–99)
HCO3 SERPL-SCNC: 27 MMOL/L (ref 22–29)
HCT VFR BLD AUTO: 44.9 % (ref 40–53)
HGB BLD-MCNC: 14.3 G/DL (ref 13.3–17.7)
MAGNESIUM SERPL-MCNC: 4.4 MG/DL (ref 1.7–2.3)
MCH RBC QN AUTO: 27.1 PG (ref 26.5–33)
MCHC RBC AUTO-ENTMCNC: 31.8 G/DL (ref 31.5–36.5)
MCV RBC AUTO: 85 FL (ref 78–100)
PLATELET # BLD AUTO: 252 10E3/UL (ref 150–450)
POTASSIUM SERPL-SCNC: 4 MMOL/L (ref 3.4–5.3)
PROT SERPL-MCNC: 5.8 G/DL (ref 6.4–8.3)
RBC # BLD AUTO: 5.27 10E6/UL (ref 4.4–5.9)
SODIUM SERPL-SCNC: 144 MMOL/L (ref 135–145)
WBC # BLD AUTO: 11.2 10E3/UL (ref 4–11)

## 2024-12-07 PROCEDURE — 85018 HEMOGLOBIN: CPT

## 2024-12-07 PROCEDURE — 250N000013 HC RX MED GY IP 250 OP 250 PS 637: Performed by: PHYSICIAN ASSISTANT

## 2024-12-07 PROCEDURE — 97161 PT EVAL LOW COMPLEX 20 MIN: CPT | Mod: GP

## 2024-12-07 PROCEDURE — 250N000013 HC RX MED GY IP 250 OP 250 PS 637

## 2024-12-07 PROCEDURE — 83735 ASSAY OF MAGNESIUM: CPT | Performed by: INTERNAL MEDICINE

## 2024-12-07 PROCEDURE — 97110 THERAPEUTIC EXERCISES: CPT | Mod: GP

## 2024-12-07 PROCEDURE — 84155 ASSAY OF PROTEIN SERUM: CPT

## 2024-12-07 PROCEDURE — 250N000011 HC RX IP 250 OP 636

## 2024-12-07 PROCEDURE — 120N000005 HC R&B MS OVERFLOW UMMC

## 2024-12-07 PROCEDURE — 85041 AUTOMATED RBC COUNT: CPT

## 2024-12-07 PROCEDURE — 99233 SBSQ HOSP IP/OBS HIGH 50: CPT | Mod: GC | Performed by: INTERNAL MEDICINE

## 2024-12-07 PROCEDURE — 82435 ASSAY OF BLOOD CHLORIDE: CPT

## 2024-12-07 PROCEDURE — 250N000013 HC RX MED GY IP 250 OP 250 PS 637: Performed by: INTERNAL MEDICINE

## 2024-12-07 PROCEDURE — 36415 COLL VENOUS BLD VENIPUNCTURE: CPT

## 2024-12-07 RX ORDER — AZITHROMYCIN 250 MG/1
500 TABLET, FILM COATED ORAL DAILY
Status: COMPLETED | OUTPATIENT
Start: 2024-12-07 | End: 2024-12-09

## 2024-12-07 RX ORDER — CEFTRIAXONE 2 G/1
2 INJECTION, POWDER, FOR SOLUTION INTRAMUSCULAR; INTRAVENOUS EVERY 24 HOURS
Status: DISCONTINUED | OUTPATIENT
Start: 2024-12-07 | End: 2024-12-09 | Stop reason: HOSPADM

## 2024-12-07 RX ORDER — ACETAMINOPHEN 650 MG/1
650 SUPPOSITORY RECTAL EVERY 4 HOURS PRN
Status: DISCONTINUED | OUTPATIENT
Start: 2024-12-07 | End: 2024-12-07

## 2024-12-07 RX ORDER — NITROGLYCERIN 0.4 MG/1
0.4 TABLET SUBLINGUAL EVERY 5 MIN PRN
Status: DISCONTINUED | OUTPATIENT
Start: 2024-12-07 | End: 2024-12-09 | Stop reason: HOSPADM

## 2024-12-07 RX ORDER — FUROSEMIDE 10 MG/ML
40 INJECTION INTRAMUSCULAR; INTRAVENOUS ONCE
Status: COMPLETED | OUTPATIENT
Start: 2024-12-07 | End: 2024-12-07

## 2024-12-07 RX ORDER — ACETAMINOPHEN 325 MG/1
650 TABLET ORAL EVERY 4 HOURS PRN
Status: DISCONTINUED | OUTPATIENT
Start: 2024-12-07 | End: 2024-12-07

## 2024-12-07 RX ORDER — MAGNESIUM HYDROXIDE/ALUMINUM HYDROXICE/SIMETHICONE 120; 1200; 1200 MG/30ML; MG/30ML; MG/30ML
30 SUSPENSION ORAL EVERY 4 HOURS PRN
Status: DISCONTINUED | OUTPATIENT
Start: 2024-12-07 | End: 2024-12-09 | Stop reason: HOSPADM

## 2024-12-07 RX ADMIN — SPIRONOLACTONE 25 MG: 25 TABLET ORAL at 07:58

## 2024-12-07 RX ADMIN — AZITHROMYCIN DIHYDRATE 500 MG: 250 TABLET, FILM COATED ORAL at 16:00

## 2024-12-07 RX ADMIN — MICONAZOLE NITRATE: 20 POWDER TOPICAL at 08:05

## 2024-12-07 RX ADMIN — FUROSEMIDE 40 MG: 10 INJECTION, SOLUTION INTRAMUSCULAR; INTRAVENOUS at 16:00

## 2024-12-07 RX ADMIN — EMPAGLIFLOZIN 10 MG: 10 TABLET, FILM COATED ORAL at 07:58

## 2024-12-07 RX ADMIN — RISPERIDONE 1 MG: 0.5 TABLET, ORALLY DISINTEGRATING ORAL at 12:08

## 2024-12-07 RX ADMIN — THIAMINE HCL TAB 100 MG 100 MG: 100 TAB at 07:58

## 2024-12-07 RX ADMIN — CEFTRIAXONE SODIUM 2 G: 2 INJECTION, POWDER, FOR SOLUTION INTRAMUSCULAR; INTRAVENOUS at 17:13

## 2024-12-07 RX ADMIN — DIGOXIN 250 MCG: 250 TABLET ORAL at 07:59

## 2024-12-07 RX ADMIN — Medication 1 TABLET: at 07:58

## 2024-12-07 RX ADMIN — Medication 1 HALF-TAB: at 21:05

## 2024-12-07 RX ADMIN — RISPERIDONE 1.5 MG: 1 TABLET, FILM COATED ORAL at 21:05

## 2024-12-07 RX ADMIN — MICONAZOLE NITRATE: 20 POWDER TOPICAL at 21:05

## 2024-12-07 RX ADMIN — Medication 1 HALF-TAB: at 12:08

## 2024-12-07 ASSESSMENT — ACTIVITIES OF DAILY LIVING (ADL)
ADLS_ACUITY_SCORE: 35
ADLS_ACUITY_SCORE: 41
ADLS_ACUITY_SCORE: 41
ADLS_ACUITY_SCORE: 34
ADLS_ACUITY_SCORE: 34
ADLS_ACUITY_SCORE: 41
ADLS_ACUITY_SCORE: 34
ADLS_ACUITY_SCORE: 34
ADLS_ACUITY_SCORE: 41
DEPENDENT_IADLS:: CLEANING;LAUNDRY;COOKING;SHOPPING;MEAL PREPARATION;MEDICATION MANAGEMENT;MONEY MANAGEMENT;TRANSPORTATION
ADLS_ACUITY_SCORE: 41
ADLS_ACUITY_SCORE: 34
ADLS_ACUITY_SCORE: 41
ADLS_ACUITY_SCORE: 34
ADLS_ACUITY_SCORE: 34
ADLS_ACUITY_SCORE: 41
ADLS_ACUITY_SCORE: 34
ADLS_ACUITY_SCORE: 34

## 2024-12-07 NOTE — PROGRESS NOTES
Admission         12/5/2024  9:58 PM   -----------------------------------------------------------  Diagnosis:      Admitted from:  [facility] ED  Report given from:  Jodie RN  Accompanied by: Transport  Family Aware of Admission: yes  Belongings:  with pt  Admission Profile: Complete  Teaching: Orientation to unit, call don't fall, use of call light, meal times, visiting hours,  when to call for the RN (angina/sob/dizzyness, etc.), and enforced importance of safety.  Access: L PIV   Telemetry: Placed on pt  Ht./Wt.: Complete    Two nurse head-to-toe skin assessment performed by Roc JENKINS and Xochitl MONROYSkin issues noted: Redness to right inner bicep and bilateral groin.  See PCS for assessment and treatment of wounds and surgical sites.    Temp:  [97.7  F (36.5  C)-98.1  F (36.7  C)] 97.7  F (36.5  C)  Pulse:  [105-132] 127  Resp:  [18] 18  BP: ()/(47-87) 96/55  SpO2:  [90 %-99 %] 98 %
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
## 2024-12-07 NOTE — PROGRESS NOTES
"   12/07/24 0930   Appointment Info   Signing Clinician's Name / Credentials (PT) Peg Arita PT, DPT   Living Environment   People in Home other (see comments)  (group home residents)   Current Living Arrangements group home   Home Accessibility stairs within home   Number of Stairs, Within Home, Primary greater than 10 stairs  (15)   Stair Railings, Within Home, Primary railing on right side (ascending)   Transportation Anticipated family or friend will provide   Living Environment Comments Lives in group home, room is on lower level/basement with full flight of stairs down   Self-Care   Usual Activity Tolerance moderate   Current Activity Tolerance moderate   Equipment Currently Used at Home none   Fall history within last six months no   Activity/Exercise/Self-Care Comment Previously IND with all mobility and ADLs, uses no AD at baseline. Has own room at group [facility],  provides meals   General Information   Onset of Illness/Injury or Date of Surgery 12/05/24   Referring Physician Negrito Alcantara MD   Patient/Family Therapy Goals Statement (PT) wants to return home   Pertinent History of Current Problem (include personal factors and/or comorbidities that impact the POC) Per pt's chart, \"Terrance Hidalgo is a 24 year old male who has PMHx of HFrEF (EF 15%) 2/2 NICM, Autism,  who presented initially to [facility] for concerns of SOB for 1 day.  He was transferred to [place] for consideration of advanced therapies and further evaluation.\"   Existing Precautions/Restrictions cardiac   General Observations UAL   Cognition   Affect/Mental Status (Cognition) WFL;emotionally labile;flat/blunted affect   Pain Assessment   Patient Currently in Pain No   Integumentary/Edema   Integumentary/Edema no deficits were identifed   Posture    Posture Forward head position;Protracted shoulders   Range of Motion (ROM)   Range of Motion ROM is WFL   Strength (Manual Muscle Testing)   Strength (Manual Muscle Testing) strength is WFL "   Strength Comments did not formally assess but pt demo antigravity strength by completing STS with no additional physical assist   Bed Mobility   Comment, (Bed Mobility) did not formally assess as pt was received sitting in recliner upon entry, pt has been UAL thus far during LOS   Transfers   Comment, (Transfers) STS Joya   Gait/Stairs (Locomotion)   Comment, (Gait/Stairs) amb with SBA and R HHA   Balance   Balance Comments good static standing balance, steady dynamic balance with amb   Sensory Examination   Sensory Perception patient reports no sensory changes   Clinical Impression   Criteria for Skilled Therapeutic Intervention Yes, treatment indicated   PT Diagnosis (PT) at risk for deconditioning   Influenced by the following impairments at risk for deconditioning 2/2 prolonged admission   Functional limitations due to impairments community ambulation, stairs, higher level balance   Clinical Presentation (PT Evaluation Complexity) evolving   Clinical Presentation Rationale clinical reasoning   Clinical Decision Making (Complexity) low complexity   Planned Therapy Interventions (PT) balance training;bed mobility training;gait training;home exercise program;motor coordination training;neuromuscular re-education;patient/family education;postural re-education;ROM (range of motion);stair training;strengthening;stretching;transfer training;progressive activity/exercise;risk factor education;home program guidelines   Risk & Benefits of therapy have been explained evaluation/treatment results reviewed;care plan/treatment goals reviewed;risks/benefits reviewed;current/potential barriers reviewed;participants voiced agreement with care plan;participants included;patient   Clinical Impression Comments Pt is mobilizing near/at IND mobility baseline, is at risk for deconditioning during LOS. Will benefit from skilled PT during LOS to maximize IND and mitigate deconditioning.   PT Total Evaluation Time   PT Eval, Low  Complexity Minutes (51936) 10   Physical Therapy Goals   PT Frequency 3x/week   PT Predicted Duration/Target Date for Goal Attainment 01/24/25   PT Goals Gait;Stairs;Aerobic Activity;PT Goal 1   PT: Gait Independent;Greater than 200 feet   PT: Stairs Independent;Greater than 10 stairs;Rail on right   PT: Perform aerobic activity with stable cardiovascular response continuous activity;10 minutes;15 minutes;ambulation;NuStep   PT: Goal 1 Pt will demo IND with BLE strengthening HEP.   PT Discharge Planning   PT Plan gait OOR, stairs, NuStep vs gait endurance   PT Discharge Recommendation (DC Rec) home with assist;home with outpatient cardiac rehab   PT Rationale for DC Rec Pt is near/at IND mobility baseline, is at risk for deconditioning during LOS. However, anticipate safe d/c home with assist once medically appropriate. Would benefit from OP CR to further improve functional endurance.   PT Brief overview of current status IND, encourage amb in hallway with 3-4x/day with RN

## 2024-12-07 NOTE — ED NOTES
Pt transported to [facility] via EMS on cardiac monitors and 3L O2. Report called to RN. Guardian updated on transfer. No questions or concerns from pt or guardian. A&O x4. Ambulatory with steady gait. Belongings sent with pt

## 2024-12-07 NOTE — PROGRESS NOTES
Brief Progress Note as the H&P written after midnight    - continue diuresis with lasix 40mg IV  - starting empirical CAP coverage with ceftriaxone and azithromycin  - TTE ordered for today    Please refer to the H&P for full information on patient care for the day.

## 2024-12-07 NOTE — PLAN OF CARE
Problem: Adult Inpatient Plan of Care  Goal: Plan of Care Review  Description: The Plan of Care Review/Shift note should be completed every shift.  The Outcome Evaluation is a brief statement about your assessment that the patient is improving, declining, or no change.  This information will be displayed automatically on your shift  note.  Flowsheets (Taken 12/7/2024 6008)  Outcome Evaluation: RNCC completed assessment with patient, group home staff member, and pt's legal guardian (via telephone). Patient comes from a group home where he does not receive any supplemental PT/OT, infusions,or use DME. CM will continue to follow to assist with uploading guardianship paperwork and other discharge service needs that may arise.  Plan of Care Reviewed With:   patient   caregiver   family  Goal: Readiness for Transition of Care  Recent Flowsheet Documentation  Taken 12/7/2024 1525 by Sybil Brewster RN  Transportation Anticipated: family or friend will provide  Concerns to be Addressed: legal  Intervention: Mutually Develop Transition Plan  Recent Flowsheet Documentation  Taken 12/7/2024 1525 by Sybil Brewster RN  Readmission Within the Last 30 Days: no previous admission in last 30 days  Transportation Anticipated: family or friend will provide  Transportation Concerns: none  Concerns to be Addressed: legal  Patient/Family Anticipated Services at Transition: none  Patient/Family Anticipates Transition to: group home  Equipment Currently Used at Home: none
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
## 2024-12-07 NOTE — H&P
[facility]    Cardiology History and Physical - Cardiology         Date of Admission:  12/5/2024    Assessment & Plan: [facility]    Terrance Hidalgo is a 24 year old male admitted on 12/5/2024. He has PMHx of non-ischemic dilated cardiomyopathy, autism, HfrEF (EF 15%) 2/2 NICM who presented with concerns for worsening SOB for 1  day likley due acute heart failure exacerbation.         #Acute on chronic systolic heart failure/HFrEF (EF 15%) secondary to nonischemic cardiomyopathy  #Acute hypoxic respiratory failure   NYHA Symptom Class III Stage C  Initially presented to the [facility] with onset of SOB for 1 day. Workup notable for elevated BNP 4686, CXR without consolidation, ED POCUS with possible  B lines. S/p IV lasix 40 mg. Negative COVID, Respiratory Panel, BCX to date. Per patient has been compliant with medication (Lives at Group home).     TTE (8/21/24): LVIDd 58mm. Biplane LVEF is 16%. Severe hypokinesis of mid to apical RV free wall.  RHC (8/21/24): RA mean 6, PA 30/23/26, PCWP 18, Wayne CO/CI 5.0/2.1, PVR 1.6, SVR 1270  9/11/2024: RA: 2/4/2 mmHg RV: 22/2 mmHg PA: 20/14 (16) mmHg W: 10/13/10 mmHg PA sat: 63 % Wayne CO/CI: 6.0/2.7 Thermo CO/CI: 5.3/2.4 PVR: 0.99 GROVE   Coronary angiogram: None   EMB (8/21/24): Fragments of essentially unremarkable myocardial tissue with no evidence of inflammatory infiltrate   CMR 9/5/2024: Mildly dilated left ventricle with severely reduced left ventricular function, LVEF 15%.  Normal right ventricular size and systolic function, RVEF 46%. No myocardial edema or replacement fibrosis or MRI features of acute myocarditis.    ACE-I/ARB/ARNi: Restart entresto 12/13 BID  BB: Holding PTA metoprolol XL 25 mg daily due to concern of being in a low flow state  Aldosterone antagonist: Continue spironolactone 25 mg daily  SGLT2i: Continue Jardiance 10 mg daily  RV Support: Continue digoxin 250 mcg daily, most recent level 0.8 (12/05/2024)  SCD  "prophylaxis: Would benefit from an ICD, can consider EP consult(QRS 82)  %BiV pacing: N/A  Fluid status: Appears Euvolemic on exam therefore no additional diuretics on admission  (PTA  Lasix 20 mg every day PRN), Dry Weight (Wt has been trending down,currently lowest 205 lb)      - NPO on admission for possible RHC   - Mg and K replacement protocols  - monitor I/Os, daily weights    #Bilateral Inguinal Erythema  Per patient is relatively new, started since 1 day, non tender or not pruritic. Concerns for fungal infection  - WOCN consulted   - Miconazole powder ordered     Autism Spectrum Disorder  - continue risperidone 1mg AM, 1.5mg PM     Patient has guardian, Guerda (although chart says brother Johann,  may need to confirm in  AM)   - please direct all communications and updates through Guerda. Family should be direct to speak with Guerda [phone]       Diet: Fluid restriction 2000 ML FLUID  NPO for Medical/Clinical Reasons Except for: Meds    DVT Prophylaxis: Pneumatic Compression Devices  Swan Catheter: Not present  Cardiac Monitoring: ACTIVE order. Indication: Acute decompensated heart failure (48 hours)  Code Status: Full Code          Clinically Significant Risk Factors                    # Acute heart failure with reduced ejection fraction: last echo with EF <40% and receiving IV diuretics           # Obesity: Estimated body mass index is 30.27 kg/m  as calculated from the following:    Height as of this encounter: 1.753 m (5' 9\").    Weight as of this encounter: 93 kg (205 lb)., PRESENT ON ADMISSION     # Financial/Environmental Concerns:        Pt to be formally staff in AM       Chika Aguirre MD  PGY-3  Internal Medicine Resident   [facility]    ______________________________________________________________________    Chief Complaint   SOB     History is obtained from the patient's parent(s)    History of Present Illness   Terrance Hidalgo is a 24 year old " male who has PMHx of HFrEF (EF 15%) 2/2 NICM, Autism,  who presented initially to [facility] for concerns of SOB for 1 day.  He was transferred to [facility] for consideration of advanced therapies and further evaluation.       Terrance presented to the [facility] in August 2024 with cough, upper respiratory infection symptoms, and hypoxemia. He was intubated due to respiratory failure and transferred to [facility], where he developed mixed cardiogenic and septic shock, requiring multiple pressor agents, inotropic support, and IV diuresis. He was then transferred to [facility] CICU on August 20, 2024, for further management. Initial concerns were for fulminant myocarditis, based on elevated cardiac and inflammatory markers, new severe biventricular dysfunction with reduced cardiac output, and respiratory failure likely due to ARDS, possibly related to myocarditis and aspiration pneumonia. He was treated empirically with steroids and sulbactam.  However, endomyocardial biopsy on August 21, 2024, showed unremarkable myocardial tissue with no inflammatory infiltrate. His hospital course was complicated by worsening cardiogenic and septic shock, renal and liver failure, and the need for IABP placement on August 27. He was weaned off inhaled nitric oxide by August 28 and extubated without complications. The IABP was removed on September 1 and the site closed. Dobutamine was successfully weaned, and his guideline-directed medical therapy (GDMT) was initiated. A CMR scan on September 5 revealed a mildly dilated left ventricle with severely reduced LVEF 15%, normal right ventricular size and RVEF 46%, and no evidence of acute myocarditis or myocardial edema. Blood cultures grew Streptococcus mitis, and the patient was treated with a prolonged course of antibiotics. He was ultimately discharged to a group home facility.      He was also admitted on 9/28 for hypotension, but was quickly discharged given absence  of symptoms and improvement in blood pressure. .      Recently admitted on 10/31 with sore throat, cough and dyspnea at OSH. His HR 140s at the time. ED vitals at the time showed temp 94.5,  sinus, tachypnea, /50 then 94/63. . He has been treated with empirical antibiotics and IVF. He temporarily required pressor support with increased supplemental oxygen requirements. There was no evidence of cardiogenic shock with normal lactate, renal and liver function.      On admission reports that his shortness of breath has improved.  Denies any chest pain, lightheadedness, dizziness, leg swelling, nausea, vomiting, abdominal pain.    Past Medical History    No past medical history on file.    Past Surgical History   Past Surgical History:   Procedure Laterality Date    CV HEART BIOPSY N/A 8/21/2024    Procedure: Heart Biopsy;  Surgeon: Yohan Aponte MD;  Location:  HEART CARDIAC CATH LAB    CV INTRA AORTIC BALLOON N/A 08/27/2024    Procedure: Intra aortic Balloon Pump Insertion;  Surgeon: Davis Disla MD;  Location:  HEART CARDIAC CATH LAB    CV INTRA AORTIC BALLOON REMOVAL N/A 09/01/2024    Procedure: Intra aortic Balloon Pump Removal;  Surgeon: Jairo Squires MD;  Location:  HEART CARDIAC CATH LAB    CV RIGHT HEART CATH MEASUREMENTS RECORDED N/A 9/11/2024    Procedure: Right Heart Catheterization;  Surgeon: Jani Martin MD;  Location:  HEART CARDIAC CATH LAB    CV RIGHT HEART CATH MEASUREMENTS RECORDED N/A 8/21/2024    Procedure: Right Heart Catheterization;  Surgeon: Yohan Aponte MD;  Location:  HEART CARDIAC CATH LAB    CV SWAN REANNA PROCEDURE N/A 8/21/2024    Procedure: [place] Reanna Procedure;  Surgeon: Yohan Aponte MD;  Location: [facility] CARDIAC CATH LAB    PICC INSERTION - TRIPLE LUMEN Left 09/03/2024    Left basilic vein 53cm total 10cm external.       Prior to Admission Medications   Prior to Admission Medications   Prescriptions Last  Dose Informant Patient Reported? Taking?   STATIN NOT PRESCRIBED (INTENTIONAL) 12/5/2024  Yes Yes   Sig: Please choose reason not prescribed from choices below.   acetaminophen (TYLENOL) 325 MG tablet 12/4/2024  No Yes   Sig: Take 2 tablets (650 mg) by mouth or Feeding Tube every 8 hours as needed for mild pain.   cetirizine (ZYRTEC) 5 MG tablet Past Month  No Yes   Sig: Take 0.5 tablets (2.5 mg) by mouth every 6 hours as needed for allergies (Give 30 minutes prior to each amoxicillin dose for rash).   digoxin (LANOXIN) 250 MCG tablet 12/5/2024  No Yes   Sig: Take 1 tablet (250 mcg) by mouth daily.   empagliflozin (JARDIANCE) 10 MG TABS tablet 12/5/2024  No Yes   Sig: Take 1 tablet (10 mg) by mouth daily.   furosemide (LASIX) 20 MG tablet 12/5/2024  No Yes   Sig: Take 1 tablet (20 mg) by mouth daily as needed (weight above 215lb, swelling or bloating).   metoprolol succinate ER (TOPROL XL) 25 MG 24 hr tablet 12/5/2024  No Yes   Sig: Take 1 tablet (25 mg) by mouth daily.   multivitamin w/minerals (THERA-VIT-M) tablet 12/5/2024  No Yes   Sig: Take 1 tablet by mouth daily.   polyethylene glycol (MIRALAX) 17 GM/Dose powder Unknown  No No   Sig: Take 17 g by mouth daily as needed for constipation.   Patient not taking: Reported on 9/23/2024   reason aspirin not prescribed, intentional, Unknown  Yes No   Sig: Please choose reason not prescribed from choices below.   Patient not taking: Reported on 10/29/2024   risperiDONE (RISPERDAL) 0.5 MG tablet 12/4/2024  No Yes   Sig: Take 3 tablets (1.5 mg) by mouth every evening.   risperiDONE (RISPERDAL) 1 MG tablet 12/5/2024 Morning  No Yes   Sig: Take 1 tablet (1 mg) by mouth daily.   sacubitril-valsartan (ENTRESTO) 24-26 MG per tablet 12/5/2024  No Yes   Sig: Take 0.5 tablets by mouth 2 times daily.   selenium sulfide (SELSUN) 1 % LOTN shampoo More than a month  No Yes   Sig: Apply topically daily as needed for itching.   senna-docusate (SENOKOT-S/PERICOLACE) 8.6-50 MG tablet  Past Month  No Yes   Sig: Take 1 tablet by mouth or Feeding Tube daily as needed for constipation.   spironolactone (ALDACTONE) 25 MG tablet 12/5/2024  No Yes   Sig: Take 1 tablet (25 mg) by mouth daily.   thiamine (B-1) 100 MG tablet 12/5/2024  No Yes   Sig: Take 1 tablet (100 mg) by mouth daily.   triamcinolone (KENALOG) 0.1 % external ointment Unknown  No No   Sig: Apply topically 2 times daily.      Facility-Administered Medications: None        Review of Systems    The 10 point Review of Systems is negative other than noted in the HPI or here.     Social History   I have reviewed this patient's social history and updated it with pertinent information if needed.  Social History     Tobacco Use    Smoking status: Never    Smokeless tobacco: Never         Physical Exam   Vital Signs: Temp: 97.7  F (36.5  C) Temp src: Oral BP: 96/55 Pulse: (!) 127   Resp: 18 SpO2: 98 % O2 Device: Nasal cannula Oxygen Delivery: 3 LPM  Weight: 205 lbs 0 oz    General Appearance: Appears well and in no acute distress  Respiratory: CTA  b/l   Cardiovascular: Tachycardic no murmurs appreciated trace edema b/l  GI: soft NT, Non distended   Skin: Erythema in there right arm, erythema in b/l inguinal area         Data     I have personally reviewed the following data over the past 24 hrs:    11.9 (H)  \   14.8   / 255     140 103 17.7 /  96   3.8 25 0.81 \       Imaging results reviewed over the past 24 hrs:   No results found for this or any previous visit (from the past 24 hours).

## 2024-12-07 NOTE — PROGRESS NOTES
[id number]  Neuro: A&Ox4. Able to make needs known.   Cardiac: ST. VSS. Denies chest pain.  Respiratory: Sating >92% on 2-3L.  GI/: Adequate urine output, needs reminders to collect urine. No BM overnight.   Diet/appetite: NPO overnight  Activity:  up ad eugenio in room.  Pain: At acceptable level on current regimen.   Skin: Redness to right bicep and bilateral groin.  LDA's: PIV to left hand.   Plan: Continue with POC. Notify primary team with changes.

## 2024-12-07 NOTE — ED NOTES
RN received call from [employer], states all visitors should be verified with Guerda beforehand. No family is to visit pt.     Guerda would also like kitchen phone number to order food for pt.

## 2024-12-07 NOTE — PROGRESS NOTES
Brief Social Work note:    NALDO paged by pt's RN, informing her that patient has a new guardian that is his aunt Guerda. Guerda is wanting to send over the new court order, as Johann Hidalgo is no longer patient's guardian. [place]erda [place].    NALDO called Guerda and said she could send the paperwork to NALDO's email address. NALDO was able to find Guerda's email in chart review so emailed her as well. Once guardianship paperwork is received, NALDO will send it to [facility] so it can get on patient's chart.    Guerda (Guardian)  P: [phone]   Email: [email or web address]     MILO Barney   12/7/2024       Social Work and Care Management Department       SEARCHABLE in [employer] - search SOCIAL WORK       [place] (0800 - 1630) Saturday and Sunday     Units: 4A Vocera, 4C Vocera, & 4E Vocera        Units: 5A 3255-4120 Vocera, 5A 4044-7439 Vocera , BMT SW 1 BMT SW 2, BMT SW 3 & BMT SW 4  5C Off Service 5401 - 5416  5C Off Service [phone]     Units: 6A Vocera & 6B Vocera      Units: 6C Vocera     Units: 7A Vocera & 7B Vocera      Units: 7C Med Surg 7401 thru 7418 and 7C Med Surg 7502 thru 7521      Unit: [place] ED Vocera & [place] Obs Vocera     [facility] ([phone]) Saturday and Sunday      Units: 5 Ortho Vocera, 5 Med Surg Vocera & WB ED Vocera     Units: 6 Med Surg Vocera, 8 Med Surg Vocera, & 10 ICU Vocera      After hours Vocera [facility] and After Hours Vocera [place]     Please NOTE changes to times below:    **Saturday & Sunday (1630 - 2030)    **Mon-Fri ([phone])     **[facility] Recognized Holidays  ([id number])    Units: ALL   - see above VOCERA links to units

## 2024-12-08 ENCOUNTER — APPOINTMENT (OUTPATIENT)
Dept: CARDIOLOGY | Facility: CLINIC | Age: 24
DRG: 291 | End: 2024-12-08
Payer: MEDICARE

## 2024-12-08 LAB
ANION GAP SERPL CALCULATED.3IONS-SCNC: 11 MMOL/L (ref 7–15)
BUN SERPL-MCNC: 14.7 MG/DL (ref 6–20)
CALCIUM SERPL-MCNC: 9.1 MG/DL (ref 8.8–10.4)
CHLORIDE SERPL-SCNC: 104 MMOL/L (ref 98–107)
CREAT SERPL-MCNC: 0.79 MG/DL (ref 0.67–1.17)
EGFRCR SERPLBLD CKD-EPI 2021: >90 ML/MIN/1.73M2
ERYTHROCYTE [DISTWIDTH] IN BLOOD BY AUTOMATED COUNT: 13.9 % (ref 10–15)
GLUCOSE SERPL-MCNC: 112 MG/DL (ref 70–99)
HCO3 SERPL-SCNC: 24 MMOL/L (ref 22–29)
HCT VFR BLD AUTO: 45.7 % (ref 40–53)
HGB BLD-MCNC: 14.6 G/DL (ref 13.3–17.7)
LVEF ECHO: NORMAL
MAGNESIUM SERPL-MCNC: 2.2 MG/DL (ref 1.7–2.3)
MCH RBC QN AUTO: 27.4 PG (ref 26.5–33)
MCHC RBC AUTO-ENTMCNC: 31.9 G/DL (ref 31.5–36.5)
MCV RBC AUTO: 86 FL (ref 78–100)
PLATELET # BLD AUTO: 262 10E3/UL (ref 150–450)
POTASSIUM SERPL-SCNC: 3.8 MMOL/L (ref 3.4–5.3)
RBC # BLD AUTO: 5.33 10E6/UL (ref 4.4–5.9)
SODIUM SERPL-SCNC: 139 MMOL/L (ref 135–145)
WBC # BLD AUTO: 12.5 10E3/UL (ref 4–11)

## 2024-12-08 PROCEDURE — 250N000011 HC RX IP 250 OP 636

## 2024-12-08 PROCEDURE — 250N000013 HC RX MED GY IP 250 OP 250 PS 637: Performed by: INTERNAL MEDICINE

## 2024-12-08 PROCEDURE — 80048 BASIC METABOLIC PNL TOTAL CA: CPT

## 2024-12-08 PROCEDURE — 36415 COLL VENOUS BLD VENIPUNCTURE: CPT

## 2024-12-08 PROCEDURE — 85018 HEMOGLOBIN: CPT

## 2024-12-08 PROCEDURE — 255N000002 HC RX 255 OP 636: Performed by: INTERNAL MEDICINE

## 2024-12-08 PROCEDURE — 250N000013 HC RX MED GY IP 250 OP 250 PS 637: Performed by: PHYSICIAN ASSISTANT

## 2024-12-08 PROCEDURE — 93306 TTE W/DOPPLER COMPLETE: CPT | Mod: 26 | Performed by: INTERNAL MEDICINE

## 2024-12-08 PROCEDURE — 120N000005 HC R&B MS OVERFLOW UMMC

## 2024-12-08 PROCEDURE — 85048 AUTOMATED LEUKOCYTE COUNT: CPT

## 2024-12-08 PROCEDURE — 999N000208 ECHOCARDIOGRAM COMPLETE

## 2024-12-08 PROCEDURE — 82435 ASSAY OF BLOOD CHLORIDE: CPT

## 2024-12-08 PROCEDURE — 83735 ASSAY OF MAGNESIUM: CPT

## 2024-12-08 PROCEDURE — 99233 SBSQ HOSP IP/OBS HIGH 50: CPT | Mod: GC | Performed by: INTERNAL MEDICINE

## 2024-12-08 PROCEDURE — 250N000013 HC RX MED GY IP 250 OP 250 PS 637

## 2024-12-08 PROCEDURE — C8929 TTE W OR WO FOL WCON,DOPPLER: HCPCS

## 2024-12-08 RX ORDER — FUROSEMIDE 20 MG/1
20 TABLET ORAL DAILY
Status: DISCONTINUED | OUTPATIENT
Start: 2024-12-09 | End: 2024-12-09 | Stop reason: HOSPADM

## 2024-12-08 RX ORDER — FUROSEMIDE 10 MG/ML
40 INJECTION INTRAMUSCULAR; INTRAVENOUS ONCE
Status: COMPLETED | OUTPATIENT
Start: 2024-12-08 | End: 2024-12-08

## 2024-12-08 RX ORDER — POTASSIUM CHLORIDE 750 MG/1
20 TABLET, EXTENDED RELEASE ORAL ONCE
Status: COMPLETED | OUTPATIENT
Start: 2024-12-08 | End: 2024-12-08

## 2024-12-08 RX ORDER — FUROSEMIDE 20 MG/1
20 TABLET ORAL DAILY
Status: DISCONTINUED | OUTPATIENT
Start: 2024-12-08 | End: 2024-12-08

## 2024-12-08 RX ADMIN — Medication 1 HALF-TAB: at 10:24

## 2024-12-08 RX ADMIN — RISPERIDONE 1 MG: 0.5 TABLET, ORALLY DISINTEGRATING ORAL at 08:35

## 2024-12-08 RX ADMIN — MICONAZOLE NITRATE: 20 POWDER TOPICAL at 08:42

## 2024-12-08 RX ADMIN — THIAMINE HCL TAB 100 MG 100 MG: 100 TAB at 08:35

## 2024-12-08 RX ADMIN — EMPAGLIFLOZIN 10 MG: 10 TABLET, FILM COATED ORAL at 08:35

## 2024-12-08 RX ADMIN — FUROSEMIDE 40 MG: 10 INJECTION, SOLUTION INTRAMUSCULAR; INTRAVENOUS at 10:24

## 2024-12-08 RX ADMIN — Medication 1 TABLET: at 08:35

## 2024-12-08 RX ADMIN — Medication 1 HALF-TAB: at 20:45

## 2024-12-08 RX ADMIN — CEFTRIAXONE SODIUM 2 G: 2 INJECTION, POWDER, FOR SOLUTION INTRAMUSCULAR; INTRAVENOUS at 15:46

## 2024-12-08 RX ADMIN — DIGOXIN 250 MCG: 250 TABLET ORAL at 08:35

## 2024-12-08 RX ADMIN — HUMAN ALBUMIN MICROSPHERES AND PERFLUTREN 5 ML: 10; .22 INJECTION, SOLUTION INTRAVENOUS at 10:03

## 2024-12-08 RX ADMIN — MICONAZOLE NITRATE: 20 POWDER TOPICAL at 20:44

## 2024-12-08 RX ADMIN — RISPERIDONE 1.5 MG: 1 TABLET, FILM COATED ORAL at 20:44

## 2024-12-08 RX ADMIN — SPIRONOLACTONE 25 MG: 25 TABLET ORAL at 08:35

## 2024-12-08 RX ADMIN — POTASSIUM CHLORIDE 20 MEQ: 750 TABLET, EXTENDED RELEASE ORAL at 08:35

## 2024-12-08 RX ADMIN — AZITHROMYCIN DIHYDRATE 500 MG: 250 TABLET, FILM COATED ORAL at 08:35

## 2024-12-08 ASSESSMENT — ACTIVITIES OF DAILY LIVING (ADL)
ADLS_ACUITY_SCORE: 34
ADLS_ACUITY_SCORE: 34
ADLS_ACUITY_SCORE: 38
ADLS_ACUITY_SCORE: 34
ADLS_ACUITY_SCORE: 38
ADLS_ACUITY_SCORE: 38
ADLS_ACUITY_SCORE: 34
ADLS_ACUITY_SCORE: 38
ADLS_ACUITY_SCORE: 34
ADLS_ACUITY_SCORE: 38
ADLS_ACUITY_SCORE: 34

## 2024-12-08 NOTE — PLAN OF CARE
Shift 1433-5211   ?  A/I : Monitored vitals and assessed pt status. A0x4, cooperative with cares. Vitals stable, tried to wean to RA, but desating when laying in bed, currently on 2 L NC. Sinus tachycardia 110-120s. Afebrile. Urinating adequately via toilet, refused to use hat and uses urinal off/on, good UO after lasix dose. BM x2. Tolerating 2 gm Na diet and 2 L FR. Denies chest pain, palpitations, shortness of breath, nausea, dizziness. Rash on upper right arm and in groin continues, miconazole powder used on groin. Up SBA in room, Etta from patient's  visiting during shift. IV access: PIV SL.     Tests/Procedures:   Changed: started on azithromycin and rocephin  Running:   PRN:   ?  P: Continue with plan of care.

## 2024-12-08 NOTE — PROGRESS NOTES
Cardiology Progress Note  Terrance Hidalgo MRN: [id number]  Age: 24 year old, : 24          Assessment and Plan:     Terrance Hidalgo is a 24 year old male admitted on 2024. He has PMHx of non-ischemic dilated cardiomyopathy, autism, HfrEF (EF 15%) 2/2 NICM who presented with concerns for worsening SOB for 1  day likley due acute heart failure exacerbation.       - continue diuresis with lasix IV  - TTE to be done today  - plan to discuss with the guardian Guerda patient's care, she is only available in late afternoon    #Acute on chronic systolic heart failure/HFrEF (EF 15%) secondary to nonischemic cardiomyopathy  #Acute hypoxic respiratory failure   NYHA Symptom Class III Stage C  Initially presented to the [facility] with onset of SOB for 1 day. Workup notable for elevated BNP 4686, CXR without consolidation, ED POCUS with possible  B lines. S/p IV lasix 40 mg. Negative COVID, Respiratory Panel, BCX to date. Per patient has been compliant with medication (Lives at Group home).   TTE (24): LVIDd 58mm. Biplane LVEF is 16%. Severe hypokinesis of mid to apical RV free wall.  RHC (24): RA mean 6, PA 30/23/26, PCWP 18, Wayne CO/CI 5.0/2.1, PVR 1.6, SVR 1270  2024: RA: 2/4/2 mmHg RV: 22/2 mmHg PA: 20/14 (16) mmHg W: 10/13/10 mmHg PA sat: 63 % Wayne CO/CI: 6.0/2.7 Thermo CO/CI: 5.3/2.4 PVR: 0.99 GROVE   Coronary angiogram: None   EMB (24): Fragments of essentially unremarkable myocardial tissue with no evidence of inflammatory infiltrate   CMR 2024: Mildly dilated left ventricle with severely reduced left ventricular function, LVEF 15%.  Normal right ventricular size and systolic function, RVEF 46%. No myocardial edema or replacement fibrosis or MRI features of acute myocarditis.      ACE-I/ARB/ARNi: Continue entresto  BID  BB: Holding PTA metoprolol XL 25 mg daily due to concern of being in a low flow state  Aldosterone antagonist: Continue  spironolactone 25 mg daily  SGLT2i: Continue Jardiance 10 mg daily  RV Support: Continue digoxin 250 mcg daily, most recent level 0.8 (12/05/2024)  SCD prophylaxis: None  %BiV pacing: N/A  Fluid status: Potentially slightly hypervolemic (PTA  Lasix 20 mg every day PRN), Dry Weight TBD (Wt has been trending down,currently lowest 205 lb)       - continue diuresis with lasix 40mg IV   - Mg and K replacement protocols  - monitor I/Os, daily weights  - plan to refer to EP for an ICD for primary prevention in the outpatient setting       #CAP  Given the new SOB, history of multiple respiratory infections and no clear evidence of volume overload by the day of admission, we will initiate empiric CAP coverage.  - ceftriaxone (12/7-12/11)  - azithromycin (12/7-12/9)    #Bilateral Inguinal Erythema  Per patient is relatively new, started since 1 day, non tender or not pruritic. Concerns for fungal infection  - WOCN consulted   - Miconazole powder ordered      Autism Spectrum Disorder  - continue risperidone 1mg AM, 1.5mg PM     Patient has guardian, Guerda  - please direct all communications and updates through [employer]. Family should be direct to speak with [employer] [phone]        Diet: Fluid restriction 2000 ML FLUID  NPO for Medical/Clinical Reasons Except for: Meds    DVT Prophylaxis: Pneumatic Compression Devices  Swan Catheter: Not present  Cardiac Monitoring: ACTIVE order. Indication: Acute decompensated heart failure (48 hours)  Code Status: Full Code        Patient was discussed with staff attending, Dr. Patience Carbajal.    Zohra Reilly DO,MS  Internal Medicine Resident (PGY-3)  [facility]              Subjective     NAEO. Denies new complaints today.          Objective     Vitals:  Temp:  [97.8  F (36.6  C)-99  F (37.2  C)] 99  F (37.2  C)  Pulse:  [115-135] 135  Resp:  [15-30] 23  BP: ()/(48-65) 93/61  SpO2:  [92 %-98 %] 95 %    Vitals:    12/06/24 2321 12/07/24 0500 12/08/24 0400    Weight: 93 kg (205 lb) 93 kg (205 lb 1.6 oz) 93 kg (205 lb 1.6 oz)       General Appearance:  Appears well and in no acute distress  Respiratory: on room air, normal work of breathing  Cardiovascular: Tachycardic no murmurs appreciated   GI: soft NT, Non distended   Extremities: warm, distal pulses weak but palpable, no edema in BLE          Data:     Results for orders placed or performed during the hospital encounter of 12/05/24 (from the past 24 hours)   Basic metabolic panel   Result Value Ref Range    Sodium 139 135 - 145 mmol/L    Potassium 3.8 3.4 - 5.3 mmol/L    Chloride 104 98 - 107 mmol/L    Carbon Dioxide (CO2) 24 22 - 29 mmol/L    Anion Gap 11 7 - 15 mmol/L    Urea Nitrogen 14.7 6.0 - 20.0 mg/dL    Creatinine 0.79 0.67 - 1.17 mg/dL    GFR Estimate >90 >60 mL/min/1.73m2    Calcium 9.1 8.8 - 10.4 mg/dL    Glucose 112 (H) 70 - 99 mg/dL   Magnesium   Result Value Ref Range    Magnesium 2.2 1.7 - 2.3 mg/dL   CBC with platelets   Result Value Ref Range    WBC Count 12.5 (H) 4.0 - 11.0 10e3/uL    RBC Count 5.33 4.40 - 5.90 10e6/uL    Hemoglobin 14.6 13.3 - 17.7 g/dL    Hematocrit 45.7 40.0 - 53.0 %    MCV 86 78 - 100 fL    MCH 27.4 26.5 - 33.0 pg    MCHC 31.9 31.5 - 36.5 g/dL    RDW 13.9 10.0 - 15.0 %    Platelet Count 262 150 - 450 10e3/uL           Imaging reviewed.    TTE (8/21/24): LVIDd 58mm. Biplane LVEF is 16%. Severe hypokinesis of mid to apical RV free wall.  RHC (8/21/24): RA mean 6, PA 30/23/26, PCWP 18, Wayne CO/CI 5.0/2.1, PVR 1.6, SVR 1270  9/11/2024: RA: 2/4/2 mmHg RV: 22/2 mmHg PA: 20/14 (16) mmHg W: 10/13/10 mmHg PA sat: 63 % Wayne CO/CI: 6.0/2.7 Thermo CO/CI: 5.3/2.4 PVR: 0.99 GROVE   Coronary angiogram: None   EMB (8/21/24): Fragments of essentially unremarkable myocardial tissue with no evidence of inflammatory infiltrate   CMR 9/5/2024: Mildly dilated left ventricle with severely reduced left ventricular function, LVEF 15%.  Normal right ventricular size and systolic function, RVEF 46%. No  myocardial edema or replacement fibrosis or MRI features of acute myocarditis.          Medications     Medications  Current Facility-Administered Medications   Medication Dose Route Frequency Provider Last Rate Last Admin    azithromycin (ZITHROMAX) tablet 500 mg  500 mg Oral Daily Zohra Reilly MD   500 mg at 12/08/24 0835    cefTRIAXone (ROCEPHIN) 2 g vial to attach to  ml bag for ADULTS or NS 50 ml bag for PEDS  2 g Intravenous Q24H Zohra Reilly MD   2 g at 12/07/24 1713    digoxin (LANOXIN) tablet 250 mcg  250 mcg Oral Daily Negrito Alcantara MD   250 mcg at 12/08/24 0835    empagliflozin (JARDIANCE) tablet 10 mg  10 mg Oral Daily Negrito Alcantara MD   10 mg at 12/08/24 0835    furosemide (LASIX) injection 40 mg  40 mg Intravenous Once Zohra Reilly MD        [Held by provider] metoprolol succinate ER (TOPROL-XL) 24 hr half-tab 12.5 mg  12.5 mg Oral Daily Vahid Quarles MD        miconazole (MICATIN) 2 % powder   Topical BID Chika Aguirre MD   Given at 12/08/24 0842    multivitamin w/minerals (THERA-VIT-M) tablet 1 tablet  1 tablet Oral Daily Negrito Alcantara MD   1 tablet at 12/08/24 0835    risperiDONE (risperDAL M-TABS) ODT tab 1 mg  1 mg Oral Daily Negrito Alcantara MD   1 mg at 12/08/24 0835    risperiDONE (risperDAL) tablet 1.5 mg  1.5 mg Oral QPM Negrito Alcantara MD   1.5 mg at 12/07/24 2105    sacubitril-valsartan half-tab 12-13 mg  1 half-tab Oral BID Ashley Arshad PA-C   1 half-tab at 12/07/24 2105    sodium chloride (PF) 0.9% PF flush 3 mL  3 mL Intracatheter Q8H Negrito Alcantara MD   3 mL at 12/07/24 2320    spironolactone (ALDACTONE) tablet 25 mg  25 mg Oral Daily Negrito Alcantara MD   25 mg at 12/08/24 0835    thiamine (B-1) tablet 100 mg  100 mg Oral Daily Negrito Alcantara MD   100 mg at 12/08/24 0835     Current Facility-Administered Medications   Medication Dose Route Frequency Provider Last Rate Last Admin    medication instruction   Does not apply Continuous  Chika Morfin MD

## 2024-12-08 NOTE — PROGRESS NOTES
Brief Social Work note:     received guardianship paperwork from patient's aunt Guerda via email. Paperwork sent to [employer] to be uploaded to patient's chart.     SW called Guerda and sent her an email confirming receipt of paperwork.    Care Management will continue to follow for safe discharge planning.    Alisha Mcclure, MSW   12/8/2024       Social Work and Care Management Department       SEARCHABLE in [facility] - search SOCIAL WORK       [place] (0800 - 1630) Saturday and Sunday     Units: 4A Vocera, 4C Vocera, & 4E Vocera        Units: 5A 7083-2471 Vocera, 5A 9384-4865 Vocera , BMT SW 1 BMT SW 2, BMT SW 3 & BMT SW 4  5C Off Service 5401 - 5416  5C Off Service [phone]     Units: 6A Vocera & 6B Vocera      Units: 6C Vocera     Units: 7A Vocera & 7B Vocera      Units: 7C Med Surg 7401 thru 7418 and 7C Med Surg 7502 thru 7521      Unit: [place] ED Vocera & [place] Obs Vocera     [facility] ([phone]) Saturday and Sunday      Units: 5 Ortho Vocera, 5 Med Surg Vocera & WB ED Vocera     Units: 6 Med Surg Vocera, 8 Med Surg Vocera, & 10 ICU Vocera      After hours Vocera [facility] and After Hours Vocera [place]     Please NOTE changes to times below:    **Saturday & Sunday (1630 - 2030)    **Mon-Fri (0552-2505)     **[facility] Recognized Holidays  ([phone])    Units: ALL   - see above VOCERA links to units

## 2024-12-08 NOTE — PROGRESS NOTES
[id number]  Neuro: A&Ox4. Able to make needs known properly.   Cardiac: ST. VSS. Denies chest pain.   Respiratory: Sating >92% on RA. Denies SOB at rest  GI/: Adequate urine output. BM X1  Diet/appetite: Tolerating 2g NA diet with 2L FR.  Activity:  Up w/ SBA.  Pain: At acceptable level on current regimen.   Skin: No new deficits noted.  LDA's: PIV to left hand.   Plan: Continue with POC. Notify primary team with changes.

## 2024-12-09 VITALS
OXYGEN SATURATION: 100 % | WEIGHT: 203.1 LBS | BODY MASS INDEX: 30.08 KG/M2 | HEART RATE: 122 BPM | RESPIRATION RATE: 25 BRPM | SYSTOLIC BLOOD PRESSURE: 108 MMHG | HEIGHT: 69 IN | TEMPERATURE: 98.3 F | DIASTOLIC BLOOD PRESSURE: 65 MMHG

## 2024-12-09 LAB
ANION GAP SERPL CALCULATED.3IONS-SCNC: 12 MMOL/L (ref 7–15)
BUN SERPL-MCNC: 12.7 MG/DL (ref 6–20)
CALCIUM SERPL-MCNC: 9.1 MG/DL (ref 8.8–10.4)
CHLORIDE SERPL-SCNC: 105 MMOL/L (ref 98–107)
CREAT SERPL-MCNC: 0.66 MG/DL (ref 0.67–1.17)
EGFRCR SERPLBLD CKD-EPI 2021: >90 ML/MIN/1.73M2
ERYTHROCYTE [DISTWIDTH] IN BLOOD BY AUTOMATED COUNT: 14 % (ref 10–15)
GLUCOSE SERPL-MCNC: 106 MG/DL (ref 70–99)
HCO3 SERPL-SCNC: 23 MMOL/L (ref 22–29)
HCT VFR BLD AUTO: 47.4 % (ref 40–53)
HGB BLD-MCNC: 14.9 G/DL (ref 13.3–17.7)
MAGNESIUM SERPL-MCNC: 2.1 MG/DL (ref 1.7–2.3)
MCH RBC QN AUTO: 26.9 PG (ref 26.5–33)
MCHC RBC AUTO-ENTMCNC: 31.4 G/DL (ref 31.5–36.5)
MCV RBC AUTO: 86 FL (ref 78–100)
PLATELET # BLD AUTO: 265 10E3/UL (ref 150–450)
POTASSIUM SERPL-SCNC: 3.9 MMOL/L (ref 3.4–5.3)
RBC # BLD AUTO: 5.54 10E6/UL (ref 4.4–5.9)
SODIUM SERPL-SCNC: 140 MMOL/L (ref 135–145)
WBC # BLD AUTO: 12 10E3/UL (ref 4–11)

## 2024-12-09 PROCEDURE — 36415 COLL VENOUS BLD VENIPUNCTURE: CPT

## 2024-12-09 PROCEDURE — G0463 HOSPITAL OUTPT CLINIC VISIT: HCPCS

## 2024-12-09 PROCEDURE — 97110 THERAPEUTIC EXERCISES: CPT | Mod: GP | Performed by: REHABILITATION PRACTITIONER

## 2024-12-09 PROCEDURE — 250N000013 HC RX MED GY IP 250 OP 250 PS 637

## 2024-12-09 PROCEDURE — 250N000013 HC RX MED GY IP 250 OP 250 PS 637: Performed by: INTERNAL MEDICINE

## 2024-12-09 PROCEDURE — 82565 ASSAY OF CREATININE: CPT

## 2024-12-09 PROCEDURE — 83735 ASSAY OF MAGNESIUM: CPT

## 2024-12-09 PROCEDURE — 85014 HEMATOCRIT: CPT

## 2024-12-09 PROCEDURE — 85041 AUTOMATED RBC COUNT: CPT

## 2024-12-09 PROCEDURE — 250N000013 HC RX MED GY IP 250 OP 250 PS 637: Performed by: PHYSICIAN ASSISTANT

## 2024-12-09 PROCEDURE — 250N000011 HC RX IP 250 OP 636

## 2024-12-09 PROCEDURE — 80048 BASIC METABOLIC PNL TOTAL CA: CPT

## 2024-12-09 RX ORDER — CEFDINIR 300 MG/1
300 CAPSULE ORAL 2 TIMES DAILY
Qty: 12 CAPSULE | Refills: 0 | Status: ACTIVE | OUTPATIENT
Start: 2024-12-09 | End: 2024-12-15

## 2024-12-09 RX ORDER — CEFDINIR 300 MG/1
300 CAPSULE ORAL 2 TIMES DAILY
Qty: 18 CAPSULE | Refills: 0 | Status: ACTIVE | OUTPATIENT
Start: 2024-12-09 | End: 2024-12-09

## 2024-12-09 RX ORDER — FUROSEMIDE 20 MG/1
20 TABLET ORAL DAILY
Qty: 60 TABLET | Refills: 1 | Status: SHIPPED | OUTPATIENT
Start: 2024-12-10

## 2024-12-09 RX ORDER — LEVOFLOXACIN 750 MG/1
750 TABLET, FILM COATED ORAL DAILY
Qty: 7 TABLET | Refills: 0 | Status: SHIPPED | OUTPATIENT
Start: 2024-12-09 | End: 2024-12-09

## 2024-12-09 RX ORDER — METOPROLOL SUCCINATE 25 MG/1
12.5 TABLET, EXTENDED RELEASE ORAL DAILY
Qty: 60 TABLET | Refills: 1 | Status: SHIPPED | OUTPATIENT
Start: 2024-12-09

## 2024-12-09 RX ADMIN — EMPAGLIFLOZIN 10 MG: 10 TABLET, FILM COATED ORAL at 08:30

## 2024-12-09 RX ADMIN — RISPERIDONE 1 MG: 0.5 TABLET, ORALLY DISINTEGRATING ORAL at 08:31

## 2024-12-09 RX ADMIN — AZITHROMYCIN DIHYDRATE 500 MG: 250 TABLET, FILM COATED ORAL at 08:29

## 2024-12-09 RX ADMIN — MICONAZOLE NITRATE: 20 POWDER TOPICAL at 08:33

## 2024-12-09 RX ADMIN — CEFTRIAXONE SODIUM 2 G: 2 INJECTION, POWDER, FOR SOLUTION INTRAMUSCULAR; INTRAVENOUS at 13:48

## 2024-12-09 RX ADMIN — THIAMINE HCL TAB 100 MG 100 MG: 100 TAB at 08:30

## 2024-12-09 RX ADMIN — FUROSEMIDE 20 MG: 20 TABLET ORAL at 08:30

## 2024-12-09 RX ADMIN — Medication 1 HALF-TAB: at 09:29

## 2024-12-09 RX ADMIN — DIGOXIN 250 MCG: 250 TABLET ORAL at 08:29

## 2024-12-09 RX ADMIN — SPIRONOLACTONE 25 MG: 25 TABLET ORAL at 08:30

## 2024-12-09 RX ADMIN — Medication 1 TABLET: at 08:30

## 2024-12-09 ASSESSMENT — ACTIVITIES OF DAILY LIVING (ADL)
ADLS_ACUITY_SCORE: 38
ADLS_ACUITY_SCORE: 38
ADLS_ACUITY_SCORE: 36
ADLS_ACUITY_SCORE: 36
ADLS_ACUITY_SCORE: 38
ADLS_ACUITY_SCORE: 36
ADLS_ACUITY_SCORE: 38

## 2024-12-09 NOTE — PLAN OF CARE
Goal Outcome Evaluation:         Right heart cath cancelled. Patient discharging back to group home

## 2024-12-09 NOTE — PROGRESS NOTES
[id number]  Neuro: A&Ox4. Able to make needs known  Cardiac: ST. Soft Bps all other VSS. Denies chest pain.   Respiratory: Sating >92% on 2L NC.  GI/: Adequate urine output, encouraged to use urinal. No BM overnight.   Diet/appetite: NPO.  Activity:  Up ind/SBA.  Pain: At acceptable level on current regimen.   Skin: No new deficits noted.  LDA's:  PIV to left hand.   Plan: Continue with POC. Notify primary team with changes.

## 2024-12-09 NOTE — PROGRESS NOTES
Cardiology Progress Note  Terrance Hidalgo MRN: [id number]  Age: 24 year old, : 24          Assessment and Plan:     Terrance Hidalgo is a 24 year old male admitted on 2024. He has PMHx of non-ischemic dilated cardiomyopathy, autism, HfrEF (EF 15%) 2/2 NICM who presented with concerns for worsening SOB for 1  day likley due acute heart failure exacerbation.       - continue diuresis with lasix IV  - TTE to be done today  - plan to discuss with the guardian Guerda patient's care, she is only available in late afternoon    #Acute on chronic systolic heart failure/HFrEF (EF 15%) secondary to nonischemic cardiomyopathy  #Acute hypoxic respiratory failure   NYHA Symptom Class III Stage C  Initially presented to the [facility] with onset of SOB for 1 day. Workup notable for elevated BNP 4686, CXR without consolidation, ED POCUS with possible  B lines. S/p IV lasix 40 mg. Negative COVID, Respiratory Panel, BCX to date. Per patient has been compliant with medication (Lives at Group home).    We started Terrance on IV lasix as well as empiric treatment of CAP. He reported subjective improvement in SOB and has been on room air throughout the stay. We will change his diuretic from 20mg PO prn to 40 mg*** PO daily.   We will plan for outpatient RHC and close follow up  ACE-I/ARB/ARNi: Continue entresto  BID  BB: Holding PTA metoprolol XL 25 mg daily due to concern of being in a low flow state  Aldosterone antagonist: Continue spironolactone 25 mg daily  SGLT2i: Continue Jardiance 10 mg daily  RV Support: Continue digoxin 250 mcg daily, most recent level 0.8 (2024)  SCD prophylaxis: None  %BiV pacing: N/A  Fluid status: Potentially slightly hypervolemic (PTA  Lasix 20 mg every day PRN), Dry Weight TBD (Wt has been trending down,currently lowest 205 lb)       - Mg and K replacement protocols  - monitor I/Os, daily weights  - seeing Dr. Santos in January to discuss an ICD for  primary prevention in the outpatient setting  - plan for outpatient RHC and close follow up       #CAP  Given the new SOB, history of multiple respiratory infections and no clear evidence of volume overload by the day of admission, we will initiate empiric CAP coverage.  - ceftriaxone (12/7-12/11)  - azithromycin (12/7-12/9)    #Bilateral Inguinal Erythema  Per patient is relatively new, started since 1 day, non tender or not pruritic. Concerns for fungal infection  - WOCN consulted   - Miconazole powder ordered      Autism Spectrum Disorder  - continue risperidone 1mg AM, 1.5mg PM     Patient has guardian, Guerda  - please direct all communications and updates through [employer]. Family should be direct to speak with Guerda [phone]        Diet: Fluid restriction 2000 ML FLUID  NPO for Medical/Clinical Reasons Except for: Meds    DVT Prophylaxis: Pneumatic Compression Devices  Swan Catheter: Not present  Cardiac Monitoring: ACTIVE order. Indication: Acute decompensated heart failure (48 hours)  Code Status: Full Code        Patient was discussed with staff attending, Dr. Patience Carbajal.    Zohra Reilly DO,MS  Internal Medicine Resident (PGY-3)  [facility]              Subjective     NAEO. Denies Sob, chest pain, fevers/          Objective     Vitals:  Temp:  [97.8  F (36.6  C)-98.9  F (37.2  C)] 98.9  F (37.2  C)  Pulse:  [109-114] 112  Resp:  [21-31] 31  BP: ()/(55-74) 91/55  SpO2:  [95 %-100 %] 98 %    Vitals:    12/07/24 0500 12/08/24 0400 12/09/24 0400   Weight: 93 kg (205 lb 1.6 oz) 93 kg (205 lb 1.6 oz) 92.1 kg (203 lb 1.6 oz)       General Appearance:  Appears well and in no acute distress  Respiratory: on room air, normal work of breathing, no crackles or wheezing  Cardiovascular: Tachycardic no murmurs appreciated   GI: soft NT, Non distended   Extremities: warm, distal pulses weak but palpable, no edema in BLE          Data:     Results for orders placed or performed  during the hospital encounter of 24 (from the past 24 hours)   Echocardiogram Complete   Result Value Ref Range    LVEF  10-15% (severely reduced)     [facility]    [id number]  [id number]  [id number]  [id number]^FISH^DRISS     [facility]  Echocardiography Laboratory  [address] [place] [address]     Name: TRISH BURTON  MRN: [id number]  : 2000  Study Date: 2024 09:52 AM  Age: 24 yrs  Gender: Male  Patient Location: [facility]  Reason For Study: Heart Failure  Ordering Physician: DRISS WHITTEN  Performed By: Zahida Michel     BSA: 2.1 m2  Height: 69 in  Weight: 205 lb  BP: 99/50 mmHg  ______________________________________________________________________________  Procedure  Complete Portable Echo Adult. Contrast Optison. Optison (NDC #2206-9334-77)  given intravenously. Patient was given 5 ml mixture of 3 ml Optison and 6 ml  saline. 4 ml wasted. The patient's rhythm is sinus tachycardia.  ______________________________________________________________________________  Interpretation Summary  The patient's rhythm is sinus tachycardia.     Left ventricular function is decreased. The ejection fraction is 10-15%  (severely reduced).  Apical wall akinesis is present.  The right ventricle is normal size.  Global right ventricular function is mildly reduced.  No significant valvular abnormalities present.  The inferior vena cava cannot be assessed.     This study was compared with the study from 10/24/2024 .  No significant changes noted.     ______________________________________________________________________________  Left Ventricle  Left ventricular size is normal. Left ventricular wall thickness is normal.  Diastolic function not assessed due to tachycardia. Left ventricular function  is decreased. The ejection fraction is 10-15% (severely reduced). Severe  diffuse hypokinesis is present. Apical wall akinesis is present.     Right Ventricle  The right  ventricle is normal size. Global right ventricular function is  mildly reduced.     Atria  Both atria appear normal.     Mitral Valve  The mitral valve is normal. Trace mitral insufficiency is present.     Aortic Valve  Aortic valve is normal in structure and function.     Tricuspid Valve  Mild tricuspid insufficiency is present. The right ventricular systolic  pressure is approximated at 36.2 mmHg plus the right atrial pressure. The  right ventricular systolic pressure is 33mmHg above the right atrial pressure.     Pulmonic Valve  On Doppler interrogation, there is no significant stenosis or regurgitation.     Vessels  The aorta root is normal. The thoracic aorta is normal. The inferior vena cava  cannot be assessed.     Pericardium  No pericardial effusion is present.     Miscellaneous  No significant valvular abnormalities present.     Compared to Previous Study  This study was compared with the study from 10/24/2024 . No significant  changes noted.  ______________________________________________________________________________  MMode/2D Measurements & Calculations  IVSd: 0.69 cm  LVIDd: 5.7 cm  LVIDs: 5.3 cm  LVPWd: 1.0 cm  FS: 6.7 %  LV mass(C)d: 179.3 grams  LV mass(C)dI: 85.9 grams/m2  Ao root diam: 2.8 cm  asc Aorta Diam: 2.5 cm  LVOT diam: 2.2 cm  LVOT area: 3.8 cm2     EF(MOD-bp): 7.6 %  Ao root diam index Ht(cm/m): 1.6  Ao root diam index BSA (cm/m2): 1.3  Asc Ao diam index BSA (cm/m2): 1.2  Asc Ao diam index Ht(cm/m): 1.4     RWT: 0.35  TAPSE: 0.81 cm     Doppler Measurements & Calculations  MV E max ayan: 93.2 cm/sec  MV dec slope: 1135 cm/sec2  MV dec time: 0.08 sec  Ao V2 max: 73.2 cm/sec  Ao max P.1 mmHg  Ao V2 mean: 59.9 cm/sec  Ao mean P.0 mmHg  Ao V2 VTI: 11.9 cm  NENA(I,D): 2.2 cm2  NENA(V,D): 2.3 cm2  LV V1 max P.81 mmHg  LV V1 max: 44.9 cm/sec  LV V1 VTI: 6.8 cm  SV(LVOT): 26.0 ml  SI(LVOT): 12.5 ml/m2  TR max ayan: 301.0 cm/sec  TR max P.2 mmHg  AV Ayan Ratio (DI): 0.61  NENA Index  (cm2/m2): 1.0  E/E' avg: 15.0  Lateral E/e': 14.0  Medial E/e': 15.9  RV S Ayan: 9.5 cm/sec     ______________________________________________________________________________  Report approved by: ROSALBA BALDERAS MD on 12/08/2024 12:56 PM         Basic metabolic panel   Result Value Ref Range    Sodium 140 135 - 145 mmol/L    Potassium 3.9 3.4 - 5.3 mmol/L    Chloride 105 98 - 107 mmol/L    Carbon Dioxide (CO2) 23 22 - 29 mmol/L    Anion Gap 12 7 - 15 mmol/L    Urea Nitrogen 12.7 6.0 - 20.0 mg/dL    Creatinine 0.66 (L) 0.67 - 1.17 mg/dL    GFR Estimate >90 >60 mL/min/1.73m2    Calcium 9.1 8.8 - 10.4 mg/dL    Glucose 106 (H) 70 - 99 mg/dL   Magnesium   Result Value Ref Range    Magnesium 2.1 1.7 - 2.3 mg/dL   CBC with platelets   Result Value Ref Range    WBC Count 12.0 (H) 4.0 - 11.0 10e3/uL    RBC Count 5.54 4.40 - 5.90 10e6/uL    Hemoglobin 14.9 13.3 - 17.7 g/dL    Hematocrit 47.4 40.0 - 53.0 %    MCV 86 78 - 100 fL    MCH 26.9 26.5 - 33.0 pg    MCHC 31.4 (L) 31.5 - 36.5 g/dL    RDW 14.0 10.0 - 15.0 %    Platelet Count 265 150 - 450 10e3/uL           Imaging reviewed.    TTE (12/07/2024)  Interpretation Summary  The patient's rhythm is sinus tachycardia.     Left ventricular function is decreased. The ejection fraction is 10-15%  (severely reduced).  Apical wall akinesis is present.  The right ventricle is normal size.  Global right ventricular function is mildly reduced.  No significant valvular abnormalities present.  The inferior vena cava cannot be assessed.     This study was compared with the study from 10/24/2024 .  No significant changes noted.    TTE (8/21/24): LVIDd 58mm. Biplane LVEF is 16%. Severe hypokinesis of mid to apical RV free wall.  RHC (8/21/24): RA mean 6, PA 30/23/26, PCWP 18, Wayne CO/CI 5.0/2.1, PVR 1.6, SVR 1270  9/11/2024: RA: 2/4/2 mmHg RV: 22/2 mmHg PA: 20/14 (16) mmHg W: 10/13/10 mmHg PA sat: 63 % Wayne CO/CI: 6.0/2.7 Thermo CO/CI: 5.3/2.4 PVR: 0.99 GROVE   Coronary angiogram: None   EMB  (8/21/24): Fragments of essentially unremarkable myocardial tissue with no evidence of inflammatory infiltrate   CMR 9/5/2024: Mildly dilated left ventricle with severely reduced left ventricular function, LVEF 15%.  Normal right ventricular size and systolic function, RVEF 46%. No myocardial edema or replacement fibrosis or MRI features of acute myocarditis.          Medications     Medications  Current Facility-Administered Medications   Medication Dose Route Frequency Provider Last Rate Last Admin    cefTRIAXone (ROCEPHIN) 2 g vial to attach to  ml bag for ADULTS or NS 50 ml bag for PEDS  2 g Intravenous Q24H Zohra Reilly MD   2 g at 12/08/24 1546    digoxin (LANOXIN) tablet 250 mcg  250 mcg Oral Daily Negrito Alcantara MD   250 mcg at 12/09/24 0829    empagliflozin (JARDIANCE) tablet 10 mg  10 mg Oral Daily Negrito Alcantara MD   10 mg at 12/09/24 0830    furosemide (LASIX) tablet 20 mg  20 mg Oral Daily Zohra Reilly MD   20 mg at 12/09/24 0830    [Held by provider] metoprolol succinate ER (TOPROL-XL) 24 hr half-tab 12.5 mg  12.5 mg Oral Daily Vahid Quarles MD        miconazole (MICATIN) 2 % powder   Topical BID Chika Aguirre MD   Given at 12/09/24 0833    multivitamin w/minerals (THERA-VIT-M) tablet 1 tablet  1 tablet Oral Daily Negrito Alcantara MD   1 tablet at 12/09/24 0830    risperiDONE (risperDAL M-TABS) ODT tab 1 mg  1 mg Oral Daily Negrito Alcantara MD   1 mg at 12/09/24 0831    risperiDONE (risperDAL) tablet 1.5 mg  1.5 mg Oral QPM Negrito Alcantara MD   1.5 mg at 12/08/24 2044    sacubitril-valsartan half-tab 12-13 mg  1 half-tab Oral BID Ashley Arshad PA-C   1 half-tab at 12/08/24 2045    sodium chloride (PF) 0.9% PF flush 3 mL  3 mL Intracatheter Q8H Negrito Alcantara MD   3 mL at 12/09/24 0833    spironolactone (ALDACTONE) tablet 25 mg  25 mg Oral Daily Negrito Alcantara MD   25 mg at 12/09/24 0830    thiamine (B-1) tablet 100 mg  100 mg Oral Daily Negrito Alcantara MD   100  mg at 12/09/24 0830     Current Facility-Administered Medications   Medication Dose Route Frequency Provider Last Rate Last Admin    medication instruction   Does not apply Continuous PRN Chika Aguirre MD

## 2024-12-09 NOTE — DISCHARGE SUMMARY
"    [address] [address]  p: [phone]    Discharge Summary: Cardiology Service    Terrance Hidalgo MRN# [id number]   YOB: 2000 Age: 24 year old       Admission Date: 12/5/2024  Discharge Date: 12/09/24    Discharge Diagnoses:  #Acute on chronic systolic heart failure/HFrEF (EF 15%) secondary to nonischemic cardiomyopathy  #Acute hypoxic respiratory failure , resolved  #CAP  #Bilateral Inguinal Erythema  #Autism Spectrum Disorder      Brief HPI:  \"  Terrance is a 24-year-old male who was diagnosed with heart failure few months ago which was complicated by cardiogenic shock requiring balloon pump support and inotropes, ultimately weaned off all that and discharged on medical therapies.  Since then he has had hospitalizations for pneumonia, sepsis, hypotension..  He lives in a group home and was brought into the emergency room couple nights ago with shortness of breath coughing and not feeling well.  Due to his autism he is not able to effectively communicate his symptoms but his aunt was concerned by what he reported.     Evaluation here suggested baseline borderline blood pressures and sinus tachycardia.  Chest x-ray without concerning pathology.\"      Hospital Course by Diagnosis:    #Acute on chronic systolic heart failure/HFrEF (EF 15%) secondary to nonischemic cardiomyopathy  #Acute hypoxic respiratory failure , resolved  NYHA Symptom Class III Stage C  Initially presented to the [facility] with onset of SOB for 1 day. Workup notable for elevated BNP 4686, CXR without consolidation, ED POCUS with possible  B lines. S/p IV lasix 40 mg. Negative COVID, Respiratory Panel, BCX to date. Per patient has been compliant with medication (Lives at Group home).    We started Terrance on IV lasix as well as empiric treatment of CAP. He reported subjective improvement in SOB and has been on room air throughout the stay. We will change his diuretic from " 20mg PO prn to scheduled.  We also decreased his metoprolol from 25mg daily to 12.5 mg daily.   He will be discharged to his group home with close follow up with cardiology.  He is also seeing Dr. Santos in January to discuss an ICD for primary prevention in the outpatient setting .      #CAP  Given the new SOB, history of multiple respiratory infections and no clear evidence of volume overload by the day of admission, we initiated empiric CAP coverage. He completed azithromycin 3 day course here and will be discharged with Cefdinir 7 day course in total. He does have history of penicillin allergies, so we checked in with pharmacy confirm that there is no concern for the crossreactivity risk.      #Bilateral Inguinal Erythema  Per patient is relatively new, started since 1 day, non tender or not pruritic. Concerns for fungal infection  - WOCN consulted   - Miconazole powder ordered      #Autism Spectrum Disorder  - continue risperidone 1mg AM, 1.5mg PM    Pertinent Procedures:  N/A    Consults:  N/A    Medication Changes:  - changed lasix 20 mg daily from prn to scheduled  - start cefdinir for 6 days (received ceftriaxone on admission)  - change metoprolol succinate from 25mg to 12.5mg daily    Discharge medications:   Discharge Medication List as of 12/9/2024  2:31 PM        CONTINUE these medications which have CHANGED    Details   cefdinir (OMNICEF) 300 MG capsule Take 1 capsule (300 mg) by mouth 2 times daily for 6 days., Disp-12 capsule, R-0, E-Prescribe      furosemide (LASIX) 20 MG tablet Take 1 tablet (20 mg) by mouth daily., Disp-60 tablet, R-1, E-Prescribe      metoprolol succinate ER (TOPROL XL) 25 MG 24 hr tablet Take 0.5 tablets (12.5 mg) by mouth daily., Disp-60 tablet, R-1, E-Prescribe           CONTINUE these medications which have NOT CHANGED    Details   acetaminophen (TYLENOL) 325 MG tablet Take 2 tablets (650 mg) by mouth or Feeding Tube every 8 hours as needed for mild pain., Disp-90 tablet, R-2,  E-Prescribe      cetirizine (ZYRTEC) 5 MG tablet Take 0.5 tablets (2.5 mg) by mouth every 6 hours as needed for allergies (Give 30 minutes prior to each amoxicillin dose for rash)., Disp-90 tablet, R-2, E-Prescribe      digoxin (LANOXIN) 250 MCG tablet Take 1 tablet (250 mcg) by mouth daily., Disp-30 tablet, R-3, E-Prescribe      empagliflozin (JARDIANCE) 10 MG TABS tablet Take 1 tablet (10 mg) by mouth daily., Disp-90 tablet, R-1, E-Prescribe      multivitamin w/minerals (THERA-VIT-M) tablet Take 1 tablet by mouth daily., Disp-30 tablet, R-3, E-Prescribe      polyethylene glycol (MIRALAX) 17 GM/Dose powder Take 17 g by mouth daily as needed for constipation., Disp-510 g, R-3, E-Prescribe      reason aspirin not prescribed, intentional, Reason ASA was Not Prescribed: Not indicated based on ageHistorical      !! risperiDONE (RISPERDAL) 0.5 MG tablet Take 3 tablets (1.5 mg) by mouth every evening., Disp-90 tablet, R-3, E-Prescribe      !! risperiDONE (RISPERDAL) 1 MG tablet Take 1 tablet (1 mg) by mouth daily., Disp-30 tablet, R-3, E-Prescribe      sacubitril-valsartan (ENTRESTO) 24-26 MG per tablet Take 0.5 tablets by mouth 2 times daily., Disp-30 tablet, R-3, E-Prescribe      selenium sulfide (SELSUN) 1 % LOTN shampoo Apply topically daily as needed for itching.Disp-420 mL, [id number]      senna-docusate (SENOKOT-S/PERICOLACE) 8.6-50 MG tablet Take 1 tablet by mouth or Feeding Tube daily as needed for constipation., Disp-30 tablet, R-2, E-Prescribe      spironolactone (ALDACTONE) 25 MG tablet Take 1 tablet (25 mg) by mouth daily., Disp-30 tablet, R-3, E-Prescribe      STATIN NOT PRESCRIBED (INTENTIONAL) Historical      thiamine (B-1) 100 MG tablet Take 1 tablet (100 mg) by mouth daily., Disp-30 tablet, R-3, E-Prescribe      triamcinolone (KENALOG) 0.1 % external ointment Apply topically 2 times daily.Disp-80 g, [id number]       !! - Potential duplicate medications found. Please discuss with provider.           Follow-up:  [facility] follow up in 1-2 weeks - scheduled for December 18th with an DOMINICK  EP follow up on January 16th with Dr. Santos    Labs or imaging requiring follow-up after discharge:  N/A    Code status:  Full Code    Condition on discharge  Temp:  [97.8  F (36.6  C)-98.9  F (37.2  C)] 98.3  F (36.8  C)  Pulse:  [109-122] 122  Resp:  [21-31] 25  BP: ()/(55-74) 108/65  SpO2:  [95 %-100 %] 100 %  General Appearance:  Appears well and in no acute distress  Respiratory: on room air, normal work of breathing, no crackles or wheezing  Cardiovascular: Tachycardic no murmurs appreciated   GI: soft NT, Non distended   Extremities: warm, distal pulses weak but palpable, no edema in BLE      TTE (12/07/2024)  Interpretation Summary  The patient's rhythm is sinus tachycardia.     Left ventricular function is decreased. The ejection fraction is 10-15%  (severely reduced).  Apical wall akinesis is present.  The right ventricle is normal size.  Global right ventricular function is mildly reduced.  No significant valvular abnormalities present.  The inferior vena cava cannot be assessed.     This study was compared with the study from 10/24/2024 .  No significant changes noted.    EXAM: XR CHEST PORT 1 VIEW  LOCATION: [facility]  DATE: 12/5/2024     INDICATION: Severe CHF, dyspnea, volume overload  COMPARISON: 09/05/2024                                                                      IMPRESSION: Cardiomegaly, unchanged. Pulmonary vascularity normal. The lungs are clear. No infiltrates or effusions. Scoliosis.      Patient Care Team:  Colt Flores MD as PCP - General (Family Medicine)  Farrah Delgado MD as MD (Advanced Heart Failure and Transplant Cardiology)  Hailee Narayanan, RN as Specialty Care Coordinator (Cardiology)  Marily Heredia MD as Assigned Surgical Provider  Ravi Whittington RN as Specialty Care Coordinator (Cardiology)  Jani Martin MD  as Assigned Heart and Vascular Provider    Patient seen and discussed with Dr. Solomon Reilly DO, MS  Internal Medicine Resident (PGY-3)  [facility]

## 2024-12-09 NOTE — PROGRESS NOTES
Care Management Discharge Note    Discharge Date: 12/9/2024     Discharge Disposition: Return to Group Home    Discharge Services: [facility]    Discharge DME: No new DME, pt being fitted for home CPAP on Wednesday 12/11 following recent sleep study for PADMA.     Discharge Transportation: family or friend will provide    Private pay costs discussed: Not applicable, Group home staff will transport    Education Provided on the Discharge Plan: Yes  Persons Notified of Discharge Plans: Pt, aunt/guardian MD Guerda  Patient/Family in Agreement with the Plan: Yes    Handoff Referral Completed: Yes, non-[facility] PCP: External handoff communication completed    Additional Information:  Per MD, pt medically ready for discharge back to group [place]. Left voicemail with group home staff requesting a call back to discuss.    Per weekend SW, pt's aunt, Guerda is now pt's legal guardian. She emailed updated paperwork to [employer] yesterday. This writer emailed [employer] to confirm receipt, awaiting response.     This writer called and updated Guerda that pt is medically ready for discharge. Guerda stated she will also reach out to [facility] herself to update them and inquire if they can transport pt home today. This writer inquired with Guerda if pt was on nocturnal oxygen prior to admission. Guerda stated that pt had his sleep study completed a few weeks ago for PADMA and they recommended a CPAP machine. Guerda confirmed pt is being fitted for his home CPAP machine this Wednesday 12/11.     1200: Guerda called back and confirmed that group home staff can come  patient this afternoon around 3pm. Updated pt, bedside RN and MD.     1315: Confirmed with [employer] that they have received pt's updated guardianship paperwork. Confirmed ok to complete discharging planning with aunt, they will work on getting Epic information updated.     Writer was able to connect with Alia  who confirmed  discharge orders should be sent with  staff coming to  pt. Also confirmed preferred pharmacy for new medications.     [facility] manager: Alia, Phone: [phone]  Nurse, Lydia Rodney: [phone]   Pharmacy: [facility] on [address]    CC will continue to monitor patient's medical condition and progress towards discharge.  Kristen Flynn RN BSN  6C Unit Care Coordinator  Phone number: [phone]    SEARCHABLE in [facility] - search CARE COORDINATOR      [place] [facility] [place] (0800-1630) Saturday & Sunday; [phone])  Recognized Holidays      Units: 5A Onc Vocera & 5C Vocera      Units: 6B Vocera & 6C Vocera       Units: 7A SOT RNCC Vocera, 7B Med Surg Vocera, & 7C Med Surg Vocera      Units: 6A Vocera & 4A CVICU Vocera, 4C MICU Vocera, and 4E SICU Vocera       Units: 5 Ortho Vocera & 5 Med Surg Vocera      Units: 6 Med Surg Vocera & 8 Med Surg Vocera

## 2024-12-09 NOTE — CONSULTS
[facility]  WOC Nurse Inpatient Assessment     Consulted for: bilateral inguinal erythema    Summary: patient resting in bed, playing video games    Patient History (according to provider note(s):      Terrance Hidalgo is a 24 year old male admitted on 12/5/2024. He has PMHx of non-ischemic dilated cardiomyopathy, autism, HfrEF (EF 15%) 2/2 NICM who presented with concerns for worsening SOB for 1 day likley due acute heart failure exacerbation.     Assessment:      Skin Injury Location: groin areas      12/9 R groin                                                  L groin    Last photo: 12/9  Skin injury due to: Fungal rash   Skin history and plan of care:   patient resting in bed, gown above waist to L groin was immediately visible. Satellite lesions notes with slight erythema. Powder in place and at bedside.   Affected area:      Skin assessment: Intact and Rash     Measurements (length x width x depth, in cm) bilateral groin fungal rash, no open areas. Approx 16 x 6 area of rash.     Color: normal and consistent with surrounding tissue     Temperature  normal      Drainage: none .      Color: none      Odor: mild  Pain: denies , none  Pain interventions prior to dressing change: slow and gentle cares   Treatment goal: Infection control/prevention and Protection  STATUS: initial assessment  Supplies ordered: at bedside     Treatment Plan:     Continue standard of care with maci cares and application of antifungal powder - see MAR    Orders: Reviewed    RECOMMEND PRIMARY TEAM ORDER: None, at this time  Education provided: plan of care and Hygiene  Discussed plan of care with: Patient and Nurse  WOC nurse follow-up plan: signing off  Notify WOC if wound(s) deteriorate.  Nursing to notify the Provider(s) and re-consult the WOC Nurse if new skin concern.    DATA:     Current support surface: Standard  Standard gel mattress (Isoflex)  Containment of urine/stool: Continent of  bladder and Continent of bowel  BMI: Body mass index is 29.99 kg/m .   Active diet order: Orders Placed This Encounter      Regular Diet Adult     Output: I/O last 3 completed shifts:  In: 1200 [P.O.:1200]  Out: 525 [Urine:525]     Labs:   Recent Labs   Lab 12/09/24  0524 12/08/24  0505 12/07/24  0540 12/06/24  0628 12/05/24  2229   ALBUMIN  --   --  3.7  --   --    HGB 14.9   < > 14.3   < > 14.9   INR  --   --   --   --  1.07   WBC 12.0*   < > 11.2*   < > 12.6*    < > = values in this interval not displayed.     Pressure injury risk assessment:   Sensory Perception: 4-->no impairment  Moisture: 3-->occasionally moist  Activity: 3-->walks occasionally  Mobility: 4-->no limitation  Nutrition: 3-->adequate  Friction and Shear: 3-->no apparent problem  Haseeb Score: 20    Pager no longer is use, please contact through [employer]   Jinny group: [facility] Nurse [facility]   Dept. Office Number: [phone]

## 2024-12-10 ENCOUNTER — CARE COORDINATION (OUTPATIENT)
Dept: CARDIOLOGY | Facility: CLINIC | Age: 24
End: 2024-12-10
Payer: MEDICARE

## 2024-12-10 ENCOUNTER — TELEPHONE (OUTPATIENT)
Dept: CARDIOLOGY | Facility: CLINIC | Age: 24
End: 2024-12-10
Payer: MEDICARE

## 2024-12-10 ENCOUNTER — MEDICAL CORRESPONDENCE (OUTPATIENT)
Dept: HEALTH INFORMATION MANAGEMENT | Facility: CLINIC | Age: 24
End: 2024-12-10
Payer: MEDICARE

## 2024-12-10 DIAGNOSIS — I42.0 DILATED CARDIOMYOPATHY (H): ICD-10-CM

## 2024-12-10 DIAGNOSIS — I50.20 HEART FAILURE WITH REDUCED EJECTION FRACTION, NYHA CLASS II (H): ICD-10-CM

## 2024-12-10 DIAGNOSIS — I50.23 ACUTE ON CHRONIC SYSTOLIC CONGESTIVE HEART FAILURE (H): Primary | ICD-10-CM

## 2024-12-10 LAB — BACTERIA BLD CULT: NO GROWTH

## 2024-12-10 RX ORDER — LIDOCAINE 40 MG/G
CREAM TOPICAL
OUTPATIENT
Start: 2024-12-10

## 2024-12-10 NOTE — TELEPHONE ENCOUNTER
Patient Contacted for the patient to call back and schedule the following:    Appointment type:  [facility]   Provider: TAI   Return date: 02/07/25  Specialty phone number: [phone] OPT 1   Additional appointment(s) needed: N/A   Additonal Notes: N/A

## 2024-12-10 NOTE — TELEPHONE ENCOUNTER
Cath Lab Case Request/Order    Location: [address] [address] [facility] Waiting Room    Procedure: Right Heart Cath (RHC)    Procedure Date: 02/07/25    Patient Arrival Time: 10:30AM    Procedure Time: 3rd case to follow    Ordering Provider: Dr. Jani Martin    Performing Cardiologist: Dr. Jani Martin    Inpatient Bed Needed: No    Post-  Procedure DOMINICK appointment scheduled (1 - 2 weeks): NO     Date: N/A      Provider: N/A     Communicated Patient Instructions:     NPO, nothing to eat 8 hours and drink 2 hours before arrival time: No     , need to arrange a ride home - unable to drive post- procedure: No     Adult at home, need a responsible adult to stay with patient 24 hours post- procedure: No    Appointment was scheduled: Over the phone    Patient expressed understanding of above instructions and denied further questions at this time.    Lisa Hopson

## 2024-12-10 NOTE — PROGRESS NOTES
[facility]: Cardiology Nurse Care Coordination   Post Discharge Phone Call Note    Terrance Hidalgo's most recent inpatient dates and diagnoses:   Admission Date: 12/5/2024   Admission Diagnosis: Acute on chronic systolic congestive heart failure (H) - I50.23   Discharge Date: 12/9/2024   Discharge Diagnosis: Acute on chronic systolic congestive heart failure (H) - I50.23  Cardiogenic shock (H) - R57.0  Cough, unspecified type - R05.9  Weight gain - R63.5  Weakness - R53.1  Other fatigue - R53.83  Fungal infection - B49  Pneumonia of both lungs due to infectious organism, unspecified part of lung - J18.9     Per hospital discharge summary and inpatient provider notes:   Terrance has severe heart failure and was admitted with shortness of breath attributed to possibly mild volume overload and possible pneumonia. He is on empiric antibiotics. We have diuresed him with resolution of symptoms. He has persistent chronic sinus tach and marginal blood pressures which are not new. He has evidence of adequate perfusion clinically and by labs but expect that his hemodynamics are marginal at best. He can continue low-dose GDMT due to hypotension. We will arrange follow-up in the core clinic, continue follow-up with Dr. Martin and he already has EP follow-up scheduled. Can consider further hemodynamic evaluation as an outpatient if indicated      Post Discharge Assessment:  Called and spoke with . Patient is doing well.     Medication Review:  I have reviewed all Terrance's medications per the discharge summary with particular attention to any medication changes, including discontinued and new medications.     Follow-Up Plan:  Right heart cath in 2 months with Dr. Martin  Date: 12/10/2024    Time of Call: 9:05 AM     Diagnosis:  HF     [ VORB ] Ordering provider: Dr. Martin  Order: Right heart cath with Dr. Martin in 2 months.     Order received by: Ravi Whittington RN     Follow-up/additional notes: Orders placed.  Schedulers and group home notified.        Future Appointments   Date Time Provider Department Center   12/18/2024 12:15 PM  LAB [facility]   12/18/2024  1:00 PM Gisselle Mead PA-C [facility]   1/16/2025 10:00 AM Valery Santos MD [facility]   3/4/2025  3:00 PM [facility]   3/4/2025  4:00 PM Jani Martin MD [facility]      I reviewed the above appointments with Terrance and am assisting in scheduling additional appointments needed per the discharge summary above.     Subspecialty Assessments:   (educate - this is where your own dot phrases can go regarding postangio, etc etc)      I reviewed the Cardiology clinic's phone number ([phone] Option #1 and after hours on-call number [phone] #4 and ask for the On-Call Cardiologist) and have advised Terrance to contact us with changes or worsening of symptoms, additional questions about medications and appt scheduling needs.   Terrance verbalizes understanding and agrees with plan of care.

## 2024-12-11 ENCOUNTER — DOCUMENTATION ONLY (OUTPATIENT)
Dept: OTHER | Facility: CLINIC | Age: 24
End: 2024-12-11
Payer: MEDICARE

## 2024-12-18 ENCOUNTER — LAB (OUTPATIENT)
Dept: LAB | Facility: CLINIC | Age: 24
End: 2024-12-18
Payer: MEDICARE

## 2024-12-18 ENCOUNTER — OFFICE VISIT (OUTPATIENT)
Dept: CARDIOLOGY | Facility: CLINIC | Age: 24
End: 2024-12-18
Attending: PHYSICIAN ASSISTANT
Payer: MEDICARE

## 2024-12-18 VITALS
HEART RATE: 125 BPM | DIASTOLIC BLOOD PRESSURE: 62 MMHG | BODY MASS INDEX: 30.72 KG/M2 | OXYGEN SATURATION: 96 % | SYSTOLIC BLOOD PRESSURE: 83 MMHG | WEIGHT: 208 LBS

## 2024-12-18 DIAGNOSIS — I50.22 CHRONIC SYSTOLIC HEART FAILURE (H): Primary | ICD-10-CM

## 2024-12-18 DIAGNOSIS — I50.20 HEART FAILURE WITH REDUCED EJECTION FRACTION, NYHA CLASS II (H): ICD-10-CM

## 2024-12-18 DIAGNOSIS — I50.23 ACUTE ON CHRONIC SYSTOLIC CONGESTIVE HEART FAILURE (H): ICD-10-CM

## 2024-12-18 DIAGNOSIS — R57.0 CARDIOGENIC SHOCK (H): ICD-10-CM

## 2024-12-18 LAB
ANION GAP SERPL CALCULATED.3IONS-SCNC: 10 MMOL/L (ref 7–15)
BUN SERPL-MCNC: 9.4 MG/DL (ref 6–20)
CALCIUM SERPL-MCNC: 8.9 MG/DL (ref 8.8–10.4)
CHLORIDE SERPL-SCNC: 108 MMOL/L (ref 98–107)
CREAT SERPL-MCNC: 0.79 MG/DL (ref 0.67–1.17)
EGFRCR SERPLBLD CKD-EPI 2021: >90 ML/MIN/1.73M2
GLUCOSE SERPL-MCNC: 100 MG/DL (ref 70–99)
HCO3 SERPL-SCNC: 26 MMOL/L (ref 22–29)
NT-PROBNP SERPL-MCNC: 4390 PG/ML (ref 0–450)
POTASSIUM SERPL-SCNC: 4.4 MMOL/L (ref 3.4–5.3)
SODIUM SERPL-SCNC: 144 MMOL/L (ref 135–145)

## 2024-12-18 PROCEDURE — 80048 BASIC METABOLIC PNL TOTAL CA: CPT | Performed by: PATHOLOGY

## 2024-12-18 PROCEDURE — G0463 HOSPITAL OUTPT CLINIC VISIT: HCPCS | Performed by: PHYSICIAN ASSISTANT

## 2024-12-18 PROCEDURE — 83880 ASSAY OF NATRIURETIC PEPTIDE: CPT | Performed by: PATHOLOGY

## 2024-12-18 PROCEDURE — 36415 COLL VENOUS BLD VENIPUNCTURE: CPT | Performed by: PATHOLOGY

## 2024-12-18 RX ORDER — FUROSEMIDE 20 MG/1
40 TABLET ORAL DAILY
Qty: 60 TABLET | Refills: 3 | Status: SHIPPED | OUTPATIENT
Start: 2024-12-18

## 2024-12-18 ASSESSMENT — PAIN SCALES - GENERAL: PAINLEVEL_OUTOF10: NO PAIN (0)

## 2024-12-18 NOTE — NURSING NOTE
Diet: Patient instructed regarding a heart healthy diet, including discussion of reduced fat and sodium intake. Patient demonstrated understanding of this information and agreed to call with further questions or concerns.  Labs: Patient was given results of the laboratory testing obtained today. Patient was instructed to return for the next laboratory testing in 1 week . Patient demonstrated understanding of this information and agreed to call with further questions or concerns.   Return Appointment: Patient given instructions regarding scheduling next clinic visit. Patient demonstrated understanding of this information and agreed to call with further questions or concerns.    Follow up CORE 2 weeks.     Medication Change: Patient was educated regarding prescribed medication change, including discussion of the indication, administration, side effects, and when to report to MD or RN. Patient demonstrated understanding of this information and agreed to call with further questions or concerns.  Increase lasix to 40 mg daily    Referral to cardiac rehab placed. Discussed we will send to closest cardiac rehab place by [place]. No close [place] locations.       Patient stated he understood all health information given and agreed to call with further questions or concerns.

## 2024-12-18 NOTE — NURSING NOTE
Chief Complaint   Patient presents with    Follow Up     ENROLLMENT CORE       Vitals were taken and medications reconciled.    Iglesia Avelar, EMT  12:55 PM
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
## 2024-12-18 NOTE — PATIENT INSTRUCTIONS
Take your medicines every day, as directed     Changes made today:    - Increase lasix to 40 mg daily     - Call if your blood pressure goes in to the 70s (top number)- call us unless he fees bad then go to the ER    Monitor Your Weight and Symptoms     Contact us if you:     Gain 2 pounds in one day or 5 pounds in one week  Feel more short of breath  Notice more leg swelling  Feel lightheadeded    Change your lifestyle     Limit Salt or Sodium:  2000 mg  Limit Fluids:  2000 mL or approximately 64 ounces  Eat a Heart Healthy Diet  Low in saturated fats  Stay Active:  Aim to move at least 150 minutes every  week            To Contact us     During Business Hours:  [phone], option # 1      After hours, weekends or holidays:   [phone], Option #4  Ask to speak to the On-Call Cardiologist. Inform them you are a CORE/heart failure patient at the [place].       Use [employer] allows you to communicate directly with your heart team through secure messaging.  [employer] can be accessed any time on your phone, computer, or tablet.  If you need assistance, we'd be happy to help!             Keep your Heart Appointments:     - Blood work (Labs) in 1 week ([place])    - We will refer you to cardiac rehab    - CORE clinic in 2-3 weeks    - Dr. Martin in [facility]      Please consider attending our virtual support group which is held monthly. Please reach out to Ian at [phone] for more information if you are interested in attending.

## 2024-12-19 ENCOUNTER — PATIENT OUTREACH (OUTPATIENT)
Dept: CARDIOLOGY | Facility: CLINIC | Age: 24
End: 2024-12-19
Payer: MEDICARE

## 2024-12-19 NOTE — PROGRESS NOTES
Called Guerda to communicate the following information:    -Dr. Martin is ok with Terrance flying on the plane  - He doesn't know any heart failure providers in [place] to refer to  -will fax cardiac rehab orders to [facility] in [place].   Guerda is in in agreement with this plan.   Faxed orders to [facility] at [phone]

## 2024-12-25 ENCOUNTER — NURSE TRIAGE (OUTPATIENT)
Dept: NURSING | Facility: CLINIC | Age: 24
End: 2024-12-25
Payer: MEDICARE

## 2024-12-25 NOTE — TELEPHONE ENCOUNTER
"Caller is group home staffer (Jose).  Currently with pt who also participates in triage conversation.    Primary conclusion after triage -> No new symptoms at this time.    Acute on chronic cough.  Pt himself states \"Vomited 3x at 6 am today (eight hours ago).\"  Staffer said \"No one witnessed the vomiting, and there wasn't anything in his sink.\"  Pt reports no vomiting since 6 am; no nausea.  \"I need this cough checked.\"  \"Too much hard coughing.\"  \"I cough a lot every single day.\"  \"Because of my weak heart.\"  Pt does not cough now.    Denies trouble breathing.  Denies chest pain.  Vital signs per staffer (Jose):  97.2 (temporal).  93%.  Respirations 18.  BP 96/78.  Weight gain estimated at approx 5 pounds, not certain.    Staff administered Cepacol lozenge for cough this morning.  Active ingredients -> menthol, benzocaine.  \"Since taking Cepacol tablet, cough is less.\"  Discussed ingredients are for relief of sore throat primarily.  However, if cough was relieved, then so much the better.    Pt expresses scattered thoughts:  \"Would like meds for nausea.\"  However, denies nausea currently.  \"I just wanna be off the cardiac diet.\"  \"I just wonder if a female visitor could come visit me often.\"    Advised staffer (Jose) that pt appears stable at this time.  Reviewed upcoming clinic appointments with pt and staff.  All appointments occur within a timely manner per triage disposition of eval within two weeks.  Advised staffer (Jose) to call pt's cardiology clinic tomorrow (Dec 26th) during open hours to report pt's weight gain.  Staffer agrees to do so.    Amy Mitchell RN  [facility] Nurse Advisor     Reason for Disposition   [1] Dry cough (non-productive;  no sputum or minimal clear sputum) AND [2] < 3 weeks duration   Cough lasts > 3 weeks   Cough lasts > 3 weeks    Additional Information   Negative: SEVERE difficulty breathing (e.g., struggling for each breath, speaks in single words)   Negative: [1] " Lips or face are bluish now AND [2] persists when not coughing   Negative: Sounds like a life-threatening emergency to the triager   Negative: Chest pain is main symptom   Negative: [1] MODERATE difficulty breathing (e.g., speaks in phrases, SOB even at rest, pulse 100-120) AND [2] still present when not coughing   Negative: Chest pain   (Exception: MILD central chest pain, present only when coughing.)   Negative: [1] Increasing difficulty breathing AND [2] always has some difficulty breathing   Negative: Patient sounds very sick or weak to the triager   Negative: [1] MILD difficulty breathing (e.g., minimal/no SOB at rest, SOB with walking, pulse <100) AND [2] still present when not coughing  (Exception: No change from usual, chronic shortness of breath.)   Negative: [1] Coughed up blood AND [2] > 1 tablespoon (15 ml)   (Exception: Blood-tinged sputum.)   Negative: Fever > 103 F (39.4 C)   Negative: [1] Fever > 101 F (38.3 C) AND [2] age > 60 years   Negative: [1] Fever > 100.0 F (37.8 C) AND [2] bedridden (e.g., CVA, chronic illness, recovering from surgery)   Negative: [1] Fever > 100.0 F (37.8 C) AND [2] diabetes mellitus or weak immune system (e.g., HIV positive, cancer chemo, splenectomy, organ transplant, chronic steroids)   Negative: SEVERE coughing spells (e.g., whooping sound after coughing, vomiting after coughing)   Negative: [1] Continuous (nonstop) coughing interferes with work or school AND [2] no improvement using cough treatment per Care Advice   Negative: Fever present > 3 days (72 hours)   Negative: Coughing up yuridia-colored sputum   Negative: Change in color of sputum (e.g., from white to yellow-green sputum)   Negative: Increase in amount of sputum   Negative: Taking an ACE Inhibitor medicine (e.g., benazepril / LOTENSIN, captopril / CAPOTEN, enalapril / VASOTEC, lisinopril / ZESTRIL)   Negative: [1] Chest or rib pain AND [2] only occurs while coughing   Negative: Sinus pain or pressure (around  cheekbone or eye)   Negative: [1] Nasal discharge AND [2] present > 10 days   Negative: [1] Blood-tinged sputum has been coughed up AND [2] more than once   Negative: History of asthma or has mild wheezing   Negative: [1] Dry lingering cough AND [2] recent cold symptoms (e.g., runny nose, fever)     Pt denies cold symptoms; vital signs per staff indicate afebrile.   Negative: [1] History of gastric reflux AND [2] intermittent symptoms of sour taste in mouth AND [3] dry cough     No chart history of reflux.    Protocols used: Cough - Chronic-A-AH, Cough - Acute Non-Productive-A-AH

## 2024-12-26 ENCOUNTER — LAB (OUTPATIENT)
Dept: LAB | Facility: CLINIC | Age: 24
End: 2024-12-26
Payer: MEDICARE

## 2024-12-26 ENCOUNTER — PATIENT OUTREACH (OUTPATIENT)
Dept: CARDIOLOGY | Facility: CLINIC | Age: 24
End: 2024-12-26

## 2024-12-26 DIAGNOSIS — I50.23 ACUTE ON CHRONIC SYSTOLIC CONGESTIVE HEART FAILURE (H): ICD-10-CM

## 2024-12-26 DIAGNOSIS — I50.20 HEART FAILURE WITH REDUCED EJECTION FRACTION, NYHA CLASS II (H): ICD-10-CM

## 2024-12-26 DIAGNOSIS — R57.0 CARDIOGENIC SHOCK (H): ICD-10-CM

## 2024-12-26 LAB
ANION GAP SERPL CALCULATED.3IONS-SCNC: 12 MMOL/L (ref 7–15)
BUN SERPL-MCNC: 15.9 MG/DL (ref 6–20)
CALCIUM SERPL-MCNC: 9.1 MG/DL (ref 8.8–10.4)
CHLORIDE SERPL-SCNC: 105 MMOL/L (ref 98–107)
CREAT SERPL-MCNC: 0.99 MG/DL (ref 0.67–1.17)
EGFRCR SERPLBLD CKD-EPI 2021: >90 ML/MIN/1.73M2
GLUCOSE SERPL-MCNC: 161 MG/DL (ref 70–99)
HCO3 SERPL-SCNC: 25 MMOL/L (ref 22–29)
POTASSIUM SERPL-SCNC: 4.1 MMOL/L (ref 3.4–5.3)
SODIUM SERPL-SCNC: 142 MMOL/L (ref 135–145)

## 2024-12-26 RX ORDER — POTASSIUM CHLORIDE 1500 MG/1
20 TABLET, EXTENDED RELEASE ORAL DAILY
Qty: 90 TABLET | Refills: 3 | Status: SHIPPED | OUTPATIENT
Start: 2024-12-26

## 2024-12-26 RX ORDER — FUROSEMIDE 20 MG/1
40 TABLET ORAL
Qty: 360 TABLET | Refills: 1 | Status: SHIPPED | OUTPATIENT
Start: 2024-12-26

## 2024-12-26 NOTE — TELEPHONE ENCOUNTER
Called to check on increased lasix dose.  LVM    Weight increased over the last few days despite his increased dose of Lasix.       205 lb--> 211.8 in the last 1.5 weeks  Not noting swelling  Has consistent cough since discharge, has had it ongoing.    Today has been coughing a good amount, especially when trying to take a deep breath, more today than lately  Has increased shortness of breath while walking around.        Reviewed with CECILY Rahman:    Cr gradually worsening. Now with wt gain and sob per result note. I would recommend increasing lasix to 40 mg bid starting today. Start kcl 20 meq daily (okay if that starts tomorrow). Labs monday. If he gains any more weight over the weekend call the on call cardiologist. Very low threshold to admit if feeling worse     Date: 12/26/2024    Time of Call: 3:32 PM     Diagnosis:  heart failure      [ VORB ] Ordering provider: CECILY Rahman    Order: increase lasix to 40 mg BID, start potassium 20 mEq daily, labs Monday, more weight gain over the weekend, should call the On-call team.  Low threshold to admit if feeling worse     Order received by: Elvi Hearn RN       Follow-up/additional notes: spoke with Alia from [facility]

## 2024-12-28 ENCOUNTER — HOSPITAL ENCOUNTER (INPATIENT)
Facility: CLINIC | Age: 24
LOS: 2 days | Discharge: NURSING, REHAB OR LONG-TERM CARE | DRG: 291 | End: 2024-12-31
Attending: EMERGENCY MEDICINE | Admitting: STUDENT IN AN ORGANIZED HEALTH CARE EDUCATION/TRAINING PROGRAM
Payer: MEDICARE

## 2024-12-28 DIAGNOSIS — R06.02 SHORTNESS OF BREATH: ICD-10-CM

## 2024-12-28 DIAGNOSIS — I50.9 ACUTE ON CHRONIC CONGESTIVE HEART FAILURE, UNSPECIFIED HEART FAILURE TYPE (H): ICD-10-CM

## 2024-12-28 DIAGNOSIS — R00.0 TACHYCARDIA, UNSPECIFIED: ICD-10-CM

## 2024-12-28 DIAGNOSIS — I50.23 ACUTE ON CHRONIC SYSTOLIC CONGESTIVE HEART FAILURE (H): Primary | ICD-10-CM

## 2024-12-28 DIAGNOSIS — R21 RASH: ICD-10-CM

## 2024-12-28 DIAGNOSIS — R11.0 NAUSEA: ICD-10-CM

## 2024-12-28 PROCEDURE — 99285 EMERGENCY DEPT VISIT HI MDM: CPT | Mod: 25 | Performed by: EMERGENCY MEDICINE

## 2024-12-28 PROCEDURE — 99285 EMERGENCY DEPT VISIT HI MDM: CPT | Performed by: EMERGENCY MEDICINE

## 2024-12-28 PROCEDURE — 93010 ELECTROCARDIOGRAM REPORT: CPT | Performed by: EMERGENCY MEDICINE

## 2024-12-28 PROCEDURE — 93005 ELECTROCARDIOGRAM TRACING: CPT | Performed by: EMERGENCY MEDICINE

## 2024-12-29 ENCOUNTER — APPOINTMENT (OUTPATIENT)
Dept: GENERAL RADIOLOGY | Facility: CLINIC | Age: 24
DRG: 291 | End: 2024-12-29
Attending: EMERGENCY MEDICINE
Payer: MEDICARE

## 2024-12-29 PROBLEM — I50.9 ACUTE ON CHRONIC CONGESTIVE HEART FAILURE, UNSPECIFIED HEART FAILURE TYPE (H): Status: ACTIVE | Noted: 2024-12-29

## 2024-12-29 LAB
ALBUMIN SERPL BCG-MCNC: 3.8 G/DL (ref 3.5–5.2)
ALP SERPL-CCNC: 76 U/L (ref 40–150)
ALT SERPL W P-5'-P-CCNC: 11 U/L (ref 0–70)
ANION GAP SERPL CALCULATED.3IONS-SCNC: 11 MMOL/L (ref 7–15)
ANION GAP SERPL CALCULATED.3IONS-SCNC: 13 MMOL/L (ref 7–15)
AST SERPL W P-5'-P-CCNC: 19 U/L (ref 0–45)
ATRIAL RATE - MUSE: 122 BPM
BASOPHILS # BLD AUTO: 0.1 10E3/UL (ref 0–0.2)
BASOPHILS NFR BLD AUTO: 1 %
BILIRUB SERPL-MCNC: 0.4 MG/DL
BUN SERPL-MCNC: 17.1 MG/DL (ref 6–20)
BUN SERPL-MCNC: 17.6 MG/DL (ref 6–20)
C PNEUM DNA SPEC QL NAA+PROBE: NOT DETECTED
CALCIUM SERPL-MCNC: 8.7 MG/DL (ref 8.8–10.4)
CALCIUM SERPL-MCNC: 8.7 MG/DL (ref 8.8–10.4)
CHLORIDE SERPL-SCNC: 102 MMOL/L (ref 98–107)
CHLORIDE SERPL-SCNC: 106 MMOL/L (ref 98–107)
CREAT SERPL-MCNC: 0.92 MG/DL (ref 0.67–1.17)
CREAT SERPL-MCNC: 1.04 MG/DL (ref 0.67–1.17)
DIASTOLIC BLOOD PRESSURE - MUSE: NORMAL MMHG
EGFRCR SERPLBLD CKD-EPI 2021: >90 ML/MIN/1.73M2
EGFRCR SERPLBLD CKD-EPI 2021: >90 ML/MIN/1.73M2
EOSINOPHIL # BLD AUTO: 0.1 10E3/UL (ref 0–0.7)
EOSINOPHIL NFR BLD AUTO: 1 %
ERYTHROCYTE [DISTWIDTH] IN BLOOD BY AUTOMATED COUNT: 14.6 % (ref 10–15)
FLUAV H1 2009 PAND RNA SPEC QL NAA+PROBE: NOT DETECTED
FLUAV H1 RNA SPEC QL NAA+PROBE: NOT DETECTED
FLUAV H3 RNA SPEC QL NAA+PROBE: NOT DETECTED
FLUAV RNA SPEC QL NAA+PROBE: NEGATIVE
FLUAV RNA SPEC QL NAA+PROBE: NOT DETECTED
FLUBV RNA RESP QL NAA+PROBE: NEGATIVE
FLUBV RNA SPEC QL NAA+PROBE: NOT DETECTED
GLUCOSE SERPL-MCNC: 117 MG/DL (ref 70–99)
GLUCOSE SERPL-MCNC: 118 MG/DL (ref 70–99)
HADV DNA SPEC QL NAA+PROBE: NOT DETECTED
HCO3 SERPL-SCNC: 24 MMOL/L (ref 22–29)
HCO3 SERPL-SCNC: 24 MMOL/L (ref 22–29)
HCOV PNL SPEC NAA+PROBE: NOT DETECTED
HCT VFR BLD AUTO: 48 % (ref 40–53)
HGB BLD-MCNC: 15.5 G/DL (ref 13.3–17.7)
HMPV RNA SPEC QL NAA+PROBE: NOT DETECTED
HPIV1 RNA SPEC QL NAA+PROBE: NOT DETECTED
HPIV2 RNA SPEC QL NAA+PROBE: NOT DETECTED
HPIV3 RNA SPEC QL NAA+PROBE: NOT DETECTED
HPIV4 RNA SPEC QL NAA+PROBE: NOT DETECTED
IMM GRANULOCYTES # BLD: 0 10E3/UL
IMM GRANULOCYTES NFR BLD: 0 %
INTERPRETATION ECG - MUSE: NORMAL
LYMPHOCYTES # BLD AUTO: 3.2 10E3/UL (ref 0.8–5.3)
LYMPHOCYTES NFR BLD AUTO: 25 %
M PNEUMO DNA SPEC QL NAA+PROBE: NOT DETECTED
MAGNESIUM SERPL-MCNC: 2.2 MG/DL (ref 1.7–2.3)
MAGNESIUM SERPL-MCNC: 2.3 MG/DL (ref 1.7–2.3)
MCH RBC QN AUTO: 27 PG (ref 26.5–33)
MCHC RBC AUTO-ENTMCNC: 32.3 G/DL (ref 31.5–36.5)
MCV RBC AUTO: 84 FL (ref 78–100)
MONOCYTES # BLD AUTO: 1.3 10E3/UL (ref 0–1.3)
MONOCYTES NFR BLD AUTO: 10 %
NEUTROPHILS # BLD AUTO: 8.1 10E3/UL (ref 1.6–8.3)
NEUTROPHILS NFR BLD AUTO: 64 %
NRBC # BLD AUTO: 0 10E3/UL
NRBC BLD AUTO-RTO: 0 /100
NT-PROBNP SERPL-MCNC: 5900 PG/ML (ref 0–450)
P AXIS - MUSE: 58 DEGREES
PLATELET # BLD AUTO: 252 10E3/UL (ref 150–450)
POTASSIUM SERPL-SCNC: 4.2 MMOL/L (ref 3.4–5.3)
POTASSIUM SERPL-SCNC: 4.4 MMOL/L (ref 3.4–5.3)
PR INTERVAL - MUSE: 162 MS
PROCALCITONIN SERPL IA-MCNC: 0.04 NG/ML
PROT SERPL-MCNC: 5.8 G/DL (ref 6.4–8.3)
QRS DURATION - MUSE: 80 MS
QT - MUSE: 306 MS
QTC - MUSE: 436 MS
R AXIS - MUSE: -61 DEGREES
RBC # BLD AUTO: 5.75 10E6/UL (ref 4.4–5.9)
RSV RNA SPEC NAA+PROBE: NEGATIVE
RSV RNA SPEC QL NAA+PROBE: NOT DETECTED
RSV RNA SPEC QL NAA+PROBE: NOT DETECTED
RV+EV RNA SPEC QL NAA+PROBE: NOT DETECTED
SARS-COV-2 RNA RESP QL NAA+PROBE: NEGATIVE
SODIUM SERPL-SCNC: 139 MMOL/L (ref 135–145)
SODIUM SERPL-SCNC: 141 MMOL/L (ref 135–145)
SYSTOLIC BLOOD PRESSURE - MUSE: NORMAL MMHG
T AXIS - MUSE: 66 DEGREES
TROPONIN T SERPL HS-MCNC: 16 NG/L
TROPONIN T SERPL HS-MCNC: 16 NG/L
VENTRICULAR RATE- MUSE: 122 BPM
WBC # BLD AUTO: 12.7 10E3/UL (ref 4–11)

## 2024-12-29 PROCEDURE — 250N000013 HC RX MED GY IP 250 OP 250 PS 637

## 2024-12-29 PROCEDURE — 83735 ASSAY OF MAGNESIUM: CPT

## 2024-12-29 PROCEDURE — 84484 ASSAY OF TROPONIN QUANT: CPT | Performed by: EMERGENCY MEDICINE

## 2024-12-29 PROCEDURE — 87486 CHLMYD PNEUM DNA AMP PROBE: CPT

## 2024-12-29 PROCEDURE — 87637 SARSCOV2&INF A&B&RSV AMP PRB: CPT | Performed by: EMERGENCY MEDICINE

## 2024-12-29 PROCEDURE — 71046 X-RAY EXAM CHEST 2 VIEWS: CPT | Mod: 26 | Performed by: RADIOLOGY

## 2024-12-29 PROCEDURE — 120N000002 HC R&B MED SURG/OB UMMC

## 2024-12-29 PROCEDURE — 83880 ASSAY OF NATRIURETIC PEPTIDE: CPT | Performed by: EMERGENCY MEDICINE

## 2024-12-29 PROCEDURE — 36415 COLL VENOUS BLD VENIPUNCTURE: CPT | Performed by: EMERGENCY MEDICINE

## 2024-12-29 PROCEDURE — 250N000011 HC RX IP 250 OP 636: Performed by: EMERGENCY MEDICINE

## 2024-12-29 PROCEDURE — 250N000011 HC RX IP 250 OP 636

## 2024-12-29 PROCEDURE — 82310 ASSAY OF CALCIUM: CPT

## 2024-12-29 PROCEDURE — 99222 1ST HOSP IP/OBS MODERATE 55: CPT | Mod: GC | Performed by: STUDENT IN AN ORGANIZED HEALTH CARE EDUCATION/TRAINING PROGRAM

## 2024-12-29 PROCEDURE — 84145 PROCALCITONIN (PCT): CPT | Performed by: EMERGENCY MEDICINE

## 2024-12-29 PROCEDURE — 85018 HEMOGLOBIN: CPT | Performed by: EMERGENCY MEDICINE

## 2024-12-29 PROCEDURE — 80053 COMPREHEN METABOLIC PANEL: CPT | Performed by: EMERGENCY MEDICINE

## 2024-12-29 PROCEDURE — 36415 COLL VENOUS BLD VENIPUNCTURE: CPT

## 2024-12-29 PROCEDURE — 85025 COMPLETE CBC W/AUTO DIFF WBC: CPT | Performed by: EMERGENCY MEDICINE

## 2024-12-29 PROCEDURE — 71046 X-RAY EXAM CHEST 2 VIEWS: CPT

## 2024-12-29 RX ORDER — GINSENG 100 MG
CAPSULE ORAL 3 TIMES DAILY PRN
COMMUNITY

## 2024-12-29 RX ORDER — LIDOCAINE 40 MG/G
CREAM TOPICAL
Status: DISCONTINUED | OUTPATIENT
Start: 2024-12-29 | End: 2024-12-31 | Stop reason: HOSPADM

## 2024-12-29 RX ORDER — ONDANSETRON 2 MG/ML
4 INJECTION INTRAMUSCULAR; INTRAVENOUS EVERY 6 HOURS PRN
Status: DISCONTINUED | OUTPATIENT
Start: 2024-12-29 | End: 2024-12-31 | Stop reason: HOSPADM

## 2024-12-29 RX ORDER — AMOXICILLIN 250 MG
1 CAPSULE ORAL DAILY PRN
COMMUNITY

## 2024-12-29 RX ORDER — MAGNESIUM HYDROXIDE/ALUMINUM HYDROXICE/SIMETHICONE 120; 1200; 1200 MG/30ML; MG/30ML; MG/30ML
30 SUSPENSION ORAL EVERY 4 HOURS PRN
Status: DISCONTINUED | OUTPATIENT
Start: 2024-12-29 | End: 2024-12-31 | Stop reason: HOSPADM

## 2024-12-29 RX ORDER — SPIRONOLACTONE 25 MG/1
25 TABLET ORAL DAILY
Status: DISCONTINUED | OUTPATIENT
Start: 2024-12-29 | End: 2024-12-31 | Stop reason: HOSPADM

## 2024-12-29 RX ORDER — BENZOCAINE AND MENTHOL 15; 3.6 MG/1; MG/1
1 LOZENGE ORAL
COMMUNITY

## 2024-12-29 RX ORDER — RISPERIDONE 1 MG/1
1 TABLET ORAL DAILY
Status: DISCONTINUED | OUTPATIENT
Start: 2024-12-29 | End: 2024-12-31 | Stop reason: HOSPADM

## 2024-12-29 RX ORDER — PROCHLORPERAZINE MALEATE 10 MG
10 TABLET ORAL EVERY 6 HOURS PRN
Status: DISCONTINUED | OUTPATIENT
Start: 2024-12-29 | End: 2024-12-31 | Stop reason: HOSPADM

## 2024-12-29 RX ORDER — ONDANSETRON 4 MG/1
4 TABLET, ORALLY DISINTEGRATING ORAL EVERY 6 HOURS PRN
Status: DISCONTINUED | OUTPATIENT
Start: 2024-12-29 | End: 2024-12-31 | Stop reason: HOSPADM

## 2024-12-29 RX ORDER — DIGOXIN 250 MCG
250 TABLET ORAL DAILY
Status: DISCONTINUED | OUTPATIENT
Start: 2024-12-29 | End: 2024-12-31 | Stop reason: HOSPADM

## 2024-12-29 RX ORDER — POTASSIUM CHLORIDE 750 MG/1
20 TABLET, EXTENDED RELEASE ORAL DAILY
Status: DISCONTINUED | OUTPATIENT
Start: 2024-12-29 | End: 2024-12-31 | Stop reason: HOSPADM

## 2024-12-29 RX ORDER — ACETAMINOPHEN 650 MG/1
650 SUPPOSITORY RECTAL EVERY 4 HOURS PRN
Status: DISCONTINUED | OUTPATIENT
Start: 2024-12-29 | End: 2024-12-31 | Stop reason: HOSPADM

## 2024-12-29 RX ORDER — LOPERAMIDE HYDROCHLORIDE 2 MG/1
2 TABLET ORAL 4 TIMES DAILY PRN
COMMUNITY

## 2024-12-29 RX ORDER — FUROSEMIDE 10 MG/ML
80 INJECTION INTRAMUSCULAR; INTRAVENOUS ONCE
Status: COMPLETED | OUTPATIENT
Start: 2024-12-29 | End: 2024-12-29

## 2024-12-29 RX ORDER — NITROGLYCERIN 0.4 MG/1
0.4 TABLET SUBLINGUAL EVERY 5 MIN PRN
Status: DISCONTINUED | OUTPATIENT
Start: 2024-12-29 | End: 2024-12-31 | Stop reason: HOSPADM

## 2024-12-29 RX ORDER — GUAIFENESIN 600 MG/1
600 TABLET, EXTENDED RELEASE ORAL EVERY 12 HOURS PRN
COMMUNITY

## 2024-12-29 RX ORDER — FUROSEMIDE 10 MG/ML
40 INJECTION INTRAMUSCULAR; INTRAVENOUS
Status: DISCONTINUED | OUTPATIENT
Start: 2024-12-29 | End: 2024-12-30

## 2024-12-29 RX ORDER — FUROSEMIDE 10 MG/ML
40 INJECTION INTRAMUSCULAR; INTRAVENOUS ONCE
Status: COMPLETED | OUTPATIENT
Start: 2024-12-29 | End: 2024-12-29

## 2024-12-29 RX ORDER — POLYETHYLENE GLYCOL 3350 17 G/17G
17 POWDER, FOR SOLUTION ORAL DAILY PRN
Status: DISCONTINUED | OUTPATIENT
Start: 2024-12-29 | End: 2024-12-31 | Stop reason: HOSPADM

## 2024-12-29 RX ORDER — BENZOCAINE/MENTHOL 6 MG-10 MG
LOZENGE MUCOUS MEMBRANE 3 TIMES DAILY PRN
COMMUNITY

## 2024-12-29 RX ORDER — ACETAMINOPHEN 325 MG/1
650 TABLET ORAL EVERY 4 HOURS PRN
Status: DISCONTINUED | OUTPATIENT
Start: 2024-12-29 | End: 2024-12-31 | Stop reason: HOSPADM

## 2024-12-29 RX ADMIN — EMPAGLIFLOZIN 10 MG: 10 TABLET, FILM COATED ORAL at 08:00

## 2024-12-29 RX ADMIN — DIGOXIN 250 MCG: 250 TABLET ORAL at 08:00

## 2024-12-29 RX ADMIN — FUROSEMIDE 80 MG: 10 INJECTION, SOLUTION INTRAMUSCULAR; INTRAVENOUS at 03:31

## 2024-12-29 RX ADMIN — FUROSEMIDE 40 MG: 10 INJECTION, SOLUTION INTRAMUSCULAR; INTRAVENOUS at 09:53

## 2024-12-29 RX ADMIN — SPIRONOLACTONE 25 MG: 25 TABLET, FILM COATED ORAL at 08:00

## 2024-12-29 RX ADMIN — RISPERIDONE 1 MG: 1 TABLET, FILM COATED ORAL at 07:59

## 2024-12-29 RX ADMIN — THIAMINE HCL TAB 100 MG 100 MG: 100 TAB at 08:00

## 2024-12-29 RX ADMIN — RISPERIDONE 1.5 MG: 1 TABLET, FILM COATED ORAL at 19:39

## 2024-12-29 RX ADMIN — FUROSEMIDE 40 MG: 10 INJECTION, SOLUTION INTRAMUSCULAR; INTRAVENOUS at 16:08

## 2024-12-29 RX ADMIN — Medication 1 HALF-TAB: at 19:40

## 2024-12-29 ASSESSMENT — ACTIVITIES OF DAILY LIVING (ADL)
ADLS_ACUITY_SCORE: 55

## 2024-12-29 NOTE — ED PROVIDER NOTES
ED Provider Note  [facility]      History     Chief Complaint   Patient presents with    Shortness of Breath     HPI  Terrance Hidalgo is a 24 year old male with a history of cardiogenic shock, acute kidney failure and acute on chronic systolic congestive heart failure, who presents to the emergency department for shortness of breath. The patient states that he has had shortness of breath and nausea today. She adds that he has been coughing a lot today. She has had a painful rash near his anus since yesterday. It is painful to make a bowel movement. She denies that rash being itchy. He denies any chest pain, cough, fever, pain with urinating, abdominal pain or diarrhea.     Per chart review: The patient was brought into the emergency room on 12/5 with shortness of breath, coughing and not feeling well. Due to his autism he is not able to effectively communicate his symptoms but his aunt was concerned by what he reported. Evaluation suggested baseline borderline blood pressures and sinus tachycardia. He was started on IV lasix as well as empiric treatment of CAP. He reported subjective improvement in SOB and has been on room air throughout the stay. Chest x-ray without concerning pathology. He completed azithromycin 3 day course while admitted and was discharged with Cefdinir 7 day course in total. His metoprolol was decreased from 25mg daily to 12.5 mg daily on discharge. Patient was discharged on 12/9.     Past Medical History  No past medical history on file.  Past Surgical History:   Procedure Laterality Date    CV HEART BIOPSY N/A 8/21/2024    Procedure: Heart Biopsy;  Surgeon: Yohan Aponte MD;  Location:  HEART CARDIAC CATH LAB    CV INTRA AORTIC BALLOON N/A 08/27/2024    Procedure: Intra aortic Balloon Pump Insertion;  Surgeon: Davis Disla MD;  Location:  HEART CARDIAC CATH LAB    CV INTRA AORTIC BALLOON REMOVAL N/A 09/01/2024    Procedure: Intra aortic  Balloon Pump Removal;  Surgeon: Jairo Squires MD;  Location:  HEART CARDIAC CATH LAB    CV RIGHT HEART CATH MEASUREMENTS RECORDED N/A 9/11/2024    Procedure: Right Heart Catheterization;  Surgeon: Jani Martin MD;  Location:  HEART CARDIAC CATH LAB    CV RIGHT HEART CATH MEASUREMENTS RECORDED N/A 8/21/2024    Procedure: Right Heart Catheterization;  Surgeon: Yohan Aponte MD;  Location:  HEART CARDIAC CATH LAB    CV SWAN REANNA PROCEDURE N/A 8/21/2024    Procedure: [place] Reanna Procedure;  Surgeon: Yohan Aponte MD;  Location:  HEART CARDIAC CATH LAB    PICC INSERTION - TRIPLE LUMEN Left 09/03/2024    Left basilic vein 53cm total 10cm external.     acetaminophen (TYLENOL) 325 MG tablet  cetirizine (ZYRTEC) 5 MG tablet  digoxin (LANOXIN) 250 MCG tablet  empagliflozin (JARDIANCE) 10 MG TABS tablet  furosemide (LASIX) 20 MG tablet  metoprolol succinate ER (TOPROL XL) 25 MG 24 hr tablet  multivitamin w/minerals (THERA-VIT-M) tablet  polyethylene glycol (MIRALAX) 17 GM/Dose powder  potassium chloride anaya ER (KLOR-CON M20) 20 MEQ CR tablet  reason aspirin not prescribed, intentional,  risperiDONE (RISPERDAL) 0.5 MG tablet  risperiDONE (RISPERDAL) 1 MG tablet  sacubitril-valsartan (ENTRESTO) 24-26 MG per tablet  selenium sulfide (SELSUN) 1 % LOTN shampoo  senna-docusate (SENOKOT-S/PERICOLACE) 8.6-50 MG tablet  spironolactone (ALDACTONE) 25 MG tablet  STATIN NOT PRESCRIBED (INTENTIONAL)  thiamine (B-1) 100 MG tablet  triamcinolone (KENALOG) 0.1 % external ointment      Allergies   Allergen Reactions    Amoxicillin Hives     (Pictures in media tab) Confirmed by dermatology 9/18/2024    Implanon [Etonogestrel]     Penicillin V Hives     Tolerated ampicillin/sulbactam 08/21/2024     Family History  Family History   Problem Relation Age of Onset    Melanoma No family hx of     Skin Cancer No family hx of      Social History   Social History     Tobacco Use    Smoking status: Never    Smokeless  tobacco: Never      A medically appropriate review of systems was performed with pertinent positives and negatives noted in the HPI, and all other systems negative.    Physical Exam   BP: 92/64  Pulse: 115  Temp: 98.2  F (36.8  C)  Resp: 18  SpO2: 96 %  Physical Exam  Vitals and nursing note reviewed. Exam conducted with a chaperone present.   Constitutional:       General: He is not in acute distress.     Appearance: Normal appearance. He is not ill-appearing, toxic-appearing or diaphoretic.   HENT:      Head: Atraumatic.      Mouth/Throat:      Mouth: Mucous membranes are moist.   Eyes:      General: No scleral icterus.     Conjunctiva/sclera: Conjunctivae normal.   Cardiovascular:      Rate and Rhythm: Tachycardia present.      Heart sounds: Normal heart sounds.   Pulmonary:      Effort: Pulmonary effort is normal. Tachypnea present. No respiratory distress.      Breath sounds: Normal breath sounds.   Abdominal:      Palpations: Abdomen is soft.      Tenderness: There is no abdominal tenderness.   Genitourinary:         Comments: Mild erythema of perianal skin. No skin breakdown.   Musculoskeletal:         General: No deformity or signs of injury.      Cervical back: Neck supple. No rigidity.   Skin:     General: Skin is warm.      Coloration: Skin is not jaundiced or pale.   Neurological:      General: No focal deficit present.      Mental Status: He is alert.   Psychiatric:         Attention and Perception: Attention normal.         Speech: Speech normal.         Behavior: Behavior is cooperative.         Cognition and Memory: Cognition is impaired.         Judgment: Judgment is impulsive and inappropriate.       Wt Readings from Last 4 Encounters:   12/30/24 93.8 kg (206 lb 14.4 oz)   12/18/24 94.3 kg (208 lb)   12/09/24 92.1 kg (203 lb 1.6 oz)   10/29/24 95.1 kg (209 lb 9.6 oz)         ED Course, Procedures, & Data      Procedures            EKG Interpretation:      Interpreted by Jo-Ann Morley MD  Time  reviewed: 2351  Symptoms at time of EKG: SOB   Rhythm: sinus tachycardia  Rate: Tachycardia  Axis: left  Ectopy: none  Conduction: normal  ST Segments/ T Waves: No acute ischemic changes  Q Waves: inferior  Comparison to prior: Unchanged    Clinical Impression: sinus tachycardia            Results for orders placed or performed during the hospital encounter of 12/28/24   XR Chest 2 Views     Status: None    Narrative    EXAM: XR CHEST 2 VIEWS  LOCATION: [facility]  DATE: 12/29/2024    INDICATION: sob  COMPARISON: 12/5/2024.    FINDINGS: The heart size is normal. The lungs are clear. There is no pneumothorax. Thoracolumbar scoliosis.      Impression    IMPRESSION: No acute abnormality.   Comprehensive metabolic panel     Status: Abnormal   Result Value Ref Range    Sodium 141 135 - 145 mmol/L    Potassium 4.2 3.4 - 5.3 mmol/L    Carbon Dioxide (CO2) 24 22 - 29 mmol/L    Anion Gap 11 7 - 15 mmol/L    Urea Nitrogen 17.1 6.0 - 20.0 mg/dL    Creatinine 0.92 0.67 - 1.17 mg/dL    GFR Estimate >90 >60 mL/min/1.73m2    Calcium 8.7 (L) 8.8 - 10.4 mg/dL    Chloride 106 98 - 107 mmol/L    Glucose 118 (H) 70 - 99 mg/dL    Alkaline Phosphatase 76 40 - 150 U/L    AST 19 0 - 45 U/L    ALT 11 0 - 70 U/L    Protein Total 5.8 (L) 6.4 - 8.3 g/dL    Albumin 3.8 3.5 - 5.2 g/dL    Bilirubin Total 0.4 <=1.2 mg/dL   Troponin T, High Sensitivity     Status: Normal   Result Value Ref Range    Troponin T, High Sensitivity 16 <=22 ng/L   Procalcitonin     Status: Normal   Result Value Ref Range    Procalcitonin 0.04 <0.50 ng/mL   Nt probnp inpatient (BNP)     Status: Abnormal   Result Value Ref Range    N terminal Pro BNP Inpatient 5,900 (H) 0 - 450 pg/mL   Influenza A/B, RSV and SARS-CoV2 PCR (COVID-19) Nasopharyngeal     Status: Normal    Specimen: Nasopharyngeal; Swab   Result Value Ref Range    Influenza A PCR Negative Negative    Influenza B PCR Negative Negative    RSV PCR Negative Negative     SARS CoV2 PCR Negative Negative    Narrative    Testing was performed using the Xpert Xpress CoV2/Flu/RSV Assay on the [employer] GeneXpert Instrument. This test should be ordered for the detection of SARS-CoV2, influenza, and RSV viruses in individuals with signs and symptoms of respiratory tract infection. This test is for in vitro diagnostic use under the [place] FDA for laboratories certified under CLIA to perform high or moderate complexity testing. This test has been [place] FDA cleared. A negative result does not rule out the presence of PCR inhibitors in the specimen or target RNA in concentration below the limit of detection for the assay. If only one viral target is positive but coinfection with multiple targets is suspected, the sample should be re-tested with another FDA cleared, approved, or authorized test, if coninfection would change clinical management. This test was validated by the [facility] [employer]. These laboratories are certified under the Clinical Laboratory Improvement Amendments of 1988 (CLIA-88) as qualified to perfom high complexity laboratory testing.   CBC with platelets and differential     Status: Abnormal   Result Value Ref Range    WBC Count 12.7 (H) 4.0 - 11.0 10e3/uL    RBC Count 5.75 4.40 - 5.90 10e6/uL    Hemoglobin 15.5 13.3 - 17.7 g/dL    Hematocrit 48.0 40.0 - 53.0 %    MCV 84 78 - 100 fL    MCH 27.0 26.5 - 33.0 pg    MCHC 32.3 31.5 - 36.5 g/dL    RDW 14.6 10.0 - 15.0 %    Platelet Count 252 150 - 450 10e3/uL    % Neutrophils 64 %    % Lymphocytes 25 %    % Monocytes 10 %    % Eosinophils 1 %    % Basophils 1 %    % Immature Granulocytes 0 %    NRBCs per 100 WBC 0 <1 /100    Absolute Neutrophils 8.1 1.6 - 8.3 10e3/uL    Absolute Lymphocytes 3.2 0.8 - 5.3 10e3/uL    Absolute Monocytes 1.3 0.0 - 1.3 10e3/uL    Absolute Eosinophils 0.1 0.0 - 0.7 10e3/uL    Absolute Basophils 0.1 0.0 - 0.2 10e3/uL    Absolute Immature Granulocytes 0.0 <=0.4 10e3/uL    Absolute NRBCs 0.0 10e3/uL    EKG 12 lead     Status: None (Preliminary result)   Result Value Ref Range    Systolic Blood Pressure  mmHg    Diastolic Blood Pressure  mmHg    Ventricular Rate 122 BPM    Atrial Rate 122 BPM    MI Interval 162 ms    QRS Duration 80 ms     ms    QTc 436 ms    P Axis 58 degrees    R AXIS -61 degrees    T Axis 66 degrees    Interpretation ECG       Sinus tachycardia  Left axis deviation  Low voltage QRS  Inferior infarct , age undetermined  Cannot rule out Anterior infarct , age undetermined  Abnormal ECG     CBC with platelets differential     Status: Abnormal    Narrative    The following orders were created for panel order CBC with platelets differential.  Procedure                               Abnormality         Status                     ---------                               -----------         ------                     CBC with platelets and d...[[id number]]  Abnormal            Final result                 Please view results for these tests on the individual orders.     Medications - No data to display  Labs Ordered and Resulted from Time of ED Arrival to Time of ED Departure   COMPREHENSIVE METABOLIC PANEL - Abnormal       Result Value    Sodium 141      Potassium 4.2      Carbon Dioxide (CO2) 24      Anion Gap 11      Urea Nitrogen 17.1      Creatinine 0.92      GFR Estimate >90      Calcium 8.7 (*)     Chloride 106      Glucose 118 (*)     Alkaline Phosphatase 76      AST 19      ALT 11      Protein Total 5.8 (*)     Albumin 3.8      Bilirubin Total 0.4     NT PROBNP INPATIENT - Abnormal    N terminal Pro BNP Inpatient 5,900 (*)    CBC WITH PLATELETS AND DIFFERENTIAL - Abnormal    WBC Count 12.7 (*)     RBC Count 5.75      Hemoglobin 15.5      Hematocrit 48.0      MCV 84      MCH 27.0      MCHC 32.3      RDW 14.6      Platelet Count 252      % Neutrophils 64      % Lymphocytes 25      % Monocytes 10      % Eosinophils 1      % Basophils 1      % Immature Granulocytes 0      NRBCs per 100 WBC 0       Absolute Neutrophils 8.1      Absolute Lymphocytes 3.2      Absolute Monocytes 1.3      Absolute Eosinophils 0.1      Absolute Basophils 0.1      Absolute Immature Granulocytes 0.0      Absolute NRBCs 0.0     TROPONIN T, HIGH SENSITIVITY - Normal    Troponin T, High Sensitivity 16     PROCALCITONIN - Normal    Procalcitonin 0.04     INFLUENZA A/B, RSV AND SARS-COV2 PCR - Normal    Influenza A PCR Negative      Influenza B PCR Negative      RSV PCR Negative      SARS CoV2 PCR Negative     TROPONIN T, HIGH SENSITIVITY     XR Chest 2 Views   Final Result   IMPRESSION: No acute abnormality.                 Assessment & Plan    Patient afebrile with blood pressure 92/64 heart rate 115, satting 96% on room air.  Denies shortness of breath or chest pain on arrival.  BMP within normal limits, magnesium normal. He is sexually troponin 16.  Influenza and COVID-negative.  Liver enzymes normal.  Procalcitonin negative.  BNP 5900 up from 4686 at the time of his last hospitalization.  White count 12.7 which is stable.  X-ray clear.  EKG with sinus tachycardia and inferior infarct that is unchanged.  Discussed with [facility] staff who recommends admission for some diuresis.  Given 80 mg IV Lasix.  Further care per [facility] service.    I have reviewed the nursing notes. I have reviewed the findings, diagnosis, plan and need for follow up with the patient.    New Prescriptions    No medications on file       Final diagnoses:   Acute on chronic congestive heart failure, unspecified heart failure type (H)   IEdelmira, am serving as a trained medical scribe to document services personally performed by Jo-Ann Morley MD, based on the provider's statements to me.     Jo-Ann VARMA MD, was physically present and have reviewed and verified the accuracy of this note documented by [facility].     Jo-Ann Morley MD  [facility] EMERGENCY DEPARTMENT  12/28/2024     Jo-Ann Morley MD  12/31/24 0015

## 2024-12-29 NOTE — PROGRESS NOTES
Short Cardiology Progress Note    S- Doing okay still has shortness of breath and orthopnea    O - On room air, MAP 76  JVP >15 cm, crepitation BLL, leg 2+ edema    A&P   # Acute on chronic systolic heart failure/HFrEF   - Lasix 40 mg IV BID, monitor I/O  - Leukocytosis, RVP neg  - Holding metoprolol due to heart failure  - Resume PTA entresto 12-13 mg BID  - Continue PTA aldactone, empa, digoxin    Frannyada MD Master  Internal Medicine, PGY-2

## 2024-12-29 NOTE — MEDICATION SCRIBE - ADMISSION MEDICATION HISTORY
Medication Scribe Admission Medication History    Admission medication history is complete. The information provided in this note is only as accurate as the sources available at the time of the update.    Information Source(s): Facility ([facility]/) medication list/MAR via in-person    Pertinent Information:     Changes made to PTA medication list:  Added:  Camphor-Eucalyptus-Menthol (VICKS VAPORUB CHILDRENS) 4.8-1.2-2.6 % OINT  Externally apply 1 Application. topically as needed.  Calcium Carbonate Antacid (TUMS PO)     Take 1 tablet by mouth as needed (1-   bacitracin 500 UNIT/GM OINT     Apply topically 3 times daily as needed for wound care.   benzocaine-menthol (CEPACOL SORE THROAT EX ST) 15-3.6 MG lozenge     Place 1 lozenge inside cheek every hour as needed for sore throat. One lozenge inside cheek every hour as needed for cough   guaiFENesin (MUCINEX) 600 MG 12 hr tablet     Take 600 mg by mouth every 12 hours as needed for congestion or cough. Take 1 tablet (600 mg total ) by mouth every 12 hours as needed with 8 oz of water.   loperamide (IMODIUM A-D) 2 MG tablet     Take 2 mg by mouth 4 times daily as needed for diarrhea (2 tablets as needed). Do not administer until individual has had 3 loose stools. Then give Loperamide/imodium two (2 mg) tablets after loose stool and 2 mg tablet after each subsequent loose stool.   hydrocortisone (CORTAID) 1 % external cream     Apply topically 3 times daily as needed for itching. Apply cream topically to affected area up to 3 times a day. Apply to clean dry skin, avoid eye , eyelid, and mouth area   Phenylephrine-DM-GG 5- MG/5ML LIQD     Take 10 mLs by mouth 4 times daily as needed. +Take 2 teaspoons ( 10 mL) by mouth every 4 hours as needed   senna-docusate (SENEXON-S) 8.6-50 MG tablet     Take 1 tablet by mouth daily as needed for constipation.    sodium chloride (OCEAN) 0.65 % nasal spray     Spray 1 spray into both nostrils daily as needed for congestion.   Instill 2 sprays into each nostril up to every 2 hours as needed   The above added medications were noted on patient's care facility medication administration record   Deleted:   polyethylene glycol (MIRALAX) 17 GM/Dose powder     Take 17 g by mouth daily as needed for constipation.,    reason aspirin not prescribed, intentional,     Please choose reason not prescribed from choices below.   senna-docusate (SENOKOT-S/PERICOLACE) 8.6-50 MG tablet     Take 1 tablet by mouth or Feeding Tube daily as needed for constipation.,    STATIN NOT PRESCRIBED (INTENTIONAL)     Please choose reason not prescribed from choices below.   The above medications are noted on patient's PTA list as not being prescribed or taken.  triamcinolone (KENALOG) 0.1 % external ointment     Apply topically 2 times daily medication is not on patient's MAR from his care facility   Changed: None    Allergies reviewed with patient and updates made in EHR: yes    Medication History Completed By: Gerri Carlton 12/29/2024 5:15 PM    PTA Med List   Medication Sig Last Dose/Taking    acetaminophen (TYLENOL) 325 MG tablet Take 2 tablets (650 mg) by mouth or Feeding Tube every 8 hours as needed for mild pain. Unknown    bacitracin 500 UNIT/GM OINT Apply topically 3 times daily as needed for wound care. Unknown    benzocaine-menthol (CEPACOL SORE THROAT EX ST) 15-3.6 MG lozenge Place 1 lozenge inside cheek every hour as needed for sore throat. One lozenge inside cheek every hour as needed for cough Unknown    Calcium Carbonate Antacid (TUMS PO) Take 1 tablet by mouth as needed (1-2 Tablet every hour as needed). Take 1-2 tablets and chew thoroughly ever has needed (up to 10 tablets each day) Do not give other medications unless instructed by healthcare professional or MD. Each tablet ontains 200 mg of cacium carbonate. Depending on brand and manufacture, it may not list both strengths on the label/name, as long as it's regular strength antacid (200/500 mg it  can be administered. Unknown    Camphor-Eucalyptus-Menthol (VICKS VAPORUB CHILDRENS) 4.8-1.2-2.6 % OINT Externally apply 1 Application. topically as needed. Apply topically underneath nose or to chest as needed Taking As Needed    cetirizine (ZYRTEC) 5 MG tablet Take 0.5 tablets (2.5 mg) by mouth every 6 hours as needed for allergies (Give 30 minutes prior to each amoxicillin dose for rash). Unknown    digoxin (LANOXIN) 250 MCG tablet Take 1 tablet (250 mcg) by mouth daily. 12/29/2024 at  8:00 AM    empagliflozin (JARDIANCE) 10 MG TABS tablet Take 1 tablet (10 mg) by mouth daily. 12/29/2024 Morning    guaiFENesin (MUCINEX) 600 MG 12 hr tablet Take 600 mg by mouth every 12 hours as needed for congestion or cough. Take 1 tablet (600 mg total ) by mouth every 12 hours as needed with 8 oz of water. Unknown    hydrocortisone (CORTAID) 1 % external cream Apply topically 3 times daily as needed for itching. Apply cream topically to affected area up to 3 times a day. Apply to clean dry skin, avoid eye , eyelid, and mouth area Taking As Needed    loperamide (IMODIUM A-D) 2 MG tablet Take 2 mg by mouth 4 times daily as needed for diarrhea (2 tablets as needed). Do not administer until individual has had 3 loose stools. Then give Loperamide/imodium two (2 mg) tablets after loose stool and 2 mg tablet after each subsequent loose stool. Do nit exceed 4 doses in 24 hours Unknown    magnesium hydroxide (MILK OF MAGNESIA) 400 MG/5ML suspension Take 30 mLs by mouth daily as needed for constipation or heartburn. Take 1 oz ( 30 ml by mouth as needed for constipation Unknown    metoprolol succinate ER (TOPROL XL) 25 MG 24 hr tablet Take 0.5 tablets (12.5 mg) by mouth daily. 12/29/2024 Morning    multivitamin w/minerals (THERA-VIT-M) tablet Take 1 tablet by mouth daily. 12/29/2024 Morning    Phenylephrine-DM-GG 5- MG/5ML LIQD Take 10 mLs by mouth 4 times daily as needed. +Take 2 teaspoons ( 10 mL) by mouth every 4 hours as needed  12/4/2024 at  2:11 PM    potassium chloride anaya ER (KLOR-CON M20) 20 MEQ CR tablet Take 1 tablet (20 mEq) by mouth daily. 12/29/2024 Morning    risperiDONE (RISPERDAL) 0.5 MG tablet Take 3 tablets (1.5 mg) by mouth every evening. 12/28/2024 Evening    risperiDONE (RISPERDAL) 1 MG tablet Take 1 tablet (1 mg) by mouth daily. 12/29/2024 Morning    sacubitril-valsartan (ENTRESTO) 24-26 MG per tablet Take 0.5 tablets by mouth 2 times daily. 12/29/2024 Morning    selenium sulfide (SELSUN) 1 % LOTN shampoo Apply topically daily as needed for itching. Unknown    senna-docusate (SENEXON-S) 8.6-50 MG tablet Take 1 tablet by mouth daily as needed for constipation. Unknown    sodium chloride (OCEAN) 0.65 % nasal spray Spray 1 spray into both nostrils daily as needed for congestion.  Instill 2 sprays into each nostril up to every 2 hours as needed Taking As Needed    spironolactone (ALDACTONE) 25 MG tablet Take 1 tablet (25 mg) by mouth daily. 12/29/2024 Morning    thiamine (B-1) 100 MG tablet Take 1 tablet (100 mg) by mouth daily. 12/29/2024 Morning

## 2024-12-29 NOTE — ED TRIAGE NOTES
Pt arrived to ED c/o SOB with difficulty breathing started at 8 pm . Denied chest pain , not fever or diarrhea he uses CPAP at bed time.    BP 92/64 (BP Location: Right arm)   Pulse 115   Temp 98.2  F (36.8  C) (Oral)   Resp 18   SpO2 96%

## 2024-12-29 NOTE — ED NOTES
With pt being admitted to the hospital, staff from pt's care facility has been called back to assist with staff.

## 2024-12-29 NOTE — H&P
"  History and Physical        Date of Service: 24  Date of Admission: 2024  Patient Name: Terrance Hidalgo  : 2000  MRN: [id number]       Chief complaint:     SOB     History of Present Illness:   Terrance Hidalgo is a 24 year old male admitted on 2024. He has PMHx of non-ischemic dilated cardiomyopathy, autism, HfrEF (EF 15%) 2/2 NICM who presented with concerns for shortness of breath x1d in setting of weight gain over the last 3-5 days.    History obtained from patient, somewhat inconsistent with reports. Per bedside attendant from the [facility], he was noted around 6-7pm to have increased work of breathing and discomfort with breathing even at rest, which prompted his presentation to the ED. Patient reports that he came to the hospital because he \"has too much fluid\", endorses that he feels he has been gaining weight over the last 3-5 days. Difficult to ascertain when patient gets short of breath, reports it occurs \"when I have too much fluid\", denies lower extremity edema, questionable insight into his physical tolerance. Patient reports that he can walk 5 miles at baseline, and that recently he feels like he can walk 3 miles. With further prompts, patient notes that due to the temperature he has not walked this far recently, has not gone on walks outside, he walks between rooms and sometimes goes up/down the 2 story building he is in. Reports that before, he used to be able to go up 10 steps before stopping and taking a break to breathe, and now can only go up 15 steps before stopping. Reaffirmed that he can walk more steps now than prior. Denies fever, chills, nausea, vomiting, chest pain, abdominal pain, changes in urination or defecation. Facility manages medications.    In the ED:   Vitals: /60s, -120s, afebrile, RA  Labs: WBC 12.7, Ca 8.7 otherwise CMP wnl, Troponin 16 -> Pending, procal negative, BNP 5900, negative influenza, flu, RSV  Imaging: CXR without acute " abnormality  Interventions: IV furosemide 80mg x1     Assessment/Plan:   Terrance Hidalgo is a 24 year old male admitted on 12/5/2024. He has PMHx of non-ischemic dilated cardiomyopathy, autism, HfrEF (EF 15%) 2/2 NICM who presented with concerns for shortness of breath x1d in setting of weight gain over the last 3-5 days concerning for heart failure exacerbation.    #Acute on chronic systolic heart failure/HFrEF (EF 15%) secondary to nonischemic cardiomyopathy, NYHA Symptom Class III Stage C  #Acute hypoxic respiratory failure   Patient with SOB, elevated BNP in setting of rapid increase in weight over last few weeks concerning for acute HF decompensation, though notably on exam does not seem significantly volume up. Negative pulmonary infectious workup (CXR, Covid/flu/RSV). Unclear etiology of his severe cardiomyopathy despite EMB and CMR. Prior to admission, plan was to follow up with Dr. Santos 1/16/24 to discuss ICD for primary prevention, and for repeat RHC 2/7/25.    Prior workup  TTE (12/5/24): LVEF 10-15%, apical wall akinesis, global mildly reduced R ventricular function, no valvular abnormalities  RHC (9/11/24): Normal R/L sided filling pressures, normal PA pressure, normal cardiac output  Coronary angiogram: None   EMB (8/21/24): Fragments of essentially unremarkable myocardial tissue with no evidence of inflammatory infiltrate   CMR (9/5/2024): Mildly dilated left ventricle with severely reduced left ventricular function, LVEF 15%.  Normal right ventricular size and systolic function, RVEF 46%. No myocardial edema or replacement fibrosis or MRI features of acute myocarditis.      Plan  Fluid status: Potentially slightly hypervolemic  Dry Weight: Currently 215 lbs, previously was 203lbs on 12/9 prior to discharge  - s/p IV Furosemide 80mg   - Strict I/Os, Daily Weights, Defer fluid or salt restriction for now, clinically monitor  - GDMT  - Hold PTA entresto 12/13 BID  - Cont PTA Metoprolol Succinate 12.5mg  daily  - Cont PTA spironolactone 25 mg daily  - Cont PTA Jardiance 10 mg daily  - Cont PTA digoxin 250 mcg daily for RV Support  - Consider evaluation for ICD for primary prevention of SCD    # Leukocytosis  WBC to 12.7, no neutrophil predominance. Low concern for infection at this time given otherwise negative infectious localizing sx and negative workup thus far.    # Persistent Chronic Sinus Tachycardic  # Persistent Relative Hypotension  Has had multiple workups on prior admissions here and at outside hospital given persistent tachycardia typically in the 110-120s and sBP 80-90s. Evaluation has suggested baseline borderline blood pressures, prior septic shock workup has been negative, negative lactate.    # Autism Spectrum Disorder  - continue risperidone 1mg AM, 1.5mg PM    Code Status: Full Code    To be formally staffed in the AM    Joce Lacey MD  Internal Medicine PGY3       Past Medical History:     No past medical history on file.    Past Surgical History:   Procedure Laterality Date    CV HEART BIOPSY N/A 8/21/2024    Procedure: Heart Biopsy;  Surgeon: Yohan Aponte MD;  Location:  HEART CARDIAC CATH LAB    CV INTRA AORTIC BALLOON N/A 08/27/2024    Procedure: Intra aortic Balloon Pump Insertion;  Surgeon: Davis Disla MD;  Location:  HEART CARDIAC CATH LAB    CV INTRA AORTIC BALLOON REMOVAL N/A 09/01/2024    Procedure: Intra aortic Balloon Pump Removal;  Surgeon: Jairo Squires MD;  Location:  HEART CARDIAC CATH LAB    CV RIGHT HEART CATH MEASUREMENTS RECORDED N/A 9/11/2024    Procedure: Right Heart Catheterization;  Surgeon: Jani Martin MD;  Location:  HEART CARDIAC CATH LAB    CV RIGHT HEART CATH MEASUREMENTS RECORDED N/A 8/21/2024    Procedure: Right Heart Catheterization;  Surgeon: Yohan Aponte MD;  Location:  HEART CARDIAC CATH LAB    CV SWAN REANNA PROCEDURE N/A 8/21/2024    Procedure: [place] Reanna Procedure;  Surgeon: Yohan Aponte MD;   Location:  HEART CARDIAC CATH LAB    PICC INSERTION - TRIPLE LUMEN Left 09/03/2024    Left basilic vein 53cm total 10cm external.        Allergies:     Allergies   Allergen Reactions    Amoxicillin Hives     (Pictures in media tab) Confirmed by dermatology 9/18/2024    Implanon [Etonogestrel]     Penicillin V Hives     Tolerated ampicillin/sulbactam 08/21/2024        Outpatient Medications:     Current Facility-Administered Medications   Medication Dose Route Frequency Provider Last Rate Last Admin    furosemide (LASIX) injection 80 mg  80 mg Intravenous Once Jo-Ann Morley MD         Current Outpatient Medications   Medication Sig Dispense Refill    acetaminophen (TYLENOL) 325 MG tablet Take 2 tablets (650 mg) by mouth or Feeding Tube every 8 hours as needed for mild pain. 90 tablet 2    cetirizine (ZYRTEC) 5 MG tablet Take 0.5 tablets (2.5 mg) by mouth every 6 hours as needed for allergies (Give 30 minutes prior to each amoxicillin dose for rash). 90 tablet 2    digoxin (LANOXIN) 250 MCG tablet Take 1 tablet (250 mcg) by mouth daily. 30 tablet 3    empagliflozin (JARDIANCE) 10 MG TABS tablet Take 1 tablet (10 mg) by mouth daily. 90 tablet 1    furosemide (LASIX) 20 MG tablet Take 2 tablets (40 mg) by mouth 2 times daily. 360 tablet 1    metoprolol succinate ER (TOPROL XL) 25 MG 24 hr tablet Take 0.5 tablets (12.5 mg) by mouth daily. 60 tablet 1    multivitamin w/minerals (THERA-VIT-M) tablet Take 1 tablet by mouth daily. 30 tablet 3    polyethylene glycol (MIRALAX) 17 GM/Dose powder Take 17 g by mouth daily as needed for constipation. (Patient not taking: Reported on 12/18/2024) 510 g 3    potassium chloride anaya ER (KLOR-CON M20) 20 MEQ CR tablet Take 1 tablet (20 mEq) by mouth daily. 90 tablet 3    reason aspirin not prescribed, intentional, Please choose reason not prescribed from choices below. (Patient not taking: Reported on 12/18/2024)      risperiDONE (RISPERDAL) 0.5 MG tablet Take 3 tablets (1.5 mg) by  mouth every evening. 90 tablet 3    risperiDONE (RISPERDAL) 1 MG tablet Take 1 tablet (1 mg) by mouth daily. 30 tablet 3    sacubitril-valsartan (ENTRESTO) 24-26 MG per tablet Take 0.5 tablets by mouth 2 times daily. 30 tablet 3    selenium sulfide (SELSUN) 1 % LOTN shampoo Apply topically daily as needed for itching. 420 mL 3    senna-docusate (SENOKOT-S/PERICOLACE) 8.6-50 MG tablet Take 1 tablet by mouth or Feeding Tube daily as needed for constipation. 30 tablet 2    spironolactone (ALDACTONE) 25 MG tablet Take 1 tablet (25 mg) by mouth daily. 30 tablet 3    STATIN NOT PRESCRIBED (INTENTIONAL) Please choose reason not prescribed from choices below.      thiamine (B-1) 100 MG tablet Take 1 tablet (100 mg) by mouth daily. 30 tablet 3    triamcinolone (KENALOG) 0.1 % external ointment Apply topically 2 times daily. 80 g 3        Family History:     Family History   Problem Relation Age of Onset    Melanoma No family hx of     Skin Cancer No family hx of         Social History:     Social History     Tobacco Use    Smoking status: Never    Smokeless tobacco: Never      Physical Exam:   Blood pressure 92/64, pulse 115, temperature 98.2  F (36.8  C), temperature source Oral, resp. rate 18, weight 97.7 kg (215 lb 6.2 oz), SpO2 96%.  Temp (24hrs), Av.2  F (36.8  C), Min:98.2  F (36.8  C), Max:98.2  F (36.8  C)    Gen: no acute distress  CARDIOVASCULAR: tachycardic, regular rhythm. S1/S2 normal, no murmurs, rubs or gallops   RESPIRATORY: clear to auscultation bilaterally, no rales, rhonchi or wheezes, no use of accessory muscles, no retractions, respirations unlabored   ABDOMEN: soft, non-tender abdomen  EXTREMITIES: No lower extremity edema     Data:   CMP  Recent Labs   Lab 24  0044 24  1254    142   POTASSIUM 4.2 4.1   CHLORIDE 106 105   CO2 24 25   ANIONGAP 11 12   * 161*   BUN 17.1 15.9   CR 0.92 0.99   GFRESTIMATED >90 >90   ALEXANDER 8.7* 9.1   PROTTOTAL 5.8*  --    ALBUMIN 3.8  --     BILITOTAL 0.4  --    ALKPHOS 76  --    AST 19  --    ALT 11  --      CBC  Recent Labs   Lab 12/29/24  0044   WBC 12.7*   RBC 5.75   HGB 15.5   HCT 48.0   MCV 84   MCH 27.0   MCHC 32.3   RDW 14.6        INRNo lab results found in last 7 days.  Arterial Blood GasNo lab results found in last 7 days.

## 2024-12-30 LAB
ANION GAP SERPL CALCULATED.3IONS-SCNC: 11 MMOL/L (ref 7–15)
ANION GAP SERPL CALCULATED.3IONS-SCNC: 14 MMOL/L (ref 7–15)
BASOPHILS # BLD AUTO: 0.1 10E3/UL (ref 0–0.2)
BASOPHILS NFR BLD AUTO: 1 %
BUN SERPL-MCNC: 16.2 MG/DL (ref 6–20)
BUN SERPL-MCNC: 17.5 MG/DL (ref 6–20)
CALCIUM SERPL-MCNC: 8.5 MG/DL (ref 8.8–10.4)
CALCIUM SERPL-MCNC: 9.6 MG/DL (ref 8.8–10.4)
CHLORIDE SERPL-SCNC: 103 MMOL/L (ref 98–107)
CHLORIDE SERPL-SCNC: 103 MMOL/L (ref 98–107)
CREAT SERPL-MCNC: 0.83 MG/DL (ref 0.67–1.17)
CREAT SERPL-MCNC: 0.9 MG/DL (ref 0.67–1.17)
EGFRCR SERPLBLD CKD-EPI 2021: >90 ML/MIN/1.73M2
EGFRCR SERPLBLD CKD-EPI 2021: >90 ML/MIN/1.73M2
EOSINOPHIL # BLD AUTO: 0.1 10E3/UL (ref 0–0.7)
EOSINOPHIL NFR BLD AUTO: 1 %
ERYTHROCYTE [DISTWIDTH] IN BLOOD BY AUTOMATED COUNT: 15 % (ref 10–15)
GLUCOSE SERPL-MCNC: 155 MG/DL (ref 70–99)
GLUCOSE SERPL-MCNC: 88 MG/DL (ref 70–99)
HCO3 SERPL-SCNC: 24 MMOL/L (ref 22–29)
HCO3 SERPL-SCNC: 25 MMOL/L (ref 22–29)
HCT VFR BLD AUTO: 47.5 % (ref 40–53)
HGB BLD-MCNC: 15.4 G/DL (ref 13.3–17.7)
IMM GRANULOCYTES # BLD: 0 10E3/UL
IMM GRANULOCYTES NFR BLD: 0 %
LYMPHOCYTES # BLD AUTO: 3 10E3/UL (ref 0.8–5.3)
LYMPHOCYTES NFR BLD AUTO: 25 %
MAGNESIUM SERPL-MCNC: 2.2 MG/DL (ref 1.7–2.3)
MAGNESIUM SERPL-MCNC: 2.3 MG/DL (ref 1.7–2.3)
MCH RBC QN AUTO: 27 PG (ref 26.5–33)
MCHC RBC AUTO-ENTMCNC: 32.4 G/DL (ref 31.5–36.5)
MCV RBC AUTO: 83 FL (ref 78–100)
MONOCYTES # BLD AUTO: 1 10E3/UL (ref 0–1.3)
MONOCYTES NFR BLD AUTO: 9 %
NEUTROPHILS # BLD AUTO: 7.7 10E3/UL (ref 1.6–8.3)
NEUTROPHILS NFR BLD AUTO: 65 %
NRBC # BLD AUTO: 0 10E3/UL
NRBC BLD AUTO-RTO: 0 /100
PLATELET # BLD AUTO: 240 10E3/UL (ref 150–450)
POTASSIUM SERPL-SCNC: 4 MMOL/L (ref 3.4–5.3)
POTASSIUM SERPL-SCNC: 4.4 MMOL/L (ref 3.4–5.3)
RBC # BLD AUTO: 5.71 10E6/UL (ref 4.4–5.9)
SODIUM SERPL-SCNC: 139 MMOL/L (ref 135–145)
SODIUM SERPL-SCNC: 141 MMOL/L (ref 135–145)
WBC # BLD AUTO: 11.9 10E3/UL (ref 4–11)

## 2024-12-30 PROCEDURE — 85004 AUTOMATED DIFF WBC COUNT: CPT

## 2024-12-30 PROCEDURE — 250N000011 HC RX IP 250 OP 636

## 2024-12-30 PROCEDURE — 85018 HEMOGLOBIN: CPT

## 2024-12-30 PROCEDURE — 36415 COLL VENOUS BLD VENIPUNCTURE: CPT

## 2024-12-30 PROCEDURE — 80048 BASIC METABOLIC PNL TOTAL CA: CPT

## 2024-12-30 PROCEDURE — 250N000013 HC RX MED GY IP 250 OP 250 PS 637

## 2024-12-30 PROCEDURE — 82435 ASSAY OF BLOOD CHLORIDE: CPT

## 2024-12-30 PROCEDURE — 250N000011 HC RX IP 250 OP 636: Mod: JZ

## 2024-12-30 PROCEDURE — 83735 ASSAY OF MAGNESIUM: CPT

## 2024-12-30 PROCEDURE — 120N000002 HC R&B MED SURG/OB UMMC

## 2024-12-30 PROCEDURE — 99232 SBSQ HOSP IP/OBS MODERATE 35: CPT | Mod: GC | Performed by: STUDENT IN AN ORGANIZED HEALTH CARE EDUCATION/TRAINING PROGRAM

## 2024-12-30 RX ORDER — FUROSEMIDE 20 MG/1
40 TABLET ORAL
Status: COMPLETED | OUTPATIENT
Start: 2024-12-30 | End: 2024-12-30

## 2024-12-30 RX ORDER — CALCIUM GLUCONATE 20 MG/ML
2 INJECTION, SOLUTION INTRAVENOUS ONCE
Status: COMPLETED | OUTPATIENT
Start: 2024-12-30 | End: 2024-12-30

## 2024-12-30 RX ORDER — FUROSEMIDE 20 MG/1
40 TABLET ORAL DAILY
Status: DISCONTINUED | OUTPATIENT
Start: 2024-12-31 | End: 2024-12-30

## 2024-12-30 RX ORDER — FUROSEMIDE 20 MG/1
40 TABLET ORAL
Status: DISCONTINUED | OUTPATIENT
Start: 2024-12-30 | End: 2024-12-30

## 2024-12-30 RX ADMIN — CALCIUM GLUCONATE 2 G: 20 INJECTION, SOLUTION INTRAVENOUS at 12:21

## 2024-12-30 RX ADMIN — RISPERIDONE 1 MG: 1 TABLET, FILM COATED ORAL at 08:07

## 2024-12-30 RX ADMIN — SPIRONOLACTONE 25 MG: 25 TABLET, FILM COATED ORAL at 08:08

## 2024-12-30 RX ADMIN — EMPAGLIFLOZIN 10 MG: 10 TABLET, FILM COATED ORAL at 08:08

## 2024-12-30 RX ADMIN — FUROSEMIDE 40 MG: 20 TABLET ORAL at 16:49

## 2024-12-30 RX ADMIN — DIGOXIN 250 MCG: 250 TABLET ORAL at 08:08

## 2024-12-30 RX ADMIN — RISPERIDONE 1.5 MG: 1 TABLET, FILM COATED ORAL at 20:57

## 2024-12-30 RX ADMIN — THIAMINE HCL TAB 100 MG 100 MG: 100 TAB at 08:07

## 2024-12-30 RX ADMIN — Medication 1 HALF-TAB: at 08:07

## 2024-12-30 RX ADMIN — Medication 1 HALF-TAB: at 20:42

## 2024-12-30 RX ADMIN — FUROSEMIDE 40 MG: 10 INJECTION, SOLUTION INTRAMUSCULAR; INTRAVENOUS at 08:07

## 2024-12-30 ASSESSMENT — ACTIVITIES OF DAILY LIVING (ADL)
ADLS_ACUITY_SCORE: 55

## 2024-12-30 NOTE — PROGRESS NOTES
Shift: 3917-8038    BP 97/66   Pulse (!) 126   Temp 97.3  F (36.3  C) (Oral)   Resp 18   Wt 97.7 kg (215 lb 6.2 oz)   SpO2 100%   BMI 31.81 kg/m      Neuro: A&Ox4.   Cardiac: ST, -120  Respiratory: Sating >95% on RA.  GI/: Adequate urine output via urinal. No BM overnight  Diet/appetite: Cardiac diet  Activity: Independent  Pain: Denies  Skin: Rash bilateral groin. Per pt, noticed it 3 days ago. Barrier cream applied   LDA's: R PIV SL    Plan: Continue with POC. Notify primary team with changes.
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
## 2024-12-31 ENCOUNTER — APPOINTMENT (OUTPATIENT)
Dept: ULTRASOUND IMAGING | Facility: CLINIC | Age: 24
DRG: 291 | End: 2024-12-31
Payer: MEDICARE

## 2024-12-31 VITALS
WEIGHT: 206.9 LBS | RESPIRATION RATE: 18 BRPM | SYSTOLIC BLOOD PRESSURE: 109 MMHG | HEART RATE: 123 BPM | OXYGEN SATURATION: 97 % | TEMPERATURE: 97.6 F | BODY MASS INDEX: 30.55 KG/M2 | DIASTOLIC BLOOD PRESSURE: 77 MMHG

## 2024-12-31 LAB
ANION GAP SERPL CALCULATED.3IONS-SCNC: 11 MMOL/L (ref 7–15)
ATRIAL RATE - MUSE: 121 BPM
BASOPHILS # BLD AUTO: 0.1 10E3/UL (ref 0–0.2)
BASOPHILS NFR BLD AUTO: 1 %
BUN SERPL-MCNC: 17.7 MG/DL (ref 6–20)
CALCIUM SERPL-MCNC: 8.5 MG/DL (ref 8.8–10.4)
CHLORIDE SERPL-SCNC: 106 MMOL/L (ref 98–107)
CREAT SERPL-MCNC: 0.79 MG/DL (ref 0.67–1.17)
DIASTOLIC BLOOD PRESSURE - MUSE: NORMAL MMHG
EGFRCR SERPLBLD CKD-EPI 2021: >90 ML/MIN/1.73M2
EOSINOPHIL # BLD AUTO: 0.1 10E3/UL (ref 0–0.7)
EOSINOPHIL NFR BLD AUTO: 1 %
ERYTHROCYTE [DISTWIDTH] IN BLOOD BY AUTOMATED COUNT: 15 % (ref 10–15)
GLUCOSE SERPL-MCNC: 100 MG/DL (ref 70–99)
HCO3 SERPL-SCNC: 26 MMOL/L (ref 22–29)
HCT VFR BLD AUTO: 46.1 % (ref 40–53)
HGB BLD-MCNC: 15.1 G/DL (ref 13.3–17.7)
IMM GRANULOCYTES # BLD: 0 10E3/UL
IMM GRANULOCYTES NFR BLD: 0 %
INTERPRETATION ECG - MUSE: NORMAL
LYMPHOCYTES # BLD AUTO: 2.7 10E3/UL (ref 0.8–5.3)
LYMPHOCYTES NFR BLD AUTO: 24 %
MAGNESIUM SERPL-MCNC: 2.1 MG/DL (ref 1.7–2.3)
MCH RBC QN AUTO: 27.2 PG (ref 26.5–33)
MCHC RBC AUTO-ENTMCNC: 32.8 G/DL (ref 31.5–36.5)
MCV RBC AUTO: 83 FL (ref 78–100)
MONOCYTES # BLD AUTO: 0.9 10E3/UL (ref 0–1.3)
MONOCYTES NFR BLD AUTO: 8 %
NEUTROPHILS # BLD AUTO: 7.7 10E3/UL (ref 1.6–8.3)
NEUTROPHILS NFR BLD AUTO: 67 %
NRBC # BLD AUTO: 0 10E3/UL
NRBC BLD AUTO-RTO: 0 /100
P AXIS - MUSE: 70 DEGREES
PLATELET # BLD AUTO: 225 10E3/UL (ref 150–450)
POTASSIUM SERPL-SCNC: 3.7 MMOL/L (ref 3.4–5.3)
PR INTERVAL - MUSE: 160 MS
QRS DURATION - MUSE: 72 MS
QT - MUSE: 310 MS
QTC - MUSE: 440 MS
R AXIS - MUSE: -70 DEGREES
RBC # BLD AUTO: 5.55 10E6/UL (ref 4.4–5.9)
SODIUM SERPL-SCNC: 143 MMOL/L (ref 135–145)
SYSTOLIC BLOOD PRESSURE - MUSE: NORMAL MMHG
T AXIS - MUSE: 75 DEGREES
VENTRICULAR RATE- MUSE: 121 BPM
WBC # BLD AUTO: 11.5 10E3/UL (ref 4–11)

## 2024-12-31 PROCEDURE — 93970 EXTREMITY STUDY: CPT | Mod: 26 | Performed by: RADIOLOGY

## 2024-12-31 PROCEDURE — 85018 HEMOGLOBIN: CPT

## 2024-12-31 PROCEDURE — 99239 HOSP IP/OBS DSCHRG MGMT >30: CPT | Mod: GC | Performed by: STUDENT IN AN ORGANIZED HEALTH CARE EDUCATION/TRAINING PROGRAM

## 2024-12-31 PROCEDURE — 36415 COLL VENOUS BLD VENIPUNCTURE: CPT

## 2024-12-31 PROCEDURE — 85004 AUTOMATED DIFF WBC COUNT: CPT

## 2024-12-31 PROCEDURE — 93970 EXTREMITY STUDY: CPT

## 2024-12-31 PROCEDURE — 82435 ASSAY OF BLOOD CHLORIDE: CPT

## 2024-12-31 PROCEDURE — 83735 ASSAY OF MAGNESIUM: CPT

## 2024-12-31 PROCEDURE — 80048 BASIC METABOLIC PNL TOTAL CA: CPT

## 2024-12-31 PROCEDURE — 250N000013 HC RX MED GY IP 250 OP 250 PS 637

## 2024-12-31 RX ORDER — FUROSEMIDE 40 MG/1
40 TABLET ORAL DAILY
Qty: 90 TABLET | Refills: 3 | Status: SHIPPED | OUTPATIENT
Start: 2024-12-31 | End: 2025-01-09

## 2024-12-31 RX ADMIN — RISPERIDONE 1 MG: 1 TABLET, FILM COATED ORAL at 08:36

## 2024-12-31 RX ADMIN — ACETAMINOPHEN 650 MG: 325 TABLET, FILM COATED ORAL at 06:33

## 2024-12-31 RX ADMIN — THIAMINE HCL TAB 100 MG 100 MG: 100 TAB at 08:36

## 2024-12-31 RX ADMIN — EMPAGLIFLOZIN 10 MG: 10 TABLET, FILM COATED ORAL at 08:36

## 2024-12-31 RX ADMIN — DIGOXIN 250 MCG: 250 TABLET ORAL at 08:38

## 2024-12-31 RX ADMIN — Medication 1 HALF-TAB: at 08:39

## 2024-12-31 RX ADMIN — SPIRONOLACTONE 25 MG: 25 TABLET, FILM COATED ORAL at 08:38

## 2024-12-31 ASSESSMENT — ACTIVITIES OF DAILY LIVING (ADL)
ADLS_ACUITY_SCORE: 57
ADLS_ACUITY_SCORE: 55
ADLS_ACUITY_SCORE: 57
ADLS_ACUITY_SCORE: 55

## 2024-12-31 NOTE — DISCHARGE SUMMARY
[facility]   Cardiology II Service / Advanced Heart Failure  Discharge Summary     Terrance Hidalgo MRN# [id number]   YOB: 2000 Age: 24 year old     DATE OF ADMISSION:  12/28/2024  DATE OF DISCHARGE: 12/31/2024    DISCHARGE DIAGNOSES:   1. Acute on chronic heart failure with reduced ejection fraction secondary to NICM, NYHA 3C    ITEMS FOR OUTPATIENT FOLLOW UP:   - Cardiology clinic: please assess electrolytes with new diuresis regimen and adjust diuretic dosing as needed.      HPI: Please see the detailed H & P from 12/28/2024. Briefly, 24 year old male admitted on 12/5/2024. He has PMHx of non-ischemic dilated cardiomyopathy, autism, HfrEF (EF 15%) 2/2 NICM who presented with concerns for shortness of breath x1d in setting of weight gain over the last 3-5 days concerning for heart failure exacerbation.     HOSPITAL COURSE BY PROBLEM:     #Acute on chronic systolic heart failure/HFrEF (EF 15%) secondary to nonischemic cardiomyopathy, NYHA Symptom Class III Stage C  #Acute hypoxic respiratory failure   Patient with SOB, elevated BNP in setting of rapid increase in weight over last few weeks concerning for acute HF decompensation, though notably on exam does not seem significantly volume up. Negative pulmonary infectious workup (CXR, Covid/flu/RSV). Patient was noted to weigh 215 lb on admission, previously 203 lbs as last dry weight. Diuresis with 80 mg furosemide IV, then 40 mg IV BID, then finally 40 mg oral with reduction of his weight to 206 lbs at last measurement. With additional urine output, it was expected to continue to decline. He was discharged on an increased dose (40 mg up from 20 mg) of furosemide due to continued fluid retention prior to admission. Entresto was held while inpatient due to soft blood pressures, but restarted at discharge.     PHYSICAL EXAM:    /77   Pulse (!) 123   Temp 97.6  F (36.4  C) (Oral)   Resp 18   Wt 93.8 kg (206 lb 14.4 oz)   SpO2 97%    "BMI 30.55 kg/m     Wt Readings from Last 1 Encounters:   12/30/24 93.8 kg (206 lb 14.4 oz)       General: Alert and oriented; no acute distress  Neck: JVP is 5 cm  CV: S1 S2 regular; no m/r/g  Lungs: CTABL, no rhonchi or wheezing  Extremities: No pretibial edema, warm and well perfused  Skin: warm, no rashes  Neuro: alert and oriented  Psych: congruent affect    LABS:   Recent Labs   Lab 12/31/24  0628 12/30/24  0654 12/29/24  0044   WBC 11.5* 11.9* 12.7*   HGB 15.1 15.4 15.5   HCT 46.1 47.5 48.0   MCV 83 83 84    240 252     Recent Labs   Lab 12/31/24  0628 12/30/24  1824    141   POTASSIUM 3.7 4.4   CHLORIDE 106 103   CO2 26 24   ANIONGAP 11 14   BUN 17.7 17.5   CR 0.79 0.90   ALEXANDER 8.5* 9.6     No results for input(s): \"CHOL\", \"HDL\", \"LDL\", \"TRIG\" in the last 70311 hours.    IMAGING:  Results for orders placed or performed during the hospital encounter of 12/28/24   XR Chest 2 Views    Narrative    EXAM: XR CHEST 2 VIEWS  LOCATION: [facility]  DATE: 12/29/2024    INDICATION: sob  COMPARISON: 12/5/2024.    FINDINGS: The heart size is normal. The lungs are clear. There is no pneumothorax. Thoracolumbar scoliosis.      Impression    IMPRESSION: No acute abnormality.   US Lower Extremity Venous Duplex Bilateral    Narrative    EXAMINATION: DOPPLER VENOUS ULTRASOUND OF BILATERAL LOWER EXTREMITIES,  12/31/2024 8:08 AM     COMPARISON: None.    HISTORY: Sudden onset 9 out of 10 leg pain    TECHNIQUE: Gray-scale evaluation with compression, spectral flow and  color Doppler assessment of the deep venous system of both legs from  groin to knee, and then at the ankles.    FINDINGS:  In both lower extremities, the common femoral, femoral, popliteal and  posterior tibial veins demonstrate normal compressibility and blood  flow.        Impression    IMPRESSION:  No evidence of deep venous thrombosis in either lower extremity.    I have personally reviewed the examination " and initial interpretation  and I agree with the findings.    DARRION FALL MD         SYSTEM ID:  [id number]       Misha Denny MD-PhD  PSTP- PGY - 1

## 2024-12-31 NOTE — ED NOTES
"PCD ordered.  Pt denied pain until approximately 0640 Pt stated having L-leg pain 9/10 \"pins & needles\" provider notified.  Occasional PVCs noted, EKG ordered & provider notified.  Tachycardic 120s/130s baseline.  02Sat dips to high 80s with exertion.   Occasional productive cough.  Large loose stool x1.  "

## 2025-01-02 ENCOUNTER — PATIENT OUTREACH (OUTPATIENT)
Dept: CARE COORDINATION | Facility: CLINIC | Age: 25
End: 2025-01-02
Payer: MEDICARE

## 2025-01-02 DIAGNOSIS — I50.23 ACUTE ON CHRONIC SYSTOLIC CONGESTIVE HEART FAILURE (H): Primary | ICD-10-CM

## 2025-01-02 NOTE — PROGRESS NOTES
Connected Care Resource Center: Connected [facility] Resource [place]    Background: Transitional Care Management program identified per system criteria and reviewed by Connected Care Resource Center team for possible outreach.    Assessment: Upon chart review, [facility] Team member will not proceed with patient outreach related to this episode of Transitional Care Management program due to reason below:    Patient has discharged to a Memory Care, Long-term Care, Assisted Living or Group Home where patient is receiving on-site support with their daily cares, including support with hospital follow up plan.    Patient lives in group home.     Plan: Transitional Care Management episode addressed appropriately per reason noted above.      Yaz Traylor RN  Connected Care Resource Center, [facility]    *Connected Care Resource Team does NOT follow patient ongoing. Referrals are identified based on internal discharge reports and the outreach is to ensure patient has an understanding of their discharge instructions.

## 2025-01-07 ENCOUNTER — TELEPHONE (OUTPATIENT)
Dept: CARDIOLOGY | Facility: CLINIC | Age: 25
End: 2025-01-07
Payer: MEDICARE

## 2025-01-07 ENCOUNTER — APPOINTMENT (OUTPATIENT)
Dept: GENERAL RADIOLOGY | Facility: CLINIC | Age: 25
End: 2025-01-07
Attending: STUDENT IN AN ORGANIZED HEALTH CARE EDUCATION/TRAINING PROGRAM
Payer: MEDICARE

## 2025-01-07 ENCOUNTER — HOSPITAL ENCOUNTER (EMERGENCY)
Facility: CLINIC | Age: 25
Discharge: HOME | End: 2025-01-07
Attending: STUDENT IN AN ORGANIZED HEALTH CARE EDUCATION/TRAINING PROGRAM
Payer: MEDICARE

## 2025-01-07 VITALS
HEART RATE: 113 BPM | DIASTOLIC BLOOD PRESSURE: 69 MMHG | TEMPERATURE: 97.7 F | OXYGEN SATURATION: 96 % | RESPIRATION RATE: 18 BRPM | SYSTOLIC BLOOD PRESSURE: 107 MMHG | BODY MASS INDEX: 31.56 KG/M2 | WEIGHT: 213.7 LBS

## 2025-01-07 DIAGNOSIS — J06.9 VIRAL UPPER RESPIRATORY INFECTION: ICD-10-CM

## 2025-01-07 DIAGNOSIS — I50.9 ACUTE EXACERBATION OF CHRONIC HEART FAILURE (H): ICD-10-CM

## 2025-01-07 LAB
ALBUMIN SERPL BCG-MCNC: 3.9 G/DL (ref 3.5–5.2)
ALP SERPL-CCNC: 71 U/L (ref 40–150)
ALT SERPL W P-5'-P-CCNC: 10 U/L (ref 0–70)
ANION GAP SERPL CALCULATED.3IONS-SCNC: 9 MMOL/L (ref 7–15)
AST SERPL W P-5'-P-CCNC: 18 U/L (ref 0–45)
ATRIAL RATE - MUSE: 123 BPM
BASOPHILS # BLD AUTO: 0.1 10E3/UL (ref 0–0.2)
BASOPHILS NFR BLD AUTO: 1 %
BILIRUB SERPL-MCNC: 0.8 MG/DL
BUN SERPL-MCNC: 16.1 MG/DL (ref 6–20)
CALCIUM SERPL-MCNC: 9.1 MG/DL (ref 8.8–10.4)
CHLORIDE SERPL-SCNC: 107 MMOL/L (ref 98–107)
CREAT SERPL-MCNC: 1.04 MG/DL (ref 0.67–1.17)
DIASTOLIC BLOOD PRESSURE - MUSE: NORMAL MMHG
EGFRCR SERPLBLD CKD-EPI 2021: >90 ML/MIN/1.73M2
EOSINOPHIL # BLD AUTO: 0.1 10E3/UL (ref 0–0.7)
EOSINOPHIL NFR BLD AUTO: 1 %
ERYTHROCYTE [DISTWIDTH] IN BLOOD BY AUTOMATED COUNT: 15.9 % (ref 10–15)
FLUAV RNA SPEC QL NAA+PROBE: NEGATIVE
FLUBV RNA RESP QL NAA+PROBE: NEGATIVE
GLUCOSE SERPL-MCNC: 118 MG/DL (ref 70–99)
HCO3 SERPL-SCNC: 23 MMOL/L (ref 22–29)
HCT VFR BLD AUTO: 51.7 % (ref 40–53)
HGB BLD-MCNC: 16.9 G/DL (ref 13.3–17.7)
IMM GRANULOCYTES # BLD: 0 10E3/UL
IMM GRANULOCYTES NFR BLD: 0 %
INR PPP: 1.16 (ref 0.85–1.15)
INTERPRETATION ECG - MUSE: NORMAL
LYMPHOCYTES # BLD AUTO: 2.7 10E3/UL (ref 0.8–5.3)
LYMPHOCYTES NFR BLD AUTO: 21 %
MCH RBC QN AUTO: 27 PG (ref 26.5–33)
MCHC RBC AUTO-ENTMCNC: 32.7 G/DL (ref 31.5–36.5)
MCV RBC AUTO: 83 FL (ref 78–100)
MONOCYTES # BLD AUTO: 1.3 10E3/UL (ref 0–1.3)
MONOCYTES NFR BLD AUTO: 10 %
NEUTROPHILS # BLD AUTO: 8.3 10E3/UL (ref 1.6–8.3)
NEUTROPHILS NFR BLD AUTO: 67 %
NRBC # BLD AUTO: 0 10E3/UL
NRBC BLD AUTO-RTO: 0 /100
NT-PROBNP SERPL-MCNC: 4887 PG/ML (ref 0–450)
P AXIS - MUSE: 54 DEGREES
PLATELET # BLD AUTO: 290 10E3/UL (ref 150–450)
POTASSIUM SERPL-SCNC: 4.7 MMOL/L (ref 3.4–5.3)
PR INTERVAL - MUSE: 166 MS
PROT SERPL-MCNC: 6 G/DL (ref 6.4–8.3)
QRS DURATION - MUSE: 74 MS
QT - MUSE: 298 MS
QTC - MUSE: 426 MS
R AXIS - MUSE: -62 DEGREES
RBC # BLD AUTO: 6.25 10E6/UL (ref 4.4–5.9)
RSV RNA SPEC NAA+PROBE: NEGATIVE
SARS-COV-2 RNA RESP QL NAA+PROBE: NEGATIVE
SODIUM SERPL-SCNC: 139 MMOL/L (ref 135–145)
SYSTOLIC BLOOD PRESSURE - MUSE: NORMAL MMHG
T AXIS - MUSE: 71 DEGREES
TROPONIN T SERPL HS-MCNC: 16 NG/L
TROPONIN T SERPL HS-MCNC: 17 NG/L
VENTRICULAR RATE- MUSE: 123 BPM
WBC # BLD AUTO: 12.5 10E3/UL (ref 4–11)

## 2025-01-07 PROCEDURE — 83880 ASSAY OF NATRIURETIC PEPTIDE: CPT | Performed by: STUDENT IN AN ORGANIZED HEALTH CARE EDUCATION/TRAINING PROGRAM

## 2025-01-07 PROCEDURE — 84484 ASSAY OF TROPONIN QUANT: CPT | Performed by: STUDENT IN AN ORGANIZED HEALTH CARE EDUCATION/TRAINING PROGRAM

## 2025-01-07 PROCEDURE — 99284 EMERGENCY DEPT VISIT MOD MDM: CPT | Performed by: STUDENT IN AN ORGANIZED HEALTH CARE EDUCATION/TRAINING PROGRAM

## 2025-01-07 PROCEDURE — 87637 SARSCOV2&INF A&B&RSV AMP PRB: CPT | Performed by: STUDENT IN AN ORGANIZED HEALTH CARE EDUCATION/TRAINING PROGRAM

## 2025-01-07 PROCEDURE — 36415 COLL VENOUS BLD VENIPUNCTURE: CPT | Performed by: STUDENT IN AN ORGANIZED HEALTH CARE EDUCATION/TRAINING PROGRAM

## 2025-01-07 PROCEDURE — 71046 X-RAY EXAM CHEST 2 VIEWS: CPT

## 2025-01-07 PROCEDURE — 93010 ELECTROCARDIOGRAM REPORT: CPT | Performed by: STUDENT IN AN ORGANIZED HEALTH CARE EDUCATION/TRAINING PROGRAM

## 2025-01-07 PROCEDURE — 85610 PROTHROMBIN TIME: CPT | Performed by: STUDENT IN AN ORGANIZED HEALTH CARE EDUCATION/TRAINING PROGRAM

## 2025-01-07 PROCEDURE — 71046 X-RAY EXAM CHEST 2 VIEWS: CPT | Mod: 26 | Performed by: RADIOLOGY

## 2025-01-07 PROCEDURE — 93005 ELECTROCARDIOGRAM TRACING: CPT | Performed by: STUDENT IN AN ORGANIZED HEALTH CARE EDUCATION/TRAINING PROGRAM

## 2025-01-07 PROCEDURE — 80053 COMPREHEN METABOLIC PANEL: CPT | Performed by: STUDENT IN AN ORGANIZED HEALTH CARE EDUCATION/TRAINING PROGRAM

## 2025-01-07 PROCEDURE — 82040 ASSAY OF SERUM ALBUMIN: CPT | Performed by: STUDENT IN AN ORGANIZED HEALTH CARE EDUCATION/TRAINING PROGRAM

## 2025-01-07 PROCEDURE — 99285 EMERGENCY DEPT VISIT HI MDM: CPT | Mod: 25 | Performed by: STUDENT IN AN ORGANIZED HEALTH CARE EDUCATION/TRAINING PROGRAM

## 2025-01-07 PROCEDURE — 85025 COMPLETE CBC W/AUTO DIFF WBC: CPT | Performed by: STUDENT IN AN ORGANIZED HEALTH CARE EDUCATION/TRAINING PROGRAM

## 2025-01-07 RX ORDER — FUROSEMIDE 20 MG/1
40 TABLET ORAL DAILY
Qty: 20 TABLET | Refills: 0 | Status: SHIPPED | OUTPATIENT
Start: 2025-01-07 | End: 2025-01-09

## 2025-01-07 ASSESSMENT — ACTIVITIES OF DAILY LIVING (ADL)
ADLS_ACUITY_SCORE: 55

## 2025-01-07 NOTE — ED TRIAGE NOTES
To triage for N/V and weight gain of 4lbs in one day per  staff       Triage Assessment (Adult)       Row Name 01/07/25 1342          Triage Assessment    Airway WDL WDL        Respiratory WDL    Respiratory WDL WDL        Skin Circulation/Temperature WDL    Skin Circulation/Temperature WDL WDL        Cardiac WDL    Cardiac WDL WDL        Peripheral/Neurovascular WDL    Peripheral Neurovascular WDL WDL        Cognitive/Neuro/Behavioral WDL    Cognitive/Neuro/Behavioral WDL WDL

## 2025-01-07 NOTE — TELEPHONE ENCOUNTER
[facility] Call Center    Phone Message    May a detailed message be left on voicemail: yes     Reason for Call: Other: Galileo with [facility] group home called stating patient has gained 4 lbs between yesterday and today. Will like to know what care team recommends for patient. Galileo requesting for a call back before the end of the day, if possible at [phone] or [phone].     Action Taken: Other: Cardiology    Travel Screening: Not Applicable     Thank you!  [facility]

## 2025-01-07 NOTE — ED PROVIDER NOTES
[place] EMERGENCY DEPARTMENT ([facility])    1/07/25       ED PROVIDER NOTE       History   No chief complaint on file.    CLARE Hidalgo is a 24 year old male with a history of cardiogenic shock, acute kidney failure, non-ischemic dilated cardiomyopathy, autism, and HfrEF (EF 15%) 2/2 NICM who presents to the Emergency Department with weight gain.       Patient reports gaining 4 lbs between yesterday and today.  Group home staff who has accompanied him today give most of the history.  They report that he was doing well after his discharge 1 week ago up until yesterday.  Patient developed a dry cough and a sore throat.  Today he says that he was not feeling well so when they checked his vital signs his blood pressure was initially unreadable and then 65 systolic.  They also noted that his oxygen saturation was varying from 88% to 98%.  They also noted the weight gain so promptly brought him in for evaluation.  Patient states that he has a sore throat and cough.  He also vomited once.  He denies any fevers chills lightheadedness syncope chest pain shortness of breath abdominal pain flank pain genital pain dysuria diarrhea melena hematochezia    He was recently admitted on 12/28/24 - 12/31/24 due to shortness of breath x1d in setting of weight gain over the last 3-5 days concerning for heart failure exacerbation. Negative pulmonary infectious workup (CXR, Covid/flu/RSV). Patient was noted to weigh 215 lb on admission, previously 203 lbs as last dry weight. Diuresis with 80 mg furosemide IV, then 40 mg IV BID, then finally 40 mg oral with reduction of his weight to 206 lbs at last measurement. He was discharged on an increased dose (40 mg up from 20 mg) of furosemide due to continued fluid retention prior to admission. Entresto was held while inpatient due to soft blood pressures, but restarted at discharge.         Past Medical History  No past medical history on file.  Past Surgical History:    Procedure Laterality Date    CV HEART BIOPSY N/A 8/21/2024    Procedure: Heart Biopsy;  Surgeon: Yohan Aponte MD;  Location:  HEART CARDIAC CATH LAB    CV INTRA AORTIC BALLOON N/A 08/27/2024    Procedure: Intra aortic Balloon Pump Insertion;  Surgeon: Davis Disla MD;  Location:  HEART CARDIAC CATH LAB    CV INTRA AORTIC BALLOON REMOVAL N/A 09/01/2024    Procedure: Intra aortic Balloon Pump Removal;  Surgeon: Jairo Squires MD;  Location:  HEART CARDIAC CATH LAB    CV RIGHT HEART CATH MEASUREMENTS RECORDED N/A 9/11/2024    Procedure: Right Heart Catheterization;  Surgeon: Jani Martin MD;  Location:  HEART CARDIAC CATH LAB    CV RIGHT HEART CATH MEASUREMENTS RECORDED N/A 8/21/2024    Procedure: Right Heart Catheterization;  Surgeon: oYhan Aponte MD;  Location:  HEART CARDIAC CATH LAB    CV SWAN REANNA PROCEDURE N/A 8/21/2024    Procedure: [place] Reanna Procedure;  Surgeon: Yohan Aponte MD;  Location:  HEART CARDIAC CATH LAB    PICC INSERTION - TRIPLE LUMEN Left 09/03/2024    Left basilic vein 53cm total 10cm external.     acetaminophen (TYLENOL) 325 MG tablet  bacitracin 500 UNIT/GM OINT  benzocaine-menthol (CEPACOL SORE THROAT EX ST) 15-3.6 MG lozenge  Calcium Carbonate Antacid (TUMS PO)  Camphor-Eucalyptus-Menthol (VICKS VAPORUB CHILDRENS) 4.8-1.2-2.6 % OINT  cetirizine (ZYRTEC) 5 MG tablet  digoxin (LANOXIN) 250 MCG tablet  empagliflozin (JARDIANCE) 10 MG TABS tablet  furosemide (LASIX) 40 MG tablet  guaiFENesin (MUCINEX) 600 MG 12 hr tablet  hydrocortisone (CORTAID) 1 % external cream  loperamide (IMODIUM A-D) 2 MG tablet  magnesium hydroxide (MILK OF MAGNESIA) 400 MG/5ML suspension  metoprolol succinate ER (TOPROL XL) 25 MG 24 hr tablet  multivitamin w/minerals (THERA-VIT-M) tablet  Phenylephrine-DM-GG 5- MG/5ML LIQD  potassium chloride anaya ER (KLOR-CON M20) 20 MEQ CR tablet  risperiDONE (RISPERDAL) 0.5 MG tablet  risperiDONE (RISPERDAL) 1 MG  tablet  sacubitril-valsartan (ENTRESTO) 24-26 MG per tablet  selenium sulfide (SELSUN) 1 % LOTN shampoo  senna-docusate (SENEXON-S) 8.6-50 MG tablet  sodium chloride (OCEAN) 0.65 % nasal spray  spironolactone (ALDACTONE) 25 MG tablet  thiamine (B-1) 100 MG tablet      Allergies   Allergen Reactions    Amoxicillin Hives     (Pictures in media tab) Confirmed by dermatology 9/18/2024    Implanon [Etonogestrel]     Penicillin V Hives     Tolerated ampicillin/sulbactam 08/21/2024     Family History  Family History   Problem Relation Age of Onset    Melanoma No family hx of     Skin Cancer No family hx of      Social History   Social History     Tobacco Use    Smoking status: Never    Smokeless tobacco: Never      Past medical history, past surgical history, medications, allergies, family history, and social history were reviewed with the patient. No additional pertinent items.   A medically appropriate review of systems was performed with pertinent positives and negatives noted in the HPI, and all other systems negative.    Physical Exam      Physical Exam  Gen: Awake and alert, interactive and in no acute distress  Head: Atraumatic  Eyes: Anicteric, EOMI, CLARA  Nose: No active bleeding  Mouth: Moist mucosa  Throat: Airway patent, pharynx clear and uvula midline, normal voice, no trismus  Neck: Supple, non-tender, no adenopathy  Chest: No tenderness to palpation, no crepitus  CV: RRR, no murmurs  Pulm: CTAB with good aeration, no retractions or accessory muscle use, no wheezing/ronchi/crackles  Abd: Soft, NTND, no organomegaly  Back: No midline ttp, no CVAT  Ext: Full ROM w/o pain, no joint effusions, no edema  Derm: No rash, skin warm and dry  Neuro: Moves all extremities appropriately. Affect, activity, and interaction appropriate  Vasc: Symmetric pulses, capillary refill < 2 sec    ED Course, Procedures, & Data      Procedures            EKG Interpretation:      Interpreted by Mikala Holden DO  Time reviewed:  1500  Symptoms at time of EKG: Sore throat and cough  Rhythm: sinus tachycardia  Rate: 120-130  Axis: Left Axis Deviation  Ectopy: none  Conduction: normal  ST Segments/ T Waves: No acute ischemic changes  Q Waves: none  Comparison to prior: Unchanged    Clinical Impression: no acute changes         No results found for any visits on 01/07/25.  Medications - No data to display  Labs Ordered and Resulted from Time of ED Arrival to Time of ED Departure - No data to display  No orders to display          Critical care was not performed.     Medical Decision Making  The patient's presentation was of high complexity (a chronic illness severe exacerbation, progression, or side effect of treatment).    The patient's evaluation involved:  review of external note(s) from 3+ sources (see separate area of note for details)  review of 3+ test result(s) ordered prior to this encounter (see separate area of note for details)  ordering and/or review of 3+ test(s) in this encounter (see separate area of note for details)  discussion of management or test interpretation with another health professional (see separate area of note for details)    The patient's management necessitated only low risk treatment.    Assessment & Plan    Vital signs stable.  On evaluation patient is well-appearing.  He does not seem particularly fluid overloaded on my assessment however his weight is up.  He is weighing 213 pounds today with his known dry weight to be 203.    Chest x-ray stable.  Evidence of chronic cardiomegaly but no evidence of pulmonary edema pneumothorax or pneumonia.  At this point I do believe the patient has a viral upper respiratory tract infection causing his postnasal drip and sore throat.  No concerns for occult bacterial infection.    Lab work at baseline.  Patient's BNP is significantly elevated around 5000 which seems to be where he trends.  Troponin is negative.  Patient does not have any metabolic derangement or electrolyte  dysfunction.  I believe he is a little bit fluid overloaded however I do not believe that he needs inpatient management of this.  Spoke with Dr. Damian with cardiology since the patient had recent long admission for acute exacerbation of heart failure for recommendations on medications.  Seems to the patient was switched from Lasix 20 mg twice daily to 40 mg once daily in the mornings.  We decided to add a second dose of Lasix in the afternoon at 40 mg to help diurese.  He does not recommend making any other changes at this time.  He states that his clinic nurses will follow-up with the next couple of days.  Patient has a scheduled appointment on Friday with cardiology.  On reevaluation patient is well-appearing.  Patient and group home staff at the bedside are comfortable with the discharge plan.  Additional prescription for Lasix placed to make sure they do not run out of pills.    I have reviewed the patient's discharge instructions with the patient and any family members verbally at the bedside, and I provided a written summary of the important points. I reviewed medications, including common side effects and activity precautions with the patient. They verbalized understanding of the discharge instructions, and I answered any questions. They are cautioned about warning symptoms that require a return visit to the emergency department, and I have reviewed follow up instructions as documented in the discharge instructions. The patient expresses understanding and is comfortable with the discharge plan. The patient is discharged in stable condition.    This chart has been created utilizing voice recognition software. It has been edited for major content, but may still contain small errors inherent in the voice recognition process.    I have reviewed the nursing notes. I have reviewed the findings, diagnosis, plan and need for follow up with the patient.    New Prescriptions    No medications on file       Final  diagnoses:   None       Mikala Holden DO on 1/7/2025 at 6:22 PM    [facility] EMERGENCY DEPARTMENT  1/7/2025     Mikala Holden DO  01/07/25 1822     Statement Selected

## 2025-01-07 NOTE — TELEPHONE ENCOUNTER
RECORDS RECEIVED FROM:    DATE RECEIVED:    NOTES STATUS DETAILS   OFFICE NOTE from referring provider  Internal    OFFICE NOTE from other cardiologists  Internal [facility] [place] 12-18-24   RECORDS from hospital/ED Internal 12-28-24   MEDICATION LIST Internal    GENERAL CARDIO RECORDS   (ALL APPOINTMENT TYPES)     LABS (CBC,BMP,CMP, TSH) Internal    EKG (STRIPS & REPORTS) Internal 12-31-24   MONITORS (STRIPS & REPORTS) N/A    ECHOS (IMAGES AND REPORTS) Internal 12-8-24   STRESS TESTS (IMAGES AND REPORTS) N/A    cMRI (IMAGES AND REPORTS) Internal 9-5-24   Cardiac cath (IMAGES AND REPORTS) Internal 9-11-24   CT/CTA (IMAGES AND REPORTS) Internal 8-20-24

## 2025-01-07 NOTE — TELEPHONE ENCOUNTER
Called and spoke with staff. They state patient had a low BP of 65/52 today with emesis and wheezing. Patient was sent to the ER.

## 2025-01-08 ENCOUNTER — PATIENT OUTREACH (OUTPATIENT)
Dept: CARDIOLOGY | Facility: CLINIC | Age: 25
End: 2025-01-08
Payer: MEDICARE

## 2025-01-08 NOTE — DISCHARGE INSTRUCTIONS
As we discussed I believe that you have a viral upper respiratory tract infection causing a sore throat and cough.  Please take Tylenol and cough drops as needed to help with the sore throat..  I believe that the weight gain is because of retained fluids.  I spoke with cardiology who would like him to resume taking Lasix twice daily.  You should take 40 mg in the morning and 40 mg at 2 PM.  Cardiology service will follow up with you via phone of the next couple of days.  Please make sure that you follow-up with your cardiology appointment on Friday.  If symptoms get worse please return immediately for evaluation

## 2025-01-08 NOTE — PROGRESS NOTES
Called [facility] to check in. Spoke with Alia, nurse at the home. She reports he is doing ok today. Sucking on a cough drop so he does still have the cough. His weight today 207.2, was 213 yesterday when he was in the hospital per their notes.     She confirms that Lasix was increased at discharge yesterday to 40 mg BID which they are starting today. Confirmed [facility] follow up on Friday.

## 2025-01-09 ENCOUNTER — APPOINTMENT (OUTPATIENT)
Dept: GENERAL RADIOLOGY | Facility: CLINIC | Age: 25
End: 2025-01-09
Attending: FAMILY MEDICINE
Payer: MEDICARE

## 2025-01-09 ENCOUNTER — TELEPHONE (OUTPATIENT)
Dept: CARDIOLOGY | Facility: CLINIC | Age: 25
End: 2025-01-09

## 2025-01-09 ENCOUNTER — HOSPITAL ENCOUNTER (EMERGENCY)
Facility: CLINIC | Age: 25
Discharge: NURSING, REHAB OR LONG-TERM CARE | End: 2025-01-09
Attending: FAMILY MEDICINE | Admitting: FAMILY MEDICINE
Payer: MEDICARE

## 2025-01-09 VITALS
OXYGEN SATURATION: 98 % | RESPIRATION RATE: 20 BRPM | TEMPERATURE: 97.7 F | HEART RATE: 110 BPM | BODY MASS INDEX: 28.31 KG/M2 | SYSTOLIC BLOOD PRESSURE: 104 MMHG | HEIGHT: 72 IN | WEIGHT: 209 LBS | DIASTOLIC BLOOD PRESSURE: 63 MMHG

## 2025-01-09 DIAGNOSIS — R11.2 NAUSEA VOMITING AND DIARRHEA: ICD-10-CM

## 2025-01-09 DIAGNOSIS — R19.7 NAUSEA VOMITING AND DIARRHEA: ICD-10-CM

## 2025-01-09 DIAGNOSIS — I50.23 ACUTE ON CHRONIC SYSTOLIC CONGESTIVE HEART FAILURE (H): ICD-10-CM

## 2025-01-09 DIAGNOSIS — I50.22 CHRONIC SYSTOLIC HEART FAILURE (H): ICD-10-CM

## 2025-01-09 LAB
ADV 40+41 DNA STL QL NAA+NON-PROBE: NEGATIVE
ALBUMIN SERPL BCG-MCNC: 4 G/DL (ref 3.5–5.2)
ALP SERPL-CCNC: 67 U/L (ref 40–150)
ALT SERPL W P-5'-P-CCNC: 10 U/L (ref 0–70)
ANION GAP SERPL CALCULATED.3IONS-SCNC: 10 MMOL/L (ref 7–15)
APTT PPP: 28 SECONDS (ref 22–38)
AST SERPL W P-5'-P-CCNC: 17 U/L (ref 0–45)
ASTRO TYP 1-8 RNA STL QL NAA+NON-PROBE: NEGATIVE
ATRIAL RATE - MUSE: 113 BPM
BASOPHILS # BLD AUTO: 0.1 10E3/UL (ref 0–0.2)
BASOPHILS NFR BLD AUTO: 1 %
BILIRUB SERPL-MCNC: 0.8 MG/DL
BUN SERPL-MCNC: 15.7 MG/DL (ref 6–20)
C CAYETANENSIS DNA STL QL NAA+NON-PROBE: NEGATIVE
CALCIUM SERPL-MCNC: 9.3 MG/DL (ref 8.8–10.4)
CAMPYLOBACTER DNA SPEC NAA+PROBE: NEGATIVE
CHLORIDE SERPL-SCNC: 105 MMOL/L (ref 98–107)
CREAT SERPL-MCNC: 1.02 MG/DL (ref 0.67–1.17)
CRP SERPL-MCNC: <3 MG/L
CRYPTOSP DNA STL QL NAA+NON-PROBE: NEGATIVE
DIASTOLIC BLOOD PRESSURE - MUSE: NORMAL MMHG
E COLI O157 DNA STL QL NAA+NON-PROBE: NORMAL
E HISTOLYT DNA STL QL NAA+NON-PROBE: NEGATIVE
EAEC ASTA GENE ISLT QL NAA+PROBE: NEGATIVE
EC STX1+STX2 GENES STL QL NAA+NON-PROBE: NEGATIVE
EGFRCR SERPLBLD CKD-EPI 2021: >90 ML/MIN/1.73M2
EOSINOPHIL # BLD AUTO: 0 10E3/UL (ref 0–0.7)
EOSINOPHIL NFR BLD AUTO: 0 %
EPEC EAE GENE STL QL NAA+NON-PROBE: NEGATIVE
ERYTHROCYTE [DISTWIDTH] IN BLOOD BY AUTOMATED COUNT: 15.4 % (ref 10–15)
ETEC LTA+ST1A+ST1B TOX ST NAA+NON-PROBE: NEGATIVE
G LAMBLIA DNA STL QL NAA+NON-PROBE: NEGATIVE
GLUCOSE SERPL-MCNC: 109 MG/DL (ref 70–99)
HCO3 SERPL-SCNC: 25 MMOL/L (ref 22–29)
HCT VFR BLD AUTO: 50.7 % (ref 40–53)
HGB BLD-MCNC: 15.9 G/DL (ref 13.3–17.7)
HOLD SPECIMEN: NORMAL
IMM GRANULOCYTES # BLD: 0.1 10E3/UL
IMM GRANULOCYTES NFR BLD: 1 %
INR PPP: 1.21 (ref 0.85–1.15)
INTERPRETATION ECG - MUSE: NORMAL
LACTATE SERPL-SCNC: 1.1 MMOL/L (ref 0.7–2)
LIPASE SERPL-CCNC: 32 U/L (ref 13–60)
LYMPHOCYTES # BLD AUTO: 1.9 10E3/UL (ref 0.8–5.3)
LYMPHOCYTES NFR BLD AUTO: 17 %
MCH RBC QN AUTO: 26.6 PG (ref 26.5–33)
MCHC RBC AUTO-ENTMCNC: 31.4 G/DL (ref 31.5–36.5)
MCV RBC AUTO: 85 FL (ref 78–100)
MONOCYTES # BLD AUTO: 1 10E3/UL (ref 0–1.3)
MONOCYTES NFR BLD AUTO: 9 %
NEUTROPHILS # BLD AUTO: 7.9 10E3/UL (ref 1.6–8.3)
NEUTROPHILS NFR BLD AUTO: 72 %
NOROVIRUS GI+II RNA STL QL NAA+NON-PROBE: NEGATIVE
NRBC # BLD AUTO: 0 10E3/UL
NRBC BLD AUTO-RTO: 0 /100
NT-PROBNP SERPL-MCNC: 4935 PG/ML (ref 0–450)
P AXIS - MUSE: 41 DEGREES
P SHIGELLOIDES DNA STL QL NAA+NON-PROBE: NEGATIVE
PLATELET # BLD AUTO: 286 10E3/UL (ref 150–450)
POTASSIUM SERPL-SCNC: 5 MMOL/L (ref 3.4–5.3)
PR INTERVAL - MUSE: 162 MS
PROT SERPL-MCNC: 6.1 G/DL (ref 6.4–8.3)
QRS DURATION - MUSE: 84 MS
QT - MUSE: 314 MS
QTC - MUSE: 430 MS
R AXIS - MUSE: -48 DEGREES
RBC # BLD AUTO: 5.98 10E6/UL (ref 4.4–5.9)
RVA RNA STL QL NAA+NON-PROBE: NEGATIVE
SALMONELLA SP RPOD STL QL NAA+PROBE: NEGATIVE
SAPO I+II+IV+V RNA STL QL NAA+NON-PROBE: NEGATIVE
SHIGELLA SP+EIEC IPAH ST NAA+NON-PROBE: NEGATIVE
SODIUM SERPL-SCNC: 140 MMOL/L (ref 135–145)
SYSTOLIC BLOOD PRESSURE - MUSE: NORMAL MMHG
T AXIS - MUSE: 82 DEGREES
TROPONIN T SERPL HS-MCNC: 14 NG/L
TROPONIN T SERPL HS-MCNC: 23 NG/L
V CHOLERAE DNA SPEC QL NAA+PROBE: NEGATIVE
VENTRICULAR RATE- MUSE: 113 BPM
VIBRIO DNA SPEC NAA+PROBE: NEGATIVE
WBC # BLD AUTO: 10.9 10E3/UL (ref 4–11)
Y ENTEROCOL DNA STL QL NAA+PROBE: NEGATIVE

## 2025-01-09 PROCEDURE — 96361 HYDRATE IV INFUSION ADD-ON: CPT | Performed by: FAMILY MEDICINE

## 2025-01-09 PROCEDURE — 83690 ASSAY OF LIPASE: CPT | Performed by: FAMILY MEDICINE

## 2025-01-09 PROCEDURE — 99285 EMERGENCY DEPT VISIT HI MDM: CPT | Performed by: FAMILY MEDICINE

## 2025-01-09 PROCEDURE — 85004 AUTOMATED DIFF WBC COUNT: CPT | Performed by: FAMILY MEDICINE

## 2025-01-09 PROCEDURE — 85610 PROTHROMBIN TIME: CPT | Performed by: FAMILY MEDICINE

## 2025-01-09 PROCEDURE — 85730 THROMBOPLASTIN TIME PARTIAL: CPT | Performed by: FAMILY MEDICINE

## 2025-01-09 PROCEDURE — 71046 X-RAY EXAM CHEST 2 VIEWS: CPT | Mod: 26 | Performed by: RADIOLOGY

## 2025-01-09 PROCEDURE — 93010 ELECTROCARDIOGRAM REPORT: CPT | Performed by: FAMILY MEDICINE

## 2025-01-09 PROCEDURE — 93005 ELECTROCARDIOGRAM TRACING: CPT | Performed by: FAMILY MEDICINE

## 2025-01-09 PROCEDURE — 86140 C-REACTIVE PROTEIN: CPT | Performed by: FAMILY MEDICINE

## 2025-01-09 PROCEDURE — 83880 ASSAY OF NATRIURETIC PEPTIDE: CPT | Performed by: FAMILY MEDICINE

## 2025-01-09 PROCEDURE — 84484 ASSAY OF TROPONIN QUANT: CPT | Performed by: FAMILY MEDICINE

## 2025-01-09 PROCEDURE — 85018 HEMOGLOBIN: CPT | Performed by: FAMILY MEDICINE

## 2025-01-09 PROCEDURE — 36415 COLL VENOUS BLD VENIPUNCTURE: CPT | Performed by: FAMILY MEDICINE

## 2025-01-09 PROCEDURE — 82947 ASSAY GLUCOSE BLOOD QUANT: CPT | Performed by: FAMILY MEDICINE

## 2025-01-09 PROCEDURE — 96360 HYDRATION IV INFUSION INIT: CPT | Performed by: FAMILY MEDICINE

## 2025-01-09 PROCEDURE — 87507 IADNA-DNA/RNA PROBE TQ 12-25: CPT | Performed by: FAMILY MEDICINE

## 2025-01-09 PROCEDURE — 258N000003 HC RX IP 258 OP 636: Performed by: FAMILY MEDICINE

## 2025-01-09 PROCEDURE — 82435 ASSAY OF BLOOD CHLORIDE: CPT | Performed by: FAMILY MEDICINE

## 2025-01-09 PROCEDURE — 99285 EMERGENCY DEPT VISIT HI MDM: CPT | Mod: 25 | Performed by: FAMILY MEDICINE

## 2025-01-09 PROCEDURE — 71046 X-RAY EXAM CHEST 2 VIEWS: CPT

## 2025-01-09 PROCEDURE — 83605 ASSAY OF LACTIC ACID: CPT | Performed by: FAMILY MEDICINE

## 2025-01-09 RX ORDER — ONDANSETRON 2 MG/ML
4 INJECTION INTRAMUSCULAR; INTRAVENOUS EVERY 30 MIN PRN
Status: DISCONTINUED | OUTPATIENT
Start: 2025-01-09 | End: 2025-01-09 | Stop reason: HOSPADM

## 2025-01-09 RX ORDER — LIDOCAINE 40 MG/G
CREAM TOPICAL
Status: DISCONTINUED | OUTPATIENT
Start: 2025-01-09 | End: 2025-01-09 | Stop reason: HOSPADM

## 2025-01-09 RX ORDER — POTASSIUM CHLORIDE 1500 MG/1
20 TABLET, EXTENDED RELEASE ORAL 2 TIMES DAILY
Qty: 180 TABLET | Refills: 1 | Status: SHIPPED | OUTPATIENT
Start: 2025-01-09

## 2025-01-09 RX ORDER — SODIUM CHLORIDE 9 MG/ML
INJECTION, SOLUTION INTRAVENOUS CONTINUOUS
Status: DISCONTINUED | OUTPATIENT
Start: 2025-01-09 | End: 2025-01-09 | Stop reason: HOSPADM

## 2025-01-09 RX ORDER — ONDANSETRON 4 MG/1
4 TABLET, ORALLY DISINTEGRATING ORAL EVERY 8 HOURS PRN
Qty: 10 TABLET | Refills: 0 | Status: SHIPPED | OUTPATIENT
Start: 2025-01-09 | End: 2025-01-12

## 2025-01-09 RX ORDER — FUROSEMIDE 40 MG/1
60 TABLET ORAL
Qty: 90 TABLET | Refills: 3 | Status: SHIPPED | OUTPATIENT
Start: 2025-01-09

## 2025-01-09 RX ADMIN — SODIUM CHLORIDE: 0.9 INJECTION, SOLUTION INTRAVENOUS at 15:40

## 2025-01-09 ASSESSMENT — ACTIVITIES OF DAILY LIVING (ADL)
ADLS_ACUITY_SCORE: 55

## 2025-01-09 NOTE — ED TRIAGE NOTES
"Pt presents with c/o multiple episodes of vomiting and diarrhea. Additionally, pt reports \"I have a rash on my bottom too\".     Triage Assessment (Adult)       Row Name 01/09/25 0070          Triage Assessment    Airway WDL WDL        Respiratory WDL    Respiratory WDL WDL        Skin Circulation/Temperature WDL    Skin Circulation/Temperature WDL WDL        Cardiac WDL    Cardiac WDL WDL        Peripheral/Neurovascular WDL    Peripheral Neurovascular WDL WDL        Cognitive/Neuro/Behavioral WDL    Cognitive/Neuro/Behavioral WDL WDL                     "

## 2025-01-09 NOTE — TELEPHONE ENCOUNTER
[facility] Call Center    Phone Message    May a detailed message be left on voicemail: yes     Reason for Call: Other: Galileo called requesting to speak with terrance's care team about his vitals. She states over the past 24 hours Terrance has gained two pounds, his blood pressure is low and has a pulse of 122bpm. Galileo also noted that Terrance's BP has been fluctuating. Please reach out to Galileo to discuss. Thank you!     Action Taken: Other: Cardiology    Travel Screening: Not Applicable    Thank you!  Specialty Access Center       Date of Service:

## 2025-01-09 NOTE — ED PROVIDER NOTES
[facility] DE[facility]MENT ([facility])    1/09/25       ED PROVIDER NOTE       History     Chief Complaint   Patient presents with    Nausea, Vomiting, & Diarrhea    Shortness of Breath     HPI  Terrance Hidalgo is a 24 year old male with a history of cardiogenic shock, acute kidney failure, non-ischemic dilated cardiomyopathy, autism, and HfrEF (EF 15%) 2/2 NICM who presents to the Emergency Department with nausea, diarrhea today.  Patient lives in a group home here with PCA also does give collateral information.  Patient seen here couple days ago for fluid overloaded alterations made with medications with Lasix primarily.  Patient apparently noted today approximate 1130 at group home having diarrhea x 2 along with nausea vomiting.  Presents now for evaluation slight shortness of breath although vitally stable here had been placed on oxygen no abdominal pain denies nausea and vomiting at this point.  No other concerns.    Past Medical History  History reviewed. No pertinent past medical history.  Past Surgical History:   Procedure Laterality Date    CV HEART BIOPSY N/A 8/21/2024    Procedure: Heart Biopsy;  Surgeon: Yohan Aponte MD;  Location:  HEART CARDIAC CATH LAB    CV INTRA AORTIC BALLOON N/A 08/27/2024    Procedure: Intra aortic Balloon Pump Insertion;  Surgeon: Davis Disla MD;  Location:  HEART CARDIAC CATH LAB    CV INTRA AORTIC BALLOON REMOVAL N/A 09/01/2024    Procedure: Intra aortic Balloon Pump Removal;  Surgeon: Jairo Squires MD;  Location:  HEART CARDIAC CATH LAB    CV RIGHT HEART CATH MEASUREMENTS RECORDED N/A 9/11/2024    Procedure: Right Heart Catheterization;  Surgeon: Jani Martin MD;  Location:  HEART CARDIAC CATH LAB    CV RIGHT HEART CATH MEASUREMENTS RECORDED N/A 8/21/2024    Procedure: Right Heart Catheterization;  Surgeon: Yohan Aponte MD;  Location:  HEART CARDIAC CATH LAB    CV SWAN REANNA PROCEDURE N/A 8/21/2024     Procedure: [place] Sha Procedure;  Surgeon: Yohan Aponte MD;  Location:  HEART CARDIAC CATH LAB    PICC INSERTION - TRIPLE LUMEN Left 09/03/2024    Left basilic vein 53cm total 10cm external.     ondansetron (ZOFRAN ODT) 4 MG ODT tab  acetaminophen (TYLENOL) 325 MG tablet  bacitracin 500 UNIT/GM OINT  benzocaine-menthol (CEPACOL SORE THROAT EX ST) 15-3.6 MG lozenge  Calcium Carbonate Antacid (TUMS PO)  Camphor-Eucalyptus-Menthol (VICKS VAPORUB CHILDRENS) 4.8-1.2-2.6 % OINT  cetirizine (ZYRTEC) 5 MG tablet  digoxin (LANOXIN) 250 MCG tablet  empagliflozin (JARDIANCE) 10 MG TABS tablet  furosemide (LASIX) 40 MG tablet  guaiFENesin (MUCINEX) 600 MG 12 hr tablet  hydrocortisone (CORTAID) 1 % external cream  loperamide (IMODIUM A-D) 2 MG tablet  magnesium hydroxide (MILK OF MAGNESIA) 400 MG/5ML suspension  metoprolol succinate ER (TOPROL XL) 25 MG 24 hr tablet  multivitamin w/minerals (THERA-VIT-M) tablet  Phenylephrine-DM-GG 5- MG/5ML LIQD  potassium chloride anaya ER (KLOR-CON M20) 20 MEQ CR tablet  risperiDONE (RISPERDAL) 0.5 MG tablet  risperiDONE (RISPERDAL) 1 MG tablet  sacubitril-valsartan (ENTRESTO) 24-26 MG per tablet  selenium sulfide (SELSUN) 1 % LOTN shampoo  senna-docusate (SENEXON-S) 8.6-50 MG tablet  sodium chloride (OCEAN) 0.65 % nasal spray  spironolactone (ALDACTONE) 25 MG tablet  thiamine (B-1) 100 MG tablet      Allergies   Allergen Reactions    Amoxicillin Hives     (Pictures in media tab) Confirmed by dermatology 9/18/2024    Implanon [Etonogestrel]     Penicillin V Hives     Tolerated ampicillin/sulbactam 08/21/2024     Family History  Family History   Problem Relation Age of Onset    Melanoma No family hx of     Skin Cancer No family hx of      Social History   Social History     Tobacco Use    Smoking status: Never    Smokeless tobacco: Never      A medically appropriate review of systems was performed with pertinent positives and negatives noted in the HPI, and all other systems  negative.    Physical Exam   BP: (!) 86/49  Pulse: 72  Temp: 97.5  F (36.4  C)  Resp: 18  Height: 182.9 cm (6')  Weight: 94.8 kg (209 lb)  SpO2: 100 %  Physical Exam  Vitals and nursing note reviewed.   Constitutional:       General: He is not in acute distress.     Appearance: Normal appearance. He is not toxic-appearing.   HENT:      Head: Atraumatic.   Eyes:      General: No scleral icterus.     Conjunctiva/sclera: Conjunctivae normal.   Cardiovascular:      Rate and Rhythm: Normal rate.      Heart sounds: Normal heart sounds.   Pulmonary:      Effort: Pulmonary effort is normal. No respiratory distress.      Breath sounds: Normal breath sounds.   Abdominal:      Palpations: Abdomen is soft.      Tenderness: There is no abdominal tenderness.   Musculoskeletal:         General: No deformity.      Cervical back: Neck supple.   Skin:     General: Skin is warm.   Neurological:      Mental Status: He is alert.           ED Course, Procedures, & Data      Records in epic patient seen in the ER 2 days ago as noted.  He is reviewed medication changes etc.    Here in the ER EKG was done sinus tachycardia nonspecific ST changes seen it seems similar to previous although today's rate is only 113 compared to 123 2 days ago    Chest x-ray done inter by myself no pneumothorax or large effusion noted.  No major heart failure identified    White count was 10.9 hemoglobin is 15.  I did order enteric pathogen panel but no stool was collected.  Lactic acid 1.1.  BNP is 4900 which is baseline  Sodium 140 potassium 5 bicarb 25 gap is 10 creatinine is 1.02.  Glucose 109 CRP is negative.    Here in the ER patient was resting did receive approximate 300 cc normal saline here in the ER this patient's baseline echo has an EF of 10%.  Patient had no signs of respiratory distress here in the ER able to drink vigorously 2 glasses of apple juice keep these down he had received some Zofran for nausea also.  This point patient feels fine is  oxygenating on room air 97% I talked to cardiology also they agreed at this point patient can be discharged does have a follow-up with cardiology tomorrow with continue to hold this appointment to the meantime patient discharged with Zofran for acute gastroenteritis.      Procedures            EKG Interpretation:      Interpreted by Juan Myers MD  Time reviewed: 1520  Symptoms at time of EKG: nvd and sob   Rhythm: normal sinus tachy  Rate: normal 113  Axis: normal  Ectopy: none  Conduction: normal  ST Segments/ T Waves: Nonspecific ST-T wave changes  Q Waves: none  Comparison to prior: no sig changes noted    Clinical Impression: sinus tach with nonspecific st changes            Results for orders placed or performed during the hospital encounter of 01/09/25   XR Chest 2 Views     Status: None    Narrative    Exam: XR CHEST 2 VIEWS, 1/9/2025 4:35 PM    Indication: sob with n/v/d hx of chf    Comparison: Chest radiograph on 1/7/2025, additional priors    Findings:   PA and lateral radiographs of the chest were obtained. Stable  cardiomegaly. No pleural effusion. No pneumothorax. No new focal  airspace consolidation. No acute osseous abnormality. Stable  dextroscoliosis of the thoracic spine.      Impression    Impression: Stable cardiomegaly. No acute airspace disease.    I have personally reviewed the examination and initial interpretation  and I agree with the findings.    LUMA MATHEWS MD         SYSTEM ID:  [id number]   [place] Draw     Status: None    Narrative    The following orders were created for panel order [place] Draw.  Procedure                               Abnormality         Status                     ---------                               -----------         ------                     Extra Blue Top Tube[[id number]]                              Final result               Extra Red Top Tube[[id number]]                               Final result               Extra Green Top  (Lithium...[[id number]]                      Final result               Extra Purple Top Tube[[id number]]                            Final result                 Please view results for these tests on the individual orders.   Extra Blue Top Tube     Status: None   Result Value Ref Range    Hold Specimen JIC    Extra Red Top Tube     Status: None   Result Value Ref Range    Hold Specimen JIC    Extra Green Top (Lithium Heparin) Tube     Status: None   Result Value Ref Range    Hold Specimen JIC    Extra Purple Top Tube     Status: None   Result Value Ref Range    Hold Specimen JIC    Partial thromboplastin time     Status: Normal   Result Value Ref Range    aPTT 28 22 - 38 Seconds   INR     Status: Abnormal   Result Value Ref Range    INR 1.21 (H) 0.85 - 1.15   CRP inflammation     Status: Normal   Result Value Ref Range    CRP Inflammation <3.00 <5.00 mg/L   Comprehensive metabolic panel     Status: Abnormal   Result Value Ref Range    Sodium 140 135 - 145 mmol/L    Potassium 5.0 3.4 - 5.3 mmol/L    Carbon Dioxide (CO2) 25 22 - 29 mmol/L    Anion Gap 10 7 - 15 mmol/L    Urea Nitrogen 15.7 6.0 - 20.0 mg/dL    Creatinine 1.02 0.67 - 1.17 mg/dL    GFR Estimate >90 >60 mL/min/1.73m2    Calcium 9.3 8.8 - 10.4 mg/dL    Chloride 105 98 - 107 mmol/L    Glucose 109 (H) 70 - 99 mg/dL    Alkaline Phosphatase 67 40 - 150 U/L    AST 17 0 - 45 U/L    ALT 10 0 - 70 U/L    Protein Total 6.1 (L) 6.4 - 8.3 g/dL    Albumin 4.0 3.5 - 5.2 g/dL    Bilirubin Total 0.8 <=1.2 mg/dL   Lipase     Status: Normal   Result Value Ref Range    Lipase 32 13 - 60 U/L   Lactic Acid Whole Blood with 1X Repeat in 2 HR when >2     Status: Normal   Result Value Ref Range    Lactic Acid, Initial 1.1 0.7 - 2.0 mmol/L   Troponin T, High Sensitivity     Status: Abnormal   Result Value Ref Range    Troponin T, High Sensitivity 23 (H) <=22 ng/L   Nt probnp inpatient (BNP)     Status: Abnormal   Result Value Ref Range    N terminal Pro BNP Inpatient 4,935 (H) 0 -  450 pg/mL   CBC with platelets and differential     Status: Abnormal   Result Value Ref Range    WBC Count 10.9 4.0 - 11.0 10e3/uL    RBC Count 5.98 (H) 4.40 - 5.90 10e6/uL    Hemoglobin 15.9 13.3 - 17.7 g/dL    Hematocrit 50.7 40.0 - 53.0 %    MCV 85 78 - 100 fL    MCH 26.6 26.5 - 33.0 pg    MCHC 31.4 (L) 31.5 - 36.5 g/dL    RDW 15.4 (H) 10.0 - 15.0 %    Platelet Count 286 150 - 450 10e3/uL    % Neutrophils 72 %    % Lymphocytes 17 %    % Monocytes 9 %    % Eosinophils 0 %    % Basophils 1 %    % Immature Granulocytes 1 %    NRBCs per 100 WBC 0 <1 /100    Absolute Neutrophils 7.9 1.6 - 8.3 10e3/uL    Absolute Lymphocytes 1.9 0.8 - 5.3 10e3/uL    Absolute Monocytes 1.0 0.0 - 1.3 10e3/uL    Absolute Eosinophils 0.0 0.0 - 0.7 10e3/uL    Absolute Basophils 0.1 0.0 - 0.2 10e3/uL    Absolute Immature Granulocytes 0.1 <=0.4 10e3/uL    Absolute NRBCs 0.0 10e3/uL   Troponin T, High Sensitivity     Status: Normal   Result Value Ref Range    Troponin T, High Sensitivity 14 <=22 ng/L   EKG 12-lead, tracing only     Status: None   Result Value Ref Range    Systolic Blood Pressure  mmHg    Diastolic Blood Pressure  mmHg    Ventricular Rate 113 BPM    Atrial Rate 113 BPM    WV Interval 162 ms    QRS Duration 84 ms     ms    QTc 430 ms    P Axis 41 degrees    R AXIS -48 degrees    T Axis 82 degrees    Interpretation ECG       Sinus tachycardia  Left axis deviation  Inferior infarct , age undetermined  Abnormal ECG  Unconfirmed report - interpretation of this ECG is computer generated - see medical record for final interpretation  Confirmed by - EMERGENCY ROOM, PHYSICIAN ([id number]),  SHAINA DILLON ([id number]) on 1/9/2025 10:32:07 PM     CBC with platelets differential     Status: Abnormal    Narrative    The following orders were created for panel order CBC with platelets differential.  Procedure                               Abnormality         Status                     ---------                               -----------          ------                     CBC with platelets and d...[[id number]]  Abnormal            Final result                 Please view results for these tests on the individual orders.     Medications - No data to display  Labs Ordered and Resulted from Time of ED Arrival to Time of ED Departure   INR - Abnormal       Result Value    INR 1.21 (*)    COMPREHENSIVE METABOLIC PANEL - Abnormal    Sodium 140      Potassium 5.0      Carbon Dioxide (CO2) 25      Anion Gap 10      Urea Nitrogen 15.7      Creatinine 1.02      GFR Estimate >90      Calcium 9.3      Chloride 105      Glucose 109 (*)     Alkaline Phosphatase 67      AST 17      ALT 10      Protein Total 6.1 (*)     Albumin 4.0      Bilirubin Total 0.8     TROPONIN T, HIGH SENSITIVITY - Abnormal    Troponin T, High Sensitivity 23 (*)    NT PROBNP INPATIENT - Abnormal    N terminal Pro BNP Inpatient 4,935 (*)    CBC WITH PLATELETS AND DIFFERENTIAL - Abnormal    WBC Count 10.9      RBC Count 5.98 (*)     Hemoglobin 15.9      Hematocrit 50.7      MCV 85      MCH 26.6      MCHC 31.4 (*)     RDW 15.4 (*)     Platelet Count 286      % Neutrophils 72      % Lymphocytes 17      % Monocytes 9      % Eosinophils 0      % Basophils 1      % Immature Granulocytes 1      NRBCs per 100 WBC 0      Absolute Neutrophils 7.9      Absolute Lymphocytes 1.9      Absolute Monocytes 1.0      Absolute Eosinophils 0.0      Absolute Basophils 0.1      Absolute Immature Granulocytes 0.1      Absolute NRBCs 0.0     PARTIAL THROMBOPLASTIN TIME - Normal    aPTT 28     CRP INFLAMMATION - Normal    CRP Inflammation <3.00     LIPASE - Normal    Lipase 32     LACTIC ACID WHOLE BLOOD WITH 1X REPEAT IN 2 HR WHEN >2 - Normal    Lactic Acid, Initial 1.1     TROPONIN T, HIGH SENSITIVITY - Normal    Troponin T, High Sensitivity 14     ENTERIC BACTERIA AND VIRUS PANEL BY PCR     XR Chest 2 Views   Final Result   Impression: Stable cardiomegaly. No acute airspace disease.      I have personally reviewed  the examination and initial interpretation   and I agree with the findings.      LUMA MATHEWS MD            SYSTEM ID:  [id number]             Critical care was not performed.     Medical Decision Making  The patient's presentation was of moderate complexity (an acute illness with systemic symptoms).    The patient's evaluation involved:  an assessment requiring an independent historian (see separate area of note for details)  review of external note(s) from 3+ sources (see separate area of note for details)  review of 3+ test result(s) ordered prior to this encounter (see separate area of note for details)  ordering and/or review of 3+ test(s) in this encounter (see separate area of note for details)  discussion of management or test interpretation with another health professional (see separate area of note for details)    The patient's management necessitated moderate risk (prescription drug management including medications given in the ED).    Assessment & Plan   24-year-old male with significant cardiomyopathy with an EF of 10% was in the hospital ER 2 days ago with medication adjustments for fluid overload presented now with episodes of nausea vomiting diarrhea none here did receive Zofran received 300 cc normal saline IV patient otherwise comfortable here labs otherwise stable chest x-ray etc. EKG shows sinus tachycardia which is baseline rate is slower at 113 BNP is baseline close to 5000 discussed with cardiology also I did order stool studies but patient unable to produce any patient able to drink apple juice comfort going back was discharged with PCA from group home with Zofran has an appoint with cardiology tomorrow to follow-up with should return if any concerns       I have reviewed the nursing notes. I have reviewed the findings, diagnosis, plan and need for follow up with the patient.    Discharge Medication List as of 1/9/2025  6:43 PM        START taking these medications    Details    ondansetron (ZOFRAN ODT) 4 MG ODT tab Take 1 tablet (4 mg) by mouth every 8 hours as needed for nausea or vomiting., Disp-10 tablet, R-0, Local Print             Final diagnoses:   Nausea vomiting and diarrhea   Chronic systolic heart failure (H)       Lucia Sharpe MD  [facility] EMERGENCY DEPARTMENT  1/9/2025    This note was created at least in part by the use of dragon voice dictation system. Inadvertent typographical errors may still exist.  Juan Myers MD.  Patient evaluated in the emergency department during the COVID-19 pandemic period. Careful attention to patients safety was addressed throughout the evaluation. Evaluation and treatment management was initiated with disposition made efficiently and appropriate as possible to minimize any risk of potential exposure to patient during this evaluation.       Juan Myers MD  01/09/25 [id number]

## 2025-01-09 NOTE — TELEPHONE ENCOUNTER
Reviewed with Angelina TONY.   Date: 1/9/2025    Time of Call: 11:59 AM     Diagnosis:  HF     [ VORB ] Ordering provider: Angelina TONY  Order: Increase lasix to 60 mg BID. Increase potassium to 20 meq BID.      Order received by: Keyla Montes RN      Follow-up/additional notes: Spoke with Galileo. Confirmed dose increases. She wanted me to know Terrance has had diarrhea x2 this morning. They gave him Immodium.  Reviewed with Angelina TONY, 2 episodes are ok, but any more should go to ER. Galileo then also advised that he vomited x1 at lunch. Advised if he has any further vomiting, needs to go to ER.   She verbalized understanding.

## 2025-01-09 NOTE — TELEPHONE ENCOUNTER
Called Galileo back to discuss Terrance's vitals. She is reporting Terrance's blood pressure - this morning 87/66. Yesterday was 87/70. Has been in 80s systolic last few clinic visits as well. .   Symptomatically no symptoms to report. No complaints of dizziness. He has had a cold.   Weight yesterday was 2 07.   BP  this morning 87/66, 87/70.   Has had a cold. No complaints of dizziness.   Weight today is 209, yesterday 207, gained 2 lbs overnight. When he went to the ER earlier this week weight was 213 lbs.   They increased his lasix to 40 mg BID from 40 mg daily, group home just started this increase yesterday.   Has labs today and CORE visit tomorrow.

## 2025-01-10 ENCOUNTER — OFFICE VISIT (OUTPATIENT)
Dept: CARDIOLOGY | Facility: CLINIC | Age: 25
End: 2025-01-10
Payer: MEDICARE

## 2025-01-10 VITALS
OXYGEN SATURATION: 98 % | DIASTOLIC BLOOD PRESSURE: 64 MMHG | SYSTOLIC BLOOD PRESSURE: 93 MMHG | HEART RATE: 121 BPM | BODY MASS INDEX: 29.08 KG/M2 | WEIGHT: 214.4 LBS

## 2025-01-10 DIAGNOSIS — I50.20 HEART FAILURE WITH REDUCED EJECTION FRACTION, NYHA CLASS II (H): ICD-10-CM

## 2025-01-10 PROCEDURE — 99213 OFFICE O/P EST LOW 20 MIN: CPT | Performed by: PHYSICIAN ASSISTANT

## 2025-01-10 NOTE — DISCHARGE INSTRUCTIONS
Home.  Labs and EKG and labs appear stable.  Discussed with cardiology.  You have an appointment tomorrow scheduled with cardiology.  Zofran for nausea.  See MD for recheck also.    Results for orders placed or performed during the hospital encounter of 01/09/25   [place] Draw     Status: None    Narrative    The following orders were created for panel order [place] Draw.  Procedure                               Abnormality         Status                     ---------                               -----------         ------                     Extra Blue Top Tube[[id number]]                              Final result               Extra Red Top Tube[[id number]]                               Final result               Extra Green Top (Lithium...[[id number]]                      Final result               Extra Purple Top Tube[[id number]]                            Final result                 Please view results for these tests on the individual orders.   Extra Blue Top Tube     Status: None   Result Value Ref Range    Hold Specimen JIC    Extra Red Top Tube     Status: None   Result Value Ref Range    Hold Specimen JIC    Extra Green Top (Lithium Heparin) Tube     Status: None   Result Value Ref Range    Hold Specimen JIC    Extra Purple Top Tube     Status: None   Result Value Ref Range    Hold Specimen JIC    Partial thromboplastin time     Status: Normal   Result Value Ref Range    aPTT 28 22 - 38 Seconds   INR     Status: Abnormal   Result Value Ref Range    INR 1.21 (H) 0.85 - 1.15   CRP inflammation     Status: Normal   Result Value Ref Range    CRP Inflammation <3.00 <5.00 mg/L   Comprehensive metabolic panel     Status: Abnormal   Result Value Ref Range    Sodium 140 135 - 145 mmol/L    Potassium 5.0 3.4 - 5.3 mmol/L    Carbon Dioxide (CO2) 25 22 - 29 mmol/L    Anion Gap 10 7 - 15 mmol/L    Urea Nitrogen 15.7 6.0 - 20.0 mg/dL    Creatinine 1.02 0.67 - 1.17 mg/dL    GFR Estimate >90 >60 mL/min/1.73m2    Calcium  9.3 8.8 - 10.4 mg/dL    Chloride 105 98 - 107 mmol/L    Glucose 109 (H) 70 - 99 mg/dL    Alkaline Phosphatase 67 40 - 150 U/L    AST 17 0 - 45 U/L    ALT 10 0 - 70 U/L    Protein Total 6.1 (L) 6.4 - 8.3 g/dL    Albumin 4.0 3.5 - 5.2 g/dL    Bilirubin Total 0.8 <=1.2 mg/dL   Lipase     Status: Normal   Result Value Ref Range    Lipase 32 13 - 60 U/L   Lactic Acid Whole Blood with 1X Repeat in 2 HR when >2     Status: Normal   Result Value Ref Range    Lactic Acid, Initial 1.1 0.7 - 2.0 mmol/L   Troponin T, High Sensitivity     Status: Abnormal   Result Value Ref Range    Troponin T, High Sensitivity 23 (H) <=22 ng/L   Nt probnp inpatient (BNP)     Status: Abnormal   Result Value Ref Range    N terminal Pro BNP Inpatient 4,935 (H) 0 - 450 pg/mL   CBC with platelets and differential     Status: Abnormal   Result Value Ref Range    WBC Count 10.9 4.0 - 11.0 10e3/uL    RBC Count 5.98 (H) 4.40 - 5.90 10e6/uL    Hemoglobin 15.9 13.3 - 17.7 g/dL    Hematocrit 50.7 40.0 - 53.0 %    MCV 85 78 - 100 fL    MCH 26.6 26.5 - 33.0 pg    MCHC 31.4 (L) 31.5 - 36.5 g/dL    RDW 15.4 (H) 10.0 - 15.0 %    Platelet Count 286 150 - 450 10e3/uL    % Neutrophils 72 %    % Lymphocytes 17 %    % Monocytes 9 %    % Eosinophils 0 %    % Basophils 1 %    % Immature Granulocytes 1 %    NRBCs per 100 WBC 0 <1 /100    Absolute Neutrophils 7.9 1.6 - 8.3 10e3/uL    Absolute Lymphocytes 1.9 0.8 - 5.3 10e3/uL    Absolute Monocytes 1.0 0.0 - 1.3 10e3/uL    Absolute Eosinophils 0.0 0.0 - 0.7 10e3/uL    Absolute Basophils 0.1 0.0 - 0.2 10e3/uL    Absolute Immature Granulocytes 0.1 <=0.4 10e3/uL    Absolute NRBCs 0.0 10e3/uL   Troponin T, High Sensitivity     Status: Normal   Result Value Ref Range    Troponin T, High Sensitivity 14 <=22 ng/L   EKG 12-lead, tracing only     Status: None (Preliminary result)   Result Value Ref Range    Systolic Blood Pressure  mmHg    Diastolic Blood Pressure  mmHg    Ventricular Rate 113 BPM    Atrial Rate 113 BPM    SD  Interval 162 ms    QRS Duration 84 ms     ms    QTc 430 ms    P Axis 41 degrees    R AXIS -48 degrees    T Axis 82 degrees    Interpretation ECG       Sinus tachycardia  Left axis deviation  Inferior infarct , age undetermined  Abnormal ECG     CBC with platelets differential     Status: Abnormal    Narrative    The following orders were created for panel order CBC with platelets differential.  Procedure                               Abnormality         Status                     ---------                               -----------         ------                     CBC with platelets and d...[[id number]]  Abnormal            Final result                 Please view results for these tests on the individual orders.

## 2025-01-10 NOTE — NURSING NOTE
Chief Complaint   Patient presents with    Follow Up     1/10/25 Return CORE, 25 yo male with systolic heart failure, NICM here for follow up with labs prior.       Initial BP 93/64 (BP Location: Right arm, Patient Position: Other (comments), Cuff Size: Adult Regular)   Pulse (!) 121   Wt 97.3 kg (214 lb 6.4 oz)   SpO2 98%   BMI 29.08 kg/m   Estimated body mass index is 29.08 kg/m  as calculated from the following:    Height as of 1/9/25: 1.829 m (6').    Weight as of this encounter: 97.3 kg (214 lb 6.4 oz)..  BP completed using cuff size: regular    LIAT Zaman    
[facility] in [place] sent to fax number requested.     Will call group home on Monday to check on weights and symptoms.   Keyla Montes RN   
H
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
## 2025-01-10 NOTE — PATIENT INSTRUCTIONS
Take your medicines every day, as directed     Changes made today:    - No medication changes- keep the higher lasix dose 60 mg twice a day    Monitor Your Weight and Symptoms     Contact us if you:     Gain 2 pounds in one day or 5 pounds in one week  Feel more short of breath  Notice more leg swelling  Feel lightheadeded    Change your lifestyle     Limit Salt or Sodium:  2000 mg  Limit Fluids:  2000 mL or approximately 64 ounces  Eat a Heart Healthy Diet  Low in saturated fats  Stay Active:  Aim to move at least 150 minutes every  week            To Contact us     During Business Hours:  [phone], option # 1      After hours, weekends or holidays:   [phone], Option #4  Ask to speak to the On-Call Cardiologist. Inform them you are a CORE/heart failure patient at the [place].       Use [employer] allows you to communicate directly with your heart team through secure messaging.  Urgent[employer]ly can be accessed any time on your phone, computer, or tablet.  If you need assistance, we'd be happy to help!             Keep your Heart Appointments:     - [employer] HelpLine for assistance getting Guerda into [employer] [phone]    - Fax number for the referral we will send to [phone]. Will send to [facility] in [place], Dr. Rosales    - RN phone call Monday    - Dr. Santos as scheduled        Please consider attending our virtual support group which is held monthly. Please reach out to Ian at [phone] for more information if you are interested in attending.

## 2025-01-13 ENCOUNTER — PATIENT OUTREACH (OUTPATIENT)
Dept: CARDIOLOGY | Facility: CLINIC | Age: 25
End: 2025-01-13
Payer: MEDICARE

## 2025-01-13 DIAGNOSIS — I50.23 ACUTE ON CHRONIC SYSTOLIC CONGESTIVE HEART FAILURE (H): ICD-10-CM

## 2025-01-13 NOTE — PROGRESS NOTES
Called group home to check in on Terrance's weights and symptoms.   Weight at CORE visit 1/10 had been 206-210.   Spoke with group home staff today. They report he is feeling better. The vomiting and diarrhea have resolved.   His weight today is 204.8 lbs. Yesterday was 205. 2 days before that were 208.    Advised to continue on lasix 60 mg BID for now and will review with provider.

## 2025-01-13 NOTE — PROGRESS NOTES
Called with following recommendations:  Date: 1/13/2025    Time of Call: 4:09 PM     Diagnosis:  HF     [ VORB ] Ordering provider: Angelina TONY  Order:   Great! It should be 60 mg BID, correct? I would recommend decreasing to 40 mg BID AND decrease kcl from 20 meq BID to 20 meq daily starting 1/15 and get labs 1/16 when he sees Dr. Santos.        Order received by: Keyla Montes RN      Follow-up/additional notes: left message for group home and asked for call back.

## 2025-01-14 RX ORDER — FUROSEMIDE 40 MG/1
40 TABLET ORAL
Qty: 180 TABLET | Refills: 3 | Status: SHIPPED | OUTPATIENT
Start: 2025-01-14

## 2025-01-14 RX ORDER — POTASSIUM CHLORIDE 1500 MG/1
20 TABLET, EXTENDED RELEASE ORAL DAILY
Qty: 90 TABLET | Refills: 1 | Status: SHIPPED | OUTPATIENT
Start: 2025-01-14

## 2025-01-14 NOTE — PROGRESS NOTES
Called Group Home back to follow up on message.  They had not received, reviewed changes.  Scheduled lab.  Sent medications to [facility].     Clinic Care Coordination Contact  Care Coordination Transition Communication           Clinical Data: Patient was hospitalized at [facility] from 8/31 to 9/7 with diagnosis of LEFT TRANS CAROTID ARTERY REVASCULARIZATION WITH CONDUIT (TCAR)  Dementia without behavioral disturbance (HC)  Hyperlipidemia  Moderate major depression (HC)  Morbid obesity (HC)  QUETA on CPAP  Paroxysmal atrial fibrillation (HC)  Stage 3 chronic kidney disease (HC)  .      Transition to Facility:              Facility Name: [facility]              Contact name and phone number/fax:  [phone]    Patient now discharged to home.  Will write a new CCC Referral for tracking purposes.

## 2025-01-16 ENCOUNTER — PRE VISIT (OUTPATIENT)
Dept: CARDIOLOGY | Facility: CLINIC | Age: 25
End: 2025-01-16
Payer: MEDICARE

## 2025-01-16 ENCOUNTER — OFFICE VISIT (OUTPATIENT)
Dept: CARDIOLOGY | Facility: CLINIC | Age: 25
End: 2025-01-16
Attending: INTERNAL MEDICINE
Payer: MEDICARE

## 2025-01-16 ENCOUNTER — LAB (OUTPATIENT)
Dept: LAB | Facility: CLINIC | Age: 25
End: 2025-01-16
Payer: MEDICARE

## 2025-01-16 ENCOUNTER — TELEPHONE (OUTPATIENT)
Dept: CARDIOLOGY | Facility: CLINIC | Age: 25
End: 2025-01-16

## 2025-01-16 VITALS
OXYGEN SATURATION: 96 % | BODY MASS INDEX: 28.14 KG/M2 | HEART RATE: 124 BPM | SYSTOLIC BLOOD PRESSURE: 105 MMHG | WEIGHT: 207.5 LBS | DIASTOLIC BLOOD PRESSURE: 74 MMHG

## 2025-01-16 DIAGNOSIS — I42.0 DILATED CARDIOMYOPATHY (H): ICD-10-CM

## 2025-01-16 DIAGNOSIS — I50.20 HEART FAILURE WITH REDUCED EJECTION FRACTION, NYHA CLASS I (H): Primary | ICD-10-CM

## 2025-01-16 DIAGNOSIS — I50.23 ACUTE ON CHRONIC SYSTOLIC CONGESTIVE HEART FAILURE (H): ICD-10-CM

## 2025-01-16 LAB
ANION GAP SERPL CALCULATED.3IONS-SCNC: 9 MMOL/L (ref 7–15)
ATRIAL RATE - MUSE: 108 BPM
BUN SERPL-MCNC: 12.8 MG/DL (ref 6–20)
CALCIUM SERPL-MCNC: 9.2 MG/DL (ref 8.8–10.4)
CHLORIDE SERPL-SCNC: 106 MMOL/L (ref 98–107)
CREAT SERPL-MCNC: 1 MG/DL (ref 0.67–1.17)
DIASTOLIC BLOOD PRESSURE - MUSE: NORMAL MMHG
EGFRCR SERPLBLD CKD-EPI 2021: >90 ML/MIN/1.73M2
GLUCOSE SERPL-MCNC: 99 MG/DL (ref 70–99)
HCO3 SERPL-SCNC: 28 MMOL/L (ref 22–29)
INTERPRETATION ECG - MUSE: NORMAL
P AXIS - MUSE: 47 DEGREES
POTASSIUM SERPL-SCNC: 4.1 MMOL/L (ref 3.4–5.3)
PR INTERVAL - MUSE: 166 MS
QRS DURATION - MUSE: 86 MS
QT - MUSE: 320 MS
QTC - MUSE: 428 MS
R AXIS - MUSE: -60 DEGREES
SODIUM SERPL-SCNC: 143 MMOL/L (ref 135–145)
SYSTOLIC BLOOD PRESSURE - MUSE: NORMAL MMHG
T AXIS - MUSE: 97 DEGREES
VENTRICULAR RATE- MUSE: 108 BPM

## 2025-01-16 PROCEDURE — 36415 COLL VENOUS BLD VENIPUNCTURE: CPT | Performed by: PATHOLOGY

## 2025-01-16 PROCEDURE — 80048 BASIC METABOLIC PNL TOTAL CA: CPT | Performed by: PATHOLOGY

## 2025-01-16 PROCEDURE — G0463 HOSPITAL OUTPT CLINIC VISIT: HCPCS | Performed by: INTERNAL MEDICINE

## 2025-01-16 ASSESSMENT — PAIN SCALES - GENERAL: PAINLEVEL_OUTOF10: NO PAIN (0)

## 2025-01-16 NOTE — TELEPHONE ENCOUNTER
----- Message from Gisselle Mead sent at 1/16/2025  2:02 PM CST -----  Stable labs, any updates to weights or symptoms?

## 2025-01-16 NOTE — PROGRESS NOTES
[facility]      Referring provider: Jani Martin MD     HPI: Mr. Terrance Hidalgo is a 24 year old  male with PMH significant for    -Nonischemic cardiomyopathy with EF of 15%  -Autism spectrum disorder  -Obstructive sleep apnea on CPAP  -Obesity    Patient is being seen today for sinus tachycardia.  Patient has history of severe nonischemic cardiomyopathy and follows with heart failure clinic.  Most recently seen on 1/10/2025.  Today he is accompanied by the group home assistant.  His aunt Guerda is on the phone.  Patient reports overall feeling well.  Group home assistant tells me that she spends several hours with the patient.  She tells me that patient has a program to walk 5 times a day each time 5 minutes or so.  Patient denies chest pain, shortness of breath, dizziness, syncope, lower extremity edema.  He is telling me that he is very bored of cardiac diet and wants to go and eat [employer].  I reviewed EKG in clinic which shows sinus rhythm, heart rate 108 bpm.  Patient recently diagnosed with sleep apnea and started using CPAP for the last few weeks or so.  Patient is moving to [place] and he has a follow-up with cardiologist in [place] on 1/25.  Last seen in core cardiology clinic in January 2025 and reported doing well.     Current medications:  Medications, personal, family, and social history reviewed with patient and revised.    PAST MEDICAL HISTORY:  No past medical history on file.    CURRENT MEDICATIONS:  Current Outpatient Medications   Medication Sig Dispense Refill    acetaminophen (TYLENOL) 325 MG tablet Take 2 tablets (650 mg) by mouth or Feeding Tube every 8 hours as needed for mild pain. 90 tablet 2    bacitracin 500 UNIT/GM OINT Apply topically 3 times daily as needed for wound care.      benzocaine-menthol (CEPACOL SORE THROAT EX ST) 15-3.6 MG lozenge Place 1 lozenge inside  cheek every hour as needed for sore throat. One lozenge inside cheek every hour as needed for cough      Calcium Carbonate Antacid (TUMS PO) Take 1 tablet by mouth as needed (1-2 Tablet every hour as needed). Take 1-2 tablets and chew thoroughly ever has needed (up to 10 tablets each day) Do not give other medications unless instructed by healthcare professional or MD. Each tablet ontains 200 mg of cacium carbonate. Depending on brand and manufacture, it may not list both strengths on the label/name, as long as it's regular strength antacid (200/500 mg it can be administered.      Camphor-Eucalyptus-Menthol (VICKS VAPORUB CHILDRENS) 4.8-1.2-2.6 % OINT Externally apply 1 Application. topically as needed. Apply topically underneath nose or to chest as needed      cetirizine (ZYRTEC) 5 MG tablet Take 0.5 tablets (2.5 mg) by mouth every 6 hours as needed for allergies (Give 30 minutes prior to each amoxicillin dose for rash). 90 tablet 2    digoxin (LANOXIN) 250 MCG tablet Take 1 tablet (250 mcg) by mouth daily. 30 tablet 3    empagliflozin (JARDIANCE) 10 MG TABS tablet Take 1 tablet (10 mg) by mouth daily. 90 tablet 1    furosemide (LASIX) 40 MG tablet Take 1 tablet (40 mg) by mouth 2 times daily. 180 tablet 3    guaiFENesin (MUCINEX) 600 MG 12 hr tablet Take 600 mg by mouth every 12 hours as needed for congestion or cough. Take 1 tablet (600 mg total ) by mouth every 12 hours as needed with 8 oz of water.      hydrocortisone (CORTAID) 1 % external cream Apply topically 3 times daily as needed for itching. Apply cream topically to affected area up to 3 times a day. Apply to clean dry skin, avoid eye , eyelid, and mouth area      loperamide (IMODIUM A-D) 2 MG tablet Take 2 mg by mouth 4 times daily as needed for diarrhea (2 tablets as needed). Do not administer until individual has had 3 loose stools. Then give Loperamide/imodium two (2 mg) tablets after loose stool and 2 mg tablet after each subsequent loose stool. Do  nit exceed 4 doses in 24 hours      magnesium hydroxide (MILK OF MAGNESIA) 400 MG/5ML suspension Take 30 mLs by mouth daily as needed for constipation or heartburn. Take 1 oz ( 30 ml by mouth as needed for constipation      metoprolol succinate ER (TOPROL XL) 25 MG 24 hr tablet Take 0.5 tablets (12.5 mg) by mouth daily. 60 tablet 1    multivitamin w/minerals (THERA-VIT-M) tablet Take 1 tablet by mouth daily. 30 tablet 3    Phenylephrine-DM-GG 5- MG/5ML LIQD Take 10 mLs by mouth 4 times daily as needed. +Take 2 teaspoons ( 10 mL) by mouth every 4 hours as needed      potassium chloride anaya ER (KLOR-CON M20) 20 MEQ CR tablet Take 1 tablet (20 mEq) by mouth daily. 90 tablet 1    risperiDONE (RISPERDAL) 0.5 MG tablet Take 3 tablets (1.5 mg) by mouth every evening. 90 tablet 3    risperiDONE (RISPERDAL) 1 MG tablet Take 1 tablet (1 mg) by mouth daily. 30 tablet 3    sacubitril-valsartan (ENTRESTO) 24-26 MG per tablet Take 0.5 tablets by mouth 2 times daily. 30 tablet 3    selenium sulfide (SELSUN) 1 % LOTN shampoo Apply topically daily as needed for itching. 420 mL 3    senna-docusate (SENEXON-S) 8.6-50 MG tablet Take 1 tablet by mouth daily as needed for constipation.      sodium chloride (OCEAN) 0.65 % nasal spray Spray 1 spray into both nostrils daily as needed for congestion.  Instill 2 sprays into each nostril up to every 2 hours as needed      spironolactone (ALDACTONE) 25 MG tablet Take 1 tablet (25 mg) by mouth daily. 30 tablet 3    thiamine (B-1) 100 MG tablet Take 1 tablet (100 mg) by mouth daily. 30 tablet 3       PAST SURGICAL HISTORY:  Past Surgical History:   Procedure Laterality Date    CV HEART BIOPSY N/A 8/21/2024    Procedure: Heart Biopsy;  Surgeon: Yohan Aponte MD;  Location:  HEART CARDIAC CATH LAB    CV INTRA AORTIC BALLOON N/A 08/27/2024    Procedure: Intra aortic Balloon Pump Insertion;  Surgeon: Davis Disla MD;  Location:  HEART CARDIAC CATH LAB    CV INTRA  AORTIC BALLOON REMOVAL N/A 09/01/2024    Procedure: Intra aortic Balloon Pump Removal;  Surgeon: Jairo Squires MD;  Location:  HEART CARDIAC CATH LAB    CV RIGHT HEART CATH MEASUREMENTS RECORDED N/A 9/11/2024    Procedure: Right Heart Catheterization;  Surgeon: Jani Martin MD;  Location:  HEART CARDIAC CATH LAB    CV RIGHT HEART CATH MEASUREMENTS RECORDED N/A 8/21/2024    Procedure: Right Heart Catheterization;  Surgeon: Yohan Aponte MD;  Location:  HEART CARDIAC CATH LAB    CV SWAN REANNA PROCEDURE N/A 8/21/2024    Procedure: [place] Reanna Procedure;  Surgeon: Yohan Aponte MD;  Location:  HEART CARDIAC CATH LAB    PICC INSERTION - TRIPLE LUMEN Left 09/03/2024    Left basilic vein 53cm total 10cm external.       ALLERGIES:     Allergies   Allergen Reactions    Amoxicillin Hives     (Pictures in media tab) Confirmed by dermatology 9/18/2024    Implanon [Etonogestrel]     Penicillin V Hives     Tolerated ampicillin/sulbactam 08/21/2024       FAMILY HISTORY:  Family History   Problem Relation Age of Onset    Melanoma No family hx of     Skin Cancer No family hx of          SOCIAL HISTORY:  Social History     Tobacco Use    Smoking status: Never    Smokeless tobacco: Never       ROS:   Constitutional: No fever, chills, or sweats. Weight stable.   Cardiovascular: As per HPI.       Exam:  /74 (BP Location: Left arm, Patient Position: Chair, Cuff Size: Adult Regular)   Pulse (!) 124   Wt 94.1 kg (207 lb 8 oz)   SpO2 96%   BMI 28.14 kg/m    GENERAL APPEARANCE: alert and no distress  HEENT: no icterus, no central cyanosis  LYMPH/NECK: no adenopathy, no asymmetry, JVP not elevated, no carotid bruits.  RESPIRATORY: lungs clear to auscultation - no rales, rhonchi or wheezes, no use of accessory muscles, no retractions, respirations are unlabored, normal respiratory rate  CARDIOVASCULAR: regular rhythm, normal S1, S2, no S3 or S4 and no murmur, click or rub, precordium quiet with normal  PMI.  GI: soft, non tender  EXTREMITIES: no edema  NEURO: alert, normal speech,and affect  SKIN: no ecchymoses, no rashes     I have reviewed the labs and personally reviewed the imaging below and made my comment in the assessment and plan.    Labs:  CBC RESULTS:   Lab Results   Component Value Date    WBC 10.9 01/09/2025    RBC 5.98 (H) 01/09/2025    HGB 15.9 01/09/2025    HCT 50.7 01/09/2025    MCV 85 01/09/2025    MCH 26.6 01/09/2025    MCHC 31.4 (L) 01/09/2025    RDW 15.4 (H) 01/09/2025     01/09/2025       BMP RESULTS:  Lab Results   Component Value Date     01/09/2025     (HH) 08/25/2024    POTASSIUM 5.0 01/09/2025    CHLORIDE 105 01/09/2025    CO2 25 01/09/2025    ANIONGAP 10 01/09/2025     (H) 01/09/2025     (H) 09/18/2024    BUN 15.7 01/09/2025    CR 1.02 01/09/2025    GFRESTIMATED >90 01/09/2025    ALEXANDER 9.3 01/09/2025 12/8/24 ECHO  Left ventricular function is decreased. The ejection fraction is 10-15%  (severely reduced).  Apical wall akinesis is present.  The right ventricle is normal size.  Global right ventricular function is mildly reduced.  No significant valvular abnormalities present.  The inferior vena cava cannot be assessed.     This study was compared with the study from 10/24/2024 .  No significant changes noted.     10/24/ 24 ECHO  Left ventricular function is decreased. The ejection fraction is 15-20%  (severely reduced). Severe diffuse hypokinesis is present. Grade II or  moderate diastolic dysfunction. Diastolic Doppler findings (E/E' ratio and/or  other parameters) suggest left ventricular filling pressures are increased.  The right ventricle is normal size. Global right ventricular function is  normal.  No significant valvular abnormalities present.  The inferior vena cava cannot be assessed.  This study was compared with the study from 09/03/2024. No significant changes  noted.     9/11/24  RHC  BP: 111/52 mmHg    RA: 2/4/2 mmHg  RV: 22/2 mmHg  PA:  20/14 (16) mmHg  W: 10/13/10 mmHg  PA sat: 63 %  Wayne CO/CI: 6.0/2.7  Thermo CO/CI: 5.3/2.4  PVR: 0.99 GROVE   Right sided filling pressures are normal. Left sided filling pressures are normal. Normal PA pressures. Normal cardiac output level. Hemodynamic data has been modified in Epic per physician review.         9/5/24 cMRI  Clinical history: 24M with URI c/b cardiogenic shock, assess for myocarditis  Comparison CMR: none     1. The left ventricle is mildly dilated with normal wall thickness.  There is severe diffuse hypokinesis.  The global systolic function is severely reduced. The LVEF is 15%.     2. The right ventricle is normal in cavity size. The global systolic function is normal. The RVEF is 46%.      3. Both atria are normal in size.     4. There is no significant valvular disease.      5. Late gadolinium enhancement imaging:   Parametric mapping: native T1 value 1152 ms, ECV = 31% (mildly elevated).  T2 =  42-46 ms (normal range).      6. There is no pericardial effusion.     7. There is no intracardiac thrombus.     CONCLUSIONS:  Mildly dilated left ventricle with severely reduced left ventricular function, LVEF 15%.  Normal right ventricular size and systolic function, RVEF 46%.   No myocardial edema or replacement fibrosis.   Specifically, no MRI features of acute myocarditis.         Assessment and Plan:   Mr. Hidalgo is a 24 year old male with a past medical history including chronic HFrEF 2/2 NICM with an EF of 15%, Autism spectrum disorder.     His aunt is currently in the process of getting Guardianship of Terrance in the [facility].  Patient euvolemic on exam.  Recently started CPAP which likely improved sinus tachycardia which is likely multifactorial.     # Chronic systolic heart failure/HFrEF secondary to NICM, EF 15%    # Sinus tachycardia likely multifactorial from obstructive sleep apnea and HFrEF  -Sinus tachycardia rate appears improved compared to prior clinic visits.  Likely this is  from recent CPAP as untreated sleep apnea is known to cause sympathetic activation.      -Euvolemic on exam.  Able to walk 5 minutes 5 times a day per the group home assistant.  -Continue current treatment with GDMT.  I am hesitant to increase neither digoxin nor metoprolol to further control heart rate which can come with risk of decompensation in the setting of severely decreased LV dysfunction.  -Patient has a follow-up scheduled in [place].  -Aunt on the phone told me that they will cancel all the follow-up appointments including right heart cath with Dr. Martin once patient is in [place] physically.    # Obstructive sleep apnea on CPAP now over the last week or so.  -Continue current treatment.    No medication changes today.  Return to clinic as needed as patient is moving out of state next week.    Total time spent today for this visit is 30 minutes including precharting, face-to-face clinic visit, review of labs/imaging and medical documentation.    Valery BALDERAS MD  [facility] Division of Cardiology  Pager [phone]

## 2025-01-16 NOTE — PATIENT INSTRUCTIONS
You were seen in the Electrophysiology Clinic today by: Dr. Santos     Plan:   Medication Changes: None    Follow up Visit: with Dr. Santos  as needed. Find new heart failure and electrophysiology provider when you have moved to [place]. We will keep your appointments scheduled here at [facility] for now.       If you have further questions, please utilize [facility] to contact us.     Your Care Team:    EP Cardiology   Telephone Number     Nurse Line  Bladimir Brito RN    [phone]     For scheduling appointments:   Jacquelyn   [phone]   For procedure scheduling:    Sheree Judge     [phone]   For the Device Clinic (Pacemakers, ICDs, Loop Recorders)    During business hours: [phone]  After business hours:   [phone]- select option 4 and ask for job code [phone].       On-call cardiologist for after hours or on weekends:   [phone], option #4, and ask to speak to the on-call cardiologist.     Cardiovascular Clinic:   [address] [place] [address]. [place] [address]      As always, Thank you for trusting us with your health care needs!

## 2025-01-16 NOTE — NURSING NOTE
Chief Complaint   Patient presents with    New Patient     NEW EP d/t tachycardia         Vitals were take, medications reconciled     Ramon Rodrigues, EMT    9:49 AM

## 2025-01-16 NOTE — TELEPHONE ENCOUNTER
Called group home to review weights and symptoms.   No more vomiting and diarrhea. Weights today are around 208. When spoke with them on 1/13 weights were 205. At last CORE visit were 206-210. They don't note any changes or worsening symptoms but has been out of the house most of the day. Will update provider.

## 2025-01-16 NOTE — LETTER
1/16/2025      RE: Terrance Hidalgo  [address]  [facility] [address]       Dear Colleague,    Thank you for the opportunity to participate in the care of your patient, Terrance Hidalgo, at the [facility]. Please see a copy of my visit note below.                                                                                                General Cardiology [facility]      Referring provider: Jani Martin MD     HPI: Mr. Terrance Hidalgo is a 24 year old  male with PMH significant for    -Nonischemic cardiomyopathy with EF of 15%  -Autism spectrum disorder  -Obstructive sleep apnea on CPAP  -Obesity    Patient is being seen today for sinus tachycardia.  Patient has history of severe nonischemic cardiomyopathy and follows with heart failure clinic.  Most recently seen on 1/10/2025.  Today he is accompanied by the group home assistant.  His aunt Guerda is on the phone.  Patient reports overall feeling well.  Group home assistant tells me that she spends several hours with the patient.  She tells me that patient has a program to walk 5 times a day each time 5 minutes or so.  Patient denies chest pain, shortness of breath, dizziness, syncope, lower extremity edema.  He is telling me that he is very bored of cardiac diet and wants to go and eat [employer].  I reviewed EKG in clinic which shows sinus rhythm, heart rate 108 bpm.  Patient recently diagnosed with sleep apnea and started using CPAP for the last few weeks or so.  Patient is moving to [place] and he has a follow-up with cardiologist in [place] on 1/25.  Last seen in core cardiology clinic in January 2025 and reported doing well.     Current medications:  Medications, personal, family, and social history reviewed with patient and revised.    PAST MEDICAL HISTORY:  No past medical history on file.    CURRENT MEDICATIONS:  Current Outpatient Medications   Medication Sig  Dispense Refill     acetaminophen (TYLENOL) 325 MG tablet Take 2 tablets (650 mg) by mouth or Feeding Tube every 8 hours as needed for mild pain. 90 tablet 2     bacitracin 500 UNIT/GM OINT Apply topically 3 times daily as needed for wound care.       benzocaine-menthol (CEPACOL SORE THROAT EX ST) 15-3.6 MG lozenge Place 1 lozenge inside cheek every hour as needed for sore throat. One lozenge inside cheek every hour as needed for cough       Calcium Carbonate Antacid (TUMS PO) Take 1 tablet by mouth as needed (1-2 Tablet every hour as needed). Take 1-2 tablets and chew thoroughly ever has needed (up to 10 tablets each day) Do not give other medications unless instructed by healthcare professional or MD. Each tablet ontains 200 mg of cacium carbonate. Depending on brand and manufacture, it may not list both strengths on the label/name, as long as it's regular strength antacid (200/500 mg it can be administered.       Camphor-Eucalyptus-Menthol (VICKS VAPORUB CHILDRENS) 4.8-1.2-2.6 % OINT Externally apply 1 Application. topically as needed. Apply topically underneath nose or to chest as needed       cetirizine (ZYRTEC) 5 MG tablet Take 0.5 tablets (2.5 mg) by mouth every 6 hours as needed for allergies (Give 30 minutes prior to each amoxicillin dose for rash). 90 tablet 2     digoxin (LANOXIN) 250 MCG tablet Take 1 tablet (250 mcg) by mouth daily. 30 tablet 3     empagliflozin (JARDIANCE) 10 MG TABS tablet Take 1 tablet (10 mg) by mouth daily. 90 tablet 1     furosemide (LASIX) 40 MG tablet Take 1 tablet (40 mg) by mouth 2 times daily. 180 tablet 3     guaiFENesin (MUCINEX) 600 MG 12 hr tablet Take 600 mg by mouth every 12 hours as needed for congestion or cough. Take 1 tablet (600 mg total ) by mouth every 12 hours as needed with 8 oz of water.       hydrocortisone (CORTAID) 1 % external cream Apply topically 3 times daily as needed for itching. Apply cream topically to affected area up to 3 times a day. Apply to  clean dry skin, avoid eye , eyelid, and mouth area       loperamide (IMODIUM A-D) 2 MG tablet Take 2 mg by mouth 4 times daily as needed for diarrhea (2 tablets as needed). Do not administer until individual has had 3 loose stools. Then give Loperamide/imodium two (2 mg) tablets after loose stool and 2 mg tablet after each subsequent loose stool. Do nit exceed 4 doses in 24 hours       magnesium hydroxide (MILK OF MAGNESIA) 400 MG/5ML suspension Take 30 mLs by mouth daily as needed for constipation or heartburn. Take 1 oz ( 30 ml by mouth as needed for constipation       metoprolol succinate ER (TOPROL XL) 25 MG 24 hr tablet Take 0.5 tablets (12.5 mg) by mouth daily. 60 tablet 1     multivitamin w/minerals (THERA-VIT-M) tablet Take 1 tablet by mouth daily. 30 tablet 3     Phenylephrine-DM-GG 5- MG/5ML LIQD Take 10 mLs by mouth 4 times daily as needed. +Take 2 teaspoons ( 10 mL) by mouth every 4 hours as needed       potassium chloride anaya ER (KLOR-CON M20) 20 MEQ CR tablet Take 1 tablet (20 mEq) by mouth daily. 90 tablet 1     risperiDONE (RISPERDAL) 0.5 MG tablet Take 3 tablets (1.5 mg) by mouth every evening. 90 tablet 3     risperiDONE (RISPERDAL) 1 MG tablet Take 1 tablet (1 mg) by mouth daily. 30 tablet 3     sacubitril-valsartan (ENTRESTO) 24-26 MG per tablet Take 0.5 tablets by mouth 2 times daily. 30 tablet 3     selenium sulfide (SELSUN) 1 % LOTN shampoo Apply topically daily as needed for itching. 420 mL 3     senna-docusate (SENEXON-S) 8.6-50 MG tablet Take 1 tablet by mouth daily as needed for constipation.       sodium chloride (OCEAN) 0.65 % nasal spray Spray 1 spray into both nostrils daily as needed for congestion.  Instill 2 sprays into each nostril up to every 2 hours as needed       spironolactone (ALDACTONE) 25 MG tablet Take 1 tablet (25 mg) by mouth daily. 30 tablet 3     thiamine (B-1) 100 MG tablet Take 1 tablet (100 mg) by mouth daily. 30 tablet 3       PAST SURGICAL HISTORY:  Past  Surgical History:   Procedure Laterality Date     CV HEART BIOPSY N/A 8/21/2024    Procedure: Heart Biopsy;  Surgeon: Yohan Aponte MD;  Location:  HEART CARDIAC CATH LAB     CV INTRA AORTIC BALLOON N/A 08/27/2024    Procedure: Intra aortic Balloon Pump Insertion;  Surgeon: Davis Disla MD;  Location:  HEART CARDIAC CATH LAB     CV INTRA AORTIC BALLOON REMOVAL N/A 09/01/2024    Procedure: Intra aortic Balloon Pump Removal;  Surgeon: Jairo Squires MD;  Location:  HEART CARDIAC CATH LAB     CV RIGHT HEART CATH MEASUREMENTS RECORDED N/A 9/11/2024    Procedure: Right Heart Catheterization;  Surgeon: Jani Martin MD;  Location:  HEART CARDIAC CATH LAB     CV RIGHT HEART CATH MEASUREMENTS RECORDED N/A 8/21/2024    Procedure: Right Heart Catheterization;  Surgeon: Yohan Aponte MD;  Location:  HEART CARDIAC CATH LAB     CV SWAN REANNA PROCEDURE N/A 8/21/2024    Procedure: [place] Reanna Procedure;  Surgeon: Yohan Aponte MD;  Location:  HEART CARDIAC CATH LAB     PICC INSERTION - TRIPLE LUMEN Left 09/03/2024    Left basilic vein 53cm total 10cm external.       ALLERGIES:     Allergies   Allergen Reactions     Amoxicillin Hives     (Pictures in media tab) Confirmed by dermatology 9/18/2024     Implanon [Etonogestrel]      Penicillin V Hives     Tolerated ampicillin/sulbactam 08/21/2024       FAMILY HISTORY:  Family History   Problem Relation Age of Onset     Melanoma No family hx of      Skin Cancer No family hx of          SOCIAL HISTORY:  Social History     Tobacco Use     Smoking status: Never     Smokeless tobacco: Never       ROS:   Constitutional: No fever, chills, or sweats. Weight stable.   Cardiovascular: As per HPI.       Exam:  /74 (BP Location: Left arm, Patient Position: Chair, Cuff Size: Adult Regular)   Pulse (!) 124   Wt 94.1 kg (207 lb 8 oz)   SpO2 96%   BMI 28.14 kg/m    GENERAL APPEARANCE: alert and no distress  HEENT: no icterus, no  central cyanosis  LYMPH/NECK: no adenopathy, no asymmetry, JVP not elevated, no carotid bruits.  RESPIRATORY: lungs clear to auscultation - no rales, rhonchi or wheezes, no use of accessory muscles, no retractions, respirations are unlabored, normal respiratory rate  CARDIOVASCULAR: regular rhythm, normal S1, S2, no S3 or S4 and no murmur, click or rub, precordium quiet with normal PMI.  GI: soft, non tender  EXTREMITIES: no edema  NEURO: alert, normal speech,and affect  SKIN: no ecchymoses, no rashes     I have reviewed the labs and personally reviewed the imaging below and made my comment in the assessment and plan.    Labs:  CBC RESULTS:   Lab Results   Component Value Date    WBC 10.9 01/09/2025    RBC 5.98 (H) 01/09/2025    HGB 15.9 01/09/2025    HCT 50.7 01/09/2025    MCV 85 01/09/2025    MCH 26.6 01/09/2025    MCHC 31.4 (L) 01/09/2025    RDW 15.4 (H) 01/09/2025     01/09/2025       BMP RESULTS:  Lab Results   Component Value Date     01/09/2025     (HH) 08/25/2024    POTASSIUM 5.0 01/09/2025    CHLORIDE 105 01/09/2025    CO2 25 01/09/2025    ANIONGAP 10 01/09/2025     (H) 01/09/2025     (H) 09/18/2024    BUN 15.7 01/09/2025    CR 1.02 01/09/2025    GFRESTIMATED >90 01/09/2025    ALEXANDER 9.3 01/09/2025 12/8/24 ECHO  Left ventricular function is decreased. The ejection fraction is 10-15%  (severely reduced).  Apical wall akinesis is present.  The right ventricle is normal size.  Global right ventricular function is mildly reduced.  No significant valvular abnormalities present.  The inferior vena cava cannot be assessed.     This study was compared with the study from 10/24/2024 .  No significant changes noted.     10/24/ 24 ECHO  Left ventricular function is decreased. The ejection fraction is 15-20%  (severely reduced). Severe diffuse hypokinesis is present. Grade II or  moderate diastolic dysfunction. Diastolic Doppler findings (E/E' ratio and/or  other parameters) suggest  left ventricular filling pressures are increased.  The right ventricle is normal size. Global right ventricular function is  normal.  No significant valvular abnormalities present.  The inferior vena cava cannot be assessed.  This study was compared with the study from 09/03/2024. No significant changes  noted.     9/11/24  RHC  BP: 111/52 mmHg    RA: 2/4/2 mmHg  RV: 22/2 mmHg  PA: 20/14 (16) mmHg  W: 10/13/10 mmHg  PA sat: 63 %  [place]ne CO/CI: 6.0/2.7  Thermo CO/CI: 5.3/2.4  PVR: 0.99 GROVE   Right sided filling pressures are normal. Left sided filling pressures are normal. Normal PA pressures. Normal cardiac output level. Hemodynamic data has been modified in Epic per physician review.         9/5/24 cMRI  Clinical history: 24M with URI c/b cardiogenic shock, assess for myocarditis  Comparison CMR: none     1. The left ventricle is mildly dilated with normal wall thickness.  There is severe diffuse hypokinesis.  The global systolic function is severely reduced. The LVEF is 15%.     2. The right ventricle is normal in cavity size. The global systolic function is normal. The RVEF is 46%.      3. Both atria are normal in size.     4. There is no significant valvular disease.      5. Late gadolinium enhancement imaging:   Parametric mapping: native T1 value 1152 ms, ECV = 31% (mildly elevated).  T2 =  42-46 ms (normal range).      6. There is no pericardial effusion.     7. There is no intracardiac thrombus.     CONCLUSIONS:  Mildly dilated left ventricle with severely reduced left ventricular function, LVEF 15%.  Normal right ventricular size and systolic function, RVEF 46%.   No myocardial edema or replacement fibrosis.   Specifically, no MRI features of acute myocarditis.         Assessment and Plan:   Mr. Hidalgo is a 24 year old male with a past medical history including chronic HFrEF 2/2 NICM with an EF of 15%, Autism spectrum disorder.     His aunt is currently in the process of getting Guardianship of Terrance in the  [facility].  Patient euvolemic on exam.  Recently started CPAP which likely improved sinus tachycardia which is likely multifactorial.     # Chronic systolic heart failure/HFrEF secondary to NICM, EF 15%    # Sinus tachycardia likely multifactorial from obstructive sleep apnea and HFrEF  -Sinus tachycardia rate appears improved compared to prior clinic visits.  Likely this is from recent CPAP as untreated sleep apnea is known to cause sympathetic activation.      -Euvolemic on exam.  Able to walk 5 minutes 5 times a day per the group home assistant.  -Continue current treatment with GDMT.  I am hesitant to increase neither digoxin nor metoprolol to further control heart rate which can come with risk of decompensation in the setting of severely decreased LV dysfunction.  -Patient has a follow-up scheduled in [place].  -Aunt on the phone told me that they will cancel all the follow-up appointments including right heart cath with Dr. Martin once patient is in [place] physically.    # Obstructive sleep apnea on CPAP now over the last week or so.  -Continue current treatment.    No medication changes today.  Return to clinic as needed as patient is moving out of [facility] next week.    Total time spent today for this visit is 30 minutes including precharting, face-to-face clinic visit, review of labs/imaging and medical documentation.    Valery BALDERAS MD  [facility] Division of Cardiology  Pager [phone]       Please do not hesitate to contact me if you have any questions/concerns.     Sincerely,     Valery Balderas MD

## 2025-01-20 ENCOUNTER — HOSPITAL ENCOUNTER (OUTPATIENT)
Dept: CARDIAC REHAB | Facility: CLINIC | Age: 25
Discharge: HOME | End: 2025-01-20
Attending: INTERNAL MEDICINE
Payer: MEDICARE

## 2025-01-20 LAB
ATRIAL RATE - MUSE: 108 BPM
DIASTOLIC BLOOD PRESSURE - MUSE: NORMAL MMHG
INTERPRETATION ECG - MUSE: NORMAL
P AXIS - MUSE: 47 DEGREES
PR INTERVAL - MUSE: 166 MS
QRS DURATION - MUSE: 86 MS
QT - MUSE: 320 MS
QTC - MUSE: 428 MS
R AXIS - MUSE: -60 DEGREES
SYSTOLIC BLOOD PRESSURE - MUSE: NORMAL MMHG
T AXIS - MUSE: 97 DEGREES
VENTRICULAR RATE- MUSE: 108 BPM

## 2025-01-20 PROCEDURE — 93798 PHYS/QHP OP CAR RHAB W/ECG: CPT | Performed by: REHABILITATION PRACTITIONER

## 2025-01-21 ENCOUNTER — HOSPITAL ENCOUNTER (EMERGENCY)
Facility: CLINIC | Age: 25
Discharge: HOME | End: 2025-01-21
Attending: EMERGENCY MEDICINE | Admitting: EMERGENCY MEDICINE
Payer: MEDICARE

## 2025-01-21 ENCOUNTER — APPOINTMENT (OUTPATIENT)
Dept: GENERAL RADIOLOGY | Facility: CLINIC | Age: 25
End: 2025-01-21
Payer: MEDICARE

## 2025-01-21 VITALS
RESPIRATION RATE: 19 BRPM | OXYGEN SATURATION: 95 % | HEART RATE: 107 BPM | DIASTOLIC BLOOD PRESSURE: 66 MMHG | SYSTOLIC BLOOD PRESSURE: 102 MMHG | TEMPERATURE: 97.4 F

## 2025-01-21 DIAGNOSIS — I50.20 HEART FAILURE WITH REDUCED EJECTION FRACTION (H): ICD-10-CM

## 2025-01-21 LAB
ALBUMIN SERPL BCG-MCNC: 3.9 G/DL (ref 3.5–5.2)
ALP SERPL-CCNC: 69 U/L (ref 40–150)
ALT SERPL W P-5'-P-CCNC: 13 U/L (ref 0–70)
ANION GAP SERPL CALCULATED.3IONS-SCNC: 12 MMOL/L (ref 7–15)
AST SERPL W P-5'-P-CCNC: 17 U/L (ref 0–45)
ATRIAL RATE - MUSE: 125 BPM
BASOPHILS # BLD AUTO: 0.1 10E3/UL (ref 0–0.2)
BASOPHILS NFR BLD AUTO: 1 %
BILIRUB SERPL-MCNC: 0.9 MG/DL
BUN SERPL-MCNC: 17.2 MG/DL (ref 6–20)
CALCIUM SERPL-MCNC: 8.7 MG/DL (ref 8.8–10.4)
CHLORIDE SERPL-SCNC: 101 MMOL/L (ref 98–107)
CREAT SERPL-MCNC: 0.99 MG/DL (ref 0.67–1.17)
DIASTOLIC BLOOD PRESSURE - MUSE: NORMAL MMHG
EGFRCR SERPLBLD CKD-EPI 2021: >90 ML/MIN/1.73M2
EOSINOPHIL # BLD AUTO: 0.1 10E3/UL (ref 0–0.7)
EOSINOPHIL NFR BLD AUTO: 1 %
ERYTHROCYTE [DISTWIDTH] IN BLOOD BY AUTOMATED COUNT: 16.7 % (ref 10–15)
FLUAV RNA SPEC QL NAA+PROBE: NEGATIVE
FLUBV RNA RESP QL NAA+PROBE: NEGATIVE
GLUCOSE SERPL-MCNC: 110 MG/DL (ref 70–99)
HCO3 SERPL-SCNC: 24 MMOL/L (ref 22–29)
HCT VFR BLD AUTO: 52.3 % (ref 40–53)
HGB BLD-MCNC: 16.6 G/DL (ref 13.3–17.7)
HOLD SPECIMEN: NORMAL
IMM GRANULOCYTES # BLD: 0.1 10E3/UL
IMM GRANULOCYTES NFR BLD: 0 %
INTERPRETATION ECG - MUSE: NORMAL
LYMPHOCYTES # BLD AUTO: 3 10E3/UL (ref 0.8–5.3)
LYMPHOCYTES NFR BLD AUTO: 24 %
MCH RBC QN AUTO: 26.6 PG (ref 26.5–33)
MCHC RBC AUTO-ENTMCNC: 31.7 G/DL (ref 31.5–36.5)
MCV RBC AUTO: 84 FL (ref 78–100)
MONOCYTES # BLD AUTO: 1.3 10E3/UL (ref 0–1.3)
MONOCYTES NFR BLD AUTO: 11 %
NEUTROPHILS # BLD AUTO: 8.1 10E3/UL (ref 1.6–8.3)
NEUTROPHILS NFR BLD AUTO: 64 %
NRBC # BLD AUTO: 0 10E3/UL
NRBC BLD AUTO-RTO: 0 /100
NT-PROBNP SERPL-MCNC: 5636 PG/ML (ref 0–450)
P AXIS - MUSE: 56 DEGREES
PLATELET # BLD AUTO: 220 10E3/UL (ref 150–450)
POTASSIUM SERPL-SCNC: 4.8 MMOL/L (ref 3.4–5.3)
PR INTERVAL - MUSE: 160 MS
PROT SERPL-MCNC: 6 G/DL (ref 6.4–8.3)
QRS DURATION - MUSE: 84 MS
QT - MUSE: 294 MS
QTC - MUSE: 424 MS
R AXIS - MUSE: -72 DEGREES
RBC # BLD AUTO: 6.23 10E6/UL (ref 4.4–5.9)
RSV RNA SPEC NAA+PROBE: NEGATIVE
SARS-COV-2 RNA RESP QL NAA+PROBE: NEGATIVE
SODIUM SERPL-SCNC: 137 MMOL/L (ref 135–145)
SYSTOLIC BLOOD PRESSURE - MUSE: NORMAL MMHG
T AXIS - MUSE: 71 DEGREES
TROPONIN T SERPL HS-MCNC: 20 NG/L
TROPONIN T SERPL HS-MCNC: 23 NG/L
VENTRICULAR RATE- MUSE: 125 BPM
WBC # BLD AUTO: 12.6 10E3/UL (ref 4–11)

## 2025-01-21 PROCEDURE — 80053 COMPREHEN METABOLIC PANEL: CPT

## 2025-01-21 PROCEDURE — 99285 EMERGENCY DEPT VISIT HI MDM: CPT | Mod: 25 | Performed by: EMERGENCY MEDICINE

## 2025-01-21 PROCEDURE — 36415 COLL VENOUS BLD VENIPUNCTURE: CPT

## 2025-01-21 PROCEDURE — 93005 ELECTROCARDIOGRAM TRACING: CPT | Performed by: EMERGENCY MEDICINE

## 2025-01-21 PROCEDURE — 96374 THER/PROPH/DIAG INJ IV PUSH: CPT | Performed by: EMERGENCY MEDICINE

## 2025-01-21 PROCEDURE — 999N000127 HC STATISTIC PERIPHERAL IV START W US GUIDANCE

## 2025-01-21 PROCEDURE — 99285 EMERGENCY DEPT VISIT HI MDM: CPT | Mod: FS | Performed by: EMERGENCY MEDICINE

## 2025-01-21 PROCEDURE — 250N000011 HC RX IP 250 OP 636

## 2025-01-21 PROCEDURE — 84484 ASSAY OF TROPONIN QUANT: CPT

## 2025-01-21 PROCEDURE — 93010 ELECTROCARDIOGRAM REPORT: CPT | Performed by: EMERGENCY MEDICINE

## 2025-01-21 PROCEDURE — 71046 X-RAY EXAM CHEST 2 VIEWS: CPT

## 2025-01-21 PROCEDURE — 71046 X-RAY EXAM CHEST 2 VIEWS: CPT | Mod: 26 | Performed by: RADIOLOGY

## 2025-01-21 PROCEDURE — 999N000285 HC STATISTIC VASC ACCESS LAB DRAW WITH PIV START

## 2025-01-21 PROCEDURE — 87637 SARSCOV2&INF A&B&RSV AMP PRB: CPT

## 2025-01-21 PROCEDURE — 85025 COMPLETE CBC W/AUTO DIFF WBC: CPT

## 2025-01-21 PROCEDURE — 83880 ASSAY OF NATRIURETIC PEPTIDE: CPT

## 2025-01-21 RX ORDER — FUROSEMIDE 10 MG/ML
60 INJECTION INTRAMUSCULAR; INTRAVENOUS ONCE
Status: COMPLETED | OUTPATIENT
Start: 2025-01-21 | End: 2025-01-21

## 2025-01-21 RX ORDER — FUROSEMIDE 20 MG/1
20 TABLET ORAL
Qty: 30 TABLET | Refills: 0 | Status: SHIPPED | OUTPATIENT
Start: 2025-01-21

## 2025-01-21 RX ADMIN — FUROSEMIDE 60 MG: 10 INJECTION, SOLUTION INTRAMUSCULAR; INTRAVENOUS at 16:23

## 2025-01-21 ASSESSMENT — ACTIVITIES OF DAILY LIVING (ADL)
ADLS_ACUITY_SCORE: 55

## 2025-01-21 NOTE — ED TRIAGE NOTES
PT brought in by guardian. PT has gained 4 pounds since Thursday. Reports fluid retention and increased labor in breathing.     Hx CHF.      Triage Assessment (Adult)       Row Name 01/21/25 1147          Triage Assessment    Airway WDL WDL        Respiratory WDL    Respiratory WDL WDL        Skin Circulation/Temperature WDL    Skin Circulation/Temperature WDL WDL        Cardiac WDL    Cardiac WDL X     Cardiac Rhythm ST        Peripheral/Neurovascular WDL    Peripheral Neurovascular WDL WDL        Cognitive/Neuro/Behavioral WDL    Cognitive/Neuro/Behavioral WDL WDL

## 2025-01-21 NOTE — ED PROVIDER NOTES
ED Provider Note  [facility]      History     Chief Complaint   Patient presents with    Weight gain, fluid retention.      The history is provided by the patient, medical records and a caregiver. No  was used.     Trish Burton is a 24 year old male who presents to the ED with complaint of concerns with fluid overload. Past medical history notable for CHF (EF 10-15% on 24) and autism spectrum disorder. He presents accompanied by his aunt who is his legal guardian.  They present with concerns of weight gain over the past 5 days.  They state that he has gained approximately 4 to 5 pounds since last Thursday, 2024.  He describes a dry, nonproductive cough over the past 24 hours without fever or other viral symptoms.  He denies any shortness of air personally but his aunt states that he appears mildly more short of breath than his baseline.  He denies any lower extremity swelling or abdomen distention stating that when he is fluid overloaded he carries in his lungs.  He denies any chest pain, nausea, vomiting, or diarrhea.  He saw his primary care provider today who noted some adventitious lung sounds on auscultation recommending he seek evaluation in the ED.  He has been taking Lasix 40 mg twice daily and has taken his morning dose today. Weight today 207 lbs. Weight at cardiac rehab on 25 at 211 lbs and 25 at 206.5 lbs.     [facility] and last saw them on 1/10/2025.  According to the note from that visit, his baseline weight is 206 to 210 pounds.  In the assessment and plan, it states that he should continue Lasix 60 mg twice daily but again is only prescribed 40 mg twice daily.   _________________________________  Name: TRISH BURTON  MRN: [id number]  : 2000  Study Date: 2024 09:52 AM    Reason For Study: Heart Failure     BSA: 2.1 m2  Height: 69 in  Weight: 205 lb  BP: 99/50  mmHg  ______________________________________________________________________________  Procedure  Complete Portable Echo Adult. Contrast Optison. Optison (NDC #2285-4931-01)  given intravenously. Patient was given 5 ml mixture of 3 ml Optison and 6 ml  saline. 4 ml wasted. The patient's rhythm is sinus tachycardia.  ______________________________________________________________________________  Interpretation Summary  The patient's rhythm is sinus tachycardia.     Left ventricular function is decreased. The ejection fraction is 10-15%  (severely reduced).  Apical wall akinesis is present.  The right ventricle is normal size.  Global right ventricular function is mildly reduced.  No significant valvular abnormalities present.  The inferior vena cava cannot be assessed.     This study was compared with the study from 10/24/2024 .  No significant changes noted.    Past Medical History  No past medical history on file.  Past Surgical History:   Procedure Laterality Date    CV HEART BIOPSY N/A 8/21/2024    Procedure: Heart Biopsy;  Surgeon: Yohan Aponte MD;  Location:  HEART CARDIAC CATH LAB    CV INTRA AORTIC BALLOON N/A 08/27/2024    Procedure: Intra aortic Balloon Pump Insertion;  Surgeon: Davis Disla MD;  Location:  HEART CARDIAC CATH LAB    CV INTRA AORTIC BALLOON REMOVAL N/A 09/01/2024    Procedure: Intra aortic Balloon Pump Removal;  Surgeon: Jairo Squires MD;  Location:  HEART CARDIAC CATH LAB    CV RIGHT HEART CATH MEASUREMENTS RECORDED N/A 9/11/2024    Procedure: Right Heart Catheterization;  Surgeon: Jani Martin MD;  Location:  HEART CARDIAC CATH LAB    CV RIGHT HEART CATH MEASUREMENTS RECORDED N/A 8/21/2024    Procedure: Right Heart Catheterization;  Surgeon: Yohan Aponte MD;  Location:  HEART CARDIAC CATH LAB    CV SWAN REANNA PROCEDURE N/A 8/21/2024    Procedure: [place] Reanna Procedure;  Surgeon: Yohan Aponte MD;  [facility]: [facility] CARDIAC CATH  LAB    PICC INSERTION - TRIPLE LUMEN Left 09/03/2024    Left basilic vein 53cm total 10cm external.     furosemide (LASIX) 20 MG tablet  acetaminophen (TYLENOL) 325 MG tablet  bacitracin 500 UNIT/GM OINT  benzocaine-menthol (CEPACOL SORE THROAT EX ST) 15-3.6 MG lozenge  Calcium Carbonate Antacid (TUMS PO)  Camphor-Eucalyptus-Menthol (VICKS VAPORUB CHILDRENS) 4.8-1.2-2.6 % OINT  cetirizine (ZYRTEC) 5 MG tablet  digoxin (LANOXIN) 250 MCG tablet  empagliflozin (JARDIANCE) 10 MG TABS tablet  furosemide (LASIX) 40 MG tablet  guaiFENesin (MUCINEX) 600 MG 12 hr tablet  hydrocortisone (CORTAID) 1 % external cream  loperamide (IMODIUM A-D) 2 MG tablet  magnesium hydroxide (MILK OF MAGNESIA) 400 MG/5ML suspension  metoprolol succinate ER (TOPROL XL) 25 MG 24 hr tablet  multivitamin w/minerals (THERA-VIT-M) tablet  Phenylephrine-DM-GG 5- MG/5ML LIQD  potassium chloride anaya ER (KLOR-CON M20) 20 MEQ CR tablet  risperiDONE (RISPERDAL) 0.5 MG tablet  risperiDONE (RISPERDAL) 1 MG tablet  sacubitril-valsartan (ENTRESTO) 24-26 MG per tablet  selenium sulfide (SELSUN) 1 % LOTN shampoo  senna-docusate (SENEXON-S) 8.6-50 MG tablet  sodium chloride (OCEAN) 0.65 % nasal spray  spironolactone (ALDACTONE) 25 MG tablet  thiamine (B-1) 100 MG tablet      Allergies   Allergen Reactions    Amoxicillin Hives     (Pictures in media tab) Confirmed by dermatology 9/18/2024    Implanon [Etonogestrel]     Penicillin V Hives     Tolerated ampicillin/sulbactam 08/21/2024     Family History  Family History   Problem Relation Age of Onset    Melanoma No family hx of     Skin Cancer No family hx of      Social History   Social History     Tobacco Use    Smoking status: Never    Smokeless tobacco: Never      Past medical history, past surgical history, medications, allergies, family history, and social history were reviewed with the patient. No additional pertinent items.     A medically appropriate review of systems was performed with pertinent  positives and negatives noted in the HPI, and all other systems negative.    Physical Exam   BP: 95/66  Pulse: (!) 122  Temp: 97.3  F (36.3  C)  Resp: 18  SpO2: 96 %    Vitals:    01/21/25 1146 01/21/25 1200 01/21/25 1436   BP:  95/66 92/65   Pulse: (!) 122  71   Resp: 18  16   Temp: 97.3  F (36.3  C)  97.3  F (36.3  C)   TempSrc: Oral  Oral   SpO2: 96%  98%     Physical Exam  Constitutional:       General: He is not in acute distress.     Appearance: Normal appearance. He is obese. He is not ill-appearing, toxic-appearing or diaphoretic.   Cardiovascular:      Rate and Rhythm: Regular rhythm. Tachycardia present.      Pulses: Normal pulses.      Heart sounds: Normal heart sounds.   Pulmonary:      Effort: Pulmonary effort is normal. No respiratory distress.      Breath sounds: Normal breath sounds. No stridor. No wheezing, rhonchi or rales.   Abdominal:      General: Bowel sounds are normal. There is no distension.      Palpations: Abdomen is soft.      Tenderness: There is no abdominal tenderness.   Musculoskeletal:      Right lower leg: No edema.      Left lower leg: No edema.   Skin:     General: Skin is warm.      Capillary Refill: Capillary refill takes less than 2 seconds.      Coloration: Skin is pale.   Neurological:      General: No focal deficit present.      Mental Status: He is alert.   Psychiatric:         Mood and Affect: Mood normal.         Behavior: Behavior normal.       ED Course, Procedures, & Data     ED Course as of 01/21/25 1655   Tue Jan 21, 2025   1505 Cards II provider paged     Procedures            EKG Interpretation:      Interpreted by Malu Harden PA-C  Time reviewed: 1245  Symptoms at time of EKG: dyspnea   Rhythm: sinus tachycardia  Rate: Tachycardia and 120-130  Axis: Left Axis Deviation  Ectopy: none  Conduction: normal  ST Segments/ T Waves: No acute ischemic changes and Non-specific ST-T wave changes  Q Waves: none  Comparison to prior: Similar findings to  01/16/25    Clinical Impression: no acute changes, non-specific EKG, and sinus tachycardia           Results for orders placed or performed during the hospital encounter of 01/21/25   XR Chest 2 Views     Status: None    Narrative    Exam: XR CHEST 2 VIEWS, 1/21/2025 12:40 PM    Indication: sob, cough, hx chf    Comparison: 1/9/2025.    Findings:   PA and lateral views of the chest. Trachea is midline.  Cardiomediastinal silhouette is stable enlarged. No focal airspace  opacity. Streaky perihilar and bibasilar opacities. No appreciable  pneumothorax or pleural effusion. Visualized portions of the upper  abdomen are unremarkable. No acute osseous abnormality.  Dextroscoliosis of the thoracic spine.      Impression    Impression:  1. Streaky perihilar and bibasilar opacities, favor atelectasis/edema.  No acute airspace disease.  2. Stable cardiomegaly.    I have personally reviewed the examination and initial interpretation  and I agree with the findings.    AZEEM ALEJO MD         SYSTEM ID:  [id number]   Comprehensive metabolic panel     Status: Abnormal   Result Value Ref Range    Sodium 137 135 - 145 mmol/L    Potassium 4.8 3.4 - 5.3 mmol/L    Carbon Dioxide (CO2) 24 22 - 29 mmol/L    Anion Gap 12 7 - 15 mmol/L    Urea Nitrogen 17.2 6.0 - 20.0 mg/dL    Creatinine 0.99 0.67 - 1.17 mg/dL    GFR Estimate >90 >60 mL/min/1.73m2    Calcium 8.7 (L) 8.8 - 10.4 mg/dL    Chloride 101 98 - 107 mmol/L    Glucose 110 (H) 70 - 99 mg/dL    Alkaline Phosphatase 69 40 - 150 U/L    AST 17 0 - 45 U/L    ALT 13 0 - 70 U/L    Protein Total 6.0 (L) 6.4 - 8.3 g/dL    Albumin 3.9 3.5 - 5.2 g/dL    Bilirubin Total 0.9 <=1.2 mg/dL   Nt probnp inpatient (BNP)     Status: Abnormal   Result Value Ref Range    N terminal Pro BNP Inpatient 5,636 (H) 0 - 450 pg/mL   Troponin T, High Sensitivity     Status: Abnormal   Result Value Ref Range    Troponin T, High Sensitivity 23 (H) <=22 ng/L   Influenza A/B, RSV and SARS-CoV2 PCR (COVID-19)  Nasopharyngeal     Status: Normal    Specimen: Nasopharyngeal; Swab   Result Value Ref Range    Influenza A PCR Negative Negative    Influenza B PCR Negative Negative    RSV PCR Negative Negative    SARS CoV2 PCR Negative Negative    Narrative    Testing was performed using the Xpert Xpress CoV2/Flu/RSV Assay on the Cepheid GeneXpert Instrument. This test should be ordered for the detection of SARS-CoV2, influenza, and RSV viruses in individuals with signs and symptoms of respiratory tract infection. This test is for in vitro diagnostic use under the [place] FDA for laboratories certified under CLIA to perform high or moderate complexity testing. This test has been [place] FDA cleared. A negative result does not rule out the presence of PCR inhibitors in the specimen or target RNA in concentration below the limit of detection for the assay. If only one viral target is positive but coinfection with multiple targets is suspected, the sample should be re-tested with another FDA cleared, approved, or authorized test, if coninfection would change clinical management. This test was validated by the [facility] [email or web address]. These laboratories are certified under the Clinical Laboratory Improvement Amendments of 1988 (CLIA-88) as qualified to perfom high complexity laboratory testing.   CBC with platelets and differential     Status: Abnormal   Result Value Ref Range    WBC Count 12.6 (H) 4.0 - 11.0 10e3/uL    RBC Count 6.23 (H) 4.40 - 5.90 10e6/uL    Hemoglobin 16.6 13.3 - 17.7 g/dL    Hematocrit 52.3 40.0 - 53.0 %    MCV 84 78 - 100 fL    MCH 26.6 26.5 - 33.0 pg    MCHC 31.7 31.5 - 36.5 g/dL    RDW 16.7 (H) 10.0 - 15.0 %    Platelet Count 220 150 - 450 10e3/uL    % Neutrophils 64 %    % Lymphocytes 24 %    % Monocytes 11 %    % Eosinophils 1 %    % Basophils 1 %    % Immature Granulocytes 0 %    NRBCs per 100 WBC 0 <1 /100    Absolute Neutrophils 8.1 1.6 - 8.3 10e3/uL    Absolute Lymphocytes 3.0 0.8 - 5.3 10e3/uL    Absolute  Monocytes 1.3 0.0 - 1.3 10e3/uL    Absolute Eosinophils 0.1 0.0 - 0.7 10e3/uL    Absolute Basophils 0.1 0.0 - 0.2 10e3/uL    Absolute Immature Granulocytes 0.1 <=0.4 10e3/uL    Absolute NRBCs 0.0 10e3/uL   EKG 12-lead, tracing only     Status: None   Result Value Ref Range    Systolic Blood Pressure  mmHg    Diastolic Blood Pressure  mmHg    Ventricular Rate 125 BPM    Atrial Rate 125 BPM    MT Interval 160 ms    QRS Duration 84 ms     ms    QTc 424 ms    P Axis 56 degrees    R AXIS -72 degrees    T Axis 71 degrees    Interpretation ECG       Sinus tachycardia  Left axis deviation  Pulmonary disease pattern  Septal infarct , age undetermined  Inferior infarct , age undetermined  Abnormal ECG  Unconfirmed report - interpretation of this ECG is computer generated - see medical record for final interpretation    Confirmed by - EMERGENCY ROOM, PHYSICIAN ([id number]),  ANDRES ALLISON ([id number]) on 1/21/2025 2:14:54 PM     CBC with platelets differential     Status: Abnormal    Narrative    The following orders were created for panel order CBC with platelets differential.  Procedure                               Abnormality         Status                     ---------                               -----------         ------                     CBC with platelets and d...[[id number]]  Abnormal            Final result                 Please view results for these tests on the individual orders.     Medications   furosemide (LASIX) injection 60 mg (60 mg Intravenous $Given 1/21/25 2192)     Labs Ordered and Resulted from Time of ED Arrival to Time of ED Departure   COMPREHENSIVE METABOLIC PANEL - Abnormal       Result Value    Sodium 137      Potassium 4.8      Carbon Dioxide (CO2) 24      Anion Gap 12      Urea Nitrogen 17.2      Creatinine 0.99      GFR Estimate >90      Calcium 8.7 (*)     Chloride 101      Glucose 110 (*)     Alkaline Phosphatase 69      AST 17      ALT 13      Protein Total 6.0 (*)     Albumin 3.9       Bilirubin Total 0.9     NT PROBNP INPATIENT - Abnormal    N terminal Pro BNP Inpatient 5,636 (*)    TROPONIN T, HIGH SENSITIVITY - Abnormal    Troponin T, High Sensitivity 23 (*)    CBC WITH PLATELETS AND DIFFERENTIAL - Abnormal    WBC Count 12.6 (*)     RBC Count 6.23 (*)     Hemoglobin 16.6      Hematocrit 52.3      MCV 84      MCH 26.6      MCHC 31.7      RDW 16.7 (*)     Platelet Count 220      % Neutrophils 64      % Lymphocytes 24      % Monocytes 11      % Eosinophils 1      % Basophils 1      % Immature Granulocytes 0      NRBCs per 100 WBC 0      Absolute Neutrophils 8.1      Absolute Lymphocytes 3.0      Absolute Monocytes 1.3      Absolute Eosinophils 0.1      Absolute Basophils 0.1      Absolute Immature Granulocytes 0.1      Absolute NRBCs 0.0     INFLUENZA A/B, RSV AND SARS-COV2 PCR - Normal    Influenza A PCR Negative      Influenza B PCR Negative      RSV PCR Negative      SARS CoV2 PCR Negative     TROPONIN T, HIGH SENSITIVITY     XR Chest 2 Views   Final Result   Impression:   1. Streaky perihilar and bibasilar opacities, favor atelectasis/edema.   No acute airspace disease.   2. Stable cardiomegaly.      I have personally reviewed the examination and initial interpretation   and I agree with the findings.      AZEEM ALEJO MD            SYSTEM ID:  [id number]             Critical care was not performed.     Medical Decision Making  The patient's presentation was of moderate complexity (a chronic illness mild to moderate exacerbation, progression, or side effect of treatment).    The patient's evaluation involved:  review of external note(s) from 2 sources (previous admissions and ED encounters; [facility] visit 1/16/25)  review of 3+ test result(s) ordered prior to this encounter (chest x-ray 1/9/2025, previous troponin and BNP)  ordering and/or review of 3+ test(s) in this encounter (see separate area of note for details)  discussion of management or test interpretation with another health professional  ([facility] staff)    The patient's management necessitated moderate risk (prescription drug management including medications given in the ED) and high risk (a decision regarding hospitalization).    Assessment & Plan    Terrance is a 24-year-old male that presented to the ED with concerns of fluid overload and weight gain.  Patient in no acute distress and nontoxic-appearing on initial evaluation.  Tachycardia in the 120s but otherwise acceptable vital signs without hypoxia, fever, blood pressure 95/66.  No adventitious lung sounds on auscultation.  No abdominal distention or tenderness on palpation.  No lower extremity swelling or edema. Chest x-ray notable for streaky perihilar and bibasilar opacities that favor atelectasis or edema but without any other acute airspace disease and stable cardiomegaly.  Troponin 23 which appears similar to previous.  BNP 5000 636 which also appears similar to baseline.  Stable creatinine 0.99.  Mild leukocytosis of 12.6 the patient has had in the past.  COVID, influenza, RSV negative.  Discussed patient presentation and ED evaluation with [facility] staff over the phone who recommended 1 dose of IV Lasix 60 mg and starting patient on the recommended Lasix 60 mg twice daily that was noted and recommended at his most recent cardiology visit but was not prescribed this dose.  Discussed lab and imaging results with the patient and his aunt/legal guardian as well as cardiology recommendations for which they voiced understanding and agreeability.  IV Lasix 60 mg administered to the patient.  Otherwise at this time, patient stable for discharge home with new medication recommendations and instructions.  Strict return precautions given and discussed at length with the patient and his legal guardian.  Rx Lasix 20 mg twice daily in addition to his current 40 mg twice daily for a total of 60 mg twice daily.  Advised continuing cardiac diet and fluid restriction as instructed by cardiology office.   Advise close follow-up with his new cardiology office after he moves this weekend.  Patient and legal guardian were agreeable to the discharge plan, voiced understanding, and all questions answered prior to discharge.    I have reviewed the nursing notes. I have reviewed the findings, diagnosis, plan and need for follow up with the patient.    New Prescriptions    FUROSEMIDE (LASIX) 20 MG TABLET    Take 1 tablet (20 mg) by mouth 2 times daily. Take in addition to 40 mg tablet for total of 60 mg twice a day       Final diagnoses:   Heart failure with reduced ejection fraction (H)     LETICIA Pink MD  [facility] EMERGENCY DEPARTMENT  1/21/2025     Malu Harden PA-C  01/21/25 3299

## 2025-01-21 NOTE — DISCHARGE INSTRUCTIONS
Begin Lasix 20 mg tablet in addition to 40 mg tablet for a total of 60 mg twice a day  Follow-up with cardiology this week as needed before you move  Otherwise, follow-up with your new cardiology office next week  Continue to monitor weight daily and follow cardiac diet and fluid restriction as recommended by cardiology  Otherwise, do not hesitate to seek urgent or emergent reevaluation if you have any worsening or concerning signs or symptoms

## 2025-01-22 ENCOUNTER — DOCUMENTATION ONLY (OUTPATIENT)
Dept: OTHER | Facility: CLINIC | Age: 25
End: 2025-01-22
Payer: MEDICARE

## 2025-01-22 ENCOUNTER — HOSPITAL ENCOUNTER (OUTPATIENT)
Dept: CARDIAC REHAB | Facility: CLINIC | Age: 25
Discharge: HOME | End: 2025-01-22
Attending: INTERNAL MEDICINE
Payer: MEDICARE

## 2025-01-22 ENCOUNTER — TELEPHONE (OUTPATIENT)
Dept: CARDIOLOGY | Facility: CLINIC | Age: 25
End: 2025-01-22
Payer: MEDICARE

## 2025-01-22 DIAGNOSIS — Z78.9 TAKES DIETARY SUPPLEMENTS: ICD-10-CM

## 2025-01-22 DIAGNOSIS — I50.20 HEART FAILURE WITH REDUCED EJECTION FRACTION, NYHA CLASS II (H): ICD-10-CM

## 2025-01-22 PROCEDURE — 93798 PHYS/QHP OP CAR RHAB W/ECG: CPT | Performed by: REHABILITATION PRACTITIONER

## 2025-01-22 RX ORDER — SACUBITRIL AND VALSARTAN 24; 26 MG/1; MG/1
0.5 TABLET, FILM COATED ORAL 2 TIMES DAILY
Qty: 180 TABLET | Refills: 3 | Status: SHIPPED | OUTPATIENT
Start: 2025-01-22

## 2025-01-22 RX ORDER — SPIRONOLACTONE 25 MG/1
25 TABLET ORAL DAILY
Qty: 90 TABLET | Refills: 3 | Status: SHIPPED | OUTPATIENT
Start: 2025-01-22

## 2025-01-22 RX ORDER — MULTIPLE VITAMINS W/ MINERALS TAB 9MG-400MCG
1 TAB ORAL DAILY
Qty: 90 TABLET | Refills: 3 | Status: SHIPPED | OUTPATIENT
Start: 2025-01-22

## 2025-01-22 RX ORDER — LANOLIN ALCOHOL/MO/W.PET/CERES
100 CREAM (GRAM) TOPICAL DAILY
Qty: 90 TABLET | Refills: 3 | Status: SHIPPED | OUTPATIENT
Start: 2025-01-22

## 2025-01-22 NOTE — TELEPHONE ENCOUNTER
[facility] Call Center    Phone Message    May a detailed message be left on voicemail: yes     Reason for Call: Medication Refill Request    Has the patient contacted the pharmacy for the refill? Yes     Name of medication being requested:   sacubitril-valsartan (ENTRESTO) 24-26 MG per tablet [211655] (Order [id number])   spironolactone (ALDACTONE) 25 MG tablet [[id number]] (Order [id number]  multivitamin w/minerals (THERA-VIT-M) tablet [84589] (Order [id number])  thiamine (B-1) 100 MG tablet [02198] (Order [id number]     Provider who prescribed the medication: Mario     Pharmacy: [place], [place] - [facility]    Date medication is needed: pt is out (2 days worth)--[place].        Action Taken: Other: cardiology     Travel Screening: Not Applicable        Thank you!  [facility]

## 2025-01-23 ENCOUNTER — TELEPHONE (OUTPATIENT)
Dept: CARDIOLOGY | Facility: CLINIC | Age: 25
End: 2025-01-23
Payer: MEDICARE

## 2025-01-23 NOTE — TELEPHONE ENCOUNTER
[facility] Call Center    Phone Message    May a detailed message be left on voicemail: yes     Reason for Call: Other: Guerda stated that she spoke with the [facility] and they cannot accept the referral that was sent because it did not have the provider's signature on it. Pt needs a signed referral sent to the [facility]. Please re-send with provider signature. Guerda would like to be notified once the referral is sent. Guerda stated they need the referral sent ASAP as pt has appt with them next week.      Action Taken: Other: Cardiology    Travel Screening: Not Applicable     Thank you!  [facility]

## 2025-01-23 NOTE — TELEPHONE ENCOUNTER
Health Call Center    Phone Message    May a detailed message be left on voicemail: yes    Reason for Call: Aunt called on behalf of the patient requesting for a referral to be faxed to [facility] from [facility]. Please call when fax is complete.    Fax #: [phone]    Thank you!  [facility]

## 2025-01-24 ENCOUNTER — HOSPITAL ENCOUNTER (OUTPATIENT)
Dept: CARDIAC REHAB | Facility: CLINIC | Age: 25
Discharge: HOME | End: 2025-01-24
Attending: INTERNAL MEDICINE
Payer: MEDICARE

## 2025-01-24 PROCEDURE — 93798 PHYS/QHP OP CAR RHAB W/ECG: CPT | Performed by: REHABILITATION PRACTITIONER

## 2025-01-26 ENCOUNTER — DOCUMENTATION ONLY (OUTPATIENT)
Dept: OTHER | Facility: CLINIC | Age: 25
End: 2025-01-26
Payer: MEDICARE

## 2025-02-06 ENCOUNTER — CARE COORDINATION (OUTPATIENT)
Dept: CARDIOLOGY | Facility: CLINIC | Age: 25
End: 2025-02-06
Payer: MEDICARE

## 2025-02-06 NOTE — PROGRESS NOTES
Received message that patient was requesting to cancel his right heart cath related to patient now living in [facility] [facility]. Called listed number to confirm. Person who answered the phone states that the patient is now living in [place]. Right cath appointment canceled.

## (undated) DEVICE — PACK HEART LEFT CUSTOM

## (undated) DEVICE — INTRO CATH 40CM 7FR FST-CATH

## (undated) DEVICE — KIT MICROINTRODUCER VASCULAR VSI 4FRX0.018INX40CM 7195X

## (undated) DEVICE — PACK HEART RIGHT CUSTOM SAN32RHF18

## (undated) DEVICE — WIRE GUIDE 0.035"X150CM EMERALD J TIP 502521

## (undated) DEVICE — INTRO SHEATH 7FRX25CM PINNACLE RSS706

## (undated) DEVICE — INTRO SHEATH 9FRX10CM PINNACLE RSS902

## (undated) DEVICE — KIT MICROINTRODUCER VASCULAR  4FRX21GAX4CM

## (undated) DEVICE — Device

## (undated) DEVICE — CATH ANGIO SUPERTORQUE PLUS JR4 6FRX100CM 533621

## (undated) DEVICE — KIT RIGHT HEART CATH 60130719

## (undated) DEVICE — 0.035IN X 145CM INQWIRE AMPLATZ DIAGNOSTIC GUIDE WIRE, 3MM J-TIP

## (undated) DEVICE — FORCEP ENDOMYOCARDIAL BIOPSY STRAIGHT 6FRX50CM 190060

## (undated) DEVICE — DEVICE STATLOCK INTRA-AORTIC BALL PUMP 0684-00-0472

## (undated) DEVICE — RAD CLOSURE ANGIOSEAL 8FR  610131

## (undated) DEVICE — INTRO SHEATH 7FRX10CM PINNACLE RSS702

## (undated) DEVICE — INTRO SHTH 7FR 12CM DIL GW LL HUB FSTCTH .038IN

## (undated) DEVICE — GW VASC .035IN DIA 260CML 7CML 3 MM RADIUS J CURVE 502455

## (undated) DEVICE — SHEATH PNCL 25CM 8FR NO WR